# Patient Record
Sex: FEMALE | Race: WHITE | NOT HISPANIC OR LATINO | Employment: FULL TIME | URBAN - NONMETROPOLITAN AREA
[De-identification: names, ages, dates, MRNs, and addresses within clinical notes are randomized per-mention and may not be internally consistent; named-entity substitution may affect disease eponyms.]

---

## 2017-01-05 ENCOUNTER — OFFICE VISIT (OUTPATIENT)
Dept: GASTROENTEROLOGY | Facility: CLINIC | Age: 57
End: 2017-01-05

## 2017-01-05 VITALS
HEART RATE: 78 BPM | BODY MASS INDEX: 32.13 KG/M2 | OXYGEN SATURATION: 98 % | SYSTOLIC BLOOD PRESSURE: 128 MMHG | WEIGHT: 159.4 LBS | HEIGHT: 59 IN | DIASTOLIC BLOOD PRESSURE: 86 MMHG

## 2017-01-05 DIAGNOSIS — Z80.0 FAMILY HISTORY OF COLON CANCER: ICD-10-CM

## 2017-01-05 DIAGNOSIS — K63.5 COLON POLYPS: Primary | ICD-10-CM

## 2017-01-05 PROCEDURE — S0260 H&P FOR SURGERY: HCPCS | Performed by: NURSE PRACTITIONER

## 2017-01-05 RX ORDER — SODIUM, POTASSIUM,MAG SULFATES 17.5-3.13G
2 SOLUTION, RECONSTITUTED, ORAL ORAL ONCE
Qty: 2 BOTTLE | Refills: 0 | Status: SHIPPED | OUTPATIENT
Start: 2017-01-05 | End: 2017-01-05

## 2017-01-05 NOTE — MR AVS SNAPSHOT
Chasidy Tejada   1/5/2017 1:15 PM   Office Visit    Dept Phone:  [phone]   Encounter #:  [id number]    Provider:  SANDRA Collado   Department:  [facility] GASTROENTEROLOGY                Your Full Care Plan              Today's Medication Changes          These changes are accurate as of: 1/5/17  1:38 PM.  If you have any questions, ask your nurse or doctor.               New Medication(s)Ordered:     sodium-potassium-magnesium sulfates solution oral solution   Commonly known as:  SUPREP BOWEL PREP   Take 2 bottles by mouth 1 (One) Time for 1 dose. Split dose prep as directed by office instructions provided.  2 bottles = one kit.   Started by:  SANDRA Collado            Where to Get Your Medications      These medications were sent to [facility] [address] [place] [address] AT  60 - [phone]  - [phone] [address] [address]     Phone:  [phone]     sodium-potassium-magnesium sulfates solution oral solution                  Your Updated Medication List          This list is accurate as of: 1/5/17  1:38 PM.  Always use your most recent med list.                aspirin 81 MG EC tablet       calcium carbonate 600 MG tablet   Commonly known as:  OS-ORTIZ       estrogens (conjugated)-methyltestosterone 0.625-1.25 MG per tablet   Commonly known as:  ESTRATEST HS       MULTIVITAMIN ADULT PO       sodium-potassium-magnesium sulfates solution oral solution   Commonly known as:  SUPREP BOWEL PREP   Take 2 bottles by mouth 1 (One) Time for 1 dose. Split dose prep as directed by office instructions provided.  2 bottles = one kit.               We Performed the Following     Case request       You Were Diagnosed With        Codes Comments    Colon polyps    -  Primary ICD-10-CM: K63.5  ICD-9-CM: 211.3     Family history of colon cancer     ICD-10-CM: Z80.0  ICD-9-CM: V16.0 maternal grandmother      Instructions     None    Patient  "Instructions History      Upcoming Appointments     Visit Type Date Time Department    SCOPE SCREENING 1/5/2017  1:15 PM [facility]      MyChart Signup     Our records indicate that you have declined [facility] sign[facility]. If you would like to sign up for [employer], please email [email or web address] or call [phone] to obtain an activation code.             Other Info from Your Visit           Allergies     Demerol [Meperidine]      Morphine And Related        Reason for Visit     Colon Cancer Screening Patient states she currently is not having any problems.  Has [place] history of [place] [place]      Vital Signs     Blood Pressure Pulse Height Weight Oxygen Saturation Body Mass Index    128/86 78 59\" (149.9 cm) 159 lb 6.4 oz (72.3 kg) 98% 32.19 kg/m2    Smoking Status                   Never Smoker           Problems and Diagnoses Noted     Colon polyp    Family history of colon cancer        "

## 2017-01-05 NOTE — PROGRESS NOTES
Chief Complaint   Patient presents with   • Colon Cancer Screening     Patient states she currently is not having any problems.  Has family history of Colon Cancer -MGM     Subjective   HPI  Chasidy Tejada is a 56 y.o. female who presents as a referral for preventative maintenance. She has no complaints of nausea or vomiting. No change in bowels. No wt loss. No BRBPR. No melena. There is a family hx for colon cancer with maternal grandmother. No abdominal pain.Last colonoscopy 9/5/12 with one 13 mm polyp in rectum.  Past Medical History   Diagnosis Date   • Asthma      Past Surgical History   Procedure Laterality Date   • Tonsillectomy     • Appendectomy     • Cholecystectomy     • Colonoscopy  09/05/2012   • Hysterectomy         Current Outpatient Prescriptions:   •  aspirin 81 MG EC tablet, Take 81 mg by mouth Daily., Disp: , Rfl:   •  Multiple Vitamins-Minerals (MULTIVITAMIN ADULT PO), Take  by mouth Daily., Disp: , Rfl:   •  calcium carbonate (OS-ORTIZ) 600 MG tablet, Take 600 mg by mouth Daily., Disp: , Rfl:   •  estrogens, conjugated,-methyltestosterone (ESTRATEST HS) 0.625-1.25 MG per tablet, Take 1 tablet by mouth Daily., Disp: , Rfl:   •  sodium-potassium-magnesium sulfates (SUPREP BOWEL PREP) solution oral solution, Take 2 bottles by mouth 1 (One) Time for 1 dose. Split dose prep as directed by office instructions provided.  2 bottles = one kit., Disp: 2 bottle, Rfl: 0  Allergies   Allergen Reactions   • Demerol [Meperidine]    • Morphine And Related      Social History     Social History   • Marital status:      Spouse name: N/A   • Number of children: N/A   • Years of education: N/A     Occupational History   • Not on file.     Social History Main Topics   • Smoking status: Never Smoker   • Smokeless tobacco: Not on file   • Alcohol use No   • Drug use: Not on file   • Sexual activity: Not on file     Other Topics Concern   • Not on file     Social History Narrative     Family History   Problem  "Relation Age of Onset   • Colon cancer Maternal Grandmother      age not known   • Colon polyps Neg Hx    • Esophageal cancer Neg Hx    • Liver cancer Neg Hx    • Liver disease Neg Hx    • Rectal cancer Neg Hx    • Stomach cancer Neg Hx        REVIEW OF SYSTEMS  General: well appearing, no fever chills or sweats, no unexplained wt loss  HEENT: no acute visual or hearing disturbances  Cardiovascular: No chest pain or palpitations  Pulmonary: No shortness of breath, coughing, wheezing or hemoptysis  : No burning, urgency, hematuria, or dysuria  Musculoskeletal: No joint pain or stiffness  Peripheral: no edema  Skin: No lesions or rashes  Neuro: No dizziness, headaches, stroke, syncope  Endocrine: No hot or cold intolerances  Hematological: No blood dyscrasias    Objective   Vitals:    01/05/17 1310   BP: 128/86   Pulse: 78   SpO2: 98%   Weight: 159 lb 6.4 oz (72.3 kg)   Height: 59\" (149.9 cm)     Body mass index is 32.19 kg/(m^2).    PHYSICAL EXAM  General: age appropriate well nourished well appearing, no acute distress  Head: normocephalic and atraumatic  Global assessment-supple  Neck-No JVD noted, no lymphadenopathy  Pulmonary-clear to auscultation bilaterally, normal respiratory effort  Cardiovascular-normal rate and rhythm, normal heart sounds, S1 and S2 noted  Abdomen-soft, non tender, non distended, normal bowel sounds all 4 quadrants, no hepatosplenomegaly noted  Extremities-No clubbing cyanosis or edema  Neuro-Non focal, converses appropriately, awake, alert, oriented    Imaging Results (most recent)     None        Assessment/Plan   Chasidy was seen today for colon cancer screening.    Diagnoses and all orders for this visit:    Colon polyps  -     Case request colonoscopy     Family history of colon cancer  Comments:  maternal grandmother  Orders:  -     Case request    Other orders  -     sodium-potassium-magnesium sulfates (SUPREP BOWEL PREP) solution oral solution; Take 2 bottles by mouth 1 (One) Time " for 1 dose. Split dose prep as directed by office instructions provided.  2 bottles = one kit.    COLONOSCOPY WITH ANESTHESIA (N/A)  No Follow-up on file.  There are no Patient Instructions on file for this visit.    All risks, benefits, alternatives, and indications of colonoscopy procedure have been discussed with the patient. Risks to include perforation of the colon requiring possible surgery or colostomy, risk of bleeding from biopsies or removal of colon tissue, possibility of missing a colon polyp or cancer, or adverse drug reaction.  Benefits to include the diagnosis and management of disease of the colon and rectum. Alternatives to include barium enema, radiographic evaluation, lab testing or no intervention. Pt verbalizes understanding and agrees.

## 2017-02-08 ENCOUNTER — ANESTHESIA EVENT (OUTPATIENT)
Dept: GASTROENTEROLOGY | Facility: HOSPITAL | Age: 57
End: 2017-02-08

## 2017-02-10 ENCOUNTER — HOSPITAL ENCOUNTER (OUTPATIENT)
Facility: HOSPITAL | Age: 57
Setting detail: HOSPITAL OUTPATIENT SURGERY
Discharge: HOME | End: 2017-02-10
Attending: INTERNAL MEDICINE | Admitting: INTERNAL MEDICINE

## 2017-02-10 ENCOUNTER — ANESTHESIA (OUTPATIENT)
Dept: GASTROENTEROLOGY | Facility: HOSPITAL | Age: 57
End: 2017-02-10

## 2017-02-10 VITALS
OXYGEN SATURATION: 100 % | HEIGHT: 59 IN | BODY MASS INDEX: 31.85 KG/M2 | HEART RATE: 79 BPM | DIASTOLIC BLOOD PRESSURE: 71 MMHG | TEMPERATURE: 97.1 F | RESPIRATION RATE: 15 BRPM | SYSTOLIC BLOOD PRESSURE: 114 MMHG | WEIGHT: 158 LBS

## 2017-02-10 DIAGNOSIS — K63.5 COLON POLYPS: ICD-10-CM

## 2017-02-10 DIAGNOSIS — Z80.0 FAMILY HISTORY OF COLON CANCER: ICD-10-CM

## 2017-02-10 PROCEDURE — 25010000002 PROPOFOL 10 MG/ML EMULSION: Performed by: NURSE ANESTHETIST, CERTIFIED REGISTERED

## 2017-02-10 PROCEDURE — 88305 TISSUE EXAM BY PATHOLOGIST: CPT | Performed by: INTERNAL MEDICINE

## 2017-02-10 RX ORDER — SODIUM CHLORIDE 0.9 % (FLUSH) 0.9 %
1-10 SYRINGE (ML) INJECTION AS NEEDED
Status: CANCELLED | OUTPATIENT
Start: 2017-02-10

## 2017-02-10 RX ORDER — SODIUM CHLORIDE 0.9 % (FLUSH) 0.9 %
1-10 SYRINGE (ML) INJECTION AS NEEDED
Status: DISCONTINUED | OUTPATIENT
Start: 2017-02-10 | End: 2017-02-10 | Stop reason: HOSPADM

## 2017-02-10 RX ORDER — SODIUM CHLORIDE 9 MG/ML
100 INJECTION, SOLUTION INTRAVENOUS CONTINUOUS
Status: CANCELLED | OUTPATIENT
Start: 2017-02-10

## 2017-02-10 RX ORDER — PROPOFOL 10 MG/ML
VIAL (ML) INTRAVENOUS AS NEEDED
Status: DISCONTINUED | OUTPATIENT
Start: 2017-02-10 | End: 2017-02-10 | Stop reason: SURG

## 2017-02-10 RX ORDER — SODIUM CHLORIDE 9 MG/ML
100 INJECTION, SOLUTION INTRAVENOUS CONTINUOUS
Status: DISCONTINUED | OUTPATIENT
Start: 2017-02-10 | End: 2017-02-10 | Stop reason: HOSPADM

## 2017-02-10 RX ORDER — LIDOCAINE HYDROCHLORIDE 20 MG/ML
INJECTION, SOLUTION INFILTRATION; PERINEURAL AS NEEDED
Status: DISCONTINUED | OUTPATIENT
Start: 2017-02-10 | End: 2017-02-10 | Stop reason: SURG

## 2017-02-10 RX ADMIN — LIDOCAINE HYDROCHLORIDE 50 MG: 20 INJECTION, SOLUTION INFILTRATION; PERINEURAL at 08:28

## 2017-02-10 RX ADMIN — PROPOFOL 50 MG: 10 INJECTION, EMULSION INTRAVENOUS at 08:30

## 2017-02-10 RX ADMIN — PROPOFOL 50 MG: 10 INJECTION, EMULSION INTRAVENOUS at 08:40

## 2017-02-10 RX ADMIN — PROPOFOL 100 MG: 10 INJECTION, EMULSION INTRAVENOUS at 08:28

## 2017-02-10 RX ADMIN — PROPOFOL 50 MG: 10 INJECTION, EMULSION INTRAVENOUS at 08:33

## 2017-02-10 RX ADMIN — PROPOFOL 50 MG: 10 INJECTION, EMULSION INTRAVENOUS at 08:34

## 2017-02-10 RX ADMIN — SODIUM CHLORIDE 100 ML/HR: 9 INJECTION, SOLUTION INTRAVENOUS at 07:02

## 2017-02-10 NOTE — ANESTHESIA POSTPROCEDURE EVALUATION
Patient: Chasidy Tejada    Procedure Summary     Date Anesthesia Start Anesthesia Stop Room / Location    02/10/17 0823 0846  [facility] END[facility]COPY 5 / [facility]       Procedure Diagnosis Surgeon Provider    COLONOSCOPY WITH ANESTHESIA (N/A ) Family history of colon cancer; Colon polyps  (Family history of colon cancer [Z80.0]; Colon polyps [K63.5]) MD Rois Shepherd CRNA          Anesthesia Type: general  Last vitals  BP      Temp      Pulse     Resp      SpO2        Post Anesthesia Care and Evaluation    Patient location during evaluation: PHASE II  Patient participation: complete - patient participated  Level of consciousness: awake and alert  Pain score: 0  Pain management: adequate  Airway patency: patent  Anesthetic complications: No anesthetic complications    Cardiovascular status: acceptable, hemodynamically stable and stable  Respiratory status: acceptable and room air  Hydration status: acceptable

## 2017-02-10 NOTE — PLAN OF CARE
Problem: Patient Care Overview (Adult)  Goal: Plan of Care Review  Outcome: Outcome(s) achieved Date Met:  02/10/17    02/10/17 0851   Coping/Psychosocial Response Interventions   Plan Of Care Reviewed With patient;spouse   Outcome Evaluation   Outcome Summary/Follow up Plan d/c criteria met

## 2017-02-10 NOTE — H&P
"    Chief Complaint:   Colon polyp    Subjective     HPI:   She had a 1.3 cm colon polyp removed from her rectum in 2012.    Past Medical History:   Past Medical History   Diagnosis Date   • Asthma        Past Surgical History:  [unfilled]    Family History:  Family History   Problem Relation Age of Onset   • Colon cancer Maternal Grandmother      age not known   • Colon polyps Neg Hx    • Esophageal cancer Neg Hx    • Liver cancer Neg Hx    • Liver disease Neg Hx    • Rectal cancer Neg Hx    • Stomach cancer Neg Hx        Social History:   reports that she has never smoked. She has never used smokeless tobacco. She reports that she does not drink alcohol or use illicit drugs.    Medications:   Prescriptions Prior to Admission   Medication Sig Dispense Refill Last Dose   • calcium carbonate (OS-ORTIZ) 600 MG tablet Take 600 mg by mouth Daily.   2/3/2017 at Unknown time   • aspirin 81 MG EC tablet Take 81 mg by mouth Daily.   2/3/2017   • estrogens, conjugated,-methyltestosterone (ESTRATEST HS) 0.625-1.25 MG per tablet Take 1 tablet by mouth Daily.   Unknown at Unknown time   • Multiple Vitamins-Minerals (MULTIVITAMIN ADULT PO) Take  by mouth Daily.   2/3/2017       Allergies:  Demerol [meperidine] and Morphine and related    ROS:    General: Weight stable  Resp: No SOA  Cardiovascular: No CP      Objective     Visit Vitals   • /89 (BP Location: Right arm, Patient Position: Sitting)   • Pulse 90   • Temp 97.1 °F (36.2 °C) (Temporal Artery )   • Resp 20   • Ht 59\" (149.9 cm)   • Wt 158 lb (71.7 kg)   • SpO2 98%   • BMI 31.91 kg/m2       Physical Exam   Constitutional: Pt is oriented to person, place, and in no distress.  Eyes: No icterus  ENMT: Unremarkable   Cardiovascular: Normal rate, regular rhythm.    Pulmonary/Chest: No distress.  No audible wheezes  Abdominal: Soft.   Skin: Skin is warm and dry.   Psychiatric: Mood, memory, affect and judgment appear normal.     Assessment/Plan     Diagnosis:  Colon " polyp    Anticipated Surgical Procedure:  Colonoscopy    The risks, benefits, and alternatives of this procedure have been discussed with the patient or the responsible party- the patient understands and agrees to proceed.    The risks, benefits, and alternatives of colonoscopy were reviewed with the patient today.  Risks including perforation of the colon possibly requiring surgery or colostomy.  Additional risks include risk of bleeding from biopsies or removal of colon tissue.  There is also the risk of a drug reaction or problems with anesthesia.  This will be discussed with the further by the anesthesia team on the day of the procedure.  Lastly there is a possibility of missing a colon polyp or cancer.  The benefits include the diagnosis and management of disease of the colon and rectum.  Alternatives to colonoscopy include barium enema, laboratory testing, radiographic evaluation, or no intervention.  The patient verbalizes understanding and agrees.

## 2017-02-10 NOTE — PLAN OF CARE
Problem: Patient Care Overview (Adult)  Goal: Discharge Needs Assessment  Outcome: Outcome(s) achieved Date Met:  02/10/17    02/10/17 0851   Discharge Needs Assessment   Concerns To Be Addressed no discharge needs identified   Equipment Needed After Discharge none   Discharge Disposition home or self-care   Current Health   Anticipated Changes Related to Illness none   Self-Care   Equipment Currently Used at Home none   Living Environment   Transportation Available car

## 2017-02-10 NOTE — PLAN OF CARE
Problem: Patient Care Overview (Adult)  Goal: Adult Individualization and Mutuality  Outcome: Outcome(s) achieved Date Met:  02/10/17    02/10/17 0851   Individualization   Patient Specific Preferences none   Patient Specific Goals none   Patient Specific Interventions none   Mutuality/Individual Preferences   What Anxieties, Fears or Concerns Do You Have About Your Health or Care? none   What Questions Do You Have About Your Health or Care? none   What Information Would Help Us Give You More Personalized Care? none

## 2017-02-10 NOTE — ANESTHESIA PREPROCEDURE EVALUATION
Anesthesia Evaluation     Patient summary reviewed and Nursing notes reviewed   NPO Status: > 8 hours   Airway   Mallampati: II  TM distance: >3 FB  no difficulty expected  Dental - normal exam     Pulmonary - normal exam    breath sounds clear to auscultation  (+) asthma,   Cardiovascular - negative cardio ROS and normal exam  Exercise tolerance: good (4-7 METS)    Rhythm: regular  Rate: normal        Neuro/Psych- negative ROS  GI/Hepatic/Renal/Endo - negative ROS     Musculoskeletal (-) negative ROS    Abdominal     Abdomen: soft.  Bowel sounds: normal.   Substance History - negative use     OB/GYN negative ob/gyn ROS         Other - negative ROS                                   Anesthesia Plan    ASA 1     general     intravenous induction

## 2017-02-13 LAB
CYTO UR: NORMAL
LAB AP CASE REPORT: NORMAL
LAB AP CLINICAL INFORMATION: NORMAL
Lab: NORMAL
PATH REPORT.FINAL DX SPEC: NORMAL
PATH REPORT.GROSS SPEC: NORMAL

## 2018-04-16 NOTE — PROGRESS NOTES
YOB: 1960  Location: [facility]  Location Address: [address] [place] [address], [facility], [address] [place] [address]  Location Phone: [phone]    Chief Complaint   Patient presents with   • Difficulty Swallowing     dysphagia       History of Present Illness  Chasidy Tejada is a 57 y.o. female.  Chasidy Tejada is here for evaluation of ENT complaints. The patient has had problems with hoarseness, voice change, dysphagia and a globus sensation  The symptoms are not localized to a particular location. The patient has had moderate symptoms. The symptoms have been present for the last 6 months There have been no identified factors that aggravate the symptoms. There have been no factors that have improved the symptoms. Patient denies loss of smell, nasal congestion, nasal drainage, postnasal drainage, ear complaints, dry mouth and reflux.  Patient is not on any medications and has not had any testing.        Past Medical History:   Diagnosis Date   • Asthma        Past Surgical History:   Procedure Laterality Date   • APPENDECTOMY     • CHOLECYSTECTOMY     • COLONOSCOPY  2012   • COLONOSCOPY N/A 2/10/2017    Procedure: COLONOSCOPY WITH ANESTHESIA;  Surgeon: Elina Gonsalez MD;  Location: [facility];  Service:    • HYSTERECTOMY     • TONSILLECTOMY         Outpatient Prescriptions Marked as Taking for the 18 encounter (Office Visit) with Miah Lainez MD   Medication Sig Dispense Refill   • aspirin 81 MG EC tablet Take 81 mg by mouth Daily.     • Multiple Vitamins-Minerals (MULTIVITAMIN ADULT PO) Take  by mouth Daily.         Demerol [meperidine] and Morphine and related    Family History   Problem Relation Age of Onset   • Colon cancer Maternal Grandmother      age not known   • Colon polyps Neg Hx    • Esophageal cancer Neg Hx    • Liver cancer Neg Hx    • Liver disease Neg Hx    • Rectal cancer Neg Hx    • Stomach cancer Neg Hx        Social History     Social History   • Marital status:       Spouse name: N/A   • Number of children: N/A   • Years of education: N/A     Occupational History   • Not on file.     Social History Main Topics   • Smoking status: Never Smoker   • Smokeless tobacco: Never Used   • Alcohol use No   • Drug use: No   • Sexual activity: Not on file     Other Topics Concern   • Not on file     Social History Narrative   • No narrative on file       Review of Systems   Constitutional: Negative.    HENT: Positive for trouble swallowing and voice change.         Globus sensation   Eyes: Negative.    Respiratory: Positive for choking.    Cardiovascular: Negative.    Gastrointestinal: Negative.    Endocrine: Negative.    Genitourinary: Negative.    Musculoskeletal: Negative.    Skin: Negative.    Allergic/Immunologic: Negative.    Neurological: Negative.    Hematological: Negative.    Psychiatric/Behavioral: Negative.        Vitals:    04/17/18 1545   BP: 140/80   Pulse: 76   Temp: 97.3 °F (36.3 °C)       Body mass index is 30.9 kg/m².    Objective     Physical Exam  CONSTITUTIONAL: Mildly overweight, well nourished, alert, oriented, in no acute distress     COMMUNICATION AND VOICE: able to communicate normally, normal voice quality with minimal raspiness    HEAD: normocephalic, no lesions, atraumatic, no tenderness, no masses     FACE: appearance normal, no lesions, no tenderness, no deformities, facial motion symmetric    SALIVARY GLANDS: parotid glands with no tenderness, no swelling, no masses, submandibular glands with normal size, nontender    EYES: ocular motility normal, eyelids normal, orbits normal, no proptosis, conjunctiva normal , pupils equal, round     EARS:  Hearing: response to conversational voice normal bilaterally   External Ears: auricles without lesions  Otoscopic: tympanic membrane appearance normal, no lesions, no perforation, normal mobility, no fluid    NOSE:  External Nose: structure normal, no tenderness on palpation, no nasal discharge, no lesions,  no evidence of trauma, nostrils patent   Intranasal Exam: nasal mucosa normal, vestibule within normal limits, inferior turbinate normal, nasal septum midline   Nasopharynx:     ORAL:  Lips: upper and lower lips without lesion   Teeth: dentition within normal limits for age   Gums: gingivae healthy   Oral Mucosa: oral mucosa normal, no mucosal lesions   Floor of Mouth: Warthin’s duct patent, mucosa normal  Tongue: lingual mucosa normal without lesions, normal tongue mobility   Palate: soft and hard palates with normal mucosa and structure  Oropharynx: oropharyngeal mucosa normal, tonsils surgically absent    HYPOPHARYNX:   LARYNX: See endo    NECK: neck appearance normal, no mass, noted without erythema or tenderness    THYROID: no overt thyromegaly, no tenderness, nodules or mass present on palpation, position midline     LYMPH NODES: no lymphadenopathy    CHEST/RESPIRATORY: respiratory effort normal, normal breath sounds     CARDIOVASCULAR: rate and rhythm normal, extremities without cyanosis or edema    NEUROLOGIC/PSYCHIATRIC: oriented to time, place and person, mood normal, affect appropriate, CN II-XII intact grossly    Assessment/Plan   Chasidy was seen today for difficulty swallowing.    Diagnoses and all orders for this visit:    Laryngopharyngeal reflux  -     FL Esophagram Complete    Pharyngoesophageal dysphagia  -     FL Esophagram Complete    Other orders  -     omeprazole (priLOSEC) 40 MG capsule; Take 1 capsule by mouth 2 (Two) Times a Day for 30 days. Take 30 minutes to 1 hour before meals      * Surgery not found *  Orders Placed This Encounter   Procedures   • FL Esophagram Complete     Order Specific Question:   Patient Pregnant     Answer:   No     Order Specific Question:   Reason for Exam:     Answer:   Dysphagia associated with food sticking in the upper esophagus intermittently     Return in about 2 months (around 6/17/2018).       Patient Instructions   Call or return for problems  Follow-up  in 2 months  PPI twice a day for 2 months  Swallow study    Recommendation was made to consider a Paleo Diet.      It Starts with Food - Yury and Diana Perez was recommended.  The basics of a Paleo diet was covered including a diet composed of meat, fruits and non-leguminous vegetables.  It is important to avoid all processed foods.  This requires that you not eat ANYTHING with a chemical preservative.    The Paleo lifestyle is about nourishing our bodies with real food that is grown and raised as nature intended, not manufactured in a facility.  It is about unplugging from the modern day electronics from time to time and giving your body a chance to actually rest.    It is important to stick very close to this diet for 6 weeks without significant cheating.  It allows you to experience the benefit of eating this way, giving your body time to detoxify.    Acceptable foods  Fresh Fish/seafood  Fresh meats-preferably grass-fed and free range  Fresh fruits  Fresh vegetables  Healthy fats-Coconut oil, olive oil, avocados etc.  Nuts/seeds    Foods to avoid  Grains  Legumes  Processed foods  Soy  Refined sugar    Web sites include:  www.PrimLegend SiliconPrimal2Nite2Nite.net.Manga Corta  www.EverydayPaleo.com  www.[employer]  Www.Xuanyixia    Other Jrarod Resources:  NomNom Paleo  PrimalPaleo  Paleo [place]  Nourished      Other Recommended Books:  The Paleo Solution  Wheat Belly  Grain Brain  Primal Body/Primal Mind        Gastroesophageal Reflux Disease (Laryngopharyngeal Reflux), Adult  Gastroesophageal reflux disease (GERD) and/or Laryngopharyngeal Reflux, (LPR) happens when acid from your stomach flows up into the esophagus and/or throat and voicebox or larynx. When acid comes in contact with the these organs, the acid can cause soreness (inflammation). Over time, GERD may create small holes (ulcers) in the lining of the esophagus and may lead to the development of hoarseness, difficulty swallowing,   feeling of something  stuck in the throat, increased mucous or drainage and even predispose to the development of malignancies, (cancer).    CAUSES   · Increased body weight. This puts pressure on the stomach, making acid rise from the stomach into the esophagus.  · Smoking. This increases acid production in the stomach.  · Drinking alcohol. This causes decreased pressure in the lower esophageal sphincter (valve or ring of muscle between the esophagus and stomach), allowing acid from the stomach into the esophagus.  · Late evening meals and a full stomach. This increases pressure and acid production in the stomach.  · A malformed lower esophageal sphincter  · Diet which can include avoidance of gluten and dairy products  · Age  SYMPTOMS   · Burning pain in the lower part of the mid-chest behind the breastbone and in the mid-stomach area. This may occur twice a week or more often.  · Trouble swallowing.  · Sore throat.  · Dry cough.  · Asthma-like symptoms including chest tightness, shortness of breath, or wheezing.  · Globus sensation-something stuck in the throat/fullness  · Hoarseness  DIAGNOSIS   Your caregiver may be able to diagnose GERD based on your symptoms. In some cases, X-rays and other tests may be done to check for complications or to check the condition of your stomach and esophagus.  You may need to see another doctor.  TREATMENT   Over-the-counter or prescription medicines to help decrease acid production.   Dietary and behavioral modifications or changes may be also recommended.  HOME CARE INSTRUCTIONS   · Change the factors that you can control. Ask your caregiver for guidance concerning weight loss, quitting smoking, and alcohol consumption.  · Avoid foods and drinks that make your symptoms worse, and MAY include such as:  ¨ Caffeine or alcoholic drinks.  ¨ Chocolate.  ¨ Gluten containing foods  ¨ Dairy  ¨ Peppermint or mint flavorings.  ¨ Garlic and onions.  ¨ Spicy foods.  ¨ Citrus fruits, such as oranges, yadiel, or  limes.  ¨ Tomato-based foods such as sauce, chili, salsa, and pizza.  ¨ Fried and fatty foods.  · Avoid lying down for the 3 hours prior to your bedtime or prior to taking a nap.  · Eat small, frequent meals instead of large meals.  · Wear loose-fitting clothing. Do not wear anything tight around your waist that causes pressure on your stomach.  · Raise the head of your bed 6 to 8 inches with wood blocks to help you sleep. Extra pillows will not help.  · Only take over-the-counter or prescription medicines for pain, discomfort, or fever as directed by your caregiver.  · Do not take aspirin, ibuprofen, or other nonsteroidal anti-inflammatory drugs if possible (NSAIDs).  SEEK IMMEDIATE MEDICAL CARE IF:   · You have pain in your arms, neck, jaw, teeth, or back.  · Your pain increases or changes in intensity or duration.  · You develop nausea, vomiting, or sweating (diaphoresis).  · You develop shortness of breath, or you faint.  · Your vomit is green, yellow, black, or looks like coffee grounds or blood.  · Your stool is red, bloody, or black.  These symptoms could be signs of other problems, such as heart disease, gastric bleeding, or esophageal bleeding.  MAKE SURE YOU:   · Understand these instructions.  · Will watch your condition.  · Will get help right away if you are not doing well or get worse.     This information is not intended to replace advice given to you by your physician. Make sure you discuss any questions you have with your health care provider.     Modified by Miah Lainez MD, FACS 9/8/2016.  Document Released: 09/27/2006 Document Revised: 01/08/2016 Document Reviewed: 04/13/2016  [employer] Interactive Patient Education ©2016 Elsevier Inc.            MyPlate from [place]  The general, healthful diet is based on the 2010 Dietary Guidelines for Americans. The amount of food you need to eat from each food group depends on your age, sex, and level of physical activity and can be individualized by a  dietitian. Go to ChooseMyPlate.gov for more information.  What do I need to know about the MyPlate plan?  · Enjoy your food, but eat less.  · Avoid oversized portions.  ¨ ½ of your plate should include fruits and vegetables.  ¨ ¼ of your plate should be grains.  ¨ ¼ of your plate should be protein.  Grains   · Make at least half of your grains whole grains.  · For a 2,000 calorie daily food plan, eat 6 oz every day.  · 1 oz is about 1 slice bread, 1 cup cereal, or ½ cup cooked rice, cereal, or pasta.  Vegetables   · Make half your plate fruits and vegetables.  · For a 2,000 calorie daily food plan, eat 2½ cups every day.  · 1 cup is about 1 cup raw or cooked vegetables or vegetable juice or 2 cups raw leafy greens.  Fruits   · Make half your plate fruits and vegetables.  · For a 2,000 calorie daily food plan, eat 2 cups every day.  · 1 cup is about 1 cup fruit or 100% fruit juice or ½ cup dried fruit.  Protein   · For a 2,000 calorie daily food plan, eat 5½ oz every day.  · 1 oz is about 1 oz meat, poultry, or fish, ¼ cup cooked beans, 1 egg, 1 Tbsp peanut butter, or ½ oz nuts or seeds.  Dairy   · Switch to fat-free or low-fat (1%) milk.  · For a 2,000 calorie daily food plan, eat 3 cups every day.  · 1 cup is about 1 cup milk or yogurt or soy milk (soy beverage), 1½ oz natural cheese, or 2 oz processed cheese.  Fats, Oils, and Empty Calories   · Only small amounts of oils are recommended.  · Empty calories are calories from solid fats or added sugars.  · Compare sodium in foods like soup, bread, and frozen meals. Choose the foods with lower numbers.  · Drink water instead of sugary drinks.  What foods can I eat?  Grains   Whole grains such as whole wheat, quinoa, millet, and bulgur. Bread, rolls, and pasta made from whole grains. Brown or wild rice. Hot or cold cereals made from whole grains and without added sugar.  Vegetables   All fresh vegetables, especially fresh red, dark green, or orange vegetables. Peas and  beans. Low-sodium frozen or canned vegetables prepared without added salt. Low-sodium vegetable juices.  Fruits   All fresh, frozen, and dried fruits. Canned fruit packed in water or fruit juice without added sugar. Fruit juices without added sugar.  Meats and Other Protein Sources   Boiled, baked, or grilled lean meat trimmed of fat. Skinless poultry. Fresh seafood and shellfish. Canned seafood packed in water. Unsalted nuts and unsalted nut butters. Tofu. Dried beans and pea. Eggs.  Dairy   Low-fat or fat-free milk, yogurt, and cheeses.  Sweets and Desserts   Frozen desserts made from low-fat milk.  Fats and Oils   Olive, peanut, and canola oils and margarine. Salad dressing and mayonnaise made from these oils.  Other   Soups and casseroles made from allowed ingredients and without added fat or salt.  The items listed above may not be a complete list of recommended foods or beverages. Contact your dietitian for more options.   What foods are not recommended?  Grains   Sweetened, low-fiber cereals. Packaged baked goods. Snack crackers and chips. Cheese crackers, butter crackers, and biscuits. Frozen waffles, sweet breads, doughnuts, pastries, packaged baking mixes, pancakes, cakes, and cookies.  Vegetables   Regular canned or frozen vegetables or vegetables prepared with salt. Canned tomatoes. Canned tomato sauce. Fried vegetables. Vegetables in cream sauce or cheese sauce.  Fruits   Fruits packed in syrup or made with added sugar.  Meats and Other Protein Sources   Marbled or fatty meats such as ribs. Poultry with skin. Fried meats, poultry, eggs, or fish. Sausages, hot dogs, and deli meats such as pastrami, bologna, or salami.  Dairy   Whole milk, cream, cheeses made from whole milk, sour cream. Ice cream or yogurt made from whole milk or with added sugar.  Beverages   For adults, no more than one alcoholic drink per day. Regular soft drinks or other sugary beverages. Juice drinks.  Sweets and Desserts   Sugary  or fatty desserts, candy, and other sweets.  Fats and Oils   Solid shortening or partially hydrogenated oils. Solid margarine. Margarine that contains trans fats. Butter.  The items listed above may not be a complete list of foods and beverages to avoid. Contact your dietitian for more information.   This information is not intended to replace advice given to you by your health care provider. Make sure you discuss any questions you have with your health care provider.  Document Released: 01/06/2009 Document Revised: 05/25/2017 Document Reviewed: 11/26/2014  Believe.in Interactive Patient Education © 2017 Believe.in Inc.     Calorie Counting for Weight Loss  Calories are units of energy. Your body needs a certain amount of calories from food to keep you going throughout the day. When you eat more calories than your body needs, your body stores the extra calories as fat. When you eat fewer calories than your body needs, your body burns fat to get the energy it needs.  Calorie counting means keeping track of how many calories you eat and drink each day. Calorie counting can be helpful if you need to lose weight. If you make sure to eat fewer calories than your body needs, you should lose weight. Ask your health care provider what a healthy weight is for you.  For calorie counting to work, you will need to eat the right number of calories in a day in order to lose a healthy amount of weight per week. A dietitian can help you determine how many calories you need in a day and will give you suggestions on how to reach your calorie goal.  · A healthy amount of weight to lose per week is usually 1-2 lb (0.5-0.9 kg). This usually means that your daily calorie intake should be reduced by 500-750 calories.  · Eating 1,200 - 1,500 calories per day can help most women lose weight.  · Eating 1,500 - 1,800 calories per day can help most men lose weight.  What is my plan?  My goal is to have __________ calories per day.  If I have this  many calories per day, I should lose around __________ pounds per week.  What do I need to know about calorie counting?  In order to meet your daily calorie goal, you will need to:  · Find out how many calories are in each food you would like to eat. Try to do this before you eat.  · Decide how much of the food you plan to eat.  · Write down what you ate and how many calories it had. Doing this is called keeping a food log.  To successfully lose weight, it is important to balance calorie counting with a healthy lifestyle that includes regular activity. Aim for 150 minutes of moderate exercise (such as walking) or 75 minutes of vigorous exercise (such as running) each week.  Where do I find calorie information?     The number of calories in a food can be found on a Nutrition Facts label. If a food does not have a Nutrition Facts label, try to look up the calories online or ask your dietitian for help.  Remember that calories are listed per serving. If you choose to have more than one serving of a food, you will have to multiply the calories per serving by the amount of servings you plan to eat. For example, the label on a package of bread might say that a serving size is 1 slice and that there are 90 calories in a serving. If you eat 1 slice, you will have eaten 90 calories. If you eat 2 slices, you will have eaten 180 calories.  How do I keep a food log?  Immediately after each meal, record the following information in your food log:  · What you ate. Don't forget to include toppings, sauces, and other extras on the food.  · How much you ate. This can be measured in cups, ounces, or number of items.  · How many calories each food and drink had.  · The total number of calories in the meal.  Keep your food log near you, such as in a small notebook in your pocket, or use a mobile florentin or website. Some programs will calculate calories for you and show you how many calories you have left for the day to meet your  "goal.  What are some calorie counting tips?  · Use your calories on foods and drinks that will fill you up and not leave you hungry:  ¨ Some examples of foods that fill you up are nuts and nut butters, vegetables, lean proteins, and high-fiber foods like whole grains. High-fiber foods are foods with more than 5 g fiber per serving.  ¨ Drinks such as sodas, specialty coffee drinks, alcohol, and juices have a lot of calories, yet do not fill you up.  · Eat nutritious foods and avoid empty calories. Empty calories are calories you get from foods or beverages that do not have many vitamins or protein, such as candy, sweets, and soda. It is better to have a nutritious high-calorie food (such as an avocado) than a food with few nutrients (such as a bag of chips).  · Know how many calories are in the foods you eat most often. This will help you calculate calorie counts faster.  · Pay attention to calories in drinks. Low-calorie drinks include water and unsweetened drinks.  · Pay attention to nutrition labels for \"low fat\" or \"fat free\" foods. These foods sometimes have the same amount of calories or more calories than the full fat versions. They also often have added sugar, starch, or salt, to make up for flavor that was removed with the fat.  · Find a way of tracking calories that works for you. Get creative. Try different apps or programs if writing down calories does not work for you.  What are some portion control tips?  · Know how many calories are in a serving. This will help you know how many servings of a certain food you can have.  · Use a measuring cup to measure serving sizes. You could also try weighing out portions on a kitchen scale. With time, you will be able to estimate serving sizes for some foods.  · Take some time to put servings of different foods on your favorite plates, bowls, and cups so you know what a serving looks like.  · Try not to eat straight from a bag or box. Doing this can lead to " overeating. Put the amount you would like to eat in a cup or on a plate to make sure you are eating the right portion.  · Use smaller plates, glasses, and bowls to prevent overeating.  · Try not to multitask (for example, watch TV or use your computer) while eating. If it is time to eat, sit down at a table and enjoy your food. This will help you to know when you are full. It will also help you to be aware of what you are eating and how much you are eating.  What are tips for following this plan?  Reading food labels   · Check the calorie count compared to the serving size. The serving size may be smaller than what you are used to eating.  · Check the source of the calories. Make sure the food you are eating is high in vitamins and protein and low in saturated and trans fats.  Shopping   · Read nutrition labels while you shop. This will help you make healthy decisions before you decide to purchase your food.  · Make a grocery list and stick to it.  Cooking   · Try to cook your favorite foods in a healthier way. For example, try baking instead of frying.  · Use low-fat dairy products.  Meal planning   · Use more fruits and vegetables. Half of your plate should be fruits and vegetables.  · Include lean proteins like poultry and fish.  How do I count calories when eating out?  · Ask for smaller portion sizes.  · Consider sharing an entree and sides instead of getting your own entree.  · If you get your own entree, eat only half. Ask for a box at the beginning of your meal and put the rest of your entree in it so you are not tempted to eat it.  · If calories are listed on the menu, choose the lower calorie options.  · Choose dishes that include vegetables, fruits, whole grains, low-fat dairy products, and lean protein.  · Choose items that are boiled, broiled, grilled, or steamed. Stay away from items that are buttered, battered, fried, or served with cream sauce. Items labeled “crispy” are usually fried, unless stated  otherwise.  · Choose water, low-fat milk, unsweetened iced tea, or other drinks without added sugar. If you want an alcoholic beverage, choose a lower calorie option such as a glass of wine or light beer.  · Ask for dressings, sauces, and syrups on the side. These are usually high in calories, so you should limit the amount you eat.  · If you want a salad, choose a garden salad and ask for grilled meats. Avoid extra toppings like aguilar, cheese, or fried items. Ask for the dressing on the side, or ask for olive oil and vinegar or lemon to use as dressing.  · Estimate how many servings of a food you are given. For example, a serving of cooked rice is ½ cup or about the size of half a baseball. Knowing serving sizes will help you be aware of how much food you are eating at restaurants. The list below tells you how big or small some common portion sizes are based on everyday objects:  ¨ 1 oz--4 stacked dice.  ¨ 3 oz--1 deck of cards.  ¨ 1 tsp--1 die.  ¨ 1 Tbsp--½ a ping-pong ball.  ¨ 2 Tbsp--1 ping-pong ball.  ¨ ½ cup--½ baseball.  ¨ 1 cup--1 baseball.  Summary  · Calorie counting means keeping track of how many calories you eat and drink each day. If you eat fewer calories than your body needs, you should lose weight.  · A healthy amount of weight to lose per week is usually 1-2 lb (0.5-0.9 kg). This usually means reducing your daily calorie intake by 500-750 calories.  · The number of calories in a food can be found on a Nutrition Facts label. If a food does not have a Nutrition Facts label, try to look up the calories online or ask your dietitian for help.  · Use your calories on foods and drinks that will fill you up, and not on foods and drinks that will leave you hungry.  · Use smaller plates, glasses, and bowls to prevent overeating.  This information is not intended to replace advice given to you by your health care provider. Make sure you discuss any questions you have with your health care provider.  Document  Released: 12/18/2006 Document Revised: 11/17/2017 Document Reviewed: 11/17/2017  [employer] Interactive Patient Education © 2017 [employer]     Exercising to Lose Weight  Exercising can help you to lose weight. In order to lose weight through exercise, you need to do vigorous-intensity exercise. You can tell that you are exercising with vigorous intensity if you are breathing very hard and fast and cannot hold a conversation while exercising.  Moderate-intensity exercise helps to maintain your current weight. You can tell that you are exercising at a moderate level if you have a higher heart rate and faster breathing, but you are still able to hold a conversation.  How often should I exercise?  Choose an activity that you enjoy and set realistic goals. Your health care provider can help you to make an activity plan that works for you. Exercise regularly as directed by your health care provider. This may include:  · Doing resistance training twice each week, such as:  ¨ Push-ups.  ¨ Sit-ups.  ¨ Lifting weights.  ¨ Using resistance bands.  · Doing a given intensity of exercise for a given amount of time. Choose from these options:  ¨ 150 minutes of moderate-intensity exercise every week.  ¨ 75 minutes of vigorous-intensity exercise every week.  ¨ A mix of moderate-intensity and vigorous-intensity exercise every week.  Children, pregnant women, people who are out of shape, people who are overweight, and older adults may need to consult a health care provider for individual recommendations. If you have any sort of medical condition, be sure to consult your health care provider before starting a new exercise program.  What are some activities that can help me to lose weight?  · Walking at a rate of at least 4.5 miles an hour.  · Jogging or running at a rate of 5 miles per hour.  · Biking at a rate of at least 10 miles per hour.  · Lap swimming.  · Roller-skating or in-line skating.  · Cross-country skiing.  · Vigorous  competitive sports, such as football, basketball, and soccer.  · Jumping rope.  · Aerobic dancing.  How can I be more active in my day-to-day activities?  · Use the stairs instead of the elevator.  · Take a walk during your lunch break.  · If you drive, park your car farther away from work or school.  · If you take public transportation, get off one stop early and walk the rest of the way.  · Make all of your phone calls while standing up and walking around.  · Get up, stretch, and walk around every 30 minutes throughout the day.  What guidelines should I follow while exercising?  · Do not exercise so much that you hurt yourself, feel dizzy, or get very short of breath.  · Consult your health care provider prior to starting a new exercise program.  · Wear comfortable clothes and shoes with good support.  · Drink plenty of water while you exercise to prevent dehydration or heat stroke. Body water is lost during exercise and must be replaced.  · Work out until you breathe faster and your heart beats faster.  This information is not intended to replace advice given to you by your health care provider. Make sure you discuss any questions you have with your health care provider.  Document Released: 01/20/2012 Document Revised: 05/25/2017 Document Reviewed: 05/21/2015  [employer] Interactive Patient Education © 2017 [employer]

## 2018-04-17 ENCOUNTER — OFFICE VISIT (OUTPATIENT)
Dept: OTOLARYNGOLOGY | Facility: CLINIC | Age: 58
End: 2018-04-17

## 2018-04-17 VITALS
HEIGHT: 59 IN | SYSTOLIC BLOOD PRESSURE: 140 MMHG | TEMPERATURE: 97.3 F | BODY MASS INDEX: 30.84 KG/M2 | HEART RATE: 76 BPM | DIASTOLIC BLOOD PRESSURE: 80 MMHG | WEIGHT: 153 LBS

## 2018-04-17 DIAGNOSIS — K21.9 LARYNGOPHARYNGEAL REFLUX: Primary | ICD-10-CM

## 2018-04-17 DIAGNOSIS — R13.14 PHARYNGOESOPHAGEAL DYSPHAGIA: ICD-10-CM

## 2018-04-17 PROCEDURE — 99203 OFFICE O/P NEW LOW 30 MIN: CPT | Performed by: OTOLARYNGOLOGY

## 2018-04-17 PROCEDURE — 31575 DIAGNOSTIC LARYNGOSCOPY: CPT | Performed by: OTOLARYNGOLOGY

## 2018-04-17 RX ORDER — OMEPRAZOLE 40 MG/1
40 CAPSULE, DELAYED RELEASE ORAL 2 TIMES DAILY
Qty: 60 CAPSULE | Refills: 3 | Status: SHIPPED | OUTPATIENT
Start: 2018-04-17 | End: 2018-05-17

## 2018-04-17 NOTE — PROGRESS NOTES
OPERATIVE NOTE:  Chasidy Tejada    DATE OF PROCEDURE: 04/17/2018    PROCEDURE:   Flexible Fiberoptic Laryngoscopy    ANESTHESIA:  None    REASON FOR PROCEDURE:  Procedure was recommend for persistant hoarseness and globus sensation  Risks, benefits and alternatives were discussed.      DETAILS of OPERATION:  The patient was seated in the exam chair.  A flexible fiberoptic laryngoscopy was performed through the oral cavity.  The scope was introduced into the oral cavity and directed to the level of the glottis, examining the structures of the oropharynx, base of tongue, vallecula, supraglottic larynx, glottic larynx, and hypopharynx.      FINDINGS:  Mucosal surfaces:   The mucosal surfaces demonstrated normal mucosa surfaces with mild inflammation    Base of tongue:  The base of tongue was found to have no mass or lesion.    Epiglottis:  The epiglottis was found to have no mass or lesion.    Aryepligottic fold:  The AE folds were found to have no mass or lesion.    False Vocal Fold:  The false cords were found to have no mass or lesion.    True Vocal Cord:  The true vocal cords were found to have no mass or lesion.  True vocal cords adduct and abduct normally    Arytenoid:   The arytenoids were found to have mild to moderate inter-arytenoid edema.    Hypopharynx:  The hypopharynx was found to have no mass or lesion.    The patient tolerated procedure well.

## 2018-04-17 NOTE — PATIENT INSTRUCTIONS
Call or return for problems  Follow-up in 2 months  PPI twice a day for 2 months  Swallow study    Recommendation was made to consider a Paleo Diet.      It Starts with Food - Yury and Diana Perez was recommended.  The basics of a Paleo diet was covered including a diet composed of meat, fruits and non-leguminous vegetables.  It is important to avoid all processed foods.  This requires that you not eat ANYTHING with a chemical preservative.    The Paleo lifestyle is about nourishing our bodies with real food that is grown and raised as nature intended, not manufactured in a facility.  It is about unplugging from the modern day electronics from time to time and giving your body a chance to actually rest.    It is important to stick very close to this diet for 6 weeks without significant cheating.  It allows you to experience the benefit of eating this way, giving your body time to detoxify.    Acceptable foods  Fresh Fish/seafood  Fresh meats-preferably grass-fed and free range  Fresh fruits  Fresh vegetables  Healthy fats-Coconut oil, olive oil, avocados etc.  Nuts/seeds    Foods to avoid  Grains  Legumes  Processed foods  Soy  Refined sugar    Web sites include:  [email or web address] [email or web address] management  [email or web address] management  www.PureHistory  [email or web address] management    Other [employer] Resources:  Angie Mcallister  PrimalPalejuana  Paleo [place]  Nourished      Other Recommended Books:  The Paleo Solution  Wheat Belly  Grain Brain  Primal Body/Primal Mind        Gastroesophageal Reflux Disease (Laryngopharyngeal Reflux), Adult  Gastroesophageal reflux disease (GERD) and/or Laryngopharyngeal Reflux, (LPR) happens when acid from your stomach flows up into the esophagus and/or throat and voicebox or larynx. When acid comes in contact with the these organs, the acid can cause soreness (inflammation). Over time, GERD may create small holes (ulcers) in the lining of the esophagus and may lead to the development of hoarseness,  difficulty swallowing,   feeling of something stuck in the throat, increased mucous or drainage and even predispose to the development of malignancies, (cancer).    CAUSES   · Increased body weight. This puts pressure on the stomach, making acid rise from the stomach into the esophagus.  · Smoking. This increases acid production in the stomach.  · Drinking alcohol. This causes decreased pressure in the lower esophageal sphincter (valve or ring of muscle between the esophagus and stomach), allowing acid from the stomach into the esophagus.  · Late evening meals and a full stomach. This increases pressure and acid production in the stomach.  · A malformed lower esophageal sphincter  · Diet which can include avoidance of gluten and dairy products  · Age  SYMPTOMS   · Burning pain in the lower part of the mid-chest behind the breastbone and in the mid-stomach area. This may occur twice a week or more often.  · Trouble swallowing.  · Sore throat.  · Dry cough.  · Asthma-like symptoms including chest tightness, shortness of breath, or wheezing.  · Globus sensation-something stuck in the throat/fullness  · Hoarseness  DIAGNOSIS   Your caregiver may be able to diagnose GERD based on your symptoms. In some cases, X-rays and other tests may be done to check for complications or to check the condition of your stomach and esophagus.  You may need to see another doctor.  TREATMENT   Over-the-counter or prescription medicines to help decrease acid production.   Dietary and behavioral modifications or changes may be also recommended.  HOME CARE INSTRUCTIONS   · Change the factors that you can control. Ask your caregiver for guidance concerning weight loss, quitting smoking, and alcohol consumption.  · Avoid foods and drinks that make your symptoms worse, and MAY include such as:  ¨ Caffeine or alcoholic drinks.  ¨ Chocolate.  ¨ Gluten containing foods  ¨ Dairy  ¨ Peppermint or mint flavorings.  ¨ Garlic and onions.  ¨ Spicy  foods.  ¨ Citrus fruits, such as oranges, yadiel, or limes.  ¨ Tomato-based foods such as sauce, chili, salsa, and pizza.  ¨ Fried and fatty foods.  · Avoid lying down for the 3 hours prior to your bedtime or prior to taking a nap.  · Eat small, frequent meals instead of large meals.  · Wear loose-fitting clothing. Do not wear anything tight around your waist that causes pressure on your stomach.  · Raise the head of your bed 6 to 8 inches with wood blocks to help you sleep. Extra pillows will not help.  · Only take over-the-counter or prescription medicines for pain, discomfort, or fever as directed by your caregiver.  · Do not take aspirin, ibuprofen, or other nonsteroidal anti-inflammatory drugs if possible (NSAIDs).  SEEK IMMEDIATE MEDICAL CARE IF:   · You have pain in your arms, neck, jaw, teeth, or back.  · Your pain increases or changes in intensity or duration.  · You develop nausea, vomiting, or sweating (diaphoresis).  · You develop shortness of breath, or you faint.  · Your vomit is green, yellow, black, or looks like coffee grounds or blood.  · Your stool is red, bloody, or black.  These symptoms could be signs of other problems, such as heart disease, gastric bleeding, or esophageal bleeding.  MAKE SURE YOU:   · Understand these instructions.  · Will watch your condition.  · Will get help right away if you are not doing well or get worse.     This information is not intended to replace advice given to you by your physician. Make sure you discuss any questions you have with your health care provider.     Modified by Miah Lainez MD, FACS 9/8/2016.  Document Released: 09/27/2006 Document Revised: 01/08/2016 Document Reviewed: 04/13/2016  [employer] Interactive Patient Education ©2016 [employer] Inc.            MyPlate from [employer]  The general, healthful diet is based on the 2010 Dietary Guidelines for Americans. The amount of food you need to eat from each food group depends on your age, sex, and level of  physical activity and can be individualized by a dietitian. Go to ChooseMyPlate.gov for more information.  What do I need to know about the MyPlate plan?  · Enjoy your food, but eat less.  · Avoid oversized portions.  ¨ ½ of your plate should include fruits and vegetables.  ¨ ¼ of your plate should be grains.  ¨ ¼ of your plate should be protein.  Grains   · Make at least half of your grains whole grains.  · For a 2,000 calorie daily food plan, eat 6 oz every day.  · 1 oz is about 1 slice bread, 1 cup cereal, or ½ cup cooked rice, cereal, or pasta.  Vegetables   · Make half your plate fruits and vegetables.  · For a 2,000 calorie daily food plan, eat 2½ cups every day.  · 1 cup is about 1 cup raw or cooked vegetables or vegetable juice or 2 cups raw leafy greens.  Fruits   · Make half your plate fruits and vegetables.  · For a 2,000 calorie daily food plan, eat 2 cups every day.  · 1 cup is about 1 cup fruit or 100% fruit juice or ½ cup dried fruit.  Protein   · For a 2,000 calorie daily food plan, eat 5½ oz every day.  · 1 oz is about 1 oz meat, poultry, or fish, ¼ cup cooked beans, 1 egg, 1 Tbsp peanut butter, or ½ oz nuts or seeds.  Dairy   · Switch to fat-free or low-fat (1%) milk.  · For a 2,000 calorie daily food plan, eat 3 cups every day.  · 1 cup is about 1 cup milk or yogurt or soy milk (soy beverage), 1½ oz natural cheese, or 2 oz processed cheese.  Fats, Oils, and Empty Calories   · Only small amounts of oils are recommended.  · Empty calories are calories from solid fats or added sugars.  · Compare sodium in foods like soup, bread, and frozen meals. Choose the foods with lower numbers.  · Drink water instead of sugary drinks.  What foods can I eat?  Grains   Whole grains such as whole wheat, quinoa, millet, and bulgur. Bread, rolls, and pasta made from whole grains. Brown or wild rice. Hot or cold cereals made from whole grains and without added sugar.  Vegetables   All fresh vegetables, especially  fresh red, dark green, or orange vegetables. Peas and beans. Low-sodium frozen or canned vegetables prepared without added salt. Low-sodium vegetable juices.  Fruits   All fresh, frozen, and dried fruits. Canned fruit packed in water or fruit juice without added sugar. Fruit juices without added sugar.  Meats and Other Protein Sources   Boiled, baked, or grilled lean meat trimmed of fat. Skinless poultry. Fresh seafood and shellfish. Canned seafood packed in water. Unsalted nuts and unsalted nut butters. Tofu. Dried beans and pea. Eggs.  Dairy   Low-fat or fat-free milk, yogurt, and cheeses.  Sweets and Desserts   Frozen desserts made from low-fat milk.  Fats and Oils   Olive, peanut, and canola oils and margarine. Salad dressing and mayonnaise made from these oils.  Other   Soups and casseroles made from allowed ingredients and without added fat or salt.  The items listed above may not be a complete list of recommended foods or beverages. Contact your dietitian for more options.   What foods are not recommended?  Grains   Sweetened, low-fiber cereals. Packaged baked goods. Snack crackers and chips. Cheese crackers, butter crackers, and biscuits. Frozen waffles, sweet breads, doughnuts, pastries, packaged baking mixes, pancakes, cakes, and cookies.  Vegetables   Regular canned or frozen vegetables or vegetables prepared with salt. Canned tomatoes. Canned tomato sauce. Fried vegetables. Vegetables in cream sauce or cheese sauce.  Fruits   Fruits packed in syrup or made with added sugar.  Meats and Other Protein Sources   Marbled or fatty meats such as ribs. Poultry with skin. Fried meats, poultry, eggs, or fish. Sausages, hot dogs, and deli meats such as pastrami, bologna, or salami.  Dairy   Whole milk, cream, cheeses made from whole milk, sour cream. Ice cream or yogurt made from whole milk or with added sugar.  Beverages   For adults, no more than one alcoholic drink per day. Regular soft drinks or other sugary  beverages. Juice drinks.  Sweets and Desserts   Sugary or fatty desserts, candy, and other sweets.  Fats and Oils   Solid shortening or partially hydrogenated oils. Solid margarine. Margarine that contains trans fats. Butter.  The items listed above may not be a complete list of foods and beverages to avoid. Contact your dietitian for more information.   This information is not intended to replace advice given to you by your health care provider. Make sure you discuss any questions you have with your health care provider.  Document Released: 01/06/2009 Document Revised: 05/25/2017 Document Reviewed: 11/26/2014  [employer] Interactive Patient Education © 2017 [employer]     Calorie Counting for Weight Loss  Calories are units of energy. Your body needs a certain amount of calories from food to keep you going throughout the day. When you eat more calories than your body needs, your body stores the extra calories as fat. When you eat fewer calories than your body needs, your body burns fat to get the energy it needs.  Calorie counting means keeping track of how many calories you eat and drink each day. Calorie counting can be helpful if you need to lose weight. If you make sure to eat fewer calories than your body needs, you should lose weight. Ask your health care provider what a healthy weight is for you.  For calorie counting to work, you will need to eat the right number of calories in a day in order to lose a healthy amount of weight per week. A dietitian can help you determine how many calories you need in a day and will give you suggestions on how to reach your calorie goal.  · A healthy amount of weight to lose per week is usually 1-2 lb (0.5-0.9 kg). This usually means that your daily calorie intake should be reduced by 500-750 calories.  · Eating 1,200 - 1,500 calories per day can help most women lose weight.  · Eating 1,500 - 1,800 calories per day can help most men lose weight.  What is my plan?  My goal  is to have __________ calories per day.  If I have this many calories per day, I should lose around __________ pounds per week.  What do I need to know about calorie counting?  In order to meet your daily calorie goal, you will need to:  · Find out how many calories are in each food you would like to eat. Try to do this before you eat.  · Decide how much of the food you plan to eat.  · Write down what you ate and how many calories it had. Doing this is called keeping a food log.  To successfully lose weight, it is important to balance calorie counting with a healthy lifestyle that includes regular activity. Aim for 150 minutes of moderate exercise (such as walking) or 75 minutes of vigorous exercise (such as running) each week.  Where do I find calorie information?     The number of calories in a food can be found on a Nutrition Facts label. If a food does not have a Nutrition Facts label, try to look up the calories online or ask your dietitian for help.  Remember that calories are listed per serving. If you choose to have more than one serving of a food, you will have to multiply the calories per serving by the amount of servings you plan to eat. For example, the label on a package of bread might say that a serving size is 1 slice and that there are 90 calories in a serving. If you eat 1 slice, you will have eaten 90 calories. If you eat 2 slices, you will have eaten 180 calories.  How do I keep a food log?  Immediately after each meal, record the following information in your food log:  · What you ate. Don't forget to include toppings, sauces, and other extras on the food.  · How much you ate. This can be measured in cups, ounces, or number of items.  · How many calories each food and drink had.  · The total number of calories in the meal.  Keep your food log near you, such as in a small notebook in your pocket, or use a mobile florentin or website. Some programs will calculate calories for you and show you how many  "calories you have left for the day to meet your goal.  What are some calorie counting tips?  · Use your calories on foods and drinks that will fill you up and not leave you hungry:  ¨ Some examples of foods that fill you up are nuts and nut butters, vegetables, lean proteins, and high-fiber foods like whole grains. High-fiber foods are foods with more than 5 g fiber per serving.  ¨ Drinks such as sodas, specialty coffee drinks, alcohol, and juices have a lot of calories, yet do not fill you up.  · Eat nutritious foods and avoid empty calories. Empty calories are calories you get from foods or beverages that do not have many vitamins or protein, such as candy, sweets, and soda. It is better to have a nutritious high-calorie food (such as an avocado) than a food with few nutrients (such as a bag of chips).  · Know how many calories are in the foods you eat most often. This will help you calculate calorie counts faster.  · Pay attention to calories in drinks. Low-calorie drinks include water and unsweetened drinks.  · Pay attention to nutrition labels for \"low fat\" or \"fat free\" foods. These foods sometimes have the same amount of calories or more calories than the full fat versions. They also often have added sugar, starch, or salt, to make up for flavor that was removed with the fat.  · Find a way of tracking calories that works for you. Get creative. Try different apps or programs if writing down calories does not work for you.  What are some portion control tips?  · Know how many calories are in a serving. This will help you know how many servings of a certain food you can have.  · Use a measuring cup to measure serving sizes. You could also try weighing out portions on a kitchen scale. With time, you will be able to estimate serving sizes for some foods.  · Take some time to put servings of different foods on your favorite plates, bowls, and cups so you know what a serving looks like.  · Try not to eat straight " from a bag or box. Doing this can lead to overeating. Put the amount you would like to eat in a cup or on a plate to make sure you are eating the right portion.  · Use smaller plates, glasses, and bowls to prevent overeating.  · Try not to multitask (for example, watch TV or use your computer) while eating. If it is time to eat, sit down at a table and enjoy your food. This will help you to know when you are full. It will also help you to be aware of what you are eating and how much you are eating.  What are tips for following this plan?  Reading food labels   · Check the calorie count compared to the serving size. The serving size may be smaller than what you are used to eating.  · Check the source of the calories. Make sure the food you are eating is high in vitamins and protein and low in saturated and trans fats.  Shopping   · Read nutrition labels while you shop. This will help you make healthy decisions before you decide to purchase your food.  · Make a grocery list and stick to it.  Cooking   · Try to cook your favorite foods in a healthier way. For example, try baking instead of frying.  · Use low-fat dairy products.  Meal planning   · Use more fruits and vegetables. Half of your plate should be fruits and vegetables.  · Include lean proteins like poultry and fish.  How do I count calories when eating out?  · Ask for smaller portion sizes.  · Consider sharing an entree and sides instead of getting your own entree.  · If you get your own entree, eat only half. Ask for a box at the beginning of your meal and put the rest of your entree in it so you are not tempted to eat it.  · If calories are listed on the menu, choose the lower calorie options.  · Choose dishes that include vegetables, fruits, whole grains, low-fat dairy products, and lean protein.  · Choose items that are boiled, broiled, grilled, or steamed. Stay away from items that are buttered, battered, fried, or served with cream sauce. Items labeled  “crispy” are usually fried, unless stated otherwise.  · Choose water, low-fat milk, unsweetened iced tea, or other drinks without added sugar. If you want an alcoholic beverage, choose a lower calorie option such as a glass of wine or light beer.  · Ask for dressings, sauces, and syrups on the side. These are usually high in calories, so you should limit the amount you eat.  · If you want a salad, choose a garden salad and ask for grilled meats. Avoid extra toppings like aguilar, cheese, or fried items. Ask for the dressing on the side, or ask for olive oil and vinegar or lemon to use as dressing.  · Estimate how many servings of a food you are given. For example, a serving of cooked rice is ½ cup or about the size of half a baseball. Knowing serving sizes will help you be aware of how much food you are eating at restaurants. The list below tells you how big or small some common portion sizes are based on everyday objects:  ¨ 1 oz--4 stacked dice.  ¨ 3 oz--1 deck of cards.  ¨ 1 tsp--1 die.  ¨ 1 Tbsp--½ a ping-pong ball.  ¨ 2 Tbsp--1 ping-pong ball.  ¨ ½ cup--½ baseball.  ¨ 1 cup--1 baseball.  Summary  · Calorie counting means keeping track of how many calories you eat and drink each day. If you eat fewer calories than your body needs, you should lose weight.  · A healthy amount of weight to lose per week is usually 1-2 lb (0.5-0.9 kg). This usually means reducing your daily calorie intake by 500-750 calories.  · The number of calories in a food can be found on a Nutrition Facts label. If a food does not have a Nutrition Facts label, try to look up the calories online or ask your dietitian for help.  · Use your calories on foods and drinks that will fill you up, and not on foods and drinks that will leave you hungry.  · Use smaller plates, glasses, and bowls to prevent overeating.  This information is not intended to replace advice given to you by your health care provider. Make sure you discuss any questions you have  with your health care provider.  Document Released: 12/18/2006 Document Revised: 11/17/2017 Document Reviewed: 11/17/2017  [employer] Interactive Patient Education © 2017 [employer]     Exercising to Lose Weight  Exercising can help you to lose weight. In order to lose weight through exercise, you need to do vigorous-intensity exercise. You can tell that you are exercising with vigorous intensity if you are breathing very hard and fast and cannot hold a conversation while exercising.  Moderate-intensity exercise helps to maintain your current weight. You can tell that you are exercising at a moderate level if you have a higher heart rate and faster breathing, but you are still able to hold a conversation.  How often should I exercise?  Choose an activity that you enjoy and set realistic goals. Your health care provider can help you to make an activity plan that works for you. Exercise regularly as directed by your health care provider. This may include:  · Doing resistance training twice each week, such as:  ¨ Push-ups.  ¨ Sit-ups.  ¨ Lifting weights.  ¨ Using resistance bands.  · Doing a given intensity of exercise for a given amount of time. Choose from these options:  ¨ 150 minutes of moderate-intensity exercise every week.  ¨ 75 minutes of vigorous-intensity exercise every week.  ¨ A mix of moderate-intensity and vigorous-intensity exercise every week.  Children, pregnant women, people who are out of shape, people who are overweight, and older adults may need to consult a health care provider for individual recommendations. If you have any sort of medical condition, be sure to consult your health care provider before starting a new exercise program.  What are some activities that can help me to lose weight?  · Walking at a rate of at least 4.5 miles an hour.  · Jogging or running at a rate of 5 miles per hour.  · Biking at a rate of at least 10 miles per hour.  · Lap swimming.  · Roller-skating or in-line  skating.  · Cross-country skiing.  · Vigorous competitive sports, such as football, basketball, and soccer.  · Jumping rope.  · Aerobic dancing.  How can I be more active in my day-to-day activities?  · Use the stairs instead of the elevator.  · Take a walk during your lunch break.  · If you drive, park your car farther away from work or school.  · If you take public transportation, get off one stop early and walk the rest of the way.  · Make all of your phone calls while standing up and walking around.  · Get up, stretch, and walk around every 30 minutes throughout the day.  What guidelines should I follow while exercising?  · Do not exercise so much that you hurt yourself, feel dizzy, or get very short of breath.  · Consult your health care provider prior to starting a new exercise program.  · Wear comfortable clothes and shoes with good support.  · Drink plenty of water while you exercise to prevent dehydration or heat stroke. Body water is lost during exercise and must be replaced.  · Work out until you breathe faster and your heart beats faster.  This information is not intended to replace advice given to you by your health care provider. Make sure you discuss any questions you have with your health care provider.  Document Released: 01/20/2012 Document Revised: 05/25/2017 Document Reviewed: 05/21/2015  [employer] Interactive Patient Education © 2017 [employer]

## 2018-04-23 ENCOUNTER — HOSPITAL ENCOUNTER (OUTPATIENT)
Dept: GENERAL RADIOLOGY | Facility: HOSPITAL | Age: 58
Discharge: HOME | End: 2018-04-23
Attending: OTOLARYNGOLOGY | Admitting: OTOLARYNGOLOGY

## 2018-04-23 PROCEDURE — 74220 X-RAY XM ESOPHAGUS 1CNTRST: CPT

## 2018-04-23 RX ADMIN — ANTACID/ANTIFLATULENT 1 TABLET: 380; 550; 10; 10 GRANULE, EFFERVESCENT ORAL at 08:06

## 2018-04-23 RX ADMIN — BARIUM SULFATE 120 ML: 980 POWDER, FOR SUSPENSION ORAL at 08:06

## 2018-04-23 RX ADMIN — BARIUM SULFATE 120 ML: 960 POWDER, FOR SUSPENSION ORAL at 08:06

## 2018-04-23 RX ADMIN — BARIUM SULFATE 700 MG: 700 TABLET ORAL at 08:06

## 2018-05-29 ENCOUNTER — OFFICE VISIT (OUTPATIENT)
Dept: OBSTETRICS AND GYNECOLOGY | Facility: CLINIC | Age: 58
End: 2018-05-29

## 2018-05-29 VITALS
WEIGHT: 149 LBS | SYSTOLIC BLOOD PRESSURE: 110 MMHG | DIASTOLIC BLOOD PRESSURE: 72 MMHG | HEIGHT: 59 IN | BODY MASS INDEX: 30.04 KG/M2

## 2018-05-29 DIAGNOSIS — N81.6 RECTOCELE: ICD-10-CM

## 2018-05-29 DIAGNOSIS — Z01.411 ENCOUNTER FOR GYNECOLOGICAL EXAMINATION WITH ABNORMAL FINDING: Primary | ICD-10-CM

## 2018-05-29 DIAGNOSIS — Z78.0 MENOPAUSE: ICD-10-CM

## 2018-05-29 PROCEDURE — 99396 PREV VISIT EST AGE 40-64: CPT | Performed by: OBSTETRICS & GYNECOLOGY

## 2018-05-29 RX ORDER — ESTERIFIED ESTROGEN AND METHYLTESTOSTERONE .625; 1.25 MG/1; MG/1
1 TABLET ORAL DAILY
Qty: 30 TABLET | Refills: 3 | Status: SHIPPED | OUTPATIENT
Start: 2018-05-29 | End: 2018-08-29 | Stop reason: SDUPTHER

## 2018-05-29 NOTE — PATIENT INSTRUCTIONS
Breast Self-Awareness  Breast self-awareness means being familiar with how your breasts look and feel. It involves checking your breasts regularly and reporting any changes to your health care provider.  Practicing breast self-awareness is important. A change in your breasts can be a sign of a serious medical problem. Being familiar with how your breasts look and feel allows you to find any problems early, when treatment is more likely to be successful. All women should practice breast self-awareness, including women who have had breast implants.  How to do a breast self-exam  One way to learn what is normal for your breasts and whether your breasts are changing is to do a breast self-exam. To do a breast self-exam:  Look for Changes     1. Remove all the clothing above your waist.  2.  front of a mirror in a room with good lighting.  3. Put your hands on your hips.  4. Push your hands firmly downward.  5. Compare your breasts in the mirror. Look for differences between them (asymmetry), such as:  ¨ Differences in shape.  ¨ Differences in size.  ¨ Puckers, dips, and bumps in one breast and not the other.  6. Look at each breast for changes in your skin, such as:  ¨ Redness.  ¨ Scaly areas.  7. Look for changes in your nipples, such as:  ¨ Discharge.  ¨ Bleeding.  ¨ Dimpling.  ¨ Redness.  ¨ A change in position.  Feel for Changes     Carefully feel your breasts for lumps and changes. It is best to do this while lying on your back on the floor and again while sitting or standing in the shower or tub with soapy water on your skin. Feel each breast in the following way:  · Place the arm on the side of the breast you are examining above your head.  · Feel your breast with the other hand.  · Start in the nipple area and make ¾ inch (2 cm) overlapping circles to feel your breast. Use the pads of your three middle fingers to do this. Apply light pressure, then medium pressure, then firm pressure. The light pressure  will allow you to feel the tissue closest to the skin. The medium pressure will allow you to feel the tissue that is a little deeper. The firm pressure will allow you to feel the tissue close to the ribs.  · Continue the overlapping circles, moving downward over the breast until you feel your ribs below your breast.  · Move one finger-width toward the center of the body. Continue to use the ¾ inch (2 cm) overlapping circles to feel your breast as you move slowly up toward your collarbone.  · Continue the up and down exam using all three pressures until you reach your armpit.  Write Down What You Find     Write down what is normal for each breast and any changes that you find. Keep a written record with breast changes or normal findings for each breast. By writing this information down, you do not need to depend only on memory for size, tenderness, or location. Write down where you are in your menstrual cycle, if you are still menstruating.  If you are having trouble noticing differences in your breasts, do not get discouraged. With time you will become more familiar with the variations in your breasts and more comfortable with the exam.  How often should I examine my breasts?  Examine your breasts every month. If you are breastfeeding, the best time to examine your breasts is after a feeding or after using a breast pump. If you menstruate, the best time to examine your breasts is 5-7 days after your period is over. During your period, your breasts are lumpier, and it may be more difficult to notice changes.  When should I see my health care provider?  See your health care provider if you notice:  · A change in shape or size of your breasts or nipples.  · A change in the skin of your breast or nipples, such as a reddened or scaly area.  · Unusual discharge from your nipples.  · A lump or thick area that was not there before.  · Pain in your breasts.  · Anything that concerns you.  This information is not intended to  replace advice given to you by your health care provider. Make sure you discuss any questions you have with your health care provider.  Document Released: 12/18/2006 Document Revised: 05/25/2017 Document Reviewed: 11/06/2016  [employer] Interactive Patient Education © 2017 [employer]

## 2018-05-29 NOTE — PROGRESS NOTES
Subjective   Chief Complaint   Patient presents with   • Gynecologic Exam     Here for annual exam. Had hysterectomy  for painful periods and bleeding (Dr. Patel). Needs mammogram. Was a former patient of Dr. Huang.      Chasidy Tejada is a 57 y.o. year old  menopausal female presenting to be seen for her annual exam.  The patient is status-post hysterectomy. Hot flashes and night sweats ARE a significant problem.    SEXUAL Hx:  She is sexually active.  In the past year there has not been new sexual partners.  Patient denies any pain with sex.    HEALTH Hx:  She exercises regularly: yes.  Patient has lost 21 pounds recently  The patient reports regular self breast exams: no  She has noticed changes in height: no.    No Additional Complaints Reported    The following portions of the patient's history were reviewed and updated as appropriate:problem list, current medications, allergies, past family history, past medical history, past social history and past surgical history.    Smoking status: Never Smoker                                                              Smokeless tobacco: Never Used                        Review of Systems   Constitutional: Negative for activity change and unexpected weight change.   HENT: Negative for congestion.    Eyes: Positive for visual disturbance (wears glasses).   Respiratory: Negative for shortness of breath.    Cardiovascular: Negative for chest pain.   Gastrointestinal: Positive for constipation (needs to drink more water). Negative for abdominal pain, blood in stool and diarrhea.   Endocrine: Negative for cold intolerance and heat intolerance.   Genitourinary: Negative for difficulty urinating, dyspareunia, enuresis, pelvic pain, vaginal discharge and vaginal pain.   Musculoskeletal: Negative for arthralgias, back pain, neck pain and neck stiffness.   Skin: Negative for rash.   Neurological: Negative for dizziness and headaches.   Psychiatric/Behavioral: Negative  "for dysphoric mood and sleep disturbance. The patient is nervous/anxious (took medication in the past, but no panic attacks recently).          Objective   /72 (BP Location: Right arm, Patient Position: Sitting, Cuff Size: Adult)   Ht 149.9 cm (59\")   Wt 67.6 kg (149 lb)   Breastfeeding? No   BMI 30.09 kg/m²   Physical Exam   Constitutional: She is oriented to person, place, and time. She appears well-developed and well-nourished. No distress.   HENT:   Head: Normocephalic and atraumatic.   Eyes: EOM are normal.   Neck: Normal range of motion. Neck supple. No thyromegaly present.   Cardiovascular: Normal rate and regular rhythm.    No murmur heard.  Pulmonary/Chest: Effort normal and breath sounds normal. Right breast exhibits no inverted nipple and no mass. Left breast exhibits no inverted nipple and no mass.   Abdominal: Soft. She exhibits no distension. There is tenderness (mild LUQ tenderness).   Genitourinary: Vagina normal. No breast tenderness or discharge. Pelvic exam was performed with patient supine. There is no tenderness or lesion on the right labia. There is no tenderness or lesion on the left labia. Right adnexum displays no tenderness and no fullness. Left adnexum displays no tenderness and no fullness. No bleeding in the vagina. No vaginal discharge found.   Genitourinary Comments: Pt s/p hysterectomy: uterus and cervix surgically absent.  Vaginal cuff unremarkable.  Grade II rectocele.  Good anterior and vault support.   Musculoskeletal: Normal range of motion. She exhibits no edema.   Neurological: She is alert and oriented to person, place, and time.   Skin: Skin is warm and dry.   Psychiatric: She has a normal mood and affect. Her behavior is normal. Judgment normal.   Nursing note and vitals reviewed.           Assessment & Plan    Chasidy was seen today for gynecologic exam.    Diagnoses and all orders for this visit:    Encounter for gynecological examination with abnormal finding: " Grade II rectocele, currently asymptomatic.  Exam otherwise unremarkable.  SBE encouraged.  Patient will return for fasting labs.  -     estrogens, conjugated,-methyltestosterone (ESTRATEST HS) 0.625-1.25 MG per tablet; Take 1 tablet by mouth Daily.  -     DEXA Assess Fracture Vertebral; Future  -     Mammo Screening Digital Tomosynthesis Bilateral With CAD; Future  -     CBC & Differential  -     Comprehensive Metabolic Panel  -     Hemoglobin A1c  -     Lipid Panel With LDL / HDL Ratio  -     TSH  -     Vitamin D 25 Hydroxy    Rectocele    Menopause: Patient understands HRT to be a controlled substance.  She will have a [place]STON today and RTO in 90 days.      This note was electronically signed.    Elisha Martel MD  5/29/2018  2:38 PM

## 2018-05-29 NOTE — PROGRESS NOTES
Attempted to obtain health maintenance information, patient unable to provide answers for the following items Tdap, Zoster Vaccine and hepatitis C screen. .

## 2018-05-30 LAB
25(OH)D3+25(OH)D2 SERPL-MCNC: 50.5 NG/ML (ref 30–100)
ALBUMIN SERPL-MCNC: 4.7 G/DL (ref 3.5–5)
ALBUMIN/GLOB SERPL: 1.5 G/DL (ref 1.1–2.5)
ALP SERPL-CCNC: 94 U/L (ref 24–120)
ALT SERPL-CCNC: 23 U/L (ref 0–54)
AST SERPL-CCNC: 23 U/L (ref 7–45)
BASOPHILS # BLD AUTO: 0.05 10*3/MM3 (ref 0–0.2)
BASOPHILS NFR BLD AUTO: 0.7 % (ref 0–2)
BILIRUB SERPL-MCNC: 0.7 MG/DL (ref 0.1–1)
BUN SERPL-MCNC: 16 MG/DL (ref 5–21)
BUN/CREAT SERPL: 31.4 (ref 7–25)
CALCIUM SERPL-MCNC: 10 MG/DL (ref 8.4–10.4)
CHLORIDE SERPL-SCNC: 102 MMOL/L (ref 98–110)
CHOLEST SERPL-MCNC: 176 MG/DL (ref 130–200)
CO2 SERPL-SCNC: 29 MMOL/L (ref 24–31)
CREAT SERPL-MCNC: 0.51 MG/DL (ref 0.5–1.4)
EOSINOPHIL # BLD AUTO: 0.08 10*3/MM3 (ref 0–0.7)
EOSINOPHIL NFR BLD AUTO: 1.1 % (ref 0–4)
ERYTHROCYTE [DISTWIDTH] IN BLOOD BY AUTOMATED COUNT: 13.9 % (ref 12–15)
GFR SERPLBLD CREATININE-BSD FMLA CKD-EPI: 125 ML/MIN/1.73
GFR SERPLBLD CREATININE-BSD FMLA CKD-EPI: >150 ML/MIN/1.73
GLOBULIN SER CALC-MCNC: 3.1 GM/DL
GLUCOSE SERPL-MCNC: 93 MG/DL (ref 70–100)
HBA1C MFR BLD: 5.5 %
HCT VFR BLD AUTO: 44.5 % (ref 37–47)
HDLC SERPL-MCNC: 63 MG/DL
HGB BLD-MCNC: 14.3 G/DL (ref 12–16)
IMM GRANULOCYTES # BLD: 0.01 10*3/MM3 (ref 0–0.03)
IMM GRANULOCYTES NFR BLD: 0.1 % (ref 0–5)
LDLC SERPL CALC-MCNC: 102 MG/DL (ref 0–99)
LDLC/HDLC SERPL: 1.62 {RATIO}
LYMPHOCYTES # BLD AUTO: 2.91 10*3/MM3 (ref 0.72–4.86)
LYMPHOCYTES NFR BLD AUTO: 38.7 % (ref 15–45)
MCH RBC QN AUTO: 29.9 PG (ref 28–32)
MCHC RBC AUTO-ENTMCNC: 32.1 G/DL (ref 33–36)
MCV RBC AUTO: 92.9 FL (ref 82–98)
MONOCYTES # BLD AUTO: 0.35 10*3/MM3 (ref 0.19–1.3)
MONOCYTES NFR BLD AUTO: 4.7 % (ref 4–12)
NEUTROPHILS # BLD AUTO: 4.12 10*3/MM3 (ref 1.87–8.4)
NEUTROPHILS NFR BLD AUTO: 54.7 % (ref 39–78)
NRBC BLD AUTO-RTO: 0 /100 WBC (ref 0–0)
PLATELET # BLD AUTO: 296 10*3/MM3 (ref 130–400)
POTASSIUM SERPL-SCNC: 5.3 MMOL/L (ref 3.5–5.3)
PROT SERPL-MCNC: 7.8 G/DL (ref 6.3–8.7)
RBC # BLD AUTO: 4.79 10*6/MM3 (ref 4.2–5.4)
SODIUM SERPL-SCNC: 142 MMOL/L (ref 135–145)
TRIGL SERPL-MCNC: 56 MG/DL (ref 0–149)
TSH SERPL DL<=0.005 MIU/L-ACNC: 1.16 MIU/ML (ref 0.47–4.68)
VLDLC SERPL CALC-MCNC: 11.2 MG/DL
WBC # BLD AUTO: 7.52 10*3/MM3 (ref 4.8–10.8)

## 2018-06-01 ENCOUNTER — HOSPITAL ENCOUNTER (OUTPATIENT)
Dept: BONE DENSITY | Facility: HOSPITAL | Age: 58
Discharge: HOME | End: 2018-06-01
Attending: OBSTETRICS & GYNECOLOGY

## 2018-06-01 ENCOUNTER — HOSPITAL ENCOUNTER (OUTPATIENT)
Dept: MAMMOGRAPHY | Facility: HOSPITAL | Age: 58
Discharge: HOME | End: 2018-06-01
Attending: OBSTETRICS & GYNECOLOGY | Admitting: OBSTETRICS & GYNECOLOGY

## 2018-06-01 DIAGNOSIS — Z01.411 ENCOUNTER FOR GYNECOLOGICAL EXAMINATION WITH ABNORMAL FINDING: ICD-10-CM

## 2018-06-01 PROCEDURE — 77063 BREAST TOMOSYNTHESIS BI: CPT

## 2018-06-01 PROCEDURE — 77067 SCR MAMMO BI INCL CAD: CPT

## 2018-06-01 PROCEDURE — 77086 VRT FRACTURE ASSMT VIA DXA: CPT

## 2018-07-31 ENCOUNTER — TRANSCRIBE ORDERS (OUTPATIENT)
Dept: GENERAL RADIOLOGY | Facility: HOSPITAL | Age: 58
End: 2018-07-31

## 2018-07-31 ENCOUNTER — HOSPITAL ENCOUNTER (OUTPATIENT)
Dept: GENERAL RADIOLOGY | Facility: HOSPITAL | Age: 58
Discharge: HOME | End: 2018-07-31
Attending: INTERNAL MEDICINE | Admitting: INTERNAL MEDICINE

## 2018-07-31 DIAGNOSIS — M54.2 CERVICALGIA: ICD-10-CM

## 2018-07-31 DIAGNOSIS — M54.2 CERVICALGIA: Primary | ICD-10-CM

## 2018-07-31 PROCEDURE — 72050 X-RAY EXAM NECK SPINE 4/5VWS: CPT

## 2018-08-16 ENCOUNTER — OFFICE VISIT (OUTPATIENT)
Dept: NEUROSURGERY | Facility: CLINIC | Age: 58
End: 2018-08-16

## 2018-08-16 VITALS
BODY MASS INDEX: 32.25 KG/M2 | DIASTOLIC BLOOD PRESSURE: 78 MMHG | WEIGHT: 160 LBS | SYSTOLIC BLOOD PRESSURE: 122 MMHG | HEIGHT: 59 IN

## 2018-08-16 DIAGNOSIS — M48.02 SPINAL STENOSIS IN CERVICAL REGION: Primary | ICD-10-CM

## 2018-08-16 DIAGNOSIS — Z78.9 NON-SMOKER: ICD-10-CM

## 2018-08-16 DIAGNOSIS — M54.12 CERVICAL RADICULOPATHY: ICD-10-CM

## 2018-08-16 PROCEDURE — 99202 OFFICE O/P NEW SF 15 MIN: CPT | Performed by: NEUROLOGICAL SURGERY

## 2018-08-16 RX ORDER — MELOXICAM 7.5 MG/1
TABLET ORAL
COMMUNITY
Start: 2018-08-01 | End: 2018-08-27

## 2018-08-16 RX ORDER — CYCLOBENZAPRINE HCL 5 MG
TABLET ORAL
COMMUNITY
Start: 2018-07-23 | End: 2018-08-27

## 2018-08-16 NOTE — PATIENT INSTRUCTIONS
PATIENT DOESN'T HAVE A PRIMARY CARE PROVIDER - ONE CAN BE PROVIDED FOR HER THROUGH [facility] IS SHE WOULD LIKE FOR US TO MAKE A REFERRAL.      BMI for Adults  Body mass index (BMI) is a number that is calculated from a person's weight and height. In most adults, the number is used to find how much of an adult's weight is made up of fat. BMI is not as accurate as a direct measure of body fat.  How is BMI calculated?  BMI is calculated by dividing weight in kilograms by height in meters squared. It can also be calculated by dividing weight in pounds by height in inches squared, then multiplying the resulting number by 703. Charts are available to help you find your BMI quickly and easily without doing this calculation.  How is BMI interpreted?  Health care professionals use BMI charts to identify whether an adult is underweight, at a normal weight, or overweight based on the following guidelines:  · Underweight: BMI less than 18.5.  · Normal weight: BMI between 18.5 and 24.9.  · Overweight: BMI between 25 and 29.9.  · Obese: BMI of 30 and above.    BMI is usually interpreted the same for males and females.  Weight includes both fat and muscle, so someone with a muscular build, such as an athlete, may have a BMI that is higher than 24.9. In cases like these, BMI may not accurately depict body fat. To determine if excess body fat is the cause of a BMI of 25 or higher, further assessments may need to be done by a health care provider.  Why is BMI a useful tool?  BMI is used to identify a possible weight problem that may be related to a medical problem or may increase the risk for medical problems. BMI can also be used to promote changes to reach a healthy weight.  This information is not intended to replace advice given to you by your health care provider. Make sure you discuss any questions you have with your health care provider.  Document Released: 08/29/2005 Document Revised: 04/27/2017 Document Reviewed:  05/15/2015  Elsevier Interactive Patient Education © 2018 [employer]vier Inc.

## 2018-08-16 NOTE — PROGRESS NOTES
Patient: Chasidy Tejada  : 1960    Primary Care Provider: No primary care provider on file.    Requesting Provider: No ref. provider found        History    Chief Complaint: Neck pain  Chief Complaint   Patient presents with   • Neck Pain     patient had xrays @ [facility] and is here to discuss results/options       History of Present Illness: 58-year-old female with a history of right paraspinal neck pain and right upper extremity radicular type pain along with some numbness and tingling.  This is been going on for 6 months or so.  She has been treated by her primary care doctor with pain medication, anti-inflammatories, steroids with no significant improvement.  She does admit to some left-sided symptoms also.  She has done no physical therapy or chiropractic care.    Review of Systems   Musculoskeletal: Positive for neck pain and neck stiffness.   All other systems reviewed and are negative.      Past Medical History:   Past Medical History:   Diagnosis Date   • Acid reflux    • Asthma        Past Surgical History:   Past Surgical History:   Procedure Laterality Date   • APPENDECTOMY     • CHOLECYSTECTOMY     • COLONOSCOPY  2012   • COLONOSCOPY N/A 2/10/2017    Procedure: COLONOSCOPY WITH ANESTHESIA;  Surgeon: Elina Gonsalez MD;  Location: [facility] ENDOSCOPY;  Service:    • HYSTERECTOMY      Had hysterectomy for painful periods and bleeding. (Dr. Patel)   • TONSILLECTOMY         Family History: family history includes Colon cancer in her maternal grandmother; Hypertension in her sister; No Known Problems in her daughter and sister.    Social History:  reports that she has never smoked. She has never used smokeless tobacco. She reports that she drinks alcohol. She reports that she does not use drugs.    Medications:    (Not in a hospital admission)    Allergies:  Demerol [meperidine] and Morphine and related    Physical Exam:     Physical Exam   Constitutional: She is oriented to person, place, and time.  She appears well-developed and well-nourished.   Cardiovascular: Normal rate.    Pulmonary/Chest: Effort normal. No respiratory distress.   Neurological: She is oriented to person, place, and time. She has normal strength. She has a normal Finger-Nose-Finger Test and a normal Romberg Test. Gait normal.   Psychiatric: Her speech is normal.       Neurologic Exam     Mental Status   Oriented to person, place, and time.   Attention: normal.   Speech: speech is normal   Level of consciousness: alert  Knowledge: good.     Cranial Nerves   Cranial nerves II through XII intact.     Motor Exam   Muscle bulk: normal  Overall muscle tone: normal  Right arm pronator drift: absent  Left arm pronator drift: absent    Strength   Strength 5/5 throughout.     Sensory Exam   Light touch normal.   Pinprick normal.     Gait, Coordination, and Reflexes     Gait  Gait: normal    Coordination   Romberg: negative  Finger to nose coordination: normal    Tremor   Resting tremor: absent  Intention tremor: absent  Action tremor: absent    Reflexes   Reflexes 2+ except as noted.     No pain with active or passive range of motion of the right shoulder    Independent Review of Radiographic Studies:   Plain x-rays suggest a significant amount of foraminal stenosis at C 3-4 on the right    ASSESSMENT/PLAN: The patient is complaining of right paraspinal radiculopathy and neck pain likely due to a nerve root compression at C3 4.  I would recommend an MRI of her cervical spine as a next step to diagnose this problem.  In addition we are in a send her to a dedicated course of physical therapy with cervical traction to see if this starts to improve her symptoms.  1. Spinal stenosis in cervical region    2. Cervical radiculopathy    3. BMI 32.0-32.9,adult    4. Non-smoker          No Follow-up on file.      Chris Mcfadden MD

## 2018-08-23 ENCOUNTER — OFFICE VISIT (OUTPATIENT)
Dept: NEUROSURGERY | Facility: CLINIC | Age: 58
End: 2018-08-23

## 2018-08-23 VITALS
BODY MASS INDEX: 32.38 KG/M2 | DIASTOLIC BLOOD PRESSURE: 90 MMHG | WEIGHT: 160.6 LBS | SYSTOLIC BLOOD PRESSURE: 160 MMHG | HEIGHT: 59 IN

## 2018-08-23 DIAGNOSIS — M48.02 SPINAL STENOSIS IN CERVICAL REGION: ICD-10-CM

## 2018-08-23 DIAGNOSIS — M54.12 CERVICAL RADICULOPATHY: ICD-10-CM

## 2018-08-23 DIAGNOSIS — M50.20 HERNIATED CERVICAL DISC WITHOUT MYELOPATHY: Primary | ICD-10-CM

## 2018-08-23 DIAGNOSIS — Z78.9 NON-SMOKER: ICD-10-CM

## 2018-08-23 PROCEDURE — 99213 OFFICE O/P EST LOW 20 MIN: CPT | Performed by: NEUROLOGICAL SURGERY

## 2018-08-23 NOTE — PATIENT INSTRUCTIONS
PATIENT TO CONTINUE TO FOLLOW UP WITH HER PRIMARY CARE PROVIDER (SHE DOES NOT HAVE ONE AT THIS TIME AND I HAVE OFFERED AGAIN TODAY TO REFER HER TO DR PURI) FOR YEARLY PHYSICAL EXAMS TO ENSURE COMPLETE HEALTH MAINTENANCE.      BMI for Adults  Body mass index (BMI) is a number that is calculated from a person's weight and height. In most adults, the number is used to find how much of an adult's weight is made up of fat. BMI is not as accurate as a direct measure of body fat.  How is BMI calculated?  BMI is calculated by dividing weight in kilograms by height in meters squared. It can also be calculated by dividing weight in pounds by height in inches squared, then multiplying the resulting number by 703. Charts are available to help you find your BMI quickly and easily without doing this calculation.  How is BMI interpreted?  Health care professionals use BMI charts to identify whether an adult is underweight, at a normal weight, or overweight based on the following guidelines:  · Underweight: BMI less than 18.5.  · Normal weight: BMI between 18.5 and 24.9.  · Overweight: BMI between 25 and 29.9.  · Obese: BMI of 30 and above.    BMI is usually interpreted the same for males and females.  Weight includes both fat and muscle, so someone with a muscular build, such as an athlete, may have a BMI that is higher than 24.9. In cases like these, BMI may not accurately depict body fat. To determine if excess body fat is the cause of a BMI of 25 or higher, further assessments may need to be done by a health care provider.  Why is BMI a useful tool?  BMI is used to identify a possible weight problem that may be related to a medical problem or may increase the risk for medical problems. BMI can also be used to promote changes to reach a healthy weight.  This information is not intended to replace advice given to you by your health care provider. Make sure you discuss any questions you have with your health care  provider.  Document Released: 08/29/2005 Document Revised: 04/27/2017 Document Reviewed: 05/15/2015  [employer] Interactive Patient Education © 2018 [employer]vier Inc.

## 2018-08-23 NOTE — PROGRESS NOTES
SUBJECTIVE:  Patient ID: Chasidy Tejada is a 58 y.o. female is here today for follow-up.    Chief Complaint: Right upper extremity pain  Chief Complaint   Patient presents with   • neck & arm pain     patient had MRI @ [facility] on 8/21/18 and is here to discuss results; patient is to start therapy tomorrow.       HPI  58-year-old female that been complaining of right paraspinal neck pain and right upper extremity radicular pain numbness tingling and weakness.This is been going on for 6 months or so.  She has been treated by her primary care doctor with pain medication, anti-inflammatories, steroids with no significant improvement.  She just started a dedicated course of physical therapy with cervical traction.  We sent her for an MRI of her cervical spine and she is here to discuss the results.    The following portions of the patient's history were reviewed and updated as appropriate: allergies, current medications, past family history, past medical history, past social history, past surgical history and problem list.    OBJECTIVE:    Review of Systems   Musculoskeletal: Positive for neck pain.   Neurological: Positive for weakness and numbness.   All other systems reviewed and are negative.         Physical Exam   Constitutional: She is oriented to person, place, and time.   Neurological: She is oriented to person, place, and time. She has a normal Finger-Nose-Finger Test and a normal Tandem Gait Test. Gait normal.   Reflex Scores:       Tricep reflexes are 0 on the right side and 1+ on the left side.       Bicep reflexes are 2+ on the right side and 2+ on the left side.       Brachioradialis reflexes are 1+ on the right side and 1+ on the left side.  Psychiatric: Her speech is normal.       Neurologic Exam     Mental Status   Oriented to person, place, and time.   Attention: normal.   Speech: speech is normal   Level of consciousness: alert  Knowledge: good.     Cranial Nerves   Cranial nerves II through XII  intact.     Motor Exam   Muscle bulk: normal  Overall muscle tone: normal  Right arm pronator drift: absent  Left arm pronator drift: absent    Strength   Right deltoid: 5/5  Left deltoid: 5/5  Right biceps: 5/5  Left biceps: 5/5  Right triceps: 3/5  Left triceps: 5/5  Right interossei: 4/5  Left interossei: 5/5    Sensory Exam   Right arm light touch: decreased from elbow  Left arm light touch: normal  Right arm pinprick: decreased from elbow  Left arm pinprick: normal    Gait, Coordination, and Reflexes     Gait  Gait: normal    Coordination   Finger to nose coordination: normal  Tandem walking coordination: normal    Tremor   Resting tremor: absent  Intention tremor: absent  Action tremor: absent    Reflexes   Right brachioradialis: 1+  Left brachioradialis: 1+  Right biceps: 2+  Left biceps: 2+  Right triceps: 0  Left triceps: 1+  Right Figueroa: absent  Left Figueroa: absent  Right ankle clonus: absent  Left ankle clonus: absent      Independent Review of Radiographic Studies:   MRI the cervical spine shows a very large right paraspinal disc herniation at C6-7 with a tremendous amount of compression of the exiting nerve root and spinal cord.    ASSESSMENT/PLAN:  The patient has right upper extremity pain, sensory deficits, weakness, diminished reflexes.  This is been going on for greater than 6 months despite maximum medical treatment.  Her MRI shows a very large C6 7 disc herniation.  I think it is very unlikely that further conservative care is going to improve her situation.  Given the neurologic deficits and weakness that she has a recommend a single level anterior cervical discectomy fusion and partial corpectomy at C6-7 to decompress her spinal cord and the exiting nerve root at that level.  The risks and benefits of the procedure were discussed at length which included but were not limited to infection, bleeding, paralysis, spinal fluid leak, speech and swallow difficulty, stroke, coma, and death.  She  acknowledged understanding of this.  Her questions concerns were addressed.  We will get her preop and scheduled as soon as possible.      1. Herniated cervical disc without myelopathy    2. Spinal stenosis in cervical region    3. Cervical radiculopathy    4. Non-smoker    5. BMI 32.0-32.9,adult            Return for postoperatively.      Chris Mcfadden MD

## 2018-08-24 ENCOUNTER — HOSPITAL ENCOUNTER (OUTPATIENT)
Dept: PHYSICAL THERAPY | Facility: HOSPITAL | Age: 58
Setting detail: THERAPIES SERIES
Discharge: HOME | End: 2018-08-24
Attending: NEUROLOGICAL SURGERY

## 2018-08-24 DIAGNOSIS — M50.20 HERNIATED CERVICAL DISC WITHOUT MYELOPATHY: Primary | ICD-10-CM

## 2018-08-24 DIAGNOSIS — M54.12 CERVICAL RADICULOPATHY: ICD-10-CM

## 2018-08-24 DIAGNOSIS — M48.02 SPINAL STENOSIS IN CERVICAL REGION: ICD-10-CM

## 2018-08-24 PROCEDURE — 97161 PT EVAL LOW COMPLEX 20 MIN: CPT

## 2018-08-24 NOTE — THERAPY EVALUATION
Outpatient Physical Therapy Ortho Initial Evaluation  [facility]     Patient Name: Chasidy Tejada  : 1960  MRN: [id number]  Today's Date: 2018      Visit Date: 2018    Patient Active Problem List   Diagnosis   • Family history of colon cancer   • Colon polyps   • Laryngopharyngeal reflux   • Pharyngoesophageal dysphagia   • Non-smoker   • BMI 32.0-32.9,adult   • Spinal stenosis in cervical region   • Cervical radiculopathy   • Herniated cervical disc without myelopathy        Past Medical History:   Diagnosis Date   • Acid reflux    • Asthma         Past Surgical History:   Procedure Laterality Date   • APPENDECTOMY     • CHOLECYSTECTOMY     • COLONOSCOPY  2012   • COLONOSCOPY N/A 2/10/2017    Procedure: COLONOSCOPY WITH ANESTHESIA;  Surgeon: Elina Gonsalez MD;  Location: [facility] ENDOSCOPY;  Service:    • HYSTERECTOMY      Had hysterectomy for painful periods and bleeding. (Dr. Patel)   • TONSILLECTOMY         Visit Dx:     ICD-10-CM ICD-9-CM   1. Herniated cervical disc without myelopathy M50.20 722.0   2. Spinal stenosis in cervical region M48.02 723.0   3. Cervical radiculopathy M54.12 723.4             Patient History     Row Name 18 1100             History    Chief Complaint Pain  -RS      Type of Pain Upper Extremity / Arm;Neck pain  -RS      Date Current Problem(s) Began 18  -RS      Brief Description of Current Complaint Patient awoke on the date listed above with right arm and shoulder pain.  There was no ILIANA noted.  Patient reports worsening symptoms on the right LE and mid back.  No significant left UE symptoms noted.  No LE symptoms noted.  No falls or bowel/bladder changes reported.    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 Dr. Mcfadden performing surgery on 09/07/2018  -RS      What clinical tests have you had for this problem? MRI;X-ray  -RS      Results of Clinical Tests C6/7 extrusion with cord contact; DDD at C3/4 with spinal stenosis  -RS      Are you or can you be pregnant No  -RS         Pain     Pain Location Neck  -RS      Pain at Present 4  -RS      Pain at Best 4  -RS      Pain at Worst 10  -RS      Pain Frequency Constant/continuous  -RS      Pain Description Aching;Burning;Numbness;Radiating  -RS      Pain Comments (R) UE pain and paresthesias present  -RS      Is your sleep disturbed? Yes  -RS      Is medication used to assist with sleep? No  -RS      Total hours of sleep per night variable  -RS      Difficulties at work? patient cannot type due to the pain; cannot complete work duties  -RS         Fall Risk Assessment    Any falls in the past year: No  -RS         Services    Prior Rehab/Home Health Experiences No  -RS         Daily Activities    Primary Language English  -RS      Are you able to read Yes  -RS      Are you able to write Yes  -RS      How does patient learn best? Pictures/Video;Demonstration;Listening  -RS      Teaching needs identified Management of Condition  -RS      Does patient have problems with the following? None  -RS      Barriers to learning None  -RS      Pt Participated in POC and Goals Yes  -RS         Safety    Are you being hurt, hit, or frightened by anyone at home or in your life? No  -RS      Are you being neglected by a caregiver No  -RS        User Key  (r) = Recorded By, (t) = Taken By, (c) = Cosigned By    Initials Name Provider Type    RS Jeremy Tyson, PT, DPT, OCS Physical Therapist                PT Ortho     Row Name 08/24/18 1100       Quarter Clearing    Quarter Clearing Upper Quarter Clearing  -RS       DTR- Upper Quarter Clearing    Biceps (C5/6)  Bilateral:;2- Normal response  -RS    Brachioradialis (C6) Right:;1- Minimal response;Left:;2- Normal response  -RS    Triceps (C7) Right:;0- No response;Left:;2- Normal response  -RS       Neural Tension Signs- Upper Quarter Clearing    ULNTT 1 Right:;Postive  -RS    ULNTT 3 Right:;Postive  -RS       Sensory Screen for Light Touch- Upper Quarter Clearing    C4 (posterior shoulder) Bilateral:;Intact  -RS    C5 (lateral upper arm) Bilateral:;Intact  -RS    C6 (tip of thumb) Right:;Diminished;Left:;Intact  -RS    C7 (tip of 3rd finger) Right:;Diminished;Left:;Intact  -RS    C8 (tip of 5th finger) Right:;Intact  -RS    T1 (medial lower arm) Right:;Intact  -RS       Myotomal Screen- Upper Quarter Clearing    Shoulder flexion (C5) Left:;WNL;Right:;4- (Good -)  -RS    Elbow flexion/wrist extension (C6) Left:;WNL;Right:;4- (Good -)  -RS    Elbow extension/wrist flexion (C7) Left:;WNL;Right:;3- (Fair -)  -RS    Finger flexion/ (C8) Left:;WNL;Right:;4- (Good -)  -RS    Finger abduction (T1) Bilateral:;WNL  -RS       Cervical/Shoulder ROM Screen    Cervical flexion Impaired  -RS    Cervical extension Impaired  -RS    Cervical rotation Impaired  -RS       Special Tests/Palpation    Special Tests/Palpation Cervical/Thoracic  -RS       Cervical Palpation    Cervical Palpation- Location? Scalenes;Levator scapula;Upper traps;Cervical facets  -RS    Cervical Facets Right:;Tender;Guarded/taut   mid cervical  -RS    Scalenes Right:;Tender;Guarded/taut  -RS    Levator Scapula Right:;Tender;Guarded/taut  -RS    Upper Traps Right:;Tender;Guarded/taut  -RS       Cervical Accessory Motions    Cervical Accessory Motions Tested? Yes  -RS    PA glide- C3 Unable to assess  -RS    PA glide- C4 Unable to assess  -RS    PA glide- C5 Unable to assess  -RS    PA glide- C6 Unable to assess  -RS    PA glide- C7 Unable to assess  -RS       Cervical/Thoracic Special Tests    Cervical Distraction (Foraminal Compression vs. Facet Pain) Negative  -RS     Bakody/Shoulder Abduction Sign (Cervical Radiculopathy) Right:;Positive  -RS    Cervical/Thoracic Special Tests Comments distraction/traction peripheralized bilateral symptoms and mid thoracic symptoms  -RS       General ROM    Head/Neck/Trunk Neck Lt Rotation;Neck Rt Rotation  -RS       Head/Neck/Trunk    Neck Lt Rotation AROM 12  -RS    Neck Lt Rotation PROM 20  -RS    Neck Rt Rotation AROM 15  -RS    Neck Rt Rotation PROM 20  -RS    Head/Neck/Trunk Comments globally guarded  -RS       Pathomechanics    Pathomechanics Comments Not able to formally assess due to pain and guarding  -RS      User Key  (r) = Recorded By, (t) = Taken By, (c) = Cosigned By    Initials Name Provider Type    Jeremy Solorzano, PT, DPT, OCS Physical Therapist                      Therapy Education  Education Details: pillow propping for UE unweighing; gentle rotation avoiding sharp pain as able without increased radicular symptoms.  Given: HEP, Symptoms/condition management  Program: New  How Provided: Verbal  Provided to: Patient  Level of Understanding: Verbalized           PT OP Goals     Row Name 08/24/18 1100          PT Short Term Goals    STG Date to Achieve 08/26/18  -RS     STG 1 Patient will demonstrate understanding of proper positioning to alleviate shoulder guarding  -RS     STG 1 Progress New  -RS     STG 2 Patient will improve bilateral PROM cervical rotation to >40 degrees without pinching.  -RS     STG 2 Progress New  -RS        Time Calculation    PT Goal Re-Cert Due Date 09/23/18  -RS       User Key  (r) = Recorded By, (t) = Taken By, (c) = Cosigned By    Initials Name Provider Type    Jeremy Solorzano, PT, DPT, OCS Physical Therapist                PT Assessment/Plan     Row Name 08/24/18 1100          PT Assessment    Functional Limitations Performance in work activities;Performance in self-care ADL;Performance in leisure activities;Limitations in functional capacity and performance  -RS      Impairments Joint mobility;Muscle strength;Pain;Peripheral nerve integrity;Posture;Range of motion;Sensation  -RS     Assessment Comments Patient presents today with cervical radiculopathy with right UE symptoms.  The patient is having neck surgery on 09/07/2018.  The patient has MRI confirmed extrusion with cord contact at C6/7.  The patient's triceps, brachioradialis, and wrist extension is weak compared to the left.  Triceps reflex was essentially absent with low triceps tone.  Traction did peripheralize symptoms into the mid thoracic and left UE, which is not a positive finding.  At this point the patient is a surgical candidate with poor rehab prognosis prior to surgery.  We will see the patient for follow up next week to assess symptoms and work to decrease the guarding that is present.  We will work to improve baseline neck ROM as long as symptoms do not peripheralize.  UE AROM is limited secondary to radiculopathy and pain.  Thank you for allowing us to work with this patient.  -RS     Please refer to paper survey for additional self-reported information Yes  -RS     Rehab Potential Fair  -RS     Patient/caregiver participated in establishment of treatment plan and goals Yes  -RS     Patient would benefit from skilled therapy intervention Yes  -RS        PT Plan    PT Frequency 2x/week  -RS     Predicted Duration of Therapy Intervention (Therapy Eval) 2-4 weeks  -RS     Planned CPT's? PT EVAL LOW COMPLEXITY: 94346;PT THER PROC EA 15 MIN: 32095;PT MANUAL THERAPY EA 15 MIN: 66666;PT NEUROMUSC RE-EDUCATION EA 15 MIN: 41164;PT HOT OR COLD PACK TREAT MCARE;PT ELECTRICAL STIM UNATTEND: ;PT ELECTRICAL STIM ATTD EA 15 MIN: 62759;PT TRACTION CERVICAL: 32774  -RS     Physical Therapy Interventions (Optional Details) home exercise program;joint mobilization;manual therapy techniques;modalities;neuromuscular re-education;patient/family education;postural re-education;ROM (Range of  Motion);strengthening;stretching;swiss ball techniques;taping  -RS     PT Plan Comments We will work to reduce the localized guarding and centralize symptoms as able prior to surgery.  I anticipate that 2 visits will be sufficient.  Cervical traction was trialed without success and will be trialed again as able.  Modalities will be used for pain control.    -RS       User Key  (r) = Recorded By, (t) = Taken By, (c) = Cosigned By    Initials Name Provider Type    RS Jeremy Tyson PT, DPT, OCS Physical Therapist                                        Time Calculation:     Therapy Suggested Charges     Code   Minutes Charges    None             Start Time: 1045  Stop Time: 1145  Time Calculation (min): 60 min     Therapy Charges for Today     Code Description Service Date Service Provider Modifiers Qty    80341757369 HC PT EVAL LOW COMPLEXITY 4 8/24/2018 Jeremy Tyson, PT, DPT, OCS GP 1                    GAIL Tyson PT, DPT, OCS  8/24/2018

## 2018-08-27 ENCOUNTER — APPOINTMENT (OUTPATIENT)
Dept: PREADMISSION TESTING | Facility: HOSPITAL | Age: 58
End: 2018-08-27

## 2018-08-27 VITALS
HEART RATE: 86 BPM | DIASTOLIC BLOOD PRESSURE: 82 MMHG | OXYGEN SATURATION: 98 % | WEIGHT: 156.53 LBS | HEIGHT: 59 IN | SYSTOLIC BLOOD PRESSURE: 141 MMHG | RESPIRATION RATE: 16 BRPM | BODY MASS INDEX: 31.56 KG/M2

## 2018-08-27 DIAGNOSIS — M54.12 CERVICAL RADICULOPATHY: ICD-10-CM

## 2018-08-27 PROCEDURE — 80053 COMPREHEN METABOLIC PANEL: CPT | Performed by: NEUROLOGICAL SURGERY

## 2018-08-27 PROCEDURE — 85027 COMPLETE CBC AUTOMATED: CPT | Performed by: NEUROLOGICAL SURGERY

## 2018-08-27 PROCEDURE — 36415 COLL VENOUS BLD VENIPUNCTURE: CPT

## 2018-08-27 PROCEDURE — 81003 URINALYSIS AUTO W/O SCOPE: CPT | Performed by: NEUROLOGICAL SURGERY

## 2018-08-27 RX ORDER — CALCIUM POLYCARBOPHIL 625 MG 625 MG/1
625 TABLET ORAL DAILY
COMMUNITY
End: 2018-09-18

## 2018-08-29 ENCOUNTER — OFFICE VISIT (OUTPATIENT)
Dept: OBSTETRICS AND GYNECOLOGY | Facility: CLINIC | Age: 58
End: 2018-08-29

## 2018-08-29 ENCOUNTER — HOSPITAL ENCOUNTER (OUTPATIENT)
Dept: PHYSICAL THERAPY | Facility: HOSPITAL | Age: 58
Setting detail: THERAPIES SERIES
Discharge: HOME | End: 2018-08-29

## 2018-08-29 ENCOUNTER — OFFICE VISIT (OUTPATIENT)
Dept: INTERNAL MEDICINE | Facility: CLINIC | Age: 58
End: 2018-08-29

## 2018-08-29 ENCOUNTER — LAB (OUTPATIENT)
Dept: LAB | Facility: HOSPITAL | Age: 58
End: 2018-08-29

## 2018-08-29 VITALS
TEMPERATURE: 98 F | HEART RATE: 72 BPM | BODY MASS INDEX: 30.88 KG/M2 | OXYGEN SATURATION: 96 % | WEIGHT: 153.19 LBS | DIASTOLIC BLOOD PRESSURE: 78 MMHG | RESPIRATION RATE: 12 BRPM | HEIGHT: 59 IN | SYSTOLIC BLOOD PRESSURE: 140 MMHG

## 2018-08-29 VITALS
HEIGHT: 59 IN | DIASTOLIC BLOOD PRESSURE: 82 MMHG | WEIGHT: 151 LBS | BODY MASS INDEX: 30.44 KG/M2 | SYSTOLIC BLOOD PRESSURE: 126 MMHG

## 2018-08-29 DIAGNOSIS — Z01.818 PRE-OPERATIVE CLEARANCE: ICD-10-CM

## 2018-08-29 DIAGNOSIS — Z78.0 MENOPAUSE: Primary | ICD-10-CM

## 2018-08-29 DIAGNOSIS — Z01.411 ENCOUNTER FOR GYNECOLOGICAL EXAMINATION WITH ABNORMAL FINDING: ICD-10-CM

## 2018-08-29 DIAGNOSIS — Z11.59 ENCOUNTER FOR HEPATITIS C VIRUS SCREENING TEST FOR HIGH RISK PATIENT: ICD-10-CM

## 2018-08-29 DIAGNOSIS — Z23 NEED FOR TDAP VACCINATION: ICD-10-CM

## 2018-08-29 DIAGNOSIS — M54.12 CERVICAL RADICULOPATHY: ICD-10-CM

## 2018-08-29 DIAGNOSIS — M50.20 HERNIATED CERVICAL DISC WITHOUT MYELOPATHY: ICD-10-CM

## 2018-08-29 DIAGNOSIS — Z76.89 ESTABLISHING CARE WITH NEW DOCTOR, ENCOUNTER FOR: ICD-10-CM

## 2018-08-29 DIAGNOSIS — R03.0 BORDERLINE BLOOD PRESSURE: Primary | ICD-10-CM

## 2018-08-29 DIAGNOSIS — Z78.9 NON-SMOKER: ICD-10-CM

## 2018-08-29 DIAGNOSIS — Z91.89 ENCOUNTER FOR HEPATITIS C VIRUS SCREENING TEST FOR HIGH RISK PATIENT: ICD-10-CM

## 2018-08-29 DIAGNOSIS — Z82.49 FAMILY HISTORY OF HEART DISEASE: ICD-10-CM

## 2018-08-29 DIAGNOSIS — Z00.00 ADULT GENERAL MEDICAL EXAM: ICD-10-CM

## 2018-08-29 DIAGNOSIS — M48.02 SPINAL STENOSIS IN CERVICAL REGION: ICD-10-CM

## 2018-08-29 DIAGNOSIS — M50.20 HERNIATED CERVICAL DISC WITHOUT MYELOPATHY: Primary | ICD-10-CM

## 2018-08-29 PROBLEM — E66.9 OBESITY (BMI 30-39.9): Status: ACTIVE | Noted: 2018-08-16

## 2018-08-29 LAB
HCV AB SER DONR QL: NEGATIVE
HCV S/C RATIO: 0.03 (ref 0–0.99)

## 2018-08-29 PROCEDURE — 90715 TDAP VACCINE 7 YRS/> IM: CPT | Performed by: NURSE PRACTITIONER

## 2018-08-29 PROCEDURE — 86803 HEPATITIS C AB TEST: CPT | Performed by: NURSE PRACTITIONER

## 2018-08-29 PROCEDURE — 99214 OFFICE O/P EST MOD 30 MIN: CPT | Performed by: NURSE PRACTITIONER

## 2018-08-29 PROCEDURE — 97140 MANUAL THERAPY 1/> REGIONS: CPT

## 2018-08-29 PROCEDURE — 93000 ELECTROCARDIOGRAM COMPLETE: CPT | Performed by: NURSE PRACTITIONER

## 2018-08-29 PROCEDURE — 97032 APPL MODALITY 1+ESTIM EA 15: CPT

## 2018-08-29 PROCEDURE — 90471 IMMUNIZATION ADMIN: CPT | Performed by: NURSE PRACTITIONER

## 2018-08-29 PROCEDURE — 99213 OFFICE O/P EST LOW 20 MIN: CPT | Performed by: OBSTETRICS & GYNECOLOGY

## 2018-08-29 PROCEDURE — 36415 COLL VENOUS BLD VENIPUNCTURE: CPT

## 2018-08-29 RX ORDER — ESTERIFIED ESTROGEN AND METHYLTESTOSTERONE .625; 1.25 MG/1; MG/1
1 TABLET ORAL DAILY
Qty: 30 TABLET | Refills: 3 | Status: SHIPPED | OUTPATIENT
Start: 2018-08-29 | End: 2018-11-27 | Stop reason: SDUPTHER

## 2018-08-29 NOTE — THERAPY TREATMENT NOTE
Outpatient Physical Therapy Ortho Treatment Note  [facility]     Patient Name: Chasidy Tejada  : 1960  MRN: [id number]  Today's Date: 2018      Visit Date: 2018    Visit Dx:    ICD-10-CM ICD-9-CM   1. Herniated cervical disc without myelopathy M50.20 722.0   2. Spinal stenosis in cervical region M48.02 723.0   3. Cervical radiculopathy M54.12 723.4       Patient Active Problem List   Diagnosis   • Family history of colon cancer   • Colon polyps   • Laryngopharyngeal reflux   • Pharyngoesophageal dysphagia   • Non-smoker   • Obesity (BMI 30-39.9)   • Spinal stenosis in cervical region   • Cervical radiculopathy   • Herniated cervical disc without myelopathy   • Borderline blood pressure        Past Medical History:   Diagnosis Date   • Acid reflux    • Asthma     long time ago    • Panic attack    • Right arm weakness         Past Surgical History:   Procedure Laterality Date   • APPENDECTOMY     • CHOLECYSTECTOMY     • COLONOSCOPY  2012   • COLONOSCOPY N/A 2/10/2017    Procedure: COLONOSCOPY WITH ANESTHESIA;  Surgeon: Elina Gonsalez MD;  Location: [facility] ENDOSCOPY;  Service:    • HYSTERECTOMY      Had hysterectomy for painful periods and bleeding. (Dr. Patel)   • LAPAROSCOPIC TUBAL LIGATION     • TONSILLECTOMY                               PT Assessment/Plan     Row Name 18 1300          PT Assessment    Assessment Comments Patient did not demonstrate any worsening of radicular symptoms with treatment today.  The AROM has improved into rotation to each direction with no peripheralization of symptoms.  We will see the patient for one additional visit then discharge.   -RS        PT Plan    PT Plan Comments modalities and manual therapy as needed for pain control.  Discharge plan of care next session with surgery scheduled 2018  -RS       User Key  (r) = Recorded By, (t) = Taken By, (c) = Cosigned By    Initials Name Provider Type    RS Jeremy Tyson, PT, DPT, OCS  Physical Therapist                Modalities     Row Name 08/29/18 1300             ELECTRICAL STIMULATION    Attended/Unattended Unattended  -RS      Stimulation Type IFC  -RS      Max mAmp 11  -RS      Location/Electrode Placement/Other quadripolar CT junction   -RS      12992 - PT Electrical Stimulation (Manual) Minutes 20  -RS        User Key  (r) = Recorded By, (t) = Taken By, (c) = Cosigned By    Initials Name Provider Type    RS Jeremy Tyson, PT, DPT, OCS Physical Therapist                Exercises     Row Name 08/29/18 1300             Subjective Comments    Subjective Comments Patient has been working on her ROM and this has not worsened her symptoms  -RS         Subjective Pain    Able to rate subjective pain? yes  -RS      Pre-Treatment Pain Level 4  -RS      Post-Treatment Pain Level 1  -RS         Total Minutes    69277 - PT Manual Therapy Minutes 25  -RS        User Key  (r) = Recorded By, (t) = Taken By, (c) = Cosigned By    Initials Name Provider Type    RS Jeremy Tyson, PT, DPT, OCS Physical Therapist                        Manual Rx (last 36 hours)      Manual Treatments     Row Name 08/29/18 1300             Total Minutes    98215 - PT Manual Therapy Minutes 25  -RS         Manual Rx 1    Manual Rx 1 Location massage chair  -RS      Manual Rx 1 Type upper thoracic PA  -RS      Manual Rx 1 Grade 2 oscillatory  -RS      Manual Rx 1 Duration 10  -RS         Manual Rx 2    Manual Rx 2 Location massage chair  -RS      Manual Rx 2 Type CT junction  -RS      Manual Rx 2 Grade sustained grade 2  -RS      Manual Rx 2 Duration 3  -RS         Manual Rx 3    Manual Rx 3 Location massage chair  -RS      Manual Rx 3 Type CT junction  -RS      Manual Rx 3 Grade STM  -RS      Manual Rx 3 Duration 5  -RS         Manual Rx 4    Manual Rx 4 Location massage chair  -RS      Manual Rx 4 Type (R) upper trap/levator  -RS      Manual Rx 4 Grade min OP  -RS      Manual Rx 4 Duration 7   -RS        User  Key  (r) = Recorded By, (t) = Taken By, (c) = Cosigned By    Initials Name Provider Type    Jeremy Solorzano, PT, DPT, OCS Physical Therapist                PT OP Goals     Row Name 08/29/18 1300          PT Short Term Goals    STG Date to Achieve 08/26/18  -RS     STG 1 Patient will demonstrate understanding of proper positioning to alleviate shoulder guarding  -RS     STG 1 Progress Ongoing  -RS     STG 1 Progress Comments positioning with pillows as able; prefers to sitting with arms down by the side.  -RS     STG 2 Patient will improve bilateral PROM cervical rotation to >40 degrees without pinching.  -RS     STG 2 Progress Ongoing  -RS        Time Calculation    PT Goal Re-Cert Due Date 09/23/18  -RS       User Key  (r) = Recorded By, (t) = Taken By, (c) = Cosigned By    Initials Name Provider Type    Jeremy Solorzano, PT, DPT, OCS Physical Therapist          Therapy Education  Education Details: continue to stress neck AROM to mid range, not pushing through pain  Given: Symptoms/condition management  Program: Reinforced  How Provided: Verbal  Provided to: Patient  Level of Understanding: Verbalized              Time Calculation:   Start Time: 1340  Stop Time: 1430  Time Calculation (min): 50 min  Therapy Suggested Charges     Code   Minutes Charges    40064 (CPT®) Hc Pt Neuromusc Re Education Ea 15 Min      56140 (CPT®) Hc Pt Ther Proc Ea 15 Min      67651 (CPT®) Hc Gait Training Ea 15 Min      75859 (CPT®) Hc Pt Therapeutic Act Ea 15 Min      35012 (CPT®) Hc Pt Manual Therapy Ea 15 Min 25 2    01136 (CPT®) Hc Pt Ther Massage- Per 15 Min      83216 (CPT®) Hc Pt Iontophoresis Ea 15 Min      42594 (CPT®) Hc Pt Elec Stim Ea-Per 15 Min 20 1    22044 (CPT®) Hc Pt Ultrasound Ea 15 Min      08605 (CPT®) Hc Pt Self Care/Mgmt/Train Ea 15 Min      91481 (CPT®) Hc Pt Prosthetic (S) Train Initial Encounter, Each 15 Min      13015 (CPT®) Hc Orthotic(S) Mgmt/Train Initial Encounter, Each 15min      80087  (CPT®) Hc Pt Aquatic Therapy Ea 15 Min      46318 (CPT®) Hc Pt Orthotic(S)/Prosthetic(S) Encounter, Each 15 Min      Total  45 3        Therapy Charges for Today     Code Description Service Date Service Provider Modifiers Qty    34956568074 HC PT MANUAL THERAPY EA 15 MIN 8/29/2018 Jeremy Tyson, PT, DPT, OCS GP 2    69302407942 HC PT ELEC STIM EA-PER 15 MIN 8/29/2018 Jeremy Tyson, PT, DPT, OCS GP 1                    GAIL Tyson, PT, DPT, OCS  8/29/2018

## 2018-08-29 NOTE — PROGRESS NOTES
CC: surgery clearance    History:  Chasidy Tejada is a 58 y.o. female who presents today for evaluation of the above problems.      Patient presents today to establish care and for surgical clearance. She states she is having extensive neck surgery with Dr. Mcfadden next week for neck and right arm pain.  Previous PCP was Dr. Marin.   Can climb a flight of stairs without issues.   Has stopped ASA at this time for surgery. Was taking for family history of heart disease. Denies any personal history of MI or TIA. She denies any chest pain, extremity swelling or SOB.       ROS:  Review of Systems   Constitutional: Negative for fatigue and unexpected weight change.   HENT: Negative.    Eyes: Negative.    Respiratory: Negative.  Negative for cough, chest tightness and shortness of breath.    Cardiovascular: Negative.  Negative for chest pain, palpitations and leg swelling.   Gastrointestinal: Negative for abdominal pain, constipation and diarrhea.   Endocrine: Negative.    Genitourinary: Negative for difficulty urinating, dyspareunia, genital sores, menstrual problem, pelvic pain, vaginal bleeding, vaginal discharge and vaginal pain.   Musculoskeletal: Positive for arthralgias (right arm for a month) and neck pain (started July 19th).   Skin: Negative.    Neurological: Positive for dizziness.   Psychiatric/Behavioral: Negative.        Allergies   Allergen Reactions   • Demerol [Meperidine] Hives   • Morphine And Related Hives     Past Medical History:   Diagnosis Date   • Acid reflux    • Asthma     long time ago    • Panic attack    • Right arm weakness      Past Surgical History:   Procedure Laterality Date   • APPENDECTOMY     • CHOLECYSTECTOMY     • COLONOSCOPY  09/05/2012   • COLONOSCOPY N/A 2/10/2017    Procedure: COLONOSCOPY WITH ANESTHESIA;  Surgeon: Elina Gonsalez MD;  Location: [facility] ENDOSCOPY;  Service:    • HYSTERECTOMY  1998    Had hysterectomy for painful periods and bleeding. (Dr. Patel)   • LAPAROSCOPIC  "TUBAL LIGATION     • TONSILLECTOMY       Family History   Problem Relation Age of Onset   • Colon cancer Maternal Grandmother         age not known   • Cancer Father    • Heart disease Father    • Diabetes Mother    • Hypertension Mother    • Stroke Mother    • Hypertension Sister    • No Known Problems Daughter    • No Known Problems Sister    • Colon polyps Neg Hx    • Esophageal cancer Neg Hx    • Liver cancer Neg Hx    • Liver disease Neg Hx    • Rectal cancer Neg Hx    • Stomach cancer Neg Hx    • Breast cancer Neg Hx    • Ovarian cancer Neg Hx       reports that she has quit smoking. Her smoking use included Cigarettes. She has never used smokeless tobacco. She reports that she drinks alcohol. She reports that she does not use drugs.      Current Outpatient Prescriptions:   •  aspirin 81 MG EC tablet, Take 81 mg by mouth Daily., Disp: , Rfl:   •  calcium polycarbophil (FIBERCON) 625 MG tablet, Take 625 mg by mouth Daily., Disp: , Rfl:   •  estrogens, conjugated,-methyltestosterone (ESTRATEST HS) 0.625-1.25 MG per tablet, Take 1 tablet by mouth Daily., Disp: 30 tablet, Rfl: 3  •  Multiple Vitamins-Minerals (MULTIVITAMIN ADULT PO), Take  by mouth Daily., Disp: , Rfl:     OBJECTIVE:  /78 (BP Location: Left arm, Patient Position: Sitting, Cuff Size: Adult)   Pulse 72   Temp 98 °F (36.7 °C) (Oral)   Resp 12   Ht 149.9 cm (59\")   Wt 69.5 kg (153 lb 3 oz)   SpO2 96%   BMI 30.94 kg/m²    Physical Exam   Constitutional: She is oriented to person, place, and time. She appears well-developed and well-nourished.   HENT:   Head: Normocephalic and atraumatic.   Eyes: Pupils are equal, round, and reactive to light. EOM are normal.   Neck: Normal range of motion. Neck supple.   Cardiovascular: Normal rate, regular rhythm and normal heart sounds.    Pulmonary/Chest: Effort normal and breath sounds normal.   Abdominal: Soft. Bowel sounds are normal.   Musculoskeletal: Normal range of motion.        Cervical back: " She exhibits pain.   Neurological: She is alert and oriented to person, place, and time.   Skin: Skin is warm and dry.   Psychiatric: She has a normal mood and affect.   Vitals reviewed.      Assessment/Plan    Chasidy was seen today for establish care.    Diagnoses and all orders for this visit:    Adult general medical exam  Establishing care with new doctor, encounter for  Encounter for hepatitis C virus screening test for high risk patient  -     Hepatitis C antibody; Future  Need for Tdap vaccination  -     Tdap Vaccine Greater Than or Equal To 6yo IM  Family history of heart disease  Patient is holding ASA at this time for surgery.      Pre-operative clearance  -     ECG 12 Lead  ECG unremarkable  Reviewed preoperative labs.   Patient denies any symptoms that could indicate Acute coronary syndrome, arrhythmia, decompensated HF, or Valvular disease.   Has stopped ASA at this time for surgery. Was taking for family history of heart disease. Denies any personal history of MI or TIA. She denies any chest pain, extremity swelling or SOB.   RCRI Risk of 0.4% no additional testing needed for surgical clearance at this time. May proceed to surgery.   Moderate Functional Capacity.     Borderline blood pressure  BP goal for age is <140/90 per JNC 8 guidelines. It was normal this AM at her Ob/Gyn appt. Patient states she is nervous and in pain today.     Cervical radiculopathy  Herniated cervical disc without myelopathy  Plans to have single level anterior cervical discectomy fusion and partial corpectomy at C6-7 next week per Dr. Mcfadden Neurosurgery.       An After Visit Summary was printed and given to the patient at discharge.  Return in about 3 months (around 11/29/2018).         Destini Oates APRN 8/29/2018

## 2018-08-29 NOTE — PROGRESS NOTES
"Subjective   Chief Complaint   Patient presents with   • Med Refill     pt here today for refill on her estrogen. pt voices no other concerns.      Chasidy Tejada is a 58 y.o. year old .  No LMP recorded. Patient has had a hysterectomy.  She presents to be seen for follow-up of HRT.  Patient reports increased sex drive, resolution of vaginal dryness and improved energy.  She wants to continue.  The patient's biggest problem is neck pain, and she is having surgery with Dr. Mcfadden next Friday.     The following portions of the patient's history were reviewed and updated as appropriate:current medications and allergies    Smoking status: Never Smoker                                                              Smokeless tobacco: Never Used                        Review of Systems   Constitutional: Negative for activity change and unexpected weight change.   HENT: Negative for congestion.    Eyes: Positive for visual disturbance (wears glasses).   Respiratory: Negative for shortness of breath.    Cardiovascular: Negative for chest pain.   Gastrointestinal: Positive for constipation (needs to drink more water). Negative for abdominal pain, blood in stool and diarrhea.   Endocrine: Negative for cold intolerance and heat intolerance.   Genitourinary: Negative for difficulty urinating, dyspareunia, enuresis, pelvic pain, vaginal discharge and vaginal pain.        Vaginal dryness much improved since starting HRT   Musculoskeletal: Positive for neck pain and neck stiffness.   Skin: Negative for rash.   Neurological: Negative for dizziness and headaches.   Psychiatric/Behavioral: Negative for dysphoric mood and sleep disturbance. The patient is nervous/anxious (took medication in the past, but no panic attacks recently).          Objective   /82   Ht 149.9 cm (59\")   Wt 68.5 kg (151 lb)   BMI 30.50 kg/m²     Physical Exam   Constitutional: She is oriented to person, place, and time. She appears well-developed " and well-nourished. No distress.   HENT:   Head: Normocephalic and atraumatic.   Eyes: EOM are normal.   Neck: Normal range of motion. No thyromegaly present.   Cardiovascular: Normal rate and regular rhythm.    No murmur heard.  Pulmonary/Chest: Effort normal and breath sounds normal. She has no wheezes.   Abdominal: Soft. She exhibits no distension. There is no tenderness.   Musculoskeletal: Normal range of motion.   Neurological: She is alert and oriented to person, place, and time.   Skin: Skin is warm and dry.   Psychiatric: She has a normal mood and affect. Her behavior is normal. Judgment normal.   Nursing note and vitals reviewed.      Lab Review   No data reviewed    Imaging   No data reviewed     Assessment & Plan    Chasidy was seen today for med refill.    Diagnoses and all orders for this visit:    Menopause: Continuing HRT.  Patient reports she feels much better on it.  -     estrogens, conjugated,-methyltestosterone (ESTRATEST HS) 0.625-1.25 MG per tablet; Take 1 tablet by mouth Daily.    Non-smoker      This note was electronically signed.    Elisha Martel MD  August 29, 2018  8:12 AM    Total time spent today with Chasidy  was 20 minutes (level 3).  Greater than 50% of the time was spent coordinating care, answering her questions and counseling regarding pathophysiology of her presenting problem along with plans for any diagnositc work-up and treatment.

## 2018-08-31 ENCOUNTER — DOCUMENTATION (OUTPATIENT)
Dept: PHYSICAL THERAPY | Facility: HOSPITAL | Age: 58
End: 2018-08-31

## 2018-08-31 DIAGNOSIS — M48.02 SPINAL STENOSIS IN CERVICAL REGION: ICD-10-CM

## 2018-08-31 DIAGNOSIS — M54.12 CERVICAL RADICULOPATHY: ICD-10-CM

## 2018-08-31 DIAGNOSIS — M50.20 HERNIATED CERVICAL DISC WITHOUT MYELOPATHY: Primary | ICD-10-CM

## 2018-09-06 ENCOUNTER — ANESTHESIA EVENT (OUTPATIENT)
Dept: PERIOP | Facility: HOSPITAL | Age: 58
End: 2018-09-06

## 2018-09-07 ENCOUNTER — APPOINTMENT (OUTPATIENT)
Dept: GENERAL RADIOLOGY | Facility: HOSPITAL | Age: 58
End: 2018-09-07

## 2018-09-07 ENCOUNTER — ANESTHESIA (OUTPATIENT)
Dept: PERIOP | Facility: HOSPITAL | Age: 58
End: 2018-09-07

## 2018-09-07 ENCOUNTER — HOSPITAL ENCOUNTER (OUTPATIENT)
Facility: HOSPITAL | Age: 58
Discharge: HOME | End: 2018-09-08
Attending: NEUROLOGICAL SURGERY | Admitting: NEUROLOGICAL SURGERY

## 2018-09-07 DIAGNOSIS — Z78.9 IMPAIRED MOBILITY AND ADLS: ICD-10-CM

## 2018-09-07 DIAGNOSIS — M54.12 CERVICAL RADICULOPATHY: ICD-10-CM

## 2018-09-07 DIAGNOSIS — Z74.09 IMPAIRED MOBILITY AND ADLS: ICD-10-CM

## 2018-09-07 DIAGNOSIS — R26.9 GAIT ABNORMALITY: ICD-10-CM

## 2018-09-07 LAB
ABO GROUP BLD: NORMAL
BLD GP AB SCN SERPL QL: NEGATIVE
RH BLD: POSITIVE
T&S EXPIRATION DATE: NORMAL

## 2018-09-07 PROCEDURE — G8978 MOBILITY CURRENT STATUS: HCPCS

## 2018-09-07 PROCEDURE — G8987 SELF CARE CURRENT STATUS: HCPCS | Performed by: OCCUPATIONAL THERAPIST

## 2018-09-07 PROCEDURE — G8988 SELF CARE GOAL STATUS: HCPCS | Performed by: OCCUPATIONAL THERAPIST

## 2018-09-07 PROCEDURE — 25010000002 SUCCINYLCHOLINE PER 20 MG: Performed by: NURSE ANESTHETIST, CERTIFIED REGISTERED

## 2018-09-07 PROCEDURE — 25010000002 PROPOFOL 10 MG/ML EMULSION: Performed by: NURSE ANESTHETIST, CERTIFIED REGISTERED

## 2018-09-07 PROCEDURE — 25010000003 CEFAZOLIN PER 500 MG: Performed by: NEUROLOGICAL SURGERY

## 2018-09-07 PROCEDURE — 25010000002 DEXAMETHASONE PER 1 MG: Performed by: ANESTHESIOLOGY

## 2018-09-07 PROCEDURE — 97162 PT EVAL MOD COMPLEX 30 MIN: CPT

## 2018-09-07 PROCEDURE — 97166 OT EVAL MOD COMPLEX 45 MIN: CPT | Performed by: OCCUPATIONAL THERAPIST

## 2018-09-07 PROCEDURE — 25010000002 ONDANSETRON PER 1 MG: Performed by: NURSE ANESTHETIST, CERTIFIED REGISTERED

## 2018-09-07 PROCEDURE — 88311 DECALCIFY TISSUE: CPT | Performed by: NEUROLOGICAL SURGERY

## 2018-09-07 PROCEDURE — C1713 ANCHOR/SCREW BN/BN,TIS/BN: HCPCS | Performed by: NEUROLOGICAL SURGERY

## 2018-09-07 PROCEDURE — 94799 UNLISTED PULMONARY SVC/PX: CPT

## 2018-09-07 PROCEDURE — 86900 BLOOD TYPING SEROLOGIC ABO: CPT | Performed by: NEUROLOGICAL SURGERY

## 2018-09-07 PROCEDURE — 25010000002 PROPOFOL 1000 MG/ML EMULSION: Performed by: NURSE ANESTHETIST, CERTIFIED REGISTERED

## 2018-09-07 PROCEDURE — 72040 X-RAY EXAM NECK SPINE 2-3 VW: CPT

## 2018-09-07 PROCEDURE — 86850 RBC ANTIBODY SCREEN: CPT | Performed by: NEUROLOGICAL SURGERY

## 2018-09-07 PROCEDURE — 63081 REMOVE VERT BODY DCMPRN CRVL: CPT | Performed by: NEUROLOGICAL SURGERY

## 2018-09-07 PROCEDURE — G8979 MOBILITY GOAL STATUS: HCPCS

## 2018-09-07 PROCEDURE — 76000 FLUOROSCOPY <1 HR PHYS/QHP: CPT

## 2018-09-07 PROCEDURE — 25010000002 FENTANYL CITRATE (PF) 250 MCG/5ML SOLUTION: Performed by: NURSE ANESTHETIST, CERTIFIED REGISTERED

## 2018-09-07 PROCEDURE — 22554 ARTHRD ANT NTRBD MIN DSC CRV: CPT | Performed by: NEUROLOGICAL SURGERY

## 2018-09-07 PROCEDURE — 25010000002 MIDAZOLAM PER 1 MG: Performed by: ANESTHESIOLOGY

## 2018-09-07 PROCEDURE — 22854 INSJ BIOMECHANICAL DEVICE: CPT | Performed by: NEUROLOGICAL SURGERY

## 2018-09-07 PROCEDURE — 86901 BLOOD TYPING SEROLOGIC RH(D): CPT | Performed by: NEUROLOGICAL SURGERY

## 2018-09-07 PROCEDURE — 25010000002 FENTANYL CITRATE (PF) 100 MCG/2ML SOLUTION: Performed by: ANESTHESIOLOGY

## 2018-09-07 PROCEDURE — 88304 TISSUE EXAM BY PATHOLOGIST: CPT | Performed by: NEUROLOGICAL SURGERY

## 2018-09-07 DEVICE — IMPLANT G6626526 NP LORDO 6MMX15MMX12MM
Type: IMPLANTABLE DEVICE | Status: FUNCTIONAL
Brand: DIVERGENCE® ANTERIOR CERVICAL FUSION SYSTEM

## 2018-09-07 DEVICE — HEMO ABS GELFOAM PWDR PORCN 1GM: Type: IMPLANTABLE DEVICE | Status: FUNCTIONAL

## 2018-09-07 DEVICE — DBM T43103 2.5CC GRAFTON PUTTY
Type: IMPLANTABLE DEVICE | Status: FUNCTIONAL
Brand: GRAFTON®AND GRAFTON PLUS®DEMINERALIZED BONE MATRIX (DBM)

## 2018-09-07 DEVICE — SCREW PK2 G6623513 SA S-D 3.5MM X 13MM
Type: IMPLANTABLE DEVICE | Status: FUNCTIONAL
Brand: DIVERGENCE® ANTERIOR CERVICAL FUSION SYSTEM

## 2018-09-07 DEVICE — HEMO ABS GELFOAM SPNG PORCN SZ100: Type: IMPLANTABLE DEVICE | Status: FUNCTIONAL

## 2018-09-07 RX ORDER — ONDANSETRON 2 MG/ML
4 INJECTION INTRAMUSCULAR; INTRAVENOUS ONCE AS NEEDED
Status: DISCONTINUED | OUTPATIENT
Start: 2018-09-07 | End: 2018-09-07 | Stop reason: HOSPADM

## 2018-09-07 RX ORDER — ESTERIFIED ESTROGEN AND METHYLTESTOSTERONE .625; 1.25 MG/1; MG/1
1 TABLET ORAL DAILY
Status: DISCONTINUED | OUTPATIENT
Start: 2018-09-07 | End: 2018-09-08 | Stop reason: HOSPADM

## 2018-09-07 RX ORDER — MIDAZOLAM HYDROCHLORIDE 1 MG/ML
1 INJECTION INTRAMUSCULAR; INTRAVENOUS
Status: DISCONTINUED | OUTPATIENT
Start: 2018-09-07 | End: 2018-09-07 | Stop reason: HOSPADM

## 2018-09-07 RX ORDER — SODIUM CHLORIDE 0.9 % (FLUSH) 0.9 %
1-10 SYRINGE (ML) INJECTION AS NEEDED
Status: DISCONTINUED | OUTPATIENT
Start: 2018-09-07 | End: 2018-09-07 | Stop reason: HOSPADM

## 2018-09-07 RX ORDER — SODIUM CHLORIDE 0.9 % (FLUSH) 0.9 %
1-10 SYRINGE (ML) INJECTION AS NEEDED
Status: DISCONTINUED | OUTPATIENT
Start: 2018-09-07 | End: 2018-09-08 | Stop reason: HOSPADM

## 2018-09-07 RX ORDER — DEXAMETHASONE SODIUM PHOSPHATE 4 MG/ML
4 INJECTION, SOLUTION INTRA-ARTICULAR; INTRALESIONAL; INTRAMUSCULAR; INTRAVENOUS; SOFT TISSUE ONCE AS NEEDED
Status: COMPLETED | OUTPATIENT
Start: 2018-09-07 | End: 2018-09-07

## 2018-09-07 RX ORDER — FENTANYL CITRATE 50 UG/ML
25 INJECTION, SOLUTION INTRAMUSCULAR; INTRAVENOUS AS NEEDED
Status: COMPLETED | OUTPATIENT
Start: 2018-09-07 | End: 2018-09-07

## 2018-09-07 RX ORDER — ACETAMINOPHEN 500 MG
1000 TABLET ORAL ONCE
Status: COMPLETED | OUTPATIENT
Start: 2018-09-07 | End: 2018-09-07

## 2018-09-07 RX ORDER — LABETALOL HYDROCHLORIDE 5 MG/ML
5 INJECTION, SOLUTION INTRAVENOUS
Status: DISCONTINUED | OUTPATIENT
Start: 2018-09-07 | End: 2018-09-07 | Stop reason: HOSPADM

## 2018-09-07 RX ORDER — SODIUM CHLORIDE 9 MG/ML
INJECTION, SOLUTION INTRAVENOUS AS NEEDED
Status: DISCONTINUED | OUTPATIENT
Start: 2018-09-07 | End: 2018-09-07 | Stop reason: HOSPADM

## 2018-09-07 RX ORDER — ONDANSETRON 2 MG/ML
INJECTION INTRAMUSCULAR; INTRAVENOUS AS NEEDED
Status: DISCONTINUED | OUTPATIENT
Start: 2018-09-07 | End: 2018-09-07 | Stop reason: SURG

## 2018-09-07 RX ORDER — NALOXONE HCL 0.4 MG/ML
0.4 VIAL (ML) INJECTION
Status: DISCONTINUED | OUTPATIENT
Start: 2018-09-07 | End: 2018-09-08 | Stop reason: HOSPADM

## 2018-09-07 RX ORDER — METOCLOPRAMIDE HYDROCHLORIDE 5 MG/ML
5 INJECTION INTRAMUSCULAR; INTRAVENOUS
Status: DISCONTINUED | OUTPATIENT
Start: 2018-09-07 | End: 2018-09-07 | Stop reason: HOSPADM

## 2018-09-07 RX ORDER — HYDROMORPHONE HYDROCHLORIDE 1 MG/ML
0.5 INJECTION, SOLUTION INTRAMUSCULAR; INTRAVENOUS; SUBCUTANEOUS
Status: DISCONTINUED | OUTPATIENT
Start: 2018-09-07 | End: 2018-09-08 | Stop reason: HOSPADM

## 2018-09-07 RX ORDER — LIDOCAINE HYDROCHLORIDE 20 MG/ML
INJECTION, SOLUTION INFILTRATION; PERINEURAL AS NEEDED
Status: DISCONTINUED | OUTPATIENT
Start: 2018-09-07 | End: 2018-09-07 | Stop reason: SURG

## 2018-09-07 RX ORDER — ONDANSETRON 2 MG/ML
4 INJECTION INTRAMUSCULAR; INTRAVENOUS EVERY 6 HOURS PRN
Status: DISCONTINUED | OUTPATIENT
Start: 2018-09-07 | End: 2018-09-08 | Stop reason: HOSPADM

## 2018-09-07 RX ORDER — BISACODYL 10 MG
10 SUPPOSITORY, RECTAL RECTAL DAILY PRN
Status: DISCONTINUED | OUTPATIENT
Start: 2018-09-07 | End: 2018-09-08 | Stop reason: HOSPADM

## 2018-09-07 RX ORDER — FENTANYL CITRATE 50 UG/ML
INJECTION, SOLUTION INTRAMUSCULAR; INTRAVENOUS AS NEEDED
Status: DISCONTINUED | OUTPATIENT
Start: 2018-09-07 | End: 2018-09-07 | Stop reason: SURG

## 2018-09-07 RX ORDER — IPRATROPIUM BROMIDE AND ALBUTEROL SULFATE 2.5; .5 MG/3ML; MG/3ML
3 SOLUTION RESPIRATORY (INHALATION) ONCE AS NEEDED
Status: DISCONTINUED | OUTPATIENT
Start: 2018-09-07 | End: 2018-09-07 | Stop reason: HOSPADM

## 2018-09-07 RX ORDER — ROCURONIUM BROMIDE 10 MG/ML
INJECTION, SOLUTION INTRAVENOUS AS NEEDED
Status: DISCONTINUED | OUTPATIENT
Start: 2018-09-07 | End: 2018-09-07 | Stop reason: SURG

## 2018-09-07 RX ORDER — SENNA AND DOCUSATE SODIUM 50; 8.6 MG/1; MG/1
1 TABLET, FILM COATED ORAL NIGHTLY PRN
Status: DISCONTINUED | OUTPATIENT
Start: 2018-09-07 | End: 2018-09-08 | Stop reason: HOSPADM

## 2018-09-07 RX ORDER — DOCUSATE SODIUM 100 MG/1
100 CAPSULE, LIQUID FILLED ORAL 2 TIMES DAILY PRN
Status: DISCONTINUED | OUTPATIENT
Start: 2018-09-07 | End: 2018-09-08 | Stop reason: HOSPADM

## 2018-09-07 RX ORDER — PROPOFOL 10 MG/ML
VIAL (ML) INTRAVENOUS AS NEEDED
Status: DISCONTINUED | OUTPATIENT
Start: 2018-09-07 | End: 2018-09-07 | Stop reason: SURG

## 2018-09-07 RX ORDER — ACETAMINOPHEN 325 MG/1
650 TABLET ORAL EVERY 4 HOURS PRN
Status: DISCONTINUED | OUTPATIENT
Start: 2018-09-07 | End: 2018-09-08 | Stop reason: HOSPADM

## 2018-09-07 RX ORDER — MIDAZOLAM HYDROCHLORIDE 1 MG/ML
2 INJECTION INTRAMUSCULAR; INTRAVENOUS
Status: DISCONTINUED | OUTPATIENT
Start: 2018-09-07 | End: 2018-09-07 | Stop reason: HOSPADM

## 2018-09-07 RX ORDER — NALOXONE HCL 0.4 MG/ML
0.4 VIAL (ML) INJECTION AS NEEDED
Status: DISCONTINUED | OUTPATIENT
Start: 2018-09-07 | End: 2018-09-07 | Stop reason: HOSPADM

## 2018-09-07 RX ORDER — SODIUM CHLORIDE 9 MG/ML
75 INJECTION, SOLUTION INTRAVENOUS CONTINUOUS
Status: DISCONTINUED | OUTPATIENT
Start: 2018-09-07 | End: 2018-09-08 | Stop reason: HOSPADM

## 2018-09-07 RX ORDER — SUCCINYLCHOLINE CHLORIDE 20 MG/ML
INJECTION INTRAMUSCULAR; INTRAVENOUS AS NEEDED
Status: DISCONTINUED | OUTPATIENT
Start: 2018-09-07 | End: 2018-09-07 | Stop reason: SURG

## 2018-09-07 RX ORDER — SODIUM CHLORIDE, SODIUM LACTATE, POTASSIUM CHLORIDE, CALCIUM CHLORIDE 600; 310; 30; 20 MG/100ML; MG/100ML; MG/100ML; MG/100ML
100 INJECTION, SOLUTION INTRAVENOUS CONTINUOUS
Status: DISCONTINUED | OUTPATIENT
Start: 2018-09-07 | End: 2018-09-08 | Stop reason: HOSPADM

## 2018-09-07 RX ORDER — MAGNESIUM HYDROXIDE 1200 MG/15ML
LIQUID ORAL AS NEEDED
Status: DISCONTINUED | OUTPATIENT
Start: 2018-09-07 | End: 2018-09-07 | Stop reason: HOSPADM

## 2018-09-07 RX ORDER — OXYCODONE AND ACETAMINOPHEN 7.5; 325 MG/1; MG/1
2 TABLET ORAL ONCE AS NEEDED
Status: DISCONTINUED | OUTPATIENT
Start: 2018-09-07 | End: 2018-09-07 | Stop reason: HOSPADM

## 2018-09-07 RX ORDER — OXYCODONE AND ACETAMINOPHEN 10; 325 MG/1; MG/1
1 TABLET ORAL ONCE AS NEEDED
Status: COMPLETED | OUTPATIENT
Start: 2018-09-07 | End: 2018-09-07

## 2018-09-07 RX ORDER — SODIUM CHLORIDE, SODIUM LACTATE, POTASSIUM CHLORIDE, CALCIUM CHLORIDE 600; 310; 30; 20 MG/100ML; MG/100ML; MG/100ML; MG/100ML
30 INJECTION, SOLUTION INTRAVENOUS CONTINUOUS
Status: DISCONTINUED | OUTPATIENT
Start: 2018-09-07 | End: 2018-09-08 | Stop reason: HOSPADM

## 2018-09-07 RX ORDER — LIDOCAINE HYDROCHLORIDE 40 MG/ML
SOLUTION TOPICAL AS NEEDED
Status: DISCONTINUED | OUTPATIENT
Start: 2018-09-07 | End: 2018-09-07 | Stop reason: SURG

## 2018-09-07 RX ORDER — IBUPROFEN 600 MG/1
600 TABLET ORAL ONCE AS NEEDED
Status: DISCONTINUED | OUTPATIENT
Start: 2018-09-07 | End: 2018-09-07 | Stop reason: HOSPADM

## 2018-09-07 RX ORDER — TRAMADOL HYDROCHLORIDE 50 MG/1
50 TABLET ORAL EVERY 6 HOURS PRN
Status: DISCONTINUED | OUTPATIENT
Start: 2018-09-07 | End: 2018-09-08 | Stop reason: HOSPADM

## 2018-09-07 RX ADMIN — ACETAMINOPHEN 1000 MG: 500 TABLET, FILM COATED ORAL at 10:12

## 2018-09-07 RX ADMIN — PROPOFOL 200 MG: 10 INJECTION, EMULSION INTRAVENOUS at 11:40

## 2018-09-07 RX ADMIN — OXYCODONE HYDROCHLORIDE AND ACETAMINOPHEN 1 TABLET: 10; 325 TABLET ORAL at 14:03

## 2018-09-07 RX ADMIN — PROPOFOL 200 MCG/KG/MIN: 10 INJECTION, EMULSION INTRAVENOUS at 11:44

## 2018-09-07 RX ADMIN — SODIUM CHLORIDE 75 ML/HR: 9 INJECTION, SOLUTION INTRAVENOUS at 15:30

## 2018-09-07 RX ADMIN — SODIUM CHLORIDE, POTASSIUM CHLORIDE, SODIUM LACTATE AND CALCIUM CHLORIDE: 600; 310; 30; 20 INJECTION, SOLUTION INTRAVENOUS at 11:50

## 2018-09-07 RX ADMIN — CEFAZOLIN 2 G: 1 INJECTION, POWDER, FOR SOLUTION INTRAMUSCULAR; INTRAVENOUS at 20:02

## 2018-09-07 RX ADMIN — TRAMADOL HYDROCHLORIDE 50 MG: 50 TABLET, COATED ORAL at 18:20

## 2018-09-07 RX ADMIN — FENTANYL CITRATE 25 MCG: 50 INJECTION, SOLUTION INTRAMUSCULAR; INTRAVENOUS at 13:36

## 2018-09-07 RX ADMIN — FENTANYL CITRATE 25 MCG: 50 INJECTION, SOLUTION INTRAMUSCULAR; INTRAVENOUS at 13:50

## 2018-09-07 RX ADMIN — CEFAZOLIN 2 G: 330 INJECTION, POWDER, FOR SOLUTION INTRAMUSCULAR; INTRAVENOUS at 11:45

## 2018-09-07 RX ADMIN — FENTANYL CITRATE 50 MCG: 50 INJECTION INTRAMUSCULAR; INTRAVENOUS at 12:10

## 2018-09-07 RX ADMIN — SUCCINYLCHOLINE CHLORIDE 80 MG: 20 INJECTION, SOLUTION INTRAMUSCULAR; INTRAVENOUS at 11:40

## 2018-09-07 RX ADMIN — ONDANSETRON HYDROCHLORIDE 8 MG: 2 SOLUTION INTRAMUSCULAR; INTRAVENOUS at 12:55

## 2018-09-07 RX ADMIN — FENTANYL CITRATE 25 MCG: 50 INJECTION, SOLUTION INTRAMUSCULAR; INTRAVENOUS at 13:41

## 2018-09-07 RX ADMIN — ROCURONIUM BROMIDE 5 MG: 10 INJECTION INTRAVENOUS at 11:40

## 2018-09-07 RX ADMIN — SODIUM CHLORIDE, POTASSIUM CHLORIDE, SODIUM LACTATE AND CALCIUM CHLORIDE: 600; 310; 30; 20 INJECTION, SOLUTION INTRAVENOUS at 11:41

## 2018-09-07 RX ADMIN — PROPOFOL 200 MCG/KG/MIN: 10 INJECTION, EMULSION INTRAVENOUS at 13:15

## 2018-09-07 RX ADMIN — MIDAZOLAM HYDROCHLORIDE 2 MG: 1 INJECTION, SOLUTION INTRAMUSCULAR; INTRAVENOUS at 10:51

## 2018-09-07 RX ADMIN — MIDAZOLAM HYDROCHLORIDE 2 MG: 1 INJECTION, SOLUTION INTRAMUSCULAR; INTRAVENOUS at 10:37

## 2018-09-07 RX ADMIN — LIDOCAINE HYDROCHLORIDE 1 EACH: 40 SOLUTION TOPICAL at 11:41

## 2018-09-07 RX ADMIN — FENTANYL CITRATE 100 MCG: 50 INJECTION INTRAMUSCULAR; INTRAVENOUS at 11:41

## 2018-09-07 RX ADMIN — SODIUM CHLORIDE, POTASSIUM CHLORIDE, SODIUM LACTATE AND CALCIUM CHLORIDE 30 ML/HR: 600; 310; 30; 20 INJECTION, SOLUTION INTRAVENOUS at 09:57

## 2018-09-07 RX ADMIN — LIDOCAINE HYDROCHLORIDE 60 MG: 20 INJECTION, SOLUTION INFILTRATION; PERINEURAL at 11:41

## 2018-09-07 RX ADMIN — FENTANYL CITRATE 25 MCG: 50 INJECTION, SOLUTION INTRAMUSCULAR; INTRAVENOUS at 13:52

## 2018-09-07 RX ADMIN — DEXAMETHASONE SODIUM PHOSPHATE 4 MG: 4 INJECTION, SOLUTION INTRA-ARTICULAR; INTRALESIONAL; INTRAMUSCULAR; INTRAVENOUS; SOFT TISSUE at 10:13

## 2018-09-07 NOTE — PLAN OF CARE
Problem: Patient Care Overview  Goal: Plan of Care Review  Outcome: Ongoing (interventions implemented as appropriate)   09/07/18 7523   Coping/Psychosocial   Plan of Care Reviewed With patient;daughter   Plan of Care Review   Progress no change   OTHER   Outcome Summary OT eval completed. Pt reports mild middle back pain which is chronic in nature, 2/10 latesha reyna. Pt was mod-max A to adjust socks and cervical collar. Pt was CGA-min A for functional mobiltiy and t/f. Pt had decreased balance and dizziness with mobility. Skilled OT recommended to address adls, functional mobility and education. Recommended d/c home with assist and HH.

## 2018-09-07 NOTE — H&P (VIEW-ONLY)
SUBJECTIVE:  Patient ID: Chasidy Tejada is a 58 y.o. female is here today for follow-up.    Chief Complaint: Right upper extremity pain  Chief Complaint   Patient presents with   • neck & arm pain     patient had MRI @ [facility] [place] on [place]/21/18 and is here to discuss results; patient is to start therapy tomorrow.       HPI  58-year-old female that been complaining of right paraspinal neck pain and right upper extremity radicular pain numbness tingling and weakness.This is been going on for 6 months or so.  She has been treated by her primary care doctor with pain medication, anti-inflammatories, steroids with no significant improvement.  She just started a dedicated course of physical therapy with cervical traction.  We sent her for an MRI of her cervical spine and she is here to discuss the results.    The following portions of the patient's history were reviewed and updated as appropriate: allergies, current medications, past family history, past medical history, past social history, past surgical history and problem list.    OBJECTIVE:    Review of Systems   Musculoskeletal: Positive for neck pain.   Neurological: Positive for weakness and numbness.   All other systems reviewed and are negative.         Physical Exam   Constitutional: She is oriented to person, place, and time.   Neurological: She is oriented to person, place, and time. She has a normal Finger-Nose-Finger Test and a normal Tandem Gait Test. Gait normal.   Reflex Scores:       Tricep reflexes are 0 on the right side and 1+ on the left side.       Bicep reflexes are 2+ on the right side and 2+ on the left side.       Brachioradialis reflexes are 1+ on the right side and 1+ on the left side.  Psychiatric: Her speech is normal.       Neurologic Exam     Mental Status   Oriented to person, place, and time.   Attention: normal.   Speech: speech is normal   Level of consciousness: alert  Knowledge: good.     Cranial Nerves   Cranial nerves II through XII  intact.     Motor Exam   Muscle bulk: normal  Overall muscle tone: normal  Right arm pronator drift: absent  Left arm pronator drift: absent    Strength   Right deltoid: 5/5  Left deltoid: 5/5  Right biceps: 5/5  Left biceps: 5/5  Right triceps: 3/5  Left triceps: 5/5  Right interossei: 4/5  Left interossei: 5/5    Sensory Exam   Right arm light touch: decreased from elbow  Left arm light touch: normal  Right arm pinprick: decreased from elbow  Left arm pinprick: normal    Gait, Coordination, and Reflexes     Gait  Gait: normal    Coordination   Finger to nose coordination: normal  Tandem walking coordination: normal    Tremor   Resting tremor: absent  Intention tremor: absent  Action tremor: absent    Reflexes   Right brachioradialis: 1+  Left brachioradialis: 1+  Right biceps: 2+  Left biceps: 2+  Right triceps: 0  Left triceps: 1+  Right Figueroa: absent  Left Figueroa: absent  Right ankle clonus: absent  Left ankle clonus: absent      Independent Review of Radiographic Studies:   MRI the cervical spine shows a very large right paraspinal disc herniation at C6-7 with a tremendous amount of compression of the exiting nerve root and spinal cord.    ASSESSMENT/PLAN:  The patient has right upper extremity pain, sensory deficits, weakness, diminished reflexes.  This is been going on for greater than 6 months despite maximum medical treatment.  Her MRI shows a very large C6 7 disc herniation.  I think it is very unlikely that further conservative care is going to improve her situation.  Given the neurologic deficits and weakness that she has a recommend a single level anterior cervical discectomy fusion and partial corpectomy at C6-7 to decompress her spinal cord and the exiting nerve root at that level.  The risks and benefits of the procedure were discussed at length which included but were not limited to infection, bleeding, paralysis, spinal fluid leak, speech and swallow difficulty, stroke, coma, and death.  She  acknowledged understanding of this.  Her questions concerns were addressed.  We will get her preop and scheduled as soon as possible.      1. Herniated cervical disc without myelopathy    2. Spinal stenosis in cervical region    3. Cervical radiculopathy    4. Non-smoker    5. BMI 32.0-32.9,adult            Return for postoperatively.      Chris Mcfadden MD

## 2018-09-07 NOTE — PLAN OF CARE
Problem: Patient Care Overview  Goal: Plan of Care Review  Outcome: Ongoing (interventions implemented as appropriate)   09/07/18 9620   Coping/Psychosocial   Plan of Care Reviewed With patient   Plan of Care Review   Progress improving   OTHER   Outcome Summary PT eval completed. Pt s/p C6-7 anterior diskectomy with partial corpectomy, and interbody fusion with peek and allograft, and instrumentation. Pt is motivated, however continues with dizziness and wooziness s/p surgery this date. Requires assist of 1 to 2 for bed mobility and transfers out of bed. Min assist of 2 to ambulate ~80 ft this date. Required multiple standing rest due to decrease balance with wooziness. Returned to bed with min assist of 2. Pt will benefit from continued PT to increase I with bed mobility, transfers, and gait , to improve activity tolerance, balance, and safety awareness with increase knowledge of proper brace use to include jesse/doffing. Upon discharge feel pt will be safe to discharge home with assist from family.,

## 2018-09-07 NOTE — ANESTHESIA POSTPROCEDURE EVALUATION
"Patient: Chasidy Tejada    Procedure Summary     Date:  09/07/18 Room / Location:  [facility] OR  /  [facility] OR    Anesthesia Start:  1141 Anesthesia Stop:  1313    Procedures:       CERVICAL DISCECTOMY ANTERIOR WITH FUSION C6-7 (N/A Spine Cervical)      CERVICAL CORPECTOMY C6-7 (N/A Spine Cervical) Diagnosis:       Cervical radiculopathy      (Cervical radiculopathy [M54.12])    Surgeon:  Chris Mcfadden MD Provider:  Arjun Whitley CRNA    Anesthesia Type:  general ASA Status:  2          Anesthesia Type: general  Last vitals  BP   126/68 (09/07/18 1442)   Temp   97.7 °F (36.5 °C) (09/07/18 1442)   Pulse   93 (09/07/18 1442)   Resp   14 (09/07/18 1442)     SpO2   98 % (09/07/18 1442)     Post Anesthesia Care and Evaluation    Patient location during evaluation: PACU  Patient participation: complete - patient participated  Level of consciousness: awake and alert  Pain management: adequate  Airway patency: patent  Anesthetic complications: No anesthetic complications  PONV Status: none  Cardiovascular status: acceptable and hemodynamically stable  Respiratory status: acceptable  Hydration status: acceptable    Comments: Blood pressure 126/68, pulse 93, temperature 97.7 °F (36.5 °C), temperature source Oral, resp. rate 14, height 149.9 cm (59\"), weight 73 kg (160 lb 14.4 oz), SpO2 98 %, not currently breastfeeding.    Patient discharged from PACU based upon Jakub score. Please see RN notes for further details      "

## 2018-09-07 NOTE — ANESTHESIA PROCEDURE NOTES
Airway  Urgency: elective    Airway not difficult    General Information and Staff    Patient location during procedure: OR  CRNA: NELA OLIVEIRA    Indications and Patient Condition  Indications for airway management: airway protection    Preoxygenated: yes  Mask difficulty assessment: 1 - vent by mask    Final Airway Details  Final airway type: endotracheal airway      Successful airway: ETT and NIM tube    Successful intubation technique: direct laryngoscopy  Endotracheal tube insertion site: oral  Blade: Lukasz  Blade size: 3.5.  ETT size: 7.0 mm  Cormack-Lehane Classification: grade IIa - partial view of glottis  Placement verified by: chest auscultation and capnometry   Measured from: lips  ETT to lips (cm): 21  Number of attempts at approach: 1

## 2018-09-07 NOTE — ANESTHESIA PREPROCEDURE EVALUATION
Anesthesia Evaluation     Patient summary reviewed   no history of anesthetic complications:  NPO Solid Status: > 8 hours  NPO Liquid Status: > 8 hours           Airway   Mallampati: I  TM distance: >3 FB  Neck ROM: limited  Comment: Limited mobility 2/2 worsening symptoms with extension  Dental          Pulmonary    (+) a smoker (very occassional) Former, asthma (as child, no current medications),   Cardiovascular     (+) hypertension (borderline, no medications),       Neuro/Psych  (+) numbness (right hand), psychiatric history Anxiety,     GI/Hepatic/Renal/Endo      Musculoskeletal     (+) neck pain,       ROS comment: Cervical radiculopathy  Abdominal    Substance History      OB/GYN          Other                      Anesthesia Plan    ASA 2     general     intravenous induction   Anesthetic plan, all risks, benefits, and alternatives have been provided, discussed and informed consent has been obtained with: patient.

## 2018-09-07 NOTE — OP NOTE
Procedure Note  Preop Diagnosis: Cervical radiculopathy [M54.12]    Post-Op Diagnosis Codes:     * Cervical radiculopathy [M54.12]     Procedure Name:  C6-C7 anterior discectomy  C6-7 partial corpectomy less than 50% of vertebral body  C6-C7 anterior interbody fusion with peek and allograft  C6-7 anterior instrumentation  Use of the operative microscope    Indications:  A MRI of the cervical spine revealed findings of disc herniation. The patient now presents for discectomy and fusion after discussing therapeutic alternatives.          Surgeon: Chris Mcfadden MD     Assistants: None    Anesthesia: General endotracheal anesthesia    ASA Class: 3    Procedure Details   After obtaining informed consent, having the risks and benefits of the procedure explained including but not limited to infection, bleeding, paralysis, spinal fluid leak, bowel bladder incontinence, speech and swallow difficulty, stroke, coma, and death.  The patient was brought to the operating.  She was given general anesthesia and endotracheal tube.  She was laid supine on operating room table.  She was placed in occipital mandibular head harness and 5 pounds of axial traction.  A preplanned incision the right anterior portion the neck was marked with an indelible marker.  Fluoroscopy confirm the correct level CVI-7.  The patient was then prepped and draped in standard sterile fashion.  The preplanned incision was infiltrated Marcaine and epinephrine.  A 10 blade scalpel was used to make an incision to the dermis and epidermis.  Bovie cautery was used to extend the incision to the subjacent soft tissues to level of the platysma.  The platysma was then split longitudinally using Metzenbaum scissors.  The connective tissue plane lateral to the esophagus and trachea medial to the carotid artery was then developed using Metzenbaum scissors.  Once the anterior portions vertebral body were reached a bent spinal needle was placed into the disc space at  C6-7.  Fluoroscopy confirm this was a correct level.  This levels and marked with Bovie cautery and a purple marker.  The longus coli were then dissected off the lateral anterior portions of the vertebral bodies at C6 and C7 using Bovie cautery.  The shadow line retractor system was then placed into the wound.  At this point the operative microscope was brought in.  The discectomy is then conducted using a 15 blade scalpel to incise the annulus a series of up-biting curettes pituitary rongeurs and a 5 mm barrel bit.  Once the discectomy was completed the PLL was identified.  Several large free fragments of disc eccentric to the right were removed using a micropituitary.  The PLL was then split into the epidural space using a small nerve hook it was then incised using 1 mm Kerrison.  Bilateral foraminotomies were then conducted using a combination of 1 mm and 1.5 mm Kerrisons.  Posterior partial corpectomies were done at the inferior portion of C5 6 and the superior portion of C7 using 2 mm Kerrisons.  Once we are satisfied with the decompression a 6 mm peek interbody spacer was selected it was tapped into place and anchored to the vertebral bodies using 2x13 mm screws.  Fluoroscopy confirmed good positioning of all interbody devices and hardware.  The wound was then copiously irrigated with an antibiotic solution.  It was inspected for hemostasis.  The subcutaneous tissues were closed using a series of inverted interrupted 2-0 Vicryl sutures and the skin was closed using a running 4-0 Monocryl subcuticular.  All sponge needle and instrument counts were correct at the end of the procedure.  The patient was extubated in stable condition returned recovery with about 20 mL of blood loss.    Findings:  Cervical disc herniation]    Estimated Blood Loss:  20           Drains: None           Total IV Fluids: ml           Specimens:   ID Type Source Tests Collected by Time   A : C6-7 Disc Spine, Cervical TISSUE PATHOLOGY  EXAM Chris Mcfadden MD 9/7/2018 1219              Implants:   Implant Name Type Inv. Item Serial No.  Lot No. LRB No. Used   GELFOAM PWDR 1GM - [id number] Implant GELFOAM PWDR 1GM  [employer]  N/A 1   SPNG HEMO GELFOAM COLLGN  - [id number] Implant SPNG HEMO GELFOAM COLLGN   [employer]  N/A 1   CAGE DIVERGENCE STANDALONE LRD [id number] - [id number] Implant CAGE DIVERGENCE STANDALONE LRD [id number]  [employer] [id number] N/A 1   PUTTY DBM BLAISE 2.5CC - [id number] - [id number] Implant PUTTY DBM BLAISE 2.5CC [id number] [employer]  N/A 1   SCRW SD 3.5X13MM PK/2 - [id number] Implant SCRW SD 3.5X13MM PK/2   [employer] [id number] N/A 1              Complications:  None           Disposition: PACU - hemodynamically stable.           Condition: stable        Chris Mcfadden MD

## 2018-09-07 NOTE — THERAPY EVALUATION
Acute Care - Occupational Therapy Initial Evaluation  [facility]     Patient Name: Chasidy Tejada  : 1960  MRN: [id number]  Today's Date: 2018  Onset of Illness/Injury or Date of Surgery: 18  Date of Referral to OT: 18  Referring Physician: Dr. Mcfadden    Admit Date: 2018       ICD-10-CM ICD-9-CM   1. Cervical radiculopathy M54.12 723.4   2. Impaired mobility and ADLs Z74.09 799.89     Patient Active Problem List   Diagnosis   • Family history of colon cancer   • Colon polyps   • Laryngopharyngeal reflux   • Pharyngoesophageal dysphagia   • Non-smoker   • Obesity (BMI 30-39.9)   • Spinal stenosis in cervical region   • Cervical radiculopathy   • Herniated cervical disc without myelopathy   • Borderline blood pressure     Past Medical History:   Diagnosis Date   • Acid reflux    • Asthma     long time ago    • Panic attack    • Right arm weakness      Past Surgical History:   Procedure Laterality Date   • ANTERIOR CERVICAL DISCECTOMY W/ FUSION N/A 2018    Procedure: CERVICAL DISCECTOMY ANTERIOR WITH FUSION C6-7;  Surgeon: Chris Mcfadden MD;  Location: [facility] OR;  Service: Neurosurgery   • APPENDECTOMY     • CERVICAL CORPECTOMY N/A 2018    Procedure: CERVICAL CORPECTOMY C6-7;  Surgeon: Chris Mcfadden MD;  Location: [facility] OR;  Service: Neurosurgery   • CHOLECYSTECTOMY     • COLONOSCOPY  2012   • COLONOSCOPY N/A 2/10/2017    Procedure: COLONOSCOPY WITH ANESTHESIA;  Surgeon: Elina Gonsalez MD;  Location: [facility] ENDOSCOPY;  Service:    • HYSTERECTOMY  1998    Had hysterectomy for painful periods and bleeding. (Dr. Patel)   • LAPAROSCOPIC TUBAL LIGATION     • TONSILLECTOMY            OT ASSESSMENT FLOWSHEET (last 72 hours)      Occupational Therapy Evaluation     Row Name 18 1547                   OT Evaluation Time/Intention    Subjective Information complains of;pain   midback chronic  -MM        Document Type evaluation   see MAR  -MM        Mode of Treatment  occupational therapy  -MM        Patient Effort good  -MM        Symptoms Noted During/After Treatment dizziness  -MM           General Information    Patient Profile Reviewed? yes  -MM        Onset of Illness/Injury or Date of Surgery 09/07/18  -MM        Referring Physician Dr. Mcfadden  -MM        Patient Observations alert;cooperative;agree to therapy  -MM        Patient/Family Observations daughter present  -MM        General Observations of Patient fowleres, IV, SCD, cervical collar donned  -MM        Prior Level of Function independent:;all household mobility;community mobility;driving;work  -MM        Equipment Currently Used at Home none  -MM        Pertinent History of Current Functional Problem Pt admitted this date for elective surgery after failed OP PT.  Pt s/p C6-7 anterior diskectomy, C6-7 partial corpectomy less than 50% of vertebral body, C6-7 anterior interbody fusion with peek and allograft, C6-7 anterior instrumentation this date.   -MM        Existing Precautions/Restrictions fall;spinal  -MM        Risks Reviewed patient:;family:;dizziness;increased discomfort;lines disloged  -MM        Benefits Reviewed patient:;family:;improve function;increase independence;increase strength;increase balance;decrease pain;increase knowledge;other (comment);decrease risk of DVT  -MM        Barriers to Rehab none identified  -MM           Relationship/Environment    Primary Source of Support/Comfort spouse;child(ofelia)  -MM        Lives With spouse  -MM        Family Caregiver if Needed spouse;child(ofelia), adult  -MM           Resource/Environmental Concerns    Current Living Arrangements home/apartment/condo  -MM           Home Main Entrance    Number of Stairs, Main Entrance three  -MM        Stair Railings, Main Entrance railings on both sides of stairs  -MM           Cognitive Assessment/Interventions    Additional Documentation Cognitive Assessment/Intervention (Group)  -MM           Cognitive  Assessment/Intervention- PT/OT    Affect/Mental Status (Cognitive) WNL  -MM        Orientation Status (Cognition) oriented x 4  -MM        Follows Commands (Cognition) follows one step commands;75-90% accuracy;verbal cues/prompting required;physical/tactile prompts required;repetition of directions required  -MM        Safety Deficit (Cognitive) safety precautions awareness  -MM        Personal Safety Interventions fall prevention program maintained;gait belt;muscle strengthening facilitated;nonskid shoes/slippers when out of bed;supervised activity  -MM           Safety Issues, Functional Mobility    Safety Issues Affecting Function (Mobility) safety precaution awareness  -MM        Impairments Affecting Function (Mobility) balance;endurance/activity tolerance;pain  -MM           Bed Mobility Assessment/Treatment    Bed Mobility Assessment/Treatment rolling right;scooting/bridging;sidelying-sit;sit-sidelying  -MM        Rolling Right [place] (Bed Mobility) contact guard;verbal cues  -MM        Scooting/Bridging [place] (Bed Mobility) supervision;verbal cues  -MM        Sidelying-Sit [place] (Bed Mobility) minimum assist (75% patient effort);verbal cues  -MM        Sit-Sidelying [place] (Bed Mobility) minimum assist (75% patient effort);2 person assist;verbal cues  -MM        Assistive Device (Bed Mobility) bed rails  -MM           Functional Mobility    Functional Mobility- Ind. Level contact guard assist;minimum assist (75% patient effort);verbal cues required  -MM        Functional Mobility- Device other (see comments)   HHA x2  -MM        Functional Mobility- Comment down wheat. intermittent dizziness requiring min A  -MM           Transfer Assessment/Treatment    Transfer Assessment/Treatment sit-stand transfer;stand-sit transfer  -MM           Sit-Stand Transfer    Sit-Stand [place] (Transfers) minimum assist (75% patient effort);contact guard;verbal cues  -MM           Stand-Sit  Transfer    Stand-Sit [place] (Transfers) minimum assist (75% patient effort);contact guard  -MM           ADL Assessment/Intervention    BADL Assessment/Intervention lower body dressing  -MM           Lower Body Dressing Assessment/Training    Lower Body Dressing [place] Level moderate assist (50% patient effort);don;doff;socks  -MM        Lower Body Dressing Position edge of bed sitting  -MM        Comment (Lower Body Dressing) d/t dizziness  -MM           BADL Safety/Performance    Impairments, BADL Safety/Performance balance;endurance/activity tolerance;strength  -MM           General ROM    GENERAL ROM COMMENTS AROM WFL BUE  -MM           MMT (Manual Muscle Testing)    General MMT Comments functionally 4-/5 BUE  -MM           Motor Assessment/Interventions    Additional Documentation Fine Motor Testing & Training (Group);Gross Motor Coordination (Group)  -MM           Gross Motor Coordination    Gross Motor Impairments finger to nose  -MM        Gross Motor Skill, Impairments Detail mild uncoordination BUE likely d/t medication  -MM           Fine Motor Testing & Training    Comment, Fine Motor Coordination opposition WNL  -MM           Sensory Assessment/Intervention    Sensory General Assessment light touch sensation deficits identified;other (see comments)   mild decreased sensation RUE   -MM           Positioning and Restraints    Pre-Treatment Position in bed  -MM        Post Treatment Position bed  -MM        In Bed notified nsg;fowlers;call light within reach;encouraged to call for assist;with family/caregiver;side rails up x2;SCD pump applied;with brace  -MM           Pain Assessment    Additional Documentation Pain Scale: FACES Pre/Post-Treatment (Group)  -MM           Pain Scale: Numbers Pre/Post-Treatment    Pain Location - Orientation medial  -MM        Pain Location back  -MM           Pain Scale: FACES Pre/Post-Treatment    Pain: FACES Scale, Pretreatment 2-->hurts little bit  -MM         Pain: FACES Scale, Post-Treatment 2-->hurts little bit  -MM        Pre/Post Treatment Pain Comment tolerated tx well  -MM           Orthotics & Prosthetics Management    Orthosis Location spinal orthosis  -MM        Additional Documentation Orthosis Location (Row)  -MM           Spinal Orthosis Management    Type (Spinal Orthosis) cervical collar, hard  -MM        Fabrication Comment (Spinal Orthosis) pt prefitted for brace, already donned upon entry to the room. Chin piece extends out slightly too far on pt, overall fit is well  -MM        Functional Design (Spinal Orthosis) static orthosis  -MM        Therapeutic Indications (Spinal Orthosis) stabilization and support;post-op positioning/protection  -MM        Wearing Schedule (Spinal Orthosis) wear full time  -MM        Orthosis Training (Spinal Orthosis) patient;donning/doffing orthosis;purpose/goals of orthosis  -MM        Compliance/Wearing Issues (Spinal Orthosis) follow-up training required  -MM           Wound 09/07/18 1322 Other (See comments) neck incision    Wound - Properties Group Date first assessed: 09/07/18  -SAGAR Time first assessed: 1322  -SAGAR Side: Other (See comments)  -SAGAR Location: neck  -SAGAR Type: incision  -SAGAR       Coping    Observed Emotional State calm;cooperative  -MM        Verbalized Emotional State acceptance  -MM           Plan of Care Review    Plan of Care Reviewed With patient;daughter  -MM           Clinical Impression (OT)    Date of Referral to OT 09/07/18  -MM        OT Diagnosis impaired mobility and adls  -MM        Functional Level at Time of Evaluation (OT Eval) good  -MM        Patient/Family Goals Statement (OT Eval) return home  -MM        Criteria for Skilled Therapeutic Interventions Met (OT Eval) yes;treatment indicated  -MM        Rehab Potential (OT Eval) good, to achieve stated therapy goals  -MM        Therapy Frequency (OT Eval) 5 times/wk  -MM        Predicted Duration of Therapy Intervention (Therapy Eval) until  d/c  -MM        Care Plan Review (OT) care plan/treatment goals reviewed;evaluation/treatment results reviewed;risks/benefits reviewed;current/potential barriers reviewed;patient/other agree to care plan  -MM        Care Plan Review, Other Participant (OT Eval) daughter  -MM        Anticipated Discharge Disposition (OT) home with assist;home with home health  -MM           Planned OT Interventions    Planned Therapy Interventions (OT Eval) activity tolerance training;adaptive equipment training;BADL retraining;functional balance retraining;occupation/activity based interventions;patient/caregiver education/training;transfer/mobility retraining;orthotic fabrication/fitting/training  -MM           OT Goals    Dressing Goal Selection (OT) dressing, OT goal 1;dressing, OT goal 2  -MM        Toileting Goal Selection (OT) toileting, OT goal 1  -MM           Dressing Goal 1 (OT)    Activity/Assistive Device (Dressing Goal 1, OT) upper body dressing   including brace  -MM        [place]/Cues Needed (Dressing Goal 1, OT) conditional independence;set-up required  -MM        Time Frame (Dressing Goal 1, OT) 10 days  -MM        Progress/Outcome (Dressing Goal 1, OT) goal ongoing  -MM           Dressing Goal 2 (OT)    Activity/Assistive Device (Dressing Goal 2, OT) lower body dressing  -MM        [place]/Cues Needed (Dressing Goal 2, OT) conditional independence;set-up required  -MM        Time Frame (Dressing Goal 2, OT) 10 days  -MM        Progress/Outcome (Dressing Goal 2, OT) goal ongoing  -MM           Toileting Goal 1 (OT)    Activity/Device (Toileting Goal 1, OT) toileting skills, all  -MM        [place] Level/Cues Needed (Toileting Goal 1, OT) independent  -MM        Time Frame (Toileting Goal 1, OT) 10 days  -MM        Progress/Outcome (Toileting Goal 1, OT) goal ongoing  -MM           Living Environment    Home Accessibility stairs to enter home;tub/shower is not walk in  -MM          User Key  (r) =  Recorded By, (t) = Taken By, (c) = Cosigned By    Initials Name Effective Dates    SAGAR Stan Hewitt RN 08/02/16 -     MM Josef Bajwa, OTR/L 04/03/18 -                  OT Recommendation and Plan  Outcome Summary/Treatment Plan (OT)  Anticipated Discharge Disposition (OT): home with assist, home with home health  Planned Therapy Interventions (OT Eval): activity tolerance training, adaptive equipment training, BADL retraining, functional balance retraining, occupation/activity based interventions, patient/caregiver education/training, transfer/mobility retraining, orthotic fabrication/fitting/training  Therapy Frequency (OT Eval): 5 times/wk  Plan of Care Review  Plan of Care Reviewed With: patient, daughter  Plan of Care Reviewed With: patient, daughter  Outcome Summary: OT eval completed. Pt reports mild middle back pain which is chronic in nature, 2/10 latesha reyna. Pt was mod-max A to adjust socks and cervical collar. Pt was CGA-min A  for functional mobiltiy and t/f. Pt had decreased balance and dizziness with mobility. Skilled OT recommended to address adls, functional mobility and education. Recommended d/c home with assist and HH.          Outcome Measures     Row Name 09/07/18 1655 09/07/18 1600          How much help from another person do you currently need...    Turning from your back to your side while in flat bed without using bedrails?  -- 3  -JE     Moving from lying on back to sitting on the side of a flat bed without bedrails?  -- 3  -JE     Moving to and from a bed to a chair (including a wheelchair)?  -- 3  -JE     Standing up from a chair using your arms (e.g., wheelchair, bedside chair)?  -- 3  -JE     Climbing 3-5 steps with a railing?  -- 3  -JE     To walk in hospital room?  -- 3  -JE     AM-PAC 6 Clicks Score  -- 18  -JE        How much help from another is currently needed...    Putting on and taking off regular lower body clothing? 2  -MM  --     Bathing (including washing, rinsing,  and drying) 2  -MM  --     Toileting (which includes using toilet bed pan or urinal) 2  -MM  --     Putting on and taking off regular upper body clothing 2  -MM  --     Taking care of personal grooming (such as brushing teeth) 3  -MM  --     Eating meals 3  -MM  --     Score 14  -MM  --        Functional Assessment    Outcome Measure Options AM-PAC 6 Clicks Daily Activity (OT)  -MM AM-PAC 6 Clicks Basic Mobility (PT)  -       User Key  (r) = Recorded By, (t) = Taken By, (c) = Cosigned By    Initials Name Provider Type    MM Josef Bajwa, OTR/L Occupational Therapist    Wendy Benito, PT Physical Therapist          Time Calculation:         Time Calculation- OT     Row Name 09/07/18 1655             Time Calculation- OT    OT Start Time 1547  -MM      OT Stop Time 1626  -MM      OT Time Calculation (min) 39 min  -MM      OT Received On 09/07/18  -MM      OT Goal Re-Cert Due Date 09/17/18  -MM        User Key  (r) = Recorded By, (t) = Taken By, (c) = Cosigned By    Initials Name Provider Type    Josef Rodriguez, OTR/L Occupational Therapist        Therapy Suggested Charges     Code   Minutes Charges    None           Therapy Charges for Today     Code Description Service Date Service Provider Modifiers Qty    05011826470 HC OT SELFCARE CURRENT 9/7/2018 Josef Bajwa OTR/L GO, CK 1    [id number] HC OT SELFCARE PROJECTED 9/7/2018 Josef Bajwa OTR/L TATIANA, CI 1    [id number]  OT EVAL MOD COMPLEXITY 3 9/7/2018 Josef Bajwa OTR/L GO, KX 1          OT G-codes  OT Professional Judgement Used?: Yes  OT Functional Scales Options: AM-PAC 6 Clicks Daily Activity (OT)  Score: 14  Functional Limitation: Self care  Self Care Current Status (): At least 40 percent but less than 60 percent impaired, limited or restricted  Self Care Goal Status (): At least 1 percent but less than 20 percent impaired, limited or restricted    KIRSTEN Lopes/GERARDO  9/7/2018

## 2018-09-07 NOTE — THERAPY EVALUATION
Acute Care - Physical Therapy Initial Evaluation  [facility]     Patient Name: Chasidy Tejada  : 1960  MRN: [id number]  Today's Date: 2018   Onset of Illness/Injury or Date of Surgery: 18  Date of Referral to PT: 18  Referring Physician: Dr. Mcfadden      Admit Date: 2018    Visit Dx:     ICD-10-CM ICD-9-CM   1. Cervical radiculopathy M54.12 723.4   2. Impaired mobility and ADLs Z74.09 799.89   3. Gait abnormality R26.9 781.2     Patient Active Problem List   Diagnosis   • Family history of colon cancer   • Colon polyps   • Laryngopharyngeal reflux   • Pharyngoesophageal dysphagia   • Non-smoker   • Obesity (BMI 30-39.9)   • Spinal stenosis in cervical region   • Cervical radiculopathy   • Herniated cervical disc without myelopathy   • Borderline blood pressure     Past Medical History:   Diagnosis Date   • Acid reflux    • Asthma     long time ago    • Panic attack    • Right arm weakness      Past Surgical History:   Procedure Laterality Date   • ANTERIOR CERVICAL DISCECTOMY W/ FUSION N/A 2018    Procedure: CERVICAL DISCECTOMY ANTERIOR WITH FUSION C6-7;  Surgeon: Chris Mcfadden MD;  Location: [facility] OR;  Service: Neurosurgery   • APPENDECTOMY     • CERVICAL CORPECTOMY N/A 2018    Procedure: CERVICAL CORPECTOMY C6-7;  Surgeon: Chris Mcfadden MD;  Location: [facility] OR;  Service: Neurosurgery   • CHOLECYSTECTOMY     • COLONOSCOPY  2012   • COLONOSCOPY N/A 2/10/2017    Procedure: COLONOSCOPY WITH ANESTHESIA;  Surgeon: Elina Gonsalez MD;  Location: [facility] ENDOSCOPY;  Service:    • HYSTERECTOMY  1998    Had hysterectomy for painful periods and bleeding. (Dr. Patel)   • LAPAROSCOPIC TUBAL LIGATION     • TONSILLECTOMY          PT ASSESSMENT (last 12 hours)      Physical Therapy Evaluation     Row Name 18 1540          PT Evaluation Time/Intention    Subjective Information complains of;pain   pain midback  -JE     Document Type evaluation;other (see comments)   see MAR  -JE      Mode of Treatment physical therapy  -JE     Patient Effort good  -JE     Symptoms Noted During/After Treatment dizziness  -JE     Comment pt required hand held assist with gait due to wooziness   -CHETAN     Row Name 09/07/18 1540          General Information    Patient Profile Reviewed? yes  -JE     Onset of Illness/Injury or Date of Surgery 09/07/18  -JE     Referring Physician Dr. Mcfadden  -CHETAN     Patient Observations alert;cooperative;agree to therapy  -JE     Patient/Family Observations supportive daughter present  -JE     General Observations of Patient IV, SCDs, fowlers, SPO2  -JE     Prior Level of Function independent:;all household mobility;community mobility;driving;work  -JE     Equipment Currently Used at Home none  -JE     Pertinent History of Current Functional Problem Pt admitted this date for elective surgery after failed OP PT.  Pt s/p C6-7 anterior diskectomy, C6-7 partial corpectomy less than 50% of vertebral body, C6-7 anterior interbody fusion with peek and allograft, C6-7 anterior instrumentation this date.   -JE     Existing Precautions/Restrictions fall;spinal  -JE     Limitations/Impairments other (see comments)   requires assist with all mobility, increase pain  -JE     Risks Reviewed patient:;family:;dizziness;increased discomfort;lines disloged;other (comment)   fall risks, proper position of cervical collar  -JE     Benefits Reviewed patient:;family:;improve function;increase independence;increase strength;increase balance;decrease pain;increase knowledge;other (comment);decrease risk of DVT   safety/falls prevention  -JE     Barriers to Rehab none identified  -CHETAN     Row Name 09/07/18 1540          Relationship/Environment    Primary Source of Support/Comfort spouse;child(ofelia)  -CHETAN     Name of Support/Comfort Primary Source Mary Carmen Fontaine  -CHETAN     Lives With spouse  -CHETAN     Name(s) of Who Lives With Patient Zhen  -CHETAN     Family Caregiver if Needed spouse;child(ofelia), adult  -CHETAN      Family Caregiver Names spouse - Zhen, Dtr - Mary Carmen  -CHETAN     Row Name 09/07/18 1540          Resource/Environmental Concerns    Current Living Arrangements home/apartment/condo  -     Row Name 09/07/18 1540          Home Main Entrance    Number of Stairs, Main Entrance three  -JE     Stair Railings, Main Entrance railings on both sides of stairs  -     Row Name 09/07/18 1540          Cognitive Assessment/Intervention- PT/OT    Orientation Status (Cognition) oriented x 4  -     Follows Commands (Cognition) follows one step commands;75-90% accuracy;verbal cues/prompting required;physical/tactile prompts required;repetition of directions required  -     Safety Deficit (Cognitive) safety precautions awareness  -     Row Name 09/07/18 1540          Safety Issues, Functional Mobility    Safety Issues Affecting Function (Mobility) safety precaution awareness  -     Impairments Affecting Function (Mobility) balance;endurance/activity tolerance;pain  -     Row Name 09/07/18 1540          Bed Mobility Assessment/Treatment    Bed Mobility Assessment/Treatment rolling right;scooting/bridging;sidelying-sit;sit-sidelying  -     Rolling Right [place] (Bed Mobility) contact guard;verbal cues  -CHETAN     Scooting/Bridging [place] (Bed Mobility) supervision;verbal cues  -CHETAN     Sidelying-Sit [place] (Bed Mobility) minimum assist (75% patient effort);verbal cues  -CHETAN     Sit-Sidelying [place] (Bed Mobility) minimum assist (75% patient effort);2 person assist;verbal cues  -CHETAN     Row Name 09/07/18 1540          Transfer Assessment/Treatment    Transfer Assessment/Treatment sit-stand transfer;stand-sit transfer  -     Sit-Stand [place] (Transfers) minimum assist (75% patient effort);2 person assist;verbal cues  -CHETAN     Stand-Sit [place] (Transfers) minimum assist (75% patient effort);2 person assist;verbal cues  -     Row Name 09/07/18 1540          Gait/Stairs Assessment/Training     "Gait/Stairs Assessment/Training gait/ambulation independence;gait/ambulation assistive device;distance ambulated;gait pattern  -     [place] Level (Gait) minimum assist (75% patient effort);2 person assist;verbal cues  -     Distance in Feet (Gait) 80 ft  -     Pattern (Gait) step-through  -     Deviations/Abnormal Patterns (Gait) gait speed decreased;stride length decreased  -     Comment (Gait/Stairs) pt required frequent standing rest due to increase wooziness and decreased balance; required B UE support throughout gait; cervical collar in place throughout visit  -JE     Row Name 09/07/18 1540          General ROM    GENERAL ROM COMMENTS AROM all 4 extremities WFLs  -JE     Row Name 09/07/18 1540          MMT (Manual Muscle Testing)    General MMT Comments moves UEs against gravity without difficulty, no formal assessment; LE's grossly 4+/5 tested in seated position  -JE     Row Name 09/07/18 1540          Sensory Assessment/Intervention    Sensory General Assessment light touch sensation deficits identified;other (see comments)   mild deficit reported R upper arm compared to L  -JE     Row Name 09/07/18 1540          Pain Assessment    Additional Documentation Pain Scale: FACES Pre/Post-Treatment (Group)  -JE     Row Name 09/07/18 1540          Pain Scale: Numbers Pre/Post-Treatment    Pain Location - Orientation other (see comments)  -     Pain Location back   \"mid back\"  -     Pain Intervention(s) Medication (See MAR);Repositioned;Ambulation/increased activity  -JE     Row Name 09/07/18 1540          Pain Scale: FACES Pre/Post-Treatment    Pain: FACES Scale, Pretreatment 2-->hurts little bit  -     Pain: FACES Scale, Post-Treatment 2-->hurts little bit  -JE     Row Name             Wound 09/07/18 1322 Other (See comments) neck incision    Wound - Properties Group Date first assessed: 09/07/18  -SAGAR Time first assessed: 1322  -SAGAR Side: Other (See comments)  -SAGAR Location: neck  -SAGAR Type: " incision  -SAGAR    Row Name 09/07/18 1540          Coping    Observed Emotional State accepting;calm;cooperative  -     Verbalized Emotional State acceptance  -     Row Name 09/07/18 1540          Plan of Care Review    Plan of Care Reviewed With patient  -     Row Name 09/07/18 1540          Physical Therapy Clinical Impression    Date of Referral to PT 09/07/18  -     Patient/Family Goals Statement (PT Clinical Impression) improve mobility, successful surgery for R UE  -     Criteria for Skilled Interventions Met (PT Clinical Impression) yes;treatment indicated;other (see comments)   decrease balance and activity tolerance  -     Rehab Potential (PT Clinical Summary) good, to achieve stated therapy goals  -     Predicted Duration of Therapy (PT) until discharge or goals achieved  -     Care Plan Review (PT) evaluation/treatment results reviewed;care plan/treatment goals reviewed;risks/benefits reviewed;patient/other agree to care plan  -     Care Plan Review, Other Participant (PT Clinical Impression) daughter  -CHETAN     Row Name 09/07/18 1540          Physical Therapy Goals    Bed Mobility Goal Selection (PT) bed mobility, PT goal 1  -     Transfer Goal Selection (PT) transfer, PT goal 1  -     Gait Training Goal Selection (PT) gait training, PT goal 1  -     Stairs Goal Selection (PT) stairs, PT goal 1  -     Additional Documentation Stairs Goal Selection (PT) (Row)  -     Row Name 09/07/18 1540          Bed Mobility Goal 1 (PT)    Activity/Assistive Device (Bed Mobility Goal 1, PT) rolling to left;rolling to right;scooting;sidelying to sit/sit to sidelying  -     [place] Level/Cues Needed (Bed Mobility Goal 1, PT) independent  -     Time Frame (Bed Mobility Goal 1, PT) by discharge  -     Progress/Outcomes (Bed Mobility Goal 1, PT) goal ongoing  -     Row Name 09/07/18 1540          Transfer Goal 1 (PT)    Activity/Assistive Device (Transfer Goal 1, PT)  sit-to-stand/stand-to-sit;bed-to-chair/chair-to-bed  -JE     [place] Level/Cues Needed (Transfer Goal 1, PT) standby assist  -JE     Time Frame (Transfer Goal 1, PT) by discharge  -JE     Progress/Outcome (Transfer Goal 1, PT) goal ongoing  [facility]     Row Name 09/07/18 8650          Gait Training Goal 1 (PT)    Activity/Assistive Device (Gait Training Goal 1, PT) gait (walking locomotion);backward stepping;decrease fall risk;forward stepping;improve balance and speed;increase endurance/gait distance  -JE     [place] Level (Gait Training Goal 1, PT) standby assist  -JE     Distance (Gait Goal 1, PT) 300 ft  -JE     Time Frame (Gait Training Goal 1, PT) by discharge  -JE     Progress/Outcome (Gait Training Goal 1, PT) goal ongoing  -     Row Name 09/07/18 4140          Stairs Goal 1 (PT)    Activity/Assistive Device (Stairs Goal 1, PT) ascending stairs;descending stairs;decrease fall risk;using handrail, right;using handrail, left  -JE     [place] Level/Cues Needed (Stairs Goal 1, PT) standby assist;contact guard assist  -     Number of Stairs (Stairs Goal 1, PT) 3  -JE     Time Frame (Stairs Goal 1, PT) by discharge  -JE     Progress/Outcome (Stairs Goal 1, PT) goal ongoing  -     Row Name 09/07/18 4820          Patient Education Goal (PT)    Activity (Patient Education Goal, PT) safety/falls prevention;jesse/doffing brace  -     [place]/Cues/Accuracy (Memory Goal 2, PT) demonstrates adequately;verbalizes understanding  -JE     Time Frame (Patient Education Goal, PT) by discharge  -JE     Progress/Outcome (Patient Education Goal, PT) goal ongoing  -     Row Name 09/07/18 3900          Positioning and Restraints    Post Treatment Position bed  -JE     In Bed fowlers;call light within reach;encouraged to call for assist;with family/caregiver;side rails up x3;SCD pump applied  -     Row Name 09/07/18 1530          Living Environment    Home Accessibility stairs to enter home  -       User  Key  (r) = Recorded By, (t) = Taken By, (c) = Cosigned By    Initials Name Provider Type    Stan Reed, RN Registered Nurse    Wendy Benito, PT Physical Therapist          Physical Therapy Education     Title: PT OT SLP Therapies (Done)     Topic: Physical Therapy (Done)     Point: Mobility training (Done)    Learning Progress Summary     Learner Status Readiness Method Response Comment Documented by    Patient Done Acceptance ZANE DIETZ,ALICIA CARDENAS,AILIN,NR Education for logroll in/out of bed, proper technique with transfers and gait  09/07/18 1655          Point: Precautions (Done)    Learning Progress Summary     Learner Status Readiness Method Response Comment Documented by    Patient Done Acceptance MIRELA,ALICIA DEGROOT,AILIN,NR Education for safety/falls prevention to include transitioning slow during transfers to reduce risk for LOB and falls; proper use/fit of brace; to call for assist  and OOB only with assistance  09/07/18 1656                      User Key     Initials Effective Dates Name Provider Type Discipline     08/02/18 -  Wendy Cross, PT Physical Therapist PT                PT Recommendation and Plan  Anticipated Discharge Disposition (PT): home with assist  Planned Therapy Interventions (PT Eval): balance training, bed mobility training, gait training, patient/family education, stair training, transfer training, other (see comments) (safety/falls prevention; proper use of brace)  Therapy Frequency (PT Clinical Impression): 2 times/day  Outcome Summary/Treatment Plan (PT)  Anticipated Discharge Disposition (PT): home with assist  Plan of Care Reviewed With: patient  Progress: improving  Outcome Summary: PT eval completed. Pt s/p C6-7 anterior diskectomy with partial corpectomy, and interbody fusion with peek and allograft, and instrumentation.  Pt is motivated, however continues with dizziness and wooziness s/p surgery this date.  Requires assist of 1 to 2 for bed mobility and transfers out of bed.   Min assist of 2 to ambulate ~80 ft this date.  Required multiple standing rest due to decrease balance with wooziness.  Returned to bed with min assist of 2.  Pt will benefit from continued PT to increase I with bed mobility, transfers, and gait , to improve activity tolerance, balance, and safety awareness with increase knowledge of proper brace use to include jesse/doffing.  Upon discharge feel pt will be safe to discharge home with assist from family.,          Outcome Measures     Row Name 09/07/18 1655 09/07/18 1600          How much help from another person do you currently need...    Turning from your back to your side while in flat bed without using bedrails?  -- 3  -JE     Moving from lying on back to sitting on the side of a flat bed without bedrails?  -- 3  -JE     Moving to and from a bed to a chair (including a wheelchair)?  -- 3  -JE     Standing up from a chair using your arms (e.g., wheelchair, bedside chair)?  -- 3  -JE     Climbing 3-5 steps with a railing?  -- 3  -JE     To walk in hospital room?  -- 3  -JE     AM-PAC 6 Clicks Score  -- 18  -JE        How much help from another is currently needed...    Putting on and taking off regular lower body clothing? 2  -MM  --     Bathing (including washing, rinsing, and drying) 2  -MM  --     Toileting (which includes using toilet bed pan or urinal) 2  -MM  --     Putting on and taking off regular upper body clothing 2  -MM  --     Taking care of personal grooming (such as brushing teeth) 3  -MM  --     Eating meals 3  -MM  --     Score 14  -MM  --        Functional Assessment    Outcome Measure Options AM-PAC 6 Clicks Daily Activity (OT)  -MM AM-PAC 6 Clicks Basic Mobility (PT)  -JE       User Key  (r) = Recorded By, (t) = Taken By, (c) = Cosigned By    Initials Name Provider Type    Josef Rodriguez, OTR/L Occupational Therapist    Wendy Benito, PT Physical Therapist           Time Calculation:         PT Charges     Row Name 09/07/18 5921              Time Calculation    Start Time 1540  -      Stop Time 1625  -      Time Calculation (min) 45 min  -      PT Received On 09/07/18  -      PT Goal Re-Cert Due Date 09/17/18  -        User Key  (r) = Recorded By, (t) = Taken By, (c) = Cosigned By    Initials Name Provider Type    Wendy Benito, PT Physical Therapist        Therapy Suggested Charges     Code   Minutes Charges    None           Therapy Charges for Today     Code Description Service Date Service Provider Modifiers Qty    69388006306 HC PT MOBILITY CURRENT 9/7/2018 Wendy Cross, PT GP, CK 1    42057199924 HC PT MOBILITY PROJECTED 9/7/2018 Wendy Cross, PT GP, CI 1    [id number] HC PT EVAL MOD COMPLEXITY 3 9/7/2018 Wendy Cross, PT GP, KX 1          PT G-Codes  PT Professional Judgement Used?: Yes  Outcome Measure Options: AM-PAC 6 Clicks Daily Activity (OT)  AM-PAC 6 Clicks Score: 18  Score: 14  Functional Limitation: Mobility: Walking and moving around  Mobility: Walking and Moving Around Current Status (): At least 40 percent but less than 60 percent impaired, limited or restricted  Mobility: Walking and Moving Around Goal Status (): At least 1 percent but less than 20 percent impaired, limited or restricted      Wendy Cross PT  9/7/2018

## 2018-09-08 VITALS
HEART RATE: 67 BPM | OXYGEN SATURATION: 97 % | RESPIRATION RATE: 18 BRPM | DIASTOLIC BLOOD PRESSURE: 58 MMHG | TEMPERATURE: 97.7 F | SYSTOLIC BLOOD PRESSURE: 116 MMHG | WEIGHT: 160.9 LBS | HEIGHT: 59 IN | BODY MASS INDEX: 32.44 KG/M2

## 2018-09-08 PROCEDURE — 97116 GAIT TRAINING THERAPY: CPT

## 2018-09-08 PROCEDURE — 99024 POSTOP FOLLOW-UP VISIT: CPT | Performed by: NEUROLOGICAL SURGERY

## 2018-09-08 PROCEDURE — 25010000003 CEFAZOLIN PER 500 MG: Performed by: NEUROLOGICAL SURGERY

## 2018-09-08 RX ORDER — CARISOPRODOL 250 MG/1
250 TABLET ORAL 4 TIMES DAILY PRN
Qty: 40 TABLET | Refills: 0 | Status: SHIPPED | OUTPATIENT
Start: 2018-09-08 | End: 2018-11-27

## 2018-09-08 RX ORDER — TRAMADOL HYDROCHLORIDE 50 MG/1
50 TABLET ORAL EVERY 6 HOURS PRN
Qty: 60 TABLET | Refills: 0 | Status: SHIPPED | OUTPATIENT
Start: 2018-09-08 | End: 2018-09-18

## 2018-09-08 RX ADMIN — CEFAZOLIN 2 G: 1 INJECTION, POWDER, FOR SOLUTION INTRAMUSCULAR; INTRAVENOUS at 04:02

## 2018-09-08 RX ADMIN — TRAMADOL HYDROCHLORIDE 50 MG: 50 TABLET, COATED ORAL at 00:44

## 2018-09-08 NOTE — DISCHARGE SUMMARY
Date of Discharge:  9/8/2018    Discharge Diagnosis: Cervical disc herniation    Presenting Problem/History of Present Illness  Cervical radiculopathy [M54.12]  Cervical radiculopathy [M54.12]       Hospital Course  Patient is a 58 y.o. female presented with neck pain and right upper extremity pain in a C6-7 disc herniation.  Patient went to the operating room on 09/07/2018 for a single level anterior cervical fusion.  Patient did well.  Postoperatively her neck and arm pain were improved.  She is tolerating by mouth.  In doing without assistance and voiding spontaneously.  At this point is felt that she be suitable for discharge home.      Procedures Performed  Procedure(s):  CERVICAL DISCECTOMY ANTERIOR WITH FUSION C6-7  CERVICAL CORPECTOMY C6-7       Consults:   Consults     No orders found for last 30 day(s).          Pertinent Test Results: radiology: X-Ray: Cervical spine    Condition on Discharge:  Stable    Vital Signs  Temp:  [97.4 °F (36.3 °C)-98.6 °F (37 °C)] 97.7 °F (36.5 °C)  Heart Rate:  [] 67  Resp:  [12-20] 18  BP: (107-150)/() 116/58    Physical Exam:   Physical Exam   Constitutional: She is oriented to person, place, and time. She appears well-developed and well-nourished.   HENT:   Head: Normocephalic and atraumatic.   Right Ear: Hearing normal.   Left Ear: Hearing normal.   Eyes: Pupils are equal, round, and reactive to light. EOM are normal.   Neck: Normal range of motion.   Neurological: She is alert and oriented to person, place, and time. She has normal strength and normal reflexes. No cranial nerve deficit or sensory deficit. She displays a negative Romberg sign. GCS eye subscore is 4. GCS verbal subscore is 5. GCS motor subscore is 6. She displays no Babinski's sign on the right side. She displays no Babinski's sign on the left side.   Psychiatric: Her speech is normal. Judgment normal. Cognition and memory are normal.        Neurologic Exam     Mental Status   Oriented to  person, place, and time.   Speech: speech is normal     Cranial Nerves     CN III, IV, VI   Pupils are equal, round, and reactive to light.  Extraocular motions are normal.     Motor Exam     Strength   Strength 5/5 throughout.          Discharge Disposition  Home or Self Care    Discharge Medications     Discharge Medications      New Medications      Instructions Start Date   carisoprodol 250 MG tablet  Commonly known as:  SOMA   250 mg, Oral, 4 Times Daily PRN      traMADol 50 MG tablet  Commonly known as:  ULTRAM   50 mg, Oral, Every 6 Hours PRN         Continue These Medications      Instructions Start Date   aspirin 81 MG EC tablet   81 mg, Oral, Daily      calcium polycarbophil 625 MG tablet  Commonly known as:  FIBERCON   625 mg, Oral, Daily      estrogens (conjugated)-methyltestosterone 0.625-1.25 MG per tablet  Commonly known as:  ESTRATEST HS   1 tablet, Oral, Daily      MULTIVITAMIN ADULT PO   Oral, Daily             Discharge Diet:   Diet Instructions     Diet: Regular; Thin       Discharge Diet:  Regular    Fluid Consistency:  Thin          Activity at Discharge:   Activity Instructions     Discharge Activity Restrictions       1) No driving for till seen in office and no longer taking narcotics.   2) May shower / sponge bathe in 72 hours.  3) Do not lift / push / pull more then 5 lbs.  4) Wear collar for 14 days then ok to remove if doing well          Follow-up Appointments  Future Appointments  Date Time Provider Department Center   11/27/2018 8:00 AM Elisha Martel MD [facility] None   11/29/2018 10:30 AM Destini Oates APRN [facility] None     Additional Instructions for the Follow-ups that You Need to Schedule     Call MD With Problems / Concerns    As directed      Instructions: Worsening neck or arm pain, drainage from wound, fever, difficulty breathing or swallowing.    Order Comments:  Instructions: Worsening neck or arm pain, drainage from wound, fever, difficulty breathing or swallowing.           Discharge Follow-up with Specialty: jerry barcenas np; 3 Weeks    As directed      Specialty:  jerry barcenas np    Follow Up:  3 Weeks               Test Results Pending at Discharge   Order Current Status    Tissue Pathology Exam In process           Chris Mcfadden MD  09/08/18  9:06 AM    Time: Discharge 45 min

## 2018-09-08 NOTE — PLAN OF CARE
Problem: Patient Care Overview  Goal: Plan of Care Review  Outcome: Ongoing (interventions implemented as appropriate)   09/08/18 0970   Coping/Psychosocial   Plan of Care Reviewed With patient   Plan of Care Review   Progress improving   OTHER   Outcome Summary Patient ambulated 350 feet. Independent with gait and transfers, She is independent with 5 stairs step over step no rails. She is planning on going home this afternoon.

## 2018-09-08 NOTE — THERAPY TREATMENT NOTE
Acute Care - Physical Therapy Treatment Note  [facility]     Patient Name: Chasidy Tejada  : 1960  MRN: [id number]  Today's Date: 2018  Onset of Illness/Injury or Date of Surgery: 18  Date of Referral to PT: 18  Referring Physician: Dr. Mcfadden    Admit Date: 2018    Visit Dx:    ICD-10-CM ICD-9-CM   1. Cervical radiculopathy M54.12 723.4   2. Impaired mobility and ADLs Z74.09 799.89   3. Gait abnormality R26.9 781.2     Patient Active Problem List   Diagnosis   • Family history of colon cancer   • Colon polyps   • Laryngopharyngeal reflux   • Pharyngoesophageal dysphagia   • Non-smoker   • Obesity (BMI 30-39.9)   • Spinal stenosis in cervical region   • Cervical radiculopathy   • Herniated cervical disc without myelopathy   • Borderline blood pressure       Therapy Treatment          Rehabilitation Treatment Summary     Row Name 18 0947             Treatment Time/Intention    Discipline physical therapy assistant  -AL      Document Type therapy note (daily note)  -AL      Subjective Information complains of;pain  -AL2      Mode of Treatment physical therapy  -AL      Existing Precautions/Restrictions fall;spinal  -AL2      Recorded by [AL] Bessy Ried, PTA 18 0948  [AL2] Bessy Reid, PTA 18 0956      Row Name 18 09             Bed Mobility Assessment/Treatment    Rolling Right [place] (Bed Mobility) independent  -AL      Recorded by [AL] Bessy Reid, PTA 18 1000      Row Name 18 09             Functional Mobility    Functional Mobility- Ind. Level independent  -AL      Recorded by [AL] Bessy Reid, PTA 18 1000      Row Name 18             Sit-Stand Transfer    Sit-Stand [place] (Transfers) independent  -AL      Recorded by [AL] Bessy Reid, PTA 18 1000      Row Name 18             Stand-Sit Transfer    Stand-Sit [place] (Transfers) independent  -AL      Recorded by [AL] Bessy Reid,  PTA 09/08/18 1000      Row Name 09/08/18 0947             Gait/Stairs Assessment/Training    [place] Level (Gait) independent  -AL      Distance in Feet (Gait) 350  -AL      Pattern (Gait) step-through  -AL      [place] Level (Stairs) independent  -AL      Handrail Location (Stairs) none  -AL      Number of Steps (Stairs) 5  -AL      Ascending Technique (Stairs) step-over-step  -AL      Descending Technique (Stairs) step-over-step  -AL      Recorded by [AL] Bessy Reid, PTA 09/08/18 1000      Row Name 09/08/18 0947             Positioning and Restraints    Pre-Treatment Position in bed  -AL      Post Treatment Position bed  -AL      In Bed fowlers;call light within reach;with family/caregiver  -AL      Recorded by [AL] Bessy Reid, PTA 09/08/18 1000      Row Name 09/08/18 0947             Pain Scale: Numbers Pre/Post-Treatment    Pain Location - Orientation medial  -AL      Pain Location back  -AL      Pain Intervention(s) Medication (See MAR)  -AL      Recorded by [AL] Bessy Reid, PTA 09/08/18 1000      Row Name 09/08/18 0947             Pain Scale: FACES Pre/Post-Treatment    Pain: FACES Scale, Pretreatment 2-->hurts little bit  -AL      Pain: FACES Scale, Post-Treatment 2-->hurts little bit  -AL      Recorded by [AL] Bessy Reid, PTA 09/08/18 1000      Row Name 09/08/18 0947             Orthotic/Prosthetic Management    Orthosis Location spinal orthosis  -AL      Recorded by [AL] Bessy Reid PTA 09/08/18 1005      Row Name 09/08/18 0947             Spinal Orthosis Management    Type (Spinal Orthosis) cervical collar, hard  -AL      Wearing Schedule (Spinal Orthosis) wear full time  -AL      Recorded by [AL] Bessy Reid, PTA 09/08/18 1005      Row Name                Wound 09/07/18 1322 Other (See comments) neck incision    Wound - Properties Group Date first assessed: 09/07/18 [SAGAR] Time first assessed: 1322 [SAGAR] Side: Other (See comments) [SAGAR] Location: neck [SAGAR] Type: incision [SAGAR]  Recorded by:  [SAGAR] Stan Hewitt RN 09/07/18 1322      User Key  (r) = Recorded By, (t) = Taken By, (c) = Cosigned By    Initials Name Effective Dates Discipline    AL Bessy Reid PTA 08/02/16 -  PT    Stan Reed RN 08/02/16 -  Nurse          Wound 09/07/18 1322 Other (See comments) neck incision (Active)   Dressing Appearance dry;intact 9/8/2018  7:27 AM   Dressing Care, Wound gauze;other (see comments) 9/7/2018  7:40 PM             Physical Therapy Education     Title: PT OT SLP Therapies (Done)     Topic: Physical Therapy (Done)     Point: Mobility training (Done)    Learning Progress Summary     Learner Status Readiness Method Response Comment Documented by    Patient Done AILIN [place] 5 stairs no rail AL 09/08/18 1000     Done Acceptance ZANE DIETZ,ALICIA CARDENAS,AILIN,NR Education for logroll in/out of bed, proper technique with transfers and gait  09/07/18 1655          Point: Precautions (Done)    Learning Progress Summary     Learner Status Readiness Method Response Comment Documented by    Patient Done AILIN [place] 5 stairs no rail AL 09/08/18 1000     Done Acceptance ZANE DIETZ D VU,AILIN,NR Education for safety/falls prevention to include transitioning slow during transfers to reduce risk for LOB and falls; proper use/fit of brace; to call for assist  and OOB only with assistance  09/07/18 1656                      User Key     Initials Effective Dates Name Provider Type Discipline    AL 08/02/16 -  Bessy Reid PTA Physical Therapy Assistant PT     08/02/18 -  Wendy Cross PT Physical Therapist PT                    PT Recommendation and Plan     Plan of Care Reviewed With: patient  Progress: improving  Outcome Summary: Patient ambulated 350 feet.  Independent with gait and transfers,  She is independent with 5 stairs step over step no rails.  She is planning on going home this afternoon.          Outcome Measures     Row Name 09/08/18 0947 09/07/18 1655 09/07/18 1600       How much help from another  person do you currently need...    Turning from your back to your side while in flat bed without using bedrails? 4  -AL  -- 3  -JE    Moving from lying on back to sitting on the side of a flat bed without bedrails? 4  -AL  -- 3  -JE    Moving to and from a bed to a chair (including a wheelchair)? 4  -AL  -- 3  -JE    Standing up from a chair using your arms (e.g., wheelchair, bedside chair)? 4  -AL  -- 3  -JE    Climbing 3-5 steps with a railing? 4  -AL  -- 3  -JE    To walk in hospital room? 4  -AL  -- 3  -JE    AM-PAC 6 Clicks Score 24  -AL  -- 18  -JE       How much help from another is currently needed...    Putting on and taking off regular lower body clothing?  -- 2  -MM  --    Bathing (including washing, rinsing, and drying)  -- 2  -MM  --    Toileting (which includes using toilet bed pan or urinal)  -- 2  -MM  --    Putting on and taking off regular upper body clothing  -- 2  -MM  --    Taking care of personal grooming (such as brushing teeth)  -- 3  -MM  --    Eating meals  -- 3  -MM  --    Score  -- 14  -MM  --       Functional Assessment    Outcome Measure Options AM-PAC 6 Clicks Basic Mobility (PT)  -AL AM-PAC 6 Clicks Daily Activity (OT)  -MM AM-PAC 6 Clicks Basic Mobility (PT)  -      User Key  (r) = Recorded By, (t) = Taken By, (c) = Cosigned By    Initials Name Provider Type    Bessy Duff, PTA Physical Therapy Assistant    MM Josef Bajwa, OTR/L Occupational Therapist    Wendy Benito, PT Physical Therapist           Time Calculation:         PT Charges     Row Name 09/08/18 0947             Time Calculation    Start Time 0947  -AL      Stop Time 0957  -AL      Time Calculation (min) 10 min  -AL      PT Received On 09/08/18  -AL      PT Goal Re-Cert Due Date 09/17/18  -AL         Time Calculation- PT    Total Timed Code Minutes- PT 10 minute(s)  -AL         Timed Charges    99668 - Gait Training Minutes  10  -AL        User Key  (r) = Recorded By, (t) = Taken By, (c) = Cosigned By     Initials Name Provider Type    AL Bessy Reid PTA Physical Therapy Assistant        Therapy Suggested Charges     Code   Minutes Charges    44762 (CPT®) Hc Pt Neuromusc Re Education Ea 15 Min      77579 (CPT®) Hc Pt Ther Proc Ea 15 Min      32012 (CPT®) Hc Gait Training Ea 15 Min 10 1    30660 (CPT®) Hc Pt Therapeutic Act Ea 15 Min      45664 (CPT®) Hc Pt Manual Therapy Ea 15 Min      56002 (CPT®) Hc Pt Iontophoresis Ea 15 Min      15272 (CPT®) Hc Pt Elec Stim Ea-Per 15 Min      59121 (CPT®) Hc Pt Ultrasound Ea 15 Min      89551 (CPT®) Hc Pt Self Care/Mgmt/Train Ea 15 Min      96864 (CPT®) Hc Pt Prosthetic (S) Train Initial Encounter, Each 15 Min      97425 (CPT®) Hc Pt Orthotic(S)/Prosthetic(S) Encounter, Each 15 Min      66001 (CPT®) Hc Orthotic(S) Mgmt/Train Initial Encounter, Each 15min      Total  10 1        Therapy Charges for Today     Code Description Service Date Service Provider Modifiers Qty    94435366285 HC GAIT TRAINING EA 15 MIN 9/8/2018 Bessy Reid PTA GP, KX 1          PT G-Codes  PT Professional Judgement Used?: Yes  Outcome Measure Options: AM-PAC 6 Clicks Basic Mobility (PT)  AM-PAC 6 Clicks Score: 24  Score: 14  Functional Limitation: Mobility: Walking and moving around  Mobility: Walking and Moving Around Current Status (): At least 40 percent but less than 60 percent impaired, limited or restricted  Mobility: Walking and Moving Around Goal Status (): At least 1 percent but less than 20 percent impaired, limited or restricted    Bessy Reid PTA  9/8/2018

## 2018-09-08 NOTE — PLAN OF CARE
Problem: Patient Care Overview  Goal: Plan of Care Review  Outcome: Ongoing (interventions implemented as appropriate)   09/08/18 0317   Coping/Psychosocial   Plan of Care Reviewed With patient   Plan of Care Review   Progress improving   OTHER   Outcome Summary Medicated for pain as needed. Up to bathroom with assist x 1. Gait steady. C Collar inplace. Resting easy at present time.       Problem: Laminectomy/Foraminotomy/Discectomy (Adult)  Goal: Signs and Symptoms of Listed Potential Problems Will be Absent, Minimized or Managed (Laminectomy/Foraminotomy/Discectomy)  Outcome: Ongoing (interventions implemented as appropriate)    Goal: Anesthesia/Sedation Recovery  Outcome: Outcome(s) achieved Date Met: 09/08/18

## 2018-09-09 NOTE — THERAPY DISCHARGE NOTE
Acute Care - Physical Therapy Discharge Summary  [facility]       Patient Name: Chasidy Tejada  : 1960  MRN: [id number]    Today's Date: 2018  Onset of Illness/Injury or Date of Surgery: 18    Date of Referral to PT: 18  Referring Physician: Dr. Mcfadden      Admit Date: 2018      PT Recommendation and Plan    Visit Dx:    ICD-10-CM ICD-9-CM   1. Cervical radiculopathy M54.12 723.4   2. Impaired mobility and ADLs Z74.09 799.89   3. Gait abnormality R26.9 781.2             Outcome Measures     Row Name 18 0947 18 1655 18 1600       How much help from another person do you currently need...    Turning from your back to your side while in flat bed without using bedrails? 4  -AL  -- 3  -JE    Moving from lying on back to sitting on the side of a flat bed without bedrails? 4  -AL  -- 3  -JE    Moving to and from a bed to a chair (including a wheelchair)? 4  -AL  -- 3  -JE    Standing up from a chair using your arms (e.g., wheelchair, bedside chair)? 4  -AL  -- 3  -JE    Climbing 3-5 steps with a railing? 4  -AL  -- 3  -JE    To walk in hospital room? 4  -AL  -- 3  -JE    AM-PAC 6 Clicks Score 24  -AL  -- 18  -JE       How much help from another is currently needed...    Putting on and taking off regular lower body clothing?  -- 2  -MM  --    Bathing (including washing, rinsing, and drying)  -- 2  -MM  --    Toileting (which includes using toilet bed pan or urinal)  -- 2  -MM  --    Putting on and taking off regular upper body clothing  -- 2  -MM  --    Taking care of personal grooming (such as brushing teeth)  -- 3  -MM  --    Eating meals  -- 3  -MM  --    Score  -- 14  -MM  --       Functional Assessment    Outcome Measure Options AM-PAC 6 Clicks Basic Mobility (PT)  -AL AM-PAC 6 Clicks Daily Activity (OT)  -MM AM-PAC 6 Clicks Basic Mobility (PT)  -JE      User Key  (r) = Recorded By, (t) = Taken By, (c) = Cosigned By    Initials Name Provider Type    Bessy Duff, PTA  Physical Therapy Assistant    Josef Rodriguez, OTR/L Occupational Therapist    Wendy Benito, PT Physical Therapist            Therapy Suggested Charges     Code   Minutes Charges    28377 (CPT®) Hc Pt Neuromusc Re Education Ea 15 Min      99104 (CPT®) Hc Pt Ther Proc Ea 15 Min      93386 (CPT®) Hc Gait Training Ea 15 Min 10 1    66872 (CPT®) Hc Pt Therapeutic Act Ea 15 Min      18014 (CPT®) Hc Pt Manual Therapy Ea 15 Min      35692 (CPT®) Hc Pt Iontophoresis Ea 15 Min      67092 (CPT®) Hc Pt Elec Stim Ea-Per 15 Min      79704 (CPT®) Hc Pt Ultrasound Ea 15 Min      98709 (CPT®) Hc Pt Self Care/Mgmt/Train Ea 15 Min      78479 (CPT®) Hc Pt Prosthetic (S) Train Initial Encounter, Each 15 Min      48769 (CPT®) Hc Pt Orthotic(S)/Prosthetic(S) Encounter, Each 15 Min      18569 (CPT®) Hc Orthotic(S) Mgmt/Train Initial Encounter, Each 15min      Total  10 1                PT Rehab Goals     Row Name 09/09/18 0827             Bed Mobility Goal 1 (PT)    Activity/Assistive Device (Bed Mobility Goal 1, PT) rolling to left;rolling to right;scooting;sidelying to sit/sit to sidelying  -      [place] Level/Cues Needed (Bed Mobility Goal 1, PT) independent  -AH      Time Frame (Bed Mobility Goal 1, PT) by discharge  -      Progress/Outcomes (Bed Mobility Goal 1, PT) goal met  -         Transfer Goal 1 (PT)    Activity/Assistive Device (Transfer Goal 1, PT) sit-to-stand/stand-to-sit;bed-to-chair/chair-to-bed  -      [place] Level/Cues Needed (Transfer Goal 1, PT) standby assist  -      Time Frame (Transfer Goal 1, PT) by discharge  -      Progress/Outcome (Transfer Goal 1, PT) goal met  -         Gait Training Goal 1 (PT)    Activity/Assistive Device (Gait Training Goal 1, PT) gait (walking locomotion);backward stepping;decrease fall risk;forward stepping;improve balance and speed;increase endurance/gait distance  -      [place] Level (Gait Training Goal 1, PT) standby assist  -       Distance (Gait Goal 1, PT) 300 ft  -      Time Frame (Gait Training Goal 1, PT) by discharge  -      Progress/Outcome (Gait Training Goal 1, PT) goal met  -         Stairs Goal 1 (PT)    Activity/Assistive Device (Stairs Goal 1, PT) ascending stairs;descending stairs;decrease fall risk;using handrail, right;using handrail, left  -      [place] Level/Cues Needed (Stairs Goal 1, PT) standby assist;contact guard assist  -      Number of Stairs (Stairs Goal 1, PT) 3  -AH      Time Frame (Stairs Goal 1, PT) by discharge  -      Progress/Outcome (Stairs Goal 1, PT) goal met  -         Patient Education Goal (PT)    Activity (Patient Education Goal, PT) safety/falls prevention;jesse/doffing brace  -      [place]/Cues/Accuracy (Memory Goal 2, PT) demonstrates adequately;verbalizes understanding  -      Time Frame (Patient Education Goal, PT) by discharge  -      Progress/Outcome (Patient Education Goal, PT) goal met  -        User Key  (r) = Recorded By, (t) = Taken By, (c) = Cosigned By    Initials Name Provider Type Discipline    Bessy Eden PTA Physical Therapy Assistant PT              PT Discharge Summary  Reason for Discharge: Discharge from facility  Outcomes Achieved: Refer to plan of care for updates on goals achieved  Discharge Destination: Home      Bessy Adams PTA   9/9/2018

## 2018-09-10 NOTE — THERAPY DISCHARGE NOTE
Acute Care - Occupational Therapy Discharge Summary  [facility]     Patient Name: Chasidy Tejada  : 1960  MRN: [id number]    Today's Date: 9/10/2018  Onset of Illness/Injury or Date of Surgery: 18    Date of Referral to OT: 18  Referring Physician: Dr. Mcfadden      Admit Date: 2018        OT Recommendation and Plan    Visit Dx:    ICD-10-CM ICD-9-CM   1. Cervical radiculopathy M54.12 723.4   2. Impaired mobility and ADLs Z74.09 799.89   3. Gait abnormality R26.9 781.2                     OT Rehab Goals     Row Name 09/10/18 0750             Dressing Goal 1 (OT)    Activity/Assistive Device (Dressing Goal 1, OT) upper body dressing   including brace  -TS      [place]/Cues Needed (Dressing Goal 1, OT) conditional independence;set-up required  -TS      Time Frame (Dressing Goal 1, OT) 10 days  -TS      Progress/Outcome (Dressing Goal 1, OT) goal not met  -TS         Dressing Goal 2 (OT)    Activity/Assistive Device (Dressing Goal 2, OT) lower body dressing  -TS      [place]/Cues Needed (Dressing Goal 2, OT) conditional independence;set-up required  -TS      Time Frame (Dressing Goal 2, OT) 10 days  -TS      Progress/Outcome (Dressing Goal 2, OT) goal not met  -TS         Toileting Goal 1 (OT)    Activity/Device (Toileting Goal 1, OT) toileting skills, all  -TS      [place] Level/Cues Needed (Toileting Goal 1, OT) independent  -TS      Time Frame (Toileting Goal 1, OT) 10 days  -TS      Progress/Outcome (Toileting Goal 1, OT) goal not met  -TS        User Key  (r) = Recorded By, (t) = Taken By, (c) = Cosigned By    Initials Name Provider Type Discipline    TS Farheen Hodges, DEEPTI/L Occupational Therapy Assistant OT                Outcome Measures     Row Name 18 0947 18 1655 18 1600       How much help from another person do you currently need...    Turning from your back to your side while in flat bed without using bedrails? 4  -AL  -- 3  -JE    Moving from  lying on back to sitting on the side of a flat bed without bedrails? 4  -AL  -- 3  -JE    Moving to and from a bed to a chair (including a wheelchair)? 4  -AL  -- 3  -JE    Standing up from a chair using your arms (e.g., wheelchair, bedside chair)? 4  -AL  -- 3  -JE    Climbing 3-5 steps with a railing? 4  -AL  -- 3  -JE    To walk in hospital room? 4  -AL  -- 3  -JE    AM-PAC 6 Clicks Score 24  -AL  -- 18  -JE       How much help from another is currently needed...    Putting on and taking off regular lower body clothing?  -- 2  -MM  --    Bathing (including washing, rinsing, and drying)  -- 2  -MM  --    Toileting (which includes using toilet bed pan or urinal)  -- 2  -MM  --    Putting on and taking off regular upper body clothing  -- 2  -MM  --    Taking care of personal grooming (such as brushing teeth)  -- 3  -MM  --    Eating meals  -- 3  -MM  --    Score  -- 14  -MM  --       Functional Assessment    Outcome Measure Options AM-PAC 6 Clicks Basic Mobility (PT)  -AL AM-PAC 6 Clicks Daily Activity (OT)  -MM AM-PAC 6 Clicks Basic Mobility (PT)  -JE      User Key  (r) = Recorded By, (t) = Taken By, (c) = Cosigned By    Initials Name Provider Type    Bessy Duff, PTA Physical Therapy Assistant    MM Josef Bajwa, OTR/L Occupational Therapist    Wendy Benito, PT Physical Therapist          Therapy Suggested Charges     Code   Minutes Charges    None                 OT Discharge Summary  Reason for Discharge: Discharge from facility  Outcomes Achieved: Refer to plan of care for updates on goals achieved  Discharge Destination: Home with assist      PIO Soto  9/10/2018

## 2018-09-11 LAB
CYTO UR: NORMAL
LAB AP CASE REPORT: NORMAL
PATH REPORT.FINAL DX SPEC: NORMAL
PATH REPORT.GROSS SPEC: NORMAL

## 2018-09-18 ENCOUNTER — OFFICE VISIT (OUTPATIENT)
Dept: INTERNAL MEDICINE | Facility: CLINIC | Age: 58
End: 2018-09-18

## 2018-09-18 VITALS
WEIGHT: 150.4 LBS | RESPIRATION RATE: 16 BRPM | SYSTOLIC BLOOD PRESSURE: 148 MMHG | HEIGHT: 59 IN | BODY MASS INDEX: 30.32 KG/M2 | HEART RATE: 114 BPM | DIASTOLIC BLOOD PRESSURE: 93 MMHG

## 2018-09-18 DIAGNOSIS — E66.9 OBESITY (BMI 30-39.9): ICD-10-CM

## 2018-09-18 DIAGNOSIS — R03.0 BORDERLINE BLOOD PRESSURE: ICD-10-CM

## 2018-09-18 DIAGNOSIS — M50.20 HERNIATED CERVICAL DISC WITHOUT MYELOPATHY: ICD-10-CM

## 2018-09-18 DIAGNOSIS — Z00.01 ANNUAL VISIT FOR GENERAL ADULT MEDICAL EXAMINATION WITH ABNORMAL FINDINGS: Primary | ICD-10-CM

## 2018-09-18 PROBLEM — Z78.9 NON-SMOKER: Status: RESOLVED | Noted: 2018-08-16 | Resolved: 2018-09-18

## 2018-09-18 PROCEDURE — 99396 PREV VISIT EST AGE 40-64: CPT | Performed by: INTERNAL MEDICINE

## 2018-09-18 PROCEDURE — 96160 PT-FOCUSED HLTH RISK ASSMT: CPT | Performed by: INTERNAL MEDICINE

## 2018-09-18 RX ORDER — OMEPRAZOLE 40 MG/1
1 CAPSULE, DELAYED RELEASE ORAL AS NEEDED
COMMUNITY
Start: 2018-09-08 | End: 2020-02-03 | Stop reason: ALTCHOICE

## 2018-09-18 NOTE — PROGRESS NOTES
CC:    History:  Chasidy Tejada is a 58 y.o. female who presents today for evaluation of the above problems.      ROS:  Review of Systems    Allergies   Allergen Reactions   • Demerol [Meperidine] Hives   • Morphine And Related Hives     Past Medical History:   Diagnosis Date   • Acid reflux    • Asthma     long time ago    • Panic attack    • Right arm weakness      Past Surgical History:   Procedure Laterality Date   • ANTERIOR CERVICAL DISCECTOMY W/ FUSION N/A 9/7/2018    Procedure: CERVICAL DISCECTOMY ANTERIOR WITH FUSION C6-7;  Surgeon: Chris Mcfadden MD;  Location: [facility] OR;  Service: Neurosurgery   • APPENDECTOMY     • CERVICAL CORPECTOMY N/A 9/7/2018    Procedure: CERVICAL CORPECTOMY C6-7;  Surgeon: Chris Mcfadden MD;  Location: [facility] OR;  Service: Neurosurgery   • CHOLECYSTECTOMY     • COLONOSCOPY  09/05/2012   • COLONOSCOPY N/A 2/10/2017    Procedure: COLONOSCOPY WITH ANESTHESIA;  Surgeon: Elina Gonsalez MD;  Location: [facility] ENDOSCOPY;  Service:    • HYSTERECTOMY  1998    Had hysterectomy for painful periods and bleeding. (Dr. Patel)   • LAPAROSCOPIC TUBAL LIGATION     • TONSILLECTOMY       Family History   Problem Relation Age of Onset   • Colon cancer Maternal Grandmother         age not known   • Cancer Father    • Heart disease Father    • Diabetes Mother    • Hypertension Mother    • Stroke Mother    • Hypertension Sister    • No Known Problems Daughter    • No Known Problems Sister    • Colon polyps Neg Hx    • Esophageal cancer Neg Hx    • Liver cancer Neg Hx    • Liver disease Neg Hx    • Rectal cancer Neg Hx    • Stomach cancer Neg Hx    • Breast cancer Neg Hx    • Ovarian cancer Neg Hx       reports that she has quit smoking. Her smoking use included Cigarettes. She has never used smokeless tobacco. She reports that she drinks alcohol. She reports that she does not use drugs.      Current Outpatient Prescriptions:   •  carisoprodol (SOMA) 250 MG tablet, Take 1 tablet by mouth 4 (Four)  "Times a Day As Needed for Muscle Spasms. (Patient taking differently: Take 250 mg by mouth 4 (Four) Times a Day As Needed for Muscle Spasms. Dr Mcfadden), Disp: 40 tablet, Rfl: 0  •  estrogens, conjugated,-methyltestosterone (ESTRATEST HS) 0.625-1.25 MG per tablet, Take 1 tablet by mouth Daily., Disp: 30 tablet, Rfl: 3  •  Multiple Vitamins-Minerals (MULTIVITAMIN ADULT PO), Take  by mouth Daily., Disp: , Rfl:   •  aspirin 81 MG EC tablet, Take 81 mg by mouth Daily., Disp: , Rfl:   •  omeprazole (priLOSEC) 40 MG capsule, Take 1 capsule by mouth As Needed., Disp: , Rfl:     OBJECTIVE:  /93 (BP Location: Left arm, Patient Position: Sitting, Cuff Size: Adult)   Pulse 114   Resp 16   Ht 149.9 cm (59\")   Wt 68.2 kg (150 lb 6.4 oz)   Breastfeeding? No   BMI 30.38 kg/m²    Physical Exam    Assessment/Plan    Diagnoses and all orders for this visit:    Borderline blood pressure    Herniated cervical disc without myelopathy    Obesity (BMI 30-39.9)        An After Visit Summary was printed and given to the patient at discharge.  No Follow-up on file.         Jones Elias D.O. 9/18/2018   "

## 2018-09-24 ENCOUNTER — TELEPHONE (OUTPATIENT)
Dept: NEUROSURGERY | Facility: CLINIC | Age: 58
End: 2018-09-24

## 2018-10-01 ENCOUNTER — OFFICE VISIT (OUTPATIENT)
Dept: NEUROSURGERY | Facility: CLINIC | Age: 58
End: 2018-10-01

## 2018-10-01 ENCOUNTER — HOSPITAL ENCOUNTER (OUTPATIENT)
Dept: GENERAL RADIOLOGY | Facility: HOSPITAL | Age: 58
Discharge: HOME | End: 2018-10-01
Admitting: NURSE PRACTITIONER

## 2018-10-01 VITALS
WEIGHT: 158 LBS | SYSTOLIC BLOOD PRESSURE: 140 MMHG | HEIGHT: 59 IN | BODY MASS INDEX: 31.85 KG/M2 | DIASTOLIC BLOOD PRESSURE: 68 MMHG

## 2018-10-01 DIAGNOSIS — M50.20 HERNIATED CERVICAL DISC WITHOUT MYELOPATHY: Primary | ICD-10-CM

## 2018-10-01 DIAGNOSIS — M48.02 SPINAL STENOSIS IN CERVICAL REGION: ICD-10-CM

## 2018-10-01 DIAGNOSIS — Z78.9 NON-SMOKER: ICD-10-CM

## 2018-10-01 PROCEDURE — 72050 X-RAY EXAM NECK SPINE 4/5VWS: CPT

## 2018-10-01 PROCEDURE — 99024 POSTOP FOLLOW-UP VISIT: CPT | Performed by: NURSE PRACTITIONER

## 2018-10-01 NOTE — PATIENT INSTRUCTIONS

## 2018-10-01 NOTE — PROGRESS NOTES
"    Chief complaint:   Chief Complaint   Patient presents with   • Post-op     Chasidy returns today for her post op visit for an ACF, she states she is doing well and has no complaints        Subjective     HPI: This is a 58-year-old female patient went to the operating room on 09/07/2018 for a C 6-7 ACDF.  She is here in follow-up today.  She says that as far as the pain is concerned she had a complete resolution in the pain in her right arm.  She does still have numbness and tingling in her right arm and she does feel like it is still weak at this point.  The pain in her neck has improved as well as she is still complaining of some pain between her shoulder blades but overall everything is better than what it was prior to the surgery.  She is out of her brace at this time.  Her incision is healing nicely.    Review of Systems   Musculoskeletal: Positive for neck stiffness.   Neurological: Positive for weakness and numbness.         Objective      Vital Signs  /68 (BP Location: Right arm, Patient Position: Sitting)   Ht 149.9 cm (59\")   Wt 71.7 kg (158 lb)   BMI 31.91 kg/m²     Physical Exam   Constitutional: She is oriented to person, place, and time. She appears well-developed and well-nourished.   HENT:   Head: Normocephalic.   Eyes: Pupils are equal, round, and reactive to light. Conjunctivae, EOM and lids are normal.   Neck: Normal range of motion.   Cardiovascular: Normal rate, regular rhythm and normal heart sounds.    Pulmonary/Chest: Effort normal and breath sounds normal.   Abdominal: Normal appearance.   Musculoskeletal: Normal range of motion.   Neurological: She is alert and oriented to person, place, and time. She has normal strength and normal reflexes. She displays normal reflexes. No cranial nerve deficit or sensory deficit. Coordination and gait normal. GCS eye subscore is 4. GCS verbal subscore is 5. GCS motor subscore is 6.   , bicep, tricep on the right are 4 out of 5.  All other " muscle groups are 5 out of 5.   Skin: Skin is warm.   Psychiatric: She has a normal mood and affect. Her speech is normal and behavior is normal. Thought content normal. Cognition and memory are normal.       Results Review: No new imaging          Assessment/Plan: At this point I am going to have the patient come back and see Dr. Mcfadden in 6-8 weeks.  We will send her for set of x-rays of her cervical spine today.  She was told to call's if she had any further pounds.  She is planning on going back to work in about 3 weeks as long as everything is improved.          Cahsidy was seen today for post-op.    Diagnoses and all orders for this visit:    Herniated cervical disc without myelopathy  -     XR Spine Cervical Complete 4 or 5 View    Spinal stenosis in cervical region  -     XR Spine Cervical Complete 4 or 5 View    BMI 31.0-31.9,adult    Non-smoker        I discussed the patients findings and my recommendations with patient  Bill Douglas, APRN  10/01/18  9:33 AM

## 2018-11-27 ENCOUNTER — OFFICE VISIT (OUTPATIENT)
Dept: OBSTETRICS AND GYNECOLOGY | Facility: CLINIC | Age: 58
End: 2018-11-27

## 2018-11-27 VITALS
WEIGHT: 161 LBS | HEIGHT: 59 IN | SYSTOLIC BLOOD PRESSURE: 116 MMHG | DIASTOLIC BLOOD PRESSURE: 72 MMHG | BODY MASS INDEX: 32.46 KG/M2

## 2018-11-27 DIAGNOSIS — Z78.9 NON-SMOKER: ICD-10-CM

## 2018-11-27 DIAGNOSIS — Z01.411 ENCOUNTER FOR GYNECOLOGICAL EXAMINATION WITH ABNORMAL FINDING: ICD-10-CM

## 2018-11-27 DIAGNOSIS — Z78.0 MENOPAUSE: Primary | ICD-10-CM

## 2018-11-27 PROCEDURE — 99213 OFFICE O/P EST LOW 20 MIN: CPT | Performed by: OBSTETRICS & GYNECOLOGY

## 2018-11-27 RX ORDER — ESTERIFIED ESTROGEN AND METHYLTESTOSTERONE .625; 1.25 MG/1; MG/1
1 TABLET ORAL DAILY
Qty: 30 TABLET | Refills: 2 | Status: SHIPPED | OUTPATIENT
Start: 2018-11-27 | End: 2019-02-27 | Stop reason: SDUPTHER

## 2018-11-27 NOTE — PROGRESS NOTES
"Subjective   Chief Complaint   Patient presents with   • Med Refill     pt here today following up after starting estratest. pt says she is doing well. pt voices no concerns.      Chasidy Tejada is a 58 y.o. year old .  No LMP recorded. Patient has had a hysterectomy.  She presents to be seen for follow-up of HRT.  Patient started Estratest 3 months ago and reports that \"everything\" is better.  She further explains that energy and mood are both significantly improved.  In addition, she is no longer having hot flashes or night sweats.  Vaginal dryness also better.  Patient very pleased; her only complaint is back pain that is still present since her recent cervical fusion.     The following portions of the patient's history were reviewed and updated as appropriate:current medications and allergies    Social History    Tobacco Use      Smoking status: Former Smoker        Types: Cigarettes      Smokeless tobacco: Never Used    Review of Systems   Constitutional: Negative for activity change, fatigue and unexpected weight change.   Respiratory: Negative for shortness of breath.    Cardiovascular: Negative for chest pain.   Gastrointestinal: Negative for abdominal pain.   Endocrine: Negative for cold intolerance and heat intolerance.   Musculoskeletal: Positive for neck pain and neck stiffness.   Neurological: Negative for dizziness and headaches (gone since surgery).   Psychiatric/Behavioral: Positive for dysphoric mood. The patient is nervous/anxious.         Still some depression and anxiety over recent cervical fusion, but feels like HRT helps.         Objective   /72   Ht 149.9 cm (59\")   Wt 73 kg (161 lb)   BMI 32.52 kg/m²     Physical Exam   Constitutional: She is oriented to person, place, and time. She appears well-developed and well-nourished. No distress.   HENT:   Head: Normocephalic and atraumatic.   Eyes: EOM are normal.   Pulmonary/Chest: Effort normal.   Abdominal: Soft. She exhibits no " distension. There is no tenderness.   Musculoskeletal: Normal range of motion.   Neurological: She is alert and oriented to person, place, and time.   Skin: Skin is warm and dry.   Psychiatric: She has a normal mood and affect. Her behavior is normal. Judgment normal.   Nursing note and vitals reviewed.      Lab Review   No data reviewed    Imaging   No data reviewed     Assessment & Plan  Chasidy was seen today for med refill.    Diagnoses and all orders for this visit:    Menopause: Patient feels much better since starting HRT.  She wants to continue.  Lipids were normal in may 2017.  RTO in 90 days  -     estrogens, conjugated,-methyltestosterone (ESTRATEST HS) 0.625-1.25 MG per tablet; Take 1 tablet by mouth Daily.    Non-smoker      This note was electronically signed.    Elisha Martel MD  November 27, 2018  8:07 AM    Total time spent today with Chasidy  was 20 minutes (level 3).  Greater than 50% of the time was spent coordinating care, answering her questions and counseling regarding pathophysiology of her presenting problem along with plans for any diagnositc work-up and treatment.

## 2018-12-06 ENCOUNTER — OFFICE VISIT (OUTPATIENT)
Dept: NEUROSURGERY | Facility: CLINIC | Age: 58
End: 2018-12-06

## 2018-12-06 VITALS
BODY MASS INDEX: 33.02 KG/M2 | SYSTOLIC BLOOD PRESSURE: 140 MMHG | DIASTOLIC BLOOD PRESSURE: 68 MMHG | HEIGHT: 59 IN | WEIGHT: 163.8 LBS

## 2018-12-06 DIAGNOSIS — M48.02 SPINAL STENOSIS IN CERVICAL REGION: ICD-10-CM

## 2018-12-06 DIAGNOSIS — M50.20 HERNIATED CERVICAL DISC WITHOUT MYELOPATHY: Primary | ICD-10-CM

## 2018-12-06 DIAGNOSIS — M54.12 CERVICAL RADICULOPATHY: ICD-10-CM

## 2018-12-06 DIAGNOSIS — Z78.9 NON-SMOKER: ICD-10-CM

## 2018-12-06 PROCEDURE — 99024 POSTOP FOLLOW-UP VISIT: CPT | Performed by: NEUROLOGICAL SURGERY

## 2018-12-06 RX ORDER — CYCLOBENZAPRINE HCL 10 MG
10 TABLET ORAL 3 TIMES DAILY PRN
Qty: 90 TABLET | Refills: 0 | Status: SHIPPED | OUTPATIENT
Start: 2018-12-06 | End: 2020-02-03 | Stop reason: ALTCHOICE

## 2018-12-06 NOTE — PATIENT INSTRUCTIONS
PATIENT TO CONTINUE TO FOLLOW UP WITH HER PRIMARY CARE PROVIDER FOR YEARLY PHYSICAL EXAMS TO ENSURE COMPLETE HEALTH MAINTENANCE      BMI for Adults  Body mass index (BMI) is a number that is calculated from a person's weight and height. In most adults, the number is used to find how much of an adult's weight is made up of fat. BMI is not as accurate as a direct measure of body fat.  How is BMI calculated?  BMI is calculated by dividing weight in kilograms by height in meters squared. It can also be calculated by dividing weight in pounds by height in inches squared, then multiplying the resulting number by 703. Charts are available to help you find your BMI quickly and easily without doing this calculation.  How is BMI interpreted?  Health care professionals use BMI charts to identify whether an adult is underweight, at a normal weight, or overweight based on the following guidelines:  · Underweight: BMI less than 18.5.  · Normal weight: BMI between 18.5 and 24.9.  · Overweight: BMI between 25 and 29.9.  · Obese: BMI of 30 and above.    BMI is usually interpreted the same for males and females.  Weight includes both fat and muscle, so someone with a muscular build, such as an athlete, may have a BMI that is higher than 24.9. In cases like these, BMI may not accurately depict body fat. To determine if excess body fat is the cause of a BMI of 25 or higher, further assessments may need to be done by a health care provider.  Why is BMI a useful tool?  BMI is used to identify a possible weight problem that may be related to a medical problem or may increase the risk for medical problems. BMI can also be used to promote changes to reach a healthy weight.  This information is not intended to replace advice given to you by your health care provider. Make sure you discuss any questions you have with your health care provider.  Document Released: 08/29/2005 Document Revised: 04/27/2017 Document Reviewed: 05/15/2015  [employer]  Interactive Patient Education © 2018 [employer]

## 2018-12-06 NOTE — PROGRESS NOTES
SUBJECTIVE:  Patient ID: Chasidy Tejada is a 58 y.o. female is here today for follow-up.    Chief Complaint: Neck pain  Chief Complaint   Patient presents with   • Neck & arm pain     patient is here to have a wound check       HPI  58-year-old female went to the operating room for a single level anterior cervical fusion about 3 months ago.  She done very well.  Most of her neck pain is gone.  She has no upper extremity pain numbness or tingling.  In general she is very satisfied with the results for surgery.  She is complaining of some muscle tension type pain between her shoulder blades.  She takes a half a Soma which seems to help the discomfort however she has complained of a hangover and some fuzzy thinking after the Soma.    The following portions of the patient's history were reviewed and updated as appropriate: allergies, current medications, past family history, past medical history, past social history, past surgical history and problem list.    OBJECTIVE:    Review of Systems   Musculoskeletal: Positive for back pain.   All other systems reviewed and are negative.         Physical Exam   Constitutional: She is oriented to person, place, and time. She appears well-developed and well-nourished.   HENT:   Head: Normocephalic and atraumatic.   Right Ear: Hearing normal.   Left Ear: Hearing normal.   Eyes: EOM are normal. Pupils are equal, round, and reactive to light.   Neck: Normal range of motion.   Neurological: She is alert and oriented to person, place, and time. She has normal strength and normal reflexes. No cranial nerve deficit or sensory deficit. She displays a negative Romberg sign. GCS eye subscore is 4. GCS verbal subscore is 5. GCS motor subscore is 6. She displays no Babinski's sign on the right side. She displays no Babinski's sign on the left side.   Psychiatric: Her speech is normal. Judgment normal. Cognition and memory are normal.       Neurologic Exam     Mental Status   Oriented to  person, place, and time.   Speech: speech is normal     Cranial Nerves     CN III, IV, VI   Pupils are equal, round, and reactive to light.  Extraocular motions are normal.     Motor Exam     Strength   Strength 5/5 throughout.       Independent Review of Radiographic Studies:       ASSESSMENT/PLAN:  Chasidy is done very well after her surgery.  Her preoperative symptoms of essentially resolved.  I explained to her that her thoracic spine pain is on certainly a mild tension syndrome.  We are in a change her from Soma Flexeril to see if that helps the side effects of muscle relaxer.  We did offer her physical therapy program for her thoracic spine discomfort but right now she like to just manage on her own.  We will see her in follow-up in 3 months      1. Herniated cervical disc without myelopathy    2. Spinal stenosis in cervical region    3. Cervical radiculopathy    4. Non-smoker    5. BMI 33.0-33.9,adult            Return in about 3 months (around 3/6/2019) for follow up w/DR SIFUENTES.      Chris Sifuentes MD

## 2019-02-27 ENCOUNTER — OFFICE VISIT (OUTPATIENT)
Dept: OBSTETRICS AND GYNECOLOGY | Facility: CLINIC | Age: 59
End: 2019-02-27

## 2019-02-27 VITALS
SYSTOLIC BLOOD PRESSURE: 122 MMHG | WEIGHT: 165 LBS | HEIGHT: 59 IN | DIASTOLIC BLOOD PRESSURE: 84 MMHG | BODY MASS INDEX: 33.26 KG/M2

## 2019-02-27 DIAGNOSIS — Z78.9 NON-SMOKER: ICD-10-CM

## 2019-02-27 DIAGNOSIS — E66.9 OBESITY (BMI 30-39.9): ICD-10-CM

## 2019-02-27 DIAGNOSIS — Z78.0 MENOPAUSE: Primary | ICD-10-CM

## 2019-02-27 DIAGNOSIS — Z01.411 ENCOUNTER FOR GYNECOLOGICAL EXAMINATION WITH ABNORMAL FINDING: ICD-10-CM

## 2019-02-27 PROCEDURE — 99213 OFFICE O/P EST LOW 20 MIN: CPT | Performed by: OBSTETRICS & GYNECOLOGY

## 2019-02-27 RX ORDER — ESTERIFIED ESTROGEN AND METHYLTESTOSTERONE .625; 1.25 MG/1; MG/1
1 TABLET ORAL DAILY
Qty: 30 TABLET | Refills: 2 | Status: ON HOLD | OUTPATIENT
Start: 2019-02-27 | End: 2020-01-10

## 2019-02-27 NOTE — PROGRESS NOTES
"Subjective   Chief Complaint   Patient presents with   • Med Refill     pt here today for refill on estratest. pt says she is doing great on it. pt voices no concerns.      Chasidy Tejada is a 58 y.o. year old .  No LMP recorded. Patient has had a hysterectomy.  She presents to be seen for med check of her Estratest.  She denies any hot flashes or night sweats.  Patient still feels like her energy level is lagging, but has finally decided to attribute that to age and the dreary winter weather.  She does not get any exercise.     The following portions of the patient's history were reviewed and updated as appropriate:current medications and allergies    Social History    Tobacco Use      Smoking status: Former Smoker        Types: Cigarettes      Smokeless tobacco: Never Used    Review of Systems   Constitutional: Negative for activity change and unexpected weight change.   Respiratory: Negative for shortness of breath.    Cardiovascular: Negative for chest pain.   Gastrointestinal: Negative for abdominal pain.   Genitourinary: Negative for pelvic pain.   Musculoskeletal: Positive for neck pain (but much better than before her surgery) and neck stiffness. Negative for arthralgias and back pain.         Objective   /84   Ht 149.9 cm (59\")   Wt 74.8 kg (165 lb)   BMI 33.33 kg/m²     Physical Exam   Constitutional: She is oriented to person, place, and time. She appears well-developed and well-nourished. No distress.   HENT:   Head: Normocephalic and atraumatic.   Eyes: EOM are normal.   Neck: Normal range of motion.   Pulmonary/Chest: Effort normal.   Musculoskeletal: Normal range of motion.   Neurological: She is alert and oriented to person, place, and time.   Skin: Skin is warm and dry.   Psychiatric: She has a normal mood and affect. Her behavior is normal. Judgment normal.   Nursing note and vitals reviewed.      Lab Review   No data reviewed    Imaging   No data reviewed     Assessment & " Plan  Chasidy was seen today for med refill.    Diagnoses and all orders for this visit:    Menopause: Patient very pleased with HRT and wants to continue  -     estrogens, conjugated,-methyltestosterone (ESTRATEST HS) 0.625-1.25 MG per tablet; Take 1 tablet by mouth Daily.    Non-smoker    Obesity (BMI 30-39.9): Exercise encouraged.  10 minutes spent discussing benefits of regular exercise and how this would improve her energy level.      This note was electronically signed.    Elisha Martel MD  February 27, 2019  8:29 AM    Total time spent today with Chasidy  was 20 minutes (level 3).  Greater than 50% of the time was spent coordinating care, answering her questions and counseling regarding pathophysiology of her presenting problem along with plans for any diagnositc work-up and treatment.

## 2019-12-10 ENCOUNTER — OFFICE VISIT (OUTPATIENT)
Dept: OBSTETRICS AND GYNECOLOGY | Facility: CLINIC | Age: 59
End: 2019-12-10

## 2019-12-10 VITALS
BODY MASS INDEX: 32.05 KG/M2 | SYSTOLIC BLOOD PRESSURE: 122 MMHG | HEIGHT: 59 IN | DIASTOLIC BLOOD PRESSURE: 68 MMHG | WEIGHT: 159 LBS

## 2019-12-10 DIAGNOSIS — Z78.0 MENOPAUSE: ICD-10-CM

## 2019-12-10 DIAGNOSIS — F41.9 ANXIETY: Primary | ICD-10-CM

## 2019-12-10 DIAGNOSIS — Z01.419 ENCOUNTER FOR GYNECOLOGICAL EXAMINATION WITHOUT ABNORMAL FINDING: ICD-10-CM

## 2019-12-10 DIAGNOSIS — Z87.891 FORMER SMOKER: ICD-10-CM

## 2019-12-10 DIAGNOSIS — Z78.9 NON-SMOKER: ICD-10-CM

## 2019-12-10 DIAGNOSIS — Z01.411 ENCOUNTER FOR GYNECOLOGICAL EXAMINATION WITH ABNORMAL FINDING: ICD-10-CM

## 2019-12-10 PROCEDURE — 99396 PREV VISIT EST AGE 40-64: CPT | Performed by: OBSTETRICS & GYNECOLOGY

## 2019-12-10 RX ORDER — ALPRAZOLAM 0.5 MG/1
0.5 TABLET ORAL 2 TIMES DAILY PRN
Qty: 60 TABLET | Refills: 2 | Status: SHIPPED | OUTPATIENT
Start: 2019-12-10 | End: 2021-02-26

## 2019-12-10 RX ORDER — ESTERIFIED ESTROGEN AND METHYLTESTOSTERONE .625; 1.25 MG/1; MG/1
1 TABLET ORAL DAILY
Qty: 90 TABLET | Refills: 3 | Status: SHIPPED | OUTPATIENT
Start: 2019-12-10 | End: 2020-01-10 | Stop reason: HOSPADM

## 2019-12-10 NOTE — PROGRESS NOTES
Subjective   Chief Complaint   Patient presents with   • Gynecologic Exam     Patient here for yearly exam. Last exam was done 2018. Patient has had a TLH w/ BSO due to pelvic pain.  Last mammo and DEXA done 18 at [facility]. Patient needs order for mammogram.  Patient has stopped taking Estratest and would like to discuss this medicaiton.      Chasidy Tejada is a 59 y.o. year old  menopausal female presenting to be seen for her annual exam.  Patient really struggling with anxiety.  She feels like anxiety exacerbated by work mostly, but also by her .  She is worried about his health because he had a stroke and she is concerned about it happening again.  The patient is status-post hysterectomy. Hot flashes and night sweats ARE a problem since she has been out of her HRT, but she reports it is better when she stays off the carbs.    SEXUAL Hx:  She is sexually active.  In the past year there has not been new sexual partners.  HEALTH Hx:  She exercises regularly: no.  The patient reports regular self breast exams: no  She has noticed changes in height: no.    No Additional Complaints Reported    The following portions of the patient's history were reviewed and updated as appropriate:problem list, current medications, allergies, past family history, past medical history, past social history and past surgical history.    Social History    Tobacco Use      Smoking status: Former Smoker        Types: Cigarettes      Smokeless tobacco: Never Used    Review of Systems   Constitutional: Negative for activity change and unexpected weight change.   Eyes: Positive for visual disturbance (wears glasses).   Respiratory: Negative for shortness of breath.    Cardiovascular: Negative for chest pain.   Gastrointestinal: Negative for abdominal pain, blood in stool, constipation and diarrhea.        Colonoscopy up-to-date until    Endocrine: Positive for heat intolerance.   Genitourinary: Positive for enuresis (with  "cough or sneeze). Negative for difficulty urinating, dyspareunia, dysuria, pelvic pain, vaginal bleeding, vaginal discharge and vaginal pain.   Musculoskeletal: Negative for arthralgias and back pain.   Neurological: Positive for headaches (tension HA's and migraines). Negative for dizziness.   Psychiatric/Behavioral: Negative for dysphoric mood. The patient is nervous/anxious (took xanax years ago).          Objective   /68   Ht 149.9 cm (59\")   Wt 72.1 kg (159 lb)   Breastfeeding No   BMI 32.11 kg/m²   Physical Exam   Constitutional: She is oriented to person, place, and time. She appears well-developed and well-nourished. No distress.   HENT:   Head: Normocephalic and atraumatic.   Eyes: EOM are normal.   Neck: Normal range of motion. Neck supple. No thyromegaly present.   Cardiovascular: Normal rate and regular rhythm.   No murmur heard.  Pulmonary/Chest: Effort normal and breath sounds normal. Right breast exhibits no inverted nipple and no mass. Left breast exhibits no inverted nipple and no mass. No breast tenderness or discharge.   Abdominal: Soft. She exhibits no distension. There is no tenderness.   Genitourinary: Vagina normal. Rectal exam shows no external hemorrhoid and anal tone normal. No breast tenderness or discharge. Pelvic exam was performed with patient supine. There is no tenderness or lesion on the right labia. There is no tenderness or lesion on the left labia. Right adnexum displays no tenderness and no fullness. Left adnexum displays no tenderness and no fullness. No bleeding in the vagina. No vaginal discharge found.   Genitourinary Comments: Pt s/p hysterectomy: uterus and cervix surgically absent.  Vaginal cuff unremarkable.   Musculoskeletal: Normal range of motion. She exhibits no edema.   Neurological: She is alert and oriented to person, place, and time.   Skin: Skin is warm and dry.   Psychiatric: She has a normal mood and affect. Her behavior is normal. Judgment normal. "   Nursing note and vitals reviewed.           Assessment & Plan    Chasidy was seen today for gynecologic exam.    Diagnoses and all orders for this visit:    Anxiety: Will call in 30 days if this is not strong enough  -     sertraline (ZOLOFT) 50 MG tablet; Take 1 tablet by mouth Daily.  -     ALPRAZolam (XANAX) 0.5 MG tablet; Take 1 tablet by mouth 2 (Two) Times a Day As Needed for Anxiety.    Encounter for gynecological examination without abnormal finding: Patient will return when fasting for labs.  Mammogram ordered; SBE encouraged.  DEXA normal in 2018.    -     Mammo Screening Digital Tomosynthesis Bilateral With CAD  -     Mammo Screening Bilateral With CAD; Future  -     CBC & Differential  -     Comprehensive Metabolic Panel  -     Hemoglobin A1c  -     Lipid Panel  -     TSH    Menopause: Resuming HRT    BMI 32.0-32.9,adult    Non-smoker    Former smoker  Comments:  only smoked socially, but has been many years      This note was electronically signed.    Elisha Martel MD  12/10/2019  2:15 PM

## 2019-12-13 ENCOUNTER — HOSPITAL ENCOUNTER (OUTPATIENT)
Dept: MAMMOGRAPHY | Facility: HOSPITAL | Age: 59
Discharge: HOME | End: 2019-12-13
Admitting: OBSTETRICS & GYNECOLOGY

## 2019-12-13 LAB
ALBUMIN SERPL-MCNC: 4.8 G/DL (ref 3.5–5.2)
ALBUMIN/GLOB SERPL: 1.7 G/DL
ALP SERPL-CCNC: 78 U/L (ref 39–117)
ALT SERPL-CCNC: 20 U/L (ref 1–33)
AST SERPL-CCNC: 17 U/L (ref 1–32)
BASOPHILS # BLD AUTO: 0.05 10*3/MM3 (ref 0–0.2)
BASOPHILS NFR BLD AUTO: 0.8 % (ref 0–1.5)
BILIRUB SERPL-MCNC: 0.5 MG/DL (ref 0.2–1.2)
BUN SERPL-MCNC: 22 MG/DL (ref 6–20)
BUN/CREAT SERPL: 34.4 (ref 7–25)
CALCIUM SERPL-MCNC: 9.5 MG/DL (ref 8.6–10.5)
CHLORIDE SERPL-SCNC: 102 MMOL/L (ref 98–107)
CHOLEST SERPL-MCNC: 187 MG/DL (ref 0–200)
CO2 SERPL-SCNC: 25.9 MMOL/L (ref 22–29)
CREAT SERPL-MCNC: 0.64 MG/DL (ref 0.57–1)
EOSINOPHIL # BLD AUTO: 0.08 10*3/MM3 (ref 0–0.4)
EOSINOPHIL NFR BLD AUTO: 1.2 % (ref 0.3–6.2)
ERYTHROCYTE [DISTWIDTH] IN BLOOD BY AUTOMATED COUNT: 12.4 % (ref 12.3–15.4)
GLOBULIN SER CALC-MCNC: 2.8 GM/DL
GLUCOSE SERPL-MCNC: 91 MG/DL (ref 65–99)
HBA1C MFR BLD: 5.5 % (ref 4.8–5.6)
HCT VFR BLD AUTO: 41.3 % (ref 34–46.6)
HDLC SERPL-MCNC: 65 MG/DL (ref 40–60)
HGB BLD-MCNC: 14.1 G/DL (ref 12–15.9)
IMM GRANULOCYTES # BLD AUTO: 0.02 10*3/MM3 (ref 0–0.05)
IMM GRANULOCYTES NFR BLD AUTO: 0.3 % (ref 0–0.5)
LDLC SERPL CALC-MCNC: 113 MG/DL (ref 0–100)
LYMPHOCYTES # BLD AUTO: 2.81 10*3/MM3 (ref 0.7–3.1)
LYMPHOCYTES NFR BLD AUTO: 42.2 % (ref 19.6–45.3)
MCH RBC QN AUTO: 31.5 PG (ref 26.6–33)
MCHC RBC AUTO-ENTMCNC: 34.1 G/DL (ref 31.5–35.7)
MCV RBC AUTO: 92.4 FL (ref 79–97)
MONOCYTES # BLD AUTO: 0.33 10*3/MM3 (ref 0.1–0.9)
MONOCYTES NFR BLD AUTO: 5 % (ref 5–12)
NEUTROPHILS # BLD AUTO: 3.37 10*3/MM3 (ref 1.7–7)
NEUTROPHILS NFR BLD AUTO: 50.5 % (ref 42.7–76)
NRBC BLD AUTO-RTO: 0 /100 WBC (ref 0–0.2)
PLATELET # BLD AUTO: 311 10*3/MM3 (ref 140–450)
POTASSIUM SERPL-SCNC: 4.6 MMOL/L (ref 3.5–5.2)
PROT SERPL-MCNC: 7.6 G/DL (ref 6–8.5)
RBC # BLD AUTO: 4.47 10*6/MM3 (ref 3.77–5.28)
SODIUM SERPL-SCNC: 141 MMOL/L (ref 136–145)
TRIGL SERPL-MCNC: 46 MG/DL (ref 0–150)
TSH SERPL DL<=0.005 MIU/L-ACNC: 0.8 UIU/ML (ref 0.27–4.2)
VLDLC SERPL CALC-MCNC: 9.2 MG/DL
WBC # BLD AUTO: 6.66 10*3/MM3 (ref 3.4–10.8)

## 2019-12-13 PROCEDURE — 77063 BREAST TOMOSYNTHESIS BI: CPT

## 2019-12-13 PROCEDURE — 77067 SCR MAMMO BI INCL CAD: CPT

## 2019-12-14 DIAGNOSIS — R92.8 ABNORMAL MAMMOGRAM: Primary | ICD-10-CM

## 2019-12-27 ENCOUNTER — TELEPHONE (OUTPATIENT)
Dept: OBSTETRICS AND GYNECOLOGY | Facility: CLINIC | Age: 59
End: 2019-12-27

## 2019-12-27 ENCOUNTER — HOSPITAL ENCOUNTER (OUTPATIENT)
Dept: MAMMOGRAPHY | Facility: HOSPITAL | Age: 59
Discharge: HOME | End: 2019-12-27
Admitting: OBSTETRICS & GYNECOLOGY

## 2019-12-27 ENCOUNTER — HOSPITAL ENCOUNTER (OUTPATIENT)
Dept: ULTRASOUND IMAGING | Facility: HOSPITAL | Age: 59
Discharge: HOME | End: 2019-12-27

## 2019-12-27 DIAGNOSIS — R92.8 ABNORMAL MAMMOGRAM: ICD-10-CM

## 2019-12-27 PROCEDURE — 77065 DX MAMMO INCL CAD UNI: CPT

## 2019-12-27 PROCEDURE — 76642 ULTRASOUND BREAST LIMITED: CPT

## 2019-12-27 PROCEDURE — G0279 TOMOSYNTHESIS, MAMMO: HCPCS

## 2019-12-27 NOTE — TELEPHONE ENCOUNTER
Shari at [facility] calls re: Diagnostic mammogram and US l breast.    States shows 8mm solid mass Left breast. Results are being faxed.  Recommending biopsy

## 2019-12-30 ENCOUNTER — TRANSCRIBE ORDERS (OUTPATIENT)
Dept: ADMINISTRATIVE | Facility: HOSPITAL | Age: 59
End: 2019-12-30

## 2019-12-30 DIAGNOSIS — N63.0 BREAST MASS: Primary | ICD-10-CM

## 2020-01-03 ENCOUNTER — HOSPITAL ENCOUNTER (OUTPATIENT)
Dept: MAMMOGRAPHY | Facility: HOSPITAL | Age: 60
Discharge: HOME | End: 2020-01-03

## 2020-01-03 ENCOUNTER — HOSPITAL ENCOUNTER (OUTPATIENT)
Dept: ULTRASOUND IMAGING | Facility: HOSPITAL | Age: 60
Discharge: HOME | End: 2020-01-03
Admitting: SPECIALIST

## 2020-01-03 DIAGNOSIS — N63.0 BREAST MASS: ICD-10-CM

## 2020-01-03 PROCEDURE — A4648 IMPLANTABLE TISSUE MARKER: HCPCS

## 2020-01-03 PROCEDURE — 88361 TUMOR IMMUNOHISTOCHEM/COMPUT: CPT

## 2020-01-03 PROCEDURE — 88305 TISSUE EXAM BY PATHOLOGIST: CPT | Performed by: SPECIALIST

## 2020-01-03 PROCEDURE — 88377 M/PHMTRC ALYS ISHQUANT/SEMIQ: CPT

## 2020-01-03 RX ORDER — LIDOCAINE HYDROCHLORIDE AND EPINEPHRINE 10; 10 MG/ML; UG/ML
5 INJECTION, SOLUTION INFILTRATION; PERINEURAL ONCE
Status: DISCONTINUED | OUTPATIENT
Start: 2020-01-03 | End: 2020-01-09

## 2020-01-03 RX ORDER — LIDOCAINE HYDROCHLORIDE 10 MG/ML
5 INJECTION, SOLUTION INFILTRATION; PERINEURAL ONCE
Status: DISCONTINUED | OUTPATIENT
Start: 2020-01-03 | End: 2020-01-09

## 2020-01-09 ENCOUNTER — APPOINTMENT (OUTPATIENT)
Dept: PREADMISSION TESTING | Facility: HOSPITAL | Age: 60
End: 2020-01-09

## 2020-01-09 ENCOUNTER — TRANSCRIBE ORDERS (OUTPATIENT)
Dept: ADMINISTRATIVE | Facility: HOSPITAL | Age: 60
End: 2020-01-09

## 2020-01-09 VITALS
SYSTOLIC BLOOD PRESSURE: 148 MMHG | OXYGEN SATURATION: 96 % | DIASTOLIC BLOOD PRESSURE: 86 MMHG | HEART RATE: 86 BPM | RESPIRATION RATE: 16 BRPM | BODY MASS INDEX: 31.51 KG/M2 | HEIGHT: 60 IN | WEIGHT: 160.5 LBS

## 2020-01-09 DIAGNOSIS — D05.92 CARCINOMA IN SITU OF LEFT BREAST, UNSPECIFIED TYPE: Primary | ICD-10-CM

## 2020-01-09 PROBLEM — C50.912 INVASIVE DUCTAL CARCINOMA OF BREAST, FEMALE, LEFT (HCC): Status: ACTIVE | Noted: 2020-01-09

## 2020-01-09 LAB
ALBUMIN SERPL-MCNC: 4.9 G/DL (ref 3.5–5.2)
ALBUMIN/GLOB SERPL: 1.4 G/DL
ALP SERPL-CCNC: 74 U/L (ref 39–117)
ALT SERPL W P-5'-P-CCNC: 12 U/L (ref 1–33)
ANION GAP SERPL CALCULATED.3IONS-SCNC: 14 MMOL/L (ref 5–15)
AST SERPL-CCNC: 18 U/L (ref 1–32)
BASOPHILS # BLD AUTO: 0.06 10*3/MM3 (ref 0–0.2)
BASOPHILS NFR BLD AUTO: 0.8 % (ref 0–1.5)
BILIRUB SERPL-MCNC: 0.6 MG/DL (ref 0.2–1.2)
BUN BLD-MCNC: 13 MG/DL (ref 6–20)
BUN/CREAT SERPL: 28.3 (ref 7–25)
CALCIUM SPEC-SCNC: 9.5 MG/DL (ref 8.6–10.5)
CHLORIDE SERPL-SCNC: 99 MMOL/L (ref 98–107)
CO2 SERPL-SCNC: 26 MMOL/L (ref 22–29)
CREAT BLD-MCNC: 0.46 MG/DL (ref 0.57–1)
DEPRECATED RDW RBC AUTO: 43.9 FL (ref 37–54)
EOSINOPHIL # BLD AUTO: 0.03 10*3/MM3 (ref 0–0.4)
EOSINOPHIL NFR BLD AUTO: 0.4 % (ref 0.3–6.2)
ERYTHROCYTE [DISTWIDTH] IN BLOOD BY AUTOMATED COUNT: 13.3 % (ref 12.3–15.4)
GFR SERPL CREATININE-BSD FRML MDRD: 140 ML/MIN/1.73
GLOBULIN UR ELPH-MCNC: 3.4 GM/DL
GLUCOSE BLD-MCNC: 87 MG/DL (ref 65–99)
HCT VFR BLD AUTO: 43.1 % (ref 34–46.6)
HGB BLD-MCNC: 15 G/DL (ref 12–15.9)
IMM GRANULOCYTES # BLD AUTO: 0.02 10*3/MM3 (ref 0–0.05)
IMM GRANULOCYTES NFR BLD AUTO: 0.3 % (ref 0–0.5)
LYMPHOCYTES # BLD AUTO: 2.94 10*3/MM3 (ref 0.7–3.1)
LYMPHOCYTES NFR BLD AUTO: 37.3 % (ref 19.6–45.3)
MCH RBC QN AUTO: 31.1 PG (ref 26.6–33)
MCHC RBC AUTO-ENTMCNC: 34.8 G/DL (ref 31.5–35.7)
MCV RBC AUTO: 89.4 FL (ref 79–97)
MONOCYTES # BLD AUTO: 0.42 10*3/MM3 (ref 0.1–0.9)
MONOCYTES NFR BLD AUTO: 5.3 % (ref 5–12)
NEUTROPHILS # BLD AUTO: 4.42 10*3/MM3 (ref 1.7–7)
NEUTROPHILS NFR BLD AUTO: 55.9 % (ref 42.7–76)
NRBC BLD AUTO-RTO: 0 /100 WBC (ref 0–0.2)
PLATELET # BLD AUTO: 375 10*3/MM3 (ref 140–450)
PMV BLD AUTO: 9.5 FL (ref 6–12)
POTASSIUM BLD-SCNC: 4.1 MMOL/L (ref 3.5–5.2)
PROT SERPL-MCNC: 8.3 G/DL (ref 6–8.5)
RBC # BLD AUTO: 4.82 10*6/MM3 (ref 3.77–5.28)
SODIUM BLD-SCNC: 139 MMOL/L (ref 136–145)
WBC NRBC COR # BLD: 7.89 10*3/MM3 (ref 3.4–10.8)

## 2020-01-09 PROCEDURE — 85025 COMPLETE CBC W/AUTO DIFF WBC: CPT | Performed by: SPECIALIST

## 2020-01-09 PROCEDURE — 36415 COLL VENOUS BLD VENIPUNCTURE: CPT

## 2020-01-09 PROCEDURE — 80053 COMPREHEN METABOLIC PANEL: CPT | Performed by: SPECIALIST

## 2020-01-09 NOTE — DISCHARGE INSTRUCTIONS
DAY OF SURGERY INSTRUCTIONS        YOUR SURGEON: dr Flor hughes    PROCEDURE: ***left needle directed ultrasound guided partial mastectomy with sentinel lymph node biopsy    DATE OF SURGERY: ***1/10/2020    ARRIVAL TIME: AS DIRECTED BY OFFICE    YOU MAY TAKE THE FOLLOWING MEDICATION(S) THE MORNING OF SURGERY WITH A SIP OF WATER: ***xanax      ALL OTHER HOME MEDICATION CHECK WITH YOUR PHYSICIAN                MANAGING PAIN AFTER SURGERY    We know you are probably wondering what your pain will be like after surgery.  Following surgery it is unrealistic to expect you will not have pain.   Pain is how our bodies let us know that something is wrong or cautions us to be careful.  That said, our goal is to make your pain tolerable.    Methods we may use to treat your pain include (oral or IV medications, PCAs, epidurals, nerve blocks, etc.)   While some procedures require IV pain medications for a short time after surgery, transitioning to pain medications by mouth allows for better management of pain.   Your nurse will encourage you to take oral pain medications whenever possible.  IV medications work almost immediately, but only last a short while.  Taking medications by mouth allows for a more constant level of medication in your blood stream for a longer period of time.      Once your pain is out of control it is harder to get back under control.  It is important you are aware when your next dose of pain medication is due.  If you are admitted, your nurse may write the time of your next dose on the white board in your room to help you remember.      We are interested in your pain and encourage you to inform us about aggravating factors during your visit.   Many times a simple repositioning every few hours can make a big difference.    If your physician says it is okay, do not let your pain prevent you from getting out of bed. Be sure to call your nurse for assistance prior to getting up so you do not fall.       Before surgery, please decide your tolerable pain goal.  These faces help describe the pain ratings we use on a 0-10 scale.   Be prepared to tell us your goal and whether or not you take pain or anxiety medications at home.          BEFORE YOU COME TO THE HOSPITAL  (Pre-op instructions)  • Do not eat, drink, smoke or chew gum after midnight the night before surgery.  This also includes no mints.  • Morning of surgery take only the medicines you have been instructed with a sip of water unless otherwise instructed  by your physician.  • Do not shave, wear makeup or dark nail polish.  • Remove all jewelry including rings.  • Leave anything you consider valuable at home.  • Leave your suitcase in the car until after your surgery.  • Bring the following with you if applicable:  o Picture ID and insurance, Medicare or Medicaid cards  o Co-pay/deductible required by insurance (cash, check, credit card)  o Copy of advance directive, living will or power-of- documents if not brought to PAT  o CPAP or BIPAP mask and tubing  o Relaxation aids ( book, magazine), etc.  o Hearing aids                        ON THE DAY OF SURGERY  · On the day of surgery check in at registration located at the main entrance of the hospital.   ? You will be registered and given a beeper with instructions where to wait in the main lobby.  ? When your beeper lights up and vibrates a member of the Outpatient Surgery staff will meet you at the double doors under the stair steps and escort you to your preoperative room.   · You may have cloth compression devices placed on your legs. These help to prevent blood clots and reduce swelling in your legs.  · An IV may be inserted into one of your veins.  · In the operating room, you may be given one or more of the following:  ? A medicine to help you relax (sedative).  ? A medicine to numb the area (local anesthetic).  ? A medicine to make you fall asleep (general anesthetic).  ? A medicine that  "is injected into an area of your body to numb everything below the injection site (regional anesthetic).  · Your surgical site will be marked or identified.  · You may be given an antibiotic through your IV to help prevent infection.  Contact a health care provider if you:  · Develop a fever of more than 100.4°F (38°C) or other feelings of illness during the 48 hours before your surgery.  · Have symptoms that get worse.  Have questions or concerns about your surgery    General Anesthesia/Surgery, Adult  General anesthesia is the use of medicines to make a person \"go to sleep\" (unconscious) for a medical procedure. General anesthesia must be used for certain procedures, and is often recommended for procedures that:  · Last a long time.  · Require you to be still or in an unusual position.  · Are major and can cause blood loss.  The medicines used for general anesthesia are called general anesthetics. As well as making you unconscious for a certain amount of time, these medicines:  · Prevent pain.  · Control your blood pressure.  · Relax your muscles.  Tell a health care provider about:  · Any allergies you have.  · All medicines you are taking, including vitamins, herbs, eye drops, creams, and over-the-counter medicines.  · Any problems you or family members have had with anesthetic medicines.  · Types of anesthetics you have had in the past.  · Any blood disorders you have.  · Any surgeries you have had.  · Any medical conditions you have.  · Any recent upper respiratory, chest, or ear infections.  · Any history of:  ? Heart or lung conditions, such as heart failure, sleep apnea, asthma, or chronic obstructive pulmonary disease (COPD).  ?  service.  ? Depression or anxiety.  · Any tobacco or drug use, including marijuana or alcohol use.  · Whether you are pregnant or may be pregnant.  What are the risks?  Generally, this is a safe procedure. However, problems may occur, including:  · Allergic " reaction.  · Lung and heart problems.  · Inhaling food or liquid from the stomach into the lungs (aspiration).  · Nerve injury.  · Air in the bloodstream, which can lead to stroke.  · Extreme agitation or confusion (delirium) when you wake up from the anesthetic.  · Waking up during your procedure and being unable to move. This is rare.  These problems are more likely to develop if you are having a major surgery or if you have an advanced or serious medical condition. You can prevent some of these complications by answering all of your health care provider's questions thoroughly and by following all instructions before your procedure.  General anesthesia can cause side effects, including:  · Nausea or vomiting.  · A sore throat from the breathing tube.  · Hoarseness.  · Wheezing or coughing.  · Shaking chills.  · Tiredness.  · Body aches.  · Anxiety.  · Sleepiness or drowsiness.  · Confusion or agitation.  RISKS AND COMPLICATIONS OF SURGERY  Your health care provider will discuss possible risks and complications with you before surgery. Common risks and complications include:    · Problems due to the use of anesthetics.  · Blood loss and replacement (does not apply to minor surgical procedures).  · Temporary increase in pain due to surgery.  · Uncorrected pain or problems that the surgery was meant to correct.  · Infection.  · New damage.    What happens before the procedure?    Medicines  Ask your health care provider about:  · Changing or stopping your regular medicines. This is especially important if you are taking diabetes medicines or blood thinners.  · Taking medicines such as aspirin and ibuprofen. These medicines can thin your blood. Do not take these medicines unless your health care provider tells you to take them.  · Taking over-the-counter medicines, vitamins, herbs, and supplements. Do not take these during the week before your procedure unless your health care provider approves them.  General  instructions  · Starting 3-6 weeks before the procedure, do not use any products that contain nicotine or tobacco, such as cigarettes and e-cigarettes. If you need help quitting, ask your health care provider.  · If you brush your teeth on the morning of the procedure, make sure to spit out all of the toothpaste.  · Tell your health care provider if you become ill or develop a cold, cough, or fever.  · If instructed by your health care provider, bring your sleep apnea device with you on the day of your surgery (if applicable).  · Ask your health care provider if you will be going home the same day, the following day, or after a longer hospital stay.  ? Plan to have someone take you home from the hospital or clinic.  ? Plan to have a responsible adult care for you for at least 24 hours after you leave the hospital or clinic. This is important.  What happens during the procedure?  · You will be given anesthetics through both of the following:  ? A mask placed over your nose and mouth.  ? An IV in one of your veins.  · You may receive a medicine to help you relax (sedative).  · After you are unconscious, a breathing tube may be inserted down your throat to help you breathe. This will be removed before you wake up.  · An anesthesia specialist will stay with you throughout your procedure. He or she will:  ? Keep you comfortable and safe by continuing to give you medicines and adjusting the amount of medicine that you get.  ? Monitor your blood pressure, pulse, and oxygen levels to make sure that the anesthetics do not cause any problems.  The procedure may vary among health care providers and hospitals.  What happens after the procedure?  · Your blood pressure, temperature, heart rate, breathing rate, and blood oxygen level will be monitored until the medicines you were given have worn off.  · You will wake up in a recovery area. You may wake up slowly.  · If you feel anxious or agitated, you may be given medicine to  help you calm down.  · If you will be going home the same day, your health care provider may check to make sure you can walk, drink, and urinate.  · Your health care provider will treat any pain or side effects you have before you go home.  · Do not drive for 24 hours if you were given a sedative.  Summary  · General anesthesia is used to keep you still and prevent pain during a procedure.  · It is important to tell your healthcare provider about your medical history and any surgeries you have had, and previous experience with anesthesia.  · Follow your healthcare provider’s instructions about when to stop eating, drinking, or taking certain medicines before your procedure.  · Plan to have someone take you home from the hospital or clinic.  This information is not intended to replace advice given to you by your health care provider. Make sure you discuss any questions you have with your health care provider.  Document Released: 03/26/2009 Document Revised: 08/03/2018 Document Reviewed: 08/03/2018  [employer] Interactive Patient Education © 2019 [employer]       Fall Prevention in Hospitals, Adult  As a hospital patient, your condition and the treatments you receive can increase your risk for falls. Some additional risk factors for falls in a hospital include:  · Being in an unfamiliar environment.  · Being on bed rest.  · Your surgery.  · Taking certain medicines.  · Your tubing requirements, such as intravenous (IV) therapy or catheters.  It is important that you learn how to decrease fall risks while at the hospital. Below are important tips that can help prevent falls.  SAFETY TIPS FOR PREVENTING FALLS  Talk about your risk of falling.  · Ask your health care provider why you are at risk for falling. Is it your medicine, illness, tubing placement, or something else?  · Make a plan with your health care provider to keep you safe from falls.  · Ask your health care provider or pharmacist about side effects of your  medicines. Some medicines can make you dizzy or affect your coordination.  Ask for help.  · Ask for help before getting out of bed. You may need to press your call button.  · Ask for assistance in getting safely to the toilet.  · Ask for a walker or cane to be put at your bedside. Ask that most of the side rails on your bed be placed up before your health care provider leaves the room.  · Ask family or friends to sit with you.  · Ask for things that are out of your reach, such as your glasses, hearing aids, telephone, bedside table, or call button.  Follow these tips to avoid falling:  · Stay lying or seated, rather than standing, while waiting for help.  · Wear rubber-soled slippers or shoes whenever you walk in the hospital.  · Avoid quick, sudden movements.  ¨ Change positions slowly.  ¨ Sit on the side of your bed before standing.  ¨ Stand up slowly and wait before you start to walk.  · Let your health care provider know if there is a spill on the floor.  · Pay careful attention to the medical equipment, electrical cords, and tubes around you.  · When you need help, use your call button by your bed or in the bathroom. Wait for one of your health care providers to help you.  · If you feel dizzy or unsure of your footing, return to bed and wait for assistance.  · Avoid being distracted by the TV, telephone, or another person in your room.  · Do not lean or support yourself on rolling objects, such as IV poles or bedside tables.     This information is not intended to replace advice given to you by your health care provider. Make sure you discuss any questions you have with your health care provider.     Document Released: 12/15/2001 Document Revised: 01/08/2016 Document Reviewed: 08/25/2013  [employer] Interactive Patient Education ©2016 [employer]       Surgical Site Infections FAQs  What is a Surgical Site Infection (SSI)?  A surgical site infection is an infection that occurs after surgery in the part of the  body where the surgery took place. Most patients who have surgery do not develop an infection. However, infections develop in about 1 to 3 out of every 100 patients who have surgery.  Some of the common symptoms of a surgical site infection are:  · Redness and pain around the area where you had surgery  · Drainage of cloudy fluid from your surgical wound  · Fever  Can SSIs be treated?  Yes. Most surgical site infections can be treated with antibiotics. The antibiotic given to you depends on the bacteria (germs) causing the infection. Sometimes patients with SSIs also need another surgery to treat the infection.  What are some of the things that hospitals are doing to prevent SSIs?  To prevent SSIs, doctors, nurses, and other healthcare providers:  · Clean their hands and arms up to their elbows with an antiseptic agent just before the surgery.  · Clean their hands with soap and water or an alcohol-based hand rub before and after caring for each patient.  · May remove some of your hair immediately before your surgery using electric clippers if the hair is in the same area where the procedure will occur. They should not shave you with a razor.  · Wear special hair covers, masks, gowns, and gloves during surgery to keep the surgery area clean.  · Give you antibiotics before your surgery starts. In most cases, you should get antibiotics within 60 minutes before the surgery starts and the antibiotics should be stopped within 24 hours after surgery.  · Clean the skin at the site of your surgery with a special soap that kills germs.  What can I do to help prevent SSIs?  Before your surgery:  · Tell your doctor about other medical problems you may have. Health problems such as allergies, diabetes, and obesity could affect your surgery and your treatment.  · Quit smoking. Patients who smoke get more infections. Talk to your doctor about how you can quit before your surgery.  · Do not shave near where you will have surgery.  Shaving with a razor can irritate your skin and make it easier to develop an infection.  At the time of your surgery:  · Speak up if someone tries to shave you with a razor before surgery. Ask why you need to be shaved and talk with your surgeon if you have any concerns.  · Ask if you will get antibiotics before surgery.  After your surgery:  · Make sure that your healthcare providers clean their hands before examining you, either with soap and water or an alcohol-based hand rub.  · If you do not see your providers clean their hands, please ask them to do so.  · Family and friends who visit you should not touch the surgical wound or dressings.  · Family and friends should clean their hands with soap and water or an alcohol-based hand rub before and after visiting you. If you do not see them clean their hands, ask them to clean their hands.  What do I need to do when I go home from the hospital?  · Before you go home, your doctor or nurse should explain everything you need to know about taking care of your wound. Make sure you understand how to care for your wound before you leave the hospital.  · Always clean your hands before and after caring for your wound.  · Before you go home, make sure you know who to contact if you have questions or problems after you get home.  · If you have any symptoms of an infection, such as redness and pain at the surgery site, drainage, or fever, call your doctor immediately.  If you have additional questions, please ask your doctor or nurse.  Developed and co-sponsored by The Society for Healthcare Epidemiology of [place] (SHEA); Infectious Diseases Society of [place] (IDSA); American Hospital Association; Association for Professionals in Infection Control and Epidemiology (APIC); Centers for Disease Control and Prevention (CDC); and The Joint Commission.     This information is not intended to replace advice given to you by your health care provider. Make sure you discuss any  questions you have with your health care provider.     Document Released: 12/23/2014 Document Revised: 01/08/2016 Document Reviewed: 03/02/2016  [employer] Interactive Patient Education ©2016 [employer]           [facility]  CHG 4% Patient Instruction Sheet    Chlorhexidine Before Surgery  Chlorhexidine gluconate (CHG) is a germ-killing (antiseptic) solution that is used to clean the skin. It gets rid of the bacteria that normally live on the skin. Cleaning your skin with CHG before surgery helps lower the risk for infection after surgery.    How to use CHG solution  · You will take 2 showers, one shower the night before surgery, the second shower the morning of surgery before coming to the hospital.  · Use CHG only as told by your health care provider, and follow the instructions on the label.  · Use CHG solution while taking a shower. Follow these steps when using CHG solution (unless your health care provider gives you different instructions):  1. Start the shower.  2. Use your normal soap and shampoo to wash your face and hair.  3. Turn off the shower or move out of the shower stream.  4. Pour the CHG onto a clean washcloth. Do not use any type of brush or rough-edged sponge.  5. Starting at your neck, lather your body down to your toes. Make sure you:  6. Pay special attention to the part of your body where you will be having surgery. Scrub this area for at least 1 minute.  7. Use the full amount of CHG as directed. Usually, this is one half bottle for each shower.  8. Do not use CHG on your head or face. If the solution gets into your ears or eyes, rinse them well with water.  9. Avoid your genital area.  10. Avoid any areas of skin that have broken skin, cuts, or scrapes.  11. Scrub your back and under your arms. Make sure to wash skin folds.  12. Let the lather sit on your skin for 1-2 minutes or as long as told by your health care  provider.  13. Thoroughly rinse your entire body in the shower.  Make sure that all body creases and crevices are rinsed well.  14. Dry off with a clean towel. Do not put any substances on your body afterward, such as powder, lotion, or perfume.  15. Put on clean clothes or pajamas.  16. If it is the night before your surgery, sleep in clean sheets.    What are the risks?  Risks of using CHG include:  · A skin reaction.  · Hearing loss, if CHG gets in your ears.  · Eye injury, if CHG gets in your eyes and is not rinsed out.  · The CHG product catching fire.  Make sure that you avoid smoking and flames after applying CHG to your skin.  Do not use CHG:  · If you have a chlorhexidine allergy or have previously reacted to chlorhexidine.  · On babies younger than 2 months of age.      On the day of surgery, when you are taken to your room in Outpatient Surgery you will be given a CHG prepackaged cloth to wipe the site for your surgery.  How to use CHG prepackaged cloths  · Follow the instructions on the label.  · Use the CHG cloth on clean, dry skin. Follow these steps when using a CHG cloth (unless your health care provider gives you different instructions):  1. Using the CHG cloth, vigorously scrub the part of your body where you will be having surgery. Scrub using a back-and-forth motion for 3 minutes. The area on your body should be completely wet with CHG when you are finished scrubbing.  2. Do not rinse. Discard the cloth and let the area air-dry for 1 minute. Do not put any substances on your body afterward, such as powder, lotion, or perfume.  Contact a health care provider if:  · Your skin gets irritated after scrubbing.  · You have questions about using your solution or cloth.  Get help right away if:  · Your eyes become very red or swollen.  · Your eyes itch badly.  · Your skin itches badly and is red or swollen.  · Your hearing changes.  · You have trouble seeing.  · You have swelling or tingling in your mouth or throat.  · You have trouble breathing.  · You swallow any  chlorhexidine.  Summary  · Chlorhexidine gluconate (CHG) is a germ-killing (antiseptic) solution that is used to clean the skin. Cleaning your skin with CHG before surgery helps lower the risk for infection after surgery.  · You may be given CHG to use at home. It may be in a bottle or in a prepackaged cloth to use on your skin. Carefully follow your health care provider's instructions and the instructions on the product label.  · Do not use CHG if you have a chlorhexidine allergy.  · Contact your health care provider if your skin gets irritated after scrubbing.  This information is not intended to replace advice given to you by your health care provider. Make sure you discuss any questions you have with your health care provider.  Document Released: 09/11/2013 Document Revised: 11/15/2018 Document Reviewed: 11/15/2018  [employer] Interactive Patient Education © 2019 [employer]          PATIENT/FAMILY/RESPONSIBLE PARTY VERBALIZES UNDERSTANDING OF ABOVE EDUCATION.  COPY OF PAIN SCALE GIVEN AND REVIEWED WITH VERBALIZED UNDERSTANDING.

## 2020-01-10 ENCOUNTER — HOSPITAL ENCOUNTER (OUTPATIENT)
Dept: ULTRASOUND IMAGING | Facility: HOSPITAL | Age: 60
Discharge: HOME | End: 2020-01-10

## 2020-01-10 ENCOUNTER — HOSPITAL ENCOUNTER (OUTPATIENT)
Dept: MAMMOGRAPHY | Facility: HOSPITAL | Age: 60
Discharge: HOME | End: 2020-01-10

## 2020-01-10 ENCOUNTER — HOSPITAL ENCOUNTER (OUTPATIENT)
Dept: NUCLEAR MEDICINE | Facility: HOSPITAL | Age: 60
Discharge: HOME | End: 2020-01-10

## 2020-01-10 ENCOUNTER — HOSPITAL ENCOUNTER (OUTPATIENT)
Facility: HOSPITAL | Age: 60
Setting detail: HOSPITAL OUTPATIENT SURGERY
Discharge: HOME | End: 2020-01-10
Attending: SPECIALIST | Admitting: SPECIALIST

## 2020-01-10 ENCOUNTER — ANESTHESIA (OUTPATIENT)
Dept: PERIOP | Facility: HOSPITAL | Age: 60
End: 2020-01-10

## 2020-01-10 ENCOUNTER — ANESTHESIA EVENT (OUTPATIENT)
Dept: PERIOP | Facility: HOSPITAL | Age: 60
End: 2020-01-10

## 2020-01-10 VITALS
DIASTOLIC BLOOD PRESSURE: 62 MMHG | SYSTOLIC BLOOD PRESSURE: 106 MMHG | OXYGEN SATURATION: 95 % | RESPIRATION RATE: 16 BRPM | HEART RATE: 66 BPM | TEMPERATURE: 99 F

## 2020-01-10 DIAGNOSIS — D05.92 CARCINOMA IN SITU OF LEFT BREAST: ICD-10-CM

## 2020-01-10 DIAGNOSIS — D05.92 CARCINOMA IN SITU OF LEFT BREAST, UNSPECIFIED TYPE: ICD-10-CM

## 2020-01-10 DIAGNOSIS — Z78.0 MENOPAUSE: ICD-10-CM

## 2020-01-10 DIAGNOSIS — C50.412 MALIGNANT NEOPLASM OF UPPER-OUTER QUADRANT OF LEFT FEMALE BREAST, UNSPECIFIED ESTROGEN RECEPTOR STATUS (HCC): Primary | ICD-10-CM

## 2020-01-10 PROCEDURE — 88342 IMHCHEM/IMCYTCHM 1ST ANTB: CPT | Performed by: SPECIALIST

## 2020-01-10 PROCEDURE — 0 TECHNETIUM FILTERED SULFUR COLLOID: Performed by: SPECIALIST

## 2020-01-10 PROCEDURE — 25010000002 ONDANSETRON PER 1 MG: Performed by: ANESTHESIOLOGY

## 2020-01-10 PROCEDURE — 25010000002 HYDROMORPHONE PER 4 MG: Performed by: ANESTHESIOLOGY

## 2020-01-10 PROCEDURE — A9541 TC99M SULFUR COLLOID: HCPCS | Performed by: SPECIALIST

## 2020-01-10 PROCEDURE — 25010000002 DEXAMETHASONE PER 1 MG: Performed by: ANESTHESIOLOGY

## 2020-01-10 PROCEDURE — 25010000002 MIDAZOLAM PER 1 MG: Performed by: NURSE ANESTHETIST, CERTIFIED REGISTERED

## 2020-01-10 PROCEDURE — 25010000002 MIDAZOLAM PER 1 MG: Performed by: ANESTHESIOLOGY

## 2020-01-10 PROCEDURE — 25010000002 PROPOFOL 10 MG/ML EMULSION: Performed by: NURSE ANESTHETIST, CERTIFIED REGISTERED

## 2020-01-10 PROCEDURE — 88307 TISSUE EXAM BY PATHOLOGIST: CPT | Performed by: SPECIALIST

## 2020-01-10 PROCEDURE — 25010000002 ONDANSETRON PER 1 MG: Performed by: NURSE ANESTHETIST, CERTIFIED REGISTERED

## 2020-01-10 PROCEDURE — 25010000002 FENTANYL CITRATE (PF) 250 MCG/5ML SOLUTION: Performed by: NURSE ANESTHETIST, CERTIFIED REGISTERED

## 2020-01-10 PROCEDURE — 88341 IMHCHEM/IMCYTCHM EA ADD ANTB: CPT | Performed by: SPECIALIST

## 2020-01-10 PROCEDURE — 78195 LYMPH SYSTEM IMAGING: CPT

## 2020-01-10 PROCEDURE — 76098 X-RAY EXAM SURGICAL SPECIMEN: CPT

## 2020-01-10 PROCEDURE — 25010000002 DEXAMETHASONE PER 1 MG: Performed by: NURSE ANESTHETIST, CERTIFIED REGISTERED

## 2020-01-10 RX ORDER — LIDOCAINE AND PRILOCAINE 25; 25 MG/G; MG/G
CREAM TOPICAL ONCE
Status: DISCONTINUED | OUTPATIENT
Start: 2020-01-10 | End: 2020-01-10

## 2020-01-10 RX ORDER — IPRATROPIUM BROMIDE AND ALBUTEROL SULFATE 2.5; .5 MG/3ML; MG/3ML
3 SOLUTION RESPIRATORY (INHALATION) ONCE AS NEEDED
Status: DISCONTINUED | OUTPATIENT
Start: 2020-01-10 | End: 2020-01-10 | Stop reason: HOSPADM

## 2020-01-10 RX ORDER — HYDROMORPHONE HYDROCHLORIDE 1 MG/ML
0.25 INJECTION, SOLUTION INTRAMUSCULAR; INTRAVENOUS; SUBCUTANEOUS
Status: DISCONTINUED | OUTPATIENT
Start: 2020-01-10 | End: 2020-01-10 | Stop reason: HOSPADM

## 2020-01-10 RX ORDER — DEXAMETHASONE SODIUM PHOSPHATE 4 MG/ML
INJECTION, SOLUTION INTRA-ARTICULAR; INTRALESIONAL; INTRAMUSCULAR; INTRAVENOUS; SOFT TISSUE AS NEEDED
Status: DISCONTINUED | OUTPATIENT
Start: 2020-01-10 | End: 2020-01-10 | Stop reason: SURG

## 2020-01-10 RX ORDER — SODIUM CHLORIDE, SODIUM LACTATE, POTASSIUM CHLORIDE, CALCIUM CHLORIDE 600; 310; 30; 20 MG/100ML; MG/100ML; MG/100ML; MG/100ML
100 INJECTION, SOLUTION INTRAVENOUS CONTINUOUS
Status: DISCONTINUED | OUTPATIENT
Start: 2020-01-10 | End: 2020-01-10 | Stop reason: HOSPADM

## 2020-01-10 RX ORDER — SODIUM CHLORIDE 0.9 % (FLUSH) 0.9 %
3 SYRINGE (ML) INJECTION AS NEEDED
Status: DISCONTINUED | OUTPATIENT
Start: 2020-01-10 | End: 2020-01-10 | Stop reason: HOSPADM

## 2020-01-10 RX ORDER — MAGNESIUM HYDROXIDE 1200 MG/15ML
LIQUID ORAL AS NEEDED
Status: DISCONTINUED | OUTPATIENT
Start: 2020-01-10 | End: 2020-01-10 | Stop reason: HOSPADM

## 2020-01-10 RX ORDER — PROPOFOL 10 MG/ML
VIAL (ML) INTRAVENOUS AS NEEDED
Status: DISCONTINUED | OUTPATIENT
Start: 2020-01-10 | End: 2020-01-10 | Stop reason: SURG

## 2020-01-10 RX ORDER — LIDOCAINE AND PRILOCAINE 25; 25 MG/G; MG/G
CREAM TOPICAL ONCE
Status: COMPLETED | OUTPATIENT
Start: 2020-01-10 | End: 2020-01-10

## 2020-01-10 RX ORDER — DEXAMETHASONE SODIUM PHOSPHATE 4 MG/ML
4 INJECTION, SOLUTION INTRA-ARTICULAR; INTRALESIONAL; INTRAMUSCULAR; INTRAVENOUS; SOFT TISSUE ONCE AS NEEDED
Status: COMPLETED | OUTPATIENT
Start: 2020-01-10 | End: 2020-01-10

## 2020-01-10 RX ORDER — ONDANSETRON 2 MG/ML
INJECTION INTRAMUSCULAR; INTRAVENOUS AS NEEDED
Status: DISCONTINUED | OUTPATIENT
Start: 2020-01-10 | End: 2020-01-10 | Stop reason: SURG

## 2020-01-10 RX ORDER — OXYCODONE HYDROCHLORIDE AND ACETAMINOPHEN 5; 325 MG/1; MG/1
1 TABLET ORAL ONCE AS NEEDED
Status: DISCONTINUED | OUTPATIENT
Start: 2020-01-10 | End: 2020-01-10 | Stop reason: HOSPADM

## 2020-01-10 RX ORDER — OXYCODONE AND ACETAMINOPHEN 7.5; 325 MG/1; MG/1
2 TABLET ORAL EVERY 4 HOURS PRN
Status: DISCONTINUED | OUTPATIENT
Start: 2020-01-10 | End: 2020-01-10 | Stop reason: HOSPADM

## 2020-01-10 RX ORDER — LABETALOL HYDROCHLORIDE 5 MG/ML
5 INJECTION, SOLUTION INTRAVENOUS
Status: DISCONTINUED | OUTPATIENT
Start: 2020-01-10 | End: 2020-01-10 | Stop reason: HOSPADM

## 2020-01-10 RX ORDER — FENTANYL CITRATE 50 UG/ML
INJECTION, SOLUTION INTRAMUSCULAR; INTRAVENOUS AS NEEDED
Status: DISCONTINUED | OUTPATIENT
Start: 2020-01-10 | End: 2020-01-10 | Stop reason: SURG

## 2020-01-10 RX ORDER — SCOLOPAMINE TRANSDERMAL SYSTEM 1 MG/1
1 PATCH, EXTENDED RELEASE TRANSDERMAL CONTINUOUS
Status: DISCONTINUED | OUTPATIENT
Start: 2020-01-10 | End: 2020-01-10 | Stop reason: HOSPADM

## 2020-01-10 RX ORDER — ONDANSETRON 2 MG/ML
4 INJECTION INTRAMUSCULAR; INTRAVENOUS ONCE AS NEEDED
Status: COMPLETED | OUTPATIENT
Start: 2020-01-10 | End: 2020-01-10

## 2020-01-10 RX ORDER — OXYCODONE AND ACETAMINOPHEN 10; 325 MG/1; MG/1
1 TABLET ORAL ONCE AS NEEDED
Status: COMPLETED | OUTPATIENT
Start: 2020-01-10 | End: 2020-01-10

## 2020-01-10 RX ORDER — MIDAZOLAM HYDROCHLORIDE 1 MG/ML
2 INJECTION INTRAMUSCULAR; INTRAVENOUS
Status: DISCONTINUED | OUTPATIENT
Start: 2020-01-10 | End: 2020-01-10 | Stop reason: HOSPADM

## 2020-01-10 RX ORDER — METOCLOPRAMIDE HYDROCHLORIDE 5 MG/ML
5 INJECTION INTRAMUSCULAR; INTRAVENOUS
Status: DISCONTINUED | OUTPATIENT
Start: 2020-01-10 | End: 2020-01-10 | Stop reason: HOSPADM

## 2020-01-10 RX ORDER — OXYCODONE HYDROCHLORIDE AND ACETAMINOPHEN 5; 325 MG/1; MG/1
1-2 TABLET ORAL EVERY 4 HOURS PRN
Qty: 30 TABLET | Refills: 0 | Status: SHIPPED | OUTPATIENT
Start: 2020-01-10 | End: 2020-02-03 | Stop reason: ALTCHOICE

## 2020-01-10 RX ORDER — SODIUM CHLORIDE 0.9 % (FLUSH) 0.9 %
3-10 SYRINGE (ML) INJECTION AS NEEDED
Status: DISCONTINUED | OUTPATIENT
Start: 2020-01-10 | End: 2020-01-10 | Stop reason: HOSPADM

## 2020-01-10 RX ORDER — MIDAZOLAM HYDROCHLORIDE 1 MG/ML
INJECTION INTRAMUSCULAR; INTRAVENOUS AS NEEDED
Status: DISCONTINUED | OUTPATIENT
Start: 2020-01-10 | End: 2020-01-10 | Stop reason: SURG

## 2020-01-10 RX ORDER — ACETAMINOPHEN 500 MG
1000 TABLET ORAL ONCE
Status: COMPLETED | OUTPATIENT
Start: 2020-01-10 | End: 2020-01-10

## 2020-01-10 RX ORDER — SODIUM CHLORIDE 0.9 % (FLUSH) 0.9 %
3 SYRINGE (ML) INJECTION EVERY 12 HOURS SCHEDULED
Status: DISCONTINUED | OUTPATIENT
Start: 2020-01-10 | End: 2020-01-10 | Stop reason: HOSPADM

## 2020-01-10 RX ORDER — MIDAZOLAM HYDROCHLORIDE 1 MG/ML
1 INJECTION INTRAMUSCULAR; INTRAVENOUS
Status: DISCONTINUED | OUTPATIENT
Start: 2020-01-10 | End: 2020-01-10 | Stop reason: HOSPADM

## 2020-01-10 RX ORDER — NALOXONE HCL 0.4 MG/ML
0.4 VIAL (ML) INJECTION AS NEEDED
Status: DISCONTINUED | OUTPATIENT
Start: 2020-01-10 | End: 2020-01-10 | Stop reason: HOSPADM

## 2020-01-10 RX ORDER — IBUPROFEN 600 MG/1
600 TABLET ORAL ONCE AS NEEDED
Status: COMPLETED | OUTPATIENT
Start: 2020-01-10 | End: 2020-01-10

## 2020-01-10 RX ORDER — SODIUM CHLORIDE, SODIUM LACTATE, POTASSIUM CHLORIDE, CALCIUM CHLORIDE 600; 310; 30; 20 MG/100ML; MG/100ML; MG/100ML; MG/100ML
1000 INJECTION, SOLUTION INTRAVENOUS CONTINUOUS
Status: DISCONTINUED | OUTPATIENT
Start: 2020-01-10 | End: 2020-01-10 | Stop reason: HOSPADM

## 2020-01-10 RX ORDER — LIDOCAINE AND PRILOCAINE 25; 25 MG/G; MG/G
CREAM TOPICAL ONCE
Status: DISCONTINUED | OUTPATIENT
Start: 2020-01-10 | End: 2020-01-11 | Stop reason: HOSPADM

## 2020-01-10 RX ADMIN — MIDAZOLAM 2 MG: 1 INJECTION INTRAMUSCULAR; INTRAVENOUS at 10:55

## 2020-01-10 RX ADMIN — CEFAZOLIN 1 G: 1 INJECTION, POWDER, FOR SOLUTION INTRAMUSCULAR; INTRAVENOUS; PARENTERAL at 11:19

## 2020-01-10 RX ADMIN — MIDAZOLAM 2 MG: 1 INJECTION INTRAMUSCULAR; INTRAVENOUS at 10:31

## 2020-01-10 RX ADMIN — IBUPROFEN 600 MG: 600 TABLET ORAL at 14:39

## 2020-01-10 RX ADMIN — FENTANYL CITRATE 50 MCG: 50 INJECTION INTRAMUSCULAR; INTRAVENOUS at 11:36

## 2020-01-10 RX ADMIN — OXYCODONE HYDROCHLORIDE AND ACETAMINOPHEN 1 TABLET: 10; 325 TABLET ORAL at 13:25

## 2020-01-10 RX ADMIN — FENTANYL CITRATE 50 MCG: 50 INJECTION INTRAMUSCULAR; INTRAVENOUS at 11:17

## 2020-01-10 RX ADMIN — DEXAMETHASONE SODIUM PHOSPHATE 4 MG: 4 INJECTION, SOLUTION INTRAMUSCULAR; INTRAVENOUS at 12:01

## 2020-01-10 RX ADMIN — MIDAZOLAM HYDROCHLORIDE 5 MG: 1 INJECTION, SOLUTION INTRAMUSCULAR; INTRAVENOUS at 11:16

## 2020-01-10 RX ADMIN — ONDANSETRON HYDROCHLORIDE 4 MG: 2 SOLUTION INTRAMUSCULAR; INTRAVENOUS at 13:02

## 2020-01-10 RX ADMIN — FENTANYL CITRATE 50 MCG: 50 INJECTION INTRAMUSCULAR; INTRAVENOUS at 11:16

## 2020-01-10 RX ADMIN — TECHNETIUM TC 99M SULFUR COLLOID 1 DOSE: KIT at 09:15

## 2020-01-10 RX ADMIN — PROPOFOL 200 MG: 10 INJECTION, EMULSION INTRAVENOUS at 11:16

## 2020-01-10 RX ADMIN — HYDROMORPHONE HYDROCHLORIDE 0.5 MG: 1 INJECTION, SOLUTION INTRAMUSCULAR; INTRAVENOUS; SUBCUTANEOUS at 13:02

## 2020-01-10 RX ADMIN — ONDANSETRON HYDROCHLORIDE 8 MG: 2 SOLUTION INTRAMUSCULAR; INTRAVENOUS at 12:01

## 2020-01-10 RX ADMIN — SODIUM CHLORIDE, POTASSIUM CHLORIDE, SODIUM LACTATE AND CALCIUM CHLORIDE 1000 ML: 600; 310; 30; 20 INJECTION, SOLUTION INTRAVENOUS at 07:33

## 2020-01-10 RX ADMIN — VASOPRESSIN 1 ML: 20 INJECTION INTRAVENOUS at 11:24

## 2020-01-10 RX ADMIN — ACETAMINOPHEN 1000 MG: 500 TABLET, FILM COATED ORAL at 10:31

## 2020-01-10 RX ADMIN — SCOLOPAMINE TRANSDERMAL SYSTEM 1 PATCH: 1 PATCH, EXTENDED RELEASE TRANSDERMAL at 10:31

## 2020-01-10 RX ADMIN — FENTANYL CITRATE 50 MCG: 50 INJECTION INTRAMUSCULAR; INTRAVENOUS at 11:44

## 2020-01-10 RX ADMIN — DEXAMETHASONE SODIUM PHOSPHATE 4 MG: 4 INJECTION, SOLUTION INTRAMUSCULAR; INTRAVENOUS at 10:31

## 2020-01-10 RX ADMIN — FENTANYL CITRATE 50 MCG: 50 INJECTION INTRAMUSCULAR; INTRAVENOUS at 11:41

## 2020-01-10 RX ADMIN — LIDOCAINE HYDROCHLORIDE 100 MG: 20 INJECTION, SOLUTION INTRAVENOUS at 11:16

## 2020-01-10 RX ADMIN — VASOPRESSIN 1 ML: 20 INJECTION INTRAVENOUS at 11:23

## 2020-01-10 RX ADMIN — LIDOCAINE AND PRILOCAINE: 25; 25 CREAM TOPICAL at 07:33

## 2020-01-10 NOTE — ANESTHESIA POSTPROCEDURE EVALUATION
Patient: Chasidy Tejada    Procedure Summary     Date:  01/10/20 Room / Location:  [facility]  /  [facility] OR    Anesthesia Start:  1108 Anesthesia Stop:  1213    Procedure:  LEFT NEEDLE DIRECTED ULTRASOUND GUIDED PARTIAL MASTECTOMY WITH SENTINEL LYMPH NODE BIOPSY, INJECTION AND SCAN, RADIOLOGIST WILL INJECT (Left Breast) Diagnosis:  (BREAST CANCER)    Surgeon:  Flor Edmond MD Provider:  Lynda Morales CRNA    Anesthesia Type:  general ASA Status:  2          Anesthesia Type: general    Vitals  Vitals Value Taken Time   /61 1/10/2020  1:35 PM   Temp 99 °F (37.2 °C) 1/10/2020  1:35 PM   Pulse 71 1/10/2020  1:38 PM   Resp 16 1/10/2020  1:35 PM   SpO2 95 % 1/10/2020  1:38 PM   Vitals shown include unvalidated device data.        Post Anesthesia Care and Evaluation    Patient location during evaluation: PACU  Patient participation: complete - patient participated  Level of consciousness: awake  Pain management: adequate  Airway patency: patent  Anesthetic complications: No anesthetic complications  PONV Status: none  Cardiovascular status: acceptable  Respiratory status: acceptable  Hydration status: acceptable

## 2020-01-10 NOTE — ANESTHESIA PREPROCEDURE EVALUATION
Anesthesia Evaluation     Patient summary reviewed   no history of anesthetic complications:  NPO Solid Status: > 8 hours  NPO Liquid Status: > 8 hours           Airway   Mallampati: I  TM distance: >3 FB  Neck ROM: full  Dental          Pulmonary - normal exam    breath sounds clear to auscultation  (+) asthma,  (-) recent URI, sleep apnea, not a smoker  Cardiovascular - normal exam  Exercise tolerance: good (4-7 METS)    ECG reviewed  Rhythm: regular  Rate: normal    (-) pacemaker, hypertension, past MI, angina, cardiac stents, CABG      Neuro/Psych  (+) psychiatric history Anxiety,     (-) seizures, TIA, CVA  GI/Hepatic/Renal/Endo    (+) obesity,  GERD,    (-) liver disease, no renal disease, diabetes, no thyroid disorder    Musculoskeletal     Abdominal    Substance History      OB/GYN          Other                      Anesthesia Plan    ASA 2     general     intravenous induction     Anesthetic plan, all risks, benefits, and alternatives have been provided, discussed and informed consent has been obtained with: patient.

## 2020-01-10 NOTE — ANESTHESIA PROCEDURE NOTES
Airway  Urgency: elective    Date/Time: 1/10/2020 11:17 AM    General Information and Staff    Patient location during procedure: OR  CRNA: Lynda Morales CRNA    Indications and Patient Condition  Indications for airway management: airway protection    Preoxygenated: yes  MILS maintained throughout  Mask difficulty assessment: 1 - vent by mask    Final Airway Details  Final airway type: supraglottic airway      Successful airway: unique  Size 4    Number of attempts at approach: 1  Assessment: lips, teeth, and gum same as pre-op and atraumatic intubation

## 2020-01-10 NOTE — OP NOTE
Preoperative diagnosis:  Left invasive mammary carcinoma  Postoperative diagnosis:  Same  Procedure;  Left needle-directed partial mastectomy with sentinel lymph node biopsy  Surgeon:  Flor Edmond MD  Anesthesia;  Get loc  Ebl:  Minimal  Ivf:  See anesthesia  Indications: the patient is a 59-year-old female who presents for lumpectomy and lymph node sampling. The risks, benefits, complications, and possible alternatives were discussed with the patient who agreed to proceed.  Description of procedure; the patient was laid supine. The left chest was prepped and draped.  Under guidance of the Neoprobe, an incision was create at the left axilla.  bovie was used to enter the subcutaneous tissue.  neoprobe located the sentinel lymph node.  bovie was used to dissect and excise it.  No other elevated counts were identified.  The localization wire entered the upper outer quadrant.  An incision was created.  bovie was used to create skin flaps.  The breast tissue was very fatty.  A firm round <1cm nodule was identified at the tip of the wire.  Specimen mammogram was confirmed to include the wire, clip, and specimen.  The wounds were copiously irrigated.  The skin was closed with 3-0 and 4-0 vicryl. The sponge, needle, and instrument counts were correct.  Complications:None  Disposition good to pacu  Findings:  Specimen mammogram was confirmed by the radiologist

## 2020-01-10 NOTE — DISCHARGE INSTRUCTIONS

## 2020-01-10 NOTE — H&P
Flor Edmond MD [facility] History and Physical     Referring Provider: Flor Edmond MD    Patient Care Team:  Jones Elias DO as PCP - General (Internal Medicine)    Chief complaint left breast cancer    Subjective .     History of present illness:  The patient is a 59 y.o. female who has been found to have a 6 x 5 x 8 mm nodule in the left breast.  Ultrasound-guided biopsy demonstrated invasive mammary carcinoma, grade 2.  She presents for definitive surgical treatment.    Review of Systems    Review of Systems - General ROS: negative  ENT ROS: negative  Respiratory ROS: no cough, shortness of breath, or wheezing  Cardiovascular ROS: no chest pain or dyspnea on exertion  Gastrointestinal ROS: no abdominal pain, change in bowel habits, or black or bloody stools  Genito-Urinary ROS: no dysuria, trouble voiding, or hematuria  Dermatological ROS: negative   Breast ROS: negative for breast lumps  Hematological and Lymphatic ROS: negative  Musculoskeletal ROS: negative   Neurological ROS: no TIA or stroke symptoms    Psychological ROS: negative  Endocrine ROS: negative    History  Past Medical History:   Diagnosis Date   • Acid reflux    • Asthma     long time ago    • Panic attack    • Right arm weakness    ,   Past Surgical History:   Procedure Laterality Date   • ANTERIOR CERVICAL DISCECTOMY W/ FUSION N/A 9/7/2018    Procedure: CERVICAL DISCECTOMY ANTERIOR WITH FUSION C6-7;  Surgeon: Chris Mcfadden MD;  Location: [facility] OR;  Service: Neurosurgery   • APPENDECTOMY     • BILATERAL SALPINGO OOPHORECTOMY     • CERVICAL CORPECTOMY N/A 9/7/2018    Procedure: CERVICAL CORPECTOMY C6-7;  Surgeon: Chris Mcfadden MD;  Location: [facility] OR;  Service: Neurosurgery   • CHOLECYSTECTOMY     • COLONOSCOPY  09/05/2012   • COLONOSCOPY N/A 2/10/2017    Procedure: COLONOSCOPY WITH ANESTHESIA;  Surgeon: Elina Gonsalez MD;  Location: [facility] ENDOSCOPY;  Service:    • HYSTERECTOMY  1998    Had hysterectomy for painful  periods and bleeding. (Dr. Patel) TLH w/ BSO   • LAPAROSCOPIC TUBAL LIGATION     • TONSILLECTOMY     ,   Family History   Problem Relation Age of Onset   • Colon cancer Maternal Grandmother         age not known   • Cancer Father    • Heart disease Father    • Diabetes Mother    • Hypertension Mother    • Stroke Mother    • Hypertension Sister    • No Known Problems Daughter    • No Known Problems Sister    • Colon polyps Neg Hx    • Esophageal cancer Neg Hx    • Liver cancer Neg Hx    • Liver disease Neg Hx    • Rectal cancer Neg Hx    • Stomach cancer Neg Hx    • Breast cancer Neg Hx    • Ovarian cancer Neg Hx    ,   Social History     Tobacco Use   • Smoking status: Former Smoker     Types: Cigarettes   • Smokeless tobacco: Never Used   Substance Use Topics   • Alcohol use: Yes     Comment: occasional   • Drug use: No   ,   Facility-Administered Medications Prior to Admission   Medication Dose Route Frequency Provider Last Rate Last Dose   • [DISCONTINUED] lidocaine (XYLOCAINE) 1 % injection 5 mL  5 mL Subcutaneous Once Flor Edmond MD       • [DISCONTINUED] lidocaine-EPINEPHrine (XYLOCAINE W/EPI) 1 %-1:842820 injection 5 mL  5 mL Injection Once Flor Edmond MD         Medications Prior to Admission   Medication Sig Dispense Refill Last Dose   • ALPRAZolam (XANAX) 0.5 MG tablet Take 1 tablet by mouth 2 (Two) Times a Day As Needed for Anxiety. 60 tablet 2    • cyclobenzaprine (FLEXERIL) 10 MG tablet Take 1 tablet by mouth 3 (Three) Times a Day As Needed for Muscle Spasms. 90 tablet 0 Taking   • estrogens, conjugated,-methyltestosterone (ESTRATEST HS) 0.625-1.25 MG per tablet Take 1 tablet by mouth Daily. 30 tablet 2    • estrogens, conjugated,-methyltestosterone (ESTRATEST HS) 0.625-1.25 MG per tablet Take 1 tablet by mouth Daily. 90 tablet 3    • Multiple Vitamins-Minerals (MULTIVITAMIN ADULT PO) Take  by mouth Daily.   Taking   • omeprazole (priLOSEC) 40 MG capsule Take 1 capsule by mouth As Needed.    Taking   • sertraline (ZOLOFT) 50 MG tablet Take 1 tablet by mouth Daily. 30 tablet 11     and Allergies:  Demerol [meperidine] and Morphine and related  No current facility-administered medications for this encounter.     Objective     Vital Signs   Heart Rate:  [86] 86  Resp:  [16] 16  BP: (148)/(86) 148/86    Physical Exam:  General appearance - alert, well appearing, and in no distress  Mental status - alert, oriented to person, place, and time  Neck - supple, no significant adenopathy  Chest - clear to auscultation, no wheezes, rales or rhonchi, symmetric air entry  Heart - normal rate, regular rhythm, normal S1, S2, no murmurs, rubs, clicks or gallops  Abdomen - soft, nontender, nondistended, no masses or organomegaly  Neurological - alert, oriented, normal speech, no focal findings or movement disorder noted  Musculoskeletal - no joint tenderness, deformity or swelling  Extremities - peripheral pulses normal, no pedal edema, no clubbing or cyanosis    Results Review:     Lab Results (last 24 hours)     ** No results found for the last 24 hours. **        Imaging Results (Last 24 Hours)     ** No results found for the last 24 hours. **            Assessment/Plan       Left breast invasive mammary carcinoma.  She will undergo left needle-directed partial mastectomy with sentinel lymph node biopsy.  The risks, benefits, complications, and possible alternatives were discussed with the patient who agreed to proceed.      Flor Edmond MD  01/10/20  6:17 AM

## 2020-02-03 ENCOUNTER — CONSULT (OUTPATIENT)
Dept: ONCOLOGY | Facility: CLINIC | Age: 60
End: 2020-02-03

## 2020-02-03 ENCOUNTER — LAB (OUTPATIENT)
Dept: LAB | Facility: HOSPITAL | Age: 60
End: 2020-02-03

## 2020-02-03 VITALS
OXYGEN SATURATION: 99 % | HEIGHT: 61 IN | BODY MASS INDEX: 30.83 KG/M2 | HEART RATE: 90 BPM | SYSTOLIC BLOOD PRESSURE: 130 MMHG | DIASTOLIC BLOOD PRESSURE: 72 MMHG | TEMPERATURE: 97.8 F | RESPIRATION RATE: 16 BRPM | WEIGHT: 163.3 LBS

## 2020-02-03 DIAGNOSIS — C50.912 INVASIVE DUCTAL CARCINOMA OF BREAST, FEMALE, LEFT (HCC): ICD-10-CM

## 2020-02-03 DIAGNOSIS — C50.912 INVASIVE DUCTAL CARCINOMA OF BREAST, FEMALE, LEFT (HCC): Primary | ICD-10-CM

## 2020-02-03 LAB
ALBUMIN SERPL-MCNC: 5 G/DL (ref 3.5–5.2)
ALBUMIN/GLOB SERPL: 1.4 G/DL
ALP SERPL-CCNC: 82 U/L (ref 39–117)
ALT SERPL W P-5'-P-CCNC: 18 U/L (ref 1–33)
ANION GAP SERPL CALCULATED.3IONS-SCNC: 11 MMOL/L (ref 5–15)
AST SERPL-CCNC: 19 U/L (ref 1–32)
BASOPHILS # BLD AUTO: 0.04 10*3/MM3 (ref 0–0.2)
BASOPHILS NFR BLD AUTO: 0.5 % (ref 0–1.5)
BILIRUB SERPL-MCNC: 0.5 MG/DL (ref 0.2–1.2)
BUN BLD-MCNC: 15 MG/DL (ref 6–20)
BUN/CREAT SERPL: 28.3 (ref 7–25)
CALCIUM SPEC-SCNC: 9.8 MG/DL (ref 8.6–10.5)
CHLORIDE SERPL-SCNC: 103 MMOL/L (ref 98–107)
CO2 SERPL-SCNC: 27 MMOL/L (ref 22–29)
CREAT BLD-MCNC: 0.53 MG/DL (ref 0.57–1)
DEPRECATED RDW RBC AUTO: 43.6 FL (ref 37–54)
EOSINOPHIL # BLD AUTO: 0.02 10*3/MM3 (ref 0–0.4)
EOSINOPHIL NFR BLD AUTO: 0.3 % (ref 0.3–6.2)
ERYTHROCYTE [DISTWIDTH] IN BLOOD BY AUTOMATED COUNT: 13 % (ref 12.3–15.4)
GFR SERPL CREATININE-BSD FRML MDRD: 119 ML/MIN/1.73
GLOBULIN UR ELPH-MCNC: 3.5 GM/DL
GLUCOSE BLD-MCNC: 105 MG/DL (ref 65–99)
HCT VFR BLD AUTO: 43 % (ref 34–46.6)
HGB BLD-MCNC: 14.5 G/DL (ref 12–15.9)
IMM GRANULOCYTES # BLD AUTO: 0.01 10*3/MM3 (ref 0–0.05)
IMM GRANULOCYTES NFR BLD AUTO: 0.1 % (ref 0–0.5)
LYMPHOCYTES # BLD AUTO: 2.87 10*3/MM3 (ref 0.7–3.1)
LYMPHOCYTES NFR BLD AUTO: 37.4 % (ref 19.6–45.3)
MCH RBC QN AUTO: 30.7 PG (ref 26.6–33)
MCHC RBC AUTO-ENTMCNC: 33.7 G/DL (ref 31.5–35.7)
MCV RBC AUTO: 91.1 FL (ref 79–97)
MONOCYTES # BLD AUTO: 0.37 10*3/MM3 (ref 0.1–0.9)
MONOCYTES NFR BLD AUTO: 4.8 % (ref 5–12)
NEUTROPHILS # BLD AUTO: 4.36 10*3/MM3 (ref 1.7–7)
NEUTROPHILS NFR BLD AUTO: 56.9 % (ref 42.7–76)
NRBC BLD AUTO-RTO: 0 /100 WBC (ref 0–0.2)
PLATELET # BLD AUTO: 337 10*3/MM3 (ref 140–450)
PMV BLD AUTO: 9.8 FL (ref 6–12)
POTASSIUM BLD-SCNC: 4 MMOL/L (ref 3.5–5.2)
PROT SERPL-MCNC: 8.5 G/DL (ref 6–8.5)
RBC # BLD AUTO: 4.72 10*6/MM3 (ref 3.77–5.28)
SODIUM BLD-SCNC: 141 MMOL/L (ref 136–145)
WBC NRBC COR # BLD: 7.67 10*3/MM3 (ref 3.4–10.8)

## 2020-02-03 PROCEDURE — 36415 COLL VENOUS BLD VENIPUNCTURE: CPT

## 2020-02-03 PROCEDURE — 80053 COMPREHEN METABOLIC PANEL: CPT

## 2020-02-03 PROCEDURE — 99205 OFFICE O/P NEW HI 60 MIN: CPT | Performed by: INTERNAL MEDICINE

## 2020-02-03 PROCEDURE — 85025 COMPLETE CBC W/AUTO DIFF WBC: CPT

## 2020-02-03 RX ORDER — ONDANSETRON HYDROCHLORIDE 8 MG/1
8 TABLET, FILM COATED ORAL EVERY 8 HOURS PRN
Qty: 30 TABLET | Refills: 0 | Status: SHIPPED | OUTPATIENT
Start: 2020-02-03 | End: 2021-02-26

## 2020-02-03 RX ORDER — ANASTROZOLE 1 MG/1
1 TABLET ORAL DAILY
Qty: 30 TABLET | Refills: 2 | Status: ON HOLD | OUTPATIENT
Start: 2020-02-03 | End: 2020-03-05

## 2020-02-04 ENCOUNTER — TELEPHONE (OUTPATIENT)
Dept: ONCOLOGY | Facility: CLINIC | Age: 60
End: 2020-02-04

## 2020-02-04 PROBLEM — Z98.890 S/P LYMPH NODE BIOPSY: Status: ACTIVE | Noted: 2020-02-04

## 2020-02-04 PROBLEM — Z98.890 S/P LUMPECTOMY, LEFT BREAST: Status: ACTIVE | Noted: 2020-02-04

## 2020-02-04 PROBLEM — Z87.891 FORMER SMOKER: Status: ACTIVE | Noted: 2020-02-04

## 2020-02-04 PROBLEM — Z71.9 ENCOUNTER FOR CONSULTATION: Status: ACTIVE | Noted: 2020-02-04

## 2020-02-04 NOTE — PROGRESS NOTES
[facility]  MD Myriam Guzman BSN, PA-C  ___________________________________________________________  [facility]  Department of Radiation Oncology  [address] [place] [id number]  Office: [phone]  Fax: [phone]    DATE:  02/05/2020    PATIENT:  Chasidy Tejada 1960                                 MEDICAL RECORD # [id number]                                                  REASON FOR CONSULTATION: Chasidy Tejada is a very pleasant 59 y.o. female that has been referred to our clinic by Flor Edmond MD to discuss radiotherapy recommendations.     History of Present Illness:  Diagnosed in January 2020 with Stage IA (pT1a, pN0, G2) Invasive Ductal Carcinoma, 5 mm with associated low-grade DCIS. % positive, FL 25% positive, HER-2/lamberto-2+ (IHC)/FISH- positive (signal ratio: 2.9). Underwent lumpectomy and SNB, 01/10/2020. Pathology confirmed the single focus of invasive carcinoma.  No EIC nor LCIS.  Lymphovascular invasion/ Microcalcifications: Not identified.  Margins uninvolved by invasive carcinoma.  Lymph nodes: Number of lymph nodes examined: 1 (0/1) negative. Follows Dr. Partida who plans for referral to Dr. Johnson at [place] due to the HER-2/lamberto positive status, further recommendations pending that visit on 2/25/2020.    12/13/2019 - Bilateral screening mammogram:  • Stellate lesion outer left breast. Spot compression and ultrasound are suggested.  • Benign findings in the right breast   • Assessment: BI-RADS Category 0    12/27/2019 - Bilateral diagnostic mammogram:  • Irregular density persists in the upper outer left breast. This is approximately 11 cm from the nipple in the upper outer left breast.  • Ultrasound demonstrates this to be a hypoechoic demonstrates acoustic shadowing and biopsy is suggested.   IMPRESSION:  • Irregular density upper outer left breast. Biopsy is suggested  • ASSESSMENT: BI-RADS Category  4B    12/27/2019 - US left breast:  • IMPRESSION suspicious 6 x 5 x 8 mm density left breast approximately 8 cm from the nipple biopsy is suggested assessment:   • BI-RADS Category 4B    01/03/2020 - Left breast at 1 o'clock position, core biopsies:  • Invasive mammary carcinoma of no special type (ductal, not otherwise specified), grade 2, at least 4.9 mm in greatest extent.  • Associated low-grade ductal carcinoma in situ.  • % positive, NE 25% positive,   • HER-2/lamberto-2+ (IHC)/FISH- positive (signal ratio: 2.9)  Comment: The histologic grade of this tumor is based on a relatively limited sample and may change upon receipt of the final excision specimen.  Estrogen and progesterone receptor analyses as well as other prognostic indicator studies will be performed on tissue from a paraffin block.  Results of these studies will be reported as an addendum upon receipt from the reference laboratory.     01/10/2020 - Left breast lumpectomy and lymph node biopsy per :  Left breast mass, lumpectomy:  • Invasive and in situ ductal carcinoma, see synoptic and comment  Left axillary sentinel lymph node, excisional biopsy:  • Negative for tumor, see comment    02/03/2020 - Consult with ion:  ASSESSMENT:  • Invasive and in situ ductal carcinoma left breast  • Stage:  IA (pT1a, pN0, G2) ER  100% positive, NE  25%, HER-2/lamberto - 2+ (IHC)/FISH positive (signal ratio: 2.9).  • Tumor [place]:  5 mm.  Tumor focality: Single focus of invasive carcinoma.  No EIC nor LCIS.  Lymphovascular invasion: Not identified.  Dermal lymphovascular invasion: No skin present.  Microcalcifications: Not identified.  Margins uninvolved by invasive carcinoma.  Lymph nodes: Number of lymph nodes examined: 1 (0/1).  • Tumor Status:  Underwent lumpectomy and SNB, 01/10/2020.    RECOMMENDATIONS:   • Schedule baseline 2D echocardiogram, bone density and staging bone scan, CT scans of the head, chest abdomen and pelvis with IV contrast  at [facility]  • Draw baseline CBC with differential, CMP, CEA and CA-27-29   • Reviewed NCCN guidelines version 3.2019 invasive breast cancer.  For tumors (=/> 5 mm), and pN0, recommend adjuvant endocrine therapy +/- adjuvant chemotherapy with trastuzumab (category 2B).    •  Re: The potential toxicities of adjuvant chemotherapy + Herceptin (to include but not limited to: Asthenia, cardiotoxicity, myelosuppression) are discussed at length. The rationale for adjuvant hormonal manipulation with AIs (see above) is discussed (to include but not limited to: Hot flashes, asthenia, pain, arthralgia, arthritis, nausea, bone pains, edema, fatigue, headache, venous thrombosis, anemia, leukopenia, depression, rash, pharyngitis, weight gain, dyspnea, fractures, breast pains, infection, angina). Questions answered to the best of my ability, and to her apparent satisfaction. She is willing to proceed with a trial of therapy if deemed necessary.   • Teaching sheets for Arimidex, Taxotere, carboplatin, and Herceptin.   • Refer to Dr. Jaimes ASAP Re: Need an opinion on adjuvant therapy in this patient with stage IA left breast carcinoma with tumor of only 0.5 cm,, ER/AL positive, but HER-2 positive.  • Schedule treatment C1 D1 to be scheduled - HOLD,  then C2D1 on  HOLD at [facility] - To be administered every 3 weeks for 6 chemo cycles   o Taxotere 75 mg/m2 (BSA = ; TD = mg.) per administration guidelines   o Carboplatin AUC = 5 mg/m2 (TD= pending) per administration guidelines.   o Herceptin 4 mg/ kg (TD = mg) C1D1 loading dose on Week #1, then Herceptin 2 mg/ kg (TD = 152 mg) weekly x17 weeks, to begin on week #2, per administration guidelines  o Anticipate: Herceptin 6 mg/kg (TD= pending) every 3 weeks x12 to begin 1 week after the weekly Herceptin completed   • Discussed the rationale for adjuvant hormonal manipulation with AIs (see above) and potential toxicities of AI therapy are discussed (to include but not limited to: Hot  flashes, asthenia, pain, arthralgias, arthritis, nausea/vomiting, headache, pharyngitis, depression, rash, hypertension, lymphedema, insomnia, edema, weight gain, dyspnea, abdominal pain, constipation, hyperlipidemia, osteoporosis, fractures, cough, bone pain, diarrhea, breast pain, paresthesias, infections, cataracts, myalgias, thromboembolism, angina, Lyles-Yung syndrome, erythema multiforme, anemia, leukopenia, stroke, myocardial infarction, osteoporosis, fractures). Questions are answered. She agrees to a trial of therapy.   • Keep appointment with Dr. Brito on 02/05/2020 Re:  Adjuvant radiation  • Return to the [place] office with pre-office serum iron, iron saturation, ferritin B12, folate, CMP, CEA, CA 27-29 and CBC and differential  in 4 weeks (after she is seen by Dr. Jaimes in [place]) for further recommendations.    02/07/2020 - CT Head with contrast:    02/07/2020 - CT Chest with contrast:    02/07/2020 - CT Abdomen/Pelvis with contrast:    02/10/2020 - Bone Scan:    History obtained from  PATIENT, FAMILY and CHART    PAST MEDICAL HISTORY   Past Medical History:   Diagnosis Date   • Acid reflux    • Asthma     long time ago    • Cancer (CMS/HCC)    • Panic attack    • Right arm weakness       PAST SURGICAL HISTORY   Past Surgical History:   Procedure Laterality Date   • ANTERIOR CERVICAL DISCECTOMY W/ FUSION N/A 9/7/2018    Procedure: CERVICAL DISCECTOMY ANTERIOR WITH FUSION C6-7;  Surgeon: Chris Mcfadden MD;  Location: [facility] OR;  Service: Neurosurgery   • APPENDECTOMY     • BILATERAL SALPINGO OOPHORECTOMY     • CERVICAL CORPECTOMY N/A 9/7/2018    Procedure: CERVICAL CORPECTOMY C6-7;  Surgeon: Chris Mcfadden MD;  Location: [facility] OR;  Service: Neurosurgery   • CHOLECYSTECTOMY     • COLONOSCOPY  09/05/2012   • COLONOSCOPY N/A 2/10/2017    Procedure: COLONOSCOPY WITH ANESTHESIA;  Surgeon: Elina Gonsalez MD;  Location: [facility] ENDOSCOPY;  Service:    • HYSTERECTOMY  1998    Had hysterectomy  "for painful periods and bleeding. (Dr. Patel) TLH w/ BSO   • LAPAROSCOPIC TUBAL LIGATION     • MASTECTOMY W/ SENTINEL NODE BIOPSY Left 1/10/2020    Procedure: LEFT NEEDLE DIRECTED ULTRASOUND GUIDED PARTIAL MASTECTOMY WITH SENTINEL LYMPH NODE BIOPSY, INJECTION AND SCAN, RADIOLOGIST WILL INJECT;  Surgeon: Flor Edmond MD;  Location: [facility] OR;  Service: General   • TONSILLECTOMY        FAMILY HISTORY  family history includes Cancer in her father; Colon cancer in her maternal grandmother; Diabetes in her mother; Heart disease in her father; Hypertension in her mother and sister; No Known Problems in her daughter and sister; Stroke in her mother.     SOCIAL HISTORY   Social History     Socioeconomic History   • Marital status:      Spouse name: Not on file   • Number of children: 1   • Years of education: Not on file   • Highest education level: Not on file   Tobacco Use   • Smoking status: Former Smoker     Types: Cigarettes   • Smokeless tobacco: Never Used   • Tobacco comment: \"i never really smoked\"   Substance and Sexual Activity   • Alcohol use: Yes     Comment: occasional   • Drug use: No   • Sexual activity: Yes     Partners: Male     Birth control/protection: Surgical      ALLERGIES  Demerol [meperidine] and Morphine and related     MEDICATIONS   Current Outpatient Medications   Medication Sig Dispense Refill   • ALPRAZolam (XANAX) 0.5 MG tablet Take 1 tablet by mouth 2 (Two) Times a Day As Needed for Anxiety. 60 tablet 2   • anastrozole (ARIMIDEX) 1 MG tablet Take 1 tablet by mouth Daily. 30 tablet 2   • Inulin (FIBER CHOICE PO) Take  by mouth.     • Multiple Vitamins-Minerals (MULTIVITAMIN ADULT PO) Take  by mouth Daily.     • ondansetron (ZOFRAN) 8 MG tablet Take 1 tablet by mouth Every 8 (Eight) Hours As Needed for Nausea or Vomiting. 30 tablet 0   • sertraline (ZOLOFT) 50 MG tablet Take 1 tablet by mouth Daily. 30 tablet 11     No current facility-administered medications for this visit.     " "  The following portions of the patient's history were reviewed and updated as appropriate: allergies, current medications, past family history, past medical history, past social history, past surgical history and problem list.    Current outpatient and discharge medications have been reconciled for the patient.  Reviewed by: Kailash Brito III, MD    REVIEW OF SYSTEMS  Review of Systems   Constitutional: Negative.    HENT: Negative.    Eyes: Negative.    Respiratory: Negative.    Cardiovascular: Negative.    Gastrointestinal: Negative.    Endocrine: Negative.         Arimidex  Hot flashes   Genitourinary: Negative.    Musculoskeletal: Negative.    Skin: Negative.    Allergic/Immunologic: Negative.    Neurological: Negative.    Hematological: Negative.    Psychiatric/Behavioral: Negative.         Zoloft  \"Overwhelmed\"     PHYSICAL EXAM  VITAL SIGNS:   Vitals:    02/05/20 1415   BP: 150/70   Weight: 75.3 kg (166 lb)   Height: 154.9 cm (61\")   PainSc: 0-No pain      General Appearance:  awake, alert, oriented, in no acute distress, cooperative. Appears stated age.   Head: Normocephalic  Eyes: Conjunctiva pink, pupils equal and reactive.   Ears:  Normal externally.  Hearing- normal to conversation  Nose/Sinuses:  Mucosa normal. No drainage or sinus tenderness.  Mouth/Throat:  Mucosa moist, no lesions; pharynx without erythema, edema or exudate.  Neck: Supple, no mass, non-tender  Back:  Symmetric, no curvature, ROM normal, no CVA tenderness   Lungs:  Normal expansion.  Clear to auscultation.  No rales, rhonchi, or wheezing.  Heart:  Heart sounds are normal.  Regular rate and rhythm without murmur, gallop or rub.  Abdomen:  Soft, non-tender, normal bowel sounds; no bruits, organomegaly or masses.  Breasts: breasts appear normal, no suspicious masses, no skin or nipple changes or axillary nodes, left breast status post lumpectomy and SNB, without mass, skin or nipple changes or axillary nodes, surgical scars " "noted  Extremities: Warm to touch, pink, with no edema. Pulses 2+ bilaterally  Musculoskeletal: strength and sensation grossly normal  Neurologic:  Alert and oriented, gait normal, non-focal exam  Psych exam: normal situational behavior   Skin:  Warm and moist. No suspicious lesions or rashes of concern     Performance Status: ECOG (0) Fully active, able to carry on all predisease performance without restriction    Clinical Quality Measures  -Pain Documented by Standardized Tool, FPS Chasidy Tejada reports a pain score of 0/10 .  Given her pain assessment as noted, treatment options were discussed and the following options were decided upon as a follow-up plan to address the patient's pain: no pain, no plan given..    -Advanced Care Planning Advance Care Planning   ACP discussion was held with the patient during this visit. Patient does not have an advance directive, information provided.    -Body Mass Index Screening and Follow-Up Plan Patient's Body mass index is 31.37 kg/m². BMI is above normal parameters. Recommendations include: educational material.  -Tobacco Use: Screening and Cessation Intervention Social History    Tobacco Use      Smoking status: Former Smoker        Types: Cigarettes      Smokeless tobacco: Never Used      Tobacco comment: \"i never really smoked\"    ASSESSMENT AND PLAN  1. Invasive ductal carcinoma of breast, female, left (CMS/HCC)    2. S/P lumpectomy, left breast    3. S/P lymph node biopsy    4. Former smoker    5. Encounter for consultation      Orders Placed This Encounter   Procedures   • Ambulatory Referral to Physical Therapy Evaluate and treat, Lymphedema, Bioimpedance   • Ambulatory Referral to ONC Social Work     RECOMMENDATIONS: Chasidy Tejada is a very pleasant 59 y.o. female that has been referred to our clinic by Flor Edmond MD to discuss radiotherapy recommendations for breast carcinoma. Diagnosed in January 2020 with Stage IA (pT1a, pN0, G2) Invasive Ductal " Carcinoma, 5 mm with associated low-grade DCIS. % positive, [place] 25% positive, HER-2/lamberto-2+ (IHC)/FISH- positive (signal ratio: 2.9). Underwent lumpectomy and SNB, 01/10/2020. Pathology confirmed the single focus of invasive carcinoma.  No EIC nor LCIS.  Lymphovascular invasion/ Microcalcifications: Not identified.  Margins uninvolved by invasive carcinoma.  Lymph nodes: Number of lymph nodes examined: 1 (0/1) negative. Follows Dr. Partida who plans for referral to Dr. Johnson at [place] due to the HER-2/lamberto positive status, further recommendations pending that visit on 2/25/2020.    We have discussed the role of radiation therapy with this diagnosis as well as the iIndications and rationale of adjuvant radiation therapy according to the NCCN Guidelines. I have extensively reviewed the risks, benefits and alternatives of therapy for cancer of the breast.  Risks of radiation therapy includes but is not limited to radiation induced red, dry, tender, or itchy sunburn-type skin irritation of the targeted area, which may range from mild to intense, skin, breast heaviness,  redness, bruised appearance or discoloration of the skin, cumulative general fatigue and the risk of progression of disease in spite of therapy with either local or systemic failure.      I have seen, examined and reviewed her medication list, appropriate labs and imaging studies as well as other physician notes. We discussed the goals and plans of care with the patient and family and answered all questions. Since the patient is at risk for lymphedema post surgery and radiation, I will ask physical therapy for lymphedema baseline evaluation.     The patient has verbalized understanding of the risk of therapy for postoperative radiation therapy to the left breast.  We will await final recommendations from Dr. Jaimes, Mrs. Tejada has been referred to her by Dr. Wheat regarding a second opinion on adjuvant therapy in this patient with  Stage IA left breast carcinoma with tumor of only 0.5 cm,, ER/CT positive, but HER-2 positive.    Todays appointment time was spent in counseling and coordination of care as follows: diagnosis, intent of treatment discussing radiation therapy specifics: logistics, possible and probable side effects and after effects, staging of cancer, standard of care in for this stage of this cancer and treatment options   Kailash Brito III, MD  02/05/2020

## 2020-02-04 NOTE — TELEPHONE ENCOUNTER
Called patient and she was aware of the scan appointments. She reports that she has had a bone density less than 2 years ago. Insurance will not cover a bonedensity scan this early. I had Dr. LEÓN Review the bonedensity performed on 6/01/2018 and it was normal. We will repeat bone density in June 2020.

## 2020-02-05 ENCOUNTER — CONSULT (OUTPATIENT)
Dept: RADIATION ONCOLOGY | Facility: HOSPITAL | Age: 60
End: 2020-02-05

## 2020-02-05 ENCOUNTER — HOSPITAL ENCOUNTER (OUTPATIENT)
Dept: RADIATION ONCOLOGY | Facility: HOSPITAL | Age: 60
Setting detail: RADIATION/ONCOLOGY SERIES
End: 2020-02-05

## 2020-02-05 VITALS
HEIGHT: 61 IN | WEIGHT: 166 LBS | SYSTOLIC BLOOD PRESSURE: 150 MMHG | BODY MASS INDEX: 31.34 KG/M2 | DIASTOLIC BLOOD PRESSURE: 70 MMHG

## 2020-02-05 DIAGNOSIS — Z98.890 S/P LUMPECTOMY, LEFT BREAST: ICD-10-CM

## 2020-02-05 DIAGNOSIS — Z87.891 FORMER SMOKER: ICD-10-CM

## 2020-02-05 DIAGNOSIS — Z71.9 ENCOUNTER FOR CONSULTATION: ICD-10-CM

## 2020-02-05 DIAGNOSIS — Z98.890 S/P LYMPH NODE BIOPSY: ICD-10-CM

## 2020-02-05 DIAGNOSIS — C50.912 INVASIVE DUCTAL CARCINOMA OF BREAST, FEMALE, LEFT (HCC): Primary | ICD-10-CM

## 2020-02-05 PROCEDURE — G0463 HOSPITAL OUTPT CLINIC VISIT: HCPCS | Performed by: RADIOLOGY

## 2020-02-05 NOTE — PATIENT INSTRUCTIONS
Breast Cancer, Female    Breast cancer is a malignant growth of tissue (tumor) in the breast. Unlike noncancerous (benign) tumors, malignant tumors are cancerous and can spread to other parts of the body. The two most common types of breast cancer start in the milk ducts (ductal carcinoma) or in the lobules where milk is made in the breast (lobular carcinoma). Breast cancer is one of the most common types of cancer in women.  What are the causes?  The exact cause of female breast cancer is unknown.  What increases the risk?  The following factors may make you more likely to develop this condition:  · Being older than 55 years of age.  · Race and ethnicity.  women generally have an increased risk, but -American women are more likely to develop the disease before age 45.  · Having a family history of breast cancer.  · Having had breast cancer in the past.  · Having certain noncancerous conditions of the breast, such as dense breast tissue.  · Having the BRCA1 and BRCA2 genes.  · Having a history of radiation exposure.  · Obesity.  · Starting menopause after age 55.  · Starting your menstrual periods before age 12.  · Having never been pregnant or having your first child after age 30.  · Having never .  · Using hormone therapy after menopause.  · Using birth control pills.  · Drinking more than one alcoholic drink a day.  · Exposure to the drug JODI, which was given to pregnant women from the 1940s to the 1970s.  What are the signs or symptoms?  Symptoms of this condition include:  · A painless lump or thickening in your breast.  · Changes in the size or shape of your breast.  · Breast skin changes, such as puckering or dimpling.  · Nipple abnormalities, such as scaling, crustiness, redness, or pulling in (retraction).  · Nipple discharge that is bloody or clear.  How is this diagnosed?  This condition may be diagnosed by:  · Taking your medical history and doing a physical exam. During the  exam, your health care provider will feel the tissue around your breast and under your arms.  · Taking a sample of nipple discharge. The sample will be examined under a microscope.  · Performing imaging tests, such as breast X-rays (mammogram), breast ultrasound exams, or an MRI.  · Taking a tissue sample (biopsy) from the breast. The sample will be examined under a microscope to look for cancer cells.  · Taking a sample from the lymph nodes near the affected breast (sentinel node biopsy).  Your cancer will be staged to determine its severity and extent. Staging is a careful attempt to find out the size of the tumor, whether the cancer has spread, and if so, to what parts of the body. Staging also includes testing your tumor for certain receptors, such as estrogen, progesterone, and human epidermal growth factor receptor 2 (HER2). This will help your cancer care team decide on a treatment that will work best for you. You may need to have more tests to determine the stage of your cancer. Stages include the following:  · Stage 0--The tumor has not spread to other breast tissue.  · Stage I--The cancer is only found in the breast or may be in the lymph nodes. The tumor may be up to ¾ in (2 cm) wide.  · Stage II--The cancer has spread to nearby lymph nodes. The tumor may be up to 2 in (5 cm) wide.  · Stage III--The cancer has spread to more distant lymph nodes. The tumor may be larger than 2 in (5 cm) wide.  · Stage IV--The cancer has spread to other parts of the body, such as the bones, brain, liver, or lungs.  How is this treated?  Treatment for this condition depends on the type and stage of the breast cancer. It may be treated with:  · Surgery. This may involve breast-conserving surgery (lumpectomy or partial mastectomy) in which only the part of the breast containing the cancer is removed. Some normal tissue surrounding this area may also be removed. In some cases, surgery may be done to remove the entire breast  (mastectomy) and nipple. Lymph nodes may also be removed.  · Radiation therapy, which uses high-energy rays to kill cancer cells.  · Chemotherapy, which is the use of drugs to kill cancer cells.  · Hormone therapy, which involves taking medicine to adjust the hormone levels in your body. You may take medicine to decrease your estrogen levels. This can help stop cancer cells from growing.  · Targeted therapy, in which drugs are used to block the growth and spread of cancer cells. These drugs target a specific part of the cancer cell and usually cause fewer side effects than chemotherapy. Targeted therapy may be used alone or in combination with chemotherapy.  · A combination of surgery, radiation, chemotherapy, or hormone therapy may be needed to treat breast cancer.  Follow these instructions at home:  · Take over-the-counter and prescription medicines only as told by your health care provider.  · Eat a healthy diet. A healthy diet includes lots of fruits and vegetables, low-fat dairy products, lean meats, and fiber.  ? Make sure half your plate is filled with fruits or vegetables.  ? Choose high-fiber foods such as whole-grain breads and cereals.  · Consider joining a support group. This may help you learn to cope with the stress of having breast cancer.  · Talk to your health care team about exercise and physical activity. The right exercise program can:  ? Help prevent or reduce symptoms such as fatigue or depression.  ? Improve overall health and survival rates.  · Keep all follow-up visits as told by your health care provider. This is important.  Where to find more information  · American Cancer Society: www.cancer.org  · National Cancer [place]: www.cancer.gov  Contact a health care provider if:  · You have a sudden increase in pain.  · You have any symptoms or changes that concern you.  · You lose weight without trying.  · You notice a new lump in either breast or under your arm.  · You develop swelling in  either arm or hand.  · You have a fever.  · You notice new fatigue or weakness.  Get help right away if:  · You have chest pain or trouble breathing.  · You faint.  Summary  · Breast cancer is a malignant growth of tissue (tumor) in the breast.  · Your cancer will be staged to determine its severity and extent.  · Treatment for this condition depends on the type and stage of the breast cancer.  This information is not intended to replace advice given to you by your health care provider. Make sure you discuss any questions you have with your health care provider.  Document Released: 03/28/2007 Document Revised: 08/13/2018 Document Reviewed: 08/13/2018  [employer] Interactive Patient Education © 2019 [employer]    External Beam Radiation Therapy  External beam radiation therapy is a type of radiation treatment. This type of radiation therapy can deliver radiation to a fairly large area. This is the most common type of radiation therapy for cancer. This therapy may be done to:  · Treat cancer by:  ? Destroying cancer cells. Radiation delivered during the treatment damages cancer cells. It may also damage normal cells, but normal cells have the DNA to repair themselves while cancer cells do not.  ? Helping with symptoms of your cancer.  ? Stopping the growth of any remaining cancer cells after surgery.  ? Preventing cancer cells from growing in areas that do not have cancer (prophylactic radiation therapy).  · Treat or shrink a tumor.  · Reduce pain (palliative therapy).  The amount of radiation you will receive and the length of therapy depend on your medical condition. You should not feel the radiation being delivered or any pain during your therapy.  Let your health care provider know about:  · Any allergies you have.  · All medicines you are taking, including vitamins, herbs, eye drops, creams, and over-the-counter medicines.  · Any problems you or family members have had with anesthetic medicines.  · Any blood  disorders you have.  · Any surgeries you have had.  · Any medical conditions you have.  · Whether you are pregnant or may be pregnant.  What are the risks?  Generally, this is a safe procedure. However, radiation therapy can place a person at a higher risk for a second cancer later in life.  Most people experience side effects from the therapy. Side effects depend on the amount of radiation and the part of the body that was exposed to radiation. The most common side effects include:  · Skin changes.  · Hair loss.  · Fatigue.  · Nausea and vomiting.  What happens before the procedure?  · There will be a planning session (simulation). During the session:  ? Your health care provider will plan exactly where the radiation will be delivered (treatment field).  ? You will be positioned for your therapy. The goal is to have a position that can be reproduced for each therapy session.  ? Temporary marks may be drawn on your body. Permanent marks may also be drawn on your body in order for you to be positioned the same way for each therapy session.  ? A tool that holds a body part in place (immobilization device) may be used to keep the area of treatment in the correct position.  · Follow instructions from your health care provider about eating or drinking restrictions.  · Ask your health care provider about changing or stopping your regular medicines. This is especially important if you are taking diabetes medicines or blood thinners.  What happens during the procedure?    · You will either lie on a table or sit in a chair in the position determined for your therapy.  · You may have a heavy shield placed on you to protect tissues and organs that are not being treated.  · The radiation machine (linear accelerator) will move around you to deliver the radiation in exact doses from different angles. The machine will not touch you.  The procedure may vary among health care providers and hospitals.  What happens after the  procedure?  · You may return to your normal routine including diet, activities, and medicines as told by your health care provider.  · You may want to have someone take you home from the hospital or clinic.  Summary  · External beam radiation therapy is a type of radiation treatment for cancer.  · The amount of radiation you will receive and the length of therapy depend on your medical condition.  · Most people experience side effects from the therapy. Side effects depend on the amount of radiation and the part of the body that was exposed to radiation.  This information is not intended to replace advice given to you by your health care provider. Make sure you discuss any questions you have with your health care provider.  Document Released: 05/06/2010 Document Revised: 12/18/2017 Document Reviewed: 12/18/2017  "[employer] Interactive Patient Education © 2019 "[employer] Inc.

## 2020-02-07 ENCOUNTER — HOSPITAL ENCOUNTER (OUTPATIENT)
Dept: CT IMAGING | Facility: HOSPITAL | Age: 60
Discharge: HOME | End: 2020-02-07
Admitting: INTERNAL MEDICINE

## 2020-02-07 DIAGNOSIS — C50.912 INVASIVE DUCTAL CARCINOMA OF BREAST, FEMALE, LEFT (HCC): ICD-10-CM

## 2020-02-07 PROCEDURE — 25010000002 IOPAMIDOL 61 % SOLUTION: Performed by: INTERNAL MEDICINE

## 2020-02-07 PROCEDURE — 74177 CT ABD & PELVIS W/CONTRAST: CPT

## 2020-02-07 PROCEDURE — 82565 ASSAY OF CREATININE: CPT

## 2020-02-07 PROCEDURE — 71260 CT THORAX DX C+: CPT

## 2020-02-07 PROCEDURE — 70460 CT HEAD/BRAIN W/DYE: CPT

## 2020-02-07 RX ADMIN — IOPAMIDOL 50 ML: 612 INJECTION, SOLUTION INTRAVENOUS at 09:57

## 2020-02-07 RX ADMIN — IOPAMIDOL 100 ML: 612 INJECTION, SOLUTION INTRAVENOUS at 09:57

## 2020-02-10 ENCOUNTER — HOSPITAL ENCOUNTER (OUTPATIENT)
Dept: NUCLEAR MEDICINE | Facility: HOSPITAL | Age: 60
Discharge: HOME | End: 2020-02-10

## 2020-02-10 DIAGNOSIS — C50.912 INVASIVE DUCTAL CARCINOMA OF BREAST, FEMALE, LEFT (HCC): ICD-10-CM

## 2020-02-10 LAB — CREAT BLDA-MCNC: 0.6 MG/DL (ref 0.6–1.3)

## 2020-02-10 PROCEDURE — 78306 BONE IMAGING WHOLE BODY: CPT

## 2020-02-10 PROCEDURE — A9561 TC99M OXIDRONATE: HCPCS | Performed by: INTERNAL MEDICINE

## 2020-02-10 PROCEDURE — 0 TECHNETIUM OXIDRONATE KIT: Performed by: INTERNAL MEDICINE

## 2020-02-10 RX ADMIN — TECHNETIUM TC 99M OXIDRONATE 1 DOSE: 3.15 INJECTION, POWDER, LYOPHILIZED, FOR SOLUTION INTRAVENOUS at 08:24

## 2020-02-12 ENCOUNTER — TELEPHONE (OUTPATIENT)
Dept: SOCIAL WORK | Facility: HOSPITAL | Age: 60
End: 2020-02-12

## 2020-02-12 ENCOUNTER — HOSPITAL ENCOUNTER (OUTPATIENT)
Dept: CARDIOLOGY | Facility: HOSPITAL | Age: 60
Discharge: HOME | End: 2020-02-12
Admitting: INTERNAL MEDICINE

## 2020-02-12 VITALS
DIASTOLIC BLOOD PRESSURE: 52 MMHG | BODY MASS INDEX: 31.34 KG/M2 | HEIGHT: 61 IN | WEIGHT: 166.01 LBS | SYSTOLIC BLOOD PRESSURE: 134 MMHG

## 2020-02-12 DIAGNOSIS — C50.912 INVASIVE DUCTAL CARCINOMA OF BREAST, FEMALE, LEFT (HCC): ICD-10-CM

## 2020-02-12 PROCEDURE — 25010000002 PERFLUTREN 6.52 MG/ML SUSPENSION: Performed by: INTERNAL MEDICINE

## 2020-02-12 PROCEDURE — 93356 MYOCRD STRAIN IMG SPCKL TRCK: CPT | Performed by: INTERNAL MEDICINE

## 2020-02-12 PROCEDURE — 93306 TTE W/DOPPLER COMPLETE: CPT

## 2020-02-12 PROCEDURE — 93306 TTE W/DOPPLER COMPLETE: CPT | Performed by: INTERNAL MEDICINE

## 2020-02-12 PROCEDURE — 93356 MYOCRD STRAIN IMG SPCKL TRCK: CPT

## 2020-02-12 RX ADMIN — PERFLUTREN 8.48 MG: 6.52 INJECTION, SUSPENSION INTRAVENOUS at 09:41

## 2020-02-12 NOTE — NURSING NOTE
Unsuccessful IV stick x 2 to right hand.  Unable to position needle related to joint placement.  Second attempt infiltrated.

## 2020-02-12 NOTE — TELEPHONE ENCOUNTER
SW has been consulted to assess PT for potential needs. SW has called and spoke with PT who states that she is sure she has questions, but cannot think of any at this time. WILBUR has provided her with the direct number for SW and advised that she may call when or if she thinks of any questions or concerns that may arise. Will assist as needed. Sharlene Mane, HILTONW

## 2020-02-14 LAB
BH CV ECHO MEAS - AO MAX PG (FULL): 4.1 MMHG
BH CV ECHO MEAS - AO MAX PG: 8.3 MMHG
BH CV ECHO MEAS - AO MEAN PG (FULL): 3 MMHG
BH CV ECHO MEAS - AO MEAN PG: 5 MMHG
BH CV ECHO MEAS - AO ROOT AREA (BSA CORRECTED): 1.6
BH CV ECHO MEAS - AO ROOT AREA: 6.2 CM^2
BH CV ECHO MEAS - AO ROOT DIAM: 2.8 CM
BH CV ECHO MEAS - AO V2 MAX: 144 CM/SEC
BH CV ECHO MEAS - AO V2 MEAN: 103 CM/SEC
BH CV ECHO MEAS - AO V2 VTI: 33.3 CM
BH CV ECHO MEAS - AVA(I,A): 2.2 CM^2
BH CV ECHO MEAS - AVA(I,D): 2.2 CM^2
BH CV ECHO MEAS - AVA(V,A): 2 CM^2
BH CV ECHO MEAS - AVA(V,D): 2 CM^2
BH CV ECHO MEAS - BSA(HAYCOCK): 1.8 M^2
BH CV ECHO MEAS - BSA: 1.7 M^2
BH CV ECHO MEAS - BZI_BMI: 31.4 KILOGRAMS/M^2
BH CV ECHO MEAS - BZI_METRIC_HEIGHT: 154.9 CM
BH CV ECHO MEAS - BZI_METRIC_WEIGHT: 75.3 KG
BH CV ECHO MEAS - EDV(CUBED): 82.9 ML
BH CV ECHO MEAS - EDV(MOD-SP4): 83.4 ML
BH CV ECHO MEAS - EDV(TEICH): 85.8 ML
BH CV ECHO MEAS - EF(CUBED): 90.9 %
BH CV ECHO MEAS - EF(MOD-SP4): 80.1 %
BH CV ECHO MEAS - EF(TEICH): 85.9 %
BH CV ECHO MEAS - ESV(CUBED): 7.5 ML
BH CV ECHO MEAS - ESV(MOD-SP4): 16.6 ML
BH CV ECHO MEAS - ESV(TEICH): 12.1 ML
BH CV ECHO MEAS - FS: 55 %
BH CV ECHO MEAS - IVS/LVPW: 1.2
BH CV ECHO MEAS - IVSD: 0.85 CM
BH CV ECHO MEAS - LA DIMENSION: 2.3 CM
BH CV ECHO MEAS - LA/AO: 0.82
BH CV ECHO MEAS - LAT PEAK E' VEL: 8.8 CM/SEC
BH CV ECHO MEAS - LV DIASTOLIC VOL/BSA (35-75): 47.8 ML/M^2
BH CV ECHO MEAS - LV MASS(C)D: 102.3 GRAMS
BH CV ECHO MEAS - LV MASS(C)DI: 58.6 GRAMS/M^2
BH CV ECHO MEAS - LV MAX PG: 4.2 MMHG
BH CV ECHO MEAS - LV MEAN PG: 2 MMHG
BH CV ECHO MEAS - LV SYSTOLIC VOL/BSA (12-30): 9.5 ML/M^2
BH CV ECHO MEAS - LV V1 MAX: 102 CM/SEC
BH CV ECHO MEAS - LV V1 MEAN: 72.9 CM/SEC
BH CV ECHO MEAS - LV V1 VTI: 26 CM
BH CV ECHO MEAS - LVIDD: 4.4 CM
BH CV ECHO MEAS - LVIDS: 2 CM
BH CV ECHO MEAS - LVLD AP4: 7.6 CM
BH CV ECHO MEAS - LVLS AP4: 5.7 CM
BH CV ECHO MEAS - LVOT AREA (M): 2.8 CM^2
BH CV ECHO MEAS - LVOT AREA: 2.8 CM^2
BH CV ECHO MEAS - LVOT DIAM: 1.9 CM
BH CV ECHO MEAS - LVPWD: 0.69 CM
BH CV ECHO MEAS - MED PEAK E' VEL: 7.18 CM/SEC
BH CV ECHO MEAS - MV A MAX VEL: 71.8 CM/SEC
BH CV ECHO MEAS - MV DEC SLOPE: 472 CM/SEC^2
BH CV ECHO MEAS - MV DEC TIME: 0.17 SEC
BH CV ECHO MEAS - MV E MAX VEL: 78.1 CM/SEC
BH CV ECHO MEAS - MV E/A: 1.1
BH CV ECHO MEAS - SI(AO): 117.5 ML/M^2
BH CV ECHO MEAS - SI(CUBED): 43.2 ML/M^2
BH CV ECHO MEAS - SI(LVOT): 42.2 ML/M^2
BH CV ECHO MEAS - SI(MOD-SP4): 38.3 ML/M^2
BH CV ECHO MEAS - SI(TEICH): 42.2 ML/M^2
BH CV ECHO MEAS - SV(AO): 205 ML
BH CV ECHO MEAS - SV(CUBED): 75.4 ML
BH CV ECHO MEAS - SV(LVOT): 73.7 ML
BH CV ECHO MEAS - SV(MOD-SP4): 66.8 ML
BH CV ECHO MEAS - SV(TEICH): 73.7 ML
BH CV ECHO MEAS - TR MAX VEL: 125 CM/SEC
BH CV ECHO MEASUREMENTS AVERAGE E/E' RATIO: 9.77
LEFT ATRIUM VOLUME INDEX: 36 ML/M2
LEFT ATRIUM VOLUME: 81.8 CM3
LV EF 2D ECHO EST: 70 %
MAXIMAL PREDICTED HEART RATE: 163 BPM
STRESS TARGET HR: 139 BPM

## 2020-02-18 ENCOUNTER — TREATMENT (OUTPATIENT)
Dept: PHYSICAL THERAPY | Facility: CLINIC | Age: 60
End: 2020-02-18

## 2020-02-18 DIAGNOSIS — Z98.890 S/P LUMPECTOMY, LEFT BREAST: ICD-10-CM

## 2020-02-18 DIAGNOSIS — Z98.890 S/P LYMPH NODE BIOPSY: ICD-10-CM

## 2020-02-18 DIAGNOSIS — Z91.89 AT RISK FOR LYMPHEDEMA: Primary | ICD-10-CM

## 2020-02-18 PROCEDURE — 97161 PT EVAL LOW COMPLEX 20 MIN: CPT | Performed by: PHYSICAL THERAPIST

## 2020-02-18 PROCEDURE — 93702 BIS XTRACELL FLUID ANALYSIS: CPT | Performed by: PHYSICAL THERAPIST

## 2020-02-18 NOTE — PROGRESS NOTES
Physical Therapy Lymphedema Initial Evaluation       Patient Name: Chasidy Tejada  : 1960  MRN: [id number]  Today's Date: 2020      Visit Date: 2020    Visit Dx:    ICD-10-CM ICD-9-CM   1. At risk for lymphedema Z91.89 V49.89   2. S/P lumpectomy, left breast Z98.890 V45.89   3. S/P lymph node biopsy Z98.890 V45.89       Patient Active Problem List   Diagnosis   • Family history of colon cancer   • Colon polyps   • Laryngopharyngeal reflux   • Pharyngoesophageal dysphagia   • Non-smoker   • Obesity (BMI 30-39.9)   • Spinal stenosis in cervical region   • Cervical radiculopathy   • Herniated cervical disc without myelopathy   • Borderline blood pressure   • BMI 31.0-31.9,adult   • BMI 33.0-33.9,adult   • Invasive ductal carcinoma of breast, female, left (CMS/HCC)   • Former smoker   • S/P lumpectomy, left breast   • S/P lymph node biopsy        Past Medical History:   Diagnosis Date   • Acid reflux    • Asthma     long time ago    • Cancer (CMS/HCC)    • Panic attack    • Right arm weakness         Past Surgical History:   Procedure Laterality Date   • ANTERIOR CERVICAL DISCECTOMY W/ FUSION N/A 2018    Procedure: CERVICAL DISCECTOMY ANTERIOR WITH FUSION C6-7;  Surgeon: Chris Mcfadden MD;  Location: [facility] OR;  Service: Neurosurgery   • APPENDECTOMY     • BILATERAL SALPINGO OOPHORECTOMY     • CERVICAL CORPECTOMY N/A 2018    Procedure: CERVICAL CORPECTOMY C6-7;  Surgeon: Chris Mcfadden MD;  Location: [facility] OR;  Service: Neurosurgery   • CHOLECYSTECTOMY     • COLONOSCOPY  2012   • COLONOSCOPY N/A 2/10/2017    Procedure: COLONOSCOPY WITH ANESTHESIA;  Surgeon: Elina Gonsalez MD;  Location: [facility] ENDOSCOPY;  Service:    • HYSTERECTOMY  1998    Had hysterectomy for painful periods and bleeding. (Dr. Patel) [facility] w/ BSO   • LAPAROSCOPIC TUBAL LIGATION     • MASTECTOMY W/ SENTINEL NODE BIOPSY Left 1/10/2020    Procedure: LEFT NEEDLE DIRECTED ULTRASOUND GUIDED PARTIAL MASTECTOMY WITH  SENTINEL LYMPH NODE BIOPSY, INJECTION AND SCAN, RADIOLOGIST WILL INJECT;  Surgeon: Flor Edmond MD;  Location: [facility] OR;  Service: General   • TONSILLECTOMY         Visit Dx:    ICD-10-CM ICD-9-CM   1. At risk for lymphedema Z91.89 V49.89   2. S/P lumpectomy, left breast Z98.890 V45.89   3. S/P lymph node biopsy Z98.890 V45.89       Patient History     Row Name 02/18/20 1600             History    Chief Complaint  Other 1 (comment) At risk for lymphedema, infection  -HR      Date Current Problem(s) Began  01/10/20  -HR      Brief Description of Current Complaint  L lumpectomy and SNB. Will receive herceptin and radiation as well.   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  yes  -HR      Pre-Treatment Pain Level  0  -HR         Subjective Comments    Subjective Comments  She is going to [place] to see an oncologist because of the type of cancer she has.  -HR         Lymphedema Assessment    Lymphedema Classification  LUE:;Trunk:;at risk/stage 0  -HR      Lymphedema Cancer Related Sx  left;lumpectomy;sentinel node biopsy  -HR      Lymphedema Surgery Comments  Flor Edmond performed L lumpectomy and SNB on 1/10/2020.  -HR      Lymph Nodes Removed #  2  -HR      Positive Lymph Nodes #  0  -HR      Cancer Comments  ER+ and HER2+  -HR      Chemo Received  no  -HR      Radiation Therapy Received  no  -HR      Infections or Cellulitis?  no  -HR      Lymphedema Assessment Comments  will have Herceptin and radiation once second opinion is obtained  -HR         Lymphedema Measurements     Measurement Type(s)  Quick Girth  -HR      Quick Girth Areas  Upper extremities  -HR         LUE Quick Girth (cm)    Axilla  31.3 cm  -HR      Mid upper arm  31 cm  -HR      Elbow  25 cm  -HR      Mid forearm  23.2 cm  -HR      Wrist crease  14.7 cm  -HR      Web space  18.1 cm  -HR      LUE Quick Girth Total  143.3  -HR         RUE Quick Girth (cm)    Axilla  32.2 cm  -HR      Mid upper arm  31.5 cm  -HR      Elbow  25.3 cm  -HR      Mid forearm  22.5 cm  -HR      Wrist crease  15.1 cm  -HR      Web space  18.3 cm  -HR      RUE Quick Girth Total  144.9  -HR         L-Dex Bioimpedence Screening    L-Dex Measurement Extremity  LUE  -HR      L-Dex Patient Position  Standing  -HR      L-Dex UE Dominate Side  Right  -HR      L-Dex UE At Risk Side  Left  -HR      L-Dex UE Pre Surgical Value  No  -HR      L-Dex UE Score  9.3  -HR      L-Dex UE Baseline Score  9.3  -HR      L-Dex UE Value Change  0  -HR      L-Dex UE Comment  5 weeks post-op  -HR        User Key  (r) = Recorded By, (t) = Taken By, (c) = Cosigned By    Initials Name Provider Type    HR Ada Hooker, PT, DPT, CLT-JIN Physical Therapist                          Therapy Education  Education Details: Lymphedema education folder reviewed and given      OP Exercises     Row Name 02/18/20 1600             Subjective Comments    Subjective Comments  She is going to [place] to see an oncologist because of the type of cancer she has.  -HR         Subjective Pain    Able to rate subjective pain?  yes  -HR      Pre-Treatment Pain Level  0  -HR        User Key  (r) = Recorded By, (t) = Taken By, (c) = Cosigned By    Initials Name Provider Type    HR Ada Hooker, PT, DPT, CLT-JIN Physical Therapist                      PT OP Goals     Row Name 02/18/20 1600          PT Short Term Goals    STG 1  Patient will have a good basic understanding of the condition of lymphedema and its risk reduction practices.   -HR     STG 1 Progress  Met  -HR        User Key  (r) = Recorded By, (t) = Taken By, (c) = Cosigned By    Initials Name Provider Type    HR Ada Hooker, PT, DPT, CLT-JIN Physical Therapist          PT Assessment/Plan     Row Name 02/18/20 1600          PT Assessment    Impairments  Impaired lymphatic circulation  -HR     Assessment Comments  Mrs. Tejada is evaluated for a baseline assessment for lymphedema. She has only had surgery for L breast cancer at this time. Awaiting decisions about chemotherapy and radiation. She showed a good understanding of the risk reduction practices that are suggested. IHer measurements did not show a size difference between the surgical and non-surgical side. Her L-Dex score was a bit elevated, but she is only 5 weeks post-op so this is not surprising. We will know better once we re-test her down the road. Anticipate seeing her again once she completes her radiation treatments.   -HR     Rehab Potential  Excellent  -HR     Patient/caregiver participated in establishment of treatment plan and goals  Yes  -HR        PT Plan    PT Frequency  One time visit  -HR     Planned CPT's?  PT RE-EVAL: 80912;PT THER PROC EA 15 MIN: 67364;PT MANUAL THERAPY EA 15 MIN: 12435;PT SELF CARE/HOME MGMT/TRAIN EA 15: 97279;PT BIS XTRACELL FLUID ANALYSIS: 92600  -HR     PT Plan Comments  Plan to reassess after radiation or as needed if she develops symptoms earlier.   -HR       User Key  (r) = Recorded By, (t) = Taken By, (c) = Cosigned By    Initials Name Provider Type    HR Ada Hooker, PT, DPT, CLT-JIN Physical Therapist             Outcome Measure Options: Other Outcome Measure  Other Outcome Measure Tool Used  Other Outcome Measure Tool Comments: L-Dex score 9.3      Time Calculation:           PT G-Codes  Outcome Measure Options: Other Outcome Measure         Ada Hooker, PT, DPT, CLT-JIN  2/18/2020

## 2020-02-26 ENCOUNTER — TELEPHONE (OUTPATIENT)
Dept: ONCOLOGY | Facility: CLINIC | Age: 60
End: 2020-02-26

## 2020-02-26 NOTE — TELEPHONE ENCOUNTER
----- Message from Myriam Lei sent at 2/26/2020  2:09 PM CST -----  Contact: CUONG Chairez called stating she saw her dr at [facility] and she wants some type of test repeated. Please call pt at your earliest convenience. [phone].

## 2020-02-26 NOTE — TELEPHONE ENCOUNTER
Spoke with he patient. She asked if a FISH had been performed on her breast cancer specimen. I let her know that it had and it was HER positive. She asks if we have received the note from Dr. Jaimes. I let her know that we had. She reports that she is needing to start her chemo regimen.

## 2020-02-26 NOTE — TELEPHONE ENCOUNTER
Read dr fuller's recs. Repeat her2 on specimen from 01/03/2020 then when we see her next week we'll have results to decide on chemo + herceptin if confirmed (+) or just AI rx if (-). Thank u

## 2020-02-26 NOTE — TELEPHONE ENCOUNTER
Called and spoke with patient and explained that we would be resending the Fish her-2. She verbalized understanding.

## 2020-02-27 ENCOUNTER — LAB (OUTPATIENT)
Dept: LAB | Facility: HOSPITAL | Age: 60
End: 2020-02-27

## 2020-02-27 ENCOUNTER — TELEPHONE (OUTPATIENT)
Dept: ONCOLOGY | Facility: CLINIC | Age: 60
End: 2020-02-27

## 2020-02-27 DIAGNOSIS — C50.912 INVASIVE DUCTAL CARCINOMA OF BREAST, FEMALE, LEFT (HCC): ICD-10-CM

## 2020-02-27 LAB
ALBUMIN SERPL-MCNC: 4.9 G/DL (ref 3.5–5.2)
ALBUMIN/GLOB SERPL: 1.4 G/DL
ALP SERPL-CCNC: 83 U/L (ref 39–117)
ALT SERPL W P-5'-P-CCNC: 19 U/L (ref 1–33)
ANION GAP SERPL CALCULATED.3IONS-SCNC: 13 MMOL/L (ref 5–15)
AST SERPL-CCNC: 18 U/L (ref 1–32)
BASOPHILS # BLD AUTO: 0.07 10*3/MM3 (ref 0–0.2)
BASOPHILS NFR BLD AUTO: 0.8 % (ref 0–1.5)
BILIRUB SERPL-MCNC: 0.4 MG/DL (ref 0.2–1.2)
BUN BLD-MCNC: 17 MG/DL (ref 6–20)
BUN/CREAT SERPL: 28.8 (ref 7–25)
CALCIUM SPEC-SCNC: 10 MG/DL (ref 8.6–10.5)
CEA SERPL-MCNC: 1.76 NG/ML
CHLORIDE SERPL-SCNC: 101 MMOL/L (ref 98–107)
CO2 SERPL-SCNC: 27 MMOL/L (ref 22–29)
CREAT BLD-MCNC: 0.59 MG/DL (ref 0.57–1)
DEPRECATED RDW RBC AUTO: 43.5 FL (ref 37–54)
EOSINOPHIL # BLD AUTO: 0.06 10*3/MM3 (ref 0–0.4)
EOSINOPHIL NFR BLD AUTO: 0.7 % (ref 0.3–6.2)
ERYTHROCYTE [DISTWIDTH] IN BLOOD BY AUTOMATED COUNT: 13.1 % (ref 12.3–15.4)
FERRITIN SERPL-MCNC: 134.2 NG/ML (ref 13–150)
FOLATE SERPL-MCNC: >20 NG/ML (ref 4.78–24.2)
GFR SERPL CREATININE-BSD FRML MDRD: 105 ML/MIN/1.73
GLOBULIN UR ELPH-MCNC: 3.4 GM/DL
GLUCOSE BLD-MCNC: 108 MG/DL (ref 65–99)
HCT VFR BLD AUTO: 41.6 % (ref 34–46.6)
HGB BLD-MCNC: 14.1 G/DL (ref 12–15.9)
IMM GRANULOCYTES # BLD AUTO: 0.01 10*3/MM3 (ref 0–0.05)
IMM GRANULOCYTES NFR BLD AUTO: 0.1 % (ref 0–0.5)
IRON 24H UR-MRATE: 65 MCG/DL (ref 37–145)
IRON SATN MFR SERPL: 18 % (ref 20–50)
LYMPHOCYTES # BLD AUTO: 3.74 10*3/MM3 (ref 0.7–3.1)
LYMPHOCYTES NFR BLD AUTO: 44.6 % (ref 19.6–45.3)
MCH RBC QN AUTO: 30.7 PG (ref 26.6–33)
MCHC RBC AUTO-ENTMCNC: 33.9 G/DL (ref 31.5–35.7)
MCV RBC AUTO: 90.4 FL (ref 79–97)
MONOCYTES # BLD AUTO: 0.44 10*3/MM3 (ref 0.1–0.9)
MONOCYTES NFR BLD AUTO: 5.2 % (ref 5–12)
NEUTROPHILS # BLD AUTO: 4.07 10*3/MM3 (ref 1.7–7)
NEUTROPHILS NFR BLD AUTO: 48.6 % (ref 42.7–76)
NRBC BLD AUTO-RTO: 0 /100 WBC (ref 0–0.2)
PLATELET # BLD AUTO: 313 10*3/MM3 (ref 140–450)
PMV BLD AUTO: 9.6 FL (ref 6–12)
POTASSIUM BLD-SCNC: 3.9 MMOL/L (ref 3.5–5.2)
PROT SERPL-MCNC: 8.3 G/DL (ref 6–8.5)
RBC # BLD AUTO: 4.6 10*6/MM3 (ref 3.77–5.28)
SODIUM BLD-SCNC: 141 MMOL/L (ref 136–145)
TIBC SERPL-MCNC: 370 MCG/DL (ref 298–536)
TRANSFERRIN SERPL-MCNC: 248 MG/DL (ref 200–360)
VIT B12 BLD-MCNC: 745 PG/ML (ref 211–946)
WBC NRBC COR # BLD: 8.39 10*3/MM3 (ref 3.4–10.8)

## 2020-02-27 PROCEDURE — 82378 CARCINOEMBRYONIC ANTIGEN: CPT

## 2020-02-27 PROCEDURE — 82746 ASSAY OF FOLIC ACID SERUM: CPT

## 2020-02-27 PROCEDURE — 86300 IMMUNOASSAY TUMOR CA 15-3: CPT

## 2020-02-27 PROCEDURE — 84466 ASSAY OF TRANSFERRIN: CPT

## 2020-02-27 PROCEDURE — 36415 COLL VENOUS BLD VENIPUNCTURE: CPT

## 2020-02-27 PROCEDURE — 82728 ASSAY OF FERRITIN: CPT

## 2020-02-27 PROCEDURE — 85025 COMPLETE CBC W/AUTO DIFF WBC: CPT

## 2020-02-27 PROCEDURE — 83540 ASSAY OF IRON: CPT

## 2020-02-27 PROCEDURE — 82607 VITAMIN B-12: CPT

## 2020-02-27 PROCEDURE — 80053 COMPREHEN METABOLIC PANEL: CPT

## 2020-02-28 ENCOUNTER — TELEPHONE (OUTPATIENT)
Dept: ONCOLOGY | Facility: CLINIC | Age: 60
End: 2020-02-28

## 2020-02-28 LAB — CANCER AG27-29 SERPL-ACNC: 14.2 U/ML (ref 0–38.6)

## 2020-02-28 NOTE — TELEPHONE ENCOUNTER
Call made to the patient today at [phone] to discuss her visit with Dr. Johnson on 2/25/2020.     Excerpt from that encounter is discussed at length: Assessment-breast cancer: We discussed that since her cancer is strongly ER+ and low histologic grade/low proliferative rate, and the HER2 on the biopsy was borderline, I would recommend repeat HER2 testing on her surgical pathology. If HER2 is negative, this could possibly save her from chemotherapy and she would proceed with only endocrine therapy and radiation. If HER2 is confirmed to be positive, would recommend adjuvant weekly Taxol x 12 + Herceptin [id number] f/b Herceptin only to complete 1 year of HER2 directed therapy.    I explained to the patient that I have personally discussed sending off the original pathology from 1/10/2020 with Dr. Burgos of pathology.  Dr. Burgos reassured me that she would be sending off the specimen today for repeat FISH/HER-2 testing.    We will discuss the results and subsequent management planning at her office visit next week.  She voiced understanding and agreement.

## 2020-02-28 NOTE — TELEPHONE ENCOUNTER
Call made to Dr. Radha Burgos, pathology at [facility].  We requested that the patient's breast tumor specimen be resent for HER-2/lamberto retesting with fish (original reported 15% positive) as recommended by Dr. Johnson in order to determine whether the patient will need systemic therapy with Herceptin (if truly positive) versus aromatase inhibitor only.  I also asked Dr. Burgos to send the specimen for Oncotype DX if the HER-2/lamberto retesting is negative.  Dr. Burgos will look for an adequate tissue block and send out the specimen tomorrow as the specimens have been sent to [facility] and she is awaiting their return.

## 2020-03-01 NOTE — PROGRESS NOTES
[facility] ONC [facility] GROUP HEMATOLOGY AND ONCOLOGY  [address]  [place] [address] [phone]    Patient Name: Chasidy Tejada  Encounter Date: 03/05/2020  YOB: 1960  Patient Number: [id number]    REASON FOR VISIT: Chasidy Tejada is a 59-year-old female who returns in follow-up of newly diagnosed stage IA left breast carcinoma, ER/AL and HER-2/lamberto positive.  She underwent left breast lumpectomy with SNB, 01/10/2020 and was started on adjuvant Arimidex beginning 2/3/2020.  She is seen to discuss the diagnostic information gathered since her initial visit and for subsequent management planning.  She is here with her spouse.  In the interval she has been seen by Dr. Jaimes of [facility] for a second opinion.    DIAGNOSTIC ABNORMALITIES:          1.   12/13/2019- mammogram screening.  Impression: Stellate lesion outer left breast.  Spot compression and ultrasound suggested.          2.   12/27/2019- diagnostic mammogram.  Impression: Irregular density upper outer left breast.  Biopsy suggested.          3.   12/27/2019- left breast ultrasound.  Impression: Suspicious 6 x 5 x 8 mm density left breast approximately 8 cm from the nipple.  Biopsy suggested.  BI-RADS Category 4B.          4.   01/03/2020-ultrasound-guided breast biopsy.  Impression: Appropriate sampling of the 8 mm irregular hypoechoic nodule in the left breast at 1:00 in the posterior depth/axillary tail region.  Final diagnosis: Left breast at 1 o'clock position, core biopsies: A.invasive mammary carcinoma of no special type (ductal, not otherwise specified), grade 2, at least 4.9 mm in greatest extent.  B.associated low-grade ductal carcinoma in situ.  % positive, AL 25% positive, HER-2/lamberto-2+ (IHC)/FISH- a focal positive score is present in an area corresponding to approximately 15% of the tumor (signal ratio: 2.9).           5.   02/03/2020- CMP normal  with .  CBC normal.           6.   02/03/2020-bone scan ([facility]).  Impression: Degenerative joint changes.  No evidence of bony metastasis.           7.   02/03/2020- DEXA scan ([facility]).  Impression: Pending           8.   02/03/2020-CT scans, head chest, abdomen, pelvis-impression: No abnormal intracranial enhancement to suggest intracranial metastasis/abnormalities.  No evidence of intrathoracic metastasis.  Stable 5 to 6 mm right lower lobe nodule of the right hemidiaphragm unchanged from 2/14/2011.  No convincing evidence of intra-abdominal or pelvic metastasis.  Hepatic cysts.  Fatty attenuated benign focus within the body of the pancreas visualized, 12/14/2011.  Previous hysterectomy, cholecystectomy and appendectomy.           9.   02/14/2020-echocardiogram.  Impression: LV systolic function normal (EF equals 70%).  Normal LV and RV size thickness and function.  Normal LA and RA size.  Normal cardiac valves.  No pericardial effusion.          10.  02/27/2020- CMP normal with .  CBC normal.  Iron 65, iron saturation 18%, ferritin 134, B12 745, folate > 20, CEA 1.76, CA-27-29 14.2.          11.  03/04/2020- repeat FISH for HER-2/lamberto (from tumor block, 01/10/2020).  No tumor on specimen.    PREVIOUS INTERVENTIONS:           1.   01/10/2020- left breast lumpectomy with left axillary sentinel node biopsy.  Final diagnosis: 1.left breast mass, lumpectomy: Invasive and in situ ductal carcinoma.  Tumor site-not specified.  Histologic type: Invasive carcinoma of no special type (invasive ductal carcinoma, not otherwise specified).  Overall tumor grade: Grade 1.  Tumor size: Greatest dimension of largest invasive focus: 5 mm.  Tumor focality: Single focus of invasive carcinoma.  Ductal carcinoma in situ (DCIS): Present.  Negative for extensive intraductal component (EIC).  Size (extent) CIS: Cannot be determined: Microscopic.  Architectural pattern: Cribriform/solid.  Nuclear grade: 2 (intermediate).  Necrosis:  "Present, central (expansive \"comedo) necrosis).  Lobular carcinoma in situ (LCIS): No LCIS in specimen.  Lymphovascular invasion: Not identified.  Dermal lymphovascular invasion: No skin present.  Margins: Uninvolved by invasive carcinoma.  Lymph nodes: Uninvolved by tumor cells.  Regional lymph nodes: 1.  Number of lymph nodes examined: 1.  Number of sentinel nodes examined: 1.  Pathologic stage classification (pTNM, AJCC eighth edition): pT1a,(sn), pN0.  % positive, AZ 25% positive, HER-2 2+ (equivocal).           2.   Adjuvant Arimidex 1 mg p.o. daily beginning 02/03/2020    LABS    Lab Results - Last 18 Months   Lab Units 02/27/20  0901 02/03/20 1035 01/09/20  1032 12/12/19  0704   HEMOGLOBIN g/dL 14.1 14.5 15.0 14.1   HEMATOCRIT % 41.6 43.0 43.1 41.3   MCV fL 90.4 91.1 89.4 92.4   WBC 10*3/mm3 8.39 7.67 7.89 6.66   RDW % 13.1 13.0 13.3 12.4   MPV fL 9.6 9.8 9.5  --    PLATELETS 10*3/mm3 313 337 375 311   IMM GRAN % % 0.1 0.1 0.3  --    NEUTROS ABS 10*3/mm3 4.07 4.36 4.42 3.37   LYMPHS ABS 10*3/mm3 3.74* 2.87 2.94 2.81   MONOS ABS 10*3/mm3 0.44 0.37 0.42 0.33   EOS ABS 10*3/mm3 0.06 0.02 0.03 0.08   BASOS ABS 10*3/mm3 0.07 0.04 0.06 0.05   IMMATURE GRANS (ABS) 10*3/mm3 0.01 0.01 0.02  --    NRBC /100 WBC 0.0 0.0 0.0 0.0       Lab Results - Last 18 Months   Lab Units 02/27/20  0901 02/07/20  0951 02/03/20  1035 01/09/20  1032 12/12/19  0704   GLUCOSE mg/dL 108*  --  105* 87  --    SODIUM mmol/L 141  --  141 139 141   POTASSIUM mmol/L 3.9  --  4.0 4.1 4.6   TOTAL CO2 mmol/L  --   --   --   --  25.9   CO2 mmol/L 27.0  --  27.0 26.0  --    CHLORIDE mmol/L 101  --  103 99 102   ANION GAP mmol/L 13.0  --  11.0 14.0  --    CREATININE mg/dL 0.59 0.60 0.53* 0.46* 0.64   BUN mg/dL 17  --  15 13 22*   BUN / CREAT RATIO  28.8*  --  28.3* 28.3* 34.4*   CALCIUM mg/dL 10.0  --  9.8 9.5 9.5   EGFR IF NONAFRICN AM mL/min/1.73 105  --  119 140 96   ALK PHOS U/L 83  --  82 74 78   TOTAL PROTEIN g/dL 8.3  --  8.5 8.3  --  "   ALT (SGPT) U/L 19  --  18 12 20   AST (SGOT) U/L 18  --  19 18 17   BILIRUBIN mg/dL 0.4  --  0.5 0.6 0.5   ALBUMIN g/dL 4.90  --  5.00 4.90 4.80   GLOBULIN gm/dL 3.4  --  3.5 3.4  --        Lab Results - Last 18 Months   Lab Units 02/27/20  0901   CEA ng/mL 1.76       Lab Results - Last 18 Months   Lab Units 02/27/20  0901 12/12/19  0704   IRON mcg/dL 65  --    TIBC mcg/dL 370  --    IRON SATURATION % 18*  --    FERRITIN ng/mL 134.20  --    TSH uIU/mL  --  0.798   FOLATE ng/mL >20.00  --          PAST MEDICAL HISTORY:  ALLERGIES:  Allergies   Allergen Reactions   • Demerol [Meperidine] Hives   • Morphine And Related Hives     CURRENT MEDICATIONS:  Outpatient Encounter Medications as of 3/5/2020   Medication Sig Dispense Refill   • ALPRAZolam (XANAX) 0.5 MG tablet Take 1 tablet by mouth 2 (Two) Times a Day As Needed for Anxiety. 60 tablet 2   • anastrozole (ARIMIDEX) 1 MG tablet Take 1 tablet by mouth Daily. 30 tablet 2   • Inulin (FIBER CHOICE PO) Take  by mouth.     • Multiple Vitamins-Minerals (MULTIVITAMIN ADULT PO) Take  by mouth Daily.     • ondansetron (ZOFRAN) 8 MG tablet Take 1 tablet by mouth Every 8 (Eight) Hours As Needed for Nausea or Vomiting. 30 tablet 0   • sertraline (ZOLOFT) 50 MG tablet Take 1 tablet by mouth Daily. 30 tablet 11     No facility-administered encounter medications on file as of 3/5/2020.      Adult illnesses:  Colon polyps  Obesity  Spinal stenosis neck   Herniated cervical disc without myelopathy  Borderline blood pressure  History of dysphagia    Past surgeries:  Anterior cervical discectomy w fusion, 09/2018  Appendectomy  BSO/GANGA  Cholecystectomy  Colonoscopy, 02/10/2017  BTL  Tonsillectomy  Breast biopsy  Left partial mastectomy, 01/10/2020    ADULT ILLNESSES:  Patient Active Problem List   Diagnosis Code   • Family history of colon cancer Z80.0   • Colon polyps K63.5   • Laryngopharyngeal reflux K21.9   • Pharyngoesophageal dysphagia R13.14   • Non-smoker Z78.9   • Obesity  (BMI 30-39.9) E66.9   • Spinal stenosis in cervical region M48.02   • Cervical radiculopathy M54.12   • Herniated cervical disc without myelopathy M50.20   • Borderline blood pressure R03.0   • BMI 31.0-31.9,adult Z68.31   • BMI 33.0-33.9,adult Z68.33   • Invasive ductal carcinoma of breast, female, left (CMS/HCC) C50.912   • Former smoker Z87.891   • S/P lumpectomy, left breast Z98.890   • S/P lymph node biopsy Z98.890     SURGERIES:  Past Surgical History:   Procedure Laterality Date   • ANTERIOR CERVICAL DISCECTOMY W/ FUSION N/A 9/7/2018    Procedure: CERVICAL DISCECTOMY ANTERIOR WITH FUSION C6-7;  Surgeon: Chris Mcfadden MD;  Location: [facility] OR;  Service: Neurosurgery   • APPENDECTOMY     • BILATERAL SALPINGO OOPHORECTOMY     • CERVICAL CORPECTOMY N/A 9/7/2018    Procedure: CERVICAL CORPECTOMY C6-7;  Surgeon: Chris Mcfadden MD;  Location: [facility] OR;  Service: Neurosurgery   • CHOLECYSTECTOMY     • COLONOSCOPY  09/05/2012   • COLONOSCOPY N/A 2/10/2017    Procedure: COLONOSCOPY WITH ANESTHESIA;  Surgeon: Elina Gonsalez MD;  Location: [facility] ENDOSCOPY;  Service:    • HYSTERECTOMY  1998    Had hysterectomy for painful periods and bleeding. (Dr. Patel) [facility] w/ BSO   • LAPAROSCOPIC TUBAL LIGATION     • MASTECTOMY W/ SENTINEL NODE BIOPSY Left 1/10/2020    Procedure: LEFT NEEDLE DIRECTED ULTRASOUND GUIDED PARTIAL MASTECTOMY WITH SENTINEL LYMPH NODE BIOPSY, INJECTION AND SCAN, RADIOLOGIST WILL INJECT;  Surgeon: Flor Edmond MD;  Location: [facility] OR;  Service: General   • TONSILLECTOMY       HEALTH MAINTENANCE ITEMS:  Health Maintenance Due   Topic Date Due   • ZOSTER VACCINE (1 of 2) 06/06/2012       <no information>  Last Completed Colonoscopy       Status Date      COLONOSCOPY Done 2/10/2017 Surg:COLONOSCOPY     Patient has more history with this topic...        Immunization History   Administered Date(s) Administered   • Tdap 08/29/2018     Last Completed Mammogram       Status Date      MAMMOGRAM Done  "12/27/2019 MAMMO DIAGNOSTIC DIGITAL TOMOSYNTHESIS LEFT W CAD     Patient has more history with this topic...            FAMILY HISTORY:  Family History   Problem Relation Age of Onset   • Colon cancer Maternal Grandmother         age not known   • Cancer Father    • Heart disease Father    • Diabetes Mother    • Hypertension Mother    • Stroke Mother    • Hypertension Sister    • No Known Problems Daughter    • No Known Problems Sister    • Colon polyps Neg Hx    • Esophageal cancer Neg Hx    • Liver cancer Neg Hx    • Liver disease Neg Hx    • Rectal cancer Neg Hx    • Stomach cancer Neg Hx    • Breast cancer Neg Hx    • Ovarian cancer Neg Hx      SOCIAL HISTORY:  Social History     Socioeconomic History   • Marital status:      Spouse name: Not on file   • Number of children: 1   • Years of education: Not on file   • Highest education level: Not on file   Tobacco Use   • Smoking status: Former Smoker     Types: Cigarettes   • Smokeless tobacco: Never Used   • Tobacco comment: \"i never really smoked\"   Substance and Sexual Activity   • Alcohol use: Yes     Comment: occasional   • Drug use: No   • Sexual activity: Yes     Partners: Male     Birth control/protection: Surgical       REVIEW OF SYSTEMS:  Review of Systems   Constitutional: Negative.    HENT: Negative.    Eyes: Negative.    Respiratory: Negative.    Cardiovascular: Negative.    Gastrointestinal: Negative.    Endocrine: Positive for heat intolerance (mild residual vasomotor changes).   Genitourinary: Negative.    Musculoskeletal: Negative.    Allergic/Immunologic: Negative.    Neurological: Negative.    Hematological: Negative.    Psychiatric/Behavioral: The patient is nervous/anxious.        /82   Pulse 105   Temp 98.4 °F (36.9 °C)   Resp 16   Ht 154.9 cm (60.98\")   Wt 74.3 kg (163 lb 14.4 oz)   SpO2 98%   Breastfeeding No   BMI 30.99 kg/m²  Body surface area is 1.74 meters squared.  Pain Score    03/05/20 0902   PainSc: 0-No pain "       Physical Exam:  Physical Exam   Constitutional: She is oriented to person, place, and time. She appears well-developed and well-nourished. No distress.   Pleasant, heavy set, cooperative, modestly kept female.  Appears her age.  ECOG 0.  Here with her spouse   HENT:   Head: Normocephalic and atraumatic.   Mouth/Throat: No oropharyngeal exudate.   Eyes: Pupils are equal, round, and reactive to light. Conjunctivae and EOM are normal. No scleral icterus.   Neck: Normal range of motion. Neck supple. No JVD present. No tracheal deviation present. No thyromegaly present.   Cardiovascular: Normal rate, regular rhythm and normal heart sounds. Exam reveals no friction rub.   No murmur heard.  Pulmonary/Chest: Effort normal and breath sounds normal. No stridor. No respiratory distress. She has no wheezes. She has no rales.   Abdominal: Soft. Bowel sounds are normal. She exhibits no distension and no mass. There is no tenderness. There is no rebound and no guarding.   Musculoskeletal: Normal range of motion. She exhibits no edema or deformity.   Lymphadenopathy:     She has no cervical adenopathy.   Neurological: She is alert and oriented to person, place, and time. She displays normal reflexes. No cranial nerve deficit. She exhibits normal muscle tone. Coordination normal.   Skin: Skin is warm and dry. No rash noted. No erythema. No pallor.   Psychiatric: She has a normal mood and affect. Her behavior is normal. Judgment and thought content normal.   Vitals reviewed.  Breasts: Left breast lumpectomy and sentinel node biopsy site in the left axillary healed.  There is no more incisional tenderness in either site.  The rest of the breast is without obvious nodularity skin changes no nipple discharge.  Right breast without masses, skin changes nor nipple discharge.    ASSESSMENT:   1.  Invasive and in situ ductal carcinoma  left breast.                 Stage:  IA (pT1a, pN0, G2) ER  100% positive, LA  25%, HER-2/lamberto - 2+  (IHC)/FISH -a focal positive score is present in an area corresponding to approximately 15% of the tumor (signal ratio: 2.9).                 Tumor Haskell:  5 mm.  Tumor focality: Single focus of invasive carcinoma.  No EIC nor LCIS.  Lymphovascular invasion: Not identified.  Dermal lymphovascular invasion: No skin present.  Microcalcifications: Not identified.  Margins uninvolved by invasive carcinoma.  Lymph nodes: Number of lymph nodes examined: 1 (0/1).                 Tumor Status:  Underwent lumpectomy and SNB, 01/10/2020.   Adjuvant Arimidex since 02/03/2020          2.   Obesity        3.   Spinal stenosis neck         4.   Herniated cervical disc without myelopathy       RECOMMENDATIONS:         1.    Re: Apprised of the labs, 02/03/2020 and 02/27/2020 (above).  Essentially normal CBC, CMP, CEA and CA-27-29.        2.   Apprised of the normal 2D echocardiogram, pending bone density, negative bone scan,  negative CT scans of the head, chest abdomen and pelvis (no evidence for metastases).        3.   Unable to repeat FISH testing for HER-2 on the tumor specimen from 01/10/2020 -pathology block did not contain any more tumor - personally discussed/confirmed with Dr. Burgos of pathology.        4.   Care discussed with Dr. Jaimes on 02/28/2020 after she saw the patient on 02/25/2020 (encounter reviewed).  Pathology was reviewed at [place] and discussed with the patient.  Recommendations/plan: Discussed that since her cancer is strongly ER positive and low histologic grade/low proliferative rate, and HER-2 on the biopsy was borderline recommended repeat HER-2 testing on her surgical pathology.  Defer to his negative proceed with only endocrine therapy and radiation.  If HER-2 is confirmed to be positive, recommend adjuvant weekly Taxol x12+ Herceptin every 3 weeks to complete 1 year of HER-2 directed therapy.  These plans were personally discussed with the patient (via telephone) on 02/28/2020 I also  personally asked the pathologist (Dr. Radha Burgos) to send off the biopsy specimen for repeat FISH HER-2 testing.          5.   Reviewed NCCN guidelines version 3.2019 invasive breast cancer.  For tumors (=/> 5 mm), and pN0, recommend adjuvant endocrine therapy +/- adjuvant chemotherapy with trastuzumab (category 2B).          6.   Re: The potential toxicities of adjuvant chemotherapy + Herceptin (to include but not limited to: Asthenia, cardiotoxicity, myelosuppression) are discussed at length. The rationale for adjuvant hormonal manipulation with AIs (see above) is discussed (to include but not limited to: Hot flashes, asthenia, pain, arthralgia, arthritis, nausea, bone pains, edema, fatigue, headache, venous thrombosis, anemia, leukopenia, depression, rash, pharyngitis, weight gain, dyspnea, fractures, breast pains, infection, angina). Questions answered to the best of my ability, and to her apparent satisfaction. She is willing to proceed with a trial of therapy if deemed necessary.   7.  Schedule treatment C1 on 03/09/2020 at [facility] -   · Taxol 80 mg/m2 (BSA = ; TD = mg.) per administration guidelines weekly x 12 beginning 03/09/2020 -C2, 03/16/2020; C3, 03/23/2020; C4, 03/30/2020; C5, 04/06/2020; C6, 04/13/2020; C7, 04/20/2020; C8, 04/27/2020; C9, 05/04/2020; C10, 05/11/2020, C11, 05/18/2020; C12, 05/25/2020  · Herceptin 8 mg/ kg loading dose for C1, then 6 mg/kg beginning C2 (TD = mg) per administration guidelines every 3 weeks x 1 year (17 cycles)-C2, 03/30/2020; C3, 04/20/2020; C4, 05/11/2020 - then C5-C17                     -  Premed before Herceptin -   · Tylenol 500 mg orally.   · Benadryl 25 mg IVP over 20-30 min.               8.  Premedicate on day 1 of chemo with:   · Aloxi 0.25 mg IVP   · Decadron 10 mg IVPB over 20-30 min   · ON TAXOL DAYS (D1,8,15): ALOXI + DEX + APAP + BENADRYL (even on No Herceptin days to prevent hypersensitivity with Taxol) - NO EMEND                9.  Rx:   · Zofran 8  mg p.o. every 8 hours as needed #30 - eRx   · Arimidex 1 mg daily #30 x2 RF - eRx - STOP on day 1 of chemo. Will resume after chemo is completed.  · Oscal 600/400 po bid # 60 - OTC          10.  CMP and CBC with diff weekly with Procrit 40,000 units subcutaneous if Hgb less than 10 or Hct less than 30; Neupogen 480 mcg subcutaneously daily x4 if ANC less than 1.0- [facility]        11. Reappoint to Dr. Edmond Re:  Mediport placement.       12. Discussed the rationale for adjuvant hormonal manipulation with AIs (see above) and potential toxicities of AI therapy are discussed (to include but not limited to: Hot flashes, asthenia, pain, arthralgias, arthritis, nausea/vomiting, headache, pharyngitis, depression, rash, hypertension, lymphedema, insomnia, edema, weight gain, dyspnea, abdominal pain, constipation, hyperlipidemia, osteoporosis, fractures, cough, bone pain, diarrhea, breast pain, paresthesias, infections, cataracts, myalgias, thromboembolism, angina, Lyles-Yung syndrome, erythema multiforme, anemia, leukopenia, stroke, myocardial infarction, osteoporosis, fractures). Questions are answered. She agrees to a trial of therapy.        13.  Review encounter with Dr. Brito on 02/05/2020.  Anticipate adjuvant radiation pending repeat FISH testing and subsequent plans for systemic therapy       14.  Return to the [place] office with pre-office serum iron, iron saturation, ferritin B12, folate, CMP, CEA, CA 27-29 and CBC and differential in 6 weeks    .     MEDICAL DECISION MAKING: High complexity   AMOUNT OF DATA: Extensive     I spent 105 total minutes, face-to-face, caring for Chasidy nova.  Greater than 50% of this time involved counseling and/or coordination of care as documented within this note regarding the patient's illness(es), pros and cons of various treatment options, instructions and/or risk reduction.     Cc:  Abigail Jaimes MD- [place] [facility]          MD Kailash Sawant  MD Jones Brito MD

## 2020-03-02 ENCOUNTER — HOSPITAL ENCOUNTER (OUTPATIENT)
Dept: RADIATION ONCOLOGY | Facility: HOSPITAL | Age: 60
Setting detail: RADIATION/ONCOLOGY SERIES
End: 2020-03-02

## 2020-03-05 ENCOUNTER — HOSPITAL ENCOUNTER (OUTPATIENT)
Facility: HOSPITAL | Age: 60
Setting detail: HOSPITAL OUTPATIENT SURGERY
Discharge: HOME | End: 2020-03-05
Attending: SPECIALIST | Admitting: SPECIALIST

## 2020-03-05 ENCOUNTER — ANESTHESIA EVENT (OUTPATIENT)
Dept: PERIOP | Facility: HOSPITAL | Age: 60
End: 2020-03-05

## 2020-03-05 ENCOUNTER — APPOINTMENT (OUTPATIENT)
Dept: GENERAL RADIOLOGY | Facility: HOSPITAL | Age: 60
End: 2020-03-05

## 2020-03-05 ENCOUNTER — ANESTHESIA (OUTPATIENT)
Dept: PERIOP | Facility: HOSPITAL | Age: 60
End: 2020-03-05

## 2020-03-05 ENCOUNTER — OFFICE VISIT (OUTPATIENT)
Dept: ONCOLOGY | Facility: CLINIC | Age: 60
End: 2020-03-05

## 2020-03-05 VITALS
TEMPERATURE: 97.4 F | HEART RATE: 81 BPM | BODY MASS INDEX: 30.26 KG/M2 | WEIGHT: 160.27 LBS | OXYGEN SATURATION: 98 % | RESPIRATION RATE: 12 BRPM | SYSTOLIC BLOOD PRESSURE: 131 MMHG | DIASTOLIC BLOOD PRESSURE: 72 MMHG | HEIGHT: 61 IN

## 2020-03-05 VITALS
DIASTOLIC BLOOD PRESSURE: 82 MMHG | HEIGHT: 61 IN | TEMPERATURE: 98.4 F | HEART RATE: 105 BPM | RESPIRATION RATE: 16 BRPM | OXYGEN SATURATION: 98 % | WEIGHT: 163.9 LBS | SYSTOLIC BLOOD PRESSURE: 158 MMHG | BODY MASS INDEX: 30.94 KG/M2

## 2020-03-05 DIAGNOSIS — C50.912 INVASIVE DUCTAL CARCINOMA OF BREAST, FEMALE, LEFT (HCC): Primary | ICD-10-CM

## 2020-03-05 DIAGNOSIS — Z98.890 S/P LYMPH NODE BIOPSY: ICD-10-CM

## 2020-03-05 DIAGNOSIS — C50.919 MALIGNANT NEOPLASM OF FEMALE BREAST, UNSPECIFIED ESTROGEN RECEPTOR STATUS, UNSPECIFIED LATERALITY, UNSPECIFIED SITE OF BREAST (HCC): Primary | ICD-10-CM

## 2020-03-05 DIAGNOSIS — Z98.890 S/P LUMPECTOMY, LEFT BREAST: ICD-10-CM

## 2020-03-05 PROCEDURE — 25010000002 FENTANYL CITRATE (PF) 100 MCG/2ML SOLUTION: Performed by: NURSE ANESTHETIST, CERTIFIED REGISTERED

## 2020-03-05 PROCEDURE — 25010000002 ONDANSETRON PER 1 MG: Performed by: NURSE ANESTHETIST, CERTIFIED REGISTERED

## 2020-03-05 PROCEDURE — 25010000002 MIDAZOLAM PER 1 MG: Performed by: NURSE ANESTHETIST, CERTIFIED REGISTERED

## 2020-03-05 PROCEDURE — 76000 FLUOROSCOPY <1 HR PHYS/QHP: CPT

## 2020-03-05 PROCEDURE — 25010000002 PROPOFOL 10 MG/ML EMULSION: Performed by: NURSE ANESTHETIST, CERTIFIED REGISTERED

## 2020-03-05 PROCEDURE — 99214 OFFICE O/P EST MOD 30 MIN: CPT | Performed by: INTERNAL MEDICINE

## 2020-03-05 PROCEDURE — 25010000002 DEXAMETHASONE PER 1 MG: Performed by: NURSE ANESTHETIST, CERTIFIED REGISTERED

## 2020-03-05 PROCEDURE — 25010000002 MIDAZOLAM PER 1 MG: Performed by: ANESTHESIOLOGY

## 2020-03-05 PROCEDURE — C1788 PORT, INDWELLING, IMP: HCPCS | Performed by: SPECIALIST

## 2020-03-05 DEVICE — POWERPORT M.R.I. IMPLANTABLE PORT WITH ATTACHABLE 9.6F OPEN-ENDED SINGLE-LUMEN VENOUS CATHETER INTERMEDIATE KIT (WITH SUTURE PLUGS)
Type: IMPLANTABLE DEVICE | Status: FUNCTIONAL
Brand: POWERPORT M.R.I.

## 2020-03-05 RX ORDER — MIDAZOLAM HYDROCHLORIDE 1 MG/ML
INJECTION INTRAMUSCULAR; INTRAVENOUS AS NEEDED
Status: DISCONTINUED | OUTPATIENT
Start: 2020-03-05 | End: 2020-03-05 | Stop reason: SURG

## 2020-03-05 RX ORDER — SODIUM CHLORIDE, SODIUM LACTATE, POTASSIUM CHLORIDE, CALCIUM CHLORIDE 600; 310; 30; 20 MG/100ML; MG/100ML; MG/100ML; MG/100ML
100 INJECTION, SOLUTION INTRAVENOUS CONTINUOUS
Status: DISCONTINUED | OUTPATIENT
Start: 2020-03-05 | End: 2020-03-05 | Stop reason: HOSPADM

## 2020-03-05 RX ORDER — DEXAMETHASONE SODIUM PHOSPHATE 4 MG/ML
INJECTION, SOLUTION INTRA-ARTICULAR; INTRALESIONAL; INTRAMUSCULAR; INTRAVENOUS; SOFT TISSUE AS NEEDED
Status: DISCONTINUED | OUTPATIENT
Start: 2020-03-05 | End: 2020-03-05 | Stop reason: SURG

## 2020-03-05 RX ORDER — HEPARIN SODIUM,PORCINE 10 UNIT/ML
VIAL (ML) INTRAVENOUS AS NEEDED
Status: DISCONTINUED | OUTPATIENT
Start: 2020-03-05 | End: 2020-03-05 | Stop reason: HOSPADM

## 2020-03-05 RX ORDER — SODIUM CHLORIDE, SODIUM LACTATE, POTASSIUM CHLORIDE, CALCIUM CHLORIDE 600; 310; 30; 20 MG/100ML; MG/100ML; MG/100ML; MG/100ML
1000 INJECTION, SOLUTION INTRAVENOUS CONTINUOUS
Status: DISCONTINUED | OUTPATIENT
Start: 2020-03-05 | End: 2020-03-05 | Stop reason: HOSPADM

## 2020-03-05 RX ORDER — PROPOFOL 10 MG/ML
VIAL (ML) INTRAVENOUS AS NEEDED
Status: DISCONTINUED | OUTPATIENT
Start: 2020-03-05 | End: 2020-03-05 | Stop reason: SURG

## 2020-03-05 RX ORDER — LABETALOL HYDROCHLORIDE 5 MG/ML
5 INJECTION, SOLUTION INTRAVENOUS
Status: DISCONTINUED | OUTPATIENT
Start: 2020-03-05 | End: 2020-03-05 | Stop reason: HOSPADM

## 2020-03-05 RX ORDER — ONDANSETRON 2 MG/ML
INJECTION INTRAMUSCULAR; INTRAVENOUS AS NEEDED
Status: DISCONTINUED | OUTPATIENT
Start: 2020-03-05 | End: 2020-03-05 | Stop reason: SURG

## 2020-03-05 RX ORDER — MIDAZOLAM HYDROCHLORIDE 1 MG/ML
1 INJECTION INTRAMUSCULAR; INTRAVENOUS
Status: DISCONTINUED | OUTPATIENT
Start: 2020-03-05 | End: 2020-03-05 | Stop reason: HOSPADM

## 2020-03-05 RX ORDER — SODIUM CHLORIDE 0.9 % (FLUSH) 0.9 %
3-10 SYRINGE (ML) INJECTION AS NEEDED
Status: DISCONTINUED | OUTPATIENT
Start: 2020-03-05 | End: 2020-03-05 | Stop reason: HOSPADM

## 2020-03-05 RX ORDER — LIDOCAINE HYDROCHLORIDE AND EPINEPHRINE 10; 10 MG/ML; UG/ML
INJECTION, SOLUTION INFILTRATION; PERINEURAL AS NEEDED
Status: DISCONTINUED | OUTPATIENT
Start: 2020-03-05 | End: 2020-03-05 | Stop reason: HOSPADM

## 2020-03-05 RX ORDER — FLUMAZENIL 0.1 MG/ML
0.2 INJECTION INTRAVENOUS AS NEEDED
Status: DISCONTINUED | OUTPATIENT
Start: 2020-03-05 | End: 2020-03-05 | Stop reason: HOSPADM

## 2020-03-05 RX ORDER — OXYCODONE HYDROCHLORIDE AND ACETAMINOPHEN 5; 325 MG/1; MG/1
1-2 TABLET ORAL EVERY 4 HOURS PRN
Qty: 20 TABLET | Refills: 0 | Status: SHIPPED | OUTPATIENT
Start: 2020-03-05 | End: 2020-09-30

## 2020-03-05 RX ORDER — SODIUM CHLORIDE 0.9 % (FLUSH) 0.9 %
3 SYRINGE (ML) INJECTION EVERY 12 HOURS SCHEDULED
Status: DISCONTINUED | OUTPATIENT
Start: 2020-03-05 | End: 2020-03-05 | Stop reason: HOSPADM

## 2020-03-05 RX ORDER — FENTANYL CITRATE 50 UG/ML
INJECTION, SOLUTION INTRAMUSCULAR; INTRAVENOUS AS NEEDED
Status: DISCONTINUED | OUTPATIENT
Start: 2020-03-05 | End: 2020-03-05 | Stop reason: SURG

## 2020-03-05 RX ORDER — OXYCODONE HYDROCHLORIDE AND ACETAMINOPHEN 5; 325 MG/1; MG/1
1 TABLET ORAL ONCE AS NEEDED
Status: DISCONTINUED | OUTPATIENT
Start: 2020-03-05 | End: 2020-03-05 | Stop reason: HOSPADM

## 2020-03-05 RX ORDER — FENTANYL CITRATE 50 UG/ML
25 INJECTION, SOLUTION INTRAMUSCULAR; INTRAVENOUS
Status: DISCONTINUED | OUTPATIENT
Start: 2020-03-05 | End: 2020-03-05 | Stop reason: HOSPADM

## 2020-03-05 RX ORDER — IPRATROPIUM BROMIDE AND ALBUTEROL SULFATE 2.5; .5 MG/3ML; MG/3ML
3 SOLUTION RESPIRATORY (INHALATION) ONCE AS NEEDED
Status: DISCONTINUED | OUTPATIENT
Start: 2020-03-05 | End: 2020-03-05 | Stop reason: HOSPADM

## 2020-03-05 RX ORDER — OXYCODONE AND ACETAMINOPHEN 7.5; 325 MG/1; MG/1
2 TABLET ORAL EVERY 4 HOURS PRN
Status: DISCONTINUED | OUTPATIENT
Start: 2020-03-05 | End: 2020-03-05 | Stop reason: HOSPADM

## 2020-03-05 RX ORDER — IBUPROFEN 600 MG/1
600 TABLET ORAL ONCE AS NEEDED
Status: DISCONTINUED | OUTPATIENT
Start: 2020-03-05 | End: 2020-03-05 | Stop reason: HOSPADM

## 2020-03-05 RX ORDER — SODIUM CHLORIDE 0.9 % (FLUSH) 0.9 %
3 SYRINGE (ML) INJECTION AS NEEDED
Status: DISCONTINUED | OUTPATIENT
Start: 2020-03-05 | End: 2020-03-05 | Stop reason: HOSPADM

## 2020-03-05 RX ORDER — ONDANSETRON 2 MG/ML
4 INJECTION INTRAMUSCULAR; INTRAVENOUS ONCE AS NEEDED
Status: DISCONTINUED | OUTPATIENT
Start: 2020-03-05 | End: 2020-03-05 | Stop reason: HOSPADM

## 2020-03-05 RX ORDER — LIDOCAINE HYDROCHLORIDE 10 MG/ML
0.5 INJECTION, SOLUTION EPIDURAL; INFILTRATION; INTRACAUDAL; PERINEURAL ONCE AS NEEDED
Status: DISCONTINUED | OUTPATIENT
Start: 2020-03-05 | End: 2020-03-05 | Stop reason: HOSPADM

## 2020-03-05 RX ORDER — OXYCODONE AND ACETAMINOPHEN 10; 325 MG/1; MG/1
1 TABLET ORAL ONCE AS NEEDED
Status: DISCONTINUED | OUTPATIENT
Start: 2020-03-05 | End: 2020-03-05 | Stop reason: HOSPADM

## 2020-03-05 RX ORDER — NALOXONE HCL 0.4 MG/ML
0.4 VIAL (ML) INJECTION AS NEEDED
Status: DISCONTINUED | OUTPATIENT
Start: 2020-03-05 | End: 2020-03-05 | Stop reason: HOSPADM

## 2020-03-05 RX ORDER — MIDAZOLAM HYDROCHLORIDE 1 MG/ML
2 INJECTION INTRAMUSCULAR; INTRAVENOUS
Status: DISCONTINUED | OUTPATIENT
Start: 2020-03-05 | End: 2020-03-05 | Stop reason: HOSPADM

## 2020-03-05 RX ORDER — ACETAMINOPHEN 500 MG
1000 TABLET ORAL ONCE
Status: COMPLETED | OUTPATIENT
Start: 2020-03-05 | End: 2020-03-05

## 2020-03-05 RX ADMIN — LIDOCAINE HYDROCHLORIDE 40 MG: 20 INJECTION, SOLUTION INTRAVENOUS at 18:25

## 2020-03-05 RX ADMIN — ACETAMINOPHEN 1000 MG: 500 TABLET, FILM COATED ORAL at 18:08

## 2020-03-05 RX ADMIN — PROPOFOL 50 MG: 10 INJECTION, EMULSION INTRAVENOUS at 18:28

## 2020-03-05 RX ADMIN — ONDANSETRON HYDROCHLORIDE 4 MG: 2 SOLUTION INTRAMUSCULAR; INTRAVENOUS at 18:39

## 2020-03-05 RX ADMIN — SODIUM CHLORIDE, POTASSIUM CHLORIDE, SODIUM LACTATE AND CALCIUM CHLORIDE 1000 ML: 600; 310; 30; 20 INJECTION, SOLUTION INTRAVENOUS at 18:00

## 2020-03-05 RX ADMIN — DEXAMETHASONE SODIUM PHOSPHATE 4 MG: 4 INJECTION, SOLUTION INTRAMUSCULAR; INTRAVENOUS at 18:39

## 2020-03-05 RX ADMIN — FENTANYL CITRATE 50 MCG: 50 INJECTION, SOLUTION INTRAMUSCULAR; INTRAVENOUS at 18:22

## 2020-03-05 RX ADMIN — CEFAZOLIN 1 G: 1 INJECTION, POWDER, FOR SOLUTION INTRAMUSCULAR; INTRAVENOUS; PARENTERAL at 18:23

## 2020-03-05 RX ADMIN — MIDAZOLAM HYDROCHLORIDE 3 MG: 1 INJECTION, SOLUTION INTRAMUSCULAR; INTRAVENOUS at 18:19

## 2020-03-05 RX ADMIN — MIDAZOLAM 2 MG: 1 INJECTION INTRAMUSCULAR; INTRAVENOUS at 18:10

## 2020-03-05 RX ADMIN — PROPOFOL 50 MG: 10 INJECTION, EMULSION INTRAVENOUS at 18:25

## 2020-03-05 RX ADMIN — MIDAZOLAM HYDROCHLORIDE 2 MG: 1 INJECTION, SOLUTION INTRAMUSCULAR; INTRAVENOUS at 18:20

## 2020-03-05 RX ADMIN — FENTANYL CITRATE 50 MCG: 50 INJECTION, SOLUTION INTRAMUSCULAR; INTRAVENOUS at 18:21

## 2020-03-05 RX ADMIN — PROPOFOL 30 MG: 10 INJECTION, EMULSION INTRAVENOUS at 18:30

## 2020-03-05 NOTE — H&P
Flor Edmond MD [facility] History and Physical     Referring Provider: Flor Edmond MD    Patient Care Team:  Jones Elias DO as PCP - General (Internal Medicine)    Chief complaint breast cancer    Subjective .     History of present illness:  The patient is a 59 y.o. female who presents for port placement.    Review of Systems    Review of Systems - General ROS: negative  ENT ROS: negative  Respiratory ROS: no cough, shortness of breath, or wheezing  Cardiovascular ROS: no chest pain or dyspnea on exertion  Gastrointestinal ROS: no abdominal pain, change in bowel habits, or black or bloody stools  Genito-Urinary ROS: no dysuria, trouble voiding, or hematuria  Dermatological ROS: negative   Breast ROS: negative for breast lumps  Hematological and Lymphatic ROS: negative  Musculoskeletal ROS: negative   Neurological ROS: no TIA or stroke symptoms    Psychological ROS: negative  Endocrine ROS: negative    History  Past Medical History:   Diagnosis Date   • Acid reflux    • Asthma     long time ago    • Cancer (CMS/HCC)    • Panic attack    • Right arm weakness    ,   Past Surgical History:   Procedure Laterality Date   • ANTERIOR CERVICAL DISCECTOMY W/ FUSION N/A 9/7/2018    Procedure: CERVICAL DISCECTOMY ANTERIOR WITH FUSION C6-7;  Surgeon: Chris Mcfadden MD;  Location: [facility] OR;  Service: Neurosurgery   • APPENDECTOMY     • BILATERAL SALPINGO OOPHORECTOMY     • CERVICAL CORPECTOMY N/A 9/7/2018    Procedure: CERVICAL CORPECTOMY C6-7;  Surgeon: Chris Mcfadden MD;  Location: [facility] OR;  Service: Neurosurgery   • CHOLECYSTECTOMY     • COLONOSCOPY  09/05/2012   • COLONOSCOPY N/A 2/10/2017    Procedure: COLONOSCOPY WITH ANESTHESIA;  Surgeon: Elina Gonsalez MD;  Location: [facility] ENDOSCOPY;  Service:    • HYSTERECTOMY  1998    Had hysterectomy for painful periods and bleeding. (Dr. Patel) [facility] w/ BSO   • LAPAROSCOPIC TUBAL LIGATION     • MASTECTOMY W/ SENTINEL NODE BIOPSY Left 1/10/2020    Procedure:  "LEFT NEEDLE DIRECTED ULTRASOUND GUIDED PARTIAL MASTECTOMY WITH SENTINEL LYMPH NODE BIOPSY, INJECTION AND SCAN, RADIOLOGIST WILL INJECT;  Surgeon: Flor Edmond MD;  Location: [facility];  Service: General   • TONSILLECTOMY     ,   Family History   Problem Relation Age of Onset   • Colon cancer Maternal Grandmother         age not known   • Cancer Father    • Heart disease Father    • Diabetes Mother    • Hypertension Mother    • Stroke Mother    • Hypertension Sister    • No Known Problems Daughter    • No Known Problems Sister    • Colon polyps Neg Hx    • Esophageal cancer Neg Hx    • Liver cancer Neg Hx    • Liver disease Neg Hx    • Rectal cancer Neg Hx    • Stomach cancer Neg Hx    • Breast cancer Neg Hx    • Ovarian cancer Neg Hx    ,   Social History     Tobacco Use   • Smoking status: Former Smoker     Types: Cigarettes   • Smokeless tobacco: Never Used   • Tobacco comment: \"i never really smoked\"   Substance Use Topics   • Alcohol use: Yes     Comment: occasional   • Drug use: No   ,   Medications Prior to Admission   Medication Sig Dispense Refill Last Dose   • ALPRAZolam (XANAX) 0.5 MG tablet Take 1 tablet by mouth 2 (Two) Times a Day As Needed for Anxiety. 60 tablet 2 Taking   • anastrozole (ARIMIDEX) 1 MG tablet Take 1 tablet by mouth Daily. 30 tablet 2 Taking   • Inulin (FIBER CHOICE PO) Take  by mouth.   Taking   • Multiple Vitamins-Minerals (MULTIVITAMIN ADULT PO) Take  by mouth Daily.   Taking   • ondansetron (ZOFRAN) 8 MG tablet Take 1 tablet by mouth Every 8 (Eight) Hours As Needed for Nausea or Vomiting. 30 tablet 0 Taking   • sertraline (ZOLOFT) 50 MG tablet Take 1 tablet by mouth Daily. 30 tablet 11 Taking    and Allergies:  Demerol [meperidine] and Morphine and related  No current facility-administered medications for this encounter.     Objective     Vital Signs   Temp:  [98.2 °F (36.8 °C)-98.4 °F (36.9 °C)] 98.2 °F (36.8 °C)  Heart Rate:  [] 95  Resp:  [16-20] 20  BP: " (145-158)/(80-82) 145/80    Physical Exam:  General appearance - alert, well appearing, and in no distress  Mental status - alert, oriented to person, place, and time  Neck - supple, no significant adenopathy  Chest - clear to auscultation, no wheezes, rales or rhonchi, symmetric air entry  Heart - normal rate, regular rhythm, normal S1, S2, no murmurs, rubs, clicks or gallops  Abdomen - soft, nontender, nondistended, no masses or organomegaly  Neurological - alert, oriented, normal speech, no focal findings or movement disorder noted  Musculoskeletal - no joint tenderness, deformity or swelling  Extremities - peripheral pulses normal, no pedal edema, no clubbing or cyanosis    Results Review:     Lab Results (last 24 hours)     ** No results found for the last 24 hours. **        Imaging Results (Last 24 Hours)     ** No results found for the last 24 hours. **            Assessment/Plan       Breast cancer.  She will undergo port placement.      Flor Edmond MD  03/05/20  4:43 PM

## 2020-03-05 NOTE — ANESTHESIA PREPROCEDURE EVALUATION
Anesthesia Evaluation     Patient summary reviewed   no history of anesthetic complications:  NPO Solid Status: > 8 hours  NPO Liquid Status: > 8 hours           Airway   Mallampati: I  TM distance: >3 FB  Neck ROM: full  Dental          Pulmonary - normal exam    breath sounds clear to auscultation  (+) asthma,  (-) recent URI, sleep apnea, not a smoker  Cardiovascular - normal exam  Exercise tolerance: good (4-7 METS)    ECG reviewed  Rhythm: regular  Rate: normal    (-) pacemaker, hypertension, past MI, angina, cardiac stents, CABG      Neuro/Psych  (+) psychiatric history Anxiety,     (-) seizures, TIA, CVA  GI/Hepatic/Renal/Endo    (+) obesity,  GERD,    (-) liver disease, no renal disease, diabetes, no thyroid disorder    Musculoskeletal     Abdominal    Substance History      OB/GYN          Other      history of cancer                      Anesthesia Plan    ASA 2     MAC     intravenous induction     Anesthetic plan, all risks, benefits, and alternatives have been provided, discussed and informed consent has been obtained with: patient.

## 2020-03-06 DIAGNOSIS — C50.912 INVASIVE DUCTAL CARCINOMA OF BREAST, FEMALE, LEFT (HCC): ICD-10-CM

## 2020-03-06 NOTE — DISCHARGE INSTRUCTIONS
YOUR NEXT PAIN MEDICATION IS DUE AT______________        Moderate Conscious Sedation, Adult, Care After  Refer to this sheet in the next few weeks. These instructions provide you with information on caring for yourself after your procedure. Your health care provider may also give you more specific instructions. Your treatment has been planned according to current medical practices, but problems sometimes occur. Call your health care provider if you have any problems or questions after your procedure.  WHAT TO EXPECT AFTER THE PROCEDURE    After your procedure:  · You may feel sleepy, clumsy, and have poor balance for several hours.  · Vomiting may occur if you eat too soon after the procedure.  HOME CARE INSTRUCTIONS  · Do not participate in any activities where you could become injured for at least 24 hours. Do not:  ¨ Drive.  ¨ Swim.  ¨ Ride a bicycle.  ¨ Operate heavy machinery.  ¨ Cook.  ¨ Use power tools.  ¨ Climb ladders.  ¨ Work from a high place.  · Do not make important decisions or sign legal documents until you are improved.  · If you vomit, drink water, juice, or soup when you can drink without vomiting. Make sure you have little or no nausea before eating solid foods.  · Only take over-the-counter or prescription medicines for pain, discomfort, or fever as directed by your health care provider.  · Make sure you and your family fully understand everything about the medicines given to you, including what side effects may occur.  · You should not drink alcohol, take sleeping pills, or take medicines that cause drowsiness for at least 24 hours.  · If you smoke, do not smoke without supervision.  · If you are feeling better, you may resume normal activities 24 hours after you were sedated.  · Keep all appointments with your health care provider.  SEEK MEDICAL CARE IF:  · Your skin is pale or bluish in color.  · You continue to feel nauseous or vomit.  · Your pain is getting worse and is not helped by  medicine.  · You have bleeding or swelling.  · You are still sleepy or feeling clumsy after 24 hours.  SEEK IMMEDIATE MEDICAL CARE IF:  · You develop a rash.  · You have difficulty breathing.  · You develop any type of allergic problem.  · You have a fever.  MAKE SURE YOU:  · Understand these instructions.  · Will watch your condition.  · Will get help right away if you are not doing well or get worse.     This information is not intended to replace advice given to you by your health care provider. Make sure you discuss any questions you have with your health care provider.     Document Released: 10/08/2014 Document Revised: 01/08/2016 Document Reviewed: 10/08/2014  [employer] Interactive Patient Education ©2016 [employer]         CALL YOUR PHYSICIAN IF YOU EXPERIENCE  INCREASED PAIN NOT HELPED BY YOUR PAIN MEDICATION.        Fall Prevention in the Home      Falls can cause injuries. They can happen to people of all ages. There are many things you can do to make your home safe and to help prevent falls.    WHAT CAN I DO ON THE OUTSIDE OF MY HOME?  · Regularly fix the edges of walkways and driveways and fix any cracks.  · Remove anything that might make you trip as you walk through a door, such as a raised step or threshold.  · Trim any bushes or trees on the path to your home.  · Use bright outdoor lighting.  · Clear any walking paths of anything that might make someone trip, such as rocks or tools.  · Regularly check to see if handrails are loose or broken. Make sure that both sides of any steps have handrails.  · Any raised decks and porches should have guardrails on the edges.  · Have any leaves, snow, or ice cleared regularly.  · Use sand or salt on walking paths during winter.  · Clean up any spills in your garage right away. This includes oil or grease spills.  WHAT CAN I DO IN THE BATHROOM?    · Use night lights.  · Install grab bars by the toilet and in the tub and shower. Do not use towel bars as grab  bars.  · Use non-skid mats or decals in the tub or shower.  · If you need to sit down in the shower, use a plastic, non-slip stool.  · Keep the floor dry. Clean up any water that spills on the floor as soon as it happens.  · Remove soap buildup in the tub or shower regularly.  · Attach bath mats securely with double-sided non-slip rug tape.  · Do not have throw rugs and other things on the floor that can make you trip.  WHAT CAN I DO IN THE BEDROOM?  · Use night lights.  · Make sure that you have a light by your bed that is easy to reach.  · Do not use any sheets or blankets that are too big for your bed. They should not hang down onto the floor.  · Have a firm chair that has side arms. You can use this for support while you get dressed.  · Do not have throw rugs and other things on the floor that can make you trip.  WHAT CAN I DO IN THE KITCHEN?  · Clean up any spills right away.  · Avoid walking on wet floors.  · Keep items that you use a lot in easy-to-reach places.  · If you need to reach something above you, use a strong step stool that has a grab bar.  · Keep electrical cords out of the way.  · Do not use floor polish or wax that makes floors slippery. If you must use wax, use non-skid floor wax.  · Do not have throw rugs and other things on the floor that can make you trip.  WHAT CAN I DO WITH MY STAIRS?  · Do not leave any items on the stairs.  · Make sure that there are handrails on both sides of the stairs and use them. Fix handrails that are broken or loose. Make sure that handrails are as long as the stairways.  · Check any carpeting to make sure that it is firmly attached to the stairs. Fix any carpet that is loose or worn.  · Avoid having throw rugs at the top or bottom of the stairs. If you do have throw rugs, attach them to the floor with carpet tape.  · Make sure that you have a light switch at the top of the stairs and the bottom of the stairs. If you do not have them, ask someone to add them for  you.  WHAT ELSE CAN I DO TO HELP PREVENT FALLS?  · Wear shoes that:  ¨ Do not have high heels.  ¨ Have rubber bottoms.  ¨ Are comfortable and fit you well.  ¨ Are closed at the toe. Do not wear sandals.  · If you use a stepladder:  ¨ Make sure that it is fully opened. Do not climb a closed stepladder.  ¨ Make sure that both sides of the stepladder are locked into place.  ¨ Ask someone to hold it for you, if possible.  · Clearly naeem and make sure that you can see:  ¨ Any grab bars or handrails.  ¨ First and last steps.  ¨ Where the edge of each step is.  · Use tools that help you move around (mobility aids) if they are needed. These include:  ¨ Canes.  ¨ Walkers.  ¨ Scooters.  ¨ Crutches.  · Turn on the lights when you go into a dark area. Replace any light bulbs as soon as they burn out.  · Set up your furniture so you have a clear path. Avoid moving your furniture around.  · If any of your floors are uneven, fix them.  · If there are any pets around you, be aware of where they are.  · Review your medicines with your doctor. Some medicines can make you feel dizzy. This can increase your chance of falling.  Ask your doctor what other things that you can do to help prevent falls.     This information is not intended to replace advice given to you by your health care provider. Make sure you discuss any questions you have with your health care provider.     Document Released: 10/14/2010 Document Revised: 05/03/2016 Document Reviewed: 01/22/2016  [employer]vier Interactive Patient Education ©2016 [employer]     PATIENT/FAMILY/RESPONSIBLE PARTY VERBALIZES UNDERSTANDING OF ABOVE EDUCATION.  COPY OF PAIN SCALE GIVEN AND REVIEWED WITH VERBALIZED UNDERSTANDING.

## 2020-03-06 NOTE — ANESTHESIA POSTPROCEDURE EVALUATION
"Patient: Chasidy Tejada    Procedure Summary     Date:  03/05/20 Room / [facility]:   [facility]  [facility]    Anesthesia Start:  1815 Anesthesia Stop:  1905    Procedure:  INSERTION VENOUS ACCESS DEVICE EXCISION SKIN LESION X 2 CHEST AND ABDOMEN (N/A Chin to Nipples) Diagnosis:      Surgeon:  Flor Edmond MD Provider:  Lynda Morales CRNA    Anesthesia Type:  MAC ASA Status:  2          Anesthesia Type: MAC    Vitals  Vitals Value Taken Time   /86 3/5/2020  7:15 PM   Temp 97.4 °F (36.3 °C) 3/5/2020  7:03 PM   Pulse 88 3/5/2020  7:19 PM   Resp 12 3/5/2020  7:10 PM   SpO2 100 % 3/5/2020  7:19 PM   Vitals shown include unvalidated device data.        Post Anesthesia Care and Evaluation    Patient location during evaluation: PHASE II  Patient participation: complete - patient participated  Level of consciousness: awake and alert  Pain score: 0  Pain management: adequate  Airway patency: patent  Anesthetic complications: No anesthetic complications  PONV Status: none  Cardiovascular status: acceptable  Respiratory status: acceptable  Hydration status: acceptable    Comments: Blood pressure 132/77, pulse 100, temperature 97.4 °F (36.3 °C), temperature source Tympanic, resp. rate 12, height 154 cm (60.63\"), weight 72.7 kg (160 lb 4.4 oz), SpO2 97 %, not currently breastfeeding.    Pt discharged from PACU based on darlin score >8      "

## 2020-03-06 NOTE — OP NOTE
Preoperative diagnosis:  Breast cancer  Postoperative diagnosis:  Same  Procedure:  Placement single lumen port via left subclavian vein, excision skin lesion x 2 each 1cm  Surgeon: Flor Edmond MD  Anesthesia;  Mac loc  Ebl:  Minimal  Ivf:  See anesthesia  Indications: the patient is a 59-year-old female who presents for port placement.  The risks, benefits, complications, and possible alternatives were discussed with the patient who agreed to proceed.  Description of procedure: the patient was laid supine. The chest and abdomen were prepped and draped.  Venous blood was aspirated from the left subclavian vein.  A 0.035J wire was placed into the vein thru the needle.  The needle was removed.  Under fluoro, the wire was seen in the superior vena cava.  A pocket was created for placement of the port.  The skin at the wire exit site was slightly enlarged.  The tubing was tunneled from the pocket to the wire exit site.  The tubing was sized and anchored to the port with the locking device.  The port was anchored to the subcutaneous tissues with 3-0 prolene x 2.  A dilator thru a breakaway sheath was placed into the vein over the wire.  The wire and dilator were removed.  The tubing was placed into the vein thru the sheath as it was broken away.  Under fluoro, the tip was seen in the superior vena cava.  Venous blood was aspirated from the port and the port was then flushed with heparinized saline.  The skin was closed with 3-0 and 4-0 vicyrl. There was a 1cm flat skin lesion at the right chest and right side of abdomen. An elliptical incision was created around each.  bovie was used to excise the specimens. The skin was closed with 3-0 and 4-0 vicyrl. The sponge, needle, and instrument counts were correct.  Complications: none  Disposition:  Good to pacu  Finding: tip svc

## 2020-03-09 ENCOUNTER — OFFICE VISIT (OUTPATIENT)
Dept: ONCOLOGY | Facility: CLINIC | Age: 60
End: 2020-03-09

## 2020-03-09 VITALS
TEMPERATURE: 97.9 F | HEART RATE: 100 BPM | RESPIRATION RATE: 16 BRPM | WEIGHT: 165 LBS | OXYGEN SATURATION: 98 % | BODY MASS INDEX: 31.15 KG/M2 | DIASTOLIC BLOOD PRESSURE: 84 MMHG | SYSTOLIC BLOOD PRESSURE: 148 MMHG | HEIGHT: 61 IN

## 2020-03-09 DIAGNOSIS — C50.912 INVASIVE DUCTAL CARCINOMA OF BREAST, FEMALE, LEFT (HCC): Primary | ICD-10-CM

## 2020-03-09 PROCEDURE — 99213 OFFICE O/P EST LOW 20 MIN: CPT | Performed by: NURSE PRACTITIONER

## 2020-03-09 NOTE — PROGRESS NOTES
[facility]  HEMATOLOGY & ONCOLOGY    W [facility] HEMATOLOGY AND ONCOLOGY  [address]  [place] [address] [phone]    Patient Name: Chasidy Tejada  Encounter Date: 03/09/2020  YOB: 1960  Patient Number: [id number]    Chief Complaint   Patient presents with   • Breast Cancer     Here for chemo education       REASON FOR VISIT: Chasidy Tejada is a 59-year-old female who returns in follow-up of newly diagnosed stage IA left breast carcinoma, ER/RI and HER-2/lamberto positive.  She underwent left breast lumpectomy with SNB, 01/10/2020 and was started on adjuvant Arimidex beginning 2/3/2020.  She is seen today for chemotherapy education.  She is here with her spouse.  In the interval she has been seen by Dr. Jaimes of [facility] for a second opinion.    Mrs. Tejada is here today for chemotherapy education. She is scheduled to begin weekly Taxol + Herceptin on Wednesday.  She has her port in place and ready to begin.  She is feeling well.      DIAGNOSTIC ABNORMALITIES:          1.   12/13/2019- mammogram screening.  Impression: Stellate lesion outer left breast.  Spot compression and ultrasound suggested.          2.   12/27/2019- diagnostic mammogram.  Impression: Irregular density upper outer left breast.  Biopsy suggested.          3.   12/27/2019- left breast ultrasound.  Impression: Suspicious 6 x 5 x 8 mm density left breast approximately 8 cm from the nipple.  Biopsy suggested.  BI-RADS Category 4B.          4.   01/03/2020-ultrasound-guided breast biopsy.  Impression: Appropriate sampling of the 8 mm irregular hypoechoic nodule in the left breast at 1:00 in the posterior depth/axillary tail region.  Final diagnosis: Left breast at 1 o'clock position, core biopsies: A.invasive mammary carcinoma of no special type (ductal, not otherwise specified), grade 2, at least 4.9 mm in greatest extent.   B.associated low-grade ductal carcinoma in situ.  % positive, TN 25% positive, HER-2/lamberto-2+ (IHC)/FISH- a focal positive score is present in an area corresponding to approximately 15% of the tumor (signal ratio: 2.9).           5.   02/03/2020- CMP normal with .  CBC normal.           6.   02/03/2020-bone scan ([facility]).  Impression: Degenerative joint changes.  No evidence of bony metastasis.           7.   02/03/2020- DEXA scan ([facility]).  Impression: Pending           8.   02/03/2020-CT scans, head chest, abdomen, pelvis-impression: No abnormal intracranial enhancement to suggest intracranial metastasis/abnormalities.  No evidence of intrathoracic metastasis.  Stable 5 to 6 mm right lower lobe nodule of the right hemidiaphragm unchanged from 2/14/2011.  No convincing evidence of intra-abdominal or pelvic metastasis.  Hepatic cysts.  Fatty attenuated benign focus within the body of the pancreas visualized, 12/14/2011.  Previous hysterectomy, cholecystectomy and appendectomy.           9.   02/14/2020-echocardiogram.  Impression: LV systolic function normal (EF equals 70%).  Normal LV and RV size thickness and function.  Normal LA and RA size.  Normal cardiac valves.  No pericardial effusion.          10.  02/27/2020- CMP normal with .  CBC normal.  Iron 65, iron saturation 18%, ferritin 134, B12 745, folate > 20, CEA 1.76, CA-27-29 14.2.          11.  03/04/2020- repeat FISH for HER-2/lamberto (from tumor block, 01/10/2020).  No tumor on specimen.     PREVIOUS INTERVENTIONS:           1.   01/10/2020- left breast lumpectomy with left axillary sentinel node biopsy.  Final diagnosis: 1.left breast mass, lumpectomy: Invasive and in situ ductal carcinoma.  Tumor site-not specified.  Histologic type: Invasive carcinoma of no special type (invasive ductal carcinoma, not otherwise specified).  Overall tumor grade: Grade 1.  Tumor size: Greatest dimension of largest invasive focus: 5 mm.  Tumor focality: Single  "focus of invasive carcinoma.  Ductal carcinoma in situ (DCIS): Present.  Negative for extensive intraductal component (EIC).  Size (extent) CIS: Cannot be determined: Microscopic.  Architectural pattern: Cribriform/solid.  Nuclear grade: 2 (intermediate).  Necrosis: Present, central (expansive \"comedo) necrosis).  Lobular carcinoma in situ (LCIS): No LCIS in specimen.  Lymphovascular invasion: Not identified.  Dermal lymphovascular invasion: No skin present.  Margins: Uninvolved by invasive carcinoma.  Lymph nodes: Uninvolved by tumor cells.  Regional lymph nodes: 1.  Number of lymph nodes examined: 1.  Number of sentinel nodes examined: 1.  Pathologic stage classification (pTNM, AJCC eighth edition): pT1a,(sn), pN0.  % positive, NC 25% positive, HER-2 2+ (equivocal).           2.   Adjuvant Arimidex 1 mg p.o. daily beginning 02/03/2020       PAST MEDICAL HISTORY:  ALLERGIES:  Allergies   Allergen Reactions   • Demerol [Meperidine] Hives   • Morphine And Related Hives       CURRENT MEDICATIONS:  Outpatient Encounter Medications as of 3/9/2020   Medication Sig Dispense Refill   • ALPRAZolam (XANAX) 0.5 MG tablet Take 1 tablet by mouth 2 (Two) Times a Day As Needed for Anxiety. 60 tablet 2   • Calcium Carb-Cholecalciferol (OS-ORTIZ PO) Take 1 tablet by mouth Daily.     • Docusate Sodium (STOOL SOFTENER LAXATIVE PO) Take  by mouth. Prn     • Inulin (FIBER CHOICE PO) Take 1 tablet by mouth Daily.     • Multiple Vitamins-Minerals (MULTIVITAMIN ADULT PO) Take 1 tablet by mouth Daily.     • ondansetron (ZOFRAN) 8 MG tablet Take 1 tablet by mouth Every 8 (Eight) Hours As Needed for Nausea or Vomiting. 30 tablet 0   • oxyCODONE-acetaminophen (PERCOCET) 5-325 MG per tablet Take 1-2 tablets by mouth Every 4 (Four) Hours As Needed (Pain). 20 tablet 0   • sertraline (ZOLOFT) 50 MG tablet Take 1 tablet by mouth Daily. 30 tablet 11     No facility-administered encounter medications on file as of 3/9/2020.      ADULT " ILLNESSES:  Patient Active Problem List   Diagnosis Code   • Family history of colon cancer Z80.0   • Colon polyps K63.5   • Laryngopharyngeal reflux K21.9   • Pharyngoesophageal dysphagia R13.14   • Non-smoker Z78.9   • Obesity (BMI 30-39.9) E66.9   • Spinal stenosis in cervical region M48.02   • Cervical radiculopathy M54.12   • Herniated cervical disc without myelopathy M50.20   • Borderline blood pressure R03.0   • BMI 31.0-31.9,adult Z68.31   • BMI 33.0-33.9,adult Z68.33   • Invasive ductal carcinoma of breast, female, left (CMS/HCC) C50.912   • Former smoker Z87.891   • S/P lumpectomy, left breast Z98.890   • S/P lymph node biopsy Z98.890     SURGERIES:  Past Surgical History:   Procedure Laterality Date   • ANTERIOR CERVICAL DISCECTOMY W/ FUSION N/A 9/7/2018    Procedure: CERVICAL DISCECTOMY ANTERIOR WITH FUSION C6-7;  Surgeon: Chris Mcfadden MD;  [facility];  Service: Neurosurgery   • APPENDECTOMY     • BILATERAL SALPINGO OOPHORECTOMY     • CERVICAL CORPECTOMY N/A 9/7/2018    Procedure: CERVICAL CORPECTOMY C6-7;  Surgeon: Chris Mcfadden MD;  Location: [facility] OR;  Service: Neurosurgery   • CHOLECYSTECTOMY     • COLONOSCOPY  09/05/2012   • COLONOSCOPY N/A 2/10/2017    Procedure: COLONOSCOPY WITH ANESTHESIA;  Surgeon: Elina Gonsalez MD;  Location: [facility] ENDOSCOPY;  Service:    • HYSTERECTOMY  1998    Had hysterectomy for painful periods and bleeding. (Dr. Patel) [facility] w/ BSO   • LAPAROSCOPIC TUBAL LIGATION     • MASTECTOMY W/ SENTINEL NODE BIOPSY Left 1/10/2020    Procedure: LEFT NEEDLE DIRECTED ULTRASOUND GUIDED PARTIAL MASTECTOMY WITH SENTINEL LYMPH NODE BIOPSY, INJECTION AND SCAN, RADIOLOGIST WILL INJECT;  Surgeon: Flor Edmond MD;  Location: [facility] OR;  Service: General   • TONSILLECTOMY     • VENOUS ACCESS DEVICE (PORT) INSERTION N/A 3/5/2020    Procedure: INSERTION VENOUS ACCESS DEVICE EXCISION SKIN LESION X 2 CHEST AND ABDOMEN;  Surgeon: Flor Edmond MD;  Location: [facility] OR;   "Service: General;  Laterality: N/A;     HEALTH MAINTENANCE ITEMS:  Health Maintenance Due   Topic Date Due   • ZOSTER VACCINE (1 of 2) 06/06/2012       <no information>  Last Completed Colonoscopy       Status Date      COLONOSCOPY Done 2/10/2017 Surg:COLONOSCOPY     1 polyp removed        Immunization History   Administered Date(s) Administered   • Tdap 08/29/2018     Last Completed Mammogram       Status Date      MAMMOGRAM Done 12/27/2019 MAMMO DIAGNOSTIC DIGITAL TOMOSYNTHESIS LEFT W CAD             FAMILY HISTORY:  Family History   Problem Relation Age of Onset   • Colon cancer Maternal Grandmother         age not known   • Cancer Father    • Heart disease Father    • Diabetes Mother    • Hypertension Mother    • Stroke Mother    • Hypertension Sister    • No Known Problems Daughter    • No Known Problems Sister    • Colon polyps Neg Hx    • Esophageal cancer Neg Hx    • Liver cancer Neg Hx    • Liver disease Neg Hx    • Rectal cancer Neg Hx    • Stomach cancer Neg Hx    • Breast cancer Neg Hx    • Ovarian cancer Neg Hx      SOCIAL HISTORY:  Social History     Socioeconomic History   • Marital status:      Spouse name: Not on file   • Number of children: 1   • Years of education: Not on file   • Highest education level: Not on file   Tobacco Use   • Smoking status: Former Smoker     Types: Cigarettes   • Smokeless tobacco: Never Used   • Tobacco comment: \"i never really smoked\"   Substance and Sexual Activity   • Alcohol use: Not Currently     Frequency: Never   • Drug use: No   • Sexual activity: Yes     Partners: Male     Birth control/protection: Surgical       REVIEW OF SYSTEMS:  Review of Systems   Constitutional: Negative for activity change, appetite change, chills, diaphoresis, fatigue, fever and unexpected weight loss.   HENT: Negative for ear pain, nosebleeds, sinus pressure, sore throat and voice change.    Eyes: Negative for blurred vision, double vision, pain and visual disturbance. " "  Respiratory: Negative for cough and shortness of breath.    Cardiovascular: Negative for chest pain, palpitations and leg swelling.   Gastrointestinal: Negative for abdominal pain, anal bleeding, blood in stool, constipation, diarrhea, nausea and vomiting.   Endocrine: Negative for heat intolerance, polydipsia and polyuria.   Genitourinary: Negative for dysuria, frequency, hematuria, urgency and urinary incontinence.   Musculoskeletal: Negative for arthralgias and myalgias.   Skin: Negative for rash and skin lesions.   Neurological: Negative for dizziness, tremors, seizures, syncope, speech difficulty, weakness and headache.   Hematological: Negative for adenopathy. Does not bruise/bleed easily.   Psychiatric/Behavioral: Negative for dysphoric mood, sleep disturbance, suicidal ideas and depressed mood.       /84   Pulse 100   Temp 97.9 °F (36.6 °C) (Temporal)   Resp 16   Ht 154.9 cm (61\")   Wt 74.8 kg (165 lb)   SpO2 98%   Breastfeeding No   BMI 31.18 kg/m²  Body surface area is 1.74 meters squared.  Pain Score    03/09/20 1314   PainSc: 0-No pain       Physical Exam:  Physical Exam   Constitutional: She is oriented to person, place, and time. She appears well-developed and well-nourished.   HENT:   Head: Atraumatic.   Mouth/Throat: Oropharynx is clear and moist.   Eyes: Pupils are equal, round, and reactive to light. EOM are normal. No scleral icterus.   Neck: Trachea normal. Neck supple. No JVD present.   Cardiovascular: Normal rate, regular rhythm and normal pulses. Exam reveals no gallop and no friction rub.   No murmur heard.  Pulmonary/Chest: Effort normal and breath sounds normal. She has no wheezes. She has no rhonchi. She has no rales. Chest wall is not dull to percussion.   Abdominal: Soft. Normal appearance. There is no tenderness. There is no rebound and no guarding.   Lymphadenopathy:     She has no cervical adenopathy.     She has no axillary adenopathy.        Right: No supraclavicular " adenopathy present.        Left: No supraclavicular adenopathy present.   Neurological: She is alert and oriented to person, place, and time. She has normal strength. No sensory deficit.   Psychiatric: She has a normal mood and affect. Judgment normal.       Chasidy WILSON Terrell reports a pain score of 0.   Patient's Body mass index is 31.18 kg/m². BMI is above normal parameters. Recommendations include: nutrition counseling.      LABS    Lab Results - Last 18 Months   Lab Units 02/27/20  0901 02/03/20 1035 01/09/20 1032 12/12/19  0704   HEMOGLOBIN g/dL 14.1 14.5 15.0 14.1   HEMATOCRIT % 41.6 43.0 43.1 41.3   MCV fL 90.4 91.1 89.4 92.4   WBC 10*3/mm3 8.39 7.67 7.89 6.66   RDW % 13.1 13.0 13.3 12.4   MPV fL 9.6 9.8 9.5  --    PLATELETS 10*3/mm3 313 337 375 311   IMM GRAN % % 0.1 0.1 0.3  --    NEUTROS ABS 10*3/mm3 4.07 4.36 4.42 3.37   LYMPHS ABS 10*3/mm3 3.74* 2.87 2.94 2.81   MONOS ABS 10*3/mm3 0.44 0.37 0.42 0.33   EOS ABS 10*3/mm3 0.06 0.02 0.03 0.08   BASOS ABS 10*3/mm3 0.07 0.04 0.06 0.05   IMMATURE GRANS (ABS) 10*3/mm3 0.01 0.01 0.02  --    NRBC /100 WBC 0.0 0.0 0.0 0.0       Lab Results - Last 18 Months   Lab Units 02/27/20  0901 02/07/20  0951 02/03/20  1035 01/09/20  1032 12/12/19  0704   GLUCOSE mg/dL 108*  --  105* 87  --    SODIUM mmol/L 141  --  141 139 141   POTASSIUM mmol/L 3.9  --  4.0 4.1 4.6   TOTAL CO2 mmol/L  --   --   --   --  25.9   CO2 mmol/L 27.0  --  27.0 26.0  --    CHLORIDE mmol/L 101  --  103 99 102   ANION GAP mmol/L 13.0  --  11.0 14.0  --    CREATININE mg/dL 0.59 0.60 0.53* 0.46* 0.64   BUN mg/dL 17  --  15 13 22*   BUN / CREAT RATIO  28.8*  --  28.3* 28.3* 34.4*   CALCIUM mg/dL 10.0  --  9.8 9.5 9.5   EGFR IF NONAFRICN AM mL/min/1.73 105  --  119 140 96   ALK PHOS U/L 83  --  82 74 78   TOTAL PROTEIN g/dL 8.3  --  8.5 8.3  --    ALT (SGPT) U/L 19  --  18 12 20   AST (SGOT) U/L 18  --  19 18 17   BILIRUBIN mg/dL 0.4  --  0.5 0.6 0.5   ALBUMIN g/dL 4.90  --  5.00 4.90 4.80   GLOBULIN  gm/dL 3.4  --  3.5 3.4  --        Lab Results - Last 18 Months   Lab Units 02/27/20  0901   CEA ng/mL 1.76       Lab Results - Last 18 Months   Lab Units 02/27/20  0901 12/12/19  0704   IRON mcg/dL 65  --    TIBC mcg/dL 370  --    IRON SATURATION % 18*  --    FERRITIN ng/mL 134.20  --    TSH uIU/mL  --  0.798   FOLATE ng/mL >20.00  --      ASSESSMENT:   1.  Invasive and in situ ductal carcinoma  left breast.                 Stage:  IA (pT1a, pN0, G2) ER  100% positive, AZ  25%, HER-2/lamberto - 2+ (IHC)/FISH -a focal positive score is present in an area corresponding to approximately 15% of the tumor (signal ratio: 2.9).                 Tumor Pierson:  5 mm.  Tumor focality: Single focus of invasive carcinoma.  No EIC nor LCIS.  Lymphovascular invasion: Not identified.  Dermal lymphovascular invasion: No skin present.  Microcalcifications: Not identified.  Margins uninvolved by invasive carcinoma.  Lymph nodes: Number of lymph nodes examined: 1 (0/1).                 Tumor Status:  Underwent lumpectomy and SNB, 01/10/2020.   Adjuvant Arimidex since 02/03/2020           2.   Obesity        3.   Spinal stenosis neck         4.   Herniated cervical disc without myelopathy        RECOMMENDATIONS:         1.    Re: Apprised of the labs, 02/03/2020 and 02/27/2020 (above).  Essentially normal CBC, CMP, CEA and CA-27-29.        2.   Apprised of the normal 2D echocardiogram, pending bone density, negative bone scan,  negative CT scans of the head, chest abdomen and pelvis (no evidence for metastases).        3.   Unable repeat FISH testing for HER-2 on the tumor specimen from 01/10/2020 -pathology block did not contain any more tumor.        4.   Dr Wheat previously discussed care with Dr. Jaimes on 02/28/2020 after she saw the patient on 02/25/2020 (encounter reviewed).  Pathology was reviewed at [place] and discussed with the patient.  Recommendations/plan: Discussed that since her cancer is strongly ER positive  and low histologic grade/low proliferative rate, and HER-2 on the biopsy was borderline recommended repeat HER-2 testing on her surgical pathology.  Defer to his negative proceed with only endocrine therapy and radiation.  If HER-2 is confirmed to be positive, recommend adjuvant weekly Taxol x12+ Herceptin every 3 weeks to complete 1 year of HER-2 directed therapy.  These plans were personally discussed with the patient (via telephone) on 02/28/2020 I also personally asked the pathologist (Dr. Radha Burgos) to send off the biopsy specimen for repeat FISH HER-2 testing.         5. Dr. Wheat previously reviewed NCCN guidelines version 3.2019 invasive breast cancer.  For tumors (=/> 5 mm), and pN0, recommend adjuvant endocrine therapy +/- adjuvant chemotherapy with trastuzumab (category 2B).          6.  Dr Wheat previoiusly  Re: The potential toxicities of adjuvant chemotherapy + Herceptin (to include but not limited to: Asthenia, cardiotoxicity, myelosuppression) are discussed at length. The rationale for adjuvant hormonal manipulation with AIs (see above) is discussed (to include but not limited to: Hot flashes, asthenia, pain, arthralgia, arthritis, nausea, bone pains, edema, fatigue, headache, venous thrombosis, anemia, leukopenia, depression, rash, pharyngitis, weight gain, dyspnea, fractures, breast pains, infection, angina). Questions answered to the best of my ability, and to her apparent satisfaction. She is willing to proceed with a trial of therapy if deemed necessary.   7.  Schedule treatment C1 on 03/11/2020 at [facility] -   · Taxol 80 mg/m2 (BSA = ; TD = mg.) per administration guidelines weekly x 12 beginning 03/11/2020 -C2, 03/18/2020; C3, 03/25/2020; C4, 04/01/2020; C5, 04/08/2020; C6, 04/15/2020; C7, 04/22/2020; C8, 04/29/2020; C9, 05/06/2020; C10, 05/13/2020, C11, 05/20/2020; C12, 05/27/2020  · Herceptin 8 mg/ kg loading dose for C1, then 6 mg/kg beginning C2 (TD = mg) per administration  guidelines every 3 weeks x 1 year (17 cycles)-C2, 04/01/2020; C3, 04/22/2020; C4, 05/13/2020 - then C5-C17                     -  Premed before Herceptin -   · Tylenol 500 mg orally.   · Benadryl 25 mg IVP over 20-30 min.               8.  Premedicate on day 1 of chemo with:   · Aloxi 0.25 mg IVP   · Decadron 10 mg IVPB over 20-30 min   · ON TAXOL DAYS (D1,8,15): ALOXI + DEX + APAP + BENADRYL (even on No Herceptin days to prevent hypersensitivity with Taxol) - NO EMEND        9. Explained  Rx:   · Zofran 8 mg p.o. every 8 hours as needed #30 - eRx   · Arimidex 1 mg daily #30 x2 RF - eRx - STOP on day 1 of chemo. Will resume after chemo is completed.  · Oscal 600/400 po bid # 60 - OTC          10. Dr Wheat has previously ordered: CMP and CBC with diff weekly with Procrit 40,000 units subcutaneous if Hgb less than 10 or Hct less than 30; Neupogen 480 mcg subcutaneously daily x4 if ANC less than 1.0- [facility]        11. Dr Wheat previously discussed the rationale for adjuvant hormonal manipulation with AIs (see above) and potential toxicities of AI therapy are discussed (to include but not limited to: Hot flashes, asthenia, pain, arthralgias, arthritis, nausea/vomiting, headache, pharyngitis, depression, rash, hypertension, lymphedema, insomnia, edema, weight gain, dyspnea, abdominal pain, constipation, hyperlipidemia, osteoporosis, fractures, cough, bone pain, diarrhea, breast pain, paresthesias, infections, cataracts, myalgias, thromboembolism, angina, Lyles-Yung syndrome, erythema multiforme, anemia, leukopenia, stroke, myocardial infarction, osteoporosis, fractures). Questions are answered. She agrees to a trial of therapy.        12.  Review encounter with Dr. Brito on 02/05/2020.  Anticipate adjuvant radiation post systemic therapy       13.  Return to the [facility] office with pre-office serum iron, iron saturation, ferritin B12, folate, CMP, CEA, CA 27-29 and CBC and differential in 6 weeks           14.  Chemotherapy education was provided today.  Handouts from [employer] was discussed and provided.  The information that was discussed but not limited to is listed below.  She verbalized understanding and signed the consent to proceed with therapy.     Important things to remember about Taxol side effects include:  Most people do not experience all of the Taxol side effects listed.   Taxol side effects are often predictable in terms of their onset and duration.   Taxol side effects are almost always reversible and will go away after treatment is complete.   There are many options to help minimize or prevent Taxol side effects.   There is no relationship between the presence or severity of Taxol side effects and the effectiveness of the medication.   The Taxol side effects and their severity vary depending on how much of the drug is given, and/or the schedule in which it is given.   The following Taxol side effects are common (occurring in greater than 30%) for patients taking Taxol:  Low blood counts. Your white and red blood cells and platelets may temporarily decrease. This can put you at increased risk for infection, anemia and/or bleeding.   Hair loss   Arthralgias and myalgias, pain in the joints and muscles. Usually temporary occurring 2 to 3 days after Taxol, and resolve within a few days.   Peripheral neuropathy (numbness and tingling of the hands and feet)   Nausea and vomiting (usually mild)   Diarrhea   Mouth sores   Hypersensitivity reaction - fever, facial flushing, chills, shortness of breath, or hives after Taxol is given. The majority of these reactions occur within the first 10 minutes of an infusion. Notify your healthcare provider immediately (premedication regimen has significantly decreased the incidence of this reaction).   Nail changes (discoloration of nail beds - rare) (see skin reactions).  The following side effects are common (occurring in greater than 30%) for patients taking  trastuzumab. The frequency of side effects reported is based on single agent trastuzumab:  During the first infusion of this trastuzumab, you may develop chills or a fever. Your health care provider might prescribe medicine to prevent or treat these symptoms.   Body pain   Weakness   Nausea  Swelling (usually of the feet, ankles or hands)  Infrequently, serious hypersensitivity reactions (anaphylaxis) (see allergic reactions), have been associated with trastuzumab. Most of these events occur within 24 hours of infusion. However, delayed reactions have occurred. Trastuzumab should be used with caution in people with lung problems. If a person experiences severe hypersensitivity reaction, trastuzumab may be discontinued.   A serious but uncommon side effect of trastuzumab can be interference with the pumping action of the heart. The incidence of heart problems (heart failure) increase in people with heart disease or other risk factors such as radiation to the chest, advancing age, and use of other heart-toxic drugs (such as doxorubicin and cyclophosphamide). Your doctor may check your heart function before you may take any trastuzumab and will monitor your heart closely during your treatment. Trastuzumab may be discontinued if symptoms of heart failure appear.    When to contact your doctor or health care provider:  Contact your health care provider immediately, day or night, if you should experience any of the following symptoms:  Fever of 100.4° F (38° C), chills (possible signs of infection)   Shortness of breath, wheezing, difficulty breathing, closing up of the throat, swelling of facial features, hives (possible allergic reaction).  The following symptoms require medical attention, but are not an emergency. Contact your health care provider within 24 hours of noticing any of the following:  If you notice any redness or pain at the site of injection   Nausea (interferes with ability to eat and unrelieved with  prescribed medication)   Vomiting (vomiting more than 4-5 times in a 24 hour period)   Diarrhea (4-6 episodes in a 24-hour period)   Unusual bleeding or bruising   Black or tarry stools, or blood in your stools or urine   Extreme fatigue (unable to carry on self-care activities)   Mouth sores (painful redness, swelling or ulcers)   Yellowing of the skin or eyes   Swelling of the feet or ankles. Sudden weight gain   Signs of infection such as redness or swelling, pain on swallowing, coughing up mucous, or painful urination.  Always inform your health care provider if you experience any unusual symptoms.     MEDICAL DECISION MAKING: High complexity   AMOUNT OF DATA: Extensive     I spent 38 total minutes, face-to-face, caring for Chasidy nova.  Greater than 50% of this time involved counseling and/or coordination of care as documented within this note regarding the patient's illness(es) as well as instructions and/or risk reduction.    Erma Varela, APRN   03/09/2020  4:45 PM    Cc:  Abigail Jaimes MD- [facility]          MD Kailash Sawant MD Robert Learch, MD

## 2020-03-10 ENCOUNTER — TELEPHONE (OUTPATIENT)
Dept: ONCOLOGY | Facility: CLINIC | Age: 60
End: 2020-03-10

## 2020-03-10 DIAGNOSIS — C50.912 INVASIVE DUCTAL CARCINOMA OF BREAST, FEMALE, LEFT (HCC): ICD-10-CM

## 2020-03-10 RX ORDER — ACETAMINOPHEN 325 MG/1
500 TABLET ORAL ONCE
Status: CANCELLED
Start: 2020-03-11

## 2020-03-10 RX ORDER — SODIUM CHLORIDE 9 MG/ML
250 INJECTION, SOLUTION INTRAVENOUS ONCE
Status: CANCELLED | OUTPATIENT
Start: 2020-03-25

## 2020-03-10 RX ORDER — DIPHENHYDRAMINE HYDROCHLORIDE 50 MG/ML
50 INJECTION INTRAMUSCULAR; INTRAVENOUS AS NEEDED
Status: CANCELLED | OUTPATIENT
Start: 2020-03-25

## 2020-03-10 RX ORDER — DIPHENHYDRAMINE HYDROCHLORIDE 50 MG/ML
50 INJECTION INTRAMUSCULAR; INTRAVENOUS AS NEEDED
Status: CANCELLED | OUTPATIENT
Start: 2020-03-11

## 2020-03-10 RX ORDER — FAMOTIDINE 10 MG/ML
20 INJECTION, SOLUTION INTRAVENOUS AS NEEDED
Status: CANCELLED | OUTPATIENT
Start: 2020-03-25

## 2020-03-10 RX ORDER — PALONOSETRON 0.05 MG/ML
0.25 INJECTION, SOLUTION INTRAVENOUS ONCE
Status: CANCELLED
Start: 2020-03-18

## 2020-03-10 RX ORDER — PALONOSETRON 0.05 MG/ML
0.25 INJECTION, SOLUTION INTRAVENOUS ONCE
Status: CANCELLED
Start: 2020-03-25

## 2020-03-10 RX ORDER — FAMOTIDINE 10 MG/ML
20 INJECTION, SOLUTION INTRAVENOUS AS NEEDED
Status: CANCELLED | OUTPATIENT
Start: 2020-03-18

## 2020-03-10 RX ORDER — DIPHENHYDRAMINE HYDROCHLORIDE 50 MG/ML
50 INJECTION INTRAMUSCULAR; INTRAVENOUS AS NEEDED
Status: CANCELLED | OUTPATIENT
Start: 2020-03-18

## 2020-03-10 RX ORDER — ACETAMINOPHEN 325 MG/1
500 TABLET ORAL ONCE
Status: CANCELLED
Start: 2020-03-25

## 2020-03-10 RX ORDER — PALONOSETRON 0.05 MG/ML
0.25 INJECTION, SOLUTION INTRAVENOUS ONCE
Status: CANCELLED
Start: 2020-03-11

## 2020-03-10 RX ORDER — FAMOTIDINE 10 MG/ML
20 INJECTION, SOLUTION INTRAVENOUS AS NEEDED
Status: CANCELLED | OUTPATIENT
Start: 2020-03-11

## 2020-03-10 RX ORDER — ACETAMINOPHEN 325 MG/1
500 TABLET ORAL ONCE
Status: CANCELLED
Start: 2020-03-18

## 2020-03-10 RX ORDER — SODIUM CHLORIDE 9 MG/ML
250 INJECTION, SOLUTION INTRAVENOUS ONCE
Status: CANCELLED | OUTPATIENT
Start: 2020-03-11

## 2020-03-10 RX ORDER — SODIUM CHLORIDE 9 MG/ML
250 INJECTION, SOLUTION INTRAVENOUS ONCE
Status: CANCELLED | OUTPATIENT
Start: 2020-03-18

## 2020-03-10 NOTE — TELEPHONE ENCOUNTER
Spoke with patient and discussed [facility] papers. I let her know they would be completed today.

## 2020-03-11 ENCOUNTER — LAB (OUTPATIENT)
Dept: LAB | Facility: HOSPITAL | Age: 60
End: 2020-03-11

## 2020-03-11 ENCOUNTER — INFUSION (OUTPATIENT)
Dept: ONCOLOGY | Facility: HOSPITAL | Age: 60
End: 2020-03-11

## 2020-03-11 VITALS
DIASTOLIC BLOOD PRESSURE: 67 MMHG | OXYGEN SATURATION: 95 % | TEMPERATURE: 98.7 F | WEIGHT: 163 LBS | HEIGHT: 60 IN | SYSTOLIC BLOOD PRESSURE: 131 MMHG | HEART RATE: 93 BPM | RESPIRATION RATE: 16 BRPM | BODY MASS INDEX: 32 KG/M2

## 2020-03-11 DIAGNOSIS — C50.912 INVASIVE DUCTAL CARCINOMA OF BREAST, FEMALE, LEFT (HCC): Primary | ICD-10-CM

## 2020-03-11 DIAGNOSIS — C50.912 INVASIVE DUCTAL CARCINOMA OF BREAST, FEMALE, LEFT (HCC): ICD-10-CM

## 2020-03-11 LAB
ALBUMIN SERPL-MCNC: 4.6 G/DL (ref 3.5–5.2)
ALBUMIN/GLOB SERPL: 1.4 G/DL
ALP SERPL-CCNC: 83 U/L (ref 39–117)
ALT SERPL W P-5'-P-CCNC: 39 U/L (ref 1–33)
ANION GAP SERPL CALCULATED.3IONS-SCNC: 12 MMOL/L (ref 5–15)
AST SERPL-CCNC: 31 U/L (ref 1–32)
BASOPHILS # BLD AUTO: 0.05 10*3/MM3 (ref 0–0.2)
BASOPHILS NFR BLD AUTO: 0.7 % (ref 0–1.5)
BILIRUB SERPL-MCNC: 0.7 MG/DL (ref 0.2–1.2)
BUN BLD-MCNC: 15 MG/DL (ref 6–20)
BUN/CREAT SERPL: 30.6 (ref 7–25)
CALCIUM SPEC-SCNC: 9.9 MG/DL (ref 8.6–10.5)
CHLORIDE SERPL-SCNC: 103 MMOL/L (ref 98–107)
CO2 SERPL-SCNC: 26 MMOL/L (ref 22–29)
CREAT BLD-MCNC: 0.49 MG/DL (ref 0.57–1)
DEPRECATED RDW RBC AUTO: 43.3 FL (ref 37–54)
EOSINOPHIL # BLD AUTO: 0.06 10*3/MM3 (ref 0–0.4)
EOSINOPHIL NFR BLD AUTO: 0.8 % (ref 0.3–6.2)
ERYTHROCYTE [DISTWIDTH] IN BLOOD BY AUTOMATED COUNT: 13.2 % (ref 12.3–15.4)
GFR SERPL CREATININE-BSD FRML MDRD: 130 ML/MIN/1.73
GLOBULIN UR ELPH-MCNC: 3.3 GM/DL
GLUCOSE BLD-MCNC: 107 MG/DL (ref 65–99)
HCT VFR BLD AUTO: 42.3 % (ref 34–46.6)
HGB BLD-MCNC: 14.3 G/DL (ref 12–15.9)
IMM GRANULOCYTES # BLD AUTO: 0.02 10*3/MM3 (ref 0–0.05)
IMM GRANULOCYTES NFR BLD AUTO: 0.3 % (ref 0–0.5)
LYMPHOCYTES # BLD AUTO: 3.04 10*3/MM3 (ref 0.7–3.1)
LYMPHOCYTES NFR BLD AUTO: 41.7 % (ref 19.6–45.3)
MCH RBC QN AUTO: 30.5 PG (ref 26.6–33)
MCHC RBC AUTO-ENTMCNC: 33.8 G/DL (ref 31.5–35.7)
MCV RBC AUTO: 90.2 FL (ref 79–97)
MONOCYTES # BLD AUTO: 0.37 10*3/MM3 (ref 0.1–0.9)
MONOCYTES NFR BLD AUTO: 5.1 % (ref 5–12)
NEUTROPHILS # BLD AUTO: 3.75 10*3/MM3 (ref 1.7–7)
NEUTROPHILS NFR BLD AUTO: 51.4 % (ref 42.7–76)
NRBC BLD AUTO-RTO: 0 /100 WBC (ref 0–0.2)
PLATELET # BLD AUTO: 278 10*3/MM3 (ref 140–450)
PMV BLD AUTO: 9.7 FL (ref 6–12)
POTASSIUM BLD-SCNC: 4 MMOL/L (ref 3.5–5.2)
PROT SERPL-MCNC: 7.9 G/DL (ref 6–8.5)
RBC # BLD AUTO: 4.69 10*6/MM3 (ref 3.77–5.28)
SODIUM BLD-SCNC: 141 MMOL/L (ref 136–145)
WBC NRBC COR # BLD: 7.29 10*3/MM3 (ref 3.4–10.8)

## 2020-03-11 PROCEDURE — 25010000002 PALONOSETRON PER 25 MCG: Performed by: NURSE PRACTITIONER

## 2020-03-11 PROCEDURE — 96375 TX/PRO/DX INJ NEW DRUG ADDON: CPT

## 2020-03-11 PROCEDURE — 80053 COMPREHEN METABOLIC PANEL: CPT

## 2020-03-11 PROCEDURE — 25010000003 HEPARIN LOCK FLUCH PER 10 UNITS: Performed by: INTERNAL MEDICINE

## 2020-03-11 PROCEDURE — 25010000002 PACLITAXEL PER 30 MG: Performed by: NURSE PRACTITIONER

## 2020-03-11 PROCEDURE — 96415 CHEMO IV INFUSION ADDL HR: CPT

## 2020-03-11 PROCEDURE — 25010000002 DEXAMETHASONE SODIUM PHOSPHATE 100 MG/10ML SOLUTION: Performed by: NURSE PRACTITIONER

## 2020-03-11 PROCEDURE — 96367 TX/PROPH/DG ADDL SEQ IV INF: CPT

## 2020-03-11 PROCEDURE — 96413 CHEMO IV INFUSION 1 HR: CPT

## 2020-03-11 PROCEDURE — 25010000002 DIPHENHYDRAMINE PER 50 MG: Performed by: NURSE PRACTITIONER

## 2020-03-11 PROCEDURE — 36415 COLL VENOUS BLD VENIPUNCTURE: CPT

## 2020-03-11 PROCEDURE — 25010000002 TRASTUZUMAB-ANNS 150 MG RECONSTITUTED SOLUTION 1 EACH VIAL: Performed by: NURSE PRACTITIONER

## 2020-03-11 PROCEDURE — 85025 COMPLETE CBC W/AUTO DIFF WBC: CPT

## 2020-03-11 PROCEDURE — 96417 CHEMO IV INFUS EACH ADDL SEQ: CPT

## 2020-03-11 RX ORDER — SODIUM CHLORIDE 9 MG/ML
250 INJECTION, SOLUTION INTRAVENOUS ONCE
Status: COMPLETED | OUTPATIENT
Start: 2020-03-11 | End: 2020-03-11

## 2020-03-11 RX ORDER — SODIUM CHLORIDE 0.9 % (FLUSH) 0.9 %
10 SYRINGE (ML) INJECTION AS NEEDED
Status: CANCELLED | OUTPATIENT
Start: 2020-03-11

## 2020-03-11 RX ORDER — SODIUM CHLORIDE 0.9 % (FLUSH) 0.9 %
10 SYRINGE (ML) INJECTION AS NEEDED
Status: DISCONTINUED | OUTPATIENT
Start: 2020-03-11 | End: 2020-03-11 | Stop reason: HOSPADM

## 2020-03-11 RX ORDER — DIPHENHYDRAMINE HYDROCHLORIDE 50 MG/ML
50 INJECTION INTRAMUSCULAR; INTRAVENOUS AS NEEDED
Status: DISCONTINUED | OUTPATIENT
Start: 2020-03-11 | End: 2020-03-11 | Stop reason: HOSPADM

## 2020-03-11 RX ORDER — HEPARIN SODIUM (PORCINE) LOCK FLUSH IV SOLN 100 UNIT/ML 100 UNIT/ML
500 SOLUTION INTRAVENOUS AS NEEDED
Status: CANCELLED | OUTPATIENT
Start: 2020-03-11

## 2020-03-11 RX ORDER — FAMOTIDINE 10 MG/ML
20 INJECTION, SOLUTION INTRAVENOUS AS NEEDED
Status: DISCONTINUED | OUTPATIENT
Start: 2020-03-11 | End: 2020-03-11 | Stop reason: HOSPADM

## 2020-03-11 RX ORDER — HEPARIN SODIUM (PORCINE) LOCK FLUSH IV SOLN 100 UNIT/ML 100 UNIT/ML
500 SOLUTION INTRAVENOUS AS NEEDED
Status: DISCONTINUED | OUTPATIENT
Start: 2020-03-11 | End: 2020-03-11 | Stop reason: HOSPADM

## 2020-03-11 RX ORDER — PALONOSETRON 0.05 MG/ML
0.25 INJECTION, SOLUTION INTRAVENOUS ONCE
Status: COMPLETED | OUTPATIENT
Start: 2020-03-11 | End: 2020-03-11

## 2020-03-11 RX ORDER — ACETAMINOPHEN 500 MG
500 TABLET ORAL ONCE
Status: COMPLETED | OUTPATIENT
Start: 2020-03-11 | End: 2020-03-11

## 2020-03-11 RX ADMIN — PACLITAXEL 140 MG: 6 INJECTION, SOLUTION INTRAVENOUS at 12:22

## 2020-03-11 RX ADMIN — SODIUM CHLORIDE 250 ML: 9 INJECTION, SOLUTION INTRAVENOUS at 09:56

## 2020-03-11 RX ADMIN — PALONOSETRON HYDROCHLORIDE 0.25 MG: 0.25 INJECTION, SOLUTION INTRAVENOUS at 09:59

## 2020-03-11 RX ADMIN — ACETAMINOPHEN 500 MG: 500 TABLET, FILM COATED ORAL at 09:57

## 2020-03-11 RX ADMIN — DIPHENHYDRAMINE HYDROCHLORIDE 25 MG: 50 INJECTION, SOLUTION INTRAMUSCULAR; INTRAVENOUS at 10:22

## 2020-03-11 RX ADMIN — SODIUM CHLORIDE, PRESERVATIVE FREE 10 ML: 5 INJECTION INTRAVENOUS at 13:57

## 2020-03-11 RX ADMIN — DEXAMETHASONE SODIUM PHOSPHATE 12 MG: 10 INJECTION, SOLUTION INTRAMUSCULAR; INTRAVENOUS at 10:00

## 2020-03-11 RX ADMIN — TRASTUZUMAB-ANNS 590 MG: 150 INJECTION, POWDER, LYOPHILIZED, FOR SOLUTION INTRAVENOUS at 10:43

## 2020-03-11 RX ADMIN — Medication 500 UNITS: at 13:58

## 2020-03-18 ENCOUNTER — INFUSION (OUTPATIENT)
Dept: ONCOLOGY | Facility: HOSPITAL | Age: 60
End: 2020-03-18

## 2020-03-18 ENCOUNTER — LAB (OUTPATIENT)
Dept: LAB | Facility: HOSPITAL | Age: 60
End: 2020-03-18

## 2020-03-18 VITALS
TEMPERATURE: 98.1 F | WEIGHT: 163 LBS | RESPIRATION RATE: 18 BRPM | HEART RATE: 76 BPM | DIASTOLIC BLOOD PRESSURE: 65 MMHG | OXYGEN SATURATION: 97 % | HEIGHT: 60 IN | BODY MASS INDEX: 32 KG/M2 | SYSTOLIC BLOOD PRESSURE: 114 MMHG

## 2020-03-18 DIAGNOSIS — Z98.890 S/P LUMPECTOMY, LEFT BREAST: ICD-10-CM

## 2020-03-18 DIAGNOSIS — Z98.890 S/P LYMPH NODE BIOPSY: ICD-10-CM

## 2020-03-18 DIAGNOSIS — C50.912 INVASIVE DUCTAL CARCINOMA OF BREAST, FEMALE, LEFT (HCC): ICD-10-CM

## 2020-03-18 DIAGNOSIS — C50.912 INVASIVE DUCTAL CARCINOMA OF BREAST, FEMALE, LEFT (HCC): Primary | ICD-10-CM

## 2020-03-18 LAB
ALBUMIN SERPL-MCNC: 4.4 G/DL (ref 3.5–5.2)
ALBUMIN/GLOB SERPL: 1.4 G/DL
ALP SERPL-CCNC: 75 U/L (ref 39–117)
ALT SERPL W P-5'-P-CCNC: 58 U/L (ref 1–33)
ANION GAP SERPL CALCULATED.3IONS-SCNC: 11 MMOL/L (ref 5–15)
AST SERPL-CCNC: 26 U/L (ref 1–32)
BASOPHILS # BLD AUTO: 0.05 10*3/MM3 (ref 0–0.2)
BASOPHILS NFR BLD AUTO: 0.9 % (ref 0–1.5)
BILIRUB SERPL-MCNC: 0.5 MG/DL (ref 0.2–1.2)
BUN BLD-MCNC: 16 MG/DL (ref 6–20)
BUN/CREAT SERPL: 28.6 (ref 7–25)
CALCIUM SPEC-SCNC: 9.6 MG/DL (ref 8.6–10.5)
CHLORIDE SERPL-SCNC: 102 MMOL/L (ref 98–107)
CO2 SERPL-SCNC: 25 MMOL/L (ref 22–29)
CREAT BLD-MCNC: 0.56 MG/DL (ref 0.57–1)
DEPRECATED RDW RBC AUTO: 42.4 FL (ref 37–54)
EOSINOPHIL # BLD AUTO: 0.13 10*3/MM3 (ref 0–0.4)
EOSINOPHIL NFR BLD AUTO: 2.3 % (ref 0.3–6.2)
ERYTHROCYTE [DISTWIDTH] IN BLOOD BY AUTOMATED COUNT: 12.8 % (ref 12.3–15.4)
GFR SERPL CREATININE-BSD FRML MDRD: 112 ML/MIN/1.73
GLOBULIN UR ELPH-MCNC: 3.2 GM/DL
GLUCOSE BLD-MCNC: 95 MG/DL (ref 65–99)
HCT VFR BLD AUTO: 39.4 % (ref 34–46.6)
HGB BLD-MCNC: 13.2 G/DL (ref 12–15.9)
IMM GRANULOCYTES # BLD AUTO: 0.02 10*3/MM3 (ref 0–0.05)
IMM GRANULOCYTES NFR BLD AUTO: 0.4 % (ref 0–0.5)
LYMPHOCYTES # BLD AUTO: 2.18 10*3/MM3 (ref 0.7–3.1)
LYMPHOCYTES NFR BLD AUTO: 39 % (ref 19.6–45.3)
MCH RBC QN AUTO: 30.1 PG (ref 26.6–33)
MCHC RBC AUTO-ENTMCNC: 33.5 G/DL (ref 31.5–35.7)
MCV RBC AUTO: 89.7 FL (ref 79–97)
MONOCYTES # BLD AUTO: 0.21 10*3/MM3 (ref 0.1–0.9)
MONOCYTES NFR BLD AUTO: 3.8 % (ref 5–12)
NEUTROPHILS # BLD AUTO: 3 10*3/MM3 (ref 1.7–7)
NEUTROPHILS NFR BLD AUTO: 53.6 % (ref 42.7–76)
NRBC BLD AUTO-RTO: 0 /100 WBC (ref 0–0.2)
PLATELET # BLD AUTO: 344 10*3/MM3 (ref 140–450)
PMV BLD AUTO: 9.5 FL (ref 6–12)
POTASSIUM BLD-SCNC: 4.5 MMOL/L (ref 3.5–5.2)
PROT SERPL-MCNC: 7.6 G/DL (ref 6–8.5)
RBC # BLD AUTO: 4.39 10*6/MM3 (ref 3.77–5.28)
SODIUM BLD-SCNC: 138 MMOL/L (ref 136–145)
WBC NRBC COR # BLD: 5.59 10*3/MM3 (ref 3.4–10.8)

## 2020-03-18 PROCEDURE — 96375 TX/PRO/DX INJ NEW DRUG ADDON: CPT

## 2020-03-18 PROCEDURE — 25010000003 HEPARIN LOCK FLUCH PER 10 UNITS: Performed by: INTERNAL MEDICINE

## 2020-03-18 PROCEDURE — 96366 THER/PROPH/DIAG IV INF ADDON: CPT

## 2020-03-18 PROCEDURE — 96367 TX/PROPH/DG ADDL SEQ IV INF: CPT

## 2020-03-18 PROCEDURE — 25010000002 DIPHENHYDRAMINE PER 50 MG: Performed by: NURSE PRACTITIONER

## 2020-03-18 PROCEDURE — 25010000002 DEXAMETHASONE SODIUM PHOSPHATE 100 MG/10ML SOLUTION: Performed by: NURSE PRACTITIONER

## 2020-03-18 PROCEDURE — 25010000002 PALONOSETRON PER 25 MCG: Performed by: NURSE PRACTITIONER

## 2020-03-18 PROCEDURE — 96413 CHEMO IV INFUSION 1 HR: CPT

## 2020-03-18 PROCEDURE — 85025 COMPLETE CBC W/AUTO DIFF WBC: CPT

## 2020-03-18 PROCEDURE — 25010000002 PACLITAXEL PER 30 MG: Performed by: NURSE PRACTITIONER

## 2020-03-18 PROCEDURE — 36415 COLL VENOUS BLD VENIPUNCTURE: CPT

## 2020-03-18 PROCEDURE — 80053 COMPREHEN METABOLIC PANEL: CPT

## 2020-03-18 RX ORDER — HEPARIN SODIUM (PORCINE) LOCK FLUSH IV SOLN 100 UNIT/ML 100 UNIT/ML
500 SOLUTION INTRAVENOUS AS NEEDED
Status: DISCONTINUED | OUTPATIENT
Start: 2020-03-18 | End: 2020-03-18 | Stop reason: HOSPADM

## 2020-03-18 RX ORDER — PALONOSETRON 0.05 MG/ML
0.25 INJECTION, SOLUTION INTRAVENOUS ONCE
Status: COMPLETED | OUTPATIENT
Start: 2020-03-18 | End: 2020-03-18

## 2020-03-18 RX ORDER — SODIUM CHLORIDE 0.9 % (FLUSH) 0.9 %
10 SYRINGE (ML) INJECTION AS NEEDED
Status: DISCONTINUED | OUTPATIENT
Start: 2020-03-18 | End: 2020-03-18 | Stop reason: HOSPADM

## 2020-03-18 RX ORDER — ACETAMINOPHEN 500 MG
500 TABLET ORAL ONCE
Status: COMPLETED | OUTPATIENT
Start: 2020-03-18 | End: 2020-03-18

## 2020-03-18 RX ORDER — SODIUM CHLORIDE 9 MG/ML
250 INJECTION, SOLUTION INTRAVENOUS ONCE
Status: COMPLETED | OUTPATIENT
Start: 2020-03-18 | End: 2020-03-18

## 2020-03-18 RX ORDER — FAMOTIDINE 10 MG/ML
20 INJECTION, SOLUTION INTRAVENOUS AS NEEDED
Status: DISCONTINUED | OUTPATIENT
Start: 2020-03-18 | End: 2020-03-18 | Stop reason: HOSPADM

## 2020-03-18 RX ORDER — HEPARIN SODIUM (PORCINE) LOCK FLUSH IV SOLN 100 UNIT/ML 100 UNIT/ML
500 SOLUTION INTRAVENOUS AS NEEDED
Status: CANCELLED | OUTPATIENT
Start: 2020-03-18

## 2020-03-18 RX ORDER — DIPHENHYDRAMINE HYDROCHLORIDE 50 MG/ML
50 INJECTION INTRAMUSCULAR; INTRAVENOUS AS NEEDED
Status: DISCONTINUED | OUTPATIENT
Start: 2020-03-18 | End: 2020-03-18 | Stop reason: HOSPADM

## 2020-03-18 RX ORDER — SODIUM CHLORIDE 0.9 % (FLUSH) 0.9 %
10 SYRINGE (ML) INJECTION AS NEEDED
Status: CANCELLED | OUTPATIENT
Start: 2020-03-18

## 2020-03-18 RX ADMIN — ACETAMINOPHEN 500 MG: 500 TABLET, FILM COATED ORAL at 09:36

## 2020-03-18 RX ADMIN — Medication 500 UNITS: at 11:36

## 2020-03-18 RX ADMIN — DEXAMETHASONE SODIUM PHOSPHATE 12 MG: 10 INJECTION, SOLUTION INTRAMUSCULAR; INTRAVENOUS at 09:33

## 2020-03-18 RX ADMIN — PACLITAXEL 140 MG: 6 INJECTION, SOLUTION INTRAVENOUS at 10:14

## 2020-03-18 RX ADMIN — SODIUM CHLORIDE, PRESERVATIVE FREE 10 ML: 5 INJECTION INTRAVENOUS at 11:35

## 2020-03-18 RX ADMIN — DIPHENHYDRAMINE HYDROCHLORIDE 25 MG: 50 INJECTION, SOLUTION INTRAMUSCULAR; INTRAVENOUS at 09:52

## 2020-03-18 RX ADMIN — PALONOSETRON HYDROCHLORIDE 0.25 MG: 0.25 INJECTION, SOLUTION INTRAVENOUS at 09:31

## 2020-03-18 RX ADMIN — SODIUM CHLORIDE 250 ML: 9 INJECTION, SOLUTION INTRAVENOUS at 09:05

## 2020-03-19 LAB
CYTO UR: NORMAL
LAB AP CASE REPORT: NORMAL
LAB AP CASE REPORT: NORMAL
LAB AP CLINICAL INFORMATION: NORMAL
LAB AP CLINICAL INFORMATION: NORMAL
LAB AP DIAGNOSIS COMMENT: NORMAL
LAB AP INTRADEPARTMENTAL CONSULT: NORMAL
LAB AP SYNOPTIC CHECKLIST: NORMAL
PATH REPORT.ADDENDUM SPEC: NORMAL
PATH REPORT.FINAL DX SPEC: NORMAL
PATH REPORT.FINAL DX SPEC: NORMAL
PATH REPORT.GROSS SPEC: NORMAL
PATH REPORT.GROSS SPEC: NORMAL

## 2020-03-23 ENCOUNTER — TELEPHONE (OUTPATIENT)
Dept: ONCOLOGY | Facility: CLINIC | Age: 60
End: 2020-03-23

## 2020-03-23 NOTE — TELEPHONE ENCOUNTER
Received call from patient she reports that she had developed a rash located on her face, neck, and behind her ears. She has received 2 treatments of chemotherapy of Trastuzumab, Paclitaxel. Questions what can use?    Also patient is requesting a letter, which I can type up, regarding her need for distancing due to her immunocompromised state is this ok for me to type up?   verbal instruction/pictorial/skill demonstration

## 2020-03-24 RX ORDER — METHYLPREDNISOLONE 4 MG/1
TABLET ORAL
Qty: 21 TABLET | Refills: 0 | Status: SHIPPED | OUTPATIENT
Start: 2020-03-24 | End: 2020-05-26 | Stop reason: ALTCHOICE

## 2020-03-25 ENCOUNTER — INFUSION (OUTPATIENT)
Dept: ONCOLOGY | Facility: HOSPITAL | Age: 60
End: 2020-03-25

## 2020-03-25 ENCOUNTER — LAB (OUTPATIENT)
Dept: LAB | Facility: HOSPITAL | Age: 60
End: 2020-03-25

## 2020-03-25 VITALS
HEART RATE: 79 BPM | RESPIRATION RATE: 16 BRPM | BODY MASS INDEX: 32.2 KG/M2 | WEIGHT: 164 LBS | DIASTOLIC BLOOD PRESSURE: 67 MMHG | SYSTOLIC BLOOD PRESSURE: 107 MMHG | HEIGHT: 60 IN | OXYGEN SATURATION: 96 % | TEMPERATURE: 98.2 F

## 2020-03-25 DIAGNOSIS — C50.912 INVASIVE DUCTAL CARCINOMA OF BREAST, FEMALE, LEFT (HCC): Primary | ICD-10-CM

## 2020-03-25 DIAGNOSIS — Z98.890 S/P LUMPECTOMY, LEFT BREAST: ICD-10-CM

## 2020-03-25 DIAGNOSIS — C50.912 INVASIVE DUCTAL CARCINOMA OF BREAST, FEMALE, LEFT (HCC): ICD-10-CM

## 2020-03-25 DIAGNOSIS — Z98.890 S/P LYMPH NODE BIOPSY: ICD-10-CM

## 2020-03-25 LAB
ALBUMIN SERPL-MCNC: 4.4 G/DL (ref 3.5–5.2)
ALBUMIN/GLOB SERPL: 1.5 G/DL
ALP SERPL-CCNC: 75 U/L (ref 39–117)
ALT SERPL W P-5'-P-CCNC: 43 U/L (ref 1–33)
ANION GAP SERPL CALCULATED.3IONS-SCNC: 10 MMOL/L (ref 5–15)
AST SERPL-CCNC: 21 U/L (ref 1–32)
BASOPHILS # BLD AUTO: 0.05 10*3/MM3 (ref 0–0.2)
BASOPHILS NFR BLD AUTO: 1.2 % (ref 0–1.5)
BILIRUB SERPL-MCNC: 0.5 MG/DL (ref 0.2–1.2)
BUN BLD-MCNC: 18 MG/DL (ref 6–20)
BUN/CREAT SERPL: 34 (ref 7–25)
CALCIUM SPEC-SCNC: 9.4 MG/DL (ref 8.6–10.5)
CHLORIDE SERPL-SCNC: 102 MMOL/L (ref 98–107)
CO2 SERPL-SCNC: 28 MMOL/L (ref 22–29)
CREAT BLD-MCNC: 0.53 MG/DL (ref 0.57–1)
DEPRECATED RDW RBC AUTO: 42.5 FL (ref 37–54)
EOSINOPHIL # BLD AUTO: 0.1 10*3/MM3 (ref 0–0.4)
EOSINOPHIL NFR BLD AUTO: 2.5 % (ref 0.3–6.2)
ERYTHROCYTE [DISTWIDTH] IN BLOOD BY AUTOMATED COUNT: 13 % (ref 12.3–15.4)
GFR SERPL CREATININE-BSD FRML MDRD: 119 ML/MIN/1.73
GLOBULIN UR ELPH-MCNC: 2.9 GM/DL
GLUCOSE BLD-MCNC: 100 MG/DL (ref 65–99)
HCT VFR BLD AUTO: 38.3 % (ref 34–46.6)
HGB BLD-MCNC: 12.8 G/DL (ref 12–15.9)
IMM GRANULOCYTES # BLD AUTO: 0.01 10*3/MM3 (ref 0–0.05)
IMM GRANULOCYTES NFR BLD AUTO: 0.2 % (ref 0–0.5)
LYMPHOCYTES # BLD AUTO: 2.21 10*3/MM3 (ref 0.7–3.1)
LYMPHOCYTES NFR BLD AUTO: 54.3 % (ref 19.6–45.3)
MCH RBC QN AUTO: 30.1 PG (ref 26.6–33)
MCHC RBC AUTO-ENTMCNC: 33.4 G/DL (ref 31.5–35.7)
MCV RBC AUTO: 90.1 FL (ref 79–97)
MONOCYTES # BLD AUTO: 0.16 10*3/MM3 (ref 0.1–0.9)
MONOCYTES NFR BLD AUTO: 3.9 % (ref 5–12)
NEUTROPHILS # BLD AUTO: 1.54 10*3/MM3 (ref 1.7–7)
NEUTROPHILS NFR BLD AUTO: 37.9 % (ref 42.7–76)
NRBC BLD AUTO-RTO: 0 /100 WBC (ref 0–0.2)
PLATELET # BLD AUTO: 371 10*3/MM3 (ref 140–450)
PMV BLD AUTO: 9.9 FL (ref 6–12)
POTASSIUM BLD-SCNC: 4.3 MMOL/L (ref 3.5–5.2)
PROT SERPL-MCNC: 7.3 G/DL (ref 6–8.5)
RBC # BLD AUTO: 4.25 10*6/MM3 (ref 3.77–5.28)
SODIUM BLD-SCNC: 140 MMOL/L (ref 136–145)
WBC NRBC COR # BLD: 4.07 10*3/MM3 (ref 3.4–10.8)

## 2020-03-25 PROCEDURE — 96367 TX/PROPH/DG ADDL SEQ IV INF: CPT

## 2020-03-25 PROCEDURE — 96413 CHEMO IV INFUSION 1 HR: CPT

## 2020-03-25 PROCEDURE — 36415 COLL VENOUS BLD VENIPUNCTURE: CPT

## 2020-03-25 PROCEDURE — 80053 COMPREHEN METABOLIC PANEL: CPT

## 2020-03-25 PROCEDURE — 25010000003 HEPARIN LOCK FLUCH PER 10 UNITS: Performed by: INTERNAL MEDICINE

## 2020-03-25 PROCEDURE — 85025 COMPLETE CBC W/AUTO DIFF WBC: CPT

## 2020-03-25 PROCEDURE — 25010000002 DIPHENHYDRAMINE PER 50 MG: Performed by: NURSE PRACTITIONER

## 2020-03-25 PROCEDURE — 25010000002 PALONOSETRON PER 25 MCG: Performed by: NURSE PRACTITIONER

## 2020-03-25 PROCEDURE — 25010000002 HYDROCORTISONE SODIUM SUCCINATE 100 MG RECONSTITUTED SOLUTION: Performed by: NURSE PRACTITIONER

## 2020-03-25 PROCEDURE — 25010000002 DEXAMETHASONE SODIUM PHOSPHATE 100 MG/10ML SOLUTION: Performed by: NURSE PRACTITIONER

## 2020-03-25 PROCEDURE — 96375 TX/PRO/DX INJ NEW DRUG ADDON: CPT

## 2020-03-25 PROCEDURE — 25010000002 PACLITAXEL PER 30 MG: Performed by: NURSE PRACTITIONER

## 2020-03-25 RX ORDER — PALONOSETRON 0.05 MG/ML
0.25 INJECTION, SOLUTION INTRAVENOUS ONCE
Status: COMPLETED | OUTPATIENT
Start: 2020-03-25 | End: 2020-03-25

## 2020-03-25 RX ORDER — SODIUM CHLORIDE 9 MG/ML
250 INJECTION, SOLUTION INTRAVENOUS ONCE
Status: COMPLETED | OUTPATIENT
Start: 2020-03-25 | End: 2020-03-25

## 2020-03-25 RX ORDER — DIPHENHYDRAMINE HYDROCHLORIDE 50 MG/ML
50 INJECTION INTRAMUSCULAR; INTRAVENOUS AS NEEDED
Status: COMPLETED | OUTPATIENT
Start: 2020-03-25 | End: 2020-03-25

## 2020-03-25 RX ORDER — SODIUM CHLORIDE 0.9 % (FLUSH) 0.9 %
10 SYRINGE (ML) INJECTION AS NEEDED
Status: DISCONTINUED | OUTPATIENT
Start: 2020-03-25 | End: 2020-03-25 | Stop reason: HOSPADM

## 2020-03-25 RX ORDER — FAMOTIDINE 10 MG/ML
20 INJECTION, SOLUTION INTRAVENOUS AS NEEDED
Status: DISCONTINUED | OUTPATIENT
Start: 2020-03-25 | End: 2020-03-25 | Stop reason: HOSPADM

## 2020-03-25 RX ORDER — ACETAMINOPHEN 500 MG
500 TABLET ORAL ONCE
Status: COMPLETED | OUTPATIENT
Start: 2020-03-25 | End: 2020-03-25

## 2020-03-25 RX ORDER — SODIUM CHLORIDE 0.9 % (FLUSH) 0.9 %
10 SYRINGE (ML) INJECTION AS NEEDED
Status: CANCELLED | OUTPATIENT
Start: 2020-03-25

## 2020-03-25 RX ORDER — HEPARIN SODIUM (PORCINE) LOCK FLUSH IV SOLN 100 UNIT/ML 100 UNIT/ML
500 SOLUTION INTRAVENOUS AS NEEDED
Status: CANCELLED | OUTPATIENT
Start: 2020-03-25

## 2020-03-25 RX ORDER — HEPARIN SODIUM (PORCINE) LOCK FLUSH IV SOLN 100 UNIT/ML 100 UNIT/ML
500 SOLUTION INTRAVENOUS AS NEEDED
Status: DISCONTINUED | OUTPATIENT
Start: 2020-03-25 | End: 2020-03-25 | Stop reason: HOSPADM

## 2020-03-25 RX ADMIN — ACETAMINOPHEN 500 MG: 500 TABLET, FILM COATED ORAL at 09:48

## 2020-03-25 RX ADMIN — PACLITAXEL 140 MG: 6 INJECTION, SOLUTION INTRAVENOUS at 10:32

## 2020-03-25 RX ADMIN — DIPHENHYDRAMINE HYDROCHLORIDE 25 MG: 50 INJECTION, SOLUTION INTRAMUSCULAR; INTRAVENOUS at 10:48

## 2020-03-25 RX ADMIN — PALONOSETRON HYDROCHLORIDE 0.25 MG: 0.25 INJECTION, SOLUTION INTRAVENOUS at 09:51

## 2020-03-25 RX ADMIN — Medication 500 UNITS: at 11:52

## 2020-03-25 RX ADMIN — SODIUM CHLORIDE, PRESERVATIVE FREE 10 ML: 5 INJECTION INTRAVENOUS at 11:51

## 2020-03-25 RX ADMIN — SODIUM CHLORIDE 250 ML: 9 INJECTION, SOLUTION INTRAVENOUS at 09:44

## 2020-03-25 RX ADMIN — DEXAMETHASONE SODIUM PHOSPHATE 12 MG: 10 INJECTION, SOLUTION INTRAMUSCULAR; INTRAVENOUS at 09:44

## 2020-03-25 RX ADMIN — DIPHENHYDRAMINE HYDROCHLORIDE 25 MG: 50 INJECTION, SOLUTION INTRAMUSCULAR; INTRAVENOUS at 10:15

## 2020-03-25 NOTE — PROGRESS NOTES
0907 Labs called to Jorge Belle RN to discuss with Dr. Armstrong and call back with orders.Javier PALENCIA    [id number] Jorge Belle RN called back with orders from Dr. Patti RAMIREZ to give treatment as ordered.Javier PALENCIA

## 2020-03-31 DIAGNOSIS — C50.912 INVASIVE DUCTAL CARCINOMA OF BREAST, FEMALE, LEFT (HCC): ICD-10-CM

## 2020-03-31 RX ORDER — SODIUM CHLORIDE 9 MG/ML
250 INJECTION, SOLUTION INTRAVENOUS ONCE
Status: CANCELLED | OUTPATIENT
Start: 2020-04-15

## 2020-03-31 RX ORDER — FAMOTIDINE 10 MG/ML
20 INJECTION, SOLUTION INTRAVENOUS AS NEEDED
Status: CANCELLED | OUTPATIENT
Start: 2020-04-08

## 2020-03-31 RX ORDER — ACETAMINOPHEN 325 MG/1
500 TABLET ORAL ONCE
Status: CANCELLED
Start: 2020-04-01

## 2020-03-31 RX ORDER — PALONOSETRON 0.05 MG/ML
0.25 INJECTION, SOLUTION INTRAVENOUS ONCE
Status: CANCELLED
Start: 2020-04-15

## 2020-03-31 RX ORDER — DIPHENHYDRAMINE HYDROCHLORIDE 50 MG/ML
50 INJECTION INTRAMUSCULAR; INTRAVENOUS AS NEEDED
Status: CANCELLED | OUTPATIENT
Start: 2020-04-01

## 2020-03-31 RX ORDER — FAMOTIDINE 10 MG/ML
20 INJECTION, SOLUTION INTRAVENOUS AS NEEDED
Status: CANCELLED | OUTPATIENT
Start: 2020-04-15

## 2020-03-31 RX ORDER — FAMOTIDINE 10 MG/ML
20 INJECTION, SOLUTION INTRAVENOUS AS NEEDED
Status: CANCELLED | OUTPATIENT
Start: 2020-04-01

## 2020-03-31 RX ORDER — DIPHENHYDRAMINE HYDROCHLORIDE 50 MG/ML
50 INJECTION INTRAMUSCULAR; INTRAVENOUS AS NEEDED
Status: CANCELLED | OUTPATIENT
Start: 2020-04-08

## 2020-03-31 RX ORDER — SODIUM CHLORIDE 9 MG/ML
250 INJECTION, SOLUTION INTRAVENOUS ONCE
Status: CANCELLED | OUTPATIENT
Start: 2020-04-08

## 2020-03-31 RX ORDER — SODIUM CHLORIDE 9 MG/ML
250 INJECTION, SOLUTION INTRAVENOUS ONCE
Status: CANCELLED | OUTPATIENT
Start: 2020-04-01

## 2020-03-31 RX ORDER — DIPHENHYDRAMINE HYDROCHLORIDE 50 MG/ML
50 INJECTION INTRAMUSCULAR; INTRAVENOUS AS NEEDED
Status: CANCELLED | OUTPATIENT
Start: 2020-04-15

## 2020-03-31 RX ORDER — ACETAMINOPHEN 325 MG/1
500 TABLET ORAL ONCE
Status: CANCELLED
Start: 2020-04-15

## 2020-03-31 RX ORDER — PALONOSETRON 0.05 MG/ML
0.25 INJECTION, SOLUTION INTRAVENOUS ONCE
Status: CANCELLED
Start: 2020-04-01

## 2020-03-31 RX ORDER — PALONOSETRON 0.05 MG/ML
0.25 INJECTION, SOLUTION INTRAVENOUS ONCE
Status: CANCELLED
Start: 2020-04-08

## 2020-03-31 RX ORDER — ACETAMINOPHEN 325 MG/1
500 TABLET ORAL ONCE
Status: CANCELLED
Start: 2020-04-08

## 2020-04-01 ENCOUNTER — INFUSION (OUTPATIENT)
Dept: ONCOLOGY | Facility: HOSPITAL | Age: 60
End: 2020-04-01

## 2020-04-01 ENCOUNTER — LAB (OUTPATIENT)
Dept: LAB | Facility: HOSPITAL | Age: 60
End: 2020-04-01

## 2020-04-01 VITALS
OXYGEN SATURATION: 97 % | DIASTOLIC BLOOD PRESSURE: 58 MMHG | HEIGHT: 60 IN | SYSTOLIC BLOOD PRESSURE: 115 MMHG | TEMPERATURE: 98 F | BODY MASS INDEX: 32 KG/M2 | WEIGHT: 163 LBS | HEART RATE: 74 BPM | RESPIRATION RATE: 18 BRPM

## 2020-04-01 DIAGNOSIS — C50.912 INVASIVE DUCTAL CARCINOMA OF BREAST, FEMALE, LEFT (HCC): ICD-10-CM

## 2020-04-01 DIAGNOSIS — C50.912 INVASIVE DUCTAL CARCINOMA OF BREAST, FEMALE, LEFT (HCC): Primary | ICD-10-CM

## 2020-04-01 LAB
ALBUMIN SERPL-MCNC: 4.6 G/DL (ref 3.5–5.2)
ALBUMIN/GLOB SERPL: 1.5 G/DL
ALP SERPL-CCNC: 71 U/L (ref 39–117)
ALT SERPL W P-5'-P-CCNC: 32 U/L (ref 1–33)
ANION GAP SERPL CALCULATED.3IONS-SCNC: 13 MMOL/L (ref 5–15)
AST SERPL-CCNC: 17 U/L (ref 1–32)
BASOPHILS # BLD AUTO: 0.1 10*3/MM3 (ref 0–0.2)
BASOPHILS NFR BLD AUTO: 1.3 % (ref 0–1.5)
BILIRUB SERPL-MCNC: 0.5 MG/DL (ref 0.2–1.2)
BUN BLD-MCNC: 21 MG/DL (ref 6–20)
BUN/CREAT SERPL: 34.4 (ref 7–25)
CALCIUM SPEC-SCNC: 9.7 MG/DL (ref 8.6–10.5)
CHLORIDE SERPL-SCNC: 101 MMOL/L (ref 98–107)
CO2 SERPL-SCNC: 27 MMOL/L (ref 22–29)
CREAT BLD-MCNC: 0.61 MG/DL (ref 0.57–1)
DEPRECATED RDW RBC AUTO: 44.9 FL (ref 37–54)
EOSINOPHIL # BLD AUTO: 0.14 10*3/MM3 (ref 0–0.4)
EOSINOPHIL NFR BLD AUTO: 1.8 % (ref 0.3–6.2)
ERYTHROCYTE [DISTWIDTH] IN BLOOD BY AUTOMATED COUNT: 13.3 % (ref 12.3–15.4)
GFR SERPL CREATININE-BSD FRML MDRD: 101 ML/MIN/1.73
GLOBULIN UR ELPH-MCNC: 3 GM/DL
GLUCOSE BLD-MCNC: 97 MG/DL (ref 65–99)
HCT VFR BLD AUTO: 40.6 % (ref 34–46.6)
HGB BLD-MCNC: 13.2 G/DL (ref 12–15.9)
LYMPHOCYTES # BLD AUTO: 4.35 10*3/MM3 (ref 0.7–3.1)
LYMPHOCYTES NFR BLD AUTO: 55.6 % (ref 19.6–45.3)
MCH RBC QN AUTO: 30.1 PG (ref 26.6–33)
MCHC RBC AUTO-ENTMCNC: 32.5 G/DL (ref 31.5–35.7)
MCV RBC AUTO: 92.7 FL (ref 79–97)
MONOCYTES # BLD AUTO: 0.39 10*3/MM3 (ref 0.1–0.9)
MONOCYTES NFR BLD AUTO: 5 % (ref 5–12)
NEUTROPHILS # BLD AUTO: 2.73 10*3/MM3 (ref 1.7–7)
NEUTROPHILS NFR BLD AUTO: 34.9 % (ref 42.7–76)
PLATELET # BLD AUTO: 424 10*3/MM3 (ref 140–450)
PMV BLD AUTO: 10.1 FL (ref 6–12)
POTASSIUM BLD-SCNC: 4.2 MMOL/L (ref 3.5–5.2)
PROT SERPL-MCNC: 7.6 G/DL (ref 6–8.5)
RBC # BLD AUTO: 4.38 10*6/MM3 (ref 3.77–5.28)
SODIUM BLD-SCNC: 141 MMOL/L (ref 136–145)
WBC NRBC COR # BLD: 7.82 10*3/MM3 (ref 3.4–10.8)

## 2020-04-01 PROCEDURE — 25010000002 PACLITAXEL PER 30 MG: Performed by: NURSE PRACTITIONER

## 2020-04-01 PROCEDURE — 36415 COLL VENOUS BLD VENIPUNCTURE: CPT

## 2020-04-01 PROCEDURE — 25010000002 DIPHENHYDRAMINE PER 50 MG: Performed by: NURSE PRACTITIONER

## 2020-04-01 PROCEDURE — 25010000003 HEPARIN LOCK FLUCH PER 10 UNITS: Performed by: INTERNAL MEDICINE

## 2020-04-01 PROCEDURE — 96375 TX/PRO/DX INJ NEW DRUG ADDON: CPT

## 2020-04-01 PROCEDURE — 80053 COMPREHEN METABOLIC PANEL: CPT

## 2020-04-01 PROCEDURE — 25010000002 DEXAMETHASONE SODIUM PHOSPHATE 100 MG/10ML SOLUTION: Performed by: NURSE PRACTITIONER

## 2020-04-01 PROCEDURE — 96367 TX/PROPH/DG ADDL SEQ IV INF: CPT

## 2020-04-01 PROCEDURE — 25010000002 TRASTUZUMAB-ANNS 150 MG RECONSTITUTED SOLUTION 1 EACH VIAL: Performed by: NURSE PRACTITIONER

## 2020-04-01 PROCEDURE — 96366 THER/PROPH/DIAG IV INF ADDON: CPT

## 2020-04-01 PROCEDURE — 85025 COMPLETE CBC W/AUTO DIFF WBC: CPT

## 2020-04-01 PROCEDURE — 25010000002 PALONOSETRON PER 25 MCG: Performed by: NURSE PRACTITIONER

## 2020-04-01 PROCEDURE — 96417 CHEMO IV INFUS EACH ADDL SEQ: CPT

## 2020-04-01 PROCEDURE — 96413 CHEMO IV INFUSION 1 HR: CPT

## 2020-04-01 RX ORDER — FAMOTIDINE 10 MG/ML
20 INJECTION, SOLUTION INTRAVENOUS AS NEEDED
Status: DISCONTINUED | OUTPATIENT
Start: 2020-04-01 | End: 2020-04-01 | Stop reason: HOSPADM

## 2020-04-01 RX ORDER — HEPARIN SODIUM (PORCINE) LOCK FLUSH IV SOLN 100 UNIT/ML 100 UNIT/ML
500 SOLUTION INTRAVENOUS AS NEEDED
Status: DISCONTINUED | OUTPATIENT
Start: 2020-04-01 | End: 2020-04-01 | Stop reason: HOSPADM

## 2020-04-01 RX ORDER — HEPARIN SODIUM (PORCINE) LOCK FLUSH IV SOLN 100 UNIT/ML 100 UNIT/ML
500 SOLUTION INTRAVENOUS AS NEEDED
Status: CANCELLED | OUTPATIENT
Start: 2020-04-01

## 2020-04-01 RX ORDER — SODIUM CHLORIDE 0.9 % (FLUSH) 0.9 %
10 SYRINGE (ML) INJECTION AS NEEDED
Status: DISCONTINUED | OUTPATIENT
Start: 2020-04-01 | End: 2020-04-01 | Stop reason: HOSPADM

## 2020-04-01 RX ORDER — SODIUM CHLORIDE 9 MG/ML
250 INJECTION, SOLUTION INTRAVENOUS ONCE
Status: COMPLETED | OUTPATIENT
Start: 2020-04-01 | End: 2020-04-01

## 2020-04-01 RX ORDER — ACETAMINOPHEN 500 MG
500 TABLET ORAL ONCE
Status: COMPLETED | OUTPATIENT
Start: 2020-04-01 | End: 2020-04-01

## 2020-04-01 RX ORDER — PALONOSETRON 0.05 MG/ML
0.25 INJECTION, SOLUTION INTRAVENOUS ONCE
Status: COMPLETED | OUTPATIENT
Start: 2020-04-01 | End: 2020-04-01

## 2020-04-01 RX ORDER — SODIUM CHLORIDE 0.9 % (FLUSH) 0.9 %
10 SYRINGE (ML) INJECTION AS NEEDED
Status: CANCELLED | OUTPATIENT
Start: 2020-04-01

## 2020-04-01 RX ORDER — DIPHENHYDRAMINE HYDROCHLORIDE 50 MG/ML
50 INJECTION INTRAMUSCULAR; INTRAVENOUS AS NEEDED
Status: DISCONTINUED | OUTPATIENT
Start: 2020-04-01 | End: 2020-04-01 | Stop reason: HOSPADM

## 2020-04-01 RX ADMIN — SODIUM CHLORIDE 250 ML: 9 INJECTION, SOLUTION INTRAVENOUS at 10:18

## 2020-04-01 RX ADMIN — SODIUM CHLORIDE, PRESERVATIVE FREE 10 ML: 5 INJECTION INTRAVENOUS at 13:29

## 2020-04-01 RX ADMIN — PACLITAXEL 140 MG: 6 INJECTION, SOLUTION INTRAVENOUS at 12:10

## 2020-04-01 RX ADMIN — Medication 500 UNITS: at 13:29

## 2020-04-01 RX ADMIN — PALONOSETRON HYDROCHLORIDE 0.25 MG: 0.25 INJECTION, SOLUTION INTRAVENOUS at 10:19

## 2020-04-01 RX ADMIN — DEXAMETHASONE SODIUM PHOSPHATE 12 MG: 10 INJECTION, SOLUTION INTRAMUSCULAR; INTRAVENOUS at 10:22

## 2020-04-01 RX ADMIN — ACETAMINOPHEN 500 MG: 500 TABLET, FILM COATED ORAL at 10:19

## 2020-04-01 RX ADMIN — TRASTUZUMAB-ANNS 450 MG: 150 INJECTION, POWDER, LYOPHILIZED, FOR SOLUTION INTRAVENOUS at 11:08

## 2020-04-01 RX ADMIN — DIPHENHYDRAMINE HYDROCHLORIDE 25 MG: 50 INJECTION, SOLUTION INTRAMUSCULAR; INTRAVENOUS at 10:45

## 2020-04-06 ENCOUNTER — TELEPHONE (OUTPATIENT)
Dept: ONCOLOGY | Facility: CLINIC | Age: 60
End: 2020-04-06

## 2020-04-06 NOTE — TELEPHONE ENCOUNTER
CALLED MONICA BACK TO LET HIM KNOW THAT I WENT THROUGH PATIENTS CHART AND GAVE HIM THE DATES FROM DR CANO ORIGINAL FIRST OFFICE NOTE. HAD TO LEAVE A MESSAGE AS HE DID NOT ANSWER.

## 2020-04-06 NOTE — TELEPHONE ENCOUNTER
from [facility]    New policy beginning of year.  She has not given a date of diagnosis.    She has only given a pathology report dated 1/10.    Please call him to verify the date of initial cancer diagnosis.    Can leave voicemail with the date if he is unable to answer.    [phone]

## 2020-04-08 ENCOUNTER — LAB (OUTPATIENT)
Dept: LAB | Facility: HOSPITAL | Age: 60
End: 2020-04-08

## 2020-04-08 ENCOUNTER — INFUSION (OUTPATIENT)
Dept: ONCOLOGY | Facility: HOSPITAL | Age: 60
End: 2020-04-08

## 2020-04-08 VITALS
TEMPERATURE: 96.9 F | OXYGEN SATURATION: 97 % | WEIGHT: 161 LBS | RESPIRATION RATE: 18 BRPM | DIASTOLIC BLOOD PRESSURE: 67 MMHG | HEIGHT: 60 IN | HEART RATE: 81 BPM | BODY MASS INDEX: 31.61 KG/M2 | SYSTOLIC BLOOD PRESSURE: 124 MMHG

## 2020-04-08 DIAGNOSIS — C50.912 INVASIVE DUCTAL CARCINOMA OF BREAST, FEMALE, LEFT (HCC): ICD-10-CM

## 2020-04-08 DIAGNOSIS — Z98.890 S/P LUMPECTOMY, LEFT BREAST: ICD-10-CM

## 2020-04-08 DIAGNOSIS — C50.912 INVASIVE DUCTAL CARCINOMA OF BREAST, FEMALE, LEFT (HCC): Primary | ICD-10-CM

## 2020-04-08 DIAGNOSIS — Z98.890 S/P LYMPH NODE BIOPSY: ICD-10-CM

## 2020-04-08 LAB
ALBUMIN SERPL-MCNC: 4.3 G/DL (ref 3.5–5.2)
ALBUMIN/GLOB SERPL: 1.6 G/DL
ALP SERPL-CCNC: 67 U/L (ref 39–117)
ALT SERPL W P-5'-P-CCNC: 46 U/L (ref 1–33)
ANION GAP SERPL CALCULATED.3IONS-SCNC: 11 MMOL/L (ref 5–15)
AST SERPL-CCNC: 27 U/L (ref 1–32)
BASOPHILS # BLD AUTO: 0.05 10*3/MM3 (ref 0–0.2)
BASOPHILS NFR BLD AUTO: 1.1 % (ref 0–1.5)
BILIRUB SERPL-MCNC: 0.4 MG/DL (ref 0.2–1.2)
BUN BLD-MCNC: 16 MG/DL (ref 6–20)
BUN/CREAT SERPL: 25.8 (ref 7–25)
CALCIUM SPEC-SCNC: 9.3 MG/DL (ref 8.6–10.5)
CHLORIDE SERPL-SCNC: 103 MMOL/L (ref 98–107)
CO2 SERPL-SCNC: 27 MMOL/L (ref 22–29)
CREAT BLD-MCNC: 0.62 MG/DL (ref 0.57–1)
DEPRECATED RDW RBC AUTO: 45.2 FL (ref 37–54)
EOSINOPHIL # BLD AUTO: 0.08 10*3/MM3 (ref 0–0.4)
EOSINOPHIL NFR BLD AUTO: 1.7 % (ref 0.3–6.2)
ERYTHROCYTE [DISTWIDTH] IN BLOOD BY AUTOMATED COUNT: 13.6 % (ref 12.3–15.4)
GFR SERPL CREATININE-BSD FRML MDRD: 99 ML/MIN/1.73
GLOBULIN UR ELPH-MCNC: 2.7 GM/DL
GLUCOSE BLD-MCNC: 114 MG/DL (ref 65–99)
HCT VFR BLD AUTO: 38 % (ref 34–46.6)
HGB BLD-MCNC: 12.6 G/DL (ref 12–15.9)
IMM GRANULOCYTES # BLD AUTO: 0.03 10*3/MM3 (ref 0–0.05)
IMM GRANULOCYTES NFR BLD AUTO: 0.6 % (ref 0–0.5)
LYMPHOCYTES # BLD AUTO: 2.09 10*3/MM3 (ref 0.7–3.1)
LYMPHOCYTES NFR BLD AUTO: 45.1 % (ref 19.6–45.3)
MCH RBC QN AUTO: 30.5 PG (ref 26.6–33)
MCHC RBC AUTO-ENTMCNC: 33.2 G/DL (ref 31.5–35.7)
MCV RBC AUTO: 92 FL (ref 79–97)
MONOCYTES # BLD AUTO: 0.28 10*3/MM3 (ref 0.1–0.9)
MONOCYTES NFR BLD AUTO: 6 % (ref 5–12)
NEUTROPHILS # BLD AUTO: 2.1 10*3/MM3 (ref 1.7–7)
NEUTROPHILS NFR BLD AUTO: 45.5 % (ref 42.7–76)
NRBC BLD AUTO-RTO: 0 /100 WBC (ref 0–0.2)
PLATELET # BLD AUTO: 336 10*3/MM3 (ref 140–450)
PMV BLD AUTO: 9.8 FL (ref 6–12)
POTASSIUM BLD-SCNC: 3.7 MMOL/L (ref 3.5–5.2)
PROT SERPL-MCNC: 7 G/DL (ref 6–8.5)
RBC # BLD AUTO: 4.13 10*6/MM3 (ref 3.77–5.28)
SODIUM BLD-SCNC: 141 MMOL/L (ref 136–145)
WBC NRBC COR # BLD: 4.63 10*3/MM3 (ref 3.4–10.8)

## 2020-04-08 PROCEDURE — 25010000002 PALONOSETRON PER 25 MCG: Performed by: NURSE PRACTITIONER

## 2020-04-08 PROCEDURE — 96367 TX/PROPH/DG ADDL SEQ IV INF: CPT

## 2020-04-08 PROCEDURE — 85025 COMPLETE CBC W/AUTO DIFF WBC: CPT

## 2020-04-08 PROCEDURE — 96375 TX/PRO/DX INJ NEW DRUG ADDON: CPT

## 2020-04-08 PROCEDURE — 96413 CHEMO IV INFUSION 1 HR: CPT

## 2020-04-08 PROCEDURE — 25010000003 HEPARIN LOCK FLUCH PER 10 UNITS: Performed by: INTERNAL MEDICINE

## 2020-04-08 PROCEDURE — 36415 COLL VENOUS BLD VENIPUNCTURE: CPT

## 2020-04-08 PROCEDURE — 80053 COMPREHEN METABOLIC PANEL: CPT

## 2020-04-08 PROCEDURE — 25010000002 DEXAMETHASONE SODIUM PHOSPHATE 100 MG/10ML SOLUTION: Performed by: NURSE PRACTITIONER

## 2020-04-08 PROCEDURE — 25010000002 PACLITAXEL PER 30 MG: Performed by: NURSE PRACTITIONER

## 2020-04-08 PROCEDURE — 25010000002 DIPHENHYDRAMINE PER 50 MG: Performed by: NURSE PRACTITIONER

## 2020-04-08 RX ORDER — DIPHENHYDRAMINE HYDROCHLORIDE 50 MG/ML
50 INJECTION INTRAMUSCULAR; INTRAVENOUS AS NEEDED
Status: DISCONTINUED | OUTPATIENT
Start: 2020-04-08 | End: 2020-04-08 | Stop reason: HOSPADM

## 2020-04-08 RX ORDER — HEPARIN SODIUM (PORCINE) LOCK FLUSH IV SOLN 100 UNIT/ML 100 UNIT/ML
500 SOLUTION INTRAVENOUS AS NEEDED
Status: CANCELLED | OUTPATIENT
Start: 2020-04-08

## 2020-04-08 RX ORDER — PALONOSETRON 0.05 MG/ML
0.25 INJECTION, SOLUTION INTRAVENOUS ONCE
Status: COMPLETED | OUTPATIENT
Start: 2020-04-08 | End: 2020-04-08

## 2020-04-08 RX ORDER — FAMOTIDINE 10 MG/ML
20 INJECTION, SOLUTION INTRAVENOUS AS NEEDED
Status: DISCONTINUED | OUTPATIENT
Start: 2020-04-08 | End: 2020-04-08 | Stop reason: HOSPADM

## 2020-04-08 RX ORDER — SODIUM CHLORIDE 9 MG/ML
250 INJECTION, SOLUTION INTRAVENOUS ONCE
Status: COMPLETED | OUTPATIENT
Start: 2020-04-08 | End: 2020-04-08

## 2020-04-08 RX ORDER — SODIUM CHLORIDE 0.9 % (FLUSH) 0.9 %
10 SYRINGE (ML) INJECTION AS NEEDED
Status: CANCELLED | OUTPATIENT
Start: 2020-04-08

## 2020-04-08 RX ORDER — HEPARIN SODIUM (PORCINE) LOCK FLUSH IV SOLN 100 UNIT/ML 100 UNIT/ML
500 SOLUTION INTRAVENOUS AS NEEDED
Status: DISCONTINUED | OUTPATIENT
Start: 2020-04-08 | End: 2020-04-08 | Stop reason: HOSPADM

## 2020-04-08 RX ORDER — SODIUM CHLORIDE 0.9 % (FLUSH) 0.9 %
10 SYRINGE (ML) INJECTION AS NEEDED
Status: DISCONTINUED | OUTPATIENT
Start: 2020-04-08 | End: 2020-04-08 | Stop reason: HOSPADM

## 2020-04-08 RX ORDER — ACETAMINOPHEN 500 MG
500 TABLET ORAL ONCE
Status: COMPLETED | OUTPATIENT
Start: 2020-04-08 | End: 2020-04-08

## 2020-04-08 RX ADMIN — DEXAMETHASONE SODIUM PHOSPHATE 12 MG: 10 INJECTION, SOLUTION INTRAMUSCULAR; INTRAVENOUS at 10:21

## 2020-04-08 RX ADMIN — ACETAMINOPHEN 500 MG: 500 TABLET, FILM COATED ORAL at 10:19

## 2020-04-08 RX ADMIN — PACLITAXEL 140 MG: 6 INJECTION, SOLUTION INTRAVENOUS at 10:59

## 2020-04-08 RX ADMIN — PALONOSETRON HYDROCHLORIDE 0.25 MG: 0.25 INJECTION, SOLUTION INTRAVENOUS at 10:19

## 2020-04-08 RX ADMIN — Medication 500 UNITS: at 12:05

## 2020-04-08 RX ADMIN — SODIUM CHLORIDE, PRESERVATIVE FREE 10 ML: 5 INJECTION INTRAVENOUS at 12:05

## 2020-04-08 RX ADMIN — DIPHENHYDRAMINE HYDROCHLORIDE 25 MG: 50 INJECTION, SOLUTION INTRAMUSCULAR; INTRAVENOUS at 10:40

## 2020-04-08 RX ADMIN — SODIUM CHLORIDE 250 ML: 9 INJECTION, SOLUTION INTRAVENOUS at 10:19

## 2020-04-09 ENCOUNTER — TELEPHONE (OUTPATIENT)
Dept: ONCOLOGY | Facility: CLINIC | Age: 60
End: 2020-04-09

## 2020-04-10 NOTE — TELEPHONE ENCOUNTER
Patient called back and Cancer policy form is completed. Will fax once the form is signed by Dr. Wheat.

## 2020-04-15 ENCOUNTER — LAB (OUTPATIENT)
Dept: LAB | Facility: HOSPITAL | Age: 60
End: 2020-04-15

## 2020-04-15 ENCOUNTER — INFUSION (OUTPATIENT)
Dept: ONCOLOGY | Facility: HOSPITAL | Age: 60
End: 2020-04-15

## 2020-04-15 VITALS
WEIGHT: 165.2 LBS | HEART RATE: 89 BPM | RESPIRATION RATE: 16 BRPM | BODY MASS INDEX: 32.43 KG/M2 | OXYGEN SATURATION: 97 % | SYSTOLIC BLOOD PRESSURE: 116 MMHG | DIASTOLIC BLOOD PRESSURE: 71 MMHG | TEMPERATURE: 97.6 F | HEIGHT: 60 IN

## 2020-04-15 DIAGNOSIS — Z98.890 S/P LUMPECTOMY, LEFT BREAST: ICD-10-CM

## 2020-04-15 DIAGNOSIS — C50.912 INVASIVE DUCTAL CARCINOMA OF BREAST, FEMALE, LEFT (HCC): Primary | ICD-10-CM

## 2020-04-15 DIAGNOSIS — Z98.890 S/P LYMPH NODE BIOPSY: ICD-10-CM

## 2020-04-15 DIAGNOSIS — C50.912 INVASIVE DUCTAL CARCINOMA OF BREAST, FEMALE, LEFT (HCC): ICD-10-CM

## 2020-04-15 LAB
ALBUMIN SERPL-MCNC: 4.4 G/DL (ref 3.5–5.2)
ALBUMIN/GLOB SERPL: 1.7 G/DL
ALP SERPL-CCNC: 67 U/L (ref 39–117)
ALT SERPL W P-5'-P-CCNC: 59 U/L (ref 1–33)
ANION GAP SERPL CALCULATED.3IONS-SCNC: 12 MMOL/L (ref 5–15)
AST SERPL-CCNC: 28 U/L (ref 1–32)
BASOPHILS # BLD AUTO: 0.05 10*3/MM3 (ref 0–0.2)
BASOPHILS NFR BLD AUTO: 1.4 % (ref 0–1.5)
BILIRUB SERPL-MCNC: 0.4 MG/DL (ref 0.2–1.2)
BUN BLD-MCNC: 14 MG/DL (ref 6–20)
BUN/CREAT SERPL: 22.6 (ref 7–25)
CALCIUM SPEC-SCNC: 9.3 MG/DL (ref 8.6–10.5)
CHLORIDE SERPL-SCNC: 105 MMOL/L (ref 98–107)
CO2 SERPL-SCNC: 24 MMOL/L (ref 22–29)
CREAT BLD-MCNC: 0.62 MG/DL (ref 0.57–1)
DEPRECATED RDW RBC AUTO: 44.3 FL (ref 37–54)
EOSINOPHIL # BLD AUTO: 0.12 10*3/MM3 (ref 0–0.4)
EOSINOPHIL NFR BLD AUTO: 3.3 % (ref 0.3–6.2)
ERYTHROCYTE [DISTWIDTH] IN BLOOD BY AUTOMATED COUNT: 13.8 % (ref 12.3–15.4)
GFR SERPL CREATININE-BSD FRML MDRD: 99 ML/MIN/1.73
GLOBULIN UR ELPH-MCNC: 2.6 GM/DL
GLUCOSE BLD-MCNC: 129 MG/DL (ref 65–99)
HCT VFR BLD AUTO: 36.4 % (ref 34–46.6)
HGB BLD-MCNC: 12.4 G/DL (ref 12–15.9)
IMM GRANULOCYTES # BLD AUTO: 0.02 10*3/MM3 (ref 0–0.05)
IMM GRANULOCYTES NFR BLD AUTO: 0.5 % (ref 0–0.5)
LYMPHOCYTES # BLD AUTO: 1.76 10*3/MM3 (ref 0.7–3.1)
LYMPHOCYTES NFR BLD AUTO: 48 % (ref 19.6–45.3)
MCH RBC QN AUTO: 30.5 PG (ref 26.6–33)
MCHC RBC AUTO-ENTMCNC: 34.1 G/DL (ref 31.5–35.7)
MCV RBC AUTO: 89.7 FL (ref 79–97)
MONOCYTES # BLD AUTO: 0.2 10*3/MM3 (ref 0.1–0.9)
MONOCYTES NFR BLD AUTO: 5.4 % (ref 5–12)
NEUTROPHILS # BLD AUTO: 1.52 10*3/MM3 (ref 1.7–7)
NEUTROPHILS NFR BLD AUTO: 41.4 % (ref 42.7–76)
NRBC BLD AUTO-RTO: 0 /100 WBC (ref 0–0.2)
PLATELET # BLD AUTO: 329 10*3/MM3 (ref 140–450)
PMV BLD AUTO: 9.6 FL (ref 6–12)
POTASSIUM BLD-SCNC: 3.7 MMOL/L (ref 3.5–5.2)
PROT SERPL-MCNC: 7 G/DL (ref 6–8.5)
RBC # BLD AUTO: 4.06 10*6/MM3 (ref 3.77–5.28)
SODIUM BLD-SCNC: 141 MMOL/L (ref 136–145)
WBC NRBC COR # BLD: 3.67 10*3/MM3 (ref 3.4–10.8)

## 2020-04-15 PROCEDURE — 25010000002 PACLITAXEL PER 30 MG: Performed by: NURSE PRACTITIONER

## 2020-04-15 PROCEDURE — 80053 COMPREHEN METABOLIC PANEL: CPT

## 2020-04-15 PROCEDURE — 96413 CHEMO IV INFUSION 1 HR: CPT

## 2020-04-15 PROCEDURE — 25010000002 DIPHENHYDRAMINE PER 50 MG: Performed by: NURSE PRACTITIONER

## 2020-04-15 PROCEDURE — 25010000002 DEXAMETHASONE SODIUM PHOSPHATE 100 MG/10ML SOLUTION: Performed by: NURSE PRACTITIONER

## 2020-04-15 PROCEDURE — 36415 COLL VENOUS BLD VENIPUNCTURE: CPT

## 2020-04-15 PROCEDURE — 96367 TX/PROPH/DG ADDL SEQ IV INF: CPT

## 2020-04-15 PROCEDURE — 25010000002 PALONOSETRON PER 25 MCG: Performed by: NURSE PRACTITIONER

## 2020-04-15 PROCEDURE — 25010000003 HEPARIN LOCK FLUCH PER 10 UNITS: Performed by: INTERNAL MEDICINE

## 2020-04-15 PROCEDURE — 96375 TX/PRO/DX INJ NEW DRUG ADDON: CPT

## 2020-04-15 PROCEDURE — 85025 COMPLETE CBC W/AUTO DIFF WBC: CPT

## 2020-04-15 RX ORDER — SODIUM CHLORIDE 9 MG/ML
250 INJECTION, SOLUTION INTRAVENOUS ONCE
Status: COMPLETED | OUTPATIENT
Start: 2020-04-15 | End: 2020-04-15

## 2020-04-15 RX ORDER — PALONOSETRON 0.05 MG/ML
0.25 INJECTION, SOLUTION INTRAVENOUS ONCE
Status: COMPLETED | OUTPATIENT
Start: 2020-04-15 | End: 2020-04-15

## 2020-04-15 RX ORDER — DIPHENHYDRAMINE HYDROCHLORIDE 50 MG/ML
50 INJECTION INTRAMUSCULAR; INTRAVENOUS AS NEEDED
Status: DISCONTINUED | OUTPATIENT
Start: 2020-04-15 | End: 2020-04-15 | Stop reason: HOSPADM

## 2020-04-15 RX ORDER — HEPARIN SODIUM (PORCINE) LOCK FLUSH IV SOLN 100 UNIT/ML 100 UNIT/ML
500 SOLUTION INTRAVENOUS AS NEEDED
Status: CANCELLED | OUTPATIENT
Start: 2020-04-15

## 2020-04-15 RX ORDER — HEPARIN SODIUM (PORCINE) LOCK FLUSH IV SOLN 100 UNIT/ML 100 UNIT/ML
500 SOLUTION INTRAVENOUS AS NEEDED
Status: DISCONTINUED | OUTPATIENT
Start: 2020-04-15 | End: 2020-04-15 | Stop reason: HOSPADM

## 2020-04-15 RX ORDER — SODIUM CHLORIDE 0.9 % (FLUSH) 0.9 %
10 SYRINGE (ML) INJECTION AS NEEDED
Status: CANCELLED | OUTPATIENT
Start: 2020-04-15

## 2020-04-15 RX ORDER — FAMOTIDINE 10 MG/ML
20 INJECTION, SOLUTION INTRAVENOUS AS NEEDED
Status: DISCONTINUED | OUTPATIENT
Start: 2020-04-15 | End: 2020-04-15 | Stop reason: HOSPADM

## 2020-04-15 RX ORDER — ACETAMINOPHEN 500 MG
500 TABLET ORAL ONCE
Status: COMPLETED | OUTPATIENT
Start: 2020-04-15 | End: 2020-04-15

## 2020-04-15 RX ORDER — SODIUM CHLORIDE 0.9 % (FLUSH) 0.9 %
10 SYRINGE (ML) INJECTION AS NEEDED
Status: DISCONTINUED | OUTPATIENT
Start: 2020-04-15 | End: 2020-04-15 | Stop reason: HOSPADM

## 2020-04-15 RX ADMIN — SODIUM CHLORIDE, PRESERVATIVE FREE 10 ML: 5 INJECTION INTRAVENOUS at 12:19

## 2020-04-15 RX ADMIN — SODIUM CHLORIDE 250 ML: 9 INJECTION, SOLUTION INTRAVENOUS at 09:28

## 2020-04-15 RX ADMIN — PACLITAXEL 140 MG: 6 INJECTION, SOLUTION INTRAVENOUS at 10:44

## 2020-04-15 RX ADMIN — ACETAMINOPHEN 500 MG: 500 TABLET, FILM COATED ORAL at 09:34

## 2020-04-15 RX ADMIN — PALONOSETRON HYDROCHLORIDE 0.25 MG: 0.25 INJECTION, SOLUTION INTRAVENOUS at 09:34

## 2020-04-15 RX ADMIN — Medication 500 UNITS: at 12:19

## 2020-04-15 RX ADMIN — DEXAMETHASONE SODIUM PHOSPHATE 12 MG: 10 INJECTION, SOLUTION INTRAMUSCULAR; INTRAVENOUS at 09:36

## 2020-04-15 RX ADMIN — DIPHENHYDRAMINE HYDROCHLORIDE 25 MG: 50 INJECTION, SOLUTION INTRAMUSCULAR; INTRAVENOUS at 10:23

## 2020-04-21 DIAGNOSIS — C50.912 INVASIVE DUCTAL CARCINOMA OF BREAST, FEMALE, LEFT (HCC): ICD-10-CM

## 2020-04-21 RX ORDER — FAMOTIDINE 10 MG/ML
20 INJECTION, SOLUTION INTRAVENOUS AS NEEDED
Status: CANCELLED | OUTPATIENT
Start: 2020-04-29

## 2020-04-21 RX ORDER — SODIUM CHLORIDE 9 MG/ML
250 INJECTION, SOLUTION INTRAVENOUS ONCE
Status: CANCELLED | OUTPATIENT
Start: 2020-04-29

## 2020-04-21 RX ORDER — FAMOTIDINE 10 MG/ML
20 INJECTION, SOLUTION INTRAVENOUS AS NEEDED
Status: CANCELLED | OUTPATIENT
Start: 2020-04-22

## 2020-04-21 RX ORDER — PALONOSETRON 0.05 MG/ML
0.25 INJECTION, SOLUTION INTRAVENOUS ONCE
Status: CANCELLED
Start: 2020-05-06

## 2020-04-21 RX ORDER — DIPHENHYDRAMINE HYDROCHLORIDE 50 MG/ML
50 INJECTION INTRAMUSCULAR; INTRAVENOUS AS NEEDED
Status: CANCELLED | OUTPATIENT
Start: 2020-04-29

## 2020-04-21 RX ORDER — FAMOTIDINE 10 MG/ML
20 INJECTION, SOLUTION INTRAVENOUS AS NEEDED
Status: CANCELLED | OUTPATIENT
Start: 2020-05-06

## 2020-04-21 RX ORDER — ACETAMINOPHEN 325 MG/1
500 TABLET ORAL ONCE
Status: CANCELLED
Start: 2020-05-06

## 2020-04-21 RX ORDER — PALONOSETRON 0.05 MG/ML
0.25 INJECTION, SOLUTION INTRAVENOUS ONCE
Status: CANCELLED
Start: 2020-04-29

## 2020-04-21 RX ORDER — DIPHENHYDRAMINE HYDROCHLORIDE 50 MG/ML
50 INJECTION INTRAMUSCULAR; INTRAVENOUS AS NEEDED
Status: CANCELLED | OUTPATIENT
Start: 2020-05-06

## 2020-04-21 RX ORDER — SODIUM CHLORIDE 9 MG/ML
250 INJECTION, SOLUTION INTRAVENOUS ONCE
Status: CANCELLED | OUTPATIENT
Start: 2020-05-06

## 2020-04-21 RX ORDER — ACETAMINOPHEN 325 MG/1
500 TABLET ORAL ONCE
Status: CANCELLED
Start: 2020-04-22

## 2020-04-21 RX ORDER — ACETAMINOPHEN 325 MG/1
500 TABLET ORAL ONCE
Status: CANCELLED
Start: 2020-04-29

## 2020-04-21 RX ORDER — SODIUM CHLORIDE 9 MG/ML
250 INJECTION, SOLUTION INTRAVENOUS ONCE
Status: CANCELLED | OUTPATIENT
Start: 2020-04-22

## 2020-04-21 RX ORDER — DIPHENHYDRAMINE HYDROCHLORIDE 50 MG/ML
50 INJECTION INTRAMUSCULAR; INTRAVENOUS AS NEEDED
Status: CANCELLED | OUTPATIENT
Start: 2020-04-22

## 2020-04-21 RX ORDER — PALONOSETRON 0.05 MG/ML
0.25 INJECTION, SOLUTION INTRAVENOUS ONCE
Status: CANCELLED
Start: 2020-04-22

## 2020-04-22 ENCOUNTER — TELEPHONE (OUTPATIENT)
Dept: ONCOLOGY | Facility: CLINIC | Age: 60
End: 2020-04-22

## 2020-04-22 ENCOUNTER — INFUSION (OUTPATIENT)
Dept: ONCOLOGY | Facility: HOSPITAL | Age: 60
End: 2020-04-22

## 2020-04-22 ENCOUNTER — LAB (OUTPATIENT)
Dept: LAB | Facility: HOSPITAL | Age: 60
End: 2020-04-22

## 2020-04-22 VITALS
HEIGHT: 60 IN | RESPIRATION RATE: 16 BRPM | HEART RATE: 91 BPM | SYSTOLIC BLOOD PRESSURE: 117 MMHG | BODY MASS INDEX: 32.79 KG/M2 | WEIGHT: 167 LBS | TEMPERATURE: 97 F | OXYGEN SATURATION: 97 % | DIASTOLIC BLOOD PRESSURE: 60 MMHG

## 2020-04-22 DIAGNOSIS — C50.912 INVASIVE DUCTAL CARCINOMA OF BREAST, FEMALE, LEFT (HCC): Primary | ICD-10-CM

## 2020-04-22 DIAGNOSIS — Z98.890 S/P LUMPECTOMY, LEFT BREAST: ICD-10-CM

## 2020-04-22 DIAGNOSIS — C50.912 INVASIVE DUCTAL CARCINOMA OF BREAST, FEMALE, LEFT (HCC): ICD-10-CM

## 2020-04-22 DIAGNOSIS — Z98.890 S/P LYMPH NODE BIOPSY: ICD-10-CM

## 2020-04-22 LAB
ALBUMIN SERPL-MCNC: 4.6 G/DL (ref 3.5–5.2)
ALBUMIN/GLOB SERPL: 1.8 G/DL
ALP SERPL-CCNC: 71 U/L (ref 39–117)
ALT SERPL W P-5'-P-CCNC: 46 U/L (ref 1–33)
ANION GAP SERPL CALCULATED.3IONS-SCNC: 11 MMOL/L (ref 5–15)
AST SERPL-CCNC: 24 U/L (ref 1–32)
BASOPHILS # BLD AUTO: 0.06 10*3/MM3 (ref 0–0.2)
BASOPHILS NFR BLD AUTO: 1.3 % (ref 0–1.5)
BILIRUB SERPL-MCNC: 0.3 MG/DL (ref 0.2–1.2)
BUN BLD-MCNC: 19 MG/DL (ref 6–20)
BUN/CREAT SERPL: 37.3 (ref 7–25)
CALCIUM SPEC-SCNC: 9.8 MG/DL (ref 8.6–10.5)
CEA SERPL-MCNC: 2.26 NG/ML
CHLORIDE SERPL-SCNC: 103 MMOL/L (ref 98–107)
CO2 SERPL-SCNC: 26 MMOL/L (ref 22–29)
CREAT BLD-MCNC: 0.51 MG/DL (ref 0.57–1)
DEPRECATED RDW RBC AUTO: 46.8 FL (ref 37–54)
EOSINOPHIL # BLD AUTO: 0.13 10*3/MM3 (ref 0–0.4)
EOSINOPHIL NFR BLD AUTO: 2.9 % (ref 0.3–6.2)
ERYTHROCYTE [DISTWIDTH] IN BLOOD BY AUTOMATED COUNT: 14.3 % (ref 12.3–15.4)
FERRITIN SERPL-MCNC: 199 NG/ML (ref 13–150)
FOLATE SERPL-MCNC: >20 NG/ML (ref 4.78–24.2)
GFR SERPL CREATININE-BSD FRML MDRD: 124 ML/MIN/1.73
GLOBULIN UR ELPH-MCNC: 2.6 GM/DL
GLUCOSE BLD-MCNC: 89 MG/DL (ref 65–99)
HCT VFR BLD AUTO: 35.8 % (ref 34–46.6)
HGB BLD-MCNC: 11.9 G/DL (ref 12–15.9)
IMM GRANULOCYTES # BLD AUTO: 0.09 10*3/MM3 (ref 0–0.05)
IMM GRANULOCYTES NFR BLD AUTO: 2 % (ref 0–0.5)
IRON 24H UR-MRATE: 55 MCG/DL (ref 37–145)
IRON SATN MFR SERPL: 16 % (ref 20–50)
LYMPHOCYTES # BLD AUTO: 2.05 10*3/MM3 (ref 0.7–3.1)
LYMPHOCYTES NFR BLD AUTO: 45.9 % (ref 19.6–45.3)
MCH RBC QN AUTO: 30.4 PG (ref 26.6–33)
MCHC RBC AUTO-ENTMCNC: 33.2 G/DL (ref 31.5–35.7)
MCV RBC AUTO: 91.6 FL (ref 79–97)
MONOCYTES # BLD AUTO: 0.25 10*3/MM3 (ref 0.1–0.9)
MONOCYTES NFR BLD AUTO: 5.6 % (ref 5–12)
NEUTROPHILS # BLD AUTO: 1.89 10*3/MM3 (ref 1.7–7)
NEUTROPHILS NFR BLD AUTO: 42.3 % (ref 42.7–76)
NRBC BLD AUTO-RTO: 0 /100 WBC (ref 0–0.2)
PLATELET # BLD AUTO: 372 10*3/MM3 (ref 140–450)
PMV BLD AUTO: 9.6 FL (ref 6–12)
POTASSIUM BLD-SCNC: 4 MMOL/L (ref 3.5–5.2)
PROT SERPL-MCNC: 7.2 G/DL (ref 6–8.5)
RBC # BLD AUTO: 3.91 10*6/MM3 (ref 3.77–5.28)
SODIUM BLD-SCNC: 140 MMOL/L (ref 136–145)
TIBC SERPL-MCNC: 353 MCG/DL (ref 298–536)
TRANSFERRIN SERPL-MCNC: 237 MG/DL (ref 200–360)
VIT B12 BLD-MCNC: 681 PG/ML (ref 211–946)
WBC NRBC COR # BLD: 4.47 10*3/MM3 (ref 3.4–10.8)

## 2020-04-22 PROCEDURE — 25010000002 TRASTUZUMAB PER 10 MG: Performed by: NURSE PRACTITIONER

## 2020-04-22 PROCEDURE — 96367 TX/PROPH/DG ADDL SEQ IV INF: CPT

## 2020-04-22 PROCEDURE — 25010000003 HEPARIN LOCK FLUCH PER 10 UNITS: Performed by: INTERNAL MEDICINE

## 2020-04-22 PROCEDURE — 96417 CHEMO IV INFUS EACH ADDL SEQ: CPT

## 2020-04-22 PROCEDURE — 25010000002 DIPHENHYDRAMINE PER 50 MG: Performed by: NURSE PRACTITIONER

## 2020-04-22 PROCEDURE — 25010000002 PALONOSETRON PER 25 MCG: Performed by: NURSE PRACTITIONER

## 2020-04-22 PROCEDURE — 86300 IMMUNOASSAY TUMOR CA 15-3: CPT

## 2020-04-22 PROCEDURE — 85025 COMPLETE CBC W/AUTO DIFF WBC: CPT

## 2020-04-22 PROCEDURE — 25010000002 DEXAMETHASONE SODIUM PHOSPHATE 100 MG/10ML SOLUTION: Performed by: NURSE PRACTITIONER

## 2020-04-22 PROCEDURE — 82378 CARCINOEMBRYONIC ANTIGEN: CPT

## 2020-04-22 PROCEDURE — 82746 ASSAY OF FOLIC ACID SERUM: CPT

## 2020-04-22 PROCEDURE — 80053 COMPREHEN METABOLIC PANEL: CPT

## 2020-04-22 PROCEDURE — 82728 ASSAY OF FERRITIN: CPT

## 2020-04-22 PROCEDURE — 96375 TX/PRO/DX INJ NEW DRUG ADDON: CPT

## 2020-04-22 PROCEDURE — 96413 CHEMO IV INFUSION 1 HR: CPT

## 2020-04-22 PROCEDURE — 84466 ASSAY OF TRANSFERRIN: CPT

## 2020-04-22 PROCEDURE — 82607 VITAMIN B-12: CPT

## 2020-04-22 PROCEDURE — 36415 COLL VENOUS BLD VENIPUNCTURE: CPT

## 2020-04-22 PROCEDURE — 25010000002 PACLITAXEL PER 30 MG: Performed by: NURSE PRACTITIONER

## 2020-04-22 PROCEDURE — 83540 ASSAY OF IRON: CPT

## 2020-04-22 RX ORDER — SODIUM CHLORIDE 0.9 % (FLUSH) 0.9 %
10 SYRINGE (ML) INJECTION AS NEEDED
Status: DISCONTINUED | OUTPATIENT
Start: 2020-04-22 | End: 2020-04-22 | Stop reason: HOSPADM

## 2020-04-22 RX ORDER — ACETAMINOPHEN 500 MG
500 TABLET ORAL ONCE
Status: COMPLETED | OUTPATIENT
Start: 2020-04-22 | End: 2020-04-22

## 2020-04-22 RX ORDER — SODIUM CHLORIDE 0.9 % (FLUSH) 0.9 %
10 SYRINGE (ML) INJECTION AS NEEDED
Status: CANCELLED | OUTPATIENT
Start: 2020-04-22

## 2020-04-22 RX ORDER — SODIUM CHLORIDE 9 MG/ML
250 INJECTION, SOLUTION INTRAVENOUS ONCE
Status: COMPLETED | OUTPATIENT
Start: 2020-04-22 | End: 2020-04-22

## 2020-04-22 RX ORDER — FAMOTIDINE 10 MG/ML
20 INJECTION, SOLUTION INTRAVENOUS AS NEEDED
Status: DISCONTINUED | OUTPATIENT
Start: 2020-04-22 | End: 2020-04-22 | Stop reason: HOSPADM

## 2020-04-22 RX ORDER — DIPHENHYDRAMINE HYDROCHLORIDE 50 MG/ML
50 INJECTION INTRAMUSCULAR; INTRAVENOUS AS NEEDED
Status: DISCONTINUED | OUTPATIENT
Start: 2020-04-22 | End: 2020-04-22 | Stop reason: HOSPADM

## 2020-04-22 RX ORDER — POLYETHYLENE GLYCOL 3350 17 G/17G
17 POWDER, FOR SOLUTION ORAL DAILY
COMMUNITY
End: 2022-11-21 | Stop reason: ALTCHOICE

## 2020-04-22 RX ORDER — PALONOSETRON 0.05 MG/ML
0.25 INJECTION, SOLUTION INTRAVENOUS ONCE
Status: COMPLETED | OUTPATIENT
Start: 2020-04-22 | End: 2020-04-22

## 2020-04-22 RX ORDER — HEPARIN SODIUM (PORCINE) LOCK FLUSH IV SOLN 100 UNIT/ML 100 UNIT/ML
500 SOLUTION INTRAVENOUS AS NEEDED
Status: DISCONTINUED | OUTPATIENT
Start: 2020-04-22 | End: 2020-04-22 | Stop reason: HOSPADM

## 2020-04-22 RX ORDER — HEPARIN SODIUM (PORCINE) LOCK FLUSH IV SOLN 100 UNIT/ML 100 UNIT/ML
500 SOLUTION INTRAVENOUS AS NEEDED
Status: CANCELLED | OUTPATIENT
Start: 2020-04-22

## 2020-04-22 RX ADMIN — TRASTUZUMAB 450 MG: 150 INJECTION, POWDER, LYOPHILIZED, FOR SOLUTION INTRAVENOUS at 10:20

## 2020-04-22 RX ADMIN — DEXAMETHASONE SODIUM PHOSPHATE 12 MG: 10 INJECTION, SOLUTION INTRAMUSCULAR; INTRAVENOUS at 09:39

## 2020-04-22 RX ADMIN — PACLITAXEL 140 MG: 6 INJECTION, SOLUTION INTRAVENOUS at 11:12

## 2020-04-22 RX ADMIN — ACETAMINOPHEN 500 MG: 500 TABLET, FILM COATED ORAL at 09:37

## 2020-04-22 RX ADMIN — SODIUM CHLORIDE 250 ML: 9 INJECTION, SOLUTION INTRAVENOUS at 09:30

## 2020-04-22 RX ADMIN — SODIUM CHLORIDE, PRESERVATIVE FREE 10 ML: 5 INJECTION INTRAVENOUS at 12:29

## 2020-04-22 RX ADMIN — Medication 500 UNITS: at 12:29

## 2020-04-22 RX ADMIN — PALONOSETRON HYDROCHLORIDE 0.25 MG: 0.25 INJECTION, SOLUTION INTRAVENOUS at 09:37

## 2020-04-22 RX ADMIN — DIPHENHYDRAMINE HYDROCHLORIDE 25 MG: 50 INJECTION, SOLUTION INTRAMUSCULAR; INTRAVENOUS at 10:00

## 2020-04-22 NOTE — TELEPHONE ENCOUNTER
Received call from [facility] she reports patients medication was overlooked this week, (Kanjinti) which has alternative Herceptin, the only difference is in manufacturing, she questions if patient can receive the Herceptin today for txt one time dose to keep her txt plan on schedule.    Per Dr Wheat instruction, Yes; patient can receive the Herceptin today for her txt. [facility] v/u and will order the Drug Kanjinti for nxt amy txt.

## 2020-04-23 LAB — CANCER AG27-29 SERPL-ACNC: 21.7 U/ML (ref 0–38.6)

## 2020-04-29 ENCOUNTER — LAB (OUTPATIENT)
Dept: LAB | Facility: HOSPITAL | Age: 60
End: 2020-04-29

## 2020-04-29 ENCOUNTER — INFUSION (OUTPATIENT)
Dept: ONCOLOGY | Facility: HOSPITAL | Age: 60
End: 2020-04-29

## 2020-04-29 VITALS
HEART RATE: 85 BPM | DIASTOLIC BLOOD PRESSURE: 66 MMHG | BODY MASS INDEX: 33.18 KG/M2 | HEIGHT: 60 IN | WEIGHT: 169 LBS | RESPIRATION RATE: 18 BRPM | SYSTOLIC BLOOD PRESSURE: 113 MMHG | TEMPERATURE: 98.5 F | OXYGEN SATURATION: 96 %

## 2020-04-29 DIAGNOSIS — C50.912 INVASIVE DUCTAL CARCINOMA OF BREAST, FEMALE, LEFT (HCC): Primary | ICD-10-CM

## 2020-04-29 DIAGNOSIS — Z98.890 S/P LUMPECTOMY, LEFT BREAST: ICD-10-CM

## 2020-04-29 DIAGNOSIS — Z98.890 S/P LYMPH NODE BIOPSY: ICD-10-CM

## 2020-04-29 DIAGNOSIS — C50.912 INVASIVE DUCTAL CARCINOMA OF BREAST, FEMALE, LEFT (HCC): ICD-10-CM

## 2020-04-29 LAB
ALBUMIN SERPL-MCNC: 4.5 G/DL (ref 3.5–5.2)
ALBUMIN/GLOB SERPL: 1.8 G/DL
ALP SERPL-CCNC: 68 U/L (ref 39–117)
ALT SERPL W P-5'-P-CCNC: 55 U/L (ref 1–33)
ANION GAP SERPL CALCULATED.3IONS-SCNC: 11 MMOL/L (ref 5–15)
AST SERPL-CCNC: 29 U/L (ref 1–32)
BASOPHILS # BLD AUTO: 0.08 10*3/MM3 (ref 0–0.2)
BASOPHILS NFR BLD AUTO: 1.7 % (ref 0–1.5)
BILIRUB SERPL-MCNC: 0.4 MG/DL (ref 0.2–1.2)
BUN BLD-MCNC: 15 MG/DL (ref 6–20)
BUN/CREAT SERPL: 23.8 (ref 7–25)
CALCIUM SPEC-SCNC: 9.6 MG/DL (ref 8.6–10.5)
CHLORIDE SERPL-SCNC: 102 MMOL/L (ref 98–107)
CO2 SERPL-SCNC: 27 MMOL/L (ref 22–29)
CREAT BLD-MCNC: 0.63 MG/DL (ref 0.57–1)
DEPRECATED RDW RBC AUTO: 48.6 FL (ref 37–54)
EOSINOPHIL # BLD AUTO: 0.07 10*3/MM3 (ref 0–0.4)
EOSINOPHIL NFR BLD AUTO: 1.5 % (ref 0.3–6.2)
ERYTHROCYTE [DISTWIDTH] IN BLOOD BY AUTOMATED COUNT: 14.7 % (ref 12.3–15.4)
GFR SERPL CREATININE-BSD FRML MDRD: 97 ML/MIN/1.73
GLOBULIN UR ELPH-MCNC: 2.5 GM/DL
GLUCOSE BLD-MCNC: 119 MG/DL (ref 65–99)
HCT VFR BLD AUTO: 35.7 % (ref 34–46.6)
HGB BLD-MCNC: 11.9 G/DL (ref 12–15.9)
IMM GRANULOCYTES # BLD AUTO: 0.13 10*3/MM3 (ref 0–0.05)
IMM GRANULOCYTES NFR BLD AUTO: 2.8 % (ref 0–0.5)
LYMPHOCYTES # BLD AUTO: 2 10*3/MM3 (ref 0.7–3.1)
LYMPHOCYTES NFR BLD AUTO: 43.4 % (ref 19.6–45.3)
MCH RBC QN AUTO: 30.8 PG (ref 26.6–33)
MCHC RBC AUTO-ENTMCNC: 33.3 G/DL (ref 31.5–35.7)
MCV RBC AUTO: 92.5 FL (ref 79–97)
MONOCYTES # BLD AUTO: 0.3 10*3/MM3 (ref 0.1–0.9)
MONOCYTES NFR BLD AUTO: 6.5 % (ref 5–12)
NEUTROPHILS # BLD AUTO: 2.03 10*3/MM3 (ref 1.7–7)
NEUTROPHILS NFR BLD AUTO: 44.1 % (ref 42.7–76)
NRBC BLD AUTO-RTO: 0 /100 WBC (ref 0–0.2)
PLATELET # BLD AUTO: 404 10*3/MM3 (ref 140–450)
PMV BLD AUTO: 9.7 FL (ref 6–12)
POTASSIUM BLD-SCNC: 4.1 MMOL/L (ref 3.5–5.2)
PROT SERPL-MCNC: 7 G/DL (ref 6–8.5)
RBC # BLD AUTO: 3.86 10*6/MM3 (ref 3.77–5.28)
SODIUM BLD-SCNC: 140 MMOL/L (ref 136–145)
WBC NRBC COR # BLD: 4.61 10*3/MM3 (ref 3.4–10.8)

## 2020-04-29 PROCEDURE — 25010000002 PACLITAXEL PER 30 MG: Performed by: NURSE PRACTITIONER

## 2020-04-29 PROCEDURE — 25010000002 DIPHENHYDRAMINE PER 50 MG: Performed by: NURSE PRACTITIONER

## 2020-04-29 PROCEDURE — 25010000002 DEXAMETHASONE SODIUM PHOSPHATE 100 MG/10ML SOLUTION: Performed by: NURSE PRACTITIONER

## 2020-04-29 PROCEDURE — 96375 TX/PRO/DX INJ NEW DRUG ADDON: CPT

## 2020-04-29 PROCEDURE — 96367 TX/PROPH/DG ADDL SEQ IV INF: CPT

## 2020-04-29 PROCEDURE — 96413 CHEMO IV INFUSION 1 HR: CPT

## 2020-04-29 PROCEDURE — 25010000002 PALONOSETRON PER 25 MCG: Performed by: NURSE PRACTITIONER

## 2020-04-29 PROCEDURE — 85025 COMPLETE CBC W/AUTO DIFF WBC: CPT

## 2020-04-29 PROCEDURE — 25010000003 HEPARIN LOCK FLUSH PER 10 UNITS: Performed by: INTERNAL MEDICINE

## 2020-04-29 PROCEDURE — 80053 COMPREHEN METABOLIC PANEL: CPT

## 2020-04-29 PROCEDURE — 36415 COLL VENOUS BLD VENIPUNCTURE: CPT

## 2020-04-29 RX ORDER — PALONOSETRON 0.05 MG/ML
0.25 INJECTION, SOLUTION INTRAVENOUS ONCE
Status: COMPLETED | OUTPATIENT
Start: 2020-04-29 | End: 2020-04-29

## 2020-04-29 RX ORDER — DIPHENHYDRAMINE HYDROCHLORIDE 50 MG/ML
50 INJECTION INTRAMUSCULAR; INTRAVENOUS AS NEEDED
Status: DISCONTINUED | OUTPATIENT
Start: 2020-04-29 | End: 2020-04-29 | Stop reason: HOSPADM

## 2020-04-29 RX ORDER — HEPARIN SODIUM (PORCINE) LOCK FLUSH IV SOLN 100 UNIT/ML 100 UNIT/ML
500 SOLUTION INTRAVENOUS AS NEEDED
Status: CANCELLED | OUTPATIENT
Start: 2020-04-29

## 2020-04-29 RX ORDER — SODIUM CHLORIDE 0.9 % (FLUSH) 0.9 %
10 SYRINGE (ML) INJECTION AS NEEDED
Status: DISCONTINUED | OUTPATIENT
Start: 2020-04-29 | End: 2020-04-29 | Stop reason: HOSPADM

## 2020-04-29 RX ORDER — SODIUM CHLORIDE 0.9 % (FLUSH) 0.9 %
10 SYRINGE (ML) INJECTION AS NEEDED
Status: CANCELLED | OUTPATIENT
Start: 2020-04-29

## 2020-04-29 RX ORDER — HEPARIN SODIUM (PORCINE) LOCK FLUSH IV SOLN 100 UNIT/ML 100 UNIT/ML
500 SOLUTION INTRAVENOUS AS NEEDED
Status: DISCONTINUED | OUTPATIENT
Start: 2020-04-29 | End: 2020-04-29 | Stop reason: HOSPADM

## 2020-04-29 RX ORDER — ACETAMINOPHEN 500 MG
500 TABLET ORAL ONCE
Status: COMPLETED | OUTPATIENT
Start: 2020-04-29 | End: 2020-04-29

## 2020-04-29 RX ORDER — SODIUM CHLORIDE 9 MG/ML
250 INJECTION, SOLUTION INTRAVENOUS ONCE
Status: COMPLETED | OUTPATIENT
Start: 2020-04-29 | End: 2020-04-29

## 2020-04-29 RX ORDER — FAMOTIDINE 10 MG/ML
20 INJECTION, SOLUTION INTRAVENOUS AS NEEDED
Status: DISCONTINUED | OUTPATIENT
Start: 2020-04-29 | End: 2020-04-29 | Stop reason: HOSPADM

## 2020-04-29 RX ADMIN — Medication 500 UNITS: at 11:18

## 2020-04-29 RX ADMIN — SODIUM CHLORIDE, PRESERVATIVE FREE 10 ML: 5 INJECTION INTRAVENOUS at 11:18

## 2020-04-29 RX ADMIN — PACLITAXEL 140 MG: 6 INJECTION, SOLUTION INTRAVENOUS at 10:07

## 2020-04-29 RX ADMIN — DIPHENHYDRAMINE HYDROCHLORIDE 25 MG: 50 INJECTION, SOLUTION INTRAMUSCULAR; INTRAVENOUS at 09:30

## 2020-04-29 RX ADMIN — PALONOSETRON HYDROCHLORIDE 0.25 MG: 0.25 INJECTION, SOLUTION INTRAVENOUS at 09:28

## 2020-04-29 RX ADMIN — DEXAMETHASONE SODIUM PHOSPHATE 12 MG: 10 INJECTION, SOLUTION INTRAMUSCULAR; INTRAVENOUS at 09:51

## 2020-04-29 RX ADMIN — ACETAMINOPHEN 500 MG: 500 TABLET, FILM COATED ORAL at 09:28

## 2020-04-29 RX ADMIN — SODIUM CHLORIDE 250 ML: 9 INJECTION, SOLUTION INTRAVENOUS at 09:24

## 2020-05-06 ENCOUNTER — INFUSION (OUTPATIENT)
Dept: ONCOLOGY | Facility: HOSPITAL | Age: 60
End: 2020-05-06

## 2020-05-06 ENCOUNTER — LAB (OUTPATIENT)
Dept: LAB | Facility: HOSPITAL | Age: 60
End: 2020-05-06

## 2020-05-06 VITALS
SYSTOLIC BLOOD PRESSURE: 118 MMHG | BODY MASS INDEX: 33.57 KG/M2 | RESPIRATION RATE: 17 BRPM | TEMPERATURE: 97.9 F | DIASTOLIC BLOOD PRESSURE: 67 MMHG | HEIGHT: 60 IN | OXYGEN SATURATION: 97 % | WEIGHT: 171 LBS | HEART RATE: 87 BPM

## 2020-05-06 DIAGNOSIS — Z98.890 S/P LYMPH NODE BIOPSY: ICD-10-CM

## 2020-05-06 DIAGNOSIS — C50.912 INVASIVE DUCTAL CARCINOMA OF BREAST, FEMALE, LEFT (HCC): Primary | ICD-10-CM

## 2020-05-06 DIAGNOSIS — Z98.890 S/P LUMPECTOMY, LEFT BREAST: ICD-10-CM

## 2020-05-06 DIAGNOSIS — C50.912 INVASIVE DUCTAL CARCINOMA OF BREAST, FEMALE, LEFT (HCC): ICD-10-CM

## 2020-05-06 LAB
ALBUMIN SERPL-MCNC: 4.4 G/DL (ref 3.5–5.2)
ALBUMIN/GLOB SERPL: 1.7 G/DL
ALP SERPL-CCNC: 66 U/L (ref 39–117)
ALT SERPL W P-5'-P-CCNC: 49 U/L (ref 1–33)
ANION GAP SERPL CALCULATED.3IONS-SCNC: 12 MMOL/L (ref 5–15)
AST SERPL-CCNC: 27 U/L (ref 1–32)
BASOPHILS # BLD AUTO: 0.06 10*3/MM3 (ref 0–0.2)
BASOPHILS NFR BLD AUTO: 1.2 % (ref 0–1.5)
BILIRUB SERPL-MCNC: 0.5 MG/DL (ref 0.2–1.2)
BUN BLD-MCNC: 16 MG/DL (ref 6–20)
BUN/CREAT SERPL: 25.4 (ref 7–25)
CALCIUM SPEC-SCNC: 9.5 MG/DL (ref 8.6–10.5)
CHLORIDE SERPL-SCNC: 103 MMOL/L (ref 98–107)
CO2 SERPL-SCNC: 25 MMOL/L (ref 22–29)
CREAT BLD-MCNC: 0.63 MG/DL (ref 0.57–1)
DEPRECATED RDW RBC AUTO: 49 FL (ref 37–54)
EOSINOPHIL # BLD AUTO: 0.05 10*3/MM3 (ref 0–0.4)
EOSINOPHIL NFR BLD AUTO: 1 % (ref 0.3–6.2)
ERYTHROCYTE [DISTWIDTH] IN BLOOD BY AUTOMATED COUNT: 15.1 % (ref 12.3–15.4)
GFR SERPL CREATININE-BSD FRML MDRD: 97 ML/MIN/1.73
GLOBULIN UR ELPH-MCNC: 2.6 GM/DL
GLUCOSE BLD-MCNC: 109 MG/DL (ref 65–99)
HCT VFR BLD AUTO: 34.3 % (ref 34–46.6)
HGB BLD-MCNC: 11.5 G/DL (ref 12–15.9)
IMM GRANULOCYTES # BLD AUTO: 0.19 10*3/MM3 (ref 0–0.05)
IMM GRANULOCYTES NFR BLD AUTO: 3.7 % (ref 0–0.5)
LYMPHOCYTES # BLD AUTO: 2.25 10*3/MM3 (ref 0.7–3.1)
LYMPHOCYTES NFR BLD AUTO: 43.9 % (ref 19.6–45.3)
MCH RBC QN AUTO: 30.7 PG (ref 26.6–33)
MCHC RBC AUTO-ENTMCNC: 33.5 G/DL (ref 31.5–35.7)
MCV RBC AUTO: 91.7 FL (ref 79–97)
MONOCYTES # BLD AUTO: 0.37 10*3/MM3 (ref 0.1–0.9)
MONOCYTES NFR BLD AUTO: 7.2 % (ref 5–12)
NEUTROPHILS # BLD AUTO: 2.2 10*3/MM3 (ref 1.7–7)
NEUTROPHILS NFR BLD AUTO: 43 % (ref 42.7–76)
NRBC BLD AUTO-RTO: 0 /100 WBC (ref 0–0.2)
PLATELET # BLD AUTO: 372 10*3/MM3 (ref 140–450)
PMV BLD AUTO: 9.7 FL (ref 6–12)
POTASSIUM BLD-SCNC: 3.9 MMOL/L (ref 3.5–5.2)
PROT SERPL-MCNC: 7 G/DL (ref 6–8.5)
RBC # BLD AUTO: 3.74 10*6/MM3 (ref 3.77–5.28)
SODIUM BLD-SCNC: 140 MMOL/L (ref 136–145)
WBC NRBC COR # BLD: 5.12 10*3/MM3 (ref 3.4–10.8)

## 2020-05-06 PROCEDURE — 25010000002 PACLITAXEL PER 30 MG: Performed by: NURSE PRACTITIONER

## 2020-05-06 PROCEDURE — 25010000002 DIPHENHYDRAMINE PER 50 MG: Performed by: NURSE PRACTITIONER

## 2020-05-06 PROCEDURE — 25010000002 PALONOSETRON PER 25 MCG: Performed by: NURSE PRACTITIONER

## 2020-05-06 PROCEDURE — 25010000002 DEXAMETHASONE SODIUM PHOSPHATE 100 MG/10ML SOLUTION: Performed by: NURSE PRACTITIONER

## 2020-05-06 PROCEDURE — 96413 CHEMO IV INFUSION 1 HR: CPT

## 2020-05-06 PROCEDURE — 80053 COMPREHEN METABOLIC PANEL: CPT

## 2020-05-06 PROCEDURE — 96375 TX/PRO/DX INJ NEW DRUG ADDON: CPT

## 2020-05-06 PROCEDURE — 25010000003 HEPARIN LOCK FLUSH PER 10 UNITS: Performed by: INTERNAL MEDICINE

## 2020-05-06 PROCEDURE — 36415 COLL VENOUS BLD VENIPUNCTURE: CPT

## 2020-05-06 PROCEDURE — 85025 COMPLETE CBC W/AUTO DIFF WBC: CPT

## 2020-05-06 PROCEDURE — 96367 TX/PROPH/DG ADDL SEQ IV INF: CPT

## 2020-05-06 RX ORDER — PALONOSETRON 0.05 MG/ML
0.25 INJECTION, SOLUTION INTRAVENOUS ONCE
Status: COMPLETED | OUTPATIENT
Start: 2020-05-06 | End: 2020-05-06

## 2020-05-06 RX ORDER — HEPARIN SODIUM (PORCINE) LOCK FLUSH IV SOLN 100 UNIT/ML 100 UNIT/ML
500 SOLUTION INTRAVENOUS AS NEEDED
Status: CANCELLED | OUTPATIENT
Start: 2020-05-06

## 2020-05-06 RX ORDER — SODIUM CHLORIDE 0.9 % (FLUSH) 0.9 %
10 SYRINGE (ML) INJECTION AS NEEDED
Status: DISCONTINUED | OUTPATIENT
Start: 2020-05-06 | End: 2020-05-06 | Stop reason: HOSPADM

## 2020-05-06 RX ORDER — SODIUM CHLORIDE 9 MG/ML
250 INJECTION, SOLUTION INTRAVENOUS ONCE
Status: COMPLETED | OUTPATIENT
Start: 2020-05-06 | End: 2020-05-06

## 2020-05-06 RX ORDER — SODIUM CHLORIDE 0.9 % (FLUSH) 0.9 %
10 SYRINGE (ML) INJECTION AS NEEDED
Status: CANCELLED | OUTPATIENT
Start: 2020-05-06

## 2020-05-06 RX ORDER — HEPARIN SODIUM (PORCINE) LOCK FLUSH IV SOLN 100 UNIT/ML 100 UNIT/ML
500 SOLUTION INTRAVENOUS AS NEEDED
Status: DISCONTINUED | OUTPATIENT
Start: 2020-05-06 | End: 2020-05-06 | Stop reason: HOSPADM

## 2020-05-06 RX ORDER — ACETAMINOPHEN 500 MG
500 TABLET ORAL ONCE
Status: COMPLETED | OUTPATIENT
Start: 2020-05-06 | End: 2020-05-06

## 2020-05-06 RX ADMIN — DIPHENHYDRAMINE HYDROCHLORIDE 25 MG: 50 INJECTION, SOLUTION INTRAMUSCULAR; INTRAVENOUS at 10:00

## 2020-05-06 RX ADMIN — Medication 500 UNITS: at 11:48

## 2020-05-06 RX ADMIN — PACLITAXEL 140 MG: 6 INJECTION, SOLUTION INTRAVENOUS at 10:18

## 2020-05-06 RX ADMIN — SODIUM CHLORIDE 250 ML: 9 INJECTION, SOLUTION INTRAVENOUS at 09:35

## 2020-05-06 RX ADMIN — PALONOSETRON HYDROCHLORIDE 0.25 MG: 0.25 INJECTION, SOLUTION INTRAVENOUS at 09:45

## 2020-05-06 RX ADMIN — SODIUM CHLORIDE, PRESERVATIVE FREE 10 ML: 5 INJECTION INTRAVENOUS at 11:48

## 2020-05-06 RX ADMIN — DEXAMETHASONE SODIUM PHOSPHATE 12 MG: 10 INJECTION, SOLUTION INTRAMUSCULAR; INTRAVENOUS at 09:42

## 2020-05-06 RX ADMIN — ACETAMINOPHEN 500 MG: 500 TABLET, FILM COATED ORAL at 09:41

## 2020-05-12 DIAGNOSIS — C50.912 INVASIVE DUCTAL CARCINOMA OF BREAST, FEMALE, LEFT (HCC): ICD-10-CM

## 2020-05-12 RX ORDER — FAMOTIDINE 10 MG/ML
20 INJECTION, SOLUTION INTRAVENOUS AS NEEDED
Status: CANCELLED | OUTPATIENT
Start: 2020-05-20

## 2020-05-12 RX ORDER — ACETAMINOPHEN 325 MG/1
500 TABLET ORAL ONCE
Status: CANCELLED
Start: 2020-05-27

## 2020-05-12 RX ORDER — SODIUM CHLORIDE 9 MG/ML
250 INJECTION, SOLUTION INTRAVENOUS ONCE
Status: CANCELLED | OUTPATIENT
Start: 2020-05-13

## 2020-05-12 RX ORDER — PALONOSETRON 0.05 MG/ML
0.25 INJECTION, SOLUTION INTRAVENOUS ONCE
Status: CANCELLED
Start: 2020-05-13

## 2020-05-12 RX ORDER — PALONOSETRON 0.05 MG/ML
0.25 INJECTION, SOLUTION INTRAVENOUS ONCE
Status: CANCELLED
Start: 2020-05-20

## 2020-05-12 RX ORDER — ACETAMINOPHEN 325 MG/1
500 TABLET ORAL ONCE
Status: CANCELLED
Start: 2020-05-13

## 2020-05-12 RX ORDER — SODIUM CHLORIDE 9 MG/ML
250 INJECTION, SOLUTION INTRAVENOUS ONCE
Status: CANCELLED | OUTPATIENT
Start: 2020-05-20

## 2020-05-12 RX ORDER — PALONOSETRON 0.05 MG/ML
0.25 INJECTION, SOLUTION INTRAVENOUS ONCE
Status: CANCELLED
Start: 2020-05-27

## 2020-05-12 RX ORDER — DIPHENHYDRAMINE HYDROCHLORIDE 50 MG/ML
50 INJECTION INTRAMUSCULAR; INTRAVENOUS AS NEEDED
Status: CANCELLED | OUTPATIENT
Start: 2020-05-13

## 2020-05-12 RX ORDER — FAMOTIDINE 10 MG/ML
20 INJECTION, SOLUTION INTRAVENOUS AS NEEDED
Status: CANCELLED | OUTPATIENT
Start: 2020-05-27

## 2020-05-12 RX ORDER — FAMOTIDINE 10 MG/ML
20 INJECTION, SOLUTION INTRAVENOUS AS NEEDED
Status: CANCELLED | OUTPATIENT
Start: 2020-05-13

## 2020-05-12 RX ORDER — ACETAMINOPHEN 325 MG/1
500 TABLET ORAL ONCE
Status: CANCELLED
Start: 2020-05-20

## 2020-05-12 RX ORDER — SODIUM CHLORIDE 9 MG/ML
250 INJECTION, SOLUTION INTRAVENOUS ONCE
Status: CANCELLED | OUTPATIENT
Start: 2020-05-27

## 2020-05-12 RX ORDER — DIPHENHYDRAMINE HYDROCHLORIDE 50 MG/ML
50 INJECTION INTRAMUSCULAR; INTRAVENOUS AS NEEDED
Status: CANCELLED | OUTPATIENT
Start: 2020-05-27

## 2020-05-12 RX ORDER — DIPHENHYDRAMINE HYDROCHLORIDE 50 MG/ML
50 INJECTION INTRAMUSCULAR; INTRAVENOUS AS NEEDED
Status: CANCELLED | OUTPATIENT
Start: 2020-05-20

## 2020-05-13 ENCOUNTER — INFUSION (OUTPATIENT)
Dept: ONCOLOGY | Facility: HOSPITAL | Age: 60
End: 2020-05-13

## 2020-05-13 ENCOUNTER — TELEPHONE (OUTPATIENT)
Dept: ONCOLOGY | Facility: CLINIC | Age: 60
End: 2020-05-13

## 2020-05-13 ENCOUNTER — LAB (OUTPATIENT)
Dept: LAB | Facility: HOSPITAL | Age: 60
End: 2020-05-13

## 2020-05-13 VITALS
SYSTOLIC BLOOD PRESSURE: 118 MMHG | WEIGHT: 172 LBS | BODY MASS INDEX: 33.77 KG/M2 | HEIGHT: 60 IN | HEART RATE: 88 BPM | DIASTOLIC BLOOD PRESSURE: 67 MMHG | RESPIRATION RATE: 18 BRPM | OXYGEN SATURATION: 97 % | TEMPERATURE: 97.4 F

## 2020-05-13 DIAGNOSIS — C50.912 INVASIVE DUCTAL CARCINOMA OF BREAST, FEMALE, LEFT (HCC): ICD-10-CM

## 2020-05-13 DIAGNOSIS — C50.912 INVASIVE DUCTAL CARCINOMA OF BREAST, FEMALE, LEFT (HCC): Primary | ICD-10-CM

## 2020-05-13 LAB
ALBUMIN SERPL-MCNC: 4.3 G/DL (ref 3.5–5.2)
ALBUMIN/GLOB SERPL: 1.5 G/DL
ALP SERPL-CCNC: 69 U/L (ref 39–117)
ALT SERPL W P-5'-P-CCNC: 47 U/L (ref 1–33)
ANION GAP SERPL CALCULATED.3IONS-SCNC: 11 MMOL/L (ref 5–15)
AST SERPL-CCNC: 26 U/L (ref 1–32)
BASOPHILS # BLD AUTO: 0.07 10*3/MM3 (ref 0–0.2)
BASOPHILS NFR BLD AUTO: 1.4 % (ref 0–1.5)
BILIRUB SERPL-MCNC: 0.4 MG/DL (ref 0.2–1.2)
BUN BLD-MCNC: 16 MG/DL (ref 6–20)
BUN/CREAT SERPL: 34 (ref 7–25)
CALCIUM SPEC-SCNC: 9.4 MG/DL (ref 8.6–10.5)
CHLORIDE SERPL-SCNC: 105 MMOL/L (ref 98–107)
CO2 SERPL-SCNC: 24 MMOL/L (ref 22–29)
CREAT BLD-MCNC: 0.47 MG/DL (ref 0.57–1)
DEPRECATED RDW RBC AUTO: 50.5 FL (ref 37–54)
EOSINOPHIL # BLD AUTO: 0.09 10*3/MM3 (ref 0–0.4)
EOSINOPHIL NFR BLD AUTO: 1.8 % (ref 0.3–6.2)
ERYTHROCYTE [DISTWIDTH] IN BLOOD BY AUTOMATED COUNT: 15.2 % (ref 12.3–15.4)
GFR SERPL CREATININE-BSD FRML MDRD: 137 ML/MIN/1.73
GLOBULIN UR ELPH-MCNC: 2.8 GM/DL
GLUCOSE BLD-MCNC: 102 MG/DL (ref 65–99)
HCT VFR BLD AUTO: 34.7 % (ref 34–46.6)
HGB BLD-MCNC: 11.5 G/DL (ref 12–15.9)
IMM GRANULOCYTES # BLD AUTO: 0.22 10*3/MM3 (ref 0–0.05)
IMM GRANULOCYTES NFR BLD AUTO: 4.5 % (ref 0–0.5)
LYMPHOCYTES # BLD AUTO: 2.05 10*3/MM3 (ref 0.7–3.1)
LYMPHOCYTES NFR BLD AUTO: 42.1 % (ref 19.6–45.3)
MCH RBC QN AUTO: 31 PG (ref 26.6–33)
MCHC RBC AUTO-ENTMCNC: 33.1 G/DL (ref 31.5–35.7)
MCV RBC AUTO: 93.5 FL (ref 79–97)
MONOCYTES # BLD AUTO: 0.31 10*3/MM3 (ref 0.1–0.9)
MONOCYTES NFR BLD AUTO: 6.4 % (ref 5–12)
NEUTROPHILS # BLD AUTO: 2.13 10*3/MM3 (ref 1.7–7)
NEUTROPHILS NFR BLD AUTO: 43.8 % (ref 42.7–76)
NRBC BLD AUTO-RTO: 0 /100 WBC (ref 0–0.2)
PLATELET # BLD AUTO: 344 10*3/MM3 (ref 140–450)
PMV BLD AUTO: 9.7 FL (ref 6–12)
POTASSIUM BLD-SCNC: 4.2 MMOL/L (ref 3.5–5.2)
PROT SERPL-MCNC: 7.1 G/DL (ref 6–8.5)
RBC # BLD AUTO: 3.71 10*6/MM3 (ref 3.77–5.28)
SODIUM BLD-SCNC: 140 MMOL/L (ref 136–145)
WBC NRBC COR # BLD: 4.87 10*3/MM3 (ref 3.4–10.8)

## 2020-05-13 PROCEDURE — 25010000002 PACLITAXEL PER 30 MG: Performed by: NURSE PRACTITIONER

## 2020-05-13 PROCEDURE — 96367 TX/PROPH/DG ADDL SEQ IV INF: CPT

## 2020-05-13 PROCEDURE — 96413 CHEMO IV INFUSION 1 HR: CPT

## 2020-05-13 PROCEDURE — 96375 TX/PRO/DX INJ NEW DRUG ADDON: CPT

## 2020-05-13 PROCEDURE — 25010000002 DEXAMETHASONE SODIUM PHOSPHATE 100 MG/10ML SOLUTION: Performed by: NURSE PRACTITIONER

## 2020-05-13 PROCEDURE — 96417 CHEMO IV INFUS EACH ADDL SEQ: CPT

## 2020-05-13 PROCEDURE — 80053 COMPREHEN METABOLIC PANEL: CPT

## 2020-05-13 PROCEDURE — 25010000002 DIPHENHYDRAMINE PER 50 MG: Performed by: NURSE PRACTITIONER

## 2020-05-13 PROCEDURE — 25010000002 TRASTUZUMAB-ANNS 150 MG RECONSTITUTED SOLUTION 1 EACH VIAL: Performed by: NURSE PRACTITIONER

## 2020-05-13 PROCEDURE — 36415 COLL VENOUS BLD VENIPUNCTURE: CPT

## 2020-05-13 PROCEDURE — 25010000003 HEPARIN LOCK FLUSH PER 10 UNITS: Performed by: INTERNAL MEDICINE

## 2020-05-13 PROCEDURE — 25010000002 PALONOSETRON PER 25 MCG: Performed by: NURSE PRACTITIONER

## 2020-05-13 PROCEDURE — 85025 COMPLETE CBC W/AUTO DIFF WBC: CPT

## 2020-05-13 RX ORDER — PALONOSETRON 0.05 MG/ML
0.25 INJECTION, SOLUTION INTRAVENOUS ONCE
Status: COMPLETED | OUTPATIENT
Start: 2020-05-13 | End: 2020-05-13

## 2020-05-13 RX ORDER — FAMOTIDINE 10 MG/ML
20 INJECTION, SOLUTION INTRAVENOUS AS NEEDED
Status: DISCONTINUED | OUTPATIENT
Start: 2020-05-13 | End: 2020-05-13 | Stop reason: HOSPADM

## 2020-05-13 RX ORDER — HEPARIN SODIUM (PORCINE) LOCK FLUSH IV SOLN 100 UNIT/ML 100 UNIT/ML
500 SOLUTION INTRAVENOUS AS NEEDED
Status: CANCELLED | OUTPATIENT
Start: 2020-05-13

## 2020-05-13 RX ORDER — SODIUM CHLORIDE 0.9 % (FLUSH) 0.9 %
10 SYRINGE (ML) INJECTION AS NEEDED
Status: CANCELLED | OUTPATIENT
Start: 2020-05-13

## 2020-05-13 RX ORDER — METHYLPREDNISOLONE 4 MG/1
TABLET ORAL
Qty: 21 TABLET | Refills: 0 | Status: SHIPPED | OUTPATIENT
Start: 2020-05-13 | End: 2020-05-26 | Stop reason: ALTCHOICE

## 2020-05-13 RX ORDER — ACETAMINOPHEN 500 MG
500 TABLET ORAL ONCE
Status: COMPLETED | OUTPATIENT
Start: 2020-05-13 | End: 2020-05-13

## 2020-05-13 RX ORDER — SODIUM CHLORIDE 9 MG/ML
250 INJECTION, SOLUTION INTRAVENOUS ONCE
Status: COMPLETED | OUTPATIENT
Start: 2020-05-13 | End: 2020-05-13

## 2020-05-13 RX ORDER — HEPARIN SODIUM (PORCINE) LOCK FLUSH IV SOLN 100 UNIT/ML 100 UNIT/ML
500 SOLUTION INTRAVENOUS AS NEEDED
Status: DISCONTINUED | OUTPATIENT
Start: 2020-05-13 | End: 2020-05-13 | Stop reason: HOSPADM

## 2020-05-13 RX ORDER — DIPHENHYDRAMINE HYDROCHLORIDE 50 MG/ML
50 INJECTION INTRAMUSCULAR; INTRAVENOUS AS NEEDED
Status: DISCONTINUED | OUTPATIENT
Start: 2020-05-13 | End: 2020-05-13 | Stop reason: HOSPADM

## 2020-05-13 RX ORDER — SODIUM CHLORIDE 0.9 % (FLUSH) 0.9 %
10 SYRINGE (ML) INJECTION AS NEEDED
Status: DISCONTINUED | OUTPATIENT
Start: 2020-05-13 | End: 2020-05-13 | Stop reason: HOSPADM

## 2020-05-13 RX ADMIN — SODIUM CHLORIDE 250 ML: 9 INJECTION, SOLUTION INTRAVENOUS at 09:55

## 2020-05-13 RX ADMIN — ACETAMINOPHEN 500 MG: 500 TABLET, FILM COATED ORAL at 09:55

## 2020-05-13 RX ADMIN — DEXAMETHASONE SODIUM PHOSPHATE 12 MG: 10 INJECTION, SOLUTION INTRAMUSCULAR; INTRAVENOUS at 09:55

## 2020-05-13 RX ADMIN — DIPHENHYDRAMINE HYDROCHLORIDE 25 MG: 50 INJECTION, SOLUTION INTRAMUSCULAR; INTRAVENOUS at 10:20

## 2020-05-13 RX ADMIN — TRASTUZUMAB-ANNS 450 MG: 150 INJECTION, POWDER, LYOPHILIZED, FOR SOLUTION INTRAVENOUS at 11:04

## 2020-05-13 RX ADMIN — SODIUM CHLORIDE, PRESERVATIVE FREE 10 ML: 5 INJECTION INTRAVENOUS at 12:55

## 2020-05-13 RX ADMIN — PACLITAXEL 140 MG: 6 INJECTION, SOLUTION INTRAVENOUS at 11:44

## 2020-05-13 RX ADMIN — PALONOSETRON HYDROCHLORIDE 0.25 MG: 0.25 INJECTION, SOLUTION INTRAVENOUS at 09:55

## 2020-05-13 RX ADMIN — Medication 500 UNITS: at 12:55

## 2020-05-20 ENCOUNTER — INFUSION (OUTPATIENT)
Dept: ONCOLOGY | Facility: HOSPITAL | Age: 60
End: 2020-05-20

## 2020-05-20 ENCOUNTER — LAB (OUTPATIENT)
Dept: LAB | Facility: HOSPITAL | Age: 60
End: 2020-05-20

## 2020-05-20 ENCOUNTER — TELEPHONE (OUTPATIENT)
Dept: ONCOLOGY | Facility: CLINIC | Age: 60
End: 2020-05-20

## 2020-05-20 VITALS
WEIGHT: 172 LBS | RESPIRATION RATE: 18 BRPM | HEIGHT: 60 IN | OXYGEN SATURATION: 99 % | TEMPERATURE: 98.3 F | BODY MASS INDEX: 33.77 KG/M2 | DIASTOLIC BLOOD PRESSURE: 75 MMHG | HEART RATE: 98 BPM | SYSTOLIC BLOOD PRESSURE: 136 MMHG

## 2020-05-20 DIAGNOSIS — C50.912 INVASIVE DUCTAL CARCINOMA OF BREAST, FEMALE, LEFT (HCC): Primary | ICD-10-CM

## 2020-05-20 DIAGNOSIS — Z98.890 S/P LUMPECTOMY, LEFT BREAST: ICD-10-CM

## 2020-05-20 DIAGNOSIS — C50.912 INVASIVE DUCTAL CARCINOMA OF BREAST, FEMALE, LEFT (HCC): ICD-10-CM

## 2020-05-20 DIAGNOSIS — Z98.890 S/P LYMPH NODE BIOPSY: ICD-10-CM

## 2020-05-20 LAB
ALBUMIN SERPL-MCNC: 4.2 G/DL (ref 3.5–5.2)
ALBUMIN/GLOB SERPL: 1.6 G/DL
ALP SERPL-CCNC: 63 U/L (ref 39–117)
ALT SERPL W P-5'-P-CCNC: 33 U/L (ref 1–33)
ANION GAP SERPL CALCULATED.3IONS-SCNC: 11 MMOL/L (ref 5–15)
ANISOCYTOSIS BLD QL: ABNORMAL
AST SERPL-CCNC: 17 U/L (ref 1–32)
BASOPHILS # BLD MANUAL: 0.09 10*3/MM3 (ref 0–0.2)
BASOPHILS NFR BLD AUTO: 1 % (ref 0–1.5)
BILIRUB SERPL-MCNC: 0.3 MG/DL (ref 0.2–1.2)
BUN BLD-MCNC: 24 MG/DL (ref 6–20)
BUN/CREAT SERPL: 47.1 (ref 7–25)
CALCIUM SPEC-SCNC: 9.5 MG/DL (ref 8.6–10.5)
CHLORIDE SERPL-SCNC: 103 MMOL/L (ref 98–107)
CO2 SERPL-SCNC: 25 MMOL/L (ref 22–29)
CREAT BLD-MCNC: 0.51 MG/DL (ref 0.57–1)
DEPRECATED RDW RBC AUTO: 51.1 FL (ref 37–54)
EOSINOPHIL # BLD MANUAL: 0.18 10*3/MM3 (ref 0–0.4)
EOSINOPHIL NFR BLD MANUAL: 2 % (ref 0.3–6.2)
ERYTHROCYTE [DISTWIDTH] IN BLOOD BY AUTOMATED COUNT: 15.6 % (ref 12.3–15.4)
GFR SERPL CREATININE-BSD FRML MDRD: 124 ML/MIN/1.73
GIANT PLATELETS: ABNORMAL
GLOBULIN UR ELPH-MCNC: 2.6 GM/DL
GLUCOSE BLD-MCNC: 110 MG/DL (ref 65–99)
HCT VFR BLD AUTO: 34.5 % (ref 34–46.6)
HGB BLD-MCNC: 11.5 G/DL (ref 12–15.9)
LYMPHOCYTES # BLD MANUAL: 2.68 10*3/MM3 (ref 0.7–3.1)
LYMPHOCYTES NFR BLD MANUAL: 30.3 % (ref 19.6–45.3)
LYMPHOCYTES NFR BLD MANUAL: 4 % (ref 5–12)
MCH RBC QN AUTO: 31 PG (ref 26.6–33)
MCHC RBC AUTO-ENTMCNC: 33.3 G/DL (ref 31.5–35.7)
MCV RBC AUTO: 93 FL (ref 79–97)
METAMYELOCYTES NFR BLD MANUAL: 1 % (ref 0–0)
MONOCYTES # BLD AUTO: 0.35 10*3/MM3 (ref 0.1–0.9)
MYELOCYTES NFR BLD MANUAL: 3 % (ref 0–0)
NEUTROPHILS # BLD AUTO: 5.18 10*3/MM3 (ref 1.7–7)
NEUTROPHILS NFR BLD MANUAL: 55.6 % (ref 42.7–76)
NEUTS BAND NFR BLD MANUAL: 3 % (ref 0–5)
NRBC SPEC MANUAL: 1 /100 WBC (ref 0–0.2)
PLATELET # BLD AUTO: 402 10*3/MM3 (ref 140–450)
PMV BLD AUTO: 9.5 FL (ref 6–12)
POLYCHROMASIA BLD QL SMEAR: ABNORMAL
POTASSIUM BLD-SCNC: 3.7 MMOL/L (ref 3.5–5.2)
PROT SERPL-MCNC: 6.8 G/DL (ref 6–8.5)
RBC # BLD AUTO: 3.71 10*6/MM3 (ref 3.77–5.28)
SMALL PLATELETS BLD QL SMEAR: ABNORMAL
SODIUM BLD-SCNC: 139 MMOL/L (ref 136–145)
WBC MORPH BLD: NORMAL
WBC NRBC COR # BLD: 8.84 10*3/MM3 (ref 3.4–10.8)

## 2020-05-20 PROCEDURE — 96375 TX/PRO/DX INJ NEW DRUG ADDON: CPT

## 2020-05-20 PROCEDURE — 85007 BL SMEAR W/DIFF WBC COUNT: CPT

## 2020-05-20 PROCEDURE — 25010000002 DIPHENHYDRAMINE PER 50 MG: Performed by: NURSE PRACTITIONER

## 2020-05-20 PROCEDURE — 85025 COMPLETE CBC W/AUTO DIFF WBC: CPT

## 2020-05-20 PROCEDURE — 25010000002 PACLITAXEL PER 30 MG: Performed by: NURSE PRACTITIONER

## 2020-05-20 PROCEDURE — 96413 CHEMO IV INFUSION 1 HR: CPT

## 2020-05-20 PROCEDURE — 25010000002 PALONOSETRON PER 25 MCG: Performed by: NURSE PRACTITIONER

## 2020-05-20 PROCEDURE — 96367 TX/PROPH/DG ADDL SEQ IV INF: CPT

## 2020-05-20 PROCEDURE — 36415 COLL VENOUS BLD VENIPUNCTURE: CPT

## 2020-05-20 PROCEDURE — 25010000002 DEXAMETHASONE SODIUM PHOSPHATE 100 MG/10ML SOLUTION: Performed by: NURSE PRACTITIONER

## 2020-05-20 PROCEDURE — 80053 COMPREHEN METABOLIC PANEL: CPT

## 2020-05-20 PROCEDURE — 25010000003 HEPARIN LOCK FLUSH PER 10 UNITS: Performed by: INTERNAL MEDICINE

## 2020-05-20 RX ORDER — ACETAMINOPHEN 500 MG
500 TABLET ORAL ONCE
Status: COMPLETED | OUTPATIENT
Start: 2020-05-20 | End: 2020-05-20

## 2020-05-20 RX ORDER — DIPHENHYDRAMINE HYDROCHLORIDE 50 MG/ML
50 INJECTION INTRAMUSCULAR; INTRAVENOUS AS NEEDED
Status: DISCONTINUED | OUTPATIENT
Start: 2020-05-20 | End: 2020-05-20 | Stop reason: HOSPADM

## 2020-05-20 RX ORDER — SODIUM CHLORIDE 9 MG/ML
250 INJECTION, SOLUTION INTRAVENOUS ONCE
Status: COMPLETED | OUTPATIENT
Start: 2020-05-20 | End: 2020-05-20

## 2020-05-20 RX ORDER — SODIUM CHLORIDE 0.9 % (FLUSH) 0.9 %
10 SYRINGE (ML) INJECTION AS NEEDED
Status: CANCELLED | OUTPATIENT
Start: 2020-05-20

## 2020-05-20 RX ORDER — FAMOTIDINE 10 MG/ML
20 INJECTION, SOLUTION INTRAVENOUS AS NEEDED
Status: DISCONTINUED | OUTPATIENT
Start: 2020-05-20 | End: 2020-05-20 | Stop reason: HOSPADM

## 2020-05-20 RX ORDER — HEPARIN SODIUM (PORCINE) LOCK FLUSH IV SOLN 100 UNIT/ML 100 UNIT/ML
500 SOLUTION INTRAVENOUS AS NEEDED
Status: CANCELLED | OUTPATIENT
Start: 2020-05-20

## 2020-05-20 RX ORDER — PALONOSETRON 0.05 MG/ML
0.25 INJECTION, SOLUTION INTRAVENOUS ONCE
Status: COMPLETED | OUTPATIENT
Start: 2020-05-20 | End: 2020-05-20

## 2020-05-20 RX ORDER — SODIUM CHLORIDE 0.9 % (FLUSH) 0.9 %
10 SYRINGE (ML) INJECTION AS NEEDED
Status: DISCONTINUED | OUTPATIENT
Start: 2020-05-20 | End: 2020-05-20 | Stop reason: HOSPADM

## 2020-05-20 RX ORDER — HEPARIN SODIUM (PORCINE) LOCK FLUSH IV SOLN 100 UNIT/ML 100 UNIT/ML
500 SOLUTION INTRAVENOUS AS NEEDED
Status: DISCONTINUED | OUTPATIENT
Start: 2020-05-20 | End: 2020-05-20 | Stop reason: HOSPADM

## 2020-05-20 RX ADMIN — DEXAMETHASONE SODIUM PHOSPHATE 12 MG: 10 INJECTION, SOLUTION INTRAMUSCULAR; INTRAVENOUS at 09:59

## 2020-05-20 RX ADMIN — Medication 500 UNITS: at 12:09

## 2020-05-20 RX ADMIN — SODIUM CHLORIDE, PRESERVATIVE FREE 10 ML: 5 INJECTION INTRAVENOUS at 12:09

## 2020-05-20 RX ADMIN — DIPHENHYDRAMINE HYDROCHLORIDE 25 MG: 50 INJECTION, SOLUTION INTRAMUSCULAR; INTRAVENOUS at 10:21

## 2020-05-20 RX ADMIN — PACLITAXEL 140 MG: 6 INJECTION, SOLUTION INTRAVENOUS at 10:45

## 2020-05-20 RX ADMIN — ACETAMINOPHEN 500 MG: 500 TABLET, FILM COATED ORAL at 09:57

## 2020-05-20 RX ADMIN — PALONOSETRON HYDROCHLORIDE 0.25 MG: 0.25 INJECTION, SOLUTION INTRAVENOUS at 09:57

## 2020-05-20 RX ADMIN — SODIUM CHLORIDE 250 ML: 9 INJECTION, SOLUTION INTRAVENOUS at 09:51

## 2020-05-20 NOTE — TELEPHONE ENCOUNTER
Received call from [facility], patient presents to Center for consideration of txt Taxol, she questions todays labs (renal function) and patient receiving her chemo txt today?    Per Dr Wheat instruction, proceed with txt, she v/u of instruction.

## 2020-05-22 NOTE — PROGRESS NOTES
[facility] GROUP HEMATOLOGY AND ONCOLOGY  [address]  [place] [address] [phone]    Patient Name: Chasidy Tejada  Encounter Date: 05/27/2020  YOB: 1960  Patient Number: [id number]    REASON FOR VISIT: Chasidy Tejada is a 59-year-old female who returns in follow-up of newly diagnosed stage IA left breast carcinoma, ER/OK and HER-2/lamberto positive.  She underwent left breast lumpectomy with SNB, 01/10/2020 and was started on adjuvant Arimidex beginning 2/3/2020 through 03/11/2020.  She is currently on adjuvant paclitaxel (week 11/12, 05/20/2020), and adjuvant trastuzumab (Herceptin), having completed cycle 4, 05/13/2020.  She is here alone (previously with her spouse).    DIAGNOSTIC ABNORMALITIES:          1.   12/13/2019- mammogram screening.  Impression: Stellate lesion outer left breast.  Spot compression and ultrasound suggested.          2.   12/27/2019- diagnostic mammogram.  Impression: Irregular density upper outer left breast.  Biopsy suggested.          3.   12/27/2019- left breast ultrasound.  Impression: Suspicious 6 x 5 x 8 mm density left breast approximately 8 cm from the nipple.  Biopsy suggested.  BI-RADS Category 4B.          4.   01/03/2020-ultrasound-guided breast biopsy.  Impression: Appropriate sampling of the 8 mm irregular hypoechoic nodule in the left breast at 1:00 in the posterior depth/axillary tail region.  Final diagnosis: Left breast at 1 o'clock position, core biopsies: A.invasive mammary carcinoma of no special type (ductal, not otherwise specified), grade 2, at least 4.9 mm in greatest extent.  B.associated low-grade ductal carcinoma in situ.  % positive, OK 25% positive, HER-2/lamberto-2+ (IHC)/FISH- a focal positive score is present in an area corresponding to approximately 15% of the tumor (signal ratio: 2.9).           5.   02/03/2020- CMP normal with .  CBC normal.           6.    "02/03/2020-bone scan ([facility]).  Impression: Degenerative joint changes.  No evidence of bony metastasis.           7.   02/03/2020- DEXA scan ([facility]).  Impression: Pending           8.   02/03/2020-CT scans, head chest, abdomen, pelvis-impression: No abnormal intracranial enhancement to suggest intracranial metastasis/abnormalities.  No evidence of intrathoracic metastasis.  Stable 5 to 6 mm right lower lobe nodule of the right hemidiaphragm unchanged from 2/14/2011.  No convincing evidence of intra-abdominal or pelvic metastasis.  Hepatic cysts.  Fatty attenuated benign focus within the body of the pancreas visualized, 12/14/2011.  Previous hysterectomy, cholecystectomy and appendectomy.           9.   02/14/2020-echocardiogram.  Impression: LV systolic function normal (EF equals 70%).  Normal LV and RV size thickness and function.  Normal LA and RA size.  Normal cardiac valves.  No pericardial effusion.          10.  02/27/2020- CMP normal with .  CBC normal.  Iron 65, iron saturation 18%, ferritin 134, B12 745, folate > 20, CEA 1.76, CA-27-29 14.2.          11.  03/04/2020- repeat FISH for HER-2/lamberto (from tumor block, 01/10/2020).  No tumor on specimen.    PREVIOUS INTERVENTIONS:           1.   01/10/2020- left breast lumpectomy with left axillary sentinel node biopsy.  Final diagnosis: 1.left breast mass, lumpectomy: Invasive and in situ ductal carcinoma.  Tumor site-not specified.  Histologic type: Invasive carcinoma of no special type (invasive ductal carcinoma, not otherwise specified).  Overall tumor grade: Grade 1.  Tumor size: Greatest dimension of largest invasive focus: 5 mm.  Tumor focality: Single focus of invasive carcinoma.  Ductal carcinoma in situ (DCIS): Present.  Negative for extensive intraductal component (EIC).  Size (extent) CIS: Cannot be determined: Microscopic.  Architectural pattern: Cribriform/solid.  Nuclear grade: 2 (intermediate).  Necrosis: Present, central (expansive \"comedo) " necrosis).  Lobular carcinoma in situ (LCIS): No LCIS in specimen.  Lymphovascular invasion: Not identified.  Dermal lymphovascular invasion: No skin present.  Margins: Uninvolved by invasive carcinoma.  Lymph nodes: Uninvolved by tumor cells.  Regional lymph nodes: 1.  Number of lymph nodes examined: 1.  Number of sentinel nodes examined: 1.  Pathologic stage classification (pTNM, AJCC eighth edition): pT1a,(sn), pN0.  % positive, TX 25% positive, HER-2 2+ (equivocal).           2.   Adjuvant Arimidex 1 mg p.o. daily beginning 02/03/2020 through 03/11/2020           3.   Adjuvant paclitaxel beginning 03/11/2020 through 05/20/2020 (10/12 weekly doses)           4.   Adjuvant trastuzamab every 3 weeks - beginning 03/11/2020 through 05/13/2020 (4 cycles)    LABS    Lab Results - Last 18 Months   Lab Units 05/20/20  0846 05/13/20  0904 05/06/20  0856 04/29/20  0845 04/22/20  0851 04/15/20  0849 04/08/20  0853   HEMOGLOBIN g/dL 11.5* 11.5* 11.5* 11.9* 11.9* 12.4 12.6   HEMATOCRIT % 34.5 34.7 34.3 35.7 35.8 36.4 38.0   MCV fL 93.0 93.5 91.7 92.5 91.6 89.7 92.0   WBC 10*3/mm3 8.84 4.87 5.12 4.61 4.47 3.67 4.63   RDW % 15.6* 15.2 15.1 14.7 14.3 13.8 13.6   MPV fL 9.5 9.7 9.7 9.7 9.6 9.6 9.8   PLATELETS 10*3/mm3 402 344 372 404 372 329 336   IMM GRAN % %  --  4.5* 3.7* 2.8* 2.0* 0.5 0.6*   NEUTROS ABS 10*3/mm3 5.18 2.13 2.20 2.03 1.89 1.52* 2.10   LYMPHS ABS 10*3/mm3  --  2.05 2.25 2.00 2.05 1.76 2.09   MONOS ABS 10*3/mm3  --  0.31 0.37 0.30 0.25 0.20 0.28   EOS ABS 10*3/mm3 0.18 0.09 0.05 0.07 0.13 0.12 0.08   BASOS ABS 10*3/mm3 0.09 0.07 0.06 0.08 0.06 0.05 0.05   IMMATURE GRANS (ABS) 10*3/mm3  --  0.22* 0.19* 0.13* 0.09* 0.02 0.03   NRBC /100 WBC 1.0* 0.0 0.0 0.0 0.0 0.0 0.0   NEUTROPHIL % % 55.6  --   --   --   --   --   --    MONOCYTES % % 4.0*  --   --   --   --   --   --    BASOPHIL % % 1.0  --   --   --   --   --   --    ANISOCYTOSIS  Slight/1+  --   --   --   --   --   --    GIANT PLT  Slight/1+  --   --   --    --   --   --        Lab Results - Last 18 Months   Lab Units 05/20/20  0846 05/13/20  0904 05/06/20  0856 04/29/20  0845 04/22/20  0851 04/15/20  0849   GLUCOSE mg/dL 110* 102* 109* 119* 89 129*   SODIUM mmol/L 139 140 140 140 140 141   POTASSIUM mmol/L 3.7 4.2 3.9 4.1 4.0 3.7   CO2 mmol/L 25.0 24.0 25.0 27.0 26.0 24.0   CHLORIDE mmol/L 103 105 103 102 103 105   ANION GAP mmol/L 11.0 11.0 12.0 11.0 11.0 12.0   CREATININE mg/dL 0.51* 0.47* 0.63 0.63 0.51* 0.62   BUN mg/dL 24* 16 16 15 19 14   BUN / CREAT RATIO  47.1* 34.0* 25.4* 23.8 37.3* 22.6   CALCIUM mg/dL 9.5 9.4 9.5 9.6 9.8 9.3   EGFR IF NONAFRICN AM mL/min/1.73 124 137 97 97 124 99   ALK PHOS U/L 63 69 66 68 71 67   TOTAL PROTEIN g/dL 6.8 7.1 7.0 7.0 7.2 7.0   ALT (SGPT) U/L 33 47* 49* 55* 46* 59*   AST (SGOT) U/L 17 26 27 29 24 28   BILIRUBIN mg/dL 0.3 0.4 0.5 0.4 0.3 0.4   ALBUMIN g/dL 4.20 4.30 4.40 4.50 4.60 4.40   GLOBULIN gm/dL 2.6 2.8 2.6 2.5 2.6 2.6       Lab Results - Last 18 Months   Lab Units 04/22/20  0851 02/27/20  0901   CEA ng/mL 2.26 1.76       Lab Results - Last 18 Months   Lab Units 04/22/20  0851 02/27/20  0901 12/12/19  0704   IRON mcg/dL 55 65  --    TIBC mcg/dL 353 370  --    IRON SATURATION % 16* 18*  --    FERRITIN ng/mL 199.00* 134.20  --    TSH uIU/mL  --   --  0.798   FOLATE ng/mL >20.00 >20.00  --          PAST MEDICAL HISTORY:  ALLERGIES:  Allergies   Allergen Reactions   • Demerol [Meperidine] Hives   • Morphine And Related Hives     CURRENT MEDICATIONS:  Outpatient Encounter Medications as of 5/26/2020   Medication Sig Dispense Refill   • ALPRAZolam (XANAX) 0.5 MG tablet Take 1 tablet by mouth 2 (Two) Times a Day As Needed for Anxiety. 60 tablet 2   • Calcium Carb-Cholecalciferol (OS-ORTIZ PO) Take 1 tablet by mouth Daily.     • Inulin (FIBER CHOICE PO) Take 1 tablet by mouth Daily.     • Multiple Vitamins-Minerals (MULTIVITAMIN ADULT PO) Take 1 tablet by mouth Daily.     • ondansetron (ZOFRAN) 8 MG tablet Take 1 tablet by mouth  Every 8 (Eight) Hours As Needed for Nausea or Vomiting. 30 tablet 0   • oxyCODONE-acetaminophen (PERCOCET) 5-325 MG per tablet Take 1-2 tablets by mouth Every 4 (Four) Hours As Needed (Pain). 20 tablet 0   • polyethylene glycol (MIRALAX) packet Take 17 g by mouth Daily.     • sertraline (ZOLOFT) 50 MG tablet Take 1 tablet by mouth Daily. 30 tablet 11   • [DISCONTINUED] Docusate Sodium (STOOL SOFTENER LAXATIVE PO) Take  by mouth. Prn     • [DISCONTINUED] methylPREDNISolone (MEDROL, CHUCK,) 4 MG tablet Take as directed on package instructions. 21 tablet 0   • [DISCONTINUED] methylPREDNISolone (MEDROL, CHUCK,) 4 MG tablet Take as directed on package instructions. 21 tablet 0     No facility-administered encounter medications on file as of 5/26/2020.      Adult illnesses:  Colon polyps  Obesity  Spinal stenosis neck   Herniated cervical disc without myelopathy  Borderline blood pressure  History of dysphagia    Past surgeries:  Anterior cervical discectomy w fusion, 09/2018  Appendectomy  BSO/GANGA  Cholecystectomy  Colonoscopy, 02/10/2017  BTL  Tonsillectomy  Breast biopsy  Left partial mastectomy, 01/10/2020  03/05/2020 - Mediport placement per Dr. Edmond    ADULT ILLNESSES:  Patient Active Problem List   Diagnosis Code   • Family history of colon cancer Z80.0   • Colon polyps K63.5   • Laryngopharyngeal reflux K21.9   • Pharyngoesophageal dysphagia R13.14   • Non-smoker Z78.9   • Obesity (BMI 30-39.9) E66.9   • Spinal stenosis in cervical region M48.02   • Cervical radiculopathy M54.12   • Herniated cervical disc without myelopathy M50.20   • Borderline blood pressure R03.0   • BMI 31.0-31.9,adult Z68.31   • BMI 33.0-33.9,adult Z68.33   • Invasive ductal carcinoma of breast, female, left (CMS/HCC) C50.912   • Former smoker Z87.891   • S/P lumpectomy, left breast Z98.890   • S/P lymph node biopsy Z98.890     SURGERIES:  Past Surgical History:   Procedure Laterality Date   • ANTERIOR CERVICAL DISCECTOMY W/ FUSION N/A  9/7/2018    Procedure: CERVICAL DISCECTOMY ANTERIOR WITH FUSION C6-7;  Surgeon: Chris Mcfadden MD;  Location:  [facility] OR;  Service: Neurosurgery   • APPENDECTOMY     • BILATERAL SALPINGO OOPHORECTOMY     • CERVICAL CORPECTOMY N/A 9/7/2018    Procedure: CERVICAL CORPECTOMY C6-7;  Surgeon: Chris Mcfadden MD;  Location:  [facility] OR;  Service: Neurosurgery   • CHOLECYSTECTOMY     • COLONOSCOPY  09/05/2012   • COLONOSCOPY N/A 2/10/2017    Procedure: COLONOSCOPY WITH ANESTHESIA;  Surgeon: Elina Gonsalez MD;  Location:  [facility] ENDOSCOPY;  Service:    • HYSTERECTOMY  1998    Had hysterectomy for painful periods and bleeding. (Dr. Patel) TLH w/ BSO   • LAPAROSCOPIC TUBAL LIGATION     • MASTECTOMY W/ SENTINEL NODE BIOPSY Left 1/10/2020    Procedure: LEFT NEEDLE DIRECTED ULTRASOUND GUIDED PARTIAL MASTECTOMY WITH SENTINEL LYMPH NODE BIOPSY, INJECTION AND SCAN, RADIOLOGIST WILL INJECT;  Surgeon: Flor Edmond MD;  Location:  [facility] OR;  Service: General   • TONSILLECTOMY     • VENOUS ACCESS DEVICE (PORT) INSERTION N/A 3/5/2020    Procedure: INSERTION VENOUS ACCESS DEVICE EXCISION SKIN LESION X 2 CHEST AND ABDOMEN;  Surgeon: Flor Edmond MD;  Location:  [facility] OR;  Service: General;  Laterality: N/A;     HEALTH MAINTENANCE ITEMS:  Health Maintenance Due   Topic Date Due   • ZOSTER VACCINE (1 of 2) 06/06/2012       <no information>  Last Completed Colonoscopy       Status Date      COLONOSCOPY Done 2/10/2017 Surg:COLONOSCOPY     Patient has more history with this topic...        Immunization History   Administered Date(s) Administered   • Tdap 08/29/2018     Last Completed Mammogram       Status Date      MAMMOGRAM Done 12/27/2019 MAMMO DIAGNOSTIC DIGITAL TOMOSYNTHESIS LEFT W CAD     Patient has more history with this topic...            FAMILY HISTORY:  Family History   Problem Relation Age of Onset   • Colon cancer Maternal Grandmother         age not known   • Cancer Father    • Heart disease Father    • Diabetes  "Mother    • Hypertension Mother    • Stroke Mother    • Hypertension Sister    • No Known Problems Daughter    • No Known Problems Sister    • Colon polyps Neg Hx    • Esophageal cancer Neg Hx    • Liver cancer Neg Hx    • Liver disease Neg Hx    • Rectal cancer Neg Hx    • Stomach cancer Neg Hx    • Breast cancer Neg Hx    • Ovarian cancer Neg Hx      SOCIAL HISTORY:  Social History     Socioeconomic History   • Marital status:      Spouse name: Not on file   • Number of children: 1   • Years of education: Not on file   • Highest education level: Not on file   Tobacco Use   • Smoking status: Former Smoker     Types: Cigarettes   • Smokeless tobacco: Never Used   • Tobacco comment: \"i never really smoked\"   Substance and Sexual Activity   • Alcohol use: Not Currently     Frequency: Never   • Drug use: No   • Sexual activity: Yes     Partners: Male     Birth control/protection: Surgical       REVIEW OF SYSTEMS:  Review of Systems   Constitutional: Negative.         She manages her ADLs' including chores, errands, driving    Chemo tolerance:  Fatigue.  \"Comes and goes.\" Mild mouth sores, \"not bad.\"  Mild nausea, has needed Zofran only very infrequently, \"not bad at all.\"  No vomiting, some dry skin otherwise no skin changes.  Mild paresthasias of the fingers and toes.  No loss of fine motor function.  Loose stools requiring 2 Imodiums yesterday.  No overt diarrhea.  Hair loss.   HENT: Negative.    Eyes: Negative.    Respiratory: Negative.    Cardiovascular: Negative.    Gastrointestinal: Positive for diarrhea (Loose stools - 1-3/day.  Had needed Imodium yesterday.), nausea and GERD (\"before chemo\".  Modulated on omeprazole).   Endocrine: Positive for heat intolerance (mild residual vasomotor changes).   Genitourinary: Negative.    Musculoskeletal: Positive for arthralgias (knees, and hips \"sometimes, not bad\").   Skin: Positive for dry skin.   Allergic/Immunologic: Negative.    Neurological: Positive for " "numbness (paresthesias of the fingers and toes).   Hematological: Negative.    Psychiatric/Behavioral: The patient is nervous/anxious (\"situational\").          /82   Pulse 108   Temp 97.1 °F (36.2 °C)   Resp 16   Ht 152.4 cm (60\")   Wt 76.2 kg (168 lb 1.6 oz)   SpO2 97%   Breastfeeding No   BMI 32.83 kg/m²  Body surface area is 1.73 meters squared.  Pain Score    05/26/20 0943   PainSc: 0-No pain       Physical Exam:  Physical Exam   Constitutional: She is oriented to person, place, and time. She appears well-developed and well-nourished. No distress.   Pleasant, heavy set, cooperative, modestly kept female.  Appears her age.  ECOG 0.      She has gained 5 pounds in the interval.   HENT:   Head: Normocephalic and atraumatic.   Mouth/Throat: No oropharyngeal exudate.   Grade 2 alopecia   Eyes: Pupils are equal, round, and reactive to light. Conjunctivae and EOM are normal. No scleral icterus.   Neck: Normal range of motion. Neck supple. No JVD present. No tracheal deviation present. No thyromegaly present.   Cardiovascular: Normal rate, regular rhythm and normal heart sounds. Exam reveals no friction rub.   No murmur heard.  Pulmonary/Chest: Effort normal and breath sounds normal. No stridor. No respiratory distress. She has no wheezes. She has no rales.   Abdominal: Soft. Bowel sounds are normal. She exhibits no distension and no mass. There is no tenderness. There is no rebound and no guarding.   Musculoskeletal: Normal range of motion. She exhibits no edema or deformity.   Lymphadenopathy:     She has no cervical adenopathy.   Neurological: She is alert and oriented to person, place, and time. She displays normal reflexes. No cranial nerve deficit. She exhibits normal muscle tone. Coordination normal.   Skin: Skin is warm and dry. No rash noted. No erythema. No pallor.   Psychiatric: She has a normal mood and affect. Her behavior is normal. Judgment and thought content normal.   Vitals " reviewed.  Breasts: Left breast lumpectomy and sentinel node biopsy site in the left axillary healed.  There is no incisional tenderness in either site.  The rest of the breast is without obvious nodularity skin changes no nipple discharge.  Right breast without masses, skin changes nor nipple discharge.    ASSESSMENT:   1.  Invasive and in situ ductal carcinoma  left breast.                 Stage:  IA (pT1a, pN0, G2) ER  100% positive, ID  25%, HER-2/lamberto - 2+ (IHC)/FISH -a focal positive score is present in an area corresponding to approximately 15% of the tumor (signal ratio: 2.9).                 Tumor [place]:  5 mm.  Tumor focality: Single focus of invasive carcinoma.  No EIC nor LCIS.  Lymphovascular invasion: Not identified.  Dermal lymphovascular invasion: No skin present.  Microcalcifications: Not identified.  Margins uninvolved by invasive carcinoma.  Lymph nodes: Number of lymph nodes examined: 1 (0/1).                 Tumor Status:  Underwent lumpectomy and SNB, 01/10/2020.   Adjuvant Arimidex since 02/03/2020          2.   Obesity        3.   Spinal stenosis neck, quiescent         4.   Herniated cervical disc without myelopathy        5.   Anemia, likely chemo associated.  Hgb 11.5, 05/20/2020 (prior: Hgb 11.5-12.6)        6.   GERD, quiescent       RECOMMENDATIONS:         1.    Re: Apprised of the labs, 05/13/2020 and 05/20/2020 (above).  Mild anemia otherwise essentially normal CBC, CMP, CEA and CA-27-29.  Chemo tolerance discussed.  Grade 1 fatigue, nausea, dry skin, and loose bowels (no overt diarrhea).  Grade 2 alopecia.  Is willing to press on.        2.   Schedule bone density (was not done) and 2D echocardiogram this [facility] at [facility].  Compare with 02/14/2020        3.   Unable to repeat FISH testing for HER-2 on the tumor specimen from 01/10/2020 -pathology block did not contain any more tumor - personally discussed/confirmed with Dr. Burgos of pathology.        4.   Care previously discussed  with Dr. Jaimes on 02/28/2020 after she saw the patient on 02/25/2020 (encounter reviewed).  Pathology was reviewed at [place] and discussed with the patient.  Recommendations/plan: Discussed that since her cancer is strongly ER positive and low histologic grade/low proliferative rate, and HER-2 on the biopsy was borderline recommended repeat HER-2 testing on her surgical pathology.  Defer to his negative proceed with only endocrine therapy and radiation.  If HER-2 is confirmed to be positive, recommend adjuvant weekly Taxol x12+ Herceptin every 3 weeks to complete 1 year of HER-2 directed therapy.  These plans were personally discussed with the patient (via telephone) on 02/28/2020 I also personally asked the pathologist (Dr. Radha Burgos) to send off the biopsy specimen for repeat FISH HER-2 testing.          5.   Reviewed NCCN guidelines version 3.2019 invasive breast cancer.  For tumors (=/> 5 mm), and pN0, recommend adjuvant endocrine therapy +/- adjuvant chemotherapy with trastuzumab (category 2B).          6.   Re: The potential toxicities of adjuvant chemotherapy + Herceptin (to include but not limited to: Asthenia, cardiotoxicity, myelosuppression) are discussed at length. The rationale for adjuvant hormonal manipulation with AIs (see above) is discussed (to include but not limited to: Hot flashes, asthenia, pain, arthralgia, arthritis, nausea, bone pains, edema, fatigue, headache, venous thrombosis, anemia, leukopenia, depression, rash, pharyngitis, weight gain, dyspnea, fractures, breast pains, infection, angina). Questions answered to the best of my ability, and to her apparent satisfaction. She is willing to press on with therapy.   7.  Schedule treatment at [facility] -   · Taxol 80 mg/m2 (BSA = 1.73; TD = 140 mg.) per administration guidelines weekly x 12 - C12, 05/27/2020  · Herceptin 6 mg/kg beginning C2 (TD = 450 mg) per administration guidelines every 3 weeks x 1 year (17 cycles)-C5,  06/03/2020; C6, 06/24/2020; C7, 07/15/2020 - then C8-C17                     -  Premed before Herceptin -   · Tylenol 500 mg orally.   · Benadryl 25 mg IVP over 20-30 min.               8.  Premedicate on day 1 of chemo with:   · Aloxi 0.25 mg IVP   · Decadron 10 mg IVPB over 20-30 min   · ON TAXOL DAYS (D1,8,15): ALOXI + DEX + APAP + BENADRYL (even on No Herceptin days to prevent hypersensitivity with Taxol) - NO EMEND           9.  Rx:   · Zofran 8 mg p.o. every 8 hours as needed #30 - eRx   · Arimidex 1 mg daily #30 x2 RF - eRx - STOP on day 1 of chemo. Will resume after chemo is completed.  · Oscal 600/400 po bid # 60 - OTC          10.  CMP and CBC with diff weekly with Procrit 40,000 units subcutaneous if Hgb less than 10 or Hct less than 30; Neupogen 480 mcg subcutaneously daily x4 if ANC less than 1.0- [facility]        11. Discussed the rationale for adjuvant hormonal manipulation with AIs (see above) and potential toxicities of AI therapy are discussed (to include but not limited to: Hot flashes, asthenia, pain, arthralgias, arthritis, nausea/vomiting, headache, pharyngitis, depression, rash, hypertension, lymphedema, insomnia, edema, weight gain, dyspnea, abdominal pain, constipation, hyperlipidemia, osteoporosis, fractures, cough, bone pain, diarrhea, breast pain, paresthesias, infections, cataracts, myalgias, thromboembolism, angina, Lyles-Yung syndrome, erythema multiforme, anemia, leukopenia, stroke, myocardial infarction, osteoporosis, fractures). Questions are answered. She agrees to a trial of therapy.        12.  Reappoint to Dr. Brito Re:  Reassess for adjuvant radiation       13.  Return to the [facility] office with pre-office serum iron, iron saturation, ferritin B12, folate, CMP, CEA, CA 27-29 and CBC and differential in 5 weeks    .     MEDICAL DECISION MAKING: High complexity   AMOUNT OF DATA: Extensive     I spent at least 45 total minutes, face-to-face, caring for Chasidy today.  Greater than  50% of this time involved counseling and/or coordination of care as documented within this note regarding the patient's illness(es), pros and cons of various treatment options, instructions and/or risk reduction.     Cc:  Abigail Jaimes MD- [facility]          MD Kailash Sawant MD Robert Learch, MD

## 2020-05-26 ENCOUNTER — OFFICE VISIT (OUTPATIENT)
Dept: ONCOLOGY | Facility: CLINIC | Age: 60
End: 2020-05-26

## 2020-05-26 VITALS
SYSTOLIC BLOOD PRESSURE: 130 MMHG | HEART RATE: 108 BPM | WEIGHT: 168.1 LBS | RESPIRATION RATE: 16 BRPM | DIASTOLIC BLOOD PRESSURE: 82 MMHG | OXYGEN SATURATION: 97 % | HEIGHT: 60 IN | BODY MASS INDEX: 33 KG/M2 | TEMPERATURE: 97.1 F

## 2020-05-26 DIAGNOSIS — C50.912 INVASIVE DUCTAL CARCINOMA OF BREAST, FEMALE, LEFT (HCC): Primary | ICD-10-CM

## 2020-05-26 PROCEDURE — 99214 OFFICE O/P EST MOD 30 MIN: CPT | Performed by: INTERNAL MEDICINE

## 2020-05-27 ENCOUNTER — TELEPHONE (OUTPATIENT)
Dept: ONCOLOGY | Facility: CLINIC | Age: 60
End: 2020-05-27

## 2020-05-27 ENCOUNTER — LAB (OUTPATIENT)
Dept: LAB | Facility: HOSPITAL | Age: 60
End: 2020-05-27

## 2020-05-27 ENCOUNTER — INFUSION (OUTPATIENT)
Dept: ONCOLOGY | Facility: HOSPITAL | Age: 60
End: 2020-05-27

## 2020-05-27 VITALS
BODY MASS INDEX: 33.57 KG/M2 | DIASTOLIC BLOOD PRESSURE: 64 MMHG | WEIGHT: 171 LBS | TEMPERATURE: 98.9 F | HEART RATE: 83 BPM | OXYGEN SATURATION: 96 % | SYSTOLIC BLOOD PRESSURE: 111 MMHG | RESPIRATION RATE: 16 BRPM | HEIGHT: 60 IN

## 2020-05-27 DIAGNOSIS — C50.912 INVASIVE DUCTAL CARCINOMA OF BREAST, FEMALE, LEFT (HCC): ICD-10-CM

## 2020-05-27 DIAGNOSIS — C50.912 INVASIVE DUCTAL CARCINOMA OF BREAST, FEMALE, LEFT (HCC): Primary | ICD-10-CM

## 2020-05-27 LAB
ALBUMIN SERPL-MCNC: 4 G/DL (ref 3.5–5.2)
ALBUMIN/GLOB SERPL: 1.6 G/DL
ALP SERPL-CCNC: 60 U/L (ref 39–117)
ALT SERPL W P-5'-P-CCNC: 43 U/L (ref 1–33)
ANION GAP SERPL CALCULATED.3IONS-SCNC: 12 MMOL/L (ref 5–15)
AST SERPL-CCNC: 22 U/L (ref 1–32)
BASOPHILS # BLD AUTO: 0.07 10*3/MM3 (ref 0–0.2)
BASOPHILS NFR BLD AUTO: 1.3 % (ref 0–1.5)
BILIRUB SERPL-MCNC: 0.4 MG/DL (ref 0.2–1.2)
BUN BLD-MCNC: 23 MG/DL (ref 6–20)
BUN/CREAT SERPL: 44.2 (ref 7–25)
CALCIUM SPEC-SCNC: 9.2 MG/DL (ref 8.6–10.5)
CHLORIDE SERPL-SCNC: 102 MMOL/L (ref 98–107)
CO2 SERPL-SCNC: 25 MMOL/L (ref 22–29)
CREAT BLD-MCNC: 0.52 MG/DL (ref 0.57–1)
DEPRECATED RDW RBC AUTO: 52.8 FL (ref 37–54)
EOSINOPHIL # BLD AUTO: 0.06 10*3/MM3 (ref 0–0.4)
EOSINOPHIL NFR BLD AUTO: 1.1 % (ref 0.3–6.2)
ERYTHROCYTE [DISTWIDTH] IN BLOOD BY AUTOMATED COUNT: 15.7 % (ref 12.3–15.4)
GFR SERPL CREATININE-BSD FRML MDRD: 122 ML/MIN/1.73
GLOBULIN UR ELPH-MCNC: 2.5 GM/DL
GLUCOSE BLD-MCNC: 123 MG/DL (ref 65–99)
HCT VFR BLD AUTO: 33.1 % (ref 34–46.6)
HGB BLD-MCNC: 11.1 G/DL (ref 12–15.9)
IMM GRANULOCYTES # BLD AUTO: 0.11 10*3/MM3 (ref 0–0.05)
IMM GRANULOCYTES NFR BLD AUTO: 2 % (ref 0–0.5)
IRON 24H UR-MRATE: 37 MCG/DL (ref 37–145)
IRON SATN MFR SERPL: 11 % (ref 20–50)
LYMPHOCYTES # BLD AUTO: 1.87 10*3/MM3 (ref 0.7–3.1)
LYMPHOCYTES NFR BLD AUTO: 34.8 % (ref 19.6–45.3)
MCH RBC QN AUTO: 31.5 PG (ref 26.6–33)
MCHC RBC AUTO-ENTMCNC: 33.5 G/DL (ref 31.5–35.7)
MCV RBC AUTO: 94 FL (ref 79–97)
MONOCYTES # BLD AUTO: 0.49 10*3/MM3 (ref 0.1–0.9)
MONOCYTES NFR BLD AUTO: 9.1 % (ref 5–12)
NEUTROPHILS # BLD AUTO: 2.78 10*3/MM3 (ref 1.7–7)
NEUTROPHILS NFR BLD AUTO: 51.7 % (ref 42.7–76)
NRBC BLD AUTO-RTO: 0 /100 WBC (ref 0–0.2)
PLATELET # BLD AUTO: 331 10*3/MM3 (ref 140–450)
PMV BLD AUTO: 9.8 FL (ref 6–12)
POTASSIUM BLD-SCNC: 4 MMOL/L (ref 3.5–5.2)
PROT SERPL-MCNC: 6.5 G/DL (ref 6–8.5)
RBC # BLD AUTO: 3.52 10*6/MM3 (ref 3.77–5.28)
SODIUM BLD-SCNC: 139 MMOL/L (ref 136–145)
TIBC SERPL-MCNC: 326 MCG/DL (ref 298–536)
TRANSFERRIN SERPL-MCNC: 219 MG/DL (ref 200–360)
WBC NRBC COR # BLD: 5.38 10*3/MM3 (ref 3.4–10.8)

## 2020-05-27 PROCEDURE — 96367 TX/PROPH/DG ADDL SEQ IV INF: CPT

## 2020-05-27 PROCEDURE — 84466 ASSAY OF TRANSFERRIN: CPT

## 2020-05-27 PROCEDURE — 25010000002 PACLITAXEL PER 30 MG: Performed by: NURSE PRACTITIONER

## 2020-05-27 PROCEDURE — 25010000003 HEPARIN LOCK FLUSH PER 10 UNITS: Performed by: INTERNAL MEDICINE

## 2020-05-27 PROCEDURE — 36415 COLL VENOUS BLD VENIPUNCTURE: CPT

## 2020-05-27 PROCEDURE — 25010000002 DEXAMETHASONE SODIUM PHOSPHATE 100 MG/10ML SOLUTION: Performed by: NURSE PRACTITIONER

## 2020-05-27 PROCEDURE — 96413 CHEMO IV INFUSION 1 HR: CPT

## 2020-05-27 PROCEDURE — 85025 COMPLETE CBC W/AUTO DIFF WBC: CPT

## 2020-05-27 PROCEDURE — 25010000002 PALONOSETRON PER 25 MCG: Performed by: NURSE PRACTITIONER

## 2020-05-27 PROCEDURE — 96375 TX/PRO/DX INJ NEW DRUG ADDON: CPT

## 2020-05-27 PROCEDURE — 83540 ASSAY OF IRON: CPT

## 2020-05-27 PROCEDURE — 25010000002 DIPHENHYDRAMINE PER 50 MG: Performed by: NURSE PRACTITIONER

## 2020-05-27 PROCEDURE — 80053 COMPREHEN METABOLIC PANEL: CPT

## 2020-05-27 RX ORDER — PALONOSETRON 0.05 MG/ML
0.25 INJECTION, SOLUTION INTRAVENOUS ONCE
Status: COMPLETED | OUTPATIENT
Start: 2020-05-27 | End: 2020-05-27

## 2020-05-27 RX ORDER — FERROUS SULFATE 325(65) MG
325 TABLET ORAL 2 TIMES DAILY
Qty: 60 TABLET | Refills: 0 | Status: SHIPPED | OUTPATIENT
Start: 2020-05-27 | End: 2020-09-30

## 2020-05-27 RX ORDER — LIDOCAINE AND PRILOCAINE 25; 25 MG/G; MG/G
CREAM TOPICAL
Qty: 30 G | Refills: 2 | Status: SHIPPED | OUTPATIENT
Start: 2020-05-27 | End: 2021-02-26

## 2020-05-27 RX ORDER — ACETAMINOPHEN 500 MG
500 TABLET ORAL ONCE
Status: COMPLETED | OUTPATIENT
Start: 2020-05-27 | End: 2020-05-27

## 2020-05-27 RX ORDER — SODIUM CHLORIDE 0.9 % (FLUSH) 0.9 %
10 SYRINGE (ML) INJECTION AS NEEDED
Status: DISCONTINUED | OUTPATIENT
Start: 2020-05-27 | End: 2020-05-27 | Stop reason: HOSPADM

## 2020-05-27 RX ORDER — FAMOTIDINE 10 MG/ML
20 INJECTION, SOLUTION INTRAVENOUS AS NEEDED
Status: DISCONTINUED | OUTPATIENT
Start: 2020-05-27 | End: 2020-05-27 | Stop reason: HOSPADM

## 2020-05-27 RX ORDER — SODIUM CHLORIDE 9 MG/ML
250 INJECTION, SOLUTION INTRAVENOUS ONCE
Status: COMPLETED | OUTPATIENT
Start: 2020-05-27 | End: 2020-05-27

## 2020-05-27 RX ORDER — DIPHENHYDRAMINE HYDROCHLORIDE 50 MG/ML
50 INJECTION INTRAMUSCULAR; INTRAVENOUS AS NEEDED
Status: DISCONTINUED | OUTPATIENT
Start: 2020-05-27 | End: 2020-05-27 | Stop reason: HOSPADM

## 2020-05-27 RX ORDER — SODIUM CHLORIDE 0.9 % (FLUSH) 0.9 %
10 SYRINGE (ML) INJECTION AS NEEDED
Status: CANCELLED | OUTPATIENT
Start: 2020-05-27

## 2020-05-27 RX ORDER — HEPARIN SODIUM (PORCINE) LOCK FLUSH IV SOLN 100 UNIT/ML 100 UNIT/ML
500 SOLUTION INTRAVENOUS AS NEEDED
Status: CANCELLED | OUTPATIENT
Start: 2020-05-27

## 2020-05-27 RX ORDER — HEPARIN SODIUM (PORCINE) LOCK FLUSH IV SOLN 100 UNIT/ML 100 UNIT/ML
500 SOLUTION INTRAVENOUS AS NEEDED
Status: DISCONTINUED | OUTPATIENT
Start: 2020-05-27 | End: 2020-05-27 | Stop reason: HOSPADM

## 2020-05-27 RX ADMIN — PACLITAXEL 140 MG: 6 INJECTION, SOLUTION INTRAVENOUS at 11:05

## 2020-05-27 RX ADMIN — SODIUM CHLORIDE 250 ML: 9 INJECTION, SOLUTION INTRAVENOUS at 10:16

## 2020-05-27 RX ADMIN — DEXAMETHASONE SODIUM PHOSPHATE 12 MG: 10 INJECTION, SOLUTION INTRAMUSCULAR; INTRAVENOUS at 10:17

## 2020-05-27 RX ADMIN — SODIUM CHLORIDE, PRESERVATIVE FREE 10 ML: 5 INJECTION INTRAVENOUS at 12:15

## 2020-05-27 RX ADMIN — Medication 500 UNITS: at 12:15

## 2020-05-27 RX ADMIN — ACETAMINOPHEN 500 MG: 500 TABLET, FILM COATED ORAL at 10:17

## 2020-05-27 RX ADMIN — PALONOSETRON HYDROCHLORIDE 0.25 MG: 0.25 INJECTION, SOLUTION INTRAVENOUS at 10:17

## 2020-05-27 RX ADMIN — DIPHENHYDRAMINE HYDROCHLORIDE 25 MG: 50 INJECTION, SOLUTION INTRAMUSCULAR; INTRAVENOUS at 10:43

## 2020-05-27 NOTE — TELEPHONE ENCOUNTER
NOTIFIED PT  SENT SCRIPT TO [facility]    ----- Message from Tommy Wheat MD sent at 5/27/2020  2:28 PM CDT -----  Labs 5/27/2020-serum iron 37, iron saturation 11% (previously 55 and 16 respectively).  Ferritin pending.  Please call in ferrous sulfate 325 p.o. twice daily dispense 60.  Please asked patient to notify us if she is intolerant (GI symptoms) so we can consider IV iron replacement instead.  Thank you

## 2020-06-01 DIAGNOSIS — C50.912 INVASIVE DUCTAL CARCINOMA OF BREAST, FEMALE, LEFT (HCC): ICD-10-CM

## 2020-06-01 RX ORDER — DIPHENHYDRAMINE HYDROCHLORIDE 50 MG/ML
50 INJECTION INTRAMUSCULAR; INTRAVENOUS AS NEEDED
Status: CANCELLED | OUTPATIENT
Start: 2020-06-03

## 2020-06-01 RX ORDER — FAMOTIDINE 10 MG/ML
20 INJECTION, SOLUTION INTRAVENOUS AS NEEDED
Status: CANCELLED | OUTPATIENT
Start: 2020-06-03

## 2020-06-01 RX ORDER — ACETAMINOPHEN 325 MG/1
500 TABLET ORAL ONCE
Status: CANCELLED
Start: 2020-06-03

## 2020-06-01 RX ORDER — SODIUM CHLORIDE 9 MG/ML
250 INJECTION, SOLUTION INTRAVENOUS ONCE
Status: CANCELLED | OUTPATIENT
Start: 2020-06-03

## 2020-06-02 NOTE — PROGRESS NOTES
[facility], P.SSudhaCMD Myriam Lopez BSN, PA-C  _______________________________________________  [facility]  Department of Radiation Oncology  [address] [place] [id number]  Office: [phone]  Fax: [phone]    DATE:  06/04/2020  PATIENT: Chasidy Tejada 1960                         MEDICAL RECORD #:  [id number]                                                       Reason for Visit: Chasidy Tejada is a very pleasant 60 y.o. patient that returns to the clinic today for further radiation therapy recommendations regarding Stage IA (pT1a, pN0, G2) Invasive Ductal Carcinoma, 5 mm with associated low-grade DCIS. % positive, UT 25% positive, HER-2/lamberto-2+ (IHC)/FISH- positive (signal ratio: 2.9). Patient was seen in clinic for consultation on 02/05/2020 where she was asked to follow up for final recommendations after appointment with Dr. Jaimes. Patient was see per  on 02/25/2020 who recommended adjuvant weekly Taxol x 12 + Herceptin [id number] f/b Herceptin only to complete 1 year of HER2 directed therapy. Patient completed 12 cycles of Taxol on 05/27/2020. She continues Herceptin under the care of . She has returned to clinic for further recommendations. Upon exam patient reports fatigue and numbness. Denies appetite change, unexpected weight change, nausea/vomiting, diarrhea, light-headedness, weakness, and headaches. She continues to follow .     HISTORY OF PRESENT ILLNESS:  Diagnosed in January 2020 with Stage IA (pT1a, pN0, G2) Invasive Ductal Carcinoma, 5 mm with associated low-grade DCIS. % positive, UT 25% positive, HER-2/lamberto-2+ (IHC)/FISH- positive (signal ratio: 2.9). Underwent lumpectomy and SNB, 01/10/2020. Pathology confirmed the single focus of invasive carcinoma.  No EIC nor LCIS.  Lymphovascular invasion/ Microcalcifications: Not identified.  Margins uninvolved by invasive carcinoma.   Lymph nodes: Number of lymph nodes examined: 1 (0/1) negative. Follows Dr. Partida who plans for referral to Dr. Johnson at [place] due to the HER-2/lamberto positive status, further recommendations pending that visit on 2/25/2020.    12/13/2019 - Bilateral screening mammogram:  • Stellate lesion outer left breast. Spot compression and ultrasound are suggested.  • Benign findings in the right breast   • Assessment: BI-RADS Category 0    12/27/2019 - Bilateral diagnostic mammogram:  • Irregular density persists in the upper outer left breast. This is approximately 11 cm from the nipple in the upper outer left breast.  • Ultrasound demonstrates this to be a hypoechoic demonstrates acoustic shadowing and biopsy is suggested.   IMPRESSION:  • Irregular density upper outer left breast. Biopsy is suggested  • ASSESSMENT: BI-RADS Category 4B    12/27/2019 - US left breast:  • IMPRESSION suspicious 6 x 5 x 8 mm density left breast approximately 8 cm from the nipple biopsy is suggested assessment:   • BI-RADS Category 4B    01/03/2020 - Left breast at 1 o'clock position, core biopsies:  • Invasive mammary carcinoma of no special type (ductal, not otherwise specified), grade 2, at least 4.9 mm in greatest extent.  • Associated low-grade ductal carcinoma in situ.  • % positive, MD 25% positive,   • HER-2/lamberto-2+ (IHC)/FISH- positive (signal ratio: 2.9)  Comment: The histologic grade of this tumor is based on a relatively limited sample and may change upon receipt of the final excision specimen.  Estrogen and progesterone receptor analyses as well as other prognostic indicator studies will be performed on tissue from a paraffin block.  Results of these studies will be reported as an addendum upon receipt from the reference laboratory.     01/10/2020 - Left breast lumpectomy and lymph node biopsy per :  Left breast mass, lumpectomy:  • Invasive and in situ ductal carcinoma, see synoptic and comment  Left  axillary sentinel lymph node, excisional biopsy:  • Negative for tumor, see comment    02/03/2020 - Consult with ion:  ASSESSMENT:  • Invasive and in situ ductal carcinoma left breast  • Stage:  IA (pT1a, pN0, G2) ER  100% positive, [place]  25%, HER-2/lamberto - 2+ (IHC)/FISH positive (signal ratio: 2.9).  • Tumor [place]:  5 mm.  Tumor focality: Single focus of invasive carcinoma.  No EIC nor LCIS.  Lymphovascular invasion: Not identified.  Dermal lymphovascular invasion: No skin present.  Microcalcifications: Not identified.  Margins uninvolved by invasive carcinoma.  Lymph nodes: Number of lymph nodes examined: 1 (0/1).  • Tumor Status:  Underwent lumpectomy and SNB, 01/10/2020.    RECOMMENDATIONS:   • Schedule baseline 2D echocardiogram, bone density and staging bone scan, CT scans of the head, chest abdomen and pelvis with IV contrast at [facility]  • Draw baseline CBC with differential, CMP, CEA and CA-27-29   • Reviewed NCCN guidelines version 3.2019 invasive breast cancer.  For tumors (=/> 5 mm), and pN0, recommend adjuvant endocrine therapy +/- adjuvant chemotherapy with trastuzumab (category 2B).    •  Re: The potential toxicities of adjuvant chemotherapy + Herceptin (to include but not limited to: Asthenia, cardiotoxicity, myelosuppression) are discussed at length. The rationale for adjuvant hormonal manipulation with AIs (see above) is discussed (to include but not limited to: Hot flashes, asthenia, pain, arthralgia, arthritis, nausea, bone pains, edema, fatigue, headache, venous thrombosis, anemia, leukopenia, depression, rash, pharyngitis, weight gain, dyspnea, fractures, breast pains, infection, angina). Questions answered to the best of my ability, and to her apparent satisfaction. She is willing to proceed with a trial of therapy if deemed necessary.   • Teaching sheets for Arimidex, Taxotere, carboplatin, and Herceptin.   • Refer to Dr. Jaimes ASAP Re: Need an opinion on adjuvant therapy in this  patient with stage IA left breast carcinoma with tumor of only 0.5 cm,, ER/MA positive, but HER-2 positive.  • Schedule treatment C1 D1 to be scheduled - HOLD,  then C2D1 on  HOLD at [facility] - To be administered every 3 weeks for 6 chemo cycles   o Taxotere 75 mg/m2 (BSA = ; TD = mg.) per administration guidelines   o Carboplatin AUC = 5 mg/m2 (TD= pending) per administration guidelines.   o Herceptin 4 mg/ kg (TD = mg) C1D1 loading dose on Week #1, then Herceptin 2 mg/ kg (TD = 152 mg) weekly x17 weeks, to begin on week #2, per administration guidelines  o Anticipate: Herceptin 6 mg/kg (TD= pending) every 3 weeks x12 to begin 1 week after the weekly Herceptin completed   • Discussed the rationale for adjuvant hormonal manipulation with AIs (see above) and potential toxicities of AI therapy are discussed (to include but not limited to: Hot flashes, asthenia, pain, arthralgias, arthritis, nausea/vomiting, headache, pharyngitis, depression, rash, hypertension, lymphedema, insomnia, edema, weight gain, dyspnea, abdominal pain, constipation, hyperlipidemia, osteoporosis, fractures, cough, bone pain, diarrhea, breast pain, paresthesias, infections, cataracts, myalgias, thromboembolism, angina, Lyles-Yung syndrome, erythema multiforme, anemia, leukopenia, stroke, myocardial infarction, osteoporosis, fractures). Questions are answered. She agrees to a trial of therapy.   • Keep appointment with Dr. Brito on 02/05/2020 Re:  Adjuvant radiation  • Return to the [place] office with pre-office serum iron, iron saturation, ferritin B12, folate, CMP, CEA, CA 27-29 and CBC and differential  in 4 weeks (after she is seen by Dr. Jaimes in [place]) for further recommendations.    02/05/2020 - Consult with :  • I have seen, examined and reviewed her medication list, appropriate labs and imaging studies as well as other physician notes. We discussed the goals and plans of care with the patient and family and answered  all questions. Since the patient is at risk for lymphedema post surgery and radiation, I will ask physical therapy for lymphedema baseline evaluation.   • The patient has verbalized understanding of the risk of therapy for postoperative radiation therapy to the left breast.  We will await final recommendations from Dr. Jaimes, Mrs. Tejada has been referred to her by Dr. Wheat regarding a second opinion on adjuvant therapy in this patient with Stage IA left breast carcinoma with tumor of only 0.5 cm,, ER/MI positive, but HER-2 positive.    02/07/2020 - CT Head:  • No abnormal intracranial enhancement to suggest intracranial metastasis on CT exam. No acute intracranial abnormality identified.     02/07/2020 - CT Chest:  • Postoperative changes of the left upper outer quadrant breast and the left axilla from recent lumpectomy and sentinel lymph node versus partial axillary dissection. No evidence of intrathoracic metastasis.  • Stable 5 to 6 mm right lower lobe nodule of the right hemidiaphragm, unchanged from 02/14/2011. Dependent basilar atelectasis.    02/07/2020 - CT Abdomen/Pelvis:  • No convincing evidence of intra-abdominal or pelvic metastasis.  • Hepatic cysts.  • Fatty attenuated benign focus within the body of the pancreas, partially visualized on the CTA chest of 12/14/2011.  • Previous hysterectomy, cholecystectomy, and appendectomy.    02/10/2020 - Bone Scan:  • Degenerative joint changes.  • No evidence of bony metastases.    02/25/2020 - Appointment with :  • Reviewed her pathology report, prognosis, and treatment recommendations in detail. Our plan:  • Breast cancer: We discussed that since her cancer is strongly ER+ and low histologic grade/low proliferative rate, and the HER2 on the biopsy was borderline, I would recommend repeat HER2 testing on her surgical pathology. If HER2 is negative, this could possibly save her from chemotherapy and she would proceed with only endocrine  therapy and radiation. If HER2 is confirmed to be positive, would recommend adjuvant weekly Taxol x 12 + Herceptin [id number] f/b Herceptin only to complete 1 year of HER2 directed therapy.  • She will continue to follow up with Dr. Wheat and return to see me if needed     03/05/2020 - Port placement per .     03/11/2020 - 05/27/2020 - Chemotherapy course:  • PACLitaxel Weekly (12 cycles)  • Trastuzumab-anns Q21D - ( x one year) - continues     05/26/2020 - Appointment with :  ASSESSMENT:   • Invasive and in situ ductal carcinoma  left breast.  o Stage:  IA (pT1a, pN0, G2) ER  100% positive, [place]  25%, HER-2/lamberto - 2+ (IHC)/FISH -a focal positive score is present in an area corresponding to approximately 15% of the tumor (signal ratio: 2.9).  o Tumor [place]:  5 mm.  Tumor focality: Single focus of invasive carcinoma.  No EIC nor LCIS.  Lymphovascular invasion: Not identified.  Dermal lymphovascular invasion: No skin present.  Microcalcifications: Not identified.  Margins uninvolved by invasive carcinoma.  Lymph nodes: Number of lymph nodes examined: 1 (0/1).  o Tumor Status:  Underwent lumpectomy and SNB, 01/10/2020.   Adjuvant Arimidex since 02/03/2020  • Obesity  • Spinal stenosis neck, quiescent   • Herniated cervical disc without myelopathy  • Anemia, likely chemo associated.  Hgb 11.5, 05/20/2020 (prior: Hgb 11.5-12.6)  • GERD, quiescent   RECOMMENDATIONS:   •  Re: Apprised of the labs, 05/13/2020 and 05/20/2020 (above).  Mild anemia otherwise essentially normal CBC, CMP, CEA and CA-27-29.  Chemo tolerance discussed.  Grade 1 fatigue, nausea, dry skin, and loose bowels (no overt diarrhea).  Grade 2 alopecia.  Is willing to press on.  • Schedule bone density (was not done) and 2D echocardiogram this week at [facility].  Compare with 02/14/2020  • Unable to repeat FISH testing for HER-2 on the tumor specimen from 01/10/2020 -pathology block did not contain any more tumor - personally  discussed/confirmed with Dr. Burgos of pathology.  • Care previously discussed with Dr. Jaimes on 02/28/2020 after she saw the patient on 02/25/2020 (encounter reviewed).  Pathology was reviewed at [place] and discussed with the patient.  Recommendations/plan: Discussed that since her cancer is strongly ER positive and low histologic grade/low proliferative rate, and HER-2 on the biopsy was borderline recommended repeat HER-2 testing on her surgical pathology.  Defer to his negative proceed with only endocrine therapy and radiation.  If HER-2 is confirmed to be positive, recommend adjuvant weekly Taxol x12+ Herceptin every 3 weeks to complete 1 year of HER-2 directed therapy.  These plans were personally discussed with the patient (via telephone) on 02/28/2020 I also personally asked the pathologist (Dr. Radha Burgos) to send off the biopsy specimen for repeat FISH HER-2 testing.  • Reviewed NCCN guidelines version 3.2019 invasive breast cancer.  For tumors (=/> 5 mm), and pN0, recommend adjuvant endocrine therapy +/- adjuvant chemotherapy with trastuzumab (category 2B).    •  Re: The potential toxicities of adjuvant chemotherapy + Herceptin (to include but not limited to: Asthenia, cardiotoxicity, myelosuppression) are discussed at length. The rationale for adjuvant hormonal manipulation with AIs (see above) is discussed (to include but not limited to: Hot flashes, asthenia, pain, arthralgia, arthritis, nausea, bone pains, edema, fatigue, headache, venous thrombosis, anemia, leukopenia, depression, rash, pharyngitis, weight gain, dyspnea, fractures, breast pains, infection, angina). Questions answered to the best of my ability, and to her apparent satisfaction. She is willing to press on with therapy.   • Schedule treatment at [facility] -   • Taxol 80 mg/m2 (BSA = 1.73; TD = 140 mg.) per administration guidelines weekly x 12 - C12, 05/27/2020  • Herceptin 6 mg/kg beginning C2 (TD = 450 mg) per administration  guidelines every 3 weeks x 1 year (17 cycles)-C5, 06/03/2020; C6, 06/24/2020; C7, 07/15/2020 - then C8-C17  - Premed before Herceptin -   - Tylenol 500 mg orally.   - Benadryl 25 mg IVP over 20-30 min.   - Premedicate on day 1 of chemo with:   - Aloxi 0.25 mg IVP   - Decadron 10 mg IVPB over 20-30 min   • ON TAXOL DAYS (D1,8,15): ALOXI + DEX + APAP + BENADRYL (even on No Herceptin days to prevent hypersensitivity with Taxol) - NO EMEND  • Rx:   - Zofran 8 mg p.o. every 8 hours as needed #30 - eRx   - Arimidex 1 mg daily #30 x2 RF - eRx - STOP on day 1 of chemo. Will resume after chemo is completed.  - Oscal 600/400 po bid # 60 - OTC  • CMP and CBC with diff weekly with Procrit 40,000 units subcutaneous if Hgb less than 10 or Hct less than 30; Neupogen 480 mcg subcutaneously daily x4 if ANC less than 1.0- BHP   • Discussed the rationale for adjuvant hormonal manipulation with AIs (see above) and potential toxicities of AI therapy are discussed (to include but not limited to: Hot flashes, asthenia, pain, arthralgias, arthritis, nausea/vomiting, headache, pharyngitis, depression, rash, hypertension, lymphedema, insomnia, edema, weight gain, dyspnea, abdominal pain, constipation, hyperlipidemia, osteoporosis, fractures, cough, bone pain, diarrhea, breast pain, paresthesias, infections, cataracts, myalgias, thromboembolism, angina, Lyles-Yung syndrome, erythema multiforme, anemia, leukopenia, stroke, myocardial infarction, osteoporosis, fractures). Questions are answered. She agrees to a trial of therapy.   • Reappoint to Dr. Brito Re:  Reassess for adjuvant radiation  • Return to the [place] office with pre-office serum iron, iron saturation, ferritin B12, folate, CMP, CEA, CA 27-29 and CBC and differential in 5 weeks    History obtained from  PATIENT and CHART    PAST MEDICAL HISTORY  Past Medical History:   Diagnosis Date   • Acid reflux    • Asthma     long time ago    • Breast cancer (CMS/HCC)    • Cancer  "(CMS/[facility])    • Panic attack    • Right arm weakness       PAST SURGICAL HISTORY  Past Surgical History:   Procedure Laterality Date   • ANTERIOR CERVICAL DISCECTOMY W/ FUSION N/A 9/7/2018    Procedure: CERVICAL DISCECTOMY ANTERIOR WITH FUSION C6-7;  Surgeon: Chris Mcfadden MD;  Location: [facility] OR;  Service: Neurosurgery   • APPENDECTOMY     • BILATERAL SALPINGO OOPHORECTOMY     • CERVICAL CORPECTOMY N/A 9/7/2018    Procedure: CERVICAL CORPECTOMY C6-7;  Surgeon: Chris Mcfadden MD;  Location:  [facility] OR;  Service: Neurosurgery   • CHOLECYSTECTOMY     • COLONOSCOPY  09/05/2012   • COLONOSCOPY N/A 2/10/2017    Procedure: COLONOSCOPY WITH ANESTHESIA;  Surgeon: Elina Gonsalez MD;  Location: [facility] ENDOSCOPY;  Service:    • HYSTERECTOMY  1998    Had hysterectomy for painful periods and bleeding. (Dr. Patel) [facility] w/ BSO   • LAPAROSCOPIC TUBAL LIGATION     • MASTECTOMY W/ SENTINEL NODE BIOPSY Left 1/10/2020    Procedure: LEFT NEEDLE DIRECTED ULTRASOUND GUIDED PARTIAL MASTECTOMY WITH SENTINEL LYMPH NODE BIOPSY, INJECTION AND SCAN, RADIOLOGIST WILL INJECT;  Surgeon: Flor Edmond MD;  Location:  [facility] OR;  Service: General   • TONSILLECTOMY     • VENOUS ACCESS DEVICE (PORT) INSERTION N/A 3/5/2020    Procedure: INSERTION VENOUS ACCESS DEVICE EXCISION SKIN LESION X 2 CHEST AND ABDOMEN;  Surgeon: Flor Edmond MD;  Location: [facility] OR;  Service: General;  Laterality: N/A;      FAMILY HISTORY  family history includes Cancer in her father; Colon cancer in her maternal grandmother; Diabetes in her mother; Heart disease in her father; Hypertension in her mother and sister; No Known Problems in her daughter and sister; Stroke in her mother.     SOCIAL HISTORY  Social History     Tobacco Use   • Smoking status: Former Smoker     Types: Cigarettes   • Smokeless tobacco: Never Used   • Tobacco comment: \"i never really smoked\"   Substance Use Topics   • Alcohol use: Not Currently     Frequency: Never   • Drug use: No    "   ALLERGIES  Demerol [meperidine] and Morphine and related     MEDICATIONS  Current Outpatient Medications   Medication Sig Dispense Refill   • ALPRAZolam (XANAX) 0.5 MG tablet Take 1 tablet by mouth 2 (Two) Times a Day As Needed for Anxiety. 60 tablet 2   • Calcium Carb-Cholecalciferol (OS-ORTIZ PO) Take 1 tablet by mouth Daily.     • ferrous sulfate 325 (65 FE) MG tablet Take 1 tablet by mouth 2 (Two) Times a Day. 60 tablet 0   • Inulin (FIBER CHOICE PO) Take 1 tablet by mouth Daily.     • Multiple Vitamins-Minerals (MULTIVITAMIN ADULT PO) Take 1 tablet by mouth Daily.     • polyethylene glycol (MIRALAX) packet Take 17 g by mouth Daily.     • sertraline (ZOLOFT) 50 MG tablet Take 1 tablet by mouth Daily. 30 tablet 11   • lidocaine-prilocaine (EMLA) 2.5-2.5 % cream Apply to port site 30 minutes before it is accessed.  Cover with occlusive dressing. 30 g 2   • ondansetron (ZOFRAN) 8 MG tablet Take 1 tablet by mouth Every 8 (Eight) Hours As Needed for Nausea or Vomiting. 30 tablet 0   • oxyCODONE-acetaminophen (PERCOCET) 5-325 MG per tablet Take 1-2 tablets by mouth Every 4 (Four) Hours As Needed (Pain). 20 tablet 0     No current facility-administered medications for this visit.       The following portions of the patient's history were reviewed and updated as appropriate: allergies, current medications, past family history, past medical history, past social history, past surgical history and problem list.    REVIEW OF SYSTEMS  Review of Systems   Constitutional: Positive for fatigue. Negative for appetite change and unexpected weight change.   HENT: Negative.    Eyes:        Glasses   Respiratory: Negative.    Cardiovascular: Negative.    Gastrointestinal: Negative.    Endocrine: Negative.    Genitourinary: Negative.    Musculoskeletal: Negative.    Skin: Negative.    Allergic/Immunologic: Negative.    Neurological: Positive for numbness (fingers/toes r/t neuropathy from chemo).   Hematological: Negative.   "  Psychiatric/Behavioral: Negative.       PHYSICAL EXAM  VITAL SIGNS:   Vitals:    06/04/20 1003   Weight: 78.9 kg (174 lb)   Height: 152.4 cm (60\")   PainSc: 0-No pain      General:  Alert and oriented, no acute distress, well developed, Vitals reviewed.  Head:  Normocephalic, without obvious abnormality    Nose/Sinuses:  Nares normal externally, no sinus tenderness.  Mouth/Throat:  Mucosa moist, pharynx without erythema  Neck:  supple, No evidence of adenopathy in the cervical or supraclavicular areas.  Eyes: No gross abnormalities   Ears: Ears intact with no external abnormalities noted  Chest:  Respiratory efforts are normal and unlabored, chest is clear to auscultation.  Cardiovascular: Regular rate and rhythm without murmurs, rubs, or gallops.   Abdomen:  Soft, non-tender, normal bowel sounds; no CVA tenderness   Extremities:  GERARD well, warm to touch, no evidence of cyanosis or edema.  Skin: No suspicious lesions or rashes of concern  Neurologic:  Alert and oriented, non focal exam, strength and sensation grossly normal  Psych: Mood and affect are appropriate    Performance Status: ECOG (0) Fully active, able to carry on all predisease performance without restriction    Clinical Quality Measures  -Pain Documented by Standardized Tool, FPS Chasidy Tejada reports a pain score of 0.  Given her pain assessment as noted, treatment options were discussed and the following options were decided upon as a follow-up plan to address the patient's pain: No pain, no plan given.    Pain Medications             sertraline (ZOLOFT) 50 MG tablet Take 1 tablet by mouth Daily.    oxyCODONE-acetaminophen (PERCOCET) 5-325 MG per tablet Take 1-2 tablets by mouth Every 4 (Four) Hours As Needed (Pain).        -Advanced Care Planning Advance Care Planning    ACP discussion was held with the patient during this visit. Patient does not have an advance directive, information provided.    -Body Mass Index Screening and Follow-Up Plan  " "Patient's Body mass index is 33.98 kg/m². BMI is above normal parameters. Recommendations include: educational material.  -Tobacco Use: Screening and Cessation Intervention Social History    Tobacco Use      Smoking status: Former Smoker        Types: Cigarettes      Smokeless tobacco: Never Used      Tobacco comment: \"i never really smoked\"     ASSESSMENT AND PLAN  1. Invasive ductal carcinoma of breast, female, left (CMS/HCC)    2. Estrogen receptor positive status (ER+)    3. On antineoplastic chemotherapy    4. S/P lumpectomy, left breast    5. S/P lymph node biopsy    6. Former smoker      RECOMMENDATIONS:  Chasidy Tejada returns to the clinic today for further radiation therapy recommendations regarding Stage IA (pT1a, pN0, G2) Invasive Ductal Carcinoma, 5 mm with associated low-grade DCIS. % positive, [place] 25% positive, HER-2/lamberto-2+ (IHC)/FISH- positive (signal ratio: 2.9). Patient was seen in clinic for consultation on 02/05/2020 where she was asked to follow up for final recommendations after appointment with Dr. Jaimes. Patient was see per  on 02/25/2020 who recommended adjuvant weekly Taxol x 12 + Herceptin [id number] f/b Herceptin only to complete 1 year of HER2 directed therapy. Patient completed 12 cycles of Taxol on 05/27/2020. She continues Herceptin under the care of . She has returned to clinic for further recommendations regarding adjuvant radiation post chemotherapy.     We have reviewed the risks, benefits of radiation therapy includes but is not limited to radiation induced red, dry, tender, or itchy sunburn-type skin irritation of the targeted area, which may range from mild to intense, skin, breast heaviness,  redness, bruised appearance or discoloration of the skin, cumulative general fatigue and the risk of progression of disease in spite of therapy with either local or systemic failure.  I have reviewed this patient's medication list, appropriate labs and " imaging studies as well as other physician notes.  The patient has verbalized understanding of the risk of therapy and agrees to the treatment recommendations for postoperative radiation therapy to the left breast, we will simulate treatment fields with dose and final number of treatment fractions to be determined during the treatment planning process,  I anticipate a dose of 5040 cGy with 1000 cGy boost to the tumor bed for a total of 6040 cGy/33 fractions with plans to start around July 1st to allow for post chemotherapy healing time, she will continue Herceptin.     Todays appointment time was spent in counseling, coordination of care and surveillance related to patients diagnosis as well as radiation therapy possible and probable after effects.   Kailash Brito III, MD  06/04/2020

## 2020-06-03 ENCOUNTER — LAB (OUTPATIENT)
Dept: LAB | Facility: HOSPITAL | Age: 60
End: 2020-06-03

## 2020-06-03 ENCOUNTER — INFUSION (OUTPATIENT)
Dept: ONCOLOGY | Facility: HOSPITAL | Age: 60
End: 2020-06-03

## 2020-06-03 VITALS
WEIGHT: 171.2 LBS | BODY MASS INDEX: 33.61 KG/M2 | RESPIRATION RATE: 16 BRPM | OXYGEN SATURATION: 98 % | DIASTOLIC BLOOD PRESSURE: 66 MMHG | SYSTOLIC BLOOD PRESSURE: 120 MMHG | HEIGHT: 60 IN | HEART RATE: 79 BPM | TEMPERATURE: 98.1 F

## 2020-06-03 DIAGNOSIS — C50.912 INVASIVE DUCTAL CARCINOMA OF BREAST, FEMALE, LEFT (HCC): ICD-10-CM

## 2020-06-03 DIAGNOSIS — C50.912 INVASIVE DUCTAL CARCINOMA OF BREAST, FEMALE, LEFT (HCC): Primary | ICD-10-CM

## 2020-06-03 LAB
ALBUMIN SERPL-MCNC: 4.1 G/DL (ref 3.5–5.2)
ALBUMIN/GLOB SERPL: 1.6 G/DL
ALP SERPL-CCNC: 71 U/L (ref 39–117)
ALT SERPL W P-5'-P-CCNC: 39 U/L (ref 1–33)
ANION GAP SERPL CALCULATED.3IONS-SCNC: 12 MMOL/L (ref 5–15)
AST SERPL-CCNC: 23 U/L (ref 1–32)
BASOPHILS # BLD AUTO: 0.09 10*3/MM3 (ref 0–0.2)
BASOPHILS NFR BLD AUTO: 1.6 % (ref 0–1.5)
BILIRUB SERPL-MCNC: 0.4 MG/DL (ref 0.2–1.2)
BUN BLD-MCNC: 17 MG/DL (ref 6–20)
BUN/CREAT SERPL: 24.6 (ref 7–25)
CALCIUM SPEC-SCNC: 9.4 MG/DL (ref 8.6–10.5)
CHLORIDE SERPL-SCNC: 106 MMOL/L (ref 98–107)
CO2 SERPL-SCNC: 23 MMOL/L (ref 22–29)
CREAT BLD-MCNC: 0.69 MG/DL (ref 0.57–1)
DEPRECATED RDW RBC AUTO: 53.5 FL (ref 37–54)
EOSINOPHIL # BLD AUTO: 0.08 10*3/MM3 (ref 0–0.4)
EOSINOPHIL NFR BLD AUTO: 1.4 % (ref 0.3–6.2)
ERYTHROCYTE [DISTWIDTH] IN BLOOD BY AUTOMATED COUNT: 15.8 % (ref 12.3–15.4)
GFR SERPL CREATININE-BSD FRML MDRD: 88 ML/MIN/1.73
GLOBULIN UR ELPH-MCNC: 2.6 GM/DL
GLUCOSE BLD-MCNC: 102 MG/DL (ref 65–99)
HCT VFR BLD AUTO: 33.8 % (ref 34–46.6)
HGB BLD-MCNC: 11.2 G/DL (ref 12–15.9)
IMM GRANULOCYTES # BLD AUTO: 0.26 10*3/MM3 (ref 0–0.05)
IMM GRANULOCYTES NFR BLD AUTO: 4.5 % (ref 0–0.5)
LYMPHOCYTES # BLD AUTO: 2.23 10*3/MM3 (ref 0.7–3.1)
LYMPHOCYTES NFR BLD AUTO: 38.6 % (ref 19.6–45.3)
MCH RBC QN AUTO: 31 PG (ref 26.6–33)
MCHC RBC AUTO-ENTMCNC: 33.1 G/DL (ref 31.5–35.7)
MCV RBC AUTO: 93.6 FL (ref 79–97)
MONOCYTES # BLD AUTO: 0.4 10*3/MM3 (ref 0.1–0.9)
MONOCYTES NFR BLD AUTO: 6.9 % (ref 5–12)
NEUTROPHILS # BLD AUTO: 2.71 10*3/MM3 (ref 1.7–7)
NEUTROPHILS NFR BLD AUTO: 47 % (ref 42.7–76)
NRBC BLD AUTO-RTO: 0 /100 WBC (ref 0–0.2)
PLATELET # BLD AUTO: 383 10*3/MM3 (ref 140–450)
PMV BLD AUTO: 9.5 FL (ref 6–12)
POTASSIUM BLD-SCNC: 4.2 MMOL/L (ref 3.5–5.2)
PROT SERPL-MCNC: 6.7 G/DL (ref 6–8.5)
RBC # BLD AUTO: 3.61 10*6/MM3 (ref 3.77–5.28)
SODIUM BLD-SCNC: 141 MMOL/L (ref 136–145)
WBC NRBC COR # BLD: 5.77 10*3/MM3 (ref 3.4–10.8)

## 2020-06-03 PROCEDURE — 80053 COMPREHEN METABOLIC PANEL: CPT

## 2020-06-03 PROCEDURE — 25010000002 DIPHENHYDRAMINE PER 50 MG: Performed by: NURSE PRACTITIONER

## 2020-06-03 PROCEDURE — 96413 CHEMO IV INFUSION 1 HR: CPT

## 2020-06-03 PROCEDURE — 85025 COMPLETE CBC W/AUTO DIFF WBC: CPT

## 2020-06-03 PROCEDURE — 96367 TX/PROPH/DG ADDL SEQ IV INF: CPT

## 2020-06-03 PROCEDURE — 25010000002 TRASTUZUMAB-ANNS 150 MG RECONSTITUTED SOLUTION 1 EACH VIAL: Performed by: NURSE PRACTITIONER

## 2020-06-03 PROCEDURE — 25010000003 HEPARIN LOCK FLUSH PER 10 UNITS: Performed by: INTERNAL MEDICINE

## 2020-06-03 PROCEDURE — 36415 COLL VENOUS BLD VENIPUNCTURE: CPT

## 2020-06-03 RX ORDER — ACETAMINOPHEN 500 MG
500 TABLET ORAL ONCE
Status: COMPLETED | OUTPATIENT
Start: 2020-06-03 | End: 2020-06-03

## 2020-06-03 RX ORDER — HEPARIN SODIUM (PORCINE) LOCK FLUSH IV SOLN 100 UNIT/ML 100 UNIT/ML
500 SOLUTION INTRAVENOUS AS NEEDED
Status: CANCELLED | OUTPATIENT
Start: 2020-06-03

## 2020-06-03 RX ORDER — SODIUM CHLORIDE 0.9 % (FLUSH) 0.9 %
10 SYRINGE (ML) INJECTION AS NEEDED
Status: DISCONTINUED | OUTPATIENT
Start: 2020-06-03 | End: 2020-06-03 | Stop reason: HOSPADM

## 2020-06-03 RX ORDER — DIPHENHYDRAMINE HYDROCHLORIDE 50 MG/ML
50 INJECTION INTRAMUSCULAR; INTRAVENOUS AS NEEDED
Status: DISCONTINUED | OUTPATIENT
Start: 2020-06-03 | End: 2020-06-03 | Stop reason: HOSPADM

## 2020-06-03 RX ORDER — SODIUM CHLORIDE 0.9 % (FLUSH) 0.9 %
10 SYRINGE (ML) INJECTION AS NEEDED
Status: CANCELLED | OUTPATIENT
Start: 2020-06-03

## 2020-06-03 RX ORDER — FAMOTIDINE 10 MG/ML
20 INJECTION, SOLUTION INTRAVENOUS AS NEEDED
Status: DISCONTINUED | OUTPATIENT
Start: 2020-06-03 | End: 2020-06-03 | Stop reason: HOSPADM

## 2020-06-03 RX ORDER — SODIUM CHLORIDE 9 MG/ML
250 INJECTION, SOLUTION INTRAVENOUS ONCE
Status: COMPLETED | OUTPATIENT
Start: 2020-06-03 | End: 2020-06-03

## 2020-06-03 RX ORDER — HEPARIN SODIUM (PORCINE) LOCK FLUSH IV SOLN 100 UNIT/ML 100 UNIT/ML
500 SOLUTION INTRAVENOUS AS NEEDED
Status: DISCONTINUED | OUTPATIENT
Start: 2020-06-03 | End: 2020-06-03 | Stop reason: HOSPADM

## 2020-06-03 RX ADMIN — Medication 500 UNITS: at 11:36

## 2020-06-03 RX ADMIN — DIPHENHYDRAMINE HYDROCHLORIDE 25 MG: 50 INJECTION, SOLUTION INTRAMUSCULAR; INTRAVENOUS at 10:34

## 2020-06-03 RX ADMIN — SODIUM CHLORIDE, PRESERVATIVE FREE 10 ML: 5 INJECTION INTRAVENOUS at 11:36

## 2020-06-03 RX ADMIN — TRASTUZUMAB-ANNS 450 MG: 150 INJECTION, POWDER, LYOPHILIZED, FOR SOLUTION INTRAVENOUS at 10:55

## 2020-06-03 RX ADMIN — SODIUM CHLORIDE 250 ML: 9 INJECTION, SOLUTION INTRAVENOUS at 10:34

## 2020-06-03 RX ADMIN — ACETAMINOPHEN 500 MG: 500 TABLET, FILM COATED ORAL at 10:33

## 2020-06-04 ENCOUNTER — HOSPITAL ENCOUNTER (OUTPATIENT)
Dept: RADIATION ONCOLOGY | Facility: HOSPITAL | Age: 60
Setting detail: RADIATION/ONCOLOGY SERIES
Discharge: HOME | End: 2020-06-04

## 2020-06-04 ENCOUNTER — OFFICE VISIT (OUTPATIENT)
Dept: RADIATION ONCOLOGY | Facility: HOSPITAL | Age: 60
End: 2020-06-04

## 2020-06-04 ENCOUNTER — HOSPITAL ENCOUNTER (OUTPATIENT)
Dept: RADIATION ONCOLOGY | Facility: HOSPITAL | Age: 60
Setting detail: RADIATION/ONCOLOGY SERIES
End: 2020-06-04

## 2020-06-04 VITALS — BODY MASS INDEX: 34.16 KG/M2 | HEIGHT: 60 IN | WEIGHT: 174 LBS

## 2020-06-04 DIAGNOSIS — Z79.899 ON ANTINEOPLASTIC CHEMOTHERAPY: ICD-10-CM

## 2020-06-04 DIAGNOSIS — Z98.890 S/P LYMPH NODE BIOPSY: ICD-10-CM

## 2020-06-04 DIAGNOSIS — C50.912 INVASIVE DUCTAL CARCINOMA OF BREAST, FEMALE, LEFT (HCC): Primary | ICD-10-CM

## 2020-06-04 DIAGNOSIS — Z17.0 ESTROGEN RECEPTOR POSITIVE STATUS (ER+): ICD-10-CM

## 2020-06-04 DIAGNOSIS — Z98.890 S/P LUMPECTOMY, LEFT BREAST: ICD-10-CM

## 2020-06-04 DIAGNOSIS — Z87.891 FORMER SMOKER: ICD-10-CM

## 2020-06-04 PROBLEM — Z79.69 ON ANTINEOPLASTIC CHEMOTHERAPY: Status: ACTIVE | Noted: 2020-06-04

## 2020-06-04 PROCEDURE — 77290 THER RAD SIMULAJ FIELD CPLX: CPT | Performed by: RADIOLOGY

## 2020-06-04 PROCEDURE — G0463 HOSPITAL OUTPT CLINIC VISIT: HCPCS | Performed by: RADIOLOGY

## 2020-06-04 PROCEDURE — 77334 RADIATION TREATMENT AID(S): CPT | Performed by: RADIOLOGY

## 2020-06-12 PROCEDURE — 77295 3-D RADIOTHERAPY PLAN: CPT | Performed by: RADIOLOGY

## 2020-06-12 PROCEDURE — 77300 RADIATION THERAPY DOSE PLAN: CPT | Performed by: RADIOLOGY

## 2020-06-12 PROCEDURE — 77334 RADIATION TREATMENT AID(S): CPT | Performed by: RADIOLOGY

## 2020-06-22 ENCOUNTER — HOSPITAL ENCOUNTER (OUTPATIENT)
Dept: RADIATION ONCOLOGY | Facility: HOSPITAL | Age: 60
Setting detail: RADIATION/ONCOLOGY SERIES
Discharge: HOME | End: 2020-06-22

## 2020-06-22 PROCEDURE — 77412 RADIATION TX DELIVERY LVL 3: CPT | Performed by: RADIOLOGY

## 2020-06-22 PROCEDURE — 77402 RADIATION TX DELIVERY LVL 1: CPT | Performed by: RADIOLOGY

## 2020-06-22 PROCEDURE — 77280 THER RAD SIMULAJ FIELD SMPL: CPT | Performed by: RADIOLOGY

## 2020-06-23 ENCOUNTER — TELEPHONE (OUTPATIENT)
Dept: ONCOLOGY | Facility: CLINIC | Age: 60
End: 2020-06-23

## 2020-06-23 ENCOUNTER — HOSPITAL ENCOUNTER (OUTPATIENT)
Dept: RADIATION ONCOLOGY | Facility: HOSPITAL | Age: 60
Setting detail: RADIATION/ONCOLOGY SERIES
Discharge: HOME | End: 2020-06-23

## 2020-06-23 ENCOUNTER — HOSPITAL ENCOUNTER (OUTPATIENT)
Dept: BONE DENSITY | Facility: HOSPITAL | Age: 60
Discharge: HOME | End: 2020-06-23
Admitting: INTERNAL MEDICINE

## 2020-06-23 DIAGNOSIS — C50.912 INVASIVE DUCTAL CARCINOMA OF BREAST, FEMALE, LEFT (HCC): ICD-10-CM

## 2020-06-23 DIAGNOSIS — C50.912 INVASIVE DUCTAL CARCINOMA OF BREAST, FEMALE, LEFT (HCC): Primary | ICD-10-CM

## 2020-06-23 PROCEDURE — 77412 RADIATION TX DELIVERY LVL 3: CPT | Performed by: RADIOLOGY

## 2020-06-23 PROCEDURE — 77080 DXA BONE DENSITY AXIAL: CPT

## 2020-06-23 PROCEDURE — 77417 THER RADIOLOGY PORT IMAGE(S): CPT | Performed by: RADIOLOGY

## 2020-06-23 RX ORDER — FAMOTIDINE 10 MG/ML
20 INJECTION, SOLUTION INTRAVENOUS AS NEEDED
Status: CANCELLED | OUTPATIENT
Start: 2020-06-24

## 2020-06-23 RX ORDER — DIPHENHYDRAMINE HYDROCHLORIDE 50 MG/ML
50 INJECTION INTRAMUSCULAR; INTRAVENOUS AS NEEDED
Status: CANCELLED | OUTPATIENT
Start: 2020-06-24

## 2020-06-23 RX ORDER — SODIUM CHLORIDE 9 MG/ML
250 INJECTION, SOLUTION INTRAVENOUS ONCE
Status: CANCELLED | OUTPATIENT
Start: 2020-06-24

## 2020-06-23 RX ORDER — ACETAMINOPHEN 325 MG/1
500 TABLET ORAL ONCE
Status: CANCELLED
Start: 2020-06-24

## 2020-06-23 NOTE — TELEPHONE ENCOUNTER
----- Message from Tommy Wheat MD sent at 6/23/2020  1:46 PM CDT -----  Regarding: Schedule 2D echocardiogram this week at [facility].  Compare with 02/14/2020  Schedule 2D echocardiogram this week at [facility].  Compare with 02/14/2020. tx u

## 2020-06-23 NOTE — PROGRESS NOTES
[facility] HEMATOLOGY AND ONCOLOGY  [address]  [place] [address] [phone]    Patient Name: Chasidy Tejada  Encounter Date: 06/30/2020  YOB: 1960  Patient Number: [id number]    REASON FOR VISIT: Chasidy Tejada is a 60-year-old female who returns in follow-up of stage IA left breast carcinoma, ER/UT and HER-2/lamberto positive.  She underwent left breast lumpectomy with SNB, 01/10/2020 and was started on adjuvant Arimidex beginning 2/3/2020 through 03/11/2020.  She is completed adjuvant paclitaxel (week 12/12, 05/27/2020), and is currently receiving adjuvant trastuzumab (Herceptin), having completed cycle 6, 06/24/2020.  Adjuvant radiation started on 06/22/2020 (7/33 fx).  She is here alone (previously with her spouse).    DIAGNOSTIC ABNORMALITIES:          1.   12/13/2019- mammogram screening.  Impression: Stellate lesion outer left breast.  Spot compression and ultrasound suggested.          2.   12/27/2019- diagnostic mammogram.  Impression: Irregular density upper outer left breast.  Biopsy suggested.          3.   12/27/2019- left breast ultrasound.  Impression: Suspicious 6 x 5 x 8 mm density left breast approximately 8 cm from the nipple.  Biopsy suggested.  BI-RADS Category 4B.          4.   01/03/2020-ultrasound-guided breast biopsy.  Impression: Appropriate sampling of the 8 mm irregular hypoechoic nodule in the left breast at 1:00 in the posterior depth/axillary tail region.  Final diagnosis: Left breast at 1 o'clock position, core biopsies: A.invasive mammary carcinoma of no special type (ductal, not otherwise specified), grade 2, at least 4.9 mm in greatest extent.  B.associated low-grade ductal carcinoma in situ.  % positive, UT 25% positive, HER-2/lamberto-2+ (IHC)/FISH- a focal positive score is present in an area corresponding to approximately 15% of the tumor (signal ratio: 2.9).           5.   02/03/2020-  CMP normal with .  CBC normal.           6.   02/03/2020-bone scan ([facility]).  Impression: Degenerative joint changes.  No evidence of bony metastasis.           7.   02/03/2020-CT scans, head chest, abdomen, pelvis-impression: No abnormal intracranial enhancement to suggest intracranial metastasis/abnormalities.  No evidence of intrathoracic metastasis.  Stable 5 to 6 mm right lower lobe nodule of the right hemidiaphragm unchanged from 2/14/2011.  No convincing evidence of intra-abdominal or pelvic metastasis.  Hepatic cysts.  Fatty attenuated benign focus within the body of the pancreas visualized, 12/14/2011.  Previous hysterectomy, cholecystectomy and appendectomy.           8.   02/14/2020-echocardiogram.  Impression: LV systolic function normal (EF equals 70%).  Normal LV and RV size thickness and function.  Normal LA and RA size.  Normal cardiac valves.  No pericardial effusion.           9.  02/27/2020- CMP normal with .  CBC normal.  Iron 65, iron saturation 18%, ferritin 134, B12 745, folate > 20, CEA 1.76, CA-27-29 14.2.          10.  03/04/2020- repeat FISH for HER-2/lamberto (from tumor block, 01/10/2020).  No tumor on specimen.          11.  06/23/2020- DEXA scan.  Impression: Normal bone density.  No lower than 1 standard deviation below the mean for young adult woman.    PREVIOUS INTERVENTIONS:           1.   01/10/2020- left breast lumpectomy with left axillary sentinel node biopsy.  Final diagnosis: 1.left breast mass, lumpectomy: Invasive and in situ ductal carcinoma.  Tumor site-not specified.  Histologic type: Invasive carcinoma of no special type (invasive ductal carcinoma, not otherwise specified).  Overall tumor grade: Grade 1.  Tumor size: Greatest dimension of largest invasive focus: 5 mm.  Tumor focality: Single focus of invasive carcinoma.  Ductal carcinoma in situ (DCIS): Present.  Negative for extensive intraductal component (EIC).  Size (extent) CIS: Cannot be determined:  "Microscopic.  Architectural pattern: Cribriform/solid.  Nuclear grade: 2 (intermediate).  Necrosis: Present, central (expansive \"comedo) necrosis).  Lobular carcinoma in situ (LCIS): No LCIS in specimen.  Lymphovascular invasion: Not identified.  Dermal lymphovascular invasion: No skin present.  Margins: Uninvolved by invasive carcinoma.  Lymph nodes: Uninvolved by tumor cells.  Regional lymph nodes: 1.  Number of lymph nodes examined: 1.  Number of sentinel nodes examined: 1.  Pathologic stage classification (pTNM, AJCC eighth edition): pT1a,(sn), pN0.  % positive, [place] 25% positive, HER-2 2+ (equivocal).           2.   Adjuvant Arimidex 1 mg p.o. daily beginning 02/03/2020 through 03/11/2020.           3.   Adjuvant paclitaxel beginning 03/11/2020 through 05/27/2020 (12/12 weekly doses)           4.   Adjuvant trastuzamab every 3 weeks - beginning 03/11/2020 through 06/24/2020 (6 cycles)           5.   Adjuvant radiation to the left breast (5040 cGy with 1000 cGy boost to the tumor bed for a total of 6040 cGy/33 fractions) beginning 06/22/2020 through present .    LABS    Lab Results - Last 18 Months   Lab Units 06/24/20  1009 06/03/20  0942 05/27/20  0916 05/20/20  0846 05/13/20  0904 05/06/20  0856 04/29/20  0845   HEMOGLOBIN g/dL 12.8 11.2* 11.1* 11.5* 11.5* 11.5* 11.9*   HEMATOCRIT % 38.5 33.8* 33.1* 34.5 34.7 34.3 35.7   MCV fL 93.0 93.6 94.0 93.0 93.5 91.7 92.5   WBC 10*3/mm3 9.53 5.77 5.38 8.84 4.87 5.12 4.61   RDW % 14.2 15.8* 15.7* 15.6* 15.2 15.1 14.7   MPV fL 10.1 9.5 9.8 9.5 9.7 9.7 9.7   PLATELETS 10*3/mm3 267 383 331 402 344 372 404   IMM GRAN % % 0.3 4.5* 2.0*  --  4.5* 3.7* 2.8*   NEUTROS ABS 10*3/mm3 4.14 2.71 2.78 5.18 2.13 2.20 2.03   LYMPHS ABS 10*3/mm3 4.47* 2.23 1.87  --  2.05 2.25 2.00   MONOS ABS 10*3/mm3 0.60 0.40 0.49  --  0.31 0.37 0.30   EOS ABS 10*3/mm3 0.19 0.08 0.06 0.18 0.09 0.05 0.07   BASOS ABS 10*3/mm3 0.10 0.09 0.07 0.09 0.07 0.06 0.08   IMMATURE GRANS (ABS) 10*3/mm3 0.03 " 0.26* 0.11*  --  0.22* 0.19* 0.13*   NRBC /100 WBC 0.0 0.0 0.0 1.0* 0.0 0.0 0.0   NEUTROPHIL % %  --   --   --  55.6  --   --   --    MONOCYTES % %  --   --   --  4.0*  --   --   --    BASOPHIL % %  --   --   --  1.0  --   --   --    ANISOCYTOSIS   --   --   --  Slight/1+  --   --   --    GIANT PLT   --   --   --  Slight/1+  --   --   --        Lab Results - Last 18 Months   Lab Units 06/24/20  1009 06/03/20  0942 05/27/20  0916 05/20/20  0846 05/13/20  0904 05/06/20  0856   GLUCOSE mg/dL 96 102* 123* 110* 102* 109*   SODIUM mmol/L 139 141 139 139 140 140   POTASSIUM mmol/L 4.1 4.2 4.0 3.7 4.2 3.9   CO2 mmol/L 24.0 23.0 25.0 25.0 24.0 25.0   CHLORIDE mmol/L 103 106 102 103 105 103   ANION GAP mmol/L 12.0 12.0 12.0 11.0 11.0 12.0   CREATININE mg/dL 0.53* 0.69 0.52* 0.51* 0.47* 0.63   BUN mg/dL 16 17 23* 24* 16 16   BUN / CREAT RATIO  30.2* 24.6 44.2* 47.1* 34.0* 25.4*   CALCIUM mg/dL 9.7 9.4 9.2 9.5 9.4 9.5   EGFR IF NONAFRICN AM mL/min/1.73 118 88 122 124 137 97   ALK PHOS U/L 73 71 60 63 69 66   TOTAL PROTEIN g/dL 7.1 6.7 6.5 6.8 7.1 7.0   ALT (SGPT) U/L 39* 39* 43* 33 47* 49*   AST (SGOT) U/L 32 23 22 17 26 27   BILIRUBIN mg/dL 0.6 0.4 0.4 0.3 0.4 0.5   ALBUMIN g/dL 4.40 4.10 4.00 4.20 4.30 4.40   GLOBULIN gm/dL 2.7 2.6 2.5 2.6 2.8 2.6       Lab Results - Last 18 Months   Lab Units 06/24/20  1009 04/22/20  0851 02/27/20  0901   CEA ng/mL 1.97 2.26 1.76       Lab Results - Last 18 Months   Lab Units 06/24/20  1009 05/27/20  0916 04/22/20  0851 02/27/20  0901 12/12/19  0704   IRON mcg/dL  --  37 55 65  --    TIBC mcg/dL  --  326 353 370  --    IRON SATURATION %  --  11* 16* 18*  --    FERRITIN ng/mL 197.90*  --  199.00* 134.20  --    TSH uIU/mL  --   --   --   --  0.798   FOLATE ng/mL  --   --  >20.00 >20.00  --          PAST MEDICAL HISTORY:  ALLERGIES:  Allergies   Allergen Reactions   • Demerol [Meperidine] Hives   • Morphine And Related Hives     CURRENT MEDICATIONS:  Outpatient Encounter Medications as of  6/30/2020   Medication Sig Dispense Refill   • ALPRAZolam (XANAX) 0.5 MG tablet Take 1 tablet by mouth 2 (Two) Times a Day As Needed for Anxiety. 60 tablet 2   • Calcium Carb-Cholecalciferol (OS-ORTIZ PO) Take 1 tablet by mouth Daily.     • Inulin (FIBER CHOICE PO) Take 1 tablet by mouth Daily.     • lidocaine-prilocaine (EMLA) 2.5-2.5 % cream Apply to port site 30 minutes before it is accessed.  Cover with occlusive dressing. 30 g 2   • Multiple Vitamins-Minerals (MULTIVITAMIN ADULT PO) Take 1 tablet by mouth Daily.     • ondansetron (ZOFRAN) 8 MG tablet Take 1 tablet by mouth Every 8 (Eight) Hours As Needed for Nausea or Vomiting. 30 tablet 0   • oxyCODONE-acetaminophen (PERCOCET) 5-325 MG per tablet Take 1-2 tablets by mouth Every 4 (Four) Hours As Needed (Pain). 20 tablet 0   • polyethylene glycol (MIRALAX) packet Take 17 g by mouth Daily.     • sertraline (ZOLOFT) 50 MG tablet Take 1 tablet by mouth Daily. 30 tablet 11   • ferrous sulfate 325 (65 FE) MG tablet Take 1 tablet by mouth 2 (Two) Times a Day. 60 tablet 0     No facility-administered encounter medications on file as of 6/30/2020.      Adult illnesses:  Colon polyps  Obesity  Spinal stenosis neck   Herniated cervical disc without myelopathy  Borderline blood pressure  History of dysphagia    Past surgeries:  Anterior cervical discectomy w fusion, 09/2018  Appendectomy  BSO/GANGA  Cholecystectomy  Colonoscopy, 02/10/2017  BTL  Tonsillectomy  Breast biopsy  Left partial mastectomy, 01/10/2020  03/05/2020 - Mediport placement per Dr. Edmond    ADULT ILLNESSES:  Patient Active Problem List   Diagnosis Code   • Family history of colon cancer Z80.0   • Colon polyps K63.5   • Laryngopharyngeal reflux K21.9   • Pharyngoesophageal dysphagia R13.14   • Non-smoker Z78.9   • Obesity (BMI 30-39.9) E66.9   • Spinal stenosis in cervical region M48.02   • Cervical radiculopathy M54.12   • Herniated cervical disc without myelopathy M50.20   • Borderline blood pressure  R03.0   • BMI 31.0-31.9,adult Z68.31   • BMI 33.0-33.9,adult Z68.33   • Invasive ductal carcinoma of breast, female, left (CMS/HCC) C50.912   • Former smoker Z87.891   • S/P lumpectomy, left breast Z98.890   • S/P lymph node biopsy Z98.890   • On antineoplastic chemotherapy Z79.899   • Estrogen receptor positive status (ER+) Z17.0     SURGERIES:  Past Surgical History:   Procedure Laterality Date   • ANTERIOR CERVICAL DISCECTOMY W/ FUSION N/A 9/7/2018    Procedure: CERVICAL DISCECTOMY ANTERIOR WITH FUSION C6-7;  Surgeon: Chris Mcfadden MD;  Location: [facility] OR;  Service: Neurosurgery   • APPENDECTOMY     • BILATERAL SALPINGO OOPHORECTOMY     • CERVICAL CORPECTOMY N/A 9/7/2018    Procedure: CERVICAL CORPECTOMY C6-7;  Surgeon: Chris Mcfadden MD;  Location:  [facility] OR;  Service: Neurosurgery   • CHOLECYSTECTOMY     • COLONOSCOPY  09/05/2012   • COLONOSCOPY N/A 2/10/2017    Procedure: COLONOSCOPY WITH ANESTHESIA;  Surgeon: Elina Gonsalez MD;  Location: [facility] ENDOSCOPY;  Service:    • HYSTERECTOMY  1998    Had hysterectomy for painful periods and bleeding. (Dr. Patel) TLH w/ BSO   • LAPAROSCOPIC TUBAL LIGATION     • MASTECTOMY W/ SENTINEL NODE BIOPSY Left 1/10/2020    Procedure: LEFT NEEDLE DIRECTED ULTRASOUND GUIDED PARTIAL MASTECTOMY WITH SENTINEL LYMPH NODE BIOPSY, INJECTION AND SCAN, RADIOLOGIST WILL INJECT;  Surgeon: Flor Edmond MD;  Location:  [facility] OR;  Service: General   • TONSILLECTOMY     • VENOUS ACCESS DEVICE (PORT) INSERTION N/A 3/5/2020    Procedure: INSERTION VENOUS ACCESS DEVICE EXCISION SKIN LESION X 2 CHEST AND ABDOMEN;  Surgeon: Flor Edmond MD;  Location:  [facility] OR;  Service: General;  Laterality: N/A;     HEALTH MAINTENANCE ITEMS:  Health Maintenance Due   Topic Date Due   • ZOSTER VACCINE (1 of 2) 06/06/2012       <no information>  Last Completed Colonoscopy       Status Date      COLONOSCOPY Done 2/10/2017 Surg:COLONOSCOPY     Patient has more history with this topic...     "    Immunization History   Administered Date(s) Administered   • Tdap 08/29/2018     Last Completed Mammogram       Status Date      MAMMOGRAM Done 12/27/2019 MAMMO DIAGNOSTIC DIGITAL TOMOSYNTHESIS LEFT W CAD     Patient has more history with this topic...            FAMILY HISTORY:  Family History   Problem Relation Age of Onset   • Colon cancer Maternal Grandmother         age not known   • Cancer Father    • Heart disease Father    • Diabetes Mother    • Hypertension Mother    • Stroke Mother    • Hypertension Sister    • No Known Problems Daughter    • No Known Problems Sister    • Colon polyps Neg Hx    • Esophageal cancer Neg Hx    • Liver cancer Neg Hx    • Liver disease Neg Hx    • Rectal cancer Neg Hx    • Stomach cancer Neg Hx    • Breast cancer Neg Hx    • Ovarian cancer Neg Hx      SOCIAL HISTORY:  Social History     Socioeconomic History   • Marital status:      Spouse name: Not on file   • Number of children: 1   • Years of education: Not on file   • Highest education level: Not on file   Tobacco Use   • Smoking status: Former Smoker     Types: Cigarettes   • Smokeless tobacco: Never Used   • Tobacco comment: \"i never really smoked\"   Substance and Sexual Activity   • Alcohol use: Not Currently     Frequency: Never   • Drug use: No   • Sexual activity: Yes     Partners: Male     Birth control/protection: Surgical       REVIEW OF SYSTEMS:  Review of Systems   Constitutional: Negative.         She manages her ADLs' including chores, errands, driving    Herceptin tolerance:  Fatigue.  \"Not bad.\" No mouth sores, \"gone.\"  No nausea, has not needed Zofran.  No vomiting, some dry skin otherwise no skin changes.  Improved paresthesias of the fingers and toes.  No loss of fine motor function.  No loose stools.  Has not required Imodiums.  No overt diarrhea.  Hair \"growing back.\"   HENT: Negative.    Eyes: Negative.    Respiratory: Negative.    Cardiovascular: Negative.    Gastrointestinal: Positive for " "GERD (\"Much better off chemo\".  Has been using omeprazole as needed). Negative for diarrhea (Loose stools - 1-3/day.  Had needed Imodium yesterday.) and nausea.        Tolerating oral iron bid - dark stools   Endocrine: Positive for heat intolerance (mild residual vasomotor changes).   Genitourinary: Negative.    Musculoskeletal: Positive for arthralgias (knees, and hips \"sometimes.\" Does not impede her activities.).   Allergic/Immunologic: Negative.    Neurological: Positive for numbness (paresthesias of the fingers and toes.  Improving).   Hematological: Negative.    Psychiatric/Behavioral: The patient is nervous/anxious (\"it's better now that I know what to expect.\").        /72   Pulse 86   Temp 96.1 °F (35.6 °C)   Resp 16   Ht 152.4 cm (60\")   Wt 77.8 kg (171 lb 9.6 oz)   SpO2 98%   Breastfeeding No   BMI 33.51 kg/m²  Body surface area is 1.75 meters squared.  Pain Score    06/30/20 1028   PainSc: 0-No pain       Physical Exam:  Physical Exam   Constitutional: She is oriented to person, place, and time. She appears well-developed and well-nourished. No distress.   Pleasant, heavy set, cooperative, modestly kept female.  Appears her age.  ECOG 0.      She has gained 3 pounds (in addition to 5 pounds at her prior visit) in the interval.   HENT:   Head: Normocephalic and atraumatic.   Mouth/Throat: No oropharyngeal exudate.   Hair growth noted   Eyes: Pupils are equal, round, and reactive to light. Conjunctivae and EOM are normal. No scleral icterus.   Neck: Normal range of motion. Neck supple. No JVD present. No tracheal deviation present. No thyromegaly present.   Cardiovascular: Normal rate, regular rhythm and normal heart sounds. Exam reveals no friction rub.   No murmur heard.  Pulmonary/Chest: Effort normal and breath sounds normal. No stridor. No respiratory distress. She has no wheezes. She has no rales.   Abdominal: Soft. Bowel sounds are normal. She exhibits no distension and no mass. There " is no tenderness. There is no rebound and no guarding.   Musculoskeletal: Normal range of motion. She exhibits no edema or deformity.   Lymphadenopathy:     She has no cervical adenopathy.   Neurological: She is alert and oriented to person, place, and time. She displays normal reflexes. No cranial nerve deficit. She exhibits normal muscle tone. Coordination normal.   Skin: Skin is warm and dry. No rash noted. No erythema. No pallor.   Psychiatric: She has a normal mood and affect. Her behavior is normal. Judgment and thought content normal.   Vitals reviewed.  Breasts: Left breast lumpectomy and sentinel node biopsy site in the left axillary healed.  There is no incisional tenderness in either site.  There is subtle radiation associated blush but no skin disruption.  The rest of the breast is without obvious nodularity skin changes no nipple discharge.  Right breast without masses, skin changes nor nipple discharge.    ASSESSMENT:   1.  Invasive and in situ ductal carcinoma  left breast.                 Stage:  IA (pT1a, pN0, G2) ER  100% positive, LA  25%, HER-2/lamberto - 2+ (IHC)/FISH -a focal positive score is present in an area corresponding to approximately 15% of the tumor (signal ratio: 2.9).                 Tumor [place]:  5 mm.  Tumor focality: Single focus of invasive carcinoma.  No EIC nor LCIS.  Lymphovascular invasion: Not identified.  Dermal lymphovascular invasion: No skin present.  Microcalcifications: Not identified.  Margins uninvolved by invasive carcinoma.  Lymph nodes: Number of lymph nodes examined: 1 (0/1).                 Tumor Status:  Underwent lumpectomy and SNB, 01/10/2020.   Adjuvant Arimidex since 02/03/2020          2.   Obesity.  Moderate (BMI 33.5)        3.   Spinal stenosis neck, quiescent         4.   Herniated cervical disc without myelopathy        5.   Anemia, likely chemo associated.  Hgb 11.2, 06/03/2020 (prior: Hgb 11.5-12.6)        6.   GERD, quiescent       RECOMMENDATIONS:          1.    Re: Apprised of the labs, 05/27/2020 and 06/03/2020 (above).  Mild anemia otherwise essentially normal CBC, and CMP.  Depressed serum iron, iron saturation 11% (previously 55 and 16 respectively).  Ferrous sulfate 325 p.o. twice daily dispense 60 called in on 05/27/2020. Chemo tolerance discussed.  Grade 1 fatigue, nausea, dry skin, and loose bowels (no overt diarrhea).  Grade 2 alopecia.  Is willing to press on.        2.    Apprised of bone density, 06/23/2020 (above).  Normal.         3.    Keep appointment for 2D echocardiogram on 07/08/2020        4.    Previously noted that we are unable to repeat FISH testing for HER-2 on the tumor specimen from 01/10/2020 -pathology block did not contain any more tumor - personally discussed/confirmed with Dr. Burgos of pathology.        5.   Care previously discussed with Dr. Jaimes on 02/28/2020 after she saw the patient on 02/25/2020 (encounter reviewed).  Pathology was reviewed at [place] and discussed with the patient.  Recommendations/plan: Discussed that since her cancer is strongly ER positive and low histologic grade/low proliferative rate, and HER-2 on the biopsy was borderline recommended repeat HER-2 testing on her surgical pathology.  Defer to his negative proceed with only endocrine therapy and radiation.  If HER-2 is confirmed to be positive, recommend adjuvant weekly Taxol x12+ Herceptin every 3 weeks to complete 1 year of HER-2 directed therapy.  These plans were personally discussed with the patient (via telephone) on 02/28/2020 I also personally asked the pathologist (Dr. Radha Burgos) to send off the biopsy specimen for repeat FISH HER-2 testing.          6.   Reviewed NCCN guidelines version 3.2019 invasive breast cancer.  For tumors (=/> 5 mm), and pN0, recommend adjuvant endocrine therapy +/- adjuvant chemotherapy with trastuzumab (category 2B).          7.   Re: The potential toxicities of adjuvant chemotherapy + Herceptin  (to include but not limited to: Asthenia, cardiotoxicity, myelosuppression) are discussed at length. The rationale for adjuvant hormonal manipulation with AIs (see above) is discussed (to include but not limited to: Hot flashes, asthenia, pain, arthralgia, arthritis, nausea, bone pains, edema, fatigue, headache, venous thrombosis, anemia, leukopenia, depression, rash, pharyngitis, weight gain, dyspnea, fractures, breast pains, infection, angina). Questions answered to the best of my ability, and to her apparent satisfaction. She is willing to press on with therapy.   8.  Schedule treatment at [facility] -     · Herceptin 6 mg/kg beginning C2 (TD = 450 mg) per administration guidelines every 3 weeks x 1 year (17 cycles)-C7, 07/15/2020; C8, 08/06/2020 - then C9                     -  Premed before Herceptin -   · Tylenol 500 mg orally.   · Benadryl 25 mg IVP over 20-30 min.                 9.  Rx:   · Oscal 600/400 po bid # 60 - OTC  · Ferrous sulfate 325 po bid # 60 x 2 RF          10.  CMP and CBC with diff weekly with Procrit 40,000 units subcutaneous if Hgb less than 10 or Hct less than 30; Neupogen 480 mcg subcutaneously daily x4 if ANC less than 1.0- [facility]        11. Previously discussed the rationale for adjuvant hormonal manipulation with AIs (see above) and potential toxicities of AI therapy are discussed (to include but not limited to: Hot flashes, asthenia, pain, arthralgias, arthritis, nausea/vomiting, headache, pharyngitis, depression, rash, hypertension, lymphedema, insomnia, edema, weight gain, dyspnea, abdominal pain, constipation, hyperlipidemia, osteoporosis, fractures, cough, bone pain, diarrhea, breast pain, paresthesias, infections, cataracts, myalgias, thromboembolism, angina, Lyles-Yung syndrome, erythema multiforme, anemia, leukopenia, stroke, myocardial infarction, osteoporosis, fractures). Questions are answered. She agrees to resume therapy at the terminus of radiation.       12.  Review office  encounter, 06/07/2020 from Dr. Brito.  Recommends adjuvant radiation to the left breast.  Anticipate 5040 cGy with 1000 cGy boost to the tumor bed for a total of 6040 cGy/33 fractions to start around July 1 to allow for postchemotherapy healing time.       13.  Return to the [place] office with pre-office serum iron, iron saturation, ferritin B12, folate, CMP, CEA, CA 27-29 and CBC and differential in 6 weeks.      .     MEDICAL DECISION MAKING: High complexity   AMOUNT OF DATA: Extensive     I spent at least 57 total minutes, face-to-face, caring for Chasidy nova.  Greater than 50% of this time involved counseling and/or coordination of care as documented within this note regarding the patient's illness(es), pros and cons of various treatment options, instructions and/or risk reduction.     Cc:  Abigail Jaimes MD- [facility]          MD Kailash Sawant MD Robert Learch, MD

## 2020-06-24 ENCOUNTER — LAB (OUTPATIENT)
Dept: LAB | Facility: HOSPITAL | Age: 60
End: 2020-06-24

## 2020-06-24 ENCOUNTER — INFUSION (OUTPATIENT)
Dept: ONCOLOGY | Facility: HOSPITAL | Age: 60
End: 2020-06-24

## 2020-06-24 ENCOUNTER — HOSPITAL ENCOUNTER (OUTPATIENT)
Dept: RADIATION ONCOLOGY | Facility: HOSPITAL | Age: 60
Setting detail: RADIATION/ONCOLOGY SERIES
Discharge: HOME | End: 2020-06-24

## 2020-06-24 VITALS
SYSTOLIC BLOOD PRESSURE: 119 MMHG | HEART RATE: 74 BPM | DIASTOLIC BLOOD PRESSURE: 68 MMHG | BODY MASS INDEX: 33.51 KG/M2 | OXYGEN SATURATION: 99 % | TEMPERATURE: 98.2 F | WEIGHT: 171.6 LBS | RESPIRATION RATE: 18 BRPM

## 2020-06-24 DIAGNOSIS — C50.912 INVASIVE DUCTAL CARCINOMA OF BREAST, FEMALE, LEFT (HCC): Primary | ICD-10-CM

## 2020-06-24 DIAGNOSIS — C50.912 INVASIVE DUCTAL CARCINOMA OF BREAST, FEMALE, LEFT (HCC): ICD-10-CM

## 2020-06-24 LAB
ALBUMIN SERPL-MCNC: 4.4 G/DL (ref 3.5–5.2)
ALBUMIN/GLOB SERPL: 1.6 G/DL
ALP SERPL-CCNC: 73 U/L (ref 39–117)
ALT SERPL W P-5'-P-CCNC: 39 U/L (ref 1–33)
ANION GAP SERPL CALCULATED.3IONS-SCNC: 12 MMOL/L (ref 5–15)
AST SERPL-CCNC: 32 U/L (ref 1–32)
BASOPHILS # BLD AUTO: 0.1 10*3/MM3 (ref 0–0.2)
BASOPHILS NFR BLD AUTO: 1 % (ref 0–1.5)
BILIRUB SERPL-MCNC: 0.6 MG/DL (ref 0.2–1.2)
BUN BLD-MCNC: 16 MG/DL (ref 6–20)
BUN/CREAT SERPL: 30.2 (ref 7–25)
CALCIUM SPEC-SCNC: 9.7 MG/DL (ref 8.6–10.5)
CEA SERPL-MCNC: 1.97 NG/ML
CHLORIDE SERPL-SCNC: 103 MMOL/L (ref 98–107)
CO2 SERPL-SCNC: 24 MMOL/L (ref 22–29)
CREAT BLD-MCNC: 0.53 MG/DL (ref 0.57–1)
DEPRECATED RDW RBC AUTO: 48.2 FL (ref 37–54)
EOSINOPHIL # BLD AUTO: 0.19 10*3/MM3 (ref 0–0.4)
EOSINOPHIL NFR BLD AUTO: 2 % (ref 0.3–6.2)
ERYTHROCYTE [DISTWIDTH] IN BLOOD BY AUTOMATED COUNT: 14.2 % (ref 12.3–15.4)
FERRITIN SERPL-MCNC: 197.9 NG/ML (ref 13–150)
GFR SERPL CREATININE-BSD FRML MDRD: 118 ML/MIN/1.73
GLOBULIN UR ELPH-MCNC: 2.7 GM/DL
GLUCOSE BLD-MCNC: 96 MG/DL (ref 65–99)
HCT VFR BLD AUTO: 38.5 % (ref 34–46.6)
HGB BLD-MCNC: 12.8 G/DL (ref 12–15.9)
HOLD SPECIMEN: NORMAL
HOLD SPECIMEN: NORMAL
IMM GRANULOCYTES # BLD AUTO: 0.03 10*3/MM3 (ref 0–0.05)
IMM GRANULOCYTES NFR BLD AUTO: 0.3 % (ref 0–0.5)
LYMPHOCYTES # BLD AUTO: 4.47 10*3/MM3 (ref 0.7–3.1)
LYMPHOCYTES NFR BLD AUTO: 46.9 % (ref 19.6–45.3)
MCH RBC QN AUTO: 30.9 PG (ref 26.6–33)
MCHC RBC AUTO-ENTMCNC: 33.2 G/DL (ref 31.5–35.7)
MCV RBC AUTO: 93 FL (ref 79–97)
MONOCYTES # BLD AUTO: 0.6 10*3/MM3 (ref 0.1–0.9)
MONOCYTES NFR BLD AUTO: 6.3 % (ref 5–12)
NEUTROPHILS # BLD AUTO: 4.14 10*3/MM3 (ref 1.7–7)
NEUTROPHILS NFR BLD AUTO: 43.5 % (ref 42.7–76)
NRBC BLD AUTO-RTO: 0 /100 WBC (ref 0–0.2)
PLATELET # BLD AUTO: 267 10*3/MM3 (ref 140–450)
PMV BLD AUTO: 10.1 FL (ref 6–12)
POTASSIUM BLD-SCNC: 4.1 MMOL/L (ref 3.5–5.2)
PROT SERPL-MCNC: 7.1 G/DL (ref 6–8.5)
RBC # BLD AUTO: 4.14 10*6/MM3 (ref 3.77–5.28)
SODIUM BLD-SCNC: 139 MMOL/L (ref 136–145)
WBC NRBC COR # BLD: 9.53 10*3/MM3 (ref 3.4–10.8)

## 2020-06-24 PROCEDURE — 77336 RADIATION PHYSICS CONSULT: CPT | Performed by: RADIOLOGY

## 2020-06-24 PROCEDURE — 96367 TX/PROPH/DG ADDL SEQ IV INF: CPT

## 2020-06-24 PROCEDURE — 25010000002 DIPHENHYDRAMINE PER 50 MG: Performed by: NURSE PRACTITIONER

## 2020-06-24 PROCEDURE — 82728 ASSAY OF FERRITIN: CPT

## 2020-06-24 PROCEDURE — 25010000003 HEPARIN LOCK FLUSH PER 10 UNITS: Performed by: INTERNAL MEDICINE

## 2020-06-24 PROCEDURE — 96366 THER/PROPH/DIAG IV INF ADDON: CPT

## 2020-06-24 PROCEDURE — 80053 COMPREHEN METABOLIC PANEL: CPT

## 2020-06-24 PROCEDURE — 96413 CHEMO IV INFUSION 1 HR: CPT

## 2020-06-24 PROCEDURE — 36415 COLL VENOUS BLD VENIPUNCTURE: CPT

## 2020-06-24 PROCEDURE — 25010000002 TRASTUZUMAB-ANNS 150 MG RECONSTITUTED SOLUTION 1 EACH VIAL: Performed by: NURSE PRACTITIONER

## 2020-06-24 PROCEDURE — 86300 IMMUNOASSAY TUMOR CA 15-3: CPT

## 2020-06-24 PROCEDURE — 85025 COMPLETE CBC W/AUTO DIFF WBC: CPT

## 2020-06-24 PROCEDURE — 77412 RADIATION TX DELIVERY LVL 3: CPT | Performed by: RADIOLOGY

## 2020-06-24 PROCEDURE — 82378 CARCINOEMBRYONIC ANTIGEN: CPT

## 2020-06-24 RX ORDER — HEPARIN SODIUM (PORCINE) LOCK FLUSH IV SOLN 100 UNIT/ML 100 UNIT/ML
500 SOLUTION INTRAVENOUS AS NEEDED
Status: DISCONTINUED | OUTPATIENT
Start: 2020-06-24 | End: 2020-06-24 | Stop reason: HOSPADM

## 2020-06-24 RX ORDER — SODIUM CHLORIDE 9 MG/ML
250 INJECTION, SOLUTION INTRAVENOUS ONCE
Status: COMPLETED | OUTPATIENT
Start: 2020-06-24 | End: 2020-06-24

## 2020-06-24 RX ORDER — SODIUM CHLORIDE 0.9 % (FLUSH) 0.9 %
10 SYRINGE (ML) INJECTION AS NEEDED
Status: CANCELLED | OUTPATIENT
Start: 2020-06-24

## 2020-06-24 RX ORDER — DIPHENHYDRAMINE HYDROCHLORIDE 50 MG/ML
50 INJECTION INTRAMUSCULAR; INTRAVENOUS AS NEEDED
Status: DISCONTINUED | OUTPATIENT
Start: 2020-06-24 | End: 2020-06-24 | Stop reason: HOSPADM

## 2020-06-24 RX ORDER — ACETAMINOPHEN 500 MG
500 TABLET ORAL ONCE
Status: COMPLETED | OUTPATIENT
Start: 2020-06-24 | End: 2020-06-24

## 2020-06-24 RX ORDER — HEPARIN SODIUM (PORCINE) LOCK FLUSH IV SOLN 100 UNIT/ML 100 UNIT/ML
500 SOLUTION INTRAVENOUS AS NEEDED
Status: CANCELLED | OUTPATIENT
Start: 2020-06-24

## 2020-06-24 RX ORDER — SODIUM CHLORIDE 0.9 % (FLUSH) 0.9 %
10 SYRINGE (ML) INJECTION AS NEEDED
Status: DISCONTINUED | OUTPATIENT
Start: 2020-06-24 | End: 2020-06-24 | Stop reason: HOSPADM

## 2020-06-24 RX ORDER — FAMOTIDINE 10 MG/ML
20 INJECTION, SOLUTION INTRAVENOUS AS NEEDED
Status: DISCONTINUED | OUTPATIENT
Start: 2020-06-24 | End: 2020-06-24 | Stop reason: HOSPADM

## 2020-06-24 RX ADMIN — SODIUM CHLORIDE 250 ML: 9 INJECTION, SOLUTION INTRAVENOUS at 10:51

## 2020-06-24 RX ADMIN — SODIUM CHLORIDE, PRESERVATIVE FREE 10 ML: 5 INJECTION INTRAVENOUS at 12:25

## 2020-06-24 RX ADMIN — Medication 500 UNITS: at 12:25

## 2020-06-24 RX ADMIN — TRASTUZUMAB-ANNS 450 MG: 150 INJECTION, POWDER, LYOPHILIZED, FOR SOLUTION INTRAVENOUS at 11:27

## 2020-06-24 RX ADMIN — ACETAMINOPHEN 500 MG: 500 TABLET, FILM COATED ORAL at 10:56

## 2020-06-24 RX ADMIN — DIPHENHYDRAMINE HYDROCHLORIDE 25 MG: 50 INJECTION, SOLUTION INTRAMUSCULAR; INTRAVENOUS at 10:56

## 2020-06-25 ENCOUNTER — HOSPITAL ENCOUNTER (OUTPATIENT)
Dept: RADIATION ONCOLOGY | Facility: HOSPITAL | Age: 60
Setting detail: RADIATION/ONCOLOGY SERIES
Discharge: HOME | End: 2020-06-25

## 2020-06-25 LAB — CANCER AG27-29 SERPL-ACNC: 22.2 U/ML (ref 0–38.6)

## 2020-06-25 PROCEDURE — 77412 RADIATION TX DELIVERY LVL 3: CPT | Performed by: RADIOLOGY

## 2020-06-26 ENCOUNTER — HOSPITAL ENCOUNTER (OUTPATIENT)
Dept: RADIATION ONCOLOGY | Facility: HOSPITAL | Age: 60
Setting detail: RADIATION/ONCOLOGY SERIES
Discharge: HOME | End: 2020-06-26

## 2020-06-26 PROCEDURE — 77412 RADIATION TX DELIVERY LVL 3: CPT | Performed by: RADIOLOGY

## 2020-06-29 ENCOUNTER — HOSPITAL ENCOUNTER (OUTPATIENT)
Dept: RADIATION ONCOLOGY | Facility: HOSPITAL | Age: 60
Setting detail: RADIATION/ONCOLOGY SERIES
Discharge: HOME | End: 2020-06-29

## 2020-06-29 PROCEDURE — 77412 RADIATION TX DELIVERY LVL 3: CPT | Performed by: RADIOLOGY

## 2020-06-30 ENCOUNTER — HOSPITAL ENCOUNTER (OUTPATIENT)
Dept: RADIATION ONCOLOGY | Facility: HOSPITAL | Age: 60
Setting detail: RADIATION/ONCOLOGY SERIES
Discharge: HOME | End: 2020-06-30

## 2020-06-30 ENCOUNTER — OFFICE VISIT (OUTPATIENT)
Dept: ONCOLOGY | Facility: CLINIC | Age: 60
End: 2020-06-30

## 2020-06-30 VITALS
RESPIRATION RATE: 16 BRPM | SYSTOLIC BLOOD PRESSURE: 118 MMHG | DIASTOLIC BLOOD PRESSURE: 72 MMHG | BODY MASS INDEX: 33.69 KG/M2 | HEART RATE: 86 BPM | HEIGHT: 60 IN | TEMPERATURE: 96.1 F | OXYGEN SATURATION: 98 % | WEIGHT: 171.6 LBS

## 2020-06-30 DIAGNOSIS — C50.912 INVASIVE DUCTAL CARCINOMA OF BREAST, FEMALE, LEFT (HCC): Primary | ICD-10-CM

## 2020-06-30 PROCEDURE — 77412 RADIATION TX DELIVERY LVL 3: CPT | Performed by: RADIOLOGY

## 2020-06-30 PROCEDURE — 99215 OFFICE O/P EST HI 40 MIN: CPT | Performed by: INTERNAL MEDICINE

## 2020-06-30 PROCEDURE — 77417 THER RADIOLOGY PORT IMAGE(S): CPT | Performed by: RADIOLOGY

## 2020-06-30 RX ORDER — ONDANSETRON HYDROCHLORIDE 8 MG/1
8 TABLET, FILM COATED ORAL EVERY 8 HOURS PRN
Qty: 30 TABLET | Refills: 0 | Status: CANCELLED | OUTPATIENT
Start: 2020-06-30

## 2020-06-30 RX ORDER — ANASTROZOLE 1 MG/1
1 TABLET ORAL DAILY
Qty: 30 TABLET | Refills: 2 | Status: CANCELLED | OUTPATIENT
Start: 2020-06-30

## 2020-07-01 ENCOUNTER — HOSPITAL ENCOUNTER (OUTPATIENT)
Dept: RADIATION ONCOLOGY | Facility: HOSPITAL | Age: 60
Setting detail: RADIATION/ONCOLOGY SERIES
Discharge: HOME | End: 2020-07-01

## 2020-07-01 ENCOUNTER — HOSPITAL ENCOUNTER (OUTPATIENT)
Dept: RADIATION ONCOLOGY | Facility: HOSPITAL | Age: 60
Setting detail: RADIATION/ONCOLOGY SERIES
End: 2020-07-01

## 2020-07-01 PROCEDURE — 77336 RADIATION PHYSICS CONSULT: CPT | Performed by: RADIOLOGY

## 2020-07-01 PROCEDURE — 77412 RADIATION TX DELIVERY LVL 3: CPT | Performed by: RADIOLOGY

## 2020-07-02 ENCOUNTER — HOSPITAL ENCOUNTER (OUTPATIENT)
Dept: RADIATION ONCOLOGY | Facility: HOSPITAL | Age: 60
Setting detail: RADIATION/ONCOLOGY SERIES
Discharge: HOME | End: 2020-07-02

## 2020-07-02 PROCEDURE — 77412 RADIATION TX DELIVERY LVL 3: CPT | Performed by: RADIOLOGY

## 2020-07-06 ENCOUNTER — HOSPITAL ENCOUNTER (OUTPATIENT)
Dept: RADIATION ONCOLOGY | Facility: HOSPITAL | Age: 60
Setting detail: RADIATION/ONCOLOGY SERIES
Discharge: HOME | End: 2020-07-06

## 2020-07-06 PROCEDURE — 77412 RADIATION TX DELIVERY LVL 3: CPT | Performed by: RADIOLOGY

## 2020-07-07 ENCOUNTER — HOSPITAL ENCOUNTER (OUTPATIENT)
Dept: RADIATION ONCOLOGY | Facility: HOSPITAL | Age: 60
Setting detail: RADIATION/ONCOLOGY SERIES
Discharge: HOME | End: 2020-07-07

## 2020-07-07 PROCEDURE — 77412 RADIATION TX DELIVERY LVL 3: CPT | Performed by: RADIOLOGY

## 2020-07-08 ENCOUNTER — HOSPITAL ENCOUNTER (OUTPATIENT)
Dept: RADIATION ONCOLOGY | Facility: HOSPITAL | Age: 60
Setting detail: RADIATION/ONCOLOGY SERIES
Discharge: HOME | End: 2020-07-08

## 2020-07-08 ENCOUNTER — HOSPITAL ENCOUNTER (OUTPATIENT)
Dept: CARDIOLOGY | Facility: HOSPITAL | Age: 60
Discharge: HOME | End: 2020-07-08
Admitting: INTERNAL MEDICINE

## 2020-07-08 VITALS
SYSTOLIC BLOOD PRESSURE: 119 MMHG | BODY MASS INDEX: 33.67 KG/M2 | WEIGHT: 171.52 LBS | HEIGHT: 60 IN | DIASTOLIC BLOOD PRESSURE: 51 MMHG

## 2020-07-08 DIAGNOSIS — C50.912 INVASIVE DUCTAL CARCINOMA OF BREAST, FEMALE, LEFT (HCC): ICD-10-CM

## 2020-07-08 LAB
BH CV ECHO MEAS - AO MAX PG (FULL): 2.9 MMHG
BH CV ECHO MEAS - AO MAX PG: 9.7 MMHG
BH CV ECHO MEAS - AO MEAN PG (FULL): 3 MMHG
BH CV ECHO MEAS - AO MEAN PG: 6 MMHG
BH CV ECHO MEAS - AO ROOT AREA (BSA CORRECTED): 1.7
BH CV ECHO MEAS - AO ROOT AREA: 6.6 CM^2
BH CV ECHO MEAS - AO ROOT DIAM: 2.9 CM
BH CV ECHO MEAS - AO V2 MAX: 156 CM/SEC
BH CV ECHO MEAS - AO V2 MEAN: 111 CM/SEC
BH CV ECHO MEAS - AO V2 VTI: 38.4 CM
BH CV ECHO MEAS - AVA(I,A): 2.6 CM^2
BH CV ECHO MEAS - AVA(I,D): 2.6 CM^2
BH CV ECHO MEAS - AVA(V,A): 2.6 CM^2
BH CV ECHO MEAS - AVA(V,D): 2.6 CM^2
BH CV ECHO MEAS - BSA(HAYCOCK): 1.8 M^2
BH CV ECHO MEAS - BSA: 1.7 M^2
BH CV ECHO MEAS - BZI_BMI: 33.4 KILOGRAMS/M^2
BH CV ECHO MEAS - BZI_METRIC_HEIGHT: 152.4 CM
BH CV ECHO MEAS - BZI_METRIC_WEIGHT: 77.6 KG
BH CV ECHO MEAS - EDV(CUBED): 47 ML
BH CV ECHO MEAS - EDV(MOD-SP4): 80.8 ML
BH CV ECHO MEAS - EDV(TEICH): 54.8 ML
BH CV ECHO MEAS - EF(CUBED): 75.1 %
BH CV ECHO MEAS - EF(MOD-BP): 67 %
BH CV ECHO MEAS - EF(MOD-SP4): 74.3 %
BH CV ECHO MEAS - EF(TEICH): 68 %
BH CV ECHO MEAS - ESV(CUBED): 11.7 ML
BH CV ECHO MEAS - ESV(MOD-SP4): 20.8 ML
BH CV ECHO MEAS - ESV(TEICH): 17.5 ML
BH CV ECHO MEAS - FS: 37.1 %
BH CV ECHO MEAS - IVS/LVPW: 0.91
BH CV ECHO MEAS - IVSD: 1.1 CM
BH CV ECHO MEAS - LA DIMENSION: 3.4 CM
BH CV ECHO MEAS - LA/AO: 1.2
BH CV ECHO MEAS - LAT PEAK E' VEL: 11.4 CM/SEC
BH CV ECHO MEAS - LV DIASTOLIC VOL/BSA (35-75): 46.3 ML/M^2
BH CV ECHO MEAS - LV MASS(C)D: 128.9 GRAMS
BH CV ECHO MEAS - LV MASS(C)DI: 73.8 GRAMS/M^2
BH CV ECHO MEAS - LV MAX PG: 6.9 MMHG
BH CV ECHO MEAS - LV MEAN PG: 3 MMHG
BH CV ECHO MEAS - LV SYSTOLIC VOL/BSA (12-30): 11.9 ML/M^2
BH CV ECHO MEAS - LV V1 MAX: 131 CM/SEC
BH CV ECHO MEAS - LV V1 MEAN: 86.2 CM/SEC
BH CV ECHO MEAS - LV V1 VTI: 32.1 CM
BH CV ECHO MEAS - LVIDD: 3.6 CM
BH CV ECHO MEAS - LVIDS: 2.3 CM
BH CV ECHO MEAS - LVLD AP4: 7.4 CM
BH CV ECHO MEAS - LVLS AP4: 5.9 CM
BH CV ECHO MEAS - LVOT AREA (M): 3.1 CM^2
BH CV ECHO MEAS - LVOT AREA: 3.1 CM^2
BH CV ECHO MEAS - LVOT DIAM: 2 CM
BH CV ECHO MEAS - LVPWD: 1.2 CM
BH CV ECHO MEAS - MED PEAK E' VEL: 10.3 CM/SEC
BH CV ECHO MEAS - MV A MAX VEL: 67.1 CM/SEC
BH CV ECHO MEAS - MV DEC SLOPE: 265 CM/SEC^2
BH CV ECHO MEAS - MV DEC TIME: 0.27 SEC
BH CV ECHO MEAS - MV E MAX VEL: 71.9 CM/SEC
BH CV ECHO MEAS - MV E/A: 1.1
BH CV ECHO MEAS - RAP SYSTOLE: 10 MMHG
BH CV ECHO MEAS - RVSP: 32.8 MMHG
BH CV ECHO MEAS - SI(AO): 145.2 ML/M^2
BH CV ECHO MEAS - SI(CUBED): 20.2 ML/M^2
BH CV ECHO MEAS - SI(LVOT): 57.7 ML/M^2
BH CV ECHO MEAS - SI(MOD-SP4): 34.4 ML/M^2
BH CV ECHO MEAS - SI(TEICH): 21.3 ML/M^2
BH CV ECHO MEAS - SV(AO): 253.6 ML
BH CV ECHO MEAS - SV(CUBED): 35.3 ML
BH CV ECHO MEAS - SV(LVOT): 100.8 ML
BH CV ECHO MEAS - SV(MOD-SP4): 60 ML
BH CV ECHO MEAS - SV(TEICH): 37.3 ML
BH CV ECHO MEAS - TR MAX VEL: 239 CM/SEC
BH CV ECHO MEASUREMENTS AVERAGE E/E' RATIO: 6.63
LEFT ATRIUM VOLUME INDEX: 19.1 ML/M2
LEFT ATRIUM VOLUME: 33.5 CM3
MAXIMAL PREDICTED HEART RATE: 162 BPM
STRESS TARGET HR: 138 BPM

## 2020-07-08 PROCEDURE — 93356 MYOCRD STRAIN IMG SPCKL TRCK: CPT

## 2020-07-08 PROCEDURE — 93306 TTE W/DOPPLER COMPLETE: CPT

## 2020-07-08 PROCEDURE — 25010000002 PERFLUTREN 6.52 MG/ML SUSPENSION: Performed by: INTERNAL MEDICINE

## 2020-07-08 PROCEDURE — 93306 TTE W/DOPPLER COMPLETE: CPT | Performed by: INTERNAL MEDICINE

## 2020-07-08 PROCEDURE — 77412 RADIATION TX DELIVERY LVL 3: CPT | Performed by: RADIOLOGY

## 2020-07-08 PROCEDURE — 93356 MYOCRD STRAIN IMG SPCKL TRCK: CPT | Performed by: INTERNAL MEDICINE

## 2020-07-08 RX ADMIN — PERFLUTREN 8.48 MG: 6.52 INJECTION, SUSPENSION INTRAVENOUS at 09:00

## 2020-07-09 ENCOUNTER — HOSPITAL ENCOUNTER (OUTPATIENT)
Dept: RADIATION ONCOLOGY | Facility: HOSPITAL | Age: 60
Setting detail: RADIATION/ONCOLOGY SERIES
Discharge: HOME | End: 2020-07-09

## 2020-07-09 PROCEDURE — 77417 THER RADIOLOGY PORT IMAGE(S): CPT | Performed by: RADIOLOGY

## 2020-07-09 PROCEDURE — 77412 RADIATION TX DELIVERY LVL 3: CPT | Performed by: RADIOLOGY

## 2020-07-10 ENCOUNTER — HOSPITAL ENCOUNTER (OUTPATIENT)
Dept: RADIATION ONCOLOGY | Facility: HOSPITAL | Age: 60
Setting detail: RADIATION/ONCOLOGY SERIES
Discharge: HOME | End: 2020-07-10

## 2020-07-10 PROCEDURE — 77412 RADIATION TX DELIVERY LVL 3: CPT | Performed by: RADIOLOGY

## 2020-07-13 ENCOUNTER — HOSPITAL ENCOUNTER (OUTPATIENT)
Dept: RADIATION ONCOLOGY | Facility: HOSPITAL | Age: 60
Setting detail: RADIATION/ONCOLOGY SERIES
Discharge: HOME | End: 2020-07-13

## 2020-07-13 PROCEDURE — 77412 RADIATION TX DELIVERY LVL 3: CPT | Performed by: RADIOLOGY

## 2020-07-14 ENCOUNTER — HOSPITAL ENCOUNTER (OUTPATIENT)
Dept: RADIATION ONCOLOGY | Facility: HOSPITAL | Age: 60
Discharge: HOME | End: 2020-07-14

## 2020-07-14 ENCOUNTER — TELEPHONE (OUTPATIENT)
Dept: ONCOLOGY | Facility: CLINIC | Age: 60
End: 2020-07-14

## 2020-07-14 DIAGNOSIS — C50.912 INVASIVE DUCTAL CARCINOMA OF BREAST, FEMALE, LEFT (HCC): ICD-10-CM

## 2020-07-14 PROCEDURE — 77412 RADIATION TX DELIVERY LVL 3: CPT | Performed by: RADIOLOGY

## 2020-07-14 RX ORDER — ACETAMINOPHEN 325 MG/1
500 TABLET ORAL ONCE
Status: CANCELLED
Start: 2020-07-15

## 2020-07-14 RX ORDER — DIPHENHYDRAMINE HYDROCHLORIDE 50 MG/ML
50 INJECTION INTRAMUSCULAR; INTRAVENOUS AS NEEDED
Status: CANCELLED | OUTPATIENT
Start: 2020-07-15

## 2020-07-14 RX ORDER — FAMOTIDINE 10 MG/ML
20 INJECTION, SOLUTION INTRAVENOUS AS NEEDED
Status: CANCELLED | OUTPATIENT
Start: 2020-07-15

## 2020-07-14 RX ORDER — SODIUM CHLORIDE 9 MG/ML
250 INJECTION, SOLUTION INTRAVENOUS ONCE
Status: CANCELLED | OUTPATIENT
Start: 2020-07-15

## 2020-07-14 NOTE — TELEPHONE ENCOUNTER
The patient has invasive and in situ ductal carcinoma  left breast - Stage:  IA (pT1a, pN0, G2) ER  100% positive, DE  25%, HER-2/lamberto - 2+ (IHC)/FISH -a focal positive score is present in an area corresponding to approximately 15% of the tumor (signal ratio: 2.9).

## 2020-07-14 NOTE — TELEPHONE ENCOUNTER
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

## 2020-07-15 ENCOUNTER — LAB (OUTPATIENT)
Dept: LAB | Facility: HOSPITAL | Age: 60
End: 2020-07-15

## 2020-07-15 ENCOUNTER — INFUSION (OUTPATIENT)
Dept: ONCOLOGY | Facility: HOSPITAL | Age: 60
End: 2020-07-15

## 2020-07-15 ENCOUNTER — HOSPITAL ENCOUNTER (OUTPATIENT)
Dept: RADIATION ONCOLOGY | Facility: HOSPITAL | Age: 60
Discharge: HOME | End: 2020-07-15

## 2020-07-15 VITALS
DIASTOLIC BLOOD PRESSURE: 60 MMHG | WEIGHT: 171.4 LBS | SYSTOLIC BLOOD PRESSURE: 121 MMHG | TEMPERATURE: 97.9 F | BODY MASS INDEX: 33.65 KG/M2 | OXYGEN SATURATION: 100 % | HEIGHT: 60 IN | RESPIRATION RATE: 18 BRPM | HEART RATE: 68 BPM

## 2020-07-15 DIAGNOSIS — C50.912 INVASIVE DUCTAL CARCINOMA OF BREAST, FEMALE, LEFT (HCC): ICD-10-CM

## 2020-07-15 DIAGNOSIS — C50.912 INVASIVE DUCTAL CARCINOMA OF BREAST, FEMALE, LEFT (HCC): Primary | ICD-10-CM

## 2020-07-15 LAB
ALBUMIN SERPL-MCNC: 4.4 G/DL (ref 3.5–5.2)
ALBUMIN/GLOB SERPL: 1.7 G/DL
ALP SERPL-CCNC: 73 U/L (ref 39–117)
ALT SERPL W P-5'-P-CCNC: 35 U/L (ref 1–33)
ANION GAP SERPL CALCULATED.3IONS-SCNC: 10 MMOL/L (ref 5–15)
AST SERPL-CCNC: 25 U/L (ref 1–32)
BASOPHILS # BLD AUTO: 0.07 10*3/MM3 (ref 0–0.2)
BASOPHILS NFR BLD AUTO: 1.1 % (ref 0–1.5)
BILIRUB SERPL-MCNC: 0.4 MG/DL (ref 0–1.2)
BUN SERPL-MCNC: 19 MG/DL (ref 6–20)
BUN/CREAT SERPL: 41.3 (ref 7–25)
CALCIUM SPEC-SCNC: 9.8 MG/DL (ref 8.6–10.5)
CHLORIDE SERPL-SCNC: 105 MMOL/L (ref 98–107)
CO2 SERPL-SCNC: 25 MMOL/L (ref 22–29)
CREAT SERPL-MCNC: 0.46 MG/DL (ref 0.57–1)
DEPRECATED RDW RBC AUTO: 44.5 FL (ref 37–54)
EOSINOPHIL # BLD AUTO: 0.49 10*3/MM3 (ref 0–0.4)
EOSINOPHIL NFR BLD AUTO: 8 % (ref 0.3–6.2)
ERYTHROCYTE [DISTWIDTH] IN BLOOD BY AUTOMATED COUNT: 13.1 % (ref 12.3–15.4)
GFR SERPL CREATININE-BSD FRML MDRD: 140 ML/MIN/1.73
GLOBULIN UR ELPH-MCNC: 2.6 GM/DL
GLUCOSE SERPL-MCNC: 90 MG/DL (ref 65–99)
HCT VFR BLD AUTO: 37.9 % (ref 34–46.6)
HGB BLD-MCNC: 12.4 G/DL (ref 12–15.9)
IMM GRANULOCYTES # BLD AUTO: 0.02 10*3/MM3 (ref 0–0.05)
IMM GRANULOCYTES NFR BLD AUTO: 0.3 % (ref 0–0.5)
LYMPHOCYTES # BLD AUTO: 2.21 10*3/MM3 (ref 0.7–3.1)
LYMPHOCYTES NFR BLD AUTO: 36 % (ref 19.6–45.3)
MCH RBC QN AUTO: 30.3 PG (ref 26.6–33)
MCHC RBC AUTO-ENTMCNC: 32.7 G/DL (ref 31.5–35.7)
MCV RBC AUTO: 92.7 FL (ref 79–97)
MONOCYTES # BLD AUTO: 0.46 10*3/MM3 (ref 0.1–0.9)
MONOCYTES NFR BLD AUTO: 7.5 % (ref 5–12)
NEUTROPHILS NFR BLD AUTO: 2.89 10*3/MM3 (ref 1.7–7)
NEUTROPHILS NFR BLD AUTO: 47.1 % (ref 42.7–76)
NRBC BLD AUTO-RTO: 0 /100 WBC (ref 0–0.2)
PLATELET # BLD AUTO: 283 10*3/MM3 (ref 140–450)
PMV BLD AUTO: 10 FL (ref 6–12)
POTASSIUM SERPL-SCNC: 3.9 MMOL/L (ref 3.5–5.2)
PROT SERPL-MCNC: 7 G/DL (ref 6–8.5)
RBC # BLD AUTO: 4.09 10*6/MM3 (ref 3.77–5.28)
SODIUM SERPL-SCNC: 140 MMOL/L (ref 136–145)
WBC # BLD AUTO: 6.14 10*3/MM3 (ref 3.4–10.8)

## 2020-07-15 PROCEDURE — 80053 COMPREHEN METABOLIC PANEL: CPT

## 2020-07-15 PROCEDURE — 25010000003 HEPARIN LOCK FLUSH PER 10 UNITS: Performed by: INTERNAL MEDICINE

## 2020-07-15 PROCEDURE — 25010000002 DIPHENHYDRAMINE PER 50 MG: Performed by: NURSE PRACTITIONER

## 2020-07-15 PROCEDURE — 96367 TX/PROPH/DG ADDL SEQ IV INF: CPT

## 2020-07-15 PROCEDURE — 96413 CHEMO IV INFUSION 1 HR: CPT

## 2020-07-15 PROCEDURE — 25010000002 TRASTUZUMAB-ANNS 150 MG RECONSTITUTED SOLUTION 1 EACH VIAL: Performed by: NURSE PRACTITIONER

## 2020-07-15 PROCEDURE — 85025 COMPLETE CBC W/AUTO DIFF WBC: CPT

## 2020-07-15 PROCEDURE — 77336 RADIATION PHYSICS CONSULT: CPT | Performed by: RADIOLOGY

## 2020-07-15 PROCEDURE — 77412 RADIATION TX DELIVERY LVL 3: CPT | Performed by: RADIOLOGY

## 2020-07-15 RX ORDER — SODIUM CHLORIDE 0.9 % (FLUSH) 0.9 %
10 SYRINGE (ML) INJECTION AS NEEDED
Status: CANCELLED | OUTPATIENT
Start: 2020-07-15

## 2020-07-15 RX ORDER — ACETAMINOPHEN 500 MG
500 TABLET ORAL ONCE
Status: COMPLETED | OUTPATIENT
Start: 2020-07-15 | End: 2020-07-15

## 2020-07-15 RX ORDER — SODIUM CHLORIDE 9 MG/ML
250 INJECTION, SOLUTION INTRAVENOUS ONCE
Status: COMPLETED | OUTPATIENT
Start: 2020-07-15 | End: 2020-07-15

## 2020-07-15 RX ORDER — HEPARIN SODIUM (PORCINE) LOCK FLUSH IV SOLN 100 UNIT/ML 100 UNIT/ML
500 SOLUTION INTRAVENOUS AS NEEDED
Status: DISCONTINUED | OUTPATIENT
Start: 2020-07-15 | End: 2020-07-15 | Stop reason: HOSPADM

## 2020-07-15 RX ORDER — HEPARIN SODIUM (PORCINE) LOCK FLUSH IV SOLN 100 UNIT/ML 100 UNIT/ML
500 SOLUTION INTRAVENOUS AS NEEDED
Status: CANCELLED | OUTPATIENT
Start: 2020-07-15

## 2020-07-15 RX ORDER — SODIUM CHLORIDE 0.9 % (FLUSH) 0.9 %
10 SYRINGE (ML) INJECTION AS NEEDED
Status: DISCONTINUED | OUTPATIENT
Start: 2020-07-15 | End: 2020-07-15 | Stop reason: HOSPADM

## 2020-07-15 RX ADMIN — SODIUM CHLORIDE 250 ML: 9 INJECTION, SOLUTION INTRAVENOUS at 11:06

## 2020-07-15 RX ADMIN — TRASTUZUMAB-ANNS 450 MG: 150 INJECTION, POWDER, LYOPHILIZED, FOR SOLUTION INTRAVENOUS at 11:39

## 2020-07-15 RX ADMIN — Medication 500 UNITS: at 12:27

## 2020-07-15 RX ADMIN — ACETAMINOPHEN 500 MG: 500 TABLET, FILM COATED ORAL at 11:08

## 2020-07-15 RX ADMIN — DIPHENHYDRAMINE HYDROCHLORIDE 25 MG: 50 INJECTION, SOLUTION INTRAMUSCULAR; INTRAVENOUS at 11:09

## 2020-07-15 RX ADMIN — SODIUM CHLORIDE, PRESERVATIVE FREE 10 ML: 5 INJECTION INTRAVENOUS at 12:26

## 2020-07-16 ENCOUNTER — HOSPITAL ENCOUNTER (OUTPATIENT)
Dept: RADIATION ONCOLOGY | Facility: HOSPITAL | Age: 60
Discharge: HOME | End: 2020-07-16

## 2020-07-16 PROCEDURE — 77417 THER RADIOLOGY PORT IMAGE(S): CPT | Performed by: RADIOLOGY

## 2020-07-16 PROCEDURE — 77412 RADIATION TX DELIVERY LVL 3: CPT | Performed by: RADIOLOGY

## 2020-07-17 ENCOUNTER — TELEPHONE (OUTPATIENT)
Dept: ONCOLOGY | Facility: CLINIC | Age: 60
End: 2020-07-17

## 2020-07-17 ENCOUNTER — HOSPITAL ENCOUNTER (OUTPATIENT)
Dept: RADIATION ONCOLOGY | Facility: HOSPITAL | Age: 60
Setting detail: RADIATION/ONCOLOGY SERIES
Discharge: HOME | End: 2020-07-17

## 2020-07-17 PROCEDURE — 77412 RADIATION TX DELIVERY LVL 3: CPT | Performed by: RADIOLOGY

## 2020-07-20 ENCOUNTER — HOSPITAL ENCOUNTER (OUTPATIENT)
Dept: RADIATION ONCOLOGY | Facility: HOSPITAL | Age: 60
Setting detail: RADIATION/ONCOLOGY SERIES
Discharge: HOME | End: 2020-07-20

## 2020-07-20 PROCEDURE — 77412 RADIATION TX DELIVERY LVL 3: CPT | Performed by: RADIOLOGY

## 2020-07-21 ENCOUNTER — HOSPITAL ENCOUNTER (OUTPATIENT)
Dept: RADIATION ONCOLOGY | Facility: HOSPITAL | Age: 60
Setting detail: RADIATION/ONCOLOGY SERIES
Discharge: HOME | End: 2020-07-21

## 2020-07-21 PROCEDURE — 77300 RADIATION THERAPY DOSE PLAN: CPT | Performed by: RADIOLOGY

## 2020-07-21 PROCEDURE — 77334 RADIATION TREATMENT AID(S): CPT | Performed by: RADIOLOGY

## 2020-07-21 PROCEDURE — 77412 RADIATION TX DELIVERY LVL 3: CPT | Performed by: RADIOLOGY

## 2020-07-22 ENCOUNTER — HOSPITAL ENCOUNTER (OUTPATIENT)
Dept: RADIATION ONCOLOGY | Facility: HOSPITAL | Age: 60
Setting detail: RADIATION/ONCOLOGY SERIES
Discharge: HOME | End: 2020-07-22

## 2020-07-22 PROCEDURE — 77412 RADIATION TX DELIVERY LVL 3: CPT | Performed by: RADIOLOGY

## 2020-07-23 ENCOUNTER — HOSPITAL ENCOUNTER (OUTPATIENT)
Dept: RADIATION ONCOLOGY | Facility: HOSPITAL | Age: 60
Setting detail: RADIATION/ONCOLOGY SERIES
Discharge: HOME | End: 2020-07-23

## 2020-07-23 PROCEDURE — 77412 RADIATION TX DELIVERY LVL 3: CPT | Performed by: RADIOLOGY

## 2020-07-23 PROCEDURE — 77336 RADIATION PHYSICS CONSULT: CPT | Performed by: RADIOLOGY

## 2020-07-24 ENCOUNTER — HOSPITAL ENCOUNTER (OUTPATIENT)
Dept: RADIATION ONCOLOGY | Facility: HOSPITAL | Age: 60
Setting detail: RADIATION/ONCOLOGY SERIES
Discharge: HOME | End: 2020-07-24

## 2020-07-24 PROCEDURE — 77412 RADIATION TX DELIVERY LVL 3: CPT | Performed by: RADIOLOGY

## 2020-07-27 ENCOUNTER — HOSPITAL ENCOUNTER (OUTPATIENT)
Dept: RADIATION ONCOLOGY | Facility: HOSPITAL | Age: 60
Setting detail: RADIATION/ONCOLOGY SERIES
Discharge: HOME | End: 2020-07-27

## 2020-07-27 PROCEDURE — 77412 RADIATION TX DELIVERY LVL 3: CPT | Performed by: RADIOLOGY

## 2020-07-28 ENCOUNTER — HOSPITAL ENCOUNTER (OUTPATIENT)
Dept: RADIATION ONCOLOGY | Facility: HOSPITAL | Age: 60
Setting detail: RADIATION/ONCOLOGY SERIES
Discharge: HOME | End: 2020-07-28

## 2020-07-28 PROCEDURE — 77412 RADIATION TX DELIVERY LVL 3: CPT | Performed by: RADIOLOGY

## 2020-07-29 ENCOUNTER — HOSPITAL ENCOUNTER (OUTPATIENT)
Dept: RADIATION ONCOLOGY | Facility: HOSPITAL | Age: 60
Setting detail: RADIATION/ONCOLOGY SERIES
Discharge: HOME | End: 2020-07-29

## 2020-07-29 PROCEDURE — 77336 RADIATION PHYSICS CONSULT: CPT | Performed by: RADIOLOGY

## 2020-07-29 PROCEDURE — 77412 RADIATION TX DELIVERY LVL 3: CPT | Performed by: RADIOLOGY

## 2020-07-30 ENCOUNTER — HOSPITAL ENCOUNTER (OUTPATIENT)
Dept: RADIATION ONCOLOGY | Facility: HOSPITAL | Age: 60
Setting detail: RADIATION/ONCOLOGY SERIES
Discharge: HOME | End: 2020-07-30

## 2020-07-30 PROCEDURE — 77412 RADIATION TX DELIVERY LVL 3: CPT | Performed by: RADIOLOGY

## 2020-07-31 ENCOUNTER — HOSPITAL ENCOUNTER (OUTPATIENT)
Dept: RADIATION ONCOLOGY | Facility: HOSPITAL | Age: 60
Setting detail: RADIATION/ONCOLOGY SERIES
Discharge: HOME | End: 2020-07-31

## 2020-07-31 PROCEDURE — 77280 THER RAD SIMULAJ FIELD SMPL: CPT | Performed by: RADIOLOGY

## 2020-07-31 PROCEDURE — 77412 RADIATION TX DELIVERY LVL 3: CPT | Performed by: RADIOLOGY

## 2020-08-03 ENCOUNTER — HOSPITAL ENCOUNTER (OUTPATIENT)
Dept: RADIATION ONCOLOGY | Facility: HOSPITAL | Age: 60
Setting detail: RADIATION/ONCOLOGY SERIES
End: 2020-08-03

## 2020-08-03 ENCOUNTER — HOSPITAL ENCOUNTER (OUTPATIENT)
Dept: RADIATION ONCOLOGY | Facility: HOSPITAL | Age: 60
Setting detail: RADIATION/ONCOLOGY SERIES
Discharge: HOME | End: 2020-08-03

## 2020-08-03 PROCEDURE — 77412 RADIATION TX DELIVERY LVL 3: CPT | Performed by: RADIOLOGY

## 2020-08-03 PROCEDURE — 77417 THER RADIOLOGY PORT IMAGE(S): CPT | Performed by: RADIOLOGY

## 2020-08-04 ENCOUNTER — HOSPITAL ENCOUNTER (OUTPATIENT)
Dept: RADIATION ONCOLOGY | Facility: HOSPITAL | Age: 60
Setting detail: RADIATION/ONCOLOGY SERIES
Discharge: HOME | End: 2020-08-04

## 2020-08-04 DIAGNOSIS — C50.912 INVASIVE DUCTAL CARCINOMA OF BREAST, FEMALE, LEFT (HCC): ICD-10-CM

## 2020-08-04 PROCEDURE — 77412 RADIATION TX DELIVERY LVL 3: CPT | Performed by: RADIOLOGY

## 2020-08-04 RX ORDER — DIPHENHYDRAMINE HYDROCHLORIDE 50 MG/ML
50 INJECTION INTRAMUSCULAR; INTRAVENOUS AS NEEDED
Status: CANCELLED | OUTPATIENT
Start: 2020-08-05

## 2020-08-04 RX ORDER — FAMOTIDINE 10 MG/ML
20 INJECTION, SOLUTION INTRAVENOUS AS NEEDED
Status: CANCELLED | OUTPATIENT
Start: 2020-08-05

## 2020-08-04 RX ORDER — ACETAMINOPHEN 325 MG/1
500 TABLET ORAL ONCE
Status: CANCELLED
Start: 2020-08-05

## 2020-08-04 RX ORDER — SODIUM CHLORIDE 9 MG/ML
250 INJECTION, SOLUTION INTRAVENOUS ONCE
Status: CANCELLED | OUTPATIENT
Start: 2020-08-05

## 2020-08-05 ENCOUNTER — HOSPITAL ENCOUNTER (OUTPATIENT)
Dept: RADIATION ONCOLOGY | Facility: HOSPITAL | Age: 60
Setting detail: RADIATION/ONCOLOGY SERIES
Discharge: HOME | End: 2020-08-05

## 2020-08-05 ENCOUNTER — LAB (OUTPATIENT)
Dept: LAB | Facility: HOSPITAL | Age: 60
End: 2020-08-05

## 2020-08-05 ENCOUNTER — INFUSION (OUTPATIENT)
Dept: ONCOLOGY | Facility: HOSPITAL | Age: 60
End: 2020-08-05

## 2020-08-05 VITALS
SYSTOLIC BLOOD PRESSURE: 126 MMHG | RESPIRATION RATE: 18 BRPM | BODY MASS INDEX: 33.53 KG/M2 | OXYGEN SATURATION: 100 % | TEMPERATURE: 96.9 F | HEART RATE: 70 BPM | WEIGHT: 170.8 LBS | DIASTOLIC BLOOD PRESSURE: 64 MMHG | HEIGHT: 60 IN

## 2020-08-05 DIAGNOSIS — C50.912 INVASIVE DUCTAL CARCINOMA OF BREAST, FEMALE, LEFT (HCC): ICD-10-CM

## 2020-08-05 DIAGNOSIS — C50.912 INVASIVE DUCTAL CARCINOMA OF BREAST, FEMALE, LEFT (HCC): Primary | ICD-10-CM

## 2020-08-05 LAB
ALBUMIN SERPL-MCNC: 4.4 G/DL (ref 3.5–5.2)
ALBUMIN/GLOB SERPL: 1.6 G/DL
ALP SERPL-CCNC: 72 U/L (ref 39–117)
ALT SERPL W P-5'-P-CCNC: 31 U/L (ref 1–33)
ANION GAP SERPL CALCULATED.3IONS-SCNC: 10 MMOL/L (ref 5–15)
AST SERPL-CCNC: 24 U/L (ref 1–32)
BASOPHILS # BLD AUTO: 0.05 10*3/MM3 (ref 0–0.2)
BASOPHILS NFR BLD AUTO: 0.9 % (ref 0–1.5)
BILIRUB SERPL-MCNC: 0.5 MG/DL (ref 0–1.2)
BUN SERPL-MCNC: 19 MG/DL (ref 6–20)
BUN/CREAT SERPL: 40.4 (ref 7–25)
CALCIUM SPEC-SCNC: 10 MG/DL (ref 8.6–10.5)
CEA SERPL-MCNC: 1.26 NG/ML
CHLORIDE SERPL-SCNC: 106 MMOL/L (ref 98–107)
CO2 SERPL-SCNC: 27 MMOL/L (ref 22–29)
CREAT SERPL-MCNC: 0.47 MG/DL (ref 0.57–1)
DEPRECATED RDW RBC AUTO: 41.9 FL (ref 37–54)
EOSINOPHIL # BLD AUTO: 0.42 10*3/MM3 (ref 0–0.4)
EOSINOPHIL NFR BLD AUTO: 7.4 % (ref 0.3–6.2)
ERYTHROCYTE [DISTWIDTH] IN BLOOD BY AUTOMATED COUNT: 12.7 % (ref 12.3–15.4)
FERRITIN SERPL-MCNC: 216.7 NG/ML (ref 13–150)
FOLATE SERPL-MCNC: >20 NG/ML (ref 4.78–24.2)
GFR SERPL CREATININE-BSD FRML MDRD: 136 ML/MIN/1.73
GLOBULIN UR ELPH-MCNC: 2.8 GM/DL
GLUCOSE SERPL-MCNC: 120 MG/DL (ref 65–99)
HCT VFR BLD AUTO: 37.7 % (ref 34–46.6)
HGB BLD-MCNC: 12.7 G/DL (ref 12–15.9)
IMM GRANULOCYTES # BLD AUTO: 0.02 10*3/MM3 (ref 0–0.05)
IMM GRANULOCYTES NFR BLD AUTO: 0.4 % (ref 0–0.5)
IRON 24H UR-MRATE: 98 MCG/DL (ref 37–145)
IRON SATN MFR SERPL: 29 % (ref 20–50)
LYMPHOCYTES # BLD AUTO: 1.68 10*3/MM3 (ref 0.7–3.1)
LYMPHOCYTES NFR BLD AUTO: 29.7 % (ref 19.6–45.3)
MCH RBC QN AUTO: 30.5 PG (ref 26.6–33)
MCHC RBC AUTO-ENTMCNC: 33.7 G/DL (ref 31.5–35.7)
MCV RBC AUTO: 90.6 FL (ref 79–97)
MONOCYTES # BLD AUTO: 0.41 10*3/MM3 (ref 0.1–0.9)
MONOCYTES NFR BLD AUTO: 7.2 % (ref 5–12)
NEUTROPHILS NFR BLD AUTO: 3.08 10*3/MM3 (ref 1.7–7)
NEUTROPHILS NFR BLD AUTO: 54.4 % (ref 42.7–76)
NRBC BLD AUTO-RTO: 0 /100 WBC (ref 0–0.2)
PLATELET # BLD AUTO: 280 10*3/MM3 (ref 140–450)
PMV BLD AUTO: 9.6 FL (ref 6–12)
POTASSIUM SERPL-SCNC: 3.9 MMOL/L (ref 3.5–5.2)
PROT SERPL-MCNC: 7.2 G/DL (ref 6–8.5)
RBC # BLD AUTO: 4.16 10*6/MM3 (ref 3.77–5.28)
SODIUM SERPL-SCNC: 143 MMOL/L (ref 136–145)
TIBC SERPL-MCNC: 340 MCG/DL (ref 298–536)
TRANSFERRIN SERPL-MCNC: 228 MG/DL (ref 200–360)
VIT B12 BLD-MCNC: 588 PG/ML (ref 211–946)
WBC # BLD AUTO: 5.66 10*3/MM3 (ref 3.4–10.8)

## 2020-08-05 PROCEDURE — 84466 ASSAY OF TRANSFERRIN: CPT

## 2020-08-05 PROCEDURE — 77412 RADIATION TX DELIVERY LVL 3: CPT | Performed by: RADIOLOGY

## 2020-08-05 PROCEDURE — 96413 CHEMO IV INFUSION 1 HR: CPT

## 2020-08-05 PROCEDURE — 25010000003 HEPARIN LOCK FLUSH PER 10 UNITS: Performed by: INTERNAL MEDICINE

## 2020-08-05 PROCEDURE — 25010000002 TRASTUZUMAB-ANNS 150 MG RECONSTITUTED SOLUTION 1 EACH VIAL: Performed by: NURSE PRACTITIONER

## 2020-08-05 PROCEDURE — 25010000002 DIPHENHYDRAMINE PER 50 MG: Performed by: NURSE PRACTITIONER

## 2020-08-05 PROCEDURE — 80053 COMPREHEN METABOLIC PANEL: CPT

## 2020-08-05 PROCEDURE — 96367 TX/PROPH/DG ADDL SEQ IV INF: CPT

## 2020-08-05 PROCEDURE — 82607 VITAMIN B-12: CPT

## 2020-08-05 PROCEDURE — 86300 IMMUNOASSAY TUMOR CA 15-3: CPT

## 2020-08-05 PROCEDURE — 83540 ASSAY OF IRON: CPT

## 2020-08-05 PROCEDURE — 85025 COMPLETE CBC W/AUTO DIFF WBC: CPT

## 2020-08-05 PROCEDURE — 82728 ASSAY OF FERRITIN: CPT

## 2020-08-05 PROCEDURE — 82378 CARCINOEMBRYONIC ANTIGEN: CPT

## 2020-08-05 PROCEDURE — 82746 ASSAY OF FOLIC ACID SERUM: CPT

## 2020-08-05 RX ORDER — ACETAMINOPHEN 500 MG
500 TABLET ORAL ONCE
Status: COMPLETED | OUTPATIENT
Start: 2020-08-05 | End: 2020-08-05

## 2020-08-05 RX ORDER — HEPARIN SODIUM (PORCINE) LOCK FLUSH IV SOLN 100 UNIT/ML 100 UNIT/ML
500 SOLUTION INTRAVENOUS AS NEEDED
Status: CANCELLED | OUTPATIENT
Start: 2020-08-05

## 2020-08-05 RX ORDER — SODIUM CHLORIDE 0.9 % (FLUSH) 0.9 %
10 SYRINGE (ML) INJECTION AS NEEDED
Status: CANCELLED | OUTPATIENT
Start: 2020-08-05

## 2020-08-05 RX ORDER — SODIUM CHLORIDE 0.9 % (FLUSH) 0.9 %
10 SYRINGE (ML) INJECTION AS NEEDED
Status: DISCONTINUED | OUTPATIENT
Start: 2020-08-05 | End: 2020-08-05 | Stop reason: HOSPADM

## 2020-08-05 RX ORDER — SODIUM CHLORIDE 9 MG/ML
250 INJECTION, SOLUTION INTRAVENOUS ONCE
Status: COMPLETED | OUTPATIENT
Start: 2020-08-05 | End: 2020-08-05

## 2020-08-05 RX ORDER — DIPHENHYDRAMINE HYDROCHLORIDE 50 MG/ML
50 INJECTION INTRAMUSCULAR; INTRAVENOUS AS NEEDED
Status: DISCONTINUED | OUTPATIENT
Start: 2020-08-05 | End: 2020-08-05 | Stop reason: HOSPADM

## 2020-08-05 RX ORDER — HEPARIN SODIUM (PORCINE) LOCK FLUSH IV SOLN 100 UNIT/ML 100 UNIT/ML
500 SOLUTION INTRAVENOUS AS NEEDED
Status: DISCONTINUED | OUTPATIENT
Start: 2020-08-05 | End: 2020-08-05 | Stop reason: HOSPADM

## 2020-08-05 RX ORDER — FAMOTIDINE 10 MG/ML
20 INJECTION, SOLUTION INTRAVENOUS AS NEEDED
Status: DISCONTINUED | OUTPATIENT
Start: 2020-08-05 | End: 2020-08-05 | Stop reason: HOSPADM

## 2020-08-05 RX ADMIN — Medication 500 UNITS: at 11:28

## 2020-08-05 RX ADMIN — SODIUM CHLORIDE 250 ML: 9 INJECTION, SOLUTION INTRAVENOUS at 10:16

## 2020-08-05 RX ADMIN — TRASTUZUMAB-ANNS 450 MG: 150 INJECTION, POWDER, LYOPHILIZED, FOR SOLUTION INTRAVENOUS at 10:40

## 2020-08-05 RX ADMIN — ACETAMINOPHEN 500 MG: 500 TABLET, FILM COATED ORAL at 10:19

## 2020-08-05 RX ADMIN — DIPHENHYDRAMINE HYDROCHLORIDE 25 MG: 50 INJECTION, SOLUTION INTRAMUSCULAR; INTRAVENOUS at 10:20

## 2020-08-05 RX ADMIN — SODIUM CHLORIDE, PRESERVATIVE FREE 10 ML: 5 INJECTION INTRAVENOUS at 11:27

## 2020-08-06 ENCOUNTER — HOSPITAL ENCOUNTER (OUTPATIENT)
Dept: RADIATION ONCOLOGY | Facility: HOSPITAL | Age: 60
Setting detail: RADIATION/ONCOLOGY SERIES
Discharge: HOME | End: 2020-08-06

## 2020-08-06 LAB — CANCER AG27-29 SERPL-ACNC: 21.1 U/ML (ref 0–38.6)

## 2020-08-06 PROCEDURE — 77412 RADIATION TX DELIVERY LVL 3: CPT | Performed by: RADIOLOGY

## 2020-08-06 PROCEDURE — 77336 RADIATION PHYSICS CONSULT: CPT | Performed by: RADIOLOGY

## 2020-08-15 NOTE — PROGRESS NOTES
[facility] ON[facility] GROUP HEMATOLOGY AND ONCOLOGY  [address]  [facility] [address] [phone]    Patient Name: Chasidy Tejada  Encounter Date: 06/30/2020  YOB: 1960  Patient Number: [id number]       REASON FOR VISIT: Chasidy Tejada is a 60-year-old female who returns in follow-up of stage IA left breast carcinoma, ER/SD and HER-2/lamberto positive.  She underwent left breast lumpectomy with SNB, 01/10/2020 and was started on adjuvant Arimidex beginning 2/3/2020 through 03/11/2020.  She is completed adjuvant paclitaxel (week 12/12, 05/27/2020), and is currently receiving adjuvant trastuzumab (Herceptin), having completed cycle 8, 08/05/2020.  Adjuvant radiation started on 06/22/2020 through 08/06/2020 (33/33 fx).  She is here alone (previously with her spouse).    DIAGNOSTIC ABNORMALITIES:          1.   12/13/2019- mammogram screening.  Impression: Stellate lesion outer left breast.  Spot compression and ultrasound suggested.          2.   12/27/2019- diagnostic mammogram.  Impression: Irregular density upper outer left breast.  Biopsy suggested.          3.   12/27/2019- left breast ultrasound.  Impression: Suspicious 6 x 5 x 8 mm density left breast approximately 8 cm from the nipple.  Biopsy suggested.  BI-RADS Category 4B.          4.   01/03/2020-ultrasound-guided breast biopsy.  Impression: Appropriate sampling of the 8 mm irregular hypoechoic nodule in the left breast at 1:00 in the posterior depth/axillary tail region.  Final diagnosis: Left breast at 1 o'clock position, core biopsies: A.invasive mammary carcinoma of no special type (ductal, not otherwise specified), grade 2, at least 4.9 mm in greatest extent.  B.associated low-grade ductal carcinoma in situ.  % positive, SD 25% positive, HER-2/lamberto-2+ (IHC)/FISH- a focal positive score is present in an area corresponding to approximately 15% of the tumor (signal ratio: 2.9).            5.   02/03/2020- CMP normal with .  CBC normal.           6.   02/03/2020-bone scan ([facility]).  Impression: Degenerative joint changes.  No evidence of bony metastasis.           7.   02/03/2020-CT scans, head chest, abdomen, pelvis-impression: No abnormal intracranial enhancement to suggest intracranial metastasis/abnormalities.  No evidence of intrathoracic metastasis.  Stable 5 to 6 mm right lower lobe nodule of the right hemidiaphragm unchanged from 2/14/2011.  No convincing evidence of intra-abdominal or pelvic metastasis.  Hepatic cysts.  Fatty attenuated benign focus within the body of the pancreas visualized, 12/14/2011.  Previous hysterectomy, cholecystectomy and appendectomy.           8.   02/14/2020-echocardiogram.  Impression: LV systolic function normal (EF equals 70%).  Normal LV and RV size thickness and function.  Normal LA and RA size.  Normal cardiac valves.  No pericardial effusion.           9.  02/27/2020- CMP normal with .  CBC normal.  Iron 65, iron saturation 18%, ferritin 134, B12 745, folate > 20, CEA 1.76, CA-27-29 14.2.          10.  03/04/2020- repeat FISH for HER-2/lamberto (from tumor block, 01/10/2020).  No tumor on specimen.          11.  06/23/2020- DEXA scan.  Impression: Normal bone density.  No lower than 1 standard deviation below the mean for young adult woman.    PREVIOUS INTERVENTIONS:           1.   01/10/2020- left breast lumpectomy with left axillary sentinel node biopsy.  Final diagnosis: 1.left breast mass, lumpectomy: Invasive and in situ ductal carcinoma.  Tumor site-not specified.  Histologic type: Invasive carcinoma of no special type (invasive ductal carcinoma, not otherwise specified).  Overall tumor grade: Grade 1.  Tumor size: Greatest dimension of largest invasive focus: 5 mm.  Tumor focality: Single focus of invasive carcinoma.  Ductal carcinoma in situ (DCIS): Present.  Negative for extensive intraductal component (EIC).  Size (extent) CIS: Cannot be  "determined: Microscopic.  Architectural pattern: Cribriform/solid.  Nuclear grade: 2 (intermediate).  Necrosis: Present, central (expansive \"comedo) necrosis).  Lobular carcinoma in situ (LCIS): No LCIS in specimen.  Lymphovascular invasion: Not identified.  Dermal lymphovascular invasion: No skin present.  Margins: Uninvolved by invasive carcinoma.  Lymph nodes: Uninvolved by tumor cells.  Regional lymph nodes: 1.  Number of lymph nodes examined: 1.  Number of sentinel nodes examined: 1.  Pathologic stage classification (pTNM, AJCC eighth edition): pT1a,(sn), pN0.  % positive, OK 25% positive, HER-2 2+ (equivocal).           2.   Adjuvant Arimidex 1 mg p.o. daily beginning 02/03/2020 through 03/11/2020: resumed 08/19/2020.           3.   Adjuvant paclitaxel beginning 03/11/2020 through 05/27/2020 (12/12 weekly doses)           4.   Adjuvant trastuzumab every 3 weeks - beginning 03/11/2020 through 08/05/2020 (8 cycles)           5.   Adjuvant radiation to the left breast (5040 cGy with 1000 cGy boost to the tumor bed for a total of 6040 cGy/33 fractions) beginning 06/22/2020 through 08/06/2020.    LABS    Lab Results - Last 18 Months   Lab Units 08/05/20  0939 07/15/20  0954 06/24/20  1009 06/03/20  0942 05/27/20  0916 05/20/20  0846 05/13/20  0904   HEMOGLOBIN g/dL 12.7 12.4 12.8 11.2* 11.1* 11.5* 11.5*   HEMATOCRIT % 37.7 37.9 38.5 33.8* 33.1* 34.5 34.7   MCV fL 90.6 92.7 93.0 93.6 94.0 93.0 93.5   WBC 10*3/mm3 5.66 6.14 9.53 5.77 5.38 8.84 4.87   RDW % 12.7 13.1 14.2 15.8* 15.7* 15.6* 15.2   MPV fL 9.6 10.0 10.1 9.5 9.8 9.5 9.7   PLATELETS 10*3/mm3 280 283 267 383 331 402 344   IMM GRAN % % 0.4 0.3 0.3 4.5* 2.0*  --  4.5*   NEUTROS ABS 10*3/mm3 3.08 2.89 4.14 2.71 2.78 5.18 2.13   LYMPHS ABS 10*3/mm3 1.68 2.21 4.47* 2.23 1.87  --  2.05   MONOS ABS 10*3/mm3 0.41 0.46 0.60 0.40 0.49  --  0.31   EOS ABS 10*3/mm3 0.42* 0.49* 0.19 0.08 0.06 0.18 0.09   BASOS ABS 10*3/mm3 0.05 0.07 0.10 0.09 0.07 0.09 0.07 "   IMMATURE GRANS (ABS) 10*3/mm3 0.02 0.02 0.03 0.26* 0.11*  --  0.22*   NRBC /100 WBC 0.0 0.0 0.0 0.0 0.0 1.0* 0.0   NEUTROPHIL % %  --   --   --   --   --  55.6  --    MONOCYTES % %  --   --   --   --   --  4.0*  --    BASOPHIL % %  --   --   --   --   --  1.0  --    ANISOCYTOSIS   --   --   --   --   --  Slight/1+  --    GIANT PLT   --   --   --   --   --  Slight/1+  --        Lab Results - Last 18 Months   Lab Units 08/05/20  0939 07/15/20  0954 06/24/20  1009 06/03/20  0942 05/27/20  0916 05/20/20  0846   GLUCOSE mg/dL 120* 90 96 102* 123* 110*   SODIUM mmol/L 143 140 139 141 139 139   POTASSIUM mmol/L 3.9 3.9 4.1 4.2 4.0 3.7   CO2 mmol/L 27.0 25.0 24.0 23.0 25.0 25.0   CHLORIDE mmol/L 106 105 103 106 102 103   ANION GAP mmol/L 10.0 10.0 12.0 12.0 12.0 11.0   CREATININE mg/dL 0.47* 0.46* 0.53* 0.69 0.52* 0.51*   BUN mg/dL 19 19 16 17 23* 24*   BUN / CREAT RATIO  40.4* 41.3* 30.2* 24.6 44.2* 47.1*   CALCIUM mg/dL 10.0 9.8 9.7 9.4 9.2 9.5   EGFR IF NONAFRICN AM mL/min/1.73 136 140 118 88 122 124   ALK PHOS U/L 72 73 73 71 60 63   TOTAL PROTEIN g/dL 7.2 7.0 7.1 6.7 6.5 6.8   ALT (SGPT) U/L 31 35* 39* 39* 43* 33   AST (SGOT) U/L 24 25 32 23 22 17   BILIRUBIN mg/dL 0.5 0.4 0.6 0.4 0.4 0.3   ALBUMIN g/dL 4.40 4.40 4.40 4.10 4.00 4.20   GLOBULIN gm/dL 2.8 2.6 2.7 2.6 2.5 2.6       Lab Results - Last 18 Months   Lab Units 08/05/20  0938 06/24/20  1009 04/22/20  0851 02/27/20  0901   CEA ng/mL 1.26 1.97 2.26 1.76       Lab Results - Last 18 Months   Lab Units 08/05/20  0939 08/05/20  0938 06/24/20  1009 05/27/20  0916 04/22/20  0851 02/27/20  0901 12/12/19  0704   IRON mcg/dL 98  --   --  37 55 65  --    TIBC mcg/dL 340  --   --  326 353 370  --    IRON SATURATION % 29  --   --  11* 16* 18*  --    FERRITIN ng/mL 216.70*  --  197.90*  --  199.00* 134.20  --    TSH uIU/mL  --   --   --   --   --   --  0.798   FOLATE ng/mL  --  >20.00  --   --  >20.00 >20.00  --          PAST MEDICAL HISTORY:  ALLERGIES:  Allergies    Allergen Reactions   • Demerol [Meperidine] Hives   • Morphine And Related Hives     CURRENT MEDICATIONS:  Outpatient Encounter Medications as of 8/19/2020   Medication Sig Dispense Refill   • ALPRAZolam (XANAX) 0.5 MG tablet Take 1 tablet by mouth 2 (Two) Times a Day As Needed for Anxiety. 60 tablet 2   • calcium carbonate-vitamin d (Calcium 600+D) 600-400 MG-UNIT per tablet Take 1 tablet by mouth 2 (Two) Times a Day. 60 tablet 3   • ferrous sulfate 325 (65 FE) MG tablet Take 1 tablet by mouth 2 (Two) Times a Day. 60 tablet 0   • Inulin (FIBER CHOICE PO) Take 1 tablet by mouth Daily.     • lidocaine-prilocaine (EMLA) 2.5-2.5 % cream Apply to port site 30 minutes before it is accessed.  Cover with occlusive dressing. 30 g 2   • Multiple Vitamins-Minerals (MULTIVITAMIN ADULT PO) Take 1 tablet by mouth Daily.     • ondansetron (ZOFRAN) 8 MG tablet Take 1 tablet by mouth Every 8 (Eight) Hours As Needed for Nausea or Vomiting. 30 tablet 0   • oxyCODONE-acetaminophen (PERCOCET) 5-325 MG per tablet Take 1-2 tablets by mouth Every 4 (Four) Hours As Needed (Pain). 20 tablet 0   • polyethylene glycol (MIRALAX) packet Take 17 g by mouth Daily.     • sertraline (ZOLOFT) 50 MG tablet Take 1 tablet by mouth Daily. 30 tablet 11     No facility-administered encounter medications on file as of 8/19/2020.      Adult illnesses:  Colon polyps  Obesity  Spinal stenosis neck   Herniated cervical disc without myelopathy  Borderline blood pressure  History of dysphagia    Past surgeries:  Anterior cervical discectomy w fusion, 09/2018  Appendectomy  BSO/GANGA  Cholecystectomy  Colonoscopy, 02/10/2017  BTL  Tonsillectomy  Breast biopsy  Left partial mastectomy, 01/10/2020  03/05/2020 - Mediport placement per Dr. Edmond    ADULT ILLNESSES:  Patient Active Problem List   Diagnosis Code   • Family history of colon cancer Z80.0   • Colon polyps K63.5   • Laryngopharyngeal reflux K21.9   • Pharyngoesophageal dysphagia R13.14   • Non-smoker  Z78.9   • Obesity (BMI 30-39.9) E66.9   • Spinal stenosis in cervical region M48.02   • Cervical radiculopathy M54.12   • Herniated cervical disc without myelopathy M50.20   • Borderline blood pressure R03.0   • BMI 31.0-31.9,adult Z68.31   • BMI 33.0-33.9,adult Z68.33   • Invasive ductal carcinoma of breast, female, left (CMS/HCC) C50.912   • Former smoker Z87.891   • S/P lumpectomy, left breast Z98.890   • S/P lymph node biopsy Z98.890   • On antineoplastic chemotherapy Z79.899   • Estrogen receptor positive status (ER+) Z17.0     SURGERIES:  Past Surgical History:   Procedure Laterality Date   • ANTERIOR CERVICAL DISCECTOMY W/ FUSION N/A 9/7/2018    Procedure: CERVICAL DISCECTOMY ANTERIOR WITH FUSION C6-7;  Surgeon: Chris Mcfadden MD;  Location: [facility] OR;  Service: Neurosurgery   • APPENDECTOMY     • BILATERAL SALPINGO OOPHORECTOMY     • CERVICAL CORPECTOMY N/A 9/7/2018    Procedure: CERVICAL CORPECTOMY C6-7;  Surgeon: Chris Mcfadden MD;  Location: [facility] OR;  Service: Neurosurgery   • CHOLECYSTECTOMY     • COLONOSCOPY  09/05/2012   • COLONOSCOPY N/A 2/10/2017    Procedure: COLONOSCOPY WITH ANESTHESIA;  Surgeon: Elina Gonsalez MD;  Location: [facility] ENDOSCOPY;  Service:    • HYSTERECTOMY  1998    Had hysterectomy for painful periods and bleeding. (Dr. Patel) [facility] w/ BSO   • LAPAROSCOPIC TUBAL LIGATION     • MASTECTOMY W/ SENTINEL NODE BIOPSY Left 1/10/2020    Procedure: LEFT NEEDLE DIRECTED ULTRASOUND GUIDED PARTIAL MASTECTOMY WITH SENTINEL LYMPH NODE BIOPSY, INJECTION AND SCAN, RADIOLOGIST WILL INJECT;  Surgeon: Flor Edmond MD;  Location: [facility] OR;  Service: General   • TONSILLECTOMY     • VENOUS ACCESS DEVICE (PORT) INSERTION N/A 3/5/2020    Procedure: INSERTION VENOUS ACCESS DEVICE EXCISION SKIN LESION X 2 CHEST AND ABDOMEN;  Surgeon: Flor Edmond MD;  Location:  [facility] OR;  Service: General;  Laterality: N/A;     HEALTH MAINTENANCE ITEMS:  Health Maintenance Due   Topic Date Due   • ZOSTER  "VACCINE (1 of 2) 06/06/2012   • INFLUENZA VACCINE  08/01/2020       <no information>  Last Completed Colonoscopy       Status Date      COLONOSCOPY Done 2/10/2017 Surg:COLONOSCOPY     Patient has more history with this topic...        Immunization History   Administered Date(s) Administered   • Tdap 08/29/2018     Last Completed Mammogram       Status Date      MAMMOGRAM Done 12/27/2019 MAMMO DIAGNOSTIC DIGITAL TOMOSYNTHESIS LEFT W CAD     Patient has more history with this topic...            FAMILY HISTORY:  Family History   Problem Relation Age of Onset   • Colon cancer Maternal Grandmother         age not known   • Cancer Father    • Heart disease Father    • Diabetes Mother    • Hypertension Mother    • Stroke Mother    • Hypertension Sister    • No Known Problems Daughter    • No Known Problems Sister    • Colon polyps Neg Hx    • Esophageal cancer Neg Hx    • Liver cancer Neg Hx    • Liver disease Neg Hx    • Rectal cancer Neg Hx    • Stomach cancer Neg Hx    • Breast cancer Neg Hx    • Ovarian cancer Neg Hx      SOCIAL HISTORY:  Social History     Socioeconomic History   • Marital status:      Spouse name: Not on file   • Number of children: 1   • Years of education: Not on file   • Highest education level: Not on file   Tobacco Use   • Smoking status: Former Smoker     Types: Cigarettes   • Smokeless tobacco: Never Used   • Tobacco comment: \"i never really smoked\"   Substance and Sexual Activity   • Alcohol use: Not Currently     Frequency: Never   • Drug use: No   • Sexual activity: Yes     Partners: Male     Birth control/protection: Surgical       REVIEW OF SYSTEMS:  Review of Systems   Constitutional: Negative.         She manages her ADLs' including chores, errands, driving    Herceptin tolerance:  \"No side effects.\"  No worsening fatigue.  No mouth sores.  No nausea, has not needed Zofran.  No vomiting, some dry skin otherwise no skin changes.  Improved paresthesias of the fingers and toes.  No " "loss of fine motor function.  No loose stools.  Has not required Imodiums.  No overt diarrhea.  Hair \"growing back.\"   HENT: Negative.    Eyes: Negative.    Respiratory: Negative.    Cardiovascular: Negative.    Gastrointestinal: Negative for diarrhea, nausea and GERD (\"Much better off chemo\".  Has been using omeprazole as needed).        Tolerating oral iron bid - dark stools   Endocrine: Positive for heat intolerance (mild residual vasomotor changes).   Genitourinary: Negative.    Musculoskeletal: Positive for arthralgias (knees, and hips \"sometimes.\" Does not impede her activities.).   Allergic/Immunologic: Negative.    Neurological: Positive for numbness (paresthesias of the toes> fingers.  Improving each time).   Hematological: Negative.    Psychiatric/Behavioral: The patient is not nervous/anxious (\"it's better now that I know what to expect.\").        /80   Pulse 96   Temp 97.8 °F (36.6 °C)   Resp 16   Ht 152.4 cm (60\")   Wt 77.3 kg (170 lb 6.4 oz)   SpO2 98%   Breastfeeding No   BMI 33.28 kg/m²  Body surface area is 1.74 meters squared.  Pain Score    08/19/20 0945   PainSc: 0-No pain       Physical Exam:  Physical Exam   Constitutional: She is oriented to person, place, and time. She appears well-developed and well-nourished. No distress.   Pleasant, heavy set, cooperative, modestly kept female.  Appears her age.  ECOG 0.      She has lost 1 pound (had gained 8 pounds at her 2 prior visits) in the interval.   HENT:   Head: Normocephalic and atraumatic.   Mouth/Throat: No oropharyngeal exudate.   Hair growth noted   Eyes: Pupils are equal, round, and reactive to light. Conjunctivae and EOM are normal. No scleral icterus.   Neck: Normal range of motion. Neck supple. No JVD present. No tracheal deviation present. No thyromegaly present.   Cardiovascular: Normal rate, regular rhythm and normal heart sounds. Exam reveals no friction rub.   No murmur heard.  Pulmonary/Chest: Effort normal and breath " sounds normal. No stridor. No respiratory distress. She has no wheezes. She has no rales.   Abdominal: Soft. Bowel sounds are normal. She exhibits no distension and no mass. There is no tenderness. There is no rebound and no guarding.   Musculoskeletal: Normal range of motion. She exhibits no edema or deformity.   Lymphadenopathy:     She has no cervical adenopathy.   Neurological: She is alert and oriented to person, place, and time. She displays normal reflexes. No cranial nerve deficit. She exhibits normal muscle tone. Coordination normal.   Skin: Skin is warm and dry. No rash noted. No erythema. No pallor.   Psychiatric: She has a normal mood and affect. Her behavior is normal. Judgment and thought content normal.   Vitals reviewed.  Breasts: Left breast lumpectomy and sentinel node biopsy site in the left axillary healed.  There is no incisional tenderness in either site.  There is radiation associated skin hyperemia with some skin disruption.  The rest of the breast is without obvious nodularity skin changes no nipple discharge.  Right breast without masses, skin changes nor nipple discharge.    ASSESSMENT:   1.  Invasive and in situ ductal carcinoma  left breast.                 Stage:  IA (pT1a, pN0, G2) ER  100% positive, KY  25%, HER-2/lamberto - 2+ (IHC)/FISH -a focal positive score is present in an area corresponding to approximately 15% of the tumor (signal ratio: 2.9).                 Tumor [place]:  5 mm.  Tumor focality: Single focus of invasive carcinoma.  No EIC nor LCIS.  Lymphovascular invasion: Not identified.  Dermal lymphovascular invasion: No skin present.  Microcalcifications: Not identified.  Margins uninvolved by invasive carcinoma.  Lymph nodes: Number of lymph nodes examined: 1 (0/1).                 Tumor Status:  Underwent lumpectomy and SNB, 01/10/2020.   Adjuvant Arimidex since 02/03/2020          2.   Obesity.  Moderate (BMI 33.5)        3.   Spinal stenosis neck, quiescent         4.    Herniated cervical disc without myelopathy        5.   Anemia, likely chemo associated.  Hgb 12.7, 08/05/2020 (prior: Hgb 11.5-12.6)        6.   paulie OCAMPO       RECOMMENDATIONS:         1.    Re: Apprised of the labs, 08/05/2020 (above).  Normal CBC, CMP, normal CEA, normal CA-27-29, repleted B12/folate, repleted serum iron, iron saturation 29% (from 11%) repleted ferritin (216)..  Ferrous sulfate 325 p.o. twice daily dispense 60 called in on 05/27/2020. Herceptin tolerance discussed.  No problems.  Is willing to press on.        2.    Stop ferrous sulfate.          3.    Previously apprised of bone density, 06/23/2020 (above).  Normal.         4.    Review 2D echocardiogram on 07/08/2020.  LV systolic function normal.        5.    Schedule 2D echo for 10/08/2020        6.    Previously noted that we are unable to repeat FISH testing for HER-2 on the tumor specimen from 01/10/2020 -pathology block did not contain any more tumor - personally discussed/confirmed with Dr. Burgos of pathology.        7.   Care previously discussed with Dr. Jaimes on 02/28/2020 after she saw the patient on 02/25/2020 (encounter reviewed).  Pathology was reviewed at [facility] and discussed with the patient.  Recommendations/plan: Discussed that since her cancer is strongly ER positive and low histologic grade/low proliferative rate, and HER-2 on the biopsy was borderline recommended repeat HER-2 testing on her surgical pathology.  Defer to his negative proceed with only endocrine therapy and radiation.  If HER-2 is confirmed to be positive, recommend adjuvant weekly Taxol x12+ Herceptin every 3 weeks to complete 1 year of HER-2 directed therapy.  These plans were personally discussed with the patient (via telephone) on 02/28/2020 I also personally asked the pathologist (Dr. Radha Burgos) to send off the biopsy specimen for repeat FISH HER-2 testing.          8.   Reviewed NCCN guidelines version 3.2019 invasive breast  cancer.  For tumors (=/> 5 mm), and pN0, recommend adjuvant endocrine therapy +/- adjuvant chemotherapy with trastuzumab (category 2B).          9.  Previously discussed the potential toxicities of adjuvant chemotherapy + Herceptin (to include but not limited to: Asthenia, cardiotoxicity, myelosuppression) are discussed at length. The rationale for adjuvant hormonal manipulation with AIs (see above) is discussed (to include but not limited to: Hot flashes, asthenia, pain, arthralgia, arthritis, nausea, bone pains, edema, fatigue, headache, venous thrombosis, anemia, leukopenia, depression, rash, pharyngitis, weight gain, dyspnea, fractures, breast pains, infection, angina). Questions answered to the best of my ability, and to her apparent satisfaction. She is willing to press on with therapy.   10.  Schedule treatment at [facility] -     · Herceptin 6 mg/kg beginning C2 (TD = 450 mg) per administration guidelines every 3 weeks x 1 year (17 cycles)-C9, 08/26/2020; C10, 09/16/2020; C11, 10/07/2020.                     -  Premed before Herceptin -   · Tylenol 500 mg orally.   · Benadryl 25 mg IVP over 20-30 min.                11.  Rx:   · Oscal 600/400 po bid # 60 - OTC  · Arimidex 1 mg daily # 30 x 3 RF-eRx          12.  CMP and CBC with diff weekly with Procrit 40,000 units subcutaneous if Hgb less than 10 or Hct less than 30; Neupogen 480 mcg subcutaneously daily x4 if ANC less than 1.0- [facility]        13. Previously discussed the rationale for adjuvant hormonal manipulation with AIs (see above) and potential toxicities of AI therapy are discussed (to include but not limited to: Hot flashes, asthenia, pain, arthralgias, arthritis, nausea/vomiting, headache, pharyngitis, depression, rash, hypertension, lymphedema, insomnia, edema, weight gain, dyspnea, abdominal pain, constipation, hyperlipidemia, osteoporosis, fractures, cough, bone pain, diarrhea, breast pain, paresthesias, infections, cataracts, myalgias, thromboembolism,  angina, Lyles-Yung syndrome, erythema multiforme, anemia, leukopenia, stroke, myocardial infarction, osteoporosis, fractures). Questions are answered. She agrees to resume therapy at the terminus of radiation.       14.  Previously reviewed office encounter, 06/07/2020 from Dr. Brito.  Recommends adjuvant radiation to the left breast.  Anticipate 5040 cGy with 1000 cGy boost to the tumor bed for a total of 6040 cGy/33 fractions to start around July 1 to allow for postchemotherapy healing time.       15.  Return to the [facility] office with pre-office serum iron, iron saturation, ferritin B12, folate, CMP, CEA, CA 27-29 and CBC and differential on 10/21/2020.      .     MEDICAL DECISION MAKING: High complexity   AMOUNT OF DATA: Extensive     I spent at least 40 total minutes, face-to-face, caring for Chasidy nova.  Greater than 50% of this time involved counseling and/or coordination of care as documented within this note regarding the patient's illness(es), pros and cons of various treatment options, instructions and/or risk reduction.     Cc:  Abigail Jaimes MD- [place] [facility]          MD Kailash Sawant MD Robert Learch, MD

## 2020-08-19 ENCOUNTER — OFFICE VISIT (OUTPATIENT)
Dept: ONCOLOGY | Facility: CLINIC | Age: 60
End: 2020-08-19

## 2020-08-19 VITALS
BODY MASS INDEX: 33.45 KG/M2 | RESPIRATION RATE: 16 BRPM | HEIGHT: 60 IN | OXYGEN SATURATION: 98 % | SYSTOLIC BLOOD PRESSURE: 130 MMHG | TEMPERATURE: 97.8 F | HEART RATE: 96 BPM | DIASTOLIC BLOOD PRESSURE: 80 MMHG | WEIGHT: 170.4 LBS

## 2020-08-19 DIAGNOSIS — C50.912 INVASIVE DUCTAL CARCINOMA OF BREAST, FEMALE, LEFT (HCC): Primary | ICD-10-CM

## 2020-08-19 PROCEDURE — 99215 OFFICE O/P EST HI 40 MIN: CPT | Performed by: INTERNAL MEDICINE

## 2020-08-25 DIAGNOSIS — C50.912 INVASIVE DUCTAL CARCINOMA OF BREAST, FEMALE, LEFT (HCC): ICD-10-CM

## 2020-08-25 RX ORDER — SODIUM CHLORIDE 9 MG/ML
250 INJECTION, SOLUTION INTRAVENOUS ONCE
Status: CANCELLED | OUTPATIENT
Start: 2020-08-26

## 2020-08-25 RX ORDER — ACETAMINOPHEN 325 MG/1
500 TABLET ORAL ONCE
Status: CANCELLED
Start: 2020-08-26

## 2020-08-25 RX ORDER — FAMOTIDINE 10 MG/ML
20 INJECTION, SOLUTION INTRAVENOUS AS NEEDED
Status: CANCELLED | OUTPATIENT
Start: 2020-08-26

## 2020-08-25 RX ORDER — DIPHENHYDRAMINE HYDROCHLORIDE 50 MG/ML
50 INJECTION INTRAMUSCULAR; INTRAVENOUS AS NEEDED
Status: CANCELLED | OUTPATIENT
Start: 2020-08-26

## 2020-08-26 ENCOUNTER — LAB (OUTPATIENT)
Dept: LAB | Facility: HOSPITAL | Age: 60
End: 2020-08-26

## 2020-08-26 ENCOUNTER — INFUSION (OUTPATIENT)
Dept: ONCOLOGY | Facility: HOSPITAL | Age: 60
End: 2020-08-26

## 2020-08-26 VITALS
TEMPERATURE: 96.7 F | DIASTOLIC BLOOD PRESSURE: 71 MMHG | OXYGEN SATURATION: 100 % | BODY MASS INDEX: 33.41 KG/M2 | SYSTOLIC BLOOD PRESSURE: 116 MMHG | RESPIRATION RATE: 18 BRPM | WEIGHT: 170.2 LBS | HEIGHT: 60 IN | HEART RATE: 70 BPM

## 2020-08-26 DIAGNOSIS — C50.912 INVASIVE DUCTAL CARCINOMA OF BREAST, FEMALE, LEFT (HCC): Primary | ICD-10-CM

## 2020-08-26 DIAGNOSIS — C50.912 INVASIVE DUCTAL CARCINOMA OF BREAST, FEMALE, LEFT (HCC): ICD-10-CM

## 2020-08-26 LAB
ALBUMIN SERPL-MCNC: 4.6 G/DL (ref 3.5–5.2)
ALBUMIN/GLOB SERPL: 1.5 G/DL
ALP SERPL-CCNC: 73 U/L (ref 39–117)
ALT SERPL W P-5'-P-CCNC: 24 U/L (ref 1–33)
ANION GAP SERPL CALCULATED.3IONS-SCNC: 11 MMOL/L (ref 5–15)
AST SERPL-CCNC: 26 U/L (ref 1–32)
BASOPHILS # BLD AUTO: 0.05 10*3/MM3 (ref 0–0.2)
BASOPHILS NFR BLD AUTO: 0.8 % (ref 0–1.5)
BILIRUB SERPL-MCNC: 0.4 MG/DL (ref 0–1.2)
BUN SERPL-MCNC: 25 MG/DL (ref 6–20)
BUN/CREAT SERPL: 41 (ref 7–25)
CALCIUM SPEC-SCNC: 9.7 MG/DL (ref 8.6–10.5)
CHLORIDE SERPL-SCNC: 102 MMOL/L (ref 98–107)
CO2 SERPL-SCNC: 27 MMOL/L (ref 22–29)
CREAT SERPL-MCNC: 0.61 MG/DL (ref 0.57–1)
DEPRECATED RDW RBC AUTO: 40.9 FL (ref 37–54)
EOSINOPHIL # BLD AUTO: 0.22 10*3/MM3 (ref 0–0.4)
EOSINOPHIL NFR BLD AUTO: 3.6 % (ref 0.3–6.2)
ERYTHROCYTE [DISTWIDTH] IN BLOOD BY AUTOMATED COUNT: 12.6 % (ref 12.3–15.4)
GFR SERPL CREATININE-BSD FRML MDRD: 101 ML/MIN/1.73
GLOBULIN UR ELPH-MCNC: 3.1 GM/DL
GLUCOSE SERPL-MCNC: 100 MG/DL (ref 65–99)
HCT VFR BLD AUTO: 40 % (ref 34–46.6)
HGB BLD-MCNC: 13.6 G/DL (ref 12–15.9)
IMM GRANULOCYTES # BLD AUTO: 0.02 10*3/MM3 (ref 0–0.05)
IMM GRANULOCYTES NFR BLD AUTO: 0.3 % (ref 0–0.5)
LYMPHOCYTES # BLD AUTO: 2 10*3/MM3 (ref 0.7–3.1)
LYMPHOCYTES NFR BLD AUTO: 32.8 % (ref 19.6–45.3)
MCH RBC QN AUTO: 30.2 PG (ref 26.6–33)
MCHC RBC AUTO-ENTMCNC: 34 G/DL (ref 31.5–35.7)
MCV RBC AUTO: 88.9 FL (ref 79–97)
MONOCYTES # BLD AUTO: 0.43 10*3/MM3 (ref 0.1–0.9)
MONOCYTES NFR BLD AUTO: 7.1 % (ref 5–12)
NEUTROPHILS NFR BLD AUTO: 3.37 10*3/MM3 (ref 1.7–7)
NEUTROPHILS NFR BLD AUTO: 55.4 % (ref 42.7–76)
NRBC BLD AUTO-RTO: 0 /100 WBC (ref 0–0.2)
PLATELET # BLD AUTO: 286 10*3/MM3 (ref 140–450)
PMV BLD AUTO: 9.8 FL (ref 6–12)
POTASSIUM SERPL-SCNC: 4.3 MMOL/L (ref 3.5–5.2)
PROT SERPL-MCNC: 7.7 G/DL (ref 6–8.5)
RBC # BLD AUTO: 4.5 10*6/MM3 (ref 3.77–5.28)
SODIUM SERPL-SCNC: 140 MMOL/L (ref 136–145)
WBC # BLD AUTO: 6.09 10*3/MM3 (ref 3.4–10.8)

## 2020-08-26 PROCEDURE — 25010000002 TRASTUZUMAB-ANNS 150 MG RECONSTITUTED SOLUTION 1 EACH VIAL: Performed by: NURSE PRACTITIONER

## 2020-08-26 PROCEDURE — 80053 COMPREHEN METABOLIC PANEL: CPT

## 2020-08-26 PROCEDURE — 96413 CHEMO IV INFUSION 1 HR: CPT

## 2020-08-26 PROCEDURE — 96367 TX/PROPH/DG ADDL SEQ IV INF: CPT

## 2020-08-26 PROCEDURE — 85025 COMPLETE CBC W/AUTO DIFF WBC: CPT

## 2020-08-26 PROCEDURE — 25010000002 DIPHENHYDRAMINE PER 50 MG: Performed by: NURSE PRACTITIONER

## 2020-08-26 PROCEDURE — 25010000003 HEPARIN LOCK FLUSH PER 10 UNITS: Performed by: INTERNAL MEDICINE

## 2020-08-26 RX ORDER — HEPARIN SODIUM (PORCINE) LOCK FLUSH IV SOLN 100 UNIT/ML 100 UNIT/ML
500 SOLUTION INTRAVENOUS AS NEEDED
Status: CANCELLED | OUTPATIENT
Start: 2020-08-26

## 2020-08-26 RX ORDER — SODIUM CHLORIDE 0.9 % (FLUSH) 0.9 %
10 SYRINGE (ML) INJECTION AS NEEDED
Status: DISCONTINUED | OUTPATIENT
Start: 2020-08-26 | End: 2020-08-26 | Stop reason: HOSPADM

## 2020-08-26 RX ORDER — HEPARIN SODIUM (PORCINE) LOCK FLUSH IV SOLN 100 UNIT/ML 100 UNIT/ML
500 SOLUTION INTRAVENOUS AS NEEDED
Status: DISCONTINUED | OUTPATIENT
Start: 2020-08-26 | End: 2020-08-26 | Stop reason: HOSPADM

## 2020-08-26 RX ORDER — SODIUM CHLORIDE 0.9 % (FLUSH) 0.9 %
10 SYRINGE (ML) INJECTION AS NEEDED
Status: CANCELLED | OUTPATIENT
Start: 2020-08-26

## 2020-08-26 RX ORDER — ACETAMINOPHEN 500 MG
500 TABLET ORAL ONCE
Status: COMPLETED | OUTPATIENT
Start: 2020-08-26 | End: 2020-08-26

## 2020-08-26 RX ORDER — DIPHENHYDRAMINE HYDROCHLORIDE 50 MG/ML
50 INJECTION INTRAMUSCULAR; INTRAVENOUS AS NEEDED
Status: DISCONTINUED | OUTPATIENT
Start: 2020-08-26 | End: 2020-08-26 | Stop reason: HOSPADM

## 2020-08-26 RX ORDER — SODIUM CHLORIDE 9 MG/ML
250 INJECTION, SOLUTION INTRAVENOUS ONCE
Status: COMPLETED | OUTPATIENT
Start: 2020-08-26 | End: 2020-08-26

## 2020-08-26 RX ORDER — FAMOTIDINE 10 MG/ML
20 INJECTION, SOLUTION INTRAVENOUS AS NEEDED
Status: DISCONTINUED | OUTPATIENT
Start: 2020-08-26 | End: 2020-08-26 | Stop reason: HOSPADM

## 2020-08-26 RX ADMIN — Medication 500 UNITS: at 11:55

## 2020-08-26 RX ADMIN — SODIUM CHLORIDE 250 ML: 9 INJECTION, SOLUTION INTRAVENOUS at 10:30

## 2020-08-26 RX ADMIN — TRASTUZUMAB-ANNS 450 MG: 150 INJECTION, POWDER, LYOPHILIZED, FOR SOLUTION INTRAVENOUS at 11:00

## 2020-08-26 RX ADMIN — SODIUM CHLORIDE, PRESERVATIVE FREE 10 ML: 5 INJECTION INTRAVENOUS at 11:55

## 2020-08-26 RX ADMIN — ACETAMINOPHEN 500 MG: 500 TABLET, FILM COATED ORAL at 10:33

## 2020-08-26 RX ADMIN — DIPHENHYDRAMINE HYDROCHLORIDE 25 MG: 50 INJECTION, SOLUTION INTRAMUSCULAR; INTRAVENOUS at 10:33

## 2020-09-01 ENCOUNTER — HOSPITAL ENCOUNTER (OUTPATIENT)
Dept: RADIATION ONCOLOGY | Facility: HOSPITAL | Age: 60
Setting detail: RADIATION/ONCOLOGY SERIES
End: 2020-09-01

## 2020-09-15 DIAGNOSIS — C50.912 INVASIVE DUCTAL CARCINOMA OF BREAST, FEMALE, LEFT (HCC): Primary | ICD-10-CM

## 2020-09-15 RX ORDER — FAMOTIDINE 10 MG/ML
20 INJECTION, SOLUTION INTRAVENOUS AS NEEDED
Status: CANCELLED | OUTPATIENT
Start: 2020-09-16

## 2020-09-15 RX ORDER — SODIUM CHLORIDE 9 MG/ML
250 INJECTION, SOLUTION INTRAVENOUS ONCE
Status: CANCELLED | OUTPATIENT
Start: 2020-09-16

## 2020-09-15 RX ORDER — ACETAMINOPHEN 325 MG/1
500 TABLET ORAL ONCE
Status: CANCELLED
Start: 2020-09-16

## 2020-09-15 RX ORDER — DIPHENHYDRAMINE HYDROCHLORIDE 50 MG/ML
50 INJECTION INTRAMUSCULAR; INTRAVENOUS AS NEEDED
Status: CANCELLED | OUTPATIENT
Start: 2020-09-16

## 2020-09-16 ENCOUNTER — INFUSION (OUTPATIENT)
Dept: ONCOLOGY | Facility: HOSPITAL | Age: 60
End: 2020-09-16

## 2020-09-16 ENCOUNTER — LAB (OUTPATIENT)
Dept: LAB | Facility: HOSPITAL | Age: 60
End: 2020-09-16

## 2020-09-16 VITALS
HEIGHT: 60 IN | TEMPERATURE: 97.1 F | WEIGHT: 172 LBS | SYSTOLIC BLOOD PRESSURE: 117 MMHG | OXYGEN SATURATION: 100 % | DIASTOLIC BLOOD PRESSURE: 67 MMHG | RESPIRATION RATE: 17 BRPM | BODY MASS INDEX: 33.77 KG/M2 | HEART RATE: 69 BPM

## 2020-09-16 DIAGNOSIS — C50.912 INVASIVE DUCTAL CARCINOMA OF BREAST, FEMALE, LEFT (HCC): ICD-10-CM

## 2020-09-16 DIAGNOSIS — C50.912 INVASIVE DUCTAL CARCINOMA OF BREAST, FEMALE, LEFT (HCC): Primary | ICD-10-CM

## 2020-09-16 LAB
ALBUMIN SERPL-MCNC: 4.3 G/DL (ref 3.5–5.2)
ALBUMIN/GLOB SERPL: 1.6 G/DL
ALP SERPL-CCNC: 74 U/L (ref 39–117)
ALT SERPL W P-5'-P-CCNC: 24 U/L (ref 1–33)
ANION GAP SERPL CALCULATED.3IONS-SCNC: 11 MMOL/L (ref 5–15)
AST SERPL-CCNC: 19 U/L (ref 1–32)
BASOPHILS # BLD AUTO: 0.06 10*3/MM3 (ref 0–0.2)
BASOPHILS NFR BLD AUTO: 1.2 % (ref 0–1.5)
BILIRUB SERPL-MCNC: 0.3 MG/DL (ref 0–1.2)
BUN SERPL-MCNC: 19 MG/DL (ref 6–20)
BUN/CREAT SERPL: 30.2 (ref 7–25)
CALCIUM SPEC-SCNC: 9.2 MG/DL (ref 8.6–10.5)
CHLORIDE SERPL-SCNC: 106 MMOL/L (ref 98–107)
CO2 SERPL-SCNC: 26 MMOL/L (ref 22–29)
CREAT SERPL-MCNC: 0.63 MG/DL (ref 0.57–1)
DEPRECATED RDW RBC AUTO: 41 FL (ref 37–54)
EOSINOPHIL # BLD AUTO: 0.18 10*3/MM3 (ref 0–0.4)
EOSINOPHIL NFR BLD AUTO: 3.5 % (ref 0.3–6.2)
ERYTHROCYTE [DISTWIDTH] IN BLOOD BY AUTOMATED COUNT: 12.7 % (ref 12.3–15.4)
GFR SERPL CREATININE-BSD FRML MDRD: 97 ML/MIN/1.73
GLOBULIN UR ELPH-MCNC: 2.7 GM/DL
GLUCOSE SERPL-MCNC: 105 MG/DL (ref 65–99)
HCT VFR BLD AUTO: 37.1 % (ref 34–46.6)
HGB BLD-MCNC: 12.5 G/DL (ref 12–15.9)
IMM GRANULOCYTES # BLD AUTO: 0.01 10*3/MM3 (ref 0–0.05)
IMM GRANULOCYTES NFR BLD AUTO: 0.2 % (ref 0–0.5)
LYMPHOCYTES # BLD AUTO: 2.03 10*3/MM3 (ref 0.7–3.1)
LYMPHOCYTES NFR BLD AUTO: 39.1 % (ref 19.6–45.3)
MCH RBC QN AUTO: 29.8 PG (ref 26.6–33)
MCHC RBC AUTO-ENTMCNC: 33.7 G/DL (ref 31.5–35.7)
MCV RBC AUTO: 88.3 FL (ref 79–97)
MONOCYTES # BLD AUTO: 0.33 10*3/MM3 (ref 0.1–0.9)
MONOCYTES NFR BLD AUTO: 6.4 % (ref 5–12)
NEUTROPHILS NFR BLD AUTO: 2.58 10*3/MM3 (ref 1.7–7)
NEUTROPHILS NFR BLD AUTO: 49.6 % (ref 42.7–76)
NRBC BLD AUTO-RTO: 0 /100 WBC (ref 0–0.2)
PLATELET # BLD AUTO: 300 10*3/MM3 (ref 140–450)
PMV BLD AUTO: 9.5 FL (ref 6–12)
POTASSIUM SERPL-SCNC: 4 MMOL/L (ref 3.5–5.2)
PROT SERPL-MCNC: 7 G/DL (ref 6–8.5)
RBC # BLD AUTO: 4.2 10*6/MM3 (ref 3.77–5.28)
SODIUM SERPL-SCNC: 143 MMOL/L (ref 136–145)
WBC # BLD AUTO: 5.19 10*3/MM3 (ref 3.4–10.8)

## 2020-09-16 PROCEDURE — 80053 COMPREHEN METABOLIC PANEL: CPT

## 2020-09-16 PROCEDURE — 96413 CHEMO IV INFUSION 1 HR: CPT

## 2020-09-16 PROCEDURE — 96367 TX/PROPH/DG ADDL SEQ IV INF: CPT

## 2020-09-16 PROCEDURE — 25010000003 HEPARIN LOCK FLUSH PER 10 UNITS: Performed by: INTERNAL MEDICINE

## 2020-09-16 PROCEDURE — 25010000002 TRASTUZUMAB-ANNS 150 MG RECONSTITUTED SOLUTION 1 EACH VIAL: Performed by: NURSE PRACTITIONER

## 2020-09-16 PROCEDURE — 85025 COMPLETE CBC W/AUTO DIFF WBC: CPT

## 2020-09-16 PROCEDURE — 25010000002 DIPHENHYDRAMINE PER 50 MG: Performed by: NURSE PRACTITIONER

## 2020-09-16 RX ORDER — SODIUM CHLORIDE 0.9 % (FLUSH) 0.9 %
10 SYRINGE (ML) INJECTION AS NEEDED
Status: DISCONTINUED | OUTPATIENT
Start: 2020-09-16 | End: 2020-09-16 | Stop reason: HOSPADM

## 2020-09-16 RX ORDER — ACETAMINOPHEN 500 MG
500 TABLET ORAL ONCE
Status: COMPLETED | OUTPATIENT
Start: 2020-09-16 | End: 2020-09-16

## 2020-09-16 RX ORDER — FAMOTIDINE 10 MG/ML
20 INJECTION, SOLUTION INTRAVENOUS AS NEEDED
Status: DISCONTINUED | OUTPATIENT
Start: 2020-09-16 | End: 2020-09-16 | Stop reason: HOSPADM

## 2020-09-16 RX ORDER — DIPHENHYDRAMINE HYDROCHLORIDE 50 MG/ML
50 INJECTION INTRAMUSCULAR; INTRAVENOUS AS NEEDED
Status: DISCONTINUED | OUTPATIENT
Start: 2020-09-16 | End: 2020-09-16 | Stop reason: HOSPADM

## 2020-09-16 RX ORDER — SODIUM CHLORIDE 9 MG/ML
250 INJECTION, SOLUTION INTRAVENOUS ONCE
Status: COMPLETED | OUTPATIENT
Start: 2020-09-16 | End: 2020-09-16

## 2020-09-16 RX ORDER — SODIUM CHLORIDE 0.9 % (FLUSH) 0.9 %
10 SYRINGE (ML) INJECTION AS NEEDED
Status: CANCELLED | OUTPATIENT
Start: 2020-09-16

## 2020-09-16 RX ORDER — HEPARIN SODIUM (PORCINE) LOCK FLUSH IV SOLN 100 UNIT/ML 100 UNIT/ML
500 SOLUTION INTRAVENOUS AS NEEDED
Status: CANCELLED | OUTPATIENT
Start: 2020-09-16

## 2020-09-16 RX ORDER — HEPARIN SODIUM (PORCINE) LOCK FLUSH IV SOLN 100 UNIT/ML 100 UNIT/ML
500 SOLUTION INTRAVENOUS AS NEEDED
Status: DISCONTINUED | OUTPATIENT
Start: 2020-09-16 | End: 2020-09-16 | Stop reason: HOSPADM

## 2020-09-16 RX ADMIN — DIPHENHYDRAMINE HYDROCHLORIDE 25 MG: 50 INJECTION, SOLUTION INTRAMUSCULAR; INTRAVENOUS at 10:57

## 2020-09-16 RX ADMIN — Medication 500 UNITS: at 11:58

## 2020-09-16 RX ADMIN — TRASTUZUMAB-ANNS 450 MG: 150 INJECTION, POWDER, LYOPHILIZED, FOR SOLUTION INTRAVENOUS at 11:18

## 2020-09-16 RX ADMIN — ACETAMINOPHEN 500 MG: 500 TABLET, FILM COATED ORAL at 10:57

## 2020-09-16 RX ADMIN — SODIUM CHLORIDE 250 ML: 9 INJECTION, SOLUTION INTRAVENOUS at 10:57

## 2020-09-16 RX ADMIN — SODIUM CHLORIDE, PRESERVATIVE FREE 10 ML: 5 INJECTION INTRAVENOUS at 11:58

## 2020-09-29 PROBLEM — Z92.3 HISTORY OF RADIATION THERAPY: Status: ACTIVE | Noted: 2020-09-29

## 2020-09-30 ENCOUNTER — OFFICE VISIT (OUTPATIENT)
Dept: RADIATION ONCOLOGY | Facility: HOSPITAL | Age: 60
End: 2020-09-30

## 2020-09-30 VITALS
HEIGHT: 60 IN | SYSTOLIC BLOOD PRESSURE: 151 MMHG | BODY MASS INDEX: 34.16 KG/M2 | WEIGHT: 174 LBS | DIASTOLIC BLOOD PRESSURE: 71 MMHG | HEART RATE: 81 BPM

## 2020-09-30 DIAGNOSIS — Z87.891 FORMER SMOKER: ICD-10-CM

## 2020-09-30 DIAGNOSIS — Z98.890 S/P LUMPECTOMY, LEFT BREAST: ICD-10-CM

## 2020-09-30 DIAGNOSIS — Z98.890 S/P LYMPH NODE BIOPSY: ICD-10-CM

## 2020-09-30 DIAGNOSIS — C50.912 INVASIVE DUCTAL CARCINOMA OF BREAST, FEMALE, LEFT (HCC): Primary | ICD-10-CM

## 2020-09-30 DIAGNOSIS — Z92.3 HISTORY OF RADIATION THERAPY: ICD-10-CM

## 2020-09-30 DIAGNOSIS — Z17.0 ESTROGEN RECEPTOR POSITIVE STATUS (ER+): ICD-10-CM

## 2020-09-30 PROCEDURE — G0463 HOSPITAL OUTPT CLINIC VISIT: HCPCS | Performed by: RADIOLOGY

## 2020-09-30 RX ORDER — ANASTROZOLE 1 MG/1
1 TABLET ORAL DAILY
COMMUNITY
Start: 2020-08-31 | End: 2020-10-23 | Stop reason: SDUPTHER

## 2020-10-06 DIAGNOSIS — C50.912 INVASIVE DUCTAL CARCINOMA OF BREAST, FEMALE, LEFT (HCC): Primary | ICD-10-CM

## 2020-10-06 RX ORDER — DIPHENHYDRAMINE HYDROCHLORIDE 50 MG/ML
50 INJECTION INTRAMUSCULAR; INTRAVENOUS AS NEEDED
Status: CANCELLED | OUTPATIENT
Start: 2020-10-07

## 2020-10-06 RX ORDER — SODIUM CHLORIDE 9 MG/ML
250 INJECTION, SOLUTION INTRAVENOUS ONCE
Status: CANCELLED | OUTPATIENT
Start: 2020-10-07

## 2020-10-06 RX ORDER — FAMOTIDINE 10 MG/ML
20 INJECTION, SOLUTION INTRAVENOUS AS NEEDED
Status: CANCELLED | OUTPATIENT
Start: 2020-10-07

## 2020-10-06 RX ORDER — ACETAMINOPHEN 325 MG/1
500 TABLET ORAL ONCE
Status: CANCELLED
Start: 2020-10-07

## 2020-10-07 ENCOUNTER — LAB (OUTPATIENT)
Dept: LAB | Facility: HOSPITAL | Age: 60
End: 2020-10-07

## 2020-10-07 ENCOUNTER — INFUSION (OUTPATIENT)
Dept: ONCOLOGY | Facility: HOSPITAL | Age: 60
End: 2020-10-07

## 2020-10-07 VITALS
DIASTOLIC BLOOD PRESSURE: 61 MMHG | OXYGEN SATURATION: 100 % | HEART RATE: 68 BPM | WEIGHT: 174 LBS | RESPIRATION RATE: 18 BRPM | TEMPERATURE: 97 F | SYSTOLIC BLOOD PRESSURE: 119 MMHG | HEIGHT: 60 IN | BODY MASS INDEX: 34.16 KG/M2

## 2020-10-07 DIAGNOSIS — C50.912 INVASIVE DUCTAL CARCINOMA OF BREAST, FEMALE, LEFT (HCC): Primary | ICD-10-CM

## 2020-10-07 DIAGNOSIS — C50.912 INVASIVE DUCTAL CARCINOMA OF BREAST, FEMALE, LEFT (HCC): ICD-10-CM

## 2020-10-07 LAB
ALBUMIN SERPL-MCNC: 4.6 G/DL (ref 3.5–5.2)
ALBUMIN/GLOB SERPL: 1.6 G/DL
ALP SERPL-CCNC: 83 U/L (ref 39–117)
ALT SERPL W P-5'-P-CCNC: 34 U/L (ref 1–33)
ANION GAP SERPL CALCULATED.3IONS-SCNC: 8 MMOL/L (ref 5–15)
AST SERPL-CCNC: 28 U/L (ref 1–32)
BASOPHILS # BLD AUTO: 0.04 10*3/MM3 (ref 0–0.2)
BASOPHILS NFR BLD AUTO: 0.7 % (ref 0–1.5)
BILIRUB SERPL-MCNC: 0.5 MG/DL (ref 0–1.2)
BUN SERPL-MCNC: 23 MG/DL (ref 6–20)
BUN/CREAT SERPL: 40.4 (ref 7–25)
CALCIUM SPEC-SCNC: 10.3 MG/DL (ref 8.6–10.5)
CHLORIDE SERPL-SCNC: 104 MMOL/L (ref 98–107)
CO2 SERPL-SCNC: 26 MMOL/L (ref 22–29)
CREAT SERPL-MCNC: 0.57 MG/DL (ref 0.57–1)
DEPRECATED RDW RBC AUTO: 42.8 FL (ref 37–54)
EOSINOPHIL # BLD AUTO: 0.13 10*3/MM3 (ref 0–0.4)
EOSINOPHIL NFR BLD AUTO: 2.2 % (ref 0.3–6.2)
ERYTHROCYTE [DISTWIDTH] IN BLOOD BY AUTOMATED COUNT: 13.2 % (ref 12.3–15.4)
GFR SERPL CREATININE-BSD FRML MDRD: 109 ML/MIN/1.73
GLOBULIN UR ELPH-MCNC: 2.9 GM/DL
GLUCOSE SERPL-MCNC: 101 MG/DL (ref 65–99)
HCT VFR BLD AUTO: 42.8 % (ref 34–46.6)
HGB BLD-MCNC: 14 G/DL (ref 12–15.9)
IMM GRANULOCYTES # BLD AUTO: 0.01 10*3/MM3 (ref 0–0.05)
IMM GRANULOCYTES NFR BLD AUTO: 0.2 % (ref 0–0.5)
LYMPHOCYTES # BLD AUTO: 2.08 10*3/MM3 (ref 0.7–3.1)
LYMPHOCYTES NFR BLD AUTO: 36 % (ref 19.6–45.3)
MCH RBC QN AUTO: 29 PG (ref 26.6–33)
MCHC RBC AUTO-ENTMCNC: 32.7 G/DL (ref 31.5–35.7)
MCV RBC AUTO: 88.8 FL (ref 79–97)
MONOCYTES # BLD AUTO: 0.34 10*3/MM3 (ref 0.1–0.9)
MONOCYTES NFR BLD AUTO: 5.9 % (ref 5–12)
NEUTROPHILS NFR BLD AUTO: 3.18 10*3/MM3 (ref 1.7–7)
NEUTROPHILS NFR BLD AUTO: 55 % (ref 42.7–76)
NRBC BLD AUTO-RTO: 0 /100 WBC (ref 0–0.2)
PLATELET # BLD AUTO: 299 10*3/MM3 (ref 140–450)
PMV BLD AUTO: 9.5 FL (ref 6–12)
POTASSIUM SERPL-SCNC: 4.1 MMOL/L (ref 3.5–5.2)
PROT SERPL-MCNC: 7.5 G/DL (ref 6–8.5)
RBC # BLD AUTO: 4.82 10*6/MM3 (ref 3.77–5.28)
SODIUM SERPL-SCNC: 138 MMOL/L (ref 136–145)
WBC # BLD AUTO: 5.78 10*3/MM3 (ref 3.4–10.8)

## 2020-10-07 PROCEDURE — 85025 COMPLETE CBC W/AUTO DIFF WBC: CPT

## 2020-10-07 PROCEDURE — 80053 COMPREHEN METABOLIC PANEL: CPT

## 2020-10-07 PROCEDURE — 36415 COLL VENOUS BLD VENIPUNCTURE: CPT

## 2020-10-07 PROCEDURE — 96413 CHEMO IV INFUSION 1 HR: CPT

## 2020-10-07 PROCEDURE — 25010000002 DIPHENHYDRAMINE PER 50 MG: Performed by: NURSE PRACTITIONER

## 2020-10-07 PROCEDURE — 25010000002 TRASTUZUMAB-ANNS 150 MG RECONSTITUTED SOLUTION 1 EACH VIAL: Performed by: NURSE PRACTITIONER

## 2020-10-07 PROCEDURE — 25010000003 HEPARIN LOCK FLUSH PER 10 UNITS: Performed by: INTERNAL MEDICINE

## 2020-10-07 PROCEDURE — 96367 TX/PROPH/DG ADDL SEQ IV INF: CPT

## 2020-10-07 RX ORDER — SODIUM CHLORIDE 0.9 % (FLUSH) 0.9 %
10 SYRINGE (ML) INJECTION AS NEEDED
Status: CANCELLED | OUTPATIENT
Start: 2020-10-07

## 2020-10-07 RX ORDER — FAMOTIDINE 10 MG/ML
20 INJECTION, SOLUTION INTRAVENOUS AS NEEDED
Status: DISCONTINUED | OUTPATIENT
Start: 2020-10-07 | End: 2020-10-07 | Stop reason: HOSPADM

## 2020-10-07 RX ORDER — DIPHENHYDRAMINE HYDROCHLORIDE 50 MG/ML
50 INJECTION INTRAMUSCULAR; INTRAVENOUS AS NEEDED
Status: DISCONTINUED | OUTPATIENT
Start: 2020-10-07 | End: 2020-10-07 | Stop reason: HOSPADM

## 2020-10-07 RX ORDER — HEPARIN SODIUM (PORCINE) LOCK FLUSH IV SOLN 100 UNIT/ML 100 UNIT/ML
500 SOLUTION INTRAVENOUS AS NEEDED
Status: DISCONTINUED | OUTPATIENT
Start: 2020-10-07 | End: 2020-10-07 | Stop reason: HOSPADM

## 2020-10-07 RX ORDER — ACETAMINOPHEN 500 MG
500 TABLET ORAL ONCE
Status: COMPLETED | OUTPATIENT
Start: 2020-10-07 | End: 2020-10-07

## 2020-10-07 RX ORDER — HEPARIN SODIUM (PORCINE) LOCK FLUSH IV SOLN 100 UNIT/ML 100 UNIT/ML
500 SOLUTION INTRAVENOUS AS NEEDED
Status: CANCELLED | OUTPATIENT
Start: 2020-10-07

## 2020-10-07 RX ORDER — SODIUM CHLORIDE 9 MG/ML
250 INJECTION, SOLUTION INTRAVENOUS ONCE
Status: COMPLETED | OUTPATIENT
Start: 2020-10-07 | End: 2020-10-07

## 2020-10-07 RX ORDER — SODIUM CHLORIDE 0.9 % (FLUSH) 0.9 %
10 SYRINGE (ML) INJECTION AS NEEDED
Status: DISCONTINUED | OUTPATIENT
Start: 2020-10-07 | End: 2020-10-07 | Stop reason: HOSPADM

## 2020-10-07 RX ADMIN — ACETAMINOPHEN 500 MG: 500 TABLET, FILM COATED ORAL at 11:08

## 2020-10-07 RX ADMIN — Medication 500 UNITS: at 12:23

## 2020-10-07 RX ADMIN — TRASTUZUMAB-ANNS 450 MG: 150 INJECTION, POWDER, LYOPHILIZED, FOR SOLUTION INTRAVENOUS at 11:43

## 2020-10-07 RX ADMIN — DIPHENHYDRAMINE HYDROCHLORIDE 25 MG: 50 INJECTION, SOLUTION INTRAMUSCULAR; INTRAVENOUS at 11:08

## 2020-10-07 RX ADMIN — SODIUM CHLORIDE, PRESERVATIVE FREE 10 ML: 5 INJECTION INTRAVENOUS at 12:23

## 2020-10-07 RX ADMIN — SODIUM CHLORIDE 250 ML: 9 INJECTION, SOLUTION INTRAVENOUS at 11:08

## 2020-10-19 ENCOUNTER — LAB (OUTPATIENT)
Dept: LAB | Facility: HOSPITAL | Age: 60
End: 2020-10-19

## 2020-10-19 ENCOUNTER — HOSPITAL ENCOUNTER (OUTPATIENT)
Dept: CARDIOLOGY | Facility: HOSPITAL | Age: 60
Discharge: HOME | End: 2020-10-19
Admitting: INTERNAL MEDICINE

## 2020-10-19 VITALS
DIASTOLIC BLOOD PRESSURE: 56 MMHG | BODY MASS INDEX: 34.15 KG/M2 | WEIGHT: 173.94 LBS | SYSTOLIC BLOOD PRESSURE: 133 MMHG | HEIGHT: 60 IN

## 2020-10-19 DIAGNOSIS — C50.912 INVASIVE DUCTAL CARCINOMA OF BREAST, FEMALE, LEFT (HCC): ICD-10-CM

## 2020-10-19 LAB
ALBUMIN SERPL-MCNC: 4.7 G/DL (ref 3.5–5.2)
ALBUMIN/GLOB SERPL: 1.6 G/DL
ALP SERPL-CCNC: 81 U/L (ref 39–117)
ALT SERPL W P-5'-P-CCNC: 27 U/L (ref 1–33)
ANION GAP SERPL CALCULATED.3IONS-SCNC: 9 MMOL/L (ref 5–15)
AST SERPL-CCNC: 24 U/L (ref 1–32)
BASOPHILS # BLD AUTO: 0.04 10*3/MM3 (ref 0–0.2)
BASOPHILS NFR BLD AUTO: 0.6 % (ref 0–1.5)
BILIRUB SERPL-MCNC: 0.4 MG/DL (ref 0–1.2)
BUN SERPL-MCNC: 16 MG/DL (ref 6–20)
BUN/CREAT SERPL: 25.8 (ref 7–25)
CALCIUM SPEC-SCNC: 9.9 MG/DL (ref 8.6–10.5)
CEA SERPL-MCNC: 1.87 NG/ML
CHLORIDE SERPL-SCNC: 104 MMOL/L (ref 98–107)
CO2 SERPL-SCNC: 25 MMOL/L (ref 22–29)
CREAT SERPL-MCNC: 0.62 MG/DL (ref 0.57–1)
DEPRECATED RDW RBC AUTO: 42.5 FL (ref 37–54)
EOSINOPHIL # BLD AUTO: 0.06 10*3/MM3 (ref 0–0.4)
EOSINOPHIL NFR BLD AUTO: 0.9 % (ref 0.3–6.2)
ERYTHROCYTE [DISTWIDTH] IN BLOOD BY AUTOMATED COUNT: 13.3 % (ref 12.3–15.4)
FERRITIN SERPL-MCNC: 141 NG/ML (ref 13–150)
FOLATE SERPL-MCNC: >20 NG/ML (ref 4.78–24.2)
GFR SERPL CREATININE-BSD FRML MDRD: 99 ML/MIN/1.73
GLOBULIN UR ELPH-MCNC: 2.9 GM/DL
GLUCOSE SERPL-MCNC: 101 MG/DL (ref 65–99)
HCT VFR BLD AUTO: 40.5 % (ref 34–46.6)
HGB BLD-MCNC: 13.8 G/DL (ref 12–15.9)
IMM GRANULOCYTES # BLD AUTO: 0.07 10*3/MM3 (ref 0–0.05)
IMM GRANULOCYTES NFR BLD AUTO: 1 % (ref 0–0.5)
IRON 24H UR-MRATE: 79 MCG/DL (ref 37–145)
IRON SATN MFR SERPL: 21 % (ref 20–50)
LYMPHOCYTES # BLD AUTO: 2.26 10*3/MM3 (ref 0.7–3.1)
LYMPHOCYTES NFR BLD AUTO: 33.3 % (ref 19.6–45.3)
MCH RBC QN AUTO: 29.9 PG (ref 26.6–33)
MCHC RBC AUTO-ENTMCNC: 34.1 G/DL (ref 31.5–35.7)
MCV RBC AUTO: 87.7 FL (ref 79–97)
MONOCYTES # BLD AUTO: 0.38 10*3/MM3 (ref 0.1–0.9)
MONOCYTES NFR BLD AUTO: 5.6 % (ref 5–12)
NEUTROPHILS NFR BLD AUTO: 3.97 10*3/MM3 (ref 1.7–7)
NEUTROPHILS NFR BLD AUTO: 58.6 % (ref 42.7–76)
NRBC BLD AUTO-RTO: 0 /100 WBC (ref 0–0.2)
PLATELET # BLD AUTO: 295 10*3/MM3 (ref 140–450)
PMV BLD AUTO: 9.8 FL (ref 6–12)
POTASSIUM SERPL-SCNC: 3.9 MMOL/L (ref 3.5–5.2)
PROT SERPL-MCNC: 7.6 G/DL (ref 6–8.5)
RBC # BLD AUTO: 4.62 10*6/MM3 (ref 3.77–5.28)
SODIUM SERPL-SCNC: 138 MMOL/L (ref 136–145)
TIBC SERPL-MCNC: 380 MCG/DL (ref 298–536)
TRANSFERRIN SERPL-MCNC: 255 MG/DL (ref 200–360)
VIT B12 BLD-MCNC: 865 PG/ML (ref 211–946)
WBC # BLD AUTO: 6.78 10*3/MM3 (ref 3.4–10.8)

## 2020-10-19 PROCEDURE — 82728 ASSAY OF FERRITIN: CPT

## 2020-10-19 PROCEDURE — 86300 IMMUNOASSAY TUMOR CA 15-3: CPT

## 2020-10-19 PROCEDURE — 82378 CARCINOEMBRYONIC ANTIGEN: CPT

## 2020-10-19 PROCEDURE — 83540 ASSAY OF IRON: CPT

## 2020-10-19 PROCEDURE — 93306 TTE W/DOPPLER COMPLETE: CPT

## 2020-10-19 PROCEDURE — 36415 COLL VENOUS BLD VENIPUNCTURE: CPT

## 2020-10-19 PROCEDURE — 93356 MYOCRD STRAIN IMG SPCKL TRCK: CPT

## 2020-10-19 PROCEDURE — 84466 ASSAY OF TRANSFERRIN: CPT

## 2020-10-19 PROCEDURE — 93306 TTE W/DOPPLER COMPLETE: CPT | Performed by: INTERNAL MEDICINE

## 2020-10-19 PROCEDURE — 80053 COMPREHEN METABOLIC PANEL: CPT

## 2020-10-19 PROCEDURE — 93356 MYOCRD STRAIN IMG SPCKL TRCK: CPT | Performed by: INTERNAL MEDICINE

## 2020-10-19 PROCEDURE — 82746 ASSAY OF FOLIC ACID SERUM: CPT

## 2020-10-19 PROCEDURE — 82607 VITAMIN B-12: CPT

## 2020-10-19 PROCEDURE — 85025 COMPLETE CBC W/AUTO DIFF WBC: CPT

## 2020-10-20 LAB
BH CV ECHO MEAS - AO MAX PG (FULL): -0.58 MMHG
BH CV ECHO MEAS - AO MAX PG: 8.1 MMHG
BH CV ECHO MEAS - AO MEAN PG (FULL): 0 MMHG
BH CV ECHO MEAS - AO MEAN PG: 4 MMHG
BH CV ECHO MEAS - AO ROOT AREA (BSA CORRECTED): 1.5
BH CV ECHO MEAS - AO ROOT AREA: 5.3 CM^2
BH CV ECHO MEAS - AO ROOT DIAM: 2.6 CM
BH CV ECHO MEAS - AO V2 MAX: 142 CM/SEC
BH CV ECHO MEAS - AO V2 MEAN: 87 CM/SEC
BH CV ECHO MEAS - AO V2 VTI: 30.1 CM
BH CV ECHO MEAS - AVA(I,A): 2.9 CM^2
BH CV ECHO MEAS - AVA(I,D): 2.9 CM^2
BH CV ECHO MEAS - AVA(V,A): 2.8 CM^2
BH CV ECHO MEAS - AVA(V,D): 2.8 CM^2
BH CV ECHO MEAS - BSA(HAYCOCK): 1.9 M^2
BH CV ECHO MEAS - BSA: 1.8 M^2
BH CV ECHO MEAS - BZI_BMI: 33.8 KILOGRAMS/M^2
BH CV ECHO MEAS - BZI_METRIC_HEIGHT: 152.4 CM
BH CV ECHO MEAS - BZI_METRIC_WEIGHT: 78.5 KG
BH CV ECHO MEAS - EDV(CUBED): 65.9 ML
BH CV ECHO MEAS - EDV(MOD-SP4): 47 ML
BH CV ECHO MEAS - EDV(TEICH): 71.7 ML
BH CV ECHO MEAS - EF(CUBED): 85.3 %
BH CV ECHO MEAS - EF(MOD-SP4): 67.2 %
BH CV ECHO MEAS - EF(TEICH): 79.2 %
BH CV ECHO MEAS - ESV(CUBED): 9.7 ML
BH CV ECHO MEAS - ESV(MOD-SP4): 15.4 ML
BH CV ECHO MEAS - ESV(TEICH): 14.9 ML
BH CV ECHO MEAS - FS: 47.3 %
BH CV ECHO MEAS - IVS/LVPW: 1
BH CV ECHO MEAS - IVSD: 1.1 CM
BH CV ECHO MEAS - LA DIMENSION: 2.7 CM
BH CV ECHO MEAS - LA/AO: 1
BH CV ECHO MEAS - LAT PEAK E' VEL: 11.4 CM/SEC
BH CV ECHO MEAS - LV DIASTOLIC VOL/BSA (35-75): 26.8 ML/M^2
BH CV ECHO MEAS - LV MASS(C)D: 151.8 GRAMS
BH CV ECHO MEAS - LV MASS(C)DI: 86.5 GRAMS/M^2
BH CV ECHO MEAS - LV MAX PG: 8.6 MMHG
BH CV ECHO MEAS - LV MEAN PG: 4 MMHG
BH CV ECHO MEAS - LV SYSTOLIC VOL/BSA (12-30): 8.8 ML/M^2
BH CV ECHO MEAS - LV V1 MAX: 147 CM/SEC
BH CV ECHO MEAS - LV V1 MEAN: 89.1 CM/SEC
BH CV ECHO MEAS - LV V1 VTI: 32.3 CM
BH CV ECHO MEAS - LVIDD: 4 CM
BH CV ECHO MEAS - LVIDS: 2.1 CM
BH CV ECHO MEAS - LVLD AP4: 6.8 CM
BH CV ECHO MEAS - LVLS AP4: 5.5 CM
BH CV ECHO MEAS - LVOT AREA (M): 2.8 CM^2
BH CV ECHO MEAS - LVOT AREA: 2.7 CM^2
BH CV ECHO MEAS - LVOT DIAM: 1.9 CM
BH CV ECHO MEAS - LVPWD: 1.1 CM
BH CV ECHO MEAS - MED PEAK E' VEL: 9.57 CM/SEC
BH CV ECHO MEAS - MV A MAX VEL: 50.9 CM/SEC
BH CV ECHO MEAS - MV DEC TIME: 0.12 SEC
BH CV ECHO MEAS - MV E MAX VEL: 94.1 CM/SEC
BH CV ECHO MEAS - MV E/A: 1.8
BH CV ECHO MEAS - RAP SYSTOLE: 5 MMHG
BH CV ECHO MEAS - RVSP: 31.6 MMHG
BH CV ECHO MEAS - SI(AO): 91.1 ML/M^2
BH CV ECHO MEAS - SI(CUBED): 32.1 ML/M^2
BH CV ECHO MEAS - SI(LVOT): 49.5 ML/M^2
BH CV ECHO MEAS - SI(MOD-SP4): 18 ML/M^2
BH CV ECHO MEAS - SI(TEICH): 32.3 ML/M^2
BH CV ECHO MEAS - SV(AO): 159.8 ML
BH CV ECHO MEAS - SV(CUBED): 56.3 ML
BH CV ECHO MEAS - SV(LVOT): 86.8 ML
BH CV ECHO MEAS - SV(MOD-SP4): 31.6 ML
BH CV ECHO MEAS - SV(TEICH): 56.7 ML
BH CV ECHO MEAS - TR MAX VEL: 258 CM/SEC
BH CV ECHO MEASUREMENTS AVERAGE E/E' RATIO: 8.97
CANCER AG27-29 SERPL-ACNC: 18.8 U/ML (ref 0–38.6)
LEFT ATRIUM VOLUME INDEX: 21.6 ML/M2
LEFT ATRIUM VOLUME: 38 CM3
MAXIMAL PREDICTED HEART RATE: 162 BPM
STRESS TARGET HR: 138 BPM

## 2020-10-22 NOTE — PROGRESS NOTES
[facility] HEMATOLOGY AND ONCOLOGY  [address]  formerly [facility] [address] [phone]    Patient Name: Chasidy Tejada  Encounter Date: 10/23/2020  YOB: 1960  Patient Number: [id number]     REASON FOR VISIT: Chasidy Tejada is a 60-year-old female who returns in follow-up of stage IA left breast carcinoma, ER/MI and HER-2/lamberto positive.  She underwent left breast lumpectomy with SNB, 01/10/2020 and was started on adjuvant Arimidex beginning 2/3/2020 through 03/11/2020 before resumption on 08/19/2020.  She is completed adjuvant paclitaxel (week 12/12, 05/27/2020), and is currently receiving adjuvant trastuzumab (Herceptin), having completed cycle 11, 10/07/2020.  Adjuvant radiation administered on 06/22/2020 through 08/06/2020 (33/33 fx).  She is here alone (previously with her spouse).    DIAGNOSTIC ABNORMALITIES:          1.   12/13/2019- mammogram screening.  Impression: Stellate lesion outer left breast.  Spot compression and ultrasound suggested.          2.   12/27/2019- diagnostic mammogram.  Impression: Irregular density upper outer left breast.  Biopsy suggested.          3.   12/27/2019- left breast ultrasound.  Impression: Suspicious 6 x 5 x 8 mm density left breast approximately 8 cm from the nipple.  Biopsy suggested.  BI-RADS Category 4B.          4.   01/03/2020-ultrasound-guided breast biopsy.  Impression: Appropriate sampling of the 8 mm irregular hypoechoic nodule in the left breast at 1:00 in the posterior depth/axillary tail region.  Final diagnosis: Left breast at 1 o'clock position, core biopsies: A.invasive mammary carcinoma of no special type (ductal, not otherwise specified), grade 2, at least 4.9 mm in greatest extent.  B.associated low-grade ductal carcinoma in situ.  % positive, MI 25% positive, HER-2/lamberto-2+ (IHC)/FISH- a focal positive score is present in an area corresponding to approximately 15% of  the tumor (signal ratio: 2.9).           5.   02/03/2020- CMP normal with .  CBC normal.           6.   02/03/2020-bone scan ([facility]).  Impression: Degenerative joint changes.  No evidence of bony metastasis.           7.   02/03/2020-CT scans, head chest, abdomen, pelvis-impression: No abnormal intracranial enhancement to suggest intracranial metastasis/abnormalities.  No evidence of intrathoracic metastasis.  Stable 5 to 6 mm right lower lobe nodule of the right hemidiaphragm unchanged from 2/14/2011.  No convincing evidence of intra-abdominal or pelvic metastasis.  Hepatic cysts.  Fatty attenuated benign focus within the body of the pancreas visualized, 12/14/2011.  Previous hysterectomy, cholecystectomy and appendectomy.           8.   02/14/2020-echocardiogram.  Impression: LV systolic function normal (EF equals 70%).  Normal LV and RV size thickness and function.  Normal LA and RA size.  Normal cardiac valves.  No pericardial effusion.           9.  02/27/2020- CMP normal with .  CBC normal.  Iron 65, iron saturation 18%, ferritin 134, B12 745, folate > 20, CEA 1.76, CA-27-29 14.2.          10.  03/04/2020- repeat FISH for HER-2/lamberto (from tumor block, 01/10/2020).  No tumor on specimen.          11.  06/23/2020- DEXA scan.  Impression: Normal bone density.  No lower than 1 standard deviation below the mean for young adult woman.    PREVIOUS INTERVENTIONS:           1.   01/10/2020- left breast lumpectomy with left axillary sentinel node biopsy.  Final diagnosis: 1.left breast mass, lumpectomy: Invasive and in situ ductal carcinoma.  Tumor site-not specified.  Histologic type: Invasive carcinoma of no special type (invasive ductal carcinoma, not otherwise specified).  Overall tumor grade: Grade 1.  Tumor size: Greatest dimension of largest invasive focus: 5 mm.  Tumor focality: Single focus of invasive carcinoma.  Ductal carcinoma in situ (DCIS): Present.  Negative for extensive intraductal component  "(EIC).  Size (extent) CIS: Cannot be determined: Microscopic.  Architectural pattern: Cribriform/solid.  Nuclear grade: 2 (intermediate).  Necrosis: Present, central (expansive \"comedo) necrosis).  Lobular carcinoma in situ (LCIS): No LCIS in specimen.  Lymphovascular invasion: Not identified.  Dermal lymphovascular invasion: No skin present.  Margins: Uninvolved by invasive carcinoma.  Lymph nodes: Uninvolved by tumor cells.  Regional lymph nodes: 1.  Number of lymph nodes examined: 1.  Number of sentinel nodes examined: 1.  Pathologic stage classification (pTNM, AJCC eighth edition): pT1a,(sn), pN0.  % positive, [place] 25% positive, HER-2 2+ (equivocal).           2.   Adjuvant Arimidex 1 mg p.o. daily beginning 02/03/2020 through 03/11/2020: resumed 08/19/2020.           3.   Adjuvant paclitaxel beginning 03/11/2020 through 05/27/2020 (12/12 weekly doses)           4.   Adjuvant trastuzumab every 3 weeks - beginning 03/11/2020 through 10/07/2020 (11 cycles)           5.   Adjuvant radiation to the left breast (5040 cGy with 1000 cGy boost to the tumor bed for a total of 6040 cGy/33 fractions) beginning 06/22/2020 through 08/06/2020.    LABS    Lab Results - Last 18 Months   Lab Units 10/19/20  1207 10/07/20  1033 09/16/20  1017 08/26/20  0945 08/05/20  0939 07/15/20  0954  05/20/20  0846   HEMOGLOBIN g/dL 13.8 14.0 12.5 13.6 12.7 12.4   < > 11.5*   HEMATOCRIT % 40.5 42.8 37.1 40.0 37.7 37.9   < > 34.5   MCV fL 87.7 88.8 88.3 88.9 90.6 92.7   < > 93.0   WBC 10*3/mm3 6.78 5.78 5.19 6.09 5.66 6.14   < > 8.84   RDW % 13.3 13.2 12.7 12.6 12.7 13.1   < > 15.6*   MPV fL 9.8 9.5 9.5 9.8 9.6 10.0   < > 9.5   PLATELETS 10*3/mm3 295 299 300 286 280 283   < > 402   IMM GRAN % % 1.0* 0.2 0.2 0.3 0.4 0.3   < >  --    NEUTROS ABS 10*3/mm3 3.97 3.18 2.58 3.37 3.08 2.89   < > 5.18   LYMPHS ABS 10*3/mm3 2.26 2.08 2.03 2.00 1.68 2.21   < >  --    MONOS ABS 10*3/mm3 0.38 0.34 0.33 0.43 0.41 0.46   < >  --    EOS ABS 10*3/mm3 0.06 " 0.13 0.18 0.22 0.42* 0.49*   < > 0.18   BASOS ABS 10*3/mm3 0.04 0.04 0.06 0.05 0.05 0.07   < > 0.09   IMMATURE GRANS (ABS) 10*3/mm3 0.07* 0.01 0.01 0.02 0.02 0.02   < >  --    NRBC /100 WBC 0.0 0.0 0.0 0.0 0.0 0.0   < > 1.0*   NEUTROPHIL % %  --   --   --   --   --   --   --  55.6   MONOCYTES % %  --   --   --   --   --   --   --  4.0*   BASOPHIL % %  --   --   --   --   --   --   --  1.0   ANISOCYTOSIS   --   --   --   --   --   --   --  Slight/1+   GIANT PLT   --   --   --   --   --   --   --  Slight/1+    < > = values in this interval not displayed.       Lab Results - Last 18 Months   Lab Units 10/19/20  1114 10/07/20  1033 09/16/20  1017 08/26/20  0945 08/05/20  0939 07/15/20  0954   GLUCOSE mg/dL 101* 101* 105* 100* 120* 90   SODIUM mmol/L 138 138 143 140 143 140   POTASSIUM mmol/L 3.9 4.1 4.0 4.3 3.9 3.9   CO2 mmol/L 25.0 26.0 26.0 27.0 27.0 25.0   CHLORIDE mmol/L 104 104 106 102 106 105   ANION GAP mmol/L 9.0 8.0 11.0 11.0 10.0 10.0   CREATININE mg/dL 0.62 0.57 0.63 0.61 0.47* 0.46*   BUN mg/dL 16 23* 19 25* 19 19   BUN / CREAT RATIO  25.8* 40.4* 30.2* 41.0* 40.4* 41.3*   CALCIUM mg/dL 9.9 10.3 9.2 9.7 10.0 9.8   EGFR IF NONAFRICN AM mL/min/1.73 99 109 97 101 136 140   ALK PHOS U/L 81 83 74 73 72 73   TOTAL PROTEIN g/dL 7.6 7.5 7.0 7.7 7.2 7.0   ALT (SGPT) U/L 27 34* 24 24 31 35*   AST (SGOT) U/L 24 28 19 26 24 25   BILIRUBIN mg/dL 0.4 0.5 0.3 0.4 0.5 0.4   ALBUMIN g/dL 4.70 4.60 4.30 4.60 4.40 4.40   GLOBULIN gm/dL 2.9 2.9 2.7 3.1 2.8 2.6       Lab Results - Last 18 Months   Lab Units 10/19/20  1114 08/05/20  0938 06/24/20  1009 04/22/20  0851 02/27/20  0901   CEA ng/mL 1.87 1.26 1.97 2.26 1.76       Lab Results - Last 18 Months   Lab Units 10/19/20  1114 08/05/20  0939 08/05/20  0938 06/24/20  1009 05/27/20  0916 04/22/20  0851 02/27/20  0901 12/12/19  0704   IRON mcg/dL 79 98  --   --  37 55 65  --    TIBC mcg/dL 380 340  --   --  326 353 370  --    IRON SATURATION % 21 29  --   --  11* 16* 18*  --     FERRITIN ng/mL 141.00 216.70*  --  197.90*  --  199.00* 134.20  --    TSH uIU/mL  --   --   --   --   --   --   --  0.798   FOLATE ng/mL >20.00  --  >20.00  --   --  >20.00 >20.00  --          PAST MEDICAL HISTORY:  ALLERGIES:  Allergies   Allergen Reactions   • Demerol [Meperidine] Hives   • Morphine And Related Hives     CURRENT MEDICATIONS:  Outpatient Encounter Medications as of 10/23/2020   Medication Sig Dispense Refill   • ALPRAZolam (XANAX) 0.5 MG tablet Take 1 tablet by mouth 2 (Two) Times a Day As Needed for Anxiety. 60 tablet 2   • anastrozole (ARIMIDEX) 1 MG tablet Take 1 mg by mouth Daily.     • calcium carbonate-vitamin d (Calcium 600+D) 600-400 MG-UNIT per tablet Take 1 tablet by mouth 2 (Two) Times a Day. 60 tablet 3   • lidocaine-prilocaine (EMLA) 2.5-2.5 % cream Apply to port site 30 minutes before it is accessed.  Cover with occlusive dressing. 30 g 2   • Multiple Vitamins-Minerals (MULTIVITAMIN ADULT PO) Take 1 tablet by mouth Daily.     • ondansetron (ZOFRAN) 8 MG tablet Take 1 tablet by mouth Every 8 (Eight) Hours As Needed for Nausea or Vomiting. 30 tablet 0   • polyethylene glycol (MIRALAX) packet Take 17 g by mouth Daily.     • sertraline (ZOLOFT) 50 MG tablet Take 1 tablet by mouth Daily. 30 tablet 11     No facility-administered encounter medications on file as of 10/23/2020.      Adult illnesses:  Colon polyps  Obesity  Spinal stenosis neck   Herniated cervical disc without myelopathy  Borderline blood pressure  History of dysphagia    Past surgeries:  Anterior cervical discectomy w fusion, 09/2018  Appendectomy  BSO/GANGA  Cholecystectomy  Colonoscopy, 02/10/2017  BTL  Tonsillectomy  Breast biopsy  Left partial mastectomy, 01/10/2020  03/05/2020 - Mediport placement per Dr. Edmond    ADULT ILLNESSES:  Patient Active Problem List   Diagnosis Code   • Family history of colon cancer Z80.0   • Colon polyps K63.5   • Laryngopharyngeal reflux K21.9   • Pharyngoesophageal dysphagia R13.14   •  Non-smoker Z78.9   • Obesity (BMI 30-39.9) E66.9   • Spinal stenosis in cervical region M48.02   • Cervical radiculopathy M54.12   • Herniated cervical disc without myelopathy M50.20   • Borderline blood pressure R03.0   • BMI 31.0-31.9,adult Z68.31   • BMI 33.0-33.9,adult Z68.33   • Invasive ductal carcinoma of breast, female, left (CMS/HCC) C50.912   • Former smoker Z87.891   • S/P lumpectomy, left breast Z98.890   • S/P lymph node biopsy Z98.890   • On antineoplastic chemotherapy Z79.899   • Estrogen receptor positive status (ER+) Z17.0   • History of radiation therapy Z92.3     SURGERIES:  Past Surgical History:   Procedure Laterality Date   • ANTERIOR CERVICAL DISCECTOMY W/ FUSION N/A 9/7/2018    Procedure: CERVICAL DISCECTOMY ANTERIOR WITH FUSION C6-7;  Surgeon: Chris Mcfadden MD;  Location: [facility] OR;  Service: Neurosurgery   • APPENDECTOMY     • BILATERAL SALPINGO OOPHORECTOMY     • CERVICAL CORPECTOMY N/A 9/7/2018    Procedure: CERVICAL CORPECTOMY C6-7;  Surgeon: Chris Mcfadden MD;  Location: [facility] OR;  Service: Neurosurgery   • CHOLECYSTECTOMY     • COLONOSCOPY  09/05/2012   • COLONOSCOPY N/A 2/10/2017    Procedure: COLONOSCOPY WITH ANESTHESIA;  Surgeon: Elina Gonsalez MD;  Location: [facility] ENDOSCOPY;  Service:    • HYSTERECTOMY  1998    Had hysterectomy for painful periods and bleeding. (Dr. Patel) TL w/ BSO   • LAPAROSCOPIC TUBAL LIGATION     • MASTECTOMY W/ SENTINEL NODE BIOPSY Left 1/10/2020    Procedure: LEFT NEEDLE DIRECTED ULTRASOUND GUIDED PARTIAL MASTECTOMY WITH SENTINEL LYMPH NODE BIOPSY, INJECTION AND SCAN, RADIOLOGIST WILL INJECT;  Surgeon: Flor Edmond MD;  Location: [facility] OR;  Service: General   • TONSILLECTOMY     • VENOUS ACCESS DEVICE (PORT) INSERTION N/A 3/5/2020    Procedure: INSERTION VENOUS ACCESS DEVICE EXCISION SKIN LESION X 2 CHEST AND ABDOMEN;  Surgeon: Flor Edmond MD;  Location:  [facility] OR;  Service: General;  Laterality: N/A;     HEALTH MAINTENANCE  "ITEMS:  Health Maintenance Due   Topic Date Due   • ZOSTER VACCINE (1 of 2) 06/06/2012   • INFLUENZA VACCINE  08/01/2020       <no information>  Last Completed Colonoscopy       Status Date      COLONOSCOPY Done 2/10/2017 Surg:COLONOSCOPY     Patient has more history with this topic...        Immunization History   Administered Date(s) Administered   • Tdap 08/29/2018     Last Completed Mammogram       Status Date      MAMMOGRAM Done 12/27/2019 MAMMO DIAGNOSTIC DIGITAL TOMOSYNTHESIS LEFT W CAD     Patient has more history with this topic...            FAMILY HISTORY:  Family History   Problem Relation Age of Onset   • Colon cancer Maternal Grandmother         age not known   • Cancer Father    • Heart disease Father    • Diabetes Mother    • Hypertension Mother    • Stroke Mother    • Hypertension Sister    • No Known Problems Daughter    • Cancer Sister    • Colon polyps Neg Hx    • Esophageal cancer Neg Hx    • Liver cancer Neg Hx    • Liver disease Neg Hx    • Rectal cancer Neg Hx    • Stomach cancer Neg Hx    • Breast cancer Neg Hx    • Ovarian cancer Neg Hx      SOCIAL HISTORY:  Social History     Socioeconomic History   • Marital status:      Spouse name: Not on file   • Number of children: 1   • Years of education: Not on file   • Highest education level: Not on file   Tobacco Use   • Smoking status: Never Smoker   • Smokeless tobacco: Never Used   • Tobacco comment: \"i never really smoked\"   Substance and Sexual Activity   • Alcohol use: Not Currently     Frequency: Never   • Drug use: No   • Sexual activity: Yes     Partners: Male     Birth control/protection: Surgical       REVIEW OF SYSTEMS:  Review of Systems   Constitutional: Negative.         She manages her ADLs' including chores, errands, driving.  Is still working remotely at home.  Walks at least 20 min/day    Herceptin tolerance:  \"Doing great.\"  No worsening fatigue.  No mouth sores.  No nausea, has not needed Zofran.  No vomiting, some " "dry skin otherwise no skin changes.  Near resolution of paresthesias of the fingers and improved in the toes.  No loss of fine motor function.  No loose stools.  Has not required Imodium.  No overt diarrhea.     Arimidex tolerance: No problems.  No arthralgias.  No heat intolerance.   HENT: Negative.    Eyes: Negative.    Respiratory: Negative.    Cardiovascular: Negative.    Gastrointestinal: Negative for diarrhea, nausea and GERD (\"Not really.\" Still on omeprazole as needed).   Endocrine: Positive for heat intolerance (mild residual vasomotor changes).   Genitourinary: Negative.    Musculoskeletal: Negative for arthralgias (knees, and hips \"been ok.\" Does not impede her activities.).   Allergic/Immunologic: Negative.    Neurological: Positive for numbness (paresthesias of the toes> fingers.  Improving each time).   Hematological: Negative.    Psychiatric/Behavioral: The patient is not nervous/anxious (\"nope.\").          /82   Pulse 92   Temp 98 °F (36.7 °C)   Resp 16   Ht 152.4 cm (60\")   Wt 79.1 kg (174 lb 4.8 oz)   SpO2 98%   Breastfeeding No   BMI 34.04 kg/m²  Body surface area is 1.76 meters squared.  Pain Score    10/23/20 0820   PainSc: 0-No pain       Physical Exam:  Physical Exam   Constitutional: She is oriented to person, place, and time. She appears well-developed and well-nourished. No distress.   Pleasant, heavy set, cooperative, modestly kept female.  Appears her age.  ECOG 0.      She has regained 4 pounds (had lost 1 pound at her prior visit) in the interval.   HENT:   Head: Normocephalic and atraumatic.   Mouth/Throat: No oropharyngeal exudate.   Gradual hair growth    Wearing a surgical mask   Eyes: Pupils are equal, round, and reactive to light. Conjunctivae are normal. No scleral icterus.   Neck: Normal range of motion. Neck supple. No JVD present. No tracheal deviation present. No thyromegaly present.   Cardiovascular: Normal rate, regular rhythm and normal heart sounds. Exam " reveals no friction rub.   No murmur heard.  Pulmonary/Chest: Effort normal and breath sounds normal. No stridor. No respiratory distress. She has no wheezes. She has no rales.   Abdominal: Soft. Bowel sounds are normal. She exhibits no distension and no mass. There is no abdominal tenderness. There is no rebound and no guarding.   Musculoskeletal: Normal range of motion. No deformity.   Lymphadenopathy:     She has no cervical adenopathy.   Neurological: She is alert and oriented to person, place, and time. She displays normal reflexes. No cranial nerve deficit. She exhibits normal muscle tone. Coordination normal.   Skin: Skin is warm and dry. No rash noted. No erythema. No pallor.   Psychiatric: Her behavior is normal. Judgment and thought content normal.   Vitals reviewed.  Breasts: Left breast lumpectomy and sentinel node biopsy site in the left axilla is healed.  There is no incisional tenderness in either site.  There remains radiation associated skin pigmentation and induration but no skin disruption.  The rest of the breast is without obvious nodularity skin changes, no nipple discharge.  Right breast without masses, skin changes nor nipple discharge.    ASSESSMENT:   1.  Invasive and in situ ductal carcinoma  left breast.                 Stage:  IA (pT1a, pN0, G2) ER  100% positive, MD  25%, HER-2/lamberto - 2+ (IHC)/FISH -a focal positive score is present in an area corresponding to approximately 15% of the tumor (signal ratio: 2.9).                 Tumor Ross:  5 mm.  Tumor focality: Single focus of invasive carcinoma.  No EIC nor LCIS.  Lymphovascular invasion: Not identified.  Dermal lymphovascular invasion: No skin present.  Microcalcifications: Not identified.  Margins uninvolved by invasive carcinoma.  Lymph nodes: Number of lymph nodes examined: 1 (0/1).                 Tumor Status:  Underwent lumpectomy and SNB, 01/10/2020.   Adjuvant Arimidex since 02/03/2020          2.   Obesity.  Moderate (BMI  34)        3.   Spinal stenosis neck, quiescent         4.   Herniated cervical disc without myelopathy        5.   Anemia, likely chemo associated.  Hgb 13.8, 10/19/2020 (prior: Hgb 11.5-12.7)        6.   GERD, quiescent       RECOMMENDATIONS:         1.    Re: Apprised of the labs, 10/19/2020 (above).  Normal CBC, CMP, normal CEA, normal CA-27-29, repleted B12/folate, repleted serum iron, iron saturation 21% (from 29%; from 11%) repleted ferritin (141; from 216)..  Ferrous sulfate stopped last visit.          2.    Herceptin tolerance discussed.  Doing well with no problems.  Is willing to press on.         3.    Previously apprised of bone density, 06/23/2020 (above).  Normal.         4.    Review 2D echocardiogram on 10/20/2020.  LV systolic function 66-70%.        5.    Schedule 2D echo for 01/20/2021        6.    Previously noted that we are unable to repeat FISH testing for HER-2 on the tumor specimen from 01/10/2020 -pathology block did not contain any more tumor - personally discussed/confirmed with Dr. Burgos of pathology.        7.   Care previously discussed with Dr. Jaimes on 02/28/2020 after she saw the patient on 02/25/2020 (encounter reviewed).  Pathology was reviewed at [facility] and discussed with the patient.  Recommendations/plan: Discussed that since her cancer is strongly ER positive and low histologic grade/low proliferative rate, and HER-2 on the biopsy was borderline recommended repeat HER-2 testing on her surgical pathology.  Defer to his negative proceed with only endocrine therapy and radiation.  If HER-2 is confirmed to be positive, recommend adjuvant weekly Taxol x12+ Herceptin every 3 weeks to complete 1 year of HER-2 directed therapy.  These plans were personally discussed with the patient (via telephone) on 02/28/2020 I also personally asked the pathologist (Dr. Radha Burgos) to send off the biopsy specimen for repeat FISH HER-2 testing.          8.   Reviewed NCCN guidelines  version 3.2019 invasive breast cancer.  For tumors (=/> 5 mm), and pN0, recommend adjuvant endocrine therapy +/- adjuvant chemotherapy with trastuzumab (category 2B).          9.  Previously discussed the potential toxicities of adjuvant chemotherapy + Herceptin (to include but not limited to: Asthenia, cardiotoxicity, myelosuppression) are discussed at length. The rationale for adjuvant hormonal manipulation with AIs (see above) is discussed (to include but not limited to: Hot flashes, asthenia, pain, arthralgia, arthritis, nausea, bone pains, edema, fatigue, headache, venous thrombosis, anemia, leukopenia, depression, rash, pharyngitis, weight gain, dyspnea, fractures, breast pains, infection, angina). Questions answered to the best of my ability, and to her apparent satisfaction. She is willing to press on with therapy.   10.  Schedule treatment at [facility] -     · Herceptin 6 mg/kg beginning C2 (TD = 450 mg) per administration guidelines every 3 weeks x 1 year (17 cycles)-C12, 10/28/2020; C13, 11/18/2020; C14, 12/09/2020.                     -  Premed before Herceptin -   · Tylenol 500 mg orally.   · Benadryl 25 mg IVP over 20-30 min.                11.  Rx:   · Oscal 600/400 po bid # 60 - OTC  · Arimidex 1 mg daily # 30 x 3 RF-eRx          12.  CMP and CBC with diff weekly with Procrit 40,000 units subcutaneous if Hgb less than 10 or Hct less than 30; Neupogen 480 mcg subcutaneously daily x4 if ANC less than 1.0- [facility]        13. Previously discussed the rationale for adjuvant hormonal manipulation with AIs (see above) and potential toxicities of AI therapy are discussed (to include but not limited to: Hot flashes, asthenia, pain, arthralgias, arthritis, nausea/vomiting, headache, pharyngitis, depression, rash, hypertension, lymphedema, insomnia, edema, weight gain, dyspnea, abdominal pain, constipation, hyperlipidemia, osteoporosis, fractures, cough, bone pain, diarrhea, breast pain, paresthesias, infections,  cataracts, myalgias, thromboembolism, angina, Lyles-Yung syndrome, erythema multiforme, anemia, leukopenia, stroke, myocardial infarction, osteoporosis, fractures). Questions are answered. She agrees to resume therapy at the terminus of radiation.       14.  Return to the [place] office with pre-office serum iron, iron saturation, ferritin B12, folate, CMP, CEA, CA 27-29 and CBC and differential on 12/18/2020.      .     MEDICAL DECISION MAKING: High complexity   AMOUNT OF DATA: Extensive     I spent at least - 40 total minutes, face-to-face, caring for Chasidy today.  Greater than 50% of this time involved counseling and/or coordination of care as documented within this note regarding the patient's illness(es), pros and cons of various treatment options, instructions and/or risk reduction.     Cc:  Abigail Jaimes MD- [place] [facility]          MD Kailash Sawant MD Robert Learch, MD

## 2020-10-23 ENCOUNTER — OFFICE VISIT (OUTPATIENT)
Dept: ONCOLOGY | Facility: CLINIC | Age: 60
End: 2020-10-23

## 2020-10-23 VITALS
WEIGHT: 174.3 LBS | SYSTOLIC BLOOD PRESSURE: 138 MMHG | OXYGEN SATURATION: 98 % | HEART RATE: 92 BPM | BODY MASS INDEX: 34.22 KG/M2 | RESPIRATION RATE: 16 BRPM | HEIGHT: 60 IN | DIASTOLIC BLOOD PRESSURE: 82 MMHG | TEMPERATURE: 98 F

## 2020-10-23 DIAGNOSIS — C50.912 INVASIVE DUCTAL CARCINOMA OF BREAST, FEMALE, LEFT (HCC): Primary | ICD-10-CM

## 2020-10-23 PROCEDURE — 99215 OFFICE O/P EST HI 40 MIN: CPT | Performed by: INTERNAL MEDICINE

## 2020-10-23 RX ORDER — ANASTROZOLE 1 MG/1
1 TABLET ORAL DAILY
Qty: 30 TABLET | Refills: 3 | Status: SHIPPED | OUTPATIENT
Start: 2020-10-23 | End: 2020-11-02

## 2020-10-27 DIAGNOSIS — C50.912 INVASIVE DUCTAL CARCINOMA OF BREAST, FEMALE, LEFT (HCC): Primary | ICD-10-CM

## 2020-10-27 RX ORDER — SODIUM CHLORIDE 9 MG/ML
250 INJECTION, SOLUTION INTRAVENOUS ONCE
Status: CANCELLED | OUTPATIENT
Start: 2020-10-28

## 2020-10-27 RX ORDER — FAMOTIDINE 10 MG/ML
20 INJECTION, SOLUTION INTRAVENOUS AS NEEDED
Status: CANCELLED | OUTPATIENT
Start: 2020-10-28

## 2020-10-27 RX ORDER — ACETAMINOPHEN 325 MG/1
500 TABLET ORAL ONCE
Status: CANCELLED
Start: 2020-10-28

## 2020-10-27 RX ORDER — DIPHENHYDRAMINE HYDROCHLORIDE 50 MG/ML
50 INJECTION INTRAMUSCULAR; INTRAVENOUS AS NEEDED
Status: CANCELLED | OUTPATIENT
Start: 2020-10-28

## 2020-10-28 ENCOUNTER — LAB (OUTPATIENT)
Dept: LAB | Facility: HOSPITAL | Age: 60
End: 2020-10-28

## 2020-10-28 ENCOUNTER — INFUSION (OUTPATIENT)
Dept: ONCOLOGY | Facility: HOSPITAL | Age: 60
End: 2020-10-28

## 2020-10-28 VITALS
DIASTOLIC BLOOD PRESSURE: 70 MMHG | BODY MASS INDEX: 33.49 KG/M2 | OXYGEN SATURATION: 97 % | TEMPERATURE: 97.3 F | RESPIRATION RATE: 16 BRPM | SYSTOLIC BLOOD PRESSURE: 146 MMHG | WEIGHT: 170.6 LBS | HEART RATE: 89 BPM | HEIGHT: 60 IN

## 2020-10-28 DIAGNOSIS — C50.912 INVASIVE DUCTAL CARCINOMA OF BREAST, FEMALE, LEFT (HCC): ICD-10-CM

## 2020-10-28 DIAGNOSIS — C50.912 INVASIVE DUCTAL CARCINOMA OF BREAST, FEMALE, LEFT (HCC): Primary | ICD-10-CM

## 2020-10-28 LAB
ALBUMIN SERPL-MCNC: 4.6 G/DL (ref 3.5–5.2)
ALBUMIN/GLOB SERPL: 1.6 G/DL
ALP SERPL-CCNC: 81 U/L (ref 39–117)
ALT SERPL W P-5'-P-CCNC: 26 U/L (ref 1–33)
ANION GAP SERPL CALCULATED.3IONS-SCNC: 10 MMOL/L (ref 5–15)
AST SERPL-CCNC: 24 U/L (ref 1–32)
BASOPHILS # BLD AUTO: 0.05 10*3/MM3 (ref 0–0.2)
BASOPHILS NFR BLD AUTO: 0.7 % (ref 0–1.5)
BILIRUB SERPL-MCNC: 0.7 MG/DL (ref 0–1.2)
BUN SERPL-MCNC: 20 MG/DL (ref 6–20)
BUN/CREAT SERPL: 32.3 (ref 7–25)
CALCIUM SPEC-SCNC: 10.3 MG/DL (ref 8.6–10.5)
CEA SERPL-MCNC: 1.72 NG/ML
CHLORIDE SERPL-SCNC: 102 MMOL/L (ref 98–107)
CO2 SERPL-SCNC: 27 MMOL/L (ref 22–29)
CREAT SERPL-MCNC: 0.62 MG/DL (ref 0.57–1)
DEPRECATED RDW RBC AUTO: 42.7 FL (ref 37–54)
EOSINOPHIL # BLD AUTO: 0.04 10*3/MM3 (ref 0–0.4)
EOSINOPHIL NFR BLD AUTO: 0.6 % (ref 0.3–6.2)
ERYTHROCYTE [DISTWIDTH] IN BLOOD BY AUTOMATED COUNT: 13.2 % (ref 12.3–15.4)
FOLATE SERPL-MCNC: >20 NG/ML (ref 4.78–24.2)
GFR SERPL CREATININE-BSD FRML MDRD: 99 ML/MIN/1.73
GLOBULIN UR ELPH-MCNC: 2.9 GM/DL
GLUCOSE SERPL-MCNC: 100 MG/DL (ref 65–99)
HCT VFR BLD AUTO: 38.8 % (ref 34–46.6)
HGB BLD-MCNC: 13.1 G/DL (ref 12–15.9)
IMM GRANULOCYTES # BLD AUTO: 0.02 10*3/MM3 (ref 0–0.05)
IMM GRANULOCYTES NFR BLD AUTO: 0.3 % (ref 0–0.5)
LYMPHOCYTES # BLD AUTO: 2.71 10*3/MM3 (ref 0.7–3.1)
LYMPHOCYTES NFR BLD AUTO: 38.9 % (ref 19.6–45.3)
MCH RBC QN AUTO: 29.6 PG (ref 26.6–33)
MCHC RBC AUTO-ENTMCNC: 33.8 G/DL (ref 31.5–35.7)
MCV RBC AUTO: 87.6 FL (ref 79–97)
MONOCYTES # BLD AUTO: 0.42 10*3/MM3 (ref 0.1–0.9)
MONOCYTES NFR BLD AUTO: 6 % (ref 5–12)
NEUTROPHILS NFR BLD AUTO: 3.73 10*3/MM3 (ref 1.7–7)
NEUTROPHILS NFR BLD AUTO: 53.5 % (ref 42.7–76)
NRBC BLD AUTO-RTO: 0 /100 WBC (ref 0–0.2)
PLATELET # BLD AUTO: 305 10*3/MM3 (ref 140–450)
PMV BLD AUTO: 9.7 FL (ref 6–12)
POTASSIUM SERPL-SCNC: 4 MMOL/L (ref 3.5–5.2)
PROT SERPL-MCNC: 7.5 G/DL (ref 6–8.5)
RBC # BLD AUTO: 4.43 10*6/MM3 (ref 3.77–5.28)
SODIUM SERPL-SCNC: 139 MMOL/L (ref 136–145)
VIT B12 BLD-MCNC: 816 PG/ML (ref 211–946)
WBC # BLD AUTO: 6.97 10*3/MM3 (ref 3.4–10.8)

## 2020-10-28 PROCEDURE — 82746 ASSAY OF FOLIC ACID SERUM: CPT

## 2020-10-28 PROCEDURE — 82378 CARCINOEMBRYONIC ANTIGEN: CPT

## 2020-10-28 PROCEDURE — 25010000003 HEPARIN LOCK FLUSH PER 10 UNITS: Performed by: INTERNAL MEDICINE

## 2020-10-28 PROCEDURE — 85025 COMPLETE CBC W/AUTO DIFF WBC: CPT

## 2020-10-28 PROCEDURE — 25010000002 TRASTUZUMAB-ANNS 150 MG RECONSTITUTED SOLUTION 1 EACH VIAL: Performed by: NURSE PRACTITIONER

## 2020-10-28 PROCEDURE — 82607 VITAMIN B-12: CPT

## 2020-10-28 PROCEDURE — 96367 TX/PROPH/DG ADDL SEQ IV INF: CPT

## 2020-10-28 PROCEDURE — 25010000002 DIPHENHYDRAMINE PER 50 MG: Performed by: NURSE PRACTITIONER

## 2020-10-28 PROCEDURE — 96413 CHEMO IV INFUSION 1 HR: CPT

## 2020-10-28 PROCEDURE — 80053 COMPREHEN METABOLIC PANEL: CPT

## 2020-10-28 PROCEDURE — 86300 IMMUNOASSAY TUMOR CA 15-3: CPT

## 2020-10-28 RX ORDER — SODIUM CHLORIDE 9 MG/ML
250 INJECTION, SOLUTION INTRAVENOUS ONCE
Status: COMPLETED | OUTPATIENT
Start: 2020-10-28 | End: 2020-10-28

## 2020-10-28 RX ORDER — SODIUM CHLORIDE 0.9 % (FLUSH) 0.9 %
10 SYRINGE (ML) INJECTION AS NEEDED
Status: CANCELLED | OUTPATIENT
Start: 2020-10-28

## 2020-10-28 RX ORDER — SODIUM CHLORIDE 0.9 % (FLUSH) 0.9 %
10 SYRINGE (ML) INJECTION AS NEEDED
Status: DISCONTINUED | OUTPATIENT
Start: 2020-10-28 | End: 2020-10-28 | Stop reason: HOSPADM

## 2020-10-28 RX ORDER — HEPARIN SODIUM (PORCINE) LOCK FLUSH IV SOLN 100 UNIT/ML 100 UNIT/ML
500 SOLUTION INTRAVENOUS AS NEEDED
Status: DISCONTINUED | OUTPATIENT
Start: 2020-10-28 | End: 2020-10-28 | Stop reason: HOSPADM

## 2020-10-28 RX ORDER — HEPARIN SODIUM (PORCINE) LOCK FLUSH IV SOLN 100 UNIT/ML 100 UNIT/ML
500 SOLUTION INTRAVENOUS AS NEEDED
Status: CANCELLED | OUTPATIENT
Start: 2020-10-28

## 2020-10-28 RX ORDER — ACETAMINOPHEN 500 MG
500 TABLET ORAL ONCE
Status: COMPLETED | OUTPATIENT
Start: 2020-10-28 | End: 2020-10-28

## 2020-10-28 RX ADMIN — Medication 500 UNITS: at 12:35

## 2020-10-28 RX ADMIN — TRASTUZUMAB-ANNS 450 MG: 150 INJECTION, POWDER, LYOPHILIZED, FOR SOLUTION INTRAVENOUS at 11:48

## 2020-10-28 RX ADMIN — SODIUM CHLORIDE 250 ML: 9 INJECTION, SOLUTION INTRAVENOUS at 11:09

## 2020-10-28 RX ADMIN — ACETAMINOPHEN 500 MG: 500 TABLET, FILM COATED ORAL at 11:08

## 2020-10-28 RX ADMIN — DIPHENHYDRAMINE HYDROCHLORIDE 25 MG: 50 INJECTION, SOLUTION INTRAMUSCULAR; INTRAVENOUS at 11:09

## 2020-10-28 RX ADMIN — SODIUM CHLORIDE, PRESERVATIVE FREE 10 ML: 5 INJECTION INTRAVENOUS at 12:34

## 2020-10-29 LAB — CANCER AG27-29 SERPL-ACNC: 15.1 U/ML (ref 0–38.6)

## 2020-11-02 RX ORDER — ANASTROZOLE 1 MG/1
TABLET ORAL
Qty: 30 TABLET | Refills: 5 | Status: SHIPPED | OUTPATIENT
Start: 2020-11-02 | End: 2020-12-18 | Stop reason: SDUPTHER

## 2020-11-18 ENCOUNTER — TELEPHONE (OUTPATIENT)
Dept: ONCOLOGY | Facility: CLINIC | Age: 60
End: 2020-11-18

## 2020-11-18 ENCOUNTER — LAB (OUTPATIENT)
Dept: LAB | Facility: HOSPITAL | Age: 60
End: 2020-11-18

## 2020-11-18 ENCOUNTER — INFUSION (OUTPATIENT)
Dept: ONCOLOGY | Facility: HOSPITAL | Age: 60
End: 2020-11-18

## 2020-11-18 VITALS
SYSTOLIC BLOOD PRESSURE: 125 MMHG | RESPIRATION RATE: 18 BRPM | HEART RATE: 68 BPM | OXYGEN SATURATION: 100 % | TEMPERATURE: 97 F | BODY MASS INDEX: 33.38 KG/M2 | HEIGHT: 60 IN | WEIGHT: 170 LBS | DIASTOLIC BLOOD PRESSURE: 62 MMHG

## 2020-11-18 DIAGNOSIS — C50.912 INVASIVE DUCTAL CARCINOMA OF BREAST, FEMALE, LEFT (HCC): Primary | ICD-10-CM

## 2020-11-18 DIAGNOSIS — C50.912 INVASIVE DUCTAL CARCINOMA OF BREAST, FEMALE, LEFT (HCC): ICD-10-CM

## 2020-11-18 LAB
ALBUMIN SERPL-MCNC: 4.6 G/DL (ref 3.5–5.2)
ALBUMIN/GLOB SERPL: 1.6 G/DL
ALP SERPL-CCNC: 81 U/L (ref 39–117)
ALT SERPL W P-5'-P-CCNC: 22 U/L (ref 1–33)
ANION GAP SERPL CALCULATED.3IONS-SCNC: 10 MMOL/L (ref 5–15)
AST SERPL-CCNC: 21 U/L (ref 1–32)
BASOPHILS # BLD AUTO: 0.04 10*3/MM3 (ref 0–0.2)
BASOPHILS NFR BLD AUTO: 0.7 % (ref 0–1.5)
BILIRUB SERPL-MCNC: 0.6 MG/DL (ref 0–1.2)
BUN SERPL-MCNC: 20 MG/DL (ref 6–20)
BUN/CREAT SERPL: 35.7 (ref 7–25)
CALCIUM SPEC-SCNC: 10.7 MG/DL (ref 8.6–10.5)
CHLORIDE SERPL-SCNC: 102 MMOL/L (ref 98–107)
CO2 SERPL-SCNC: 26 MMOL/L (ref 22–29)
CREAT SERPL-MCNC: 0.56 MG/DL (ref 0.57–1)
DEPRECATED RDW RBC AUTO: 43.2 FL (ref 37–54)
EOSINOPHIL # BLD AUTO: 0.06 10*3/MM3 (ref 0–0.4)
EOSINOPHIL NFR BLD AUTO: 1.1 % (ref 0.3–6.2)
ERYTHROCYTE [DISTWIDTH] IN BLOOD BY AUTOMATED COUNT: 13.4 % (ref 12.3–15.4)
FERRITIN SERPL-MCNC: 164.3 NG/ML (ref 13–150)
GFR SERPL CREATININE-BSD FRML MDRD: 111 ML/MIN/1.73
GLOBULIN UR ELPH-MCNC: 2.8 GM/DL
GLUCOSE SERPL-MCNC: 94 MG/DL (ref 65–99)
HCT VFR BLD AUTO: 37.7 % (ref 34–46.6)
HGB BLD-MCNC: 12.9 G/DL (ref 12–15.9)
IMM GRANULOCYTES # BLD AUTO: 0.01 10*3/MM3 (ref 0–0.05)
IMM GRANULOCYTES NFR BLD AUTO: 0.2 % (ref 0–0.5)
IRON 24H UR-MRATE: 89 MCG/DL (ref 37–145)
IRON SATN MFR SERPL: 25 % (ref 20–50)
LYMPHOCYTES # BLD AUTO: 2.34 10*3/MM3 (ref 0.7–3.1)
LYMPHOCYTES NFR BLD AUTO: 41.4 % (ref 19.6–45.3)
MCH RBC QN AUTO: 30.1 PG (ref 26.6–33)
MCHC RBC AUTO-ENTMCNC: 34.2 G/DL (ref 31.5–35.7)
MCV RBC AUTO: 88.1 FL (ref 79–97)
MONOCYTES # BLD AUTO: 0.37 10*3/MM3 (ref 0.1–0.9)
MONOCYTES NFR BLD AUTO: 6.5 % (ref 5–12)
NEUTROPHILS NFR BLD AUTO: 2.83 10*3/MM3 (ref 1.7–7)
NEUTROPHILS NFR BLD AUTO: 50.1 % (ref 42.7–76)
NRBC BLD AUTO-RTO: 0 /100 WBC (ref 0–0.2)
PLATELET # BLD AUTO: 289 10*3/MM3 (ref 140–450)
PMV BLD AUTO: 9.5 FL (ref 6–12)
POTASSIUM SERPL-SCNC: 3.9 MMOL/L (ref 3.5–5.2)
PROT SERPL-MCNC: 7.4 G/DL (ref 6–8.5)
RBC # BLD AUTO: 4.28 10*6/MM3 (ref 3.77–5.28)
SODIUM SERPL-SCNC: 138 MMOL/L (ref 136–145)
TIBC SERPL-MCNC: 353 MCG/DL (ref 298–536)
TRANSFERRIN SERPL-MCNC: 237 MG/DL (ref 200–360)
WBC # BLD AUTO: 5.65 10*3/MM3 (ref 3.4–10.8)

## 2020-11-18 PROCEDURE — 25010000003 HEPARIN LOCK FLUSH PER 10 UNITS: Performed by: INTERNAL MEDICINE

## 2020-11-18 PROCEDURE — 83540 ASSAY OF IRON: CPT

## 2020-11-18 PROCEDURE — 96413 CHEMO IV INFUSION 1 HR: CPT

## 2020-11-18 PROCEDURE — 96366 THER/PROPH/DIAG IV INF ADDON: CPT

## 2020-11-18 PROCEDURE — 25010000002 DIPHENHYDRAMINE PER 50 MG: Performed by: NURSE PRACTITIONER

## 2020-11-18 PROCEDURE — 85025 COMPLETE CBC W/AUTO DIFF WBC: CPT

## 2020-11-18 PROCEDURE — 25010000002 TRASTUZUMAB-ANNS 150 MG RECONSTITUTED SOLUTION 1 EACH VIAL: Performed by: NURSE PRACTITIONER

## 2020-11-18 PROCEDURE — 84466 ASSAY OF TRANSFERRIN: CPT

## 2020-11-18 PROCEDURE — 96367 TX/PROPH/DG ADDL SEQ IV INF: CPT

## 2020-11-18 PROCEDURE — 82728 ASSAY OF FERRITIN: CPT

## 2020-11-18 PROCEDURE — 80053 COMPREHEN METABOLIC PANEL: CPT

## 2020-11-18 RX ORDER — DIPHENHYDRAMINE HYDROCHLORIDE 50 MG/ML
50 INJECTION INTRAMUSCULAR; INTRAVENOUS AS NEEDED
Status: CANCELLED | OUTPATIENT
Start: 2020-11-18

## 2020-11-18 RX ORDER — ACETAMINOPHEN 500 MG
500 TABLET ORAL ONCE
Status: COMPLETED | OUTPATIENT
Start: 2020-11-18 | End: 2020-11-18

## 2020-11-18 RX ORDER — ACETAMINOPHEN 325 MG/1
500 TABLET ORAL ONCE
Status: CANCELLED
Start: 2020-11-18

## 2020-11-18 RX ORDER — HEPARIN SODIUM (PORCINE) LOCK FLUSH IV SOLN 100 UNIT/ML 100 UNIT/ML
500 SOLUTION INTRAVENOUS AS NEEDED
Status: DISCONTINUED | OUTPATIENT
Start: 2020-11-18 | End: 2020-11-18 | Stop reason: HOSPADM

## 2020-11-18 RX ORDER — SODIUM CHLORIDE 0.9 % (FLUSH) 0.9 %
10 SYRINGE (ML) INJECTION AS NEEDED
Status: DISCONTINUED | OUTPATIENT
Start: 2020-11-18 | End: 2020-11-18 | Stop reason: HOSPADM

## 2020-11-18 RX ORDER — SODIUM CHLORIDE 0.9 % (FLUSH) 0.9 %
10 SYRINGE (ML) INJECTION AS NEEDED
Status: CANCELLED | OUTPATIENT
Start: 2020-11-18

## 2020-11-18 RX ORDER — SODIUM CHLORIDE 9 MG/ML
250 INJECTION, SOLUTION INTRAVENOUS ONCE
Status: CANCELLED | OUTPATIENT
Start: 2020-11-18

## 2020-11-18 RX ORDER — DIPHENHYDRAMINE HYDROCHLORIDE 50 MG/ML
50 INJECTION INTRAMUSCULAR; INTRAVENOUS AS NEEDED
Status: DISCONTINUED | OUTPATIENT
Start: 2020-11-18 | End: 2020-11-18 | Stop reason: HOSPADM

## 2020-11-18 RX ORDER — SODIUM CHLORIDE 9 MG/ML
250 INJECTION, SOLUTION INTRAVENOUS ONCE
Status: DISCONTINUED | OUTPATIENT
Start: 2020-11-18 | End: 2020-11-18 | Stop reason: HOSPADM

## 2020-11-18 RX ORDER — HEPARIN SODIUM (PORCINE) LOCK FLUSH IV SOLN 100 UNIT/ML 100 UNIT/ML
500 SOLUTION INTRAVENOUS AS NEEDED
Status: CANCELLED | OUTPATIENT
Start: 2020-11-18

## 2020-11-18 RX ORDER — FAMOTIDINE 10 MG/ML
20 INJECTION, SOLUTION INTRAVENOUS AS NEEDED
Status: CANCELLED | OUTPATIENT
Start: 2020-11-18

## 2020-11-18 RX ORDER — FAMOTIDINE 10 MG/ML
20 INJECTION, SOLUTION INTRAVENOUS AS NEEDED
Status: DISCONTINUED | OUTPATIENT
Start: 2020-11-18 | End: 2020-11-18 | Stop reason: HOSPADM

## 2020-11-18 RX ADMIN — ACETAMINOPHEN 500 MG: 500 TABLET, FILM COATED ORAL at 11:16

## 2020-11-18 RX ADMIN — SODIUM CHLORIDE, PRESERVATIVE FREE 10 ML: 5 INJECTION INTRAVENOUS at 12:29

## 2020-11-18 RX ADMIN — DIPHENHYDRAMINE HYDROCHLORIDE 25 MG: 50 INJECTION, SOLUTION INTRAMUSCULAR; INTRAVENOUS at 11:17

## 2020-11-18 RX ADMIN — TRASTUZUMAB-ANNS 450 MG: 150 INJECTION, POWDER, LYOPHILIZED, FOR SOLUTION INTRAVENOUS at 11:37

## 2020-11-18 RX ADMIN — Medication 500 UNITS: at 12:29

## 2020-11-18 NOTE — TELEPHONE ENCOUNTER
----- Message from Tommy Wheat MD sent at 11/18/2020  1:01 PM CST -----  Labs 11/18/2020–calcium 10.7.  Please repeat CMP in 1 week.  Thank you

## 2020-11-18 NOTE — TELEPHONE ENCOUNTER
Called patient and reviewed CMP results, Ca+ level slightly elevated at 10.9.  Instructed that patient wants her to get labs repeated next week due to this.  Appointment set up for 11/25 at 1000.  Patient v/u.

## 2020-11-25 ENCOUNTER — LAB (OUTPATIENT)
Dept: LAB | Facility: HOSPITAL | Age: 60
End: 2020-11-25

## 2020-11-25 DIAGNOSIS — C50.912 INVASIVE DUCTAL CARCINOMA OF BREAST, FEMALE, LEFT (HCC): ICD-10-CM

## 2020-11-25 LAB
ALBUMIN SERPL-MCNC: 4.6 G/DL (ref 3.5–5.2)
ALBUMIN/GLOB SERPL: 1.5 G/DL
ALP SERPL-CCNC: 93 U/L (ref 39–117)
ALT SERPL W P-5'-P-CCNC: 24 U/L (ref 1–33)
ANION GAP SERPL CALCULATED.3IONS-SCNC: 9 MMOL/L (ref 5–15)
AST SERPL-CCNC: 22 U/L (ref 1–32)
BASOPHILS # BLD AUTO: 0.05 10*3/MM3 (ref 0–0.2)
BASOPHILS NFR BLD AUTO: 0.8 % (ref 0–1.5)
BILIRUB SERPL-MCNC: 0.6 MG/DL (ref 0–1.2)
BUN SERPL-MCNC: 17 MG/DL (ref 6–20)
BUN/CREAT SERPL: 28.8 (ref 7–25)
CALCIUM SPEC-SCNC: 9.9 MG/DL (ref 8.6–10.5)
CHLORIDE SERPL-SCNC: 104 MMOL/L (ref 98–107)
CO2 SERPL-SCNC: 26 MMOL/L (ref 22–29)
CREAT SERPL-MCNC: 0.59 MG/DL (ref 0.57–1)
DEPRECATED RDW RBC AUTO: 45.3 FL (ref 37–54)
EOSINOPHIL # BLD AUTO: 0.08 10*3/MM3 (ref 0–0.4)
EOSINOPHIL NFR BLD AUTO: 1.3 % (ref 0.3–6.2)
ERYTHROCYTE [DISTWIDTH] IN BLOOD BY AUTOMATED COUNT: 13.6 % (ref 12.3–15.4)
GFR SERPL CREATININE-BSD FRML MDRD: 105 ML/MIN/1.73
GLOBULIN UR ELPH-MCNC: 3.1 GM/DL
GLUCOSE SERPL-MCNC: 127 MG/DL (ref 65–99)
HCT VFR BLD AUTO: 40.3 % (ref 34–46.6)
HGB BLD-MCNC: 13.3 G/DL (ref 12–15.9)
IMM GRANULOCYTES # BLD AUTO: 0.02 10*3/MM3 (ref 0–0.05)
IMM GRANULOCYTES NFR BLD AUTO: 0.3 % (ref 0–0.5)
LYMPHOCYTES # BLD AUTO: 2.32 10*3/MM3 (ref 0.7–3.1)
LYMPHOCYTES NFR BLD AUTO: 36.9 % (ref 19.6–45.3)
MCH RBC QN AUTO: 29.9 PG (ref 26.6–33)
MCHC RBC AUTO-ENTMCNC: 33 G/DL (ref 31.5–35.7)
MCV RBC AUTO: 90.6 FL (ref 79–97)
MONOCYTES # BLD AUTO: 0.32 10*3/MM3 (ref 0.1–0.9)
MONOCYTES NFR BLD AUTO: 5.1 % (ref 5–12)
NEUTROPHILS NFR BLD AUTO: 3.49 10*3/MM3 (ref 1.7–7)
NEUTROPHILS NFR BLD AUTO: 55.6 % (ref 42.7–76)
NRBC BLD AUTO-RTO: 0 /100 WBC (ref 0–0.2)
PLATELET # BLD AUTO: 312 10*3/MM3 (ref 140–450)
PMV BLD AUTO: 9.7 FL (ref 6–12)
POTASSIUM SERPL-SCNC: 3.8 MMOL/L (ref 3.5–5.2)
PROT SERPL-MCNC: 7.7 G/DL (ref 6–8.5)
RBC # BLD AUTO: 4.45 10*6/MM3 (ref 3.77–5.28)
SODIUM SERPL-SCNC: 139 MMOL/L (ref 136–145)
WBC # BLD AUTO: 6.28 10*3/MM3 (ref 3.4–10.8)

## 2020-11-25 PROCEDURE — 85025 COMPLETE CBC W/AUTO DIFF WBC: CPT

## 2020-11-25 PROCEDURE — 80053 COMPREHEN METABOLIC PANEL: CPT

## 2020-11-25 PROCEDURE — 36415 COLL VENOUS BLD VENIPUNCTURE: CPT

## 2020-12-08 DIAGNOSIS — C50.912 INVASIVE DUCTAL CARCINOMA OF BREAST, FEMALE, LEFT (HCC): Primary | ICD-10-CM

## 2020-12-08 RX ORDER — DIPHENHYDRAMINE HYDROCHLORIDE 50 MG/ML
50 INJECTION INTRAMUSCULAR; INTRAVENOUS AS NEEDED
Status: CANCELLED | OUTPATIENT
Start: 2020-12-09

## 2020-12-08 RX ORDER — SODIUM CHLORIDE 9 MG/ML
250 INJECTION, SOLUTION INTRAVENOUS ONCE
Status: CANCELLED | OUTPATIENT
Start: 2020-12-09

## 2020-12-08 RX ORDER — ACETAMINOPHEN 325 MG/1
500 TABLET ORAL ONCE
Status: CANCELLED
Start: 2020-12-09

## 2020-12-08 RX ORDER — FAMOTIDINE 10 MG/ML
20 INJECTION, SOLUTION INTRAVENOUS AS NEEDED
Status: CANCELLED | OUTPATIENT
Start: 2020-12-09

## 2020-12-09 ENCOUNTER — LAB (OUTPATIENT)
Dept: LAB | Facility: HOSPITAL | Age: 60
End: 2020-12-09

## 2020-12-09 ENCOUNTER — INFUSION (OUTPATIENT)
Dept: ONCOLOGY | Facility: HOSPITAL | Age: 60
End: 2020-12-09

## 2020-12-09 VITALS
HEART RATE: 80 BPM | WEIGHT: 169 LBS | HEIGHT: 60 IN | SYSTOLIC BLOOD PRESSURE: 124 MMHG | OXYGEN SATURATION: 100 % | RESPIRATION RATE: 18 BRPM | DIASTOLIC BLOOD PRESSURE: 77 MMHG | TEMPERATURE: 97.5 F | BODY MASS INDEX: 33.18 KG/M2

## 2020-12-09 DIAGNOSIS — C50.912 INVASIVE DUCTAL CARCINOMA OF BREAST, FEMALE, LEFT (HCC): Primary | ICD-10-CM

## 2020-12-09 DIAGNOSIS — C50.912 INVASIVE DUCTAL CARCINOMA OF BREAST, FEMALE, LEFT (HCC): ICD-10-CM

## 2020-12-09 LAB
ALBUMIN SERPL-MCNC: 4.4 G/DL (ref 3.5–5.2)
ALBUMIN/GLOB SERPL: 1.5 G/DL
ALP SERPL-CCNC: 90 U/L (ref 39–117)
ALT SERPL W P-5'-P-CCNC: 22 U/L (ref 1–33)
ANION GAP SERPL CALCULATED.3IONS-SCNC: 9 MMOL/L (ref 5–15)
AST SERPL-CCNC: 17 U/L (ref 1–32)
BASOPHILS # BLD AUTO: 0.04 10*3/MM3 (ref 0–0.2)
BASOPHILS NFR BLD AUTO: 0.7 % (ref 0–1.5)
BILIRUB SERPL-MCNC: 0.6 MG/DL (ref 0–1.2)
BUN SERPL-MCNC: 20 MG/DL (ref 6–20)
BUN/CREAT SERPL: 30.8 (ref 7–25)
CALCIUM SPEC-SCNC: 9.3 MG/DL (ref 8.6–10.5)
CHLORIDE SERPL-SCNC: 104 MMOL/L (ref 98–107)
CO2 SERPL-SCNC: 26 MMOL/L (ref 22–29)
CREAT SERPL-MCNC: 0.65 MG/DL (ref 0.57–1)
DEPRECATED RDW RBC AUTO: 43.3 FL (ref 37–54)
EOSINOPHIL # BLD AUTO: 0.05 10*3/MM3 (ref 0–0.4)
EOSINOPHIL NFR BLD AUTO: 0.8 % (ref 0.3–6.2)
ERYTHROCYTE [DISTWIDTH] IN BLOOD BY AUTOMATED COUNT: 13.2 % (ref 12.3–15.4)
FERRITIN SERPL-MCNC: 138.9 NG/ML (ref 13–150)
GFR SERPL CREATININE-BSD FRML MDRD: 94 ML/MIN/1.73
GLOBULIN UR ELPH-MCNC: 3 GM/DL
GLUCOSE SERPL-MCNC: 105 MG/DL (ref 65–99)
HCT VFR BLD AUTO: 38.9 % (ref 34–46.6)
HGB BLD-MCNC: 12.9 G/DL (ref 12–15.9)
IMM GRANULOCYTES # BLD AUTO: 0.02 10*3/MM3 (ref 0–0.05)
IMM GRANULOCYTES NFR BLD AUTO: 0.3 % (ref 0–0.5)
IRON 24H UR-MRATE: 87 MCG/DL (ref 37–145)
IRON SATN MFR SERPL: 25 % (ref 20–50)
LYMPHOCYTES # BLD AUTO: 2.12 10*3/MM3 (ref 0.7–3.1)
LYMPHOCYTES NFR BLD AUTO: 35.3 % (ref 19.6–45.3)
MCH RBC QN AUTO: 29.7 PG (ref 26.6–33)
MCHC RBC AUTO-ENTMCNC: 33.2 G/DL (ref 31.5–35.7)
MCV RBC AUTO: 89.4 FL (ref 79–97)
MONOCYTES # BLD AUTO: 0.38 10*3/MM3 (ref 0.1–0.9)
MONOCYTES NFR BLD AUTO: 6.3 % (ref 5–12)
NEUTROPHILS NFR BLD AUTO: 3.39 10*3/MM3 (ref 1.7–7)
NEUTROPHILS NFR BLD AUTO: 56.6 % (ref 42.7–76)
NRBC BLD AUTO-RTO: 0 /100 WBC (ref 0–0.2)
PLATELET # BLD AUTO: 311 10*3/MM3 (ref 140–450)
PMV BLD AUTO: 9.7 FL (ref 6–12)
POTASSIUM SERPL-SCNC: 3.7 MMOL/L (ref 3.5–5.2)
PROT SERPL-MCNC: 7.4 G/DL (ref 6–8.5)
RBC # BLD AUTO: 4.35 10*6/MM3 (ref 3.77–5.28)
SODIUM SERPL-SCNC: 139 MMOL/L (ref 136–145)
TIBC SERPL-MCNC: 353 MCG/DL (ref 298–536)
TRANSFERRIN SERPL-MCNC: 237 MG/DL (ref 200–360)
WBC # BLD AUTO: 6 10*3/MM3 (ref 3.4–10.8)

## 2020-12-09 PROCEDURE — 96413 CHEMO IV INFUSION 1 HR: CPT

## 2020-12-09 PROCEDURE — 84466 ASSAY OF TRANSFERRIN: CPT

## 2020-12-09 PROCEDURE — 85025 COMPLETE CBC W/AUTO DIFF WBC: CPT

## 2020-12-09 PROCEDURE — 83540 ASSAY OF IRON: CPT

## 2020-12-09 PROCEDURE — 25010000002 TRASTUZUMAB-ANNS 150 MG RECONSTITUTED SOLUTION 1 EACH VIAL: Performed by: NURSE PRACTITIONER

## 2020-12-09 PROCEDURE — 80053 COMPREHEN METABOLIC PANEL: CPT

## 2020-12-09 PROCEDURE — 25010000002 DIPHENHYDRAMINE PER 50 MG: Performed by: NURSE PRACTITIONER

## 2020-12-09 PROCEDURE — 25010000003 HEPARIN LOCK FLUSH PER 10 UNITS: Performed by: INTERNAL MEDICINE

## 2020-12-09 PROCEDURE — 82728 ASSAY OF FERRITIN: CPT

## 2020-12-09 PROCEDURE — 96367 TX/PROPH/DG ADDL SEQ IV INF: CPT

## 2020-12-09 RX ORDER — SODIUM CHLORIDE 0.9 % (FLUSH) 0.9 %
10 SYRINGE (ML) INJECTION AS NEEDED
Status: DISCONTINUED | OUTPATIENT
Start: 2020-12-09 | End: 2020-12-09 | Stop reason: HOSPADM

## 2020-12-09 RX ORDER — HEPARIN SODIUM (PORCINE) LOCK FLUSH IV SOLN 100 UNIT/ML 100 UNIT/ML
500 SOLUTION INTRAVENOUS AS NEEDED
Status: CANCELLED | OUTPATIENT
Start: 2020-12-09

## 2020-12-09 RX ORDER — HEPARIN SODIUM (PORCINE) LOCK FLUSH IV SOLN 100 UNIT/ML 100 UNIT/ML
500 SOLUTION INTRAVENOUS AS NEEDED
Status: DISCONTINUED | OUTPATIENT
Start: 2020-12-09 | End: 2020-12-09 | Stop reason: HOSPADM

## 2020-12-09 RX ORDER — FAMOTIDINE 10 MG/ML
20 INJECTION, SOLUTION INTRAVENOUS AS NEEDED
Status: DISCONTINUED | OUTPATIENT
Start: 2020-12-09 | End: 2020-12-09 | Stop reason: HOSPADM

## 2020-12-09 RX ORDER — ACETAMINOPHEN 500 MG
500 TABLET ORAL ONCE
Status: COMPLETED | OUTPATIENT
Start: 2020-12-09 | End: 2020-12-09

## 2020-12-09 RX ORDER — SODIUM CHLORIDE 0.9 % (FLUSH) 0.9 %
10 SYRINGE (ML) INJECTION AS NEEDED
Status: CANCELLED | OUTPATIENT
Start: 2020-12-09

## 2020-12-09 RX ORDER — DIPHENHYDRAMINE HYDROCHLORIDE 50 MG/ML
50 INJECTION INTRAMUSCULAR; INTRAVENOUS AS NEEDED
Status: DISCONTINUED | OUTPATIENT
Start: 2020-12-09 | End: 2020-12-09 | Stop reason: HOSPADM

## 2020-12-09 RX ORDER — SODIUM CHLORIDE 9 MG/ML
250 INJECTION, SOLUTION INTRAVENOUS ONCE
Status: COMPLETED | OUTPATIENT
Start: 2020-12-09 | End: 2020-12-09

## 2020-12-09 RX ADMIN — ACETAMINOPHEN 500 MG: 500 TABLET, FILM COATED ORAL at 11:00

## 2020-12-09 RX ADMIN — Medication 500 UNITS: at 12:07

## 2020-12-09 RX ADMIN — SODIUM CHLORIDE 250 ML: 9 INJECTION, SOLUTION INTRAVENOUS at 11:00

## 2020-12-09 RX ADMIN — DIPHENHYDRAMINE HYDROCHLORIDE 25 MG: 50 INJECTION, SOLUTION INTRAMUSCULAR; INTRAVENOUS at 11:00

## 2020-12-09 RX ADMIN — TRASTUZUMAB-ANNS 450 MG: 150 INJECTION, POWDER, LYOPHILIZED, FOR SOLUTION INTRAVENOUS at 11:26

## 2020-12-09 RX ADMIN — SODIUM CHLORIDE, PRESERVATIVE FREE 10 ML: 5 INJECTION INTRAVENOUS at 12:07

## 2020-12-15 ENCOUNTER — OFFICE VISIT (OUTPATIENT)
Dept: OBSTETRICS AND GYNECOLOGY | Facility: CLINIC | Age: 60
End: 2020-12-15

## 2020-12-15 VITALS
WEIGHT: 172 LBS | DIASTOLIC BLOOD PRESSURE: 82 MMHG | BODY MASS INDEX: 33.77 KG/M2 | SYSTOLIC BLOOD PRESSURE: 164 MMHG | HEIGHT: 60 IN

## 2020-12-15 DIAGNOSIS — Z78.0 MENOPAUSE: Primary | ICD-10-CM

## 2020-12-15 DIAGNOSIS — Z87.891 FORMER SMOKER: ICD-10-CM

## 2020-12-15 DIAGNOSIS — Z01.411 ENCOUNTER FOR GYNECOLOGICAL EXAMINATION WITH ABNORMAL FINDING: ICD-10-CM

## 2020-12-15 DIAGNOSIS — Z98.890 S/P LUMPECTOMY, LEFT BREAST: ICD-10-CM

## 2020-12-15 DIAGNOSIS — C50.912 INVASIVE DUCTAL CARCINOMA OF BREAST, FEMALE, LEFT (HCC): ICD-10-CM

## 2020-12-15 PROCEDURE — 99396 PREV VISIT EST AGE 40-64: CPT | Performed by: OBSTETRICS & GYNECOLOGY

## 2020-12-15 RX ORDER — AMITRIPTYLINE HYDROCHLORIDE 25 MG/1
50 TABLET, FILM COATED ORAL NIGHTLY
Qty: 30 TABLET | Refills: 5 | Status: SHIPPED | OUTPATIENT
Start: 2020-12-15 | End: 2021-02-26

## 2020-12-15 NOTE — PROGRESS NOTES
"Subjective   Chief Complaint   Patient presents with   • Annual Exam     pt here today for annual exam. pt voices no concerns.      Chasidy Tejada is a 60 y.o. year old  menopausal female presenting to be seen for her annual exam.  Patient has had breast cancer this year - she had a lumpectomy and is now doing chemotherapy.  Had previously been really struggling with anxiety last year;  COVID, pandemic and cancer have made this year hard, but she still feels like anxiety has probably been better.   is doing better (had a stroke last year). The patient is status-post hysterectomy. Hot flashes and night sweats ARE a problem since she has been on Arimidex.    SEXUAL Hx:  She is sexually active.  In the past year there has not been new sexual partners.  HEALTH Hx:  She exercises regularly: no.  The patient reports regular self breast exams: no  She has noticed changes in height: no.    No Additional Complaints Reported    The following portions of the patient's history were reviewed and updated as appropriate:problem list, current medications, allergies, past family history, past medical history, past social history and past surgical history.    Social History    Tobacco Use      Smoking status: Never Smoker      Smokeless tobacco: Never Used      Tobacco comment: \"i never really smoked\"    Review of Systems   Constitutional: Negative for activity change and unexpected weight change.   Eyes: Positive for visual disturbance (wears glasses).   Respiratory: Negative for shortness of breath.    Cardiovascular: Negative for chest pain.   Gastrointestinal: Positive for diarrhea (from chemo). Negative for abdominal pain, blood in stool and constipation.        Colonoscopy up-to-date until    Endocrine: Positive for heat intolerance.   Genitourinary: Positive for enuresis (with cough or sneeze). Negative for difficulty urinating, dyspareunia, dysuria, pelvic pain, vaginal bleeding, vaginal discharge and " "vaginal pain.   Musculoskeletal: Positive for arthralgias (neuropathy in R hip). Negative for back pain.   Neurological: Negative for dizziness and headaches (tension HA's and migraines, rare now).   Psychiatric/Behavioral: Positive for sleep disturbance. Negative for dysphoric mood. The patient is nervous/anxious (took xanax years ago).          Objective   /82   Ht 152.4 cm (60\")   Wt 78 kg (172 lb)   BMI 33.59 kg/m²   Physical Exam   Constitutional: She is oriented to person, place, and time. She appears well-developed. No distress.   HENT:   Head: Normocephalic and atraumatic.   Neck: Normal range of motion. Neck supple. No thyromegaly present.   Cardiovascular: Normal rate and regular rhythm.   No murmur heard.  Pulmonary/Chest: Effort normal and breath sounds normal. Right breast exhibits no inverted nipple and no mass. Left breast exhibits inverted nipple and skin change. Left breast exhibits no mass. No breast tenderness or discharge.   Port-a-cath for chemo is near clavicle, where indicated on diagram.  Skin of L breast is dark and has orange peel appearance, secondary to radiation.       Abdominal: Soft. She exhibits no distension. There is no abdominal tenderness.   Genitourinary:    Vagina normal.   Rectum:      No external hemorrhoid or abnormal anal tone.   No breast tenderness or discharge.    Pelvic exam was performed with patient supine.   There is no tenderness or lesion on the right labia. There is no tenderness or lesion on the left labia. Right adnexum displays no tenderness and no fullness. Left adnexum displays no tenderness and no fullness.    No vaginal discharge or bleeding.   No bleeding in the vagina.    Genitourinary Comments: Pt s/p hysterectomy: uterus and cervix surgically absent.  Vaginal cuff unremarkable.     Musculoskeletal: Normal range of motion.   Neurological: She is alert and oriented to person, place, and time.   Skin: Skin is warm and dry.   Psychiatric: Her " behavior is normal. Judgment normal.   Nursing note and vitals reviewed.           Assessment & Plan    Chasidy was seen today for gynecologic exam.    Diagnoses and all orders for this visit:  Diagnoses and all orders for this visit:    1. Menopause (Primary): Patient with hot flashes and night sweats secondary to menopause and Arimidex.  Patient not a candidate for traditional HRT due to Estrogen receptor + breast cancer  -     amitriptyline (ELAVIL) 25 MG tablet; Take 2 tablets by mouth Every Night.  Dispense: 30 tablet; Refill: 5    2. Encounter for gynecological examination with abnormal finding: Patient's exam remarkable for skin changes from her L breast radiation.  Mammogram ordered; patient reports regular SBE.  Routine screening labs ordered; patient has not seen PCP this year.  DEXA was normal in 2020.  Colonoscopy up-to-date.  -     Mammo Screening Bilateral With CAD; Future  -     CBC & Differential  -     Comprehensive Metabolic Panel  -     Hemoglobin A1c  -     Lipid Panel  -     TSH    3. Invasive ductal carcinoma of breast, female, left (CMS/HCC)    4. S/P lumpectomy, left breast    5. Former smoker    6. BMI 33.0-33.9,adult        This note was electronically signed.    Elisha Martel MD  12/16/2020  09:06 CST

## 2020-12-17 ENCOUNTER — HOSPITAL ENCOUNTER (OUTPATIENT)
Dept: MAMMOGRAPHY | Facility: HOSPITAL | Age: 60
Discharge: HOME | End: 2020-12-17
Admitting: OBSTETRICS & GYNECOLOGY

## 2020-12-17 DIAGNOSIS — Z85.3 HX OF BREAST CANCER: ICD-10-CM

## 2020-12-17 DIAGNOSIS — Z01.411 ENCOUNTER FOR GYNECOLOGICAL EXAMINATION WITH ABNORMAL FINDING: ICD-10-CM

## 2020-12-17 PROCEDURE — 77066 DX MAMMO INCL CAD BI: CPT

## 2020-12-17 PROCEDURE — G0279 TOMOSYNTHESIS, MAMMO: HCPCS

## 2020-12-18 ENCOUNTER — OFFICE VISIT (OUTPATIENT)
Dept: ONCOLOGY | Facility: CLINIC | Age: 60
End: 2020-12-18

## 2020-12-18 VITALS
OXYGEN SATURATION: 97 % | DIASTOLIC BLOOD PRESSURE: 80 MMHG | TEMPERATURE: 96.8 F | HEIGHT: 60 IN | HEART RATE: 85 BPM | WEIGHT: 172.3 LBS | SYSTOLIC BLOOD PRESSURE: 138 MMHG | RESPIRATION RATE: 16 BRPM | BODY MASS INDEX: 33.83 KG/M2

## 2020-12-18 DIAGNOSIS — C50.912 INVASIVE DUCTAL CARCINOMA OF BREAST, FEMALE, LEFT (HCC): Primary | ICD-10-CM

## 2020-12-18 PROCEDURE — 99214 OFFICE O/P EST MOD 30 MIN: CPT | Performed by: INTERNAL MEDICINE

## 2020-12-18 RX ORDER — ANASTROZOLE 1 MG/1
1 TABLET ORAL DAILY
Qty: 30 TABLET | Refills: 5 | Status: SHIPPED | OUTPATIENT
Start: 2020-12-18 | End: 2021-02-26 | Stop reason: SDUPTHER

## 2020-12-18 NOTE — PROGRESS NOTES
[facility] HEMATOLOGY AND ONCOLOGY  [address]  [place] [address] [phone]    Patient Name: Chasidy Tejada  Encounter Date: 12/18/2020  YOB: 1960  Patient Number: [id number]       REASON FOR VISIT: Chasidy Tejada is a 60-year-old female who returns in follow-up of stage IA left breast carcinoma, ER/NM and HER-2/lamberto positive.  She underwent left breast lumpectomy with SNB, 01/10/2020 and was started on adjuvant Arimidex beginning 02/03/2020 through 03/11/2020 before resumption on 08/19/2020.  She completed adjuvant paclitaxel (week 12/12, 05/27/2020), and is currently receiving adjuvant trastuzumab (Herceptin), having completed cycle 14, 12/09/2020.  Adjuvant radiation administered on 06/22/2020 through 08/06/2020 (33/33 fx).  She is here alone (previously with her spouse).    I have reviewed the HPI and verified with the patient the accuracy of it. No changes to interval history since the information was documented. Tommy Wheat MD 12/18/20     DIAGNOSTIC ABNORMALITIES:          1.   12/13/2019- mammogram screening.  Impression: Stellate lesion outer left breast.  Spot compression and ultrasound suggested.          2.   12/27/2019- diagnostic mammogram.  Impression: Irregular density upper outer left breast.  Biopsy suggested.          3.   12/27/2019- left breast ultrasound.  Impression: Suspicious 6 x 5 x 8 mm density left breast approximately 8 cm from the nipple.  Biopsy suggested.  BI-RADS Category 4B.          4.   01/03/2020-ultrasound-guided breast biopsy.  Impression: Appropriate sampling of the 8 mm irregular hypoechoic nodule in the left breast at 1:00 in the posterior depth/axillary tail region.  Final diagnosis: Left breast at 1 o'clock position, core biopsies: A.invasive mammary carcinoma of no special type (ductal, not otherwise specified), grade 2, at least 4.9 mm in greatest extent.  B.associated  low-grade ductal carcinoma in situ.  % positive, DC 25% positive, HER-2/lamberto-2+ (IHC)/FISH- a focal positive score is present in an area corresponding to approximately 15% of the tumor (signal ratio: 2.9).           5.   02/03/2020- CMP normal with .  CBC normal.           6.   02/03/2020-bone scan ([facility]).  Impression: Degenerative joint changes.  No evidence of bony metastasis.           7.   02/03/2020-CT scans, head chest, abdomen, pelvis-impression: No abnormal intracranial enhancement to suggest intracranial metastasis/abnormalities.  No evidence of intrathoracic metastasis.  Stable 5 to 6 mm right lower lobe nodule of the right hemidiaphragm unchanged from 2/14/2011.  No convincing evidence of intra-abdominal or pelvic metastasis.  Hepatic cysts.  Fatty attenuated benign focus within the body of the pancreas visualized, 12/14/2011.  Previous hysterectomy, cholecystectomy and appendectomy.           8.   02/14/2020-echocardiogram.  Impression: LV systolic function normal (EF equals 70%).  Normal LV and RV size thickness and function.  Normal LA and RA size.  Normal cardiac valves.  No pericardial effusion.           9.  02/27/2020- CMP normal with .  CBC normal.  Iron 65, iron saturation 18%, ferritin 134, B12 745, folate > 20, CEA 1.76, CA-27-29 14.2.          10.  03/04/2020- repeat FISH for HER-2/lamberto (from tumor block, 01/10/2020).  No tumor on specimen.          11.  06/23/2020- DEXA scan.  Impression: Normal bone density.  No lower than 1 standard deviation below the mean for young adult woman.    PREVIOUS INTERVENTIONS:           1.   01/10/2020- left breast lumpectomy with left axillary sentinel node biopsy.  Final diagnosis: 1.left breast mass, lumpectomy: Invasive and in situ ductal carcinoma.  Tumor site-not specified.  Histologic type: Invasive carcinoma of no special type (invasive ductal carcinoma, not otherwise specified).  Overall tumor grade: Grade 1.  Tumor size: Greatest  "dimension of largest invasive focus: 5 mm.  Tumor focality: Single focus of invasive carcinoma.  Ductal carcinoma in situ (DCIS): Present.  Negative for extensive intraductal component (EIC).  Size (extent) CIS: Cannot be determined: Microscopic.  Architectural pattern: Cribriform/solid.  Nuclear grade: 2 (intermediate).  Necrosis: Present, central (expansive \"comedo) necrosis).  Lobular carcinoma in situ (LCIS): No LCIS in specimen.  Lymphovascular invasion: Not identified.  Dermal lymphovascular invasion: No skin present.  Margins: Uninvolved by invasive carcinoma.  Lymph nodes: Uninvolved by tumor cells.  Regional lymph nodes: 1.  Number of lymph nodes examined: 1.  Number of sentinel nodes examined: 1.  Pathologic stage classification (pTNM, AJCC eighth edition): pT1a,(sn), pN0.  % positive, IA 25% positive, HER-2 2+ (equivocal).           2.   Adjuvant Arimidex 1 mg p.o. daily beginning 02/03/2020 through 03/11/2020: resumed 08/19/2020.           3.   Adjuvant paclitaxel beginning 03/11/2020 through 05/27/2020 (12/12 weekly doses)           4.   Adjuvant trastuzumab every 3 weeks - beginning 03/11/2020 through 12/09/2020 (14 cycles)           5.   Adjuvant radiation to the left breast (5040 cGy with 1000 cGy boost to the tumor bed for a total of 6040 cGy/33 fractions) beginning 06/22/2020 through 08/06/2020.    LABS    Lab Results - Last 18 Months   Lab Units 12/09/20  1021 11/25/20  1000 11/18/20  1025 10/28/20  0221 10/19/20  1207 10/07/20  1033  05/20/20  0846   HEMOGLOBIN g/dL 12.9 13.3 12.9 13.1 13.8 14.0   < > 11.5*   HEMATOCRIT % 38.9 40.3 37.7 38.8 40.5 42.8   < > 34.5   MCV fL 89.4 90.6 88.1 87.6 87.7 88.8   < > 93.0   WBC 10*3/mm3 6.00 6.28 5.65 6.97 6.78 5.78   < > 8.84   RDW % 13.2 13.6 13.4 13.2 13.3 13.2   < > 15.6*   MPV fL 9.7 9.7 9.5 9.7 9.8 9.5   < > 9.5   PLATELETS 10*3/mm3 311 312 289 305 295 299   < > 402   IMM GRAN % % 0.3 0.3 0.2 0.3 1.0* 0.2   < >  --    NEUTROS ABS 10*3/mm3 3.39 " 3.49 2.83 3.73 3.97 3.18   < > 5.18   LYMPHS ABS 10*3/mm3 2.12 2.32 2.34 2.71 2.26 2.08   < >  --    MONOS ABS 10*3/mm3 0.38 0.32 0.37 0.42 0.38 0.34   < >  --    EOS ABS 10*3/mm3 0.05 0.08 0.06 0.04 0.06 0.13   < > 0.18   BASOS ABS 10*3/mm3 0.04 0.05 0.04 0.05 0.04 0.04   < > 0.09   IMMATURE GRANS (ABS) 10*3/mm3 0.02 0.02 0.01 0.02 0.07* 0.01   < >  --    NRBC /100 WBC 0.0 0.0 0.0 0.0 0.0 0.0   < > 1.0*   NEUTROPHIL % %  --   --   --   --   --   --   --  55.6   MONOCYTES % %  --   --   --   --   --   --   --  4.0*   BASOPHIL % %  --   --   --   --   --   --   --  1.0   ANISOCYTOSIS   --   --   --   --   --   --   --  Slight/1+   GIANT PLT   --   --   --   --   --   --   --  Slight/1+    < > = values in this interval not displayed.       Lab Results - Last 18 Months   Lab Units 12/09/20  1021 11/25/20  1000 11/18/20  1025 10/28/20  1012 10/19/20  1114 10/07/20  1033   GLUCOSE mg/dL 105* 127* 94 100* 101* 101*   SODIUM mmol/L 139 139 138 139 138 138   POTASSIUM mmol/L 3.7 3.8 3.9 4.0 3.9 4.1   CO2 mmol/L 26.0 26.0 26.0 27.0 25.0 26.0   CHLORIDE mmol/L 104 104 102 102 104 104   ANION GAP mmol/L 9.0 9.0 10.0 10.0 9.0 8.0   CREATININE mg/dL 0.65 0.59 0.56* 0.62 0.62 0.57   BUN mg/dL 20 17 20 20 16 23*   BUN / CREAT RATIO  30.8* 28.8* 35.7* 32.3* 25.8* 40.4*   CALCIUM mg/dL 9.3 9.9 10.7* 10.3 9.9 10.3   EGFR IF NONAFRICN AM mL/min/1.73 94 105 111 99 99 109   ALK PHOS U/L 90 93 81 81 81 83   TOTAL PROTEIN g/dL 7.4 7.7 7.4 7.5 7.6 7.5   ALT (SGPT) U/L 22 24 22 26 27 34*   AST (SGOT) U/L 17 22 21 24 24 28   BILIRUBIN mg/dL 0.6 0.6 0.6 0.7 0.4 0.5   ALBUMIN g/dL 4.40 4.60 4.60 4.60 4.70 4.60   GLOBULIN gm/dL 3.0 3.1 2.8 2.9 2.9 2.9       Lab Results - Last 18 Months   Lab Units 10/28/20  1012 10/19/20  1114 08/05/20  0938 06/24/20  1009 04/22/20  0851 02/27/20  0901   CEA ng/mL 1.72 1.87 1.26 1.97 2.26 1.76       Lab Results - Last 18 Months   Lab Units 12/09/20  1021 11/18/20  1025 10/28/20  1012 10/19/20  1114  08/05/20  0939 08/05/20  0938 06/24/20  1009 05/27/20  0916 04/22/20  0851 02/27/20  0901  12/12/19  0704   IRON mcg/dL 87 89  --  79 98  --   --  37 55 65   < >  --    TIBC mcg/dL 353 353  --  380 340  --   --  326 353 370   < >  --    IRON SATURATION % 25 25  --  21 29  --   --  11* 16* 18*   < >  --    FERRITIN ng/mL 138.90 164.30*  --  141.00 216.70*  --  197.90*  --  199.00* 134.20   < >  --    TSH uIU/mL  --   --   --   --   --   --   --   --   --   --   --  0.798   FOLATE ng/mL  --   --  >20.00 >20.00  --  >20.00  --   --  >20.00 >20.00  --   --     < > = values in this interval not displayed.         PAST MEDICAL HISTORY:  ALLERGIES:  Allergies   Allergen Reactions   • Demerol [Meperidine] Hives   • Morphine And Related Hives     CURRENT MEDICATIONS:  Outpatient Encounter Medications as of 12/18/2020   Medication Sig Dispense Refill   • ALPRAZolam (XANAX) 0.5 MG tablet Take 1 tablet by mouth 2 (Two) Times a Day As Needed for Anxiety. 60 tablet 2   • amitriptyline (ELAVIL) 25 MG tablet Take 2 tablets by mouth Every Night. 30 tablet 5   • anastrozole (ARIMIDEX) 1 MG tablet TAKE ONE TABLET BY MOUTH DAILY 30 tablet 5   • calcium carbonate-vitamin d (Calcium 600+D) 600-400 MG-UNIT per tablet Take 1 tablet by mouth 2 (Two) Times a Day. 60 tablet 3   • lidocaine-prilocaine (EMLA) 2.5-2.5 % cream Apply to port site 30 minutes before it is accessed.  Cover with occlusive dressing. 30 g 2   • Multiple Vitamins-Minerals (MULTIVITAMIN ADULT PO) Take 1 tablet by mouth Daily.     • ondansetron (ZOFRAN) 8 MG tablet Take 1 tablet by mouth Every 8 (Eight) Hours As Needed for Nausea or Vomiting. 30 tablet 0   • polyethylene glycol (MIRALAX) packet Take 17 g by mouth Daily.       No facility-administered encounter medications on file as of 12/18/2020.      Adult illnesses:  Colon polyps  Obesity  Spinal stenosis neck   Herniated cervical disc without myelopathy  Borderline blood pressure  History of dysphagia    Past  surgeries:  Anterior cervical discectomy w fusion, 09/2018  Appendectomy  BSO/GANGA  Cholecystectomy  Colonoscopy, 02/10/2017  BTL  Tonsillectomy  Breast biopsy  Left partial mastectomy, 01/10/2020  03/05/2020 - Mediport placement per Dr. Edmond    ADULT ILLNESSES:  Patient Active Problem List   Diagnosis Code   • Family history of colon cancer Z80.0   • Colon polyps K63.5   • Laryngopharyngeal reflux K21.9   • Pharyngoesophageal dysphagia R13.14   • Non-smoker Z78.9   • Obesity (BMI 30-39.9) E66.9   • Spinal stenosis in cervical region M48.02   • Cervical radiculopathy M54.12   • Herniated cervical disc without myelopathy M50.20   • Borderline blood pressure R03.0   • BMI 31.0-31.9,adult Z68.31   • BMI 33.0-33.9,adult Z68.33   • Invasive ductal carcinoma of breast, female, left (CMS/HCC) C50.912   • Former smoker Z87.891   • S/P lumpectomy, left breast Z98.890   • S/P lymph node biopsy Z98.890   • On antineoplastic chemotherapy Z79.899   • Estrogen receptor positive status (ER+) Z17.0   • History of radiation therapy Z92.3     SURGERIES:  Past Surgical History:   Procedure Laterality Date   • ANTERIOR CERVICAL DISCECTOMY W/ FUSION N/A 9/7/2018    Procedure: CERVICAL DISCECTOMY ANTERIOR WITH FUSION C6-7;  Surgeon: Chris Mcfadden MD;  Location: [facility] OR;  Service: Neurosurgery   • APPENDECTOMY     • BILATERAL SALPINGO OOPHORECTOMY     • BREAST BIOPSY     • BREAST LUMPECTOMY     • CERVICAL CORPECTOMY N/A 9/7/2018    Procedure: CERVICAL CORPECTOMY C6-7;  Surgeon: Chris Mcfadden MD;  Location: [facility] OR;  Service: Neurosurgery   • CHOLECYSTECTOMY     • COLONOSCOPY  09/05/2012   • COLONOSCOPY N/A 2/10/2017    Procedure: COLONOSCOPY WITH ANESTHESIA;  Surgeon: Elina Gonsalez MD;  Location: [facility] ENDOSCOPY;  Service:    • HYSTERECTOMY  1998    Had hysterectomy for painful periods and bleeding. (Dr. Patel) H w/ BSO   • LAPAROSCOPIC TUBAL LIGATION     • MASTECTOMY W/ SENTINEL NODE BIOPSY Left 1/10/2020    Procedure:  LEFT NEEDLE DIRECTED ULTRASOUND GUIDED PARTIAL MASTECTOMY WITH SENTINEL LYMPH NODE BIOPSY, INJECTION AND SCAN, RADIOLOGIST WILL INJECT;  Surgeon: Flor Edmond MD;  Location:  [facility] OR;  Service: General   • TONSILLECTOMY     • VENOUS ACCESS DEVICE (PORT) INSERTION N/A 3/5/2020    Procedure: INSERTION VENOUS ACCESS DEVICE EXCISION SKIN LESION X 2 CHEST AND ABDOMEN;  Surgeon: Flor Edmond MD;  Location:  [facility] OR;  Service: General;  Laterality: N/A;     HEALTH MAINTENANCE ITEMS:  Health Maintenance Due   Topic Date Due   • ZOSTER VACCINE (1 of 2) 06/06/2012   • INFLUENZA VACCINE  08/01/2020   • ANNUAL PHYSICAL  12/03/2020       <no information>  Last Completed Colonoscopy       Status Date      COLONOSCOPY Done 2/10/2017 Surg:COLONOSCOPY     Patient has more history with this topic...        Immunization History   Administered Date(s) Administered   • Tdap 08/29/2018     Last Completed Mammogram       Status Date      MAMMOGRAM Done 12/17/2020 MAMMO DIAGNOSTIC DIGITAL TOMOSYNTHESIS BILATERAL W CAD     Patient has more history with this topic...            FAMILY HISTORY:  Family History   Problem Relation Age of Onset   • Colon cancer Maternal Grandmother         age not known   • Cancer Father    • Heart disease Father    • Diabetes Mother    • Hypertension Mother    • Stroke Mother    • Breast cancer Sister    • No Known Problems Daughter    • No Known Problems Sister    • Colon polyps Neg Hx    • Esophageal cancer Neg Hx    • Liver cancer Neg Hx    • Liver disease Neg Hx    • Rectal cancer Neg Hx    • Stomach cancer Neg Hx    • Ovarian cancer Neg Hx    • BRCA 1/2 Neg Hx    • Endometrial cancer Neg Hx      SOCIAL HISTORY:  Social History     Socioeconomic History   • Marital status:      Spouse name: Not on file   • Number of children: 1   • Years of education: Not on file   • Highest education level: Not on file   Tobacco Use   • Smoking status: Never Smoker   • Smokeless tobacco: Never Used   •  "Tobacco comment: \"i never really smoked\"   Substance and Sexual Activity   • Alcohol use: Not Currently     Frequency: Never   • Drug use: No   • Sexual activity: Yes     Partners: Male     Birth control/protection: Surgical     I reviewed the ROS as documented here and confirmed the accuracy of it with the patient today. 12/18/2020     REVIEW OF SYSTEMS:  Review of Systems   Constitutional: Negative.         She manages her ADLs' including chores, errands, driving.  Is still working remotely at home.  Has been sedentary    Herceptin tolerance:  \"Doing good.\"  No worsening fatigue.  No mouth sores.  No nausea, has not needed Zofran.  No vomiting, some dry skin otherwise no skin changes.  Near resolution of paresthesias of the fingers and improved in the toes.  No loss of fine motor function.  No loose stools.  Has not required Imodium.  No overt diarrhea.     Arimidex tolerance: No problems.  No new arthralgias.  Moderate hot flashes.  Says Dr. Martel has prescribed Elavil but she has not yet taken   HENT: Negative.    Eyes: Negative.    Respiratory: Negative.    Cardiovascular: Negative.    Gastrointestinal: Negative for diarrhea, nausea and GERD (\"Not really.\" Still on omeprazole as needed).   Endocrine: Positive for heat intolerance (worsening vasomotor changes.  Has been written for Elavil last 12/16/2020 by Dr. Martel but she has yet to fill it).   Genitourinary: Negative.    Musculoskeletal: Negative for arthralgias (knees, and hips \"been ok.\" Does not impede her activities.).   Allergic/Immunologic: Negative.    Neurological: Positive for numbness (paresthesias of the toes> fingers.  Says improving each time).   Hematological: Negative.    Psychiatric/Behavioral: Negative for depressed mood. The patient is not nervous/anxious (\"not bad\").        /80   Pulse 85   Temp 96.8 °F (36 °C)   Resp 16   Ht 152.4 cm (60\")   Wt 78.2 kg (172 lb 4.8 oz)   SpO2 97%   Breastfeeding No   BMI 33.65 kg/m²  Body " surface area is 1.75 meters squared.  Pain Score    12/18/20 1058   PainSc: 0-No pain     I have reexamined the patient and the results are consistent with the previously documented exam. Tommy Wheat MD     Physical Exam:  Physical Exam   Constitutional: She is oriented to person, place, and time. She appears well-developed and well-nourished. No distress.   Pleasant, heavy set, cooperative, modestly kept female.  Appears her age.  ECOG 0.      She has lost 2 pounds (had gained 4 pounds at her prior visit) in the interval.   HENT:   Head: Normocephalic and atraumatic.   Mouth/Throat: No oropharyngeal exudate.   Gradual hair growth    Wearing a surgical mask   Eyes: Pupils are equal, round, and reactive to light. Conjunctivae are normal. No scleral icterus.   Neck: Normal range of motion. Neck supple. No JVD present. No tracheal deviation present. No thyromegaly present.   Cardiovascular: Normal rate, regular rhythm and normal heart sounds. Exam reveals no friction rub.   No murmur heard.  Pulmonary/Chest: Effort normal and breath sounds normal. No stridor. No respiratory distress. She has no wheezes. She has no rales.   Port in left upper chest is well seated   Abdominal: Soft. Bowel sounds are normal. She exhibits no distension and no mass. There is no abdominal tenderness. There is no rebound and no guarding.   Musculoskeletal: Normal range of motion. No deformity.   Lymphadenopathy:     She has no cervical adenopathy.   Neurological: She is alert and oriented to person, place, and time. She displays normal reflexes. No cranial nerve deficit. She exhibits normal muscle tone. Coordination normal.   Skin: Skin is warm and dry. No rash noted. No erythema. No pallor.   Psychiatric: Her behavior is normal. Judgment and thought content normal.   Vitals reviewed.  Breasts: Again note that the left breast lumpectomy and sentinel node biopsy site in the left axilla are healed.  There is no incisional tenderness in  either site.  There remains radiation associated skin pigmentation and induration but no skin disruption.  The rest of the breast is without obvious nodularity skin changes, no nipple discharge.  Right breast without masses, skin changes nor nipple discharge.    ASSESSMENT:   1.  Invasive and in situ ductal carcinoma  left breast.                 Stage:  IA (pT1a, pN0, G2) ER  100% positive, KS  25%, HER-2/lamberto - 2+ (IHC)/FISH -a focal positive score is present in an area corresponding to approximately 15% of the tumor (signal ratio: 2.9).                 Tumor [place]:  5 mm.  Tumor focality: Single focus of invasive carcinoma.  No EIC nor LCIS.  Lymphovascular invasion: Not identified.  Dermal lymphovascular invasion: No skin present.  Microcalcifications: Not identified.  Margins uninvolved by invasive carcinoma.  Lymph nodes: Number of lymph nodes examined: 1 (0/1).                 Tumor Status:  Underwent lumpectomy and SNB, 01/10/2020.   Adjuvant Arimidex since 02/03/2020  -- 12/17/2020-Mammogram- [id number]          2.   Obesity.  Moderate (BMI 34)        3.   Spinal stenosis neck, quiescent         4.   Herniated cervical disc without myelopathy        5.   Anemia, likely chemo associated.  Hgb 12.9, 12/09/2020 (prior: Hgb 11.5-13.8)        6.   GERD, quiescent       RECOMMENDATIONS:         1.   Draw CEA and CA 27-29 if not done        2.    Re: Apprised of the labs, 12/09/2020 (above).  Normal CBC, CMP, pending CEA, pending CA-27-29, repleted B12/folate, repleted serum iron, iron saturation 25%, repleted ferritin (138; from 141; from 216)..  Ferrous sulfate stopped last visit.          3.    Herceptin tolerance discussed.  Doing well with no problems.  Is willing to press on.          4.    Mammogram report, 12/17/202 noted-YUDY        5.    Schedule 2D echo for 01/20/2021        6.    Previously noted that we are unable to repeat FISH testing for HER-2 on the tumor specimen from 01/10/2020  -pathology block did not contain any more tumor - personally discussed/confirmed with Dr. Burgos of pathology.        7.   Care previously discussed with Dr. Jaimes on 02/28/2020 after she saw the patient on 02/25/2020 (encounter reviewed).  Pathology was reviewed at [facility] and discussed with the patient.  Recommendations/plan: Discussed that since her cancer is strongly ER positive and low histologic grade/low proliferative rate, and HER-2 on the biopsy was borderline recommended repeat HER-2 testing on her surgical pathology.  Defer to his negative proceed with only endocrine therapy and radiation.  If HER-2 is confirmed to be positive, recommend adjuvant weekly Taxol x12+ Herceptin every 3 weeks to complete 1 year of HER-2 directed therapy.  These plans were personally discussed with the patient (via telephone) on 02/28/2020 I also personally asked the pathologist (Dr. Radha Burgos) to send off the biopsy specimen for repeat FISH HER-2 testing.          8.   Reviewed NCCN guidelines version 3.2019 invasive breast cancer.  For tumors (=/> 5 mm), and pN0, recommend adjuvant endocrine therapy +/- adjuvant chemotherapy with trastuzumab (category 2B).          9.  Previously discussed the potential toxicities of adjuvant chemotherapy + Herceptin (to include but not limited to: Asthenia, cardiotoxicity, myelosuppression) are discussed at length. The rationale for adjuvant hormonal manipulation with AIs (see above) is discussed (to include but not limited to: Hot flashes, asthenia, pain, arthralgia, arthritis, nausea, bone pains, edema, fatigue, headache, venous thrombosis, anemia, leukopenia, depression, rash, pharyngitis, weight gain, dyspnea, fractures, breast pains, infection, angina). Questions answered to the best of my ability, and to her apparent satisfaction. She is willing to press on with therapy.   10.  Schedule treatment at [facility] -     · Herceptin 6 mg/kg beginning C2 (TD = 450 mg) per administration  guidelines every 3 weeks x 1 year (17 cycles)-C15, 12/30/2020; C16, 01/20/2021; C17, 02/10/2021.                     -  Premed before Herceptin -   · Tylenol 500 mg orally.   · Benadryl 25 mg IVP over 20-30 min.                11.  Rx:   · Oscal 600/400 po bid # 60 - OTC  · Arimidex 1 mg daily # 30 x 3 RF-eRx          12.  CMP and CBC with diff weekly with Procrit 40,000 units subcutaneous if Hgb less than 10 or Hct less than 30; Neupogen 480 mcg subcutaneously daily x4 if ANC less than 1.0- [facility]        13. Previously discussed the rationale for adjuvant hormonal manipulation with AIs (see above) and potential toxicities of AI therapy are discussed (to include but not limited to: Hot flashes, asthenia, pain, arthralgias, arthritis, nausea/vomiting, headache, pharyngitis, depression, rash, hypertension, lymphedema, insomnia, edema, weight gain, dyspnea, abdominal pain, constipation, hyperlipidemia, osteoporosis, fractures, cough, bone pain, diarrhea, breast pain, paresthesias, infections, cataracts, myalgias, thromboembolism, angina, Lyles-Yung syndrome, erythema multiforme, anemia, leukopenia, stroke, myocardial infarction, osteoporosis, fractures). Questions are answered. She agrees to resume therapy at the terminus of radiation.       14.  Return to the [facility] office with pre-office serum iron, iron saturation, ferritin B12, folate, CMP, CEA, CA 27-29 and CBC and differential on 02/26/2021.      .     MEDICAL DECISION MAKING: Moderate complexity   AMOUNT OF DATA: Extensive     I spent 35 total minutes, face-to-face, caring for Chasidy nova.  Greater than 50% of this time involved counseling and/or coordination of care as documented within this note regarding the patient's illness(es), pros and cons of various treatment options, instructions and/or risk reduction.     Cc:  Abigail Jaimes MD- [place] [facility]          MD Kailash Sawant MD Robert Learch, MD

## 2020-12-29 DIAGNOSIS — C50.912 INVASIVE DUCTAL CARCINOMA OF BREAST, FEMALE, LEFT (HCC): Primary | ICD-10-CM

## 2020-12-29 RX ORDER — FAMOTIDINE 10 MG/ML
20 INJECTION, SOLUTION INTRAVENOUS AS NEEDED
Status: CANCELLED | OUTPATIENT
Start: 2020-12-30

## 2020-12-29 RX ORDER — ACETAMINOPHEN 325 MG/1
500 TABLET ORAL ONCE
Status: CANCELLED
Start: 2020-12-30

## 2020-12-29 RX ORDER — SODIUM CHLORIDE 9 MG/ML
250 INJECTION, SOLUTION INTRAVENOUS ONCE
Status: CANCELLED | OUTPATIENT
Start: 2020-12-30

## 2020-12-29 RX ORDER — DIPHENHYDRAMINE HYDROCHLORIDE 50 MG/ML
50 INJECTION INTRAMUSCULAR; INTRAVENOUS AS NEEDED
Status: CANCELLED | OUTPATIENT
Start: 2020-12-30

## 2020-12-30 ENCOUNTER — INFUSION (OUTPATIENT)
Dept: ONCOLOGY | Facility: HOSPITAL | Age: 60
End: 2020-12-30

## 2020-12-30 ENCOUNTER — LAB (OUTPATIENT)
Dept: LAB | Facility: HOSPITAL | Age: 60
End: 2020-12-30

## 2020-12-30 VITALS
HEART RATE: 82 BPM | HEIGHT: 60 IN | OXYGEN SATURATION: 99 % | TEMPERATURE: 97 F | BODY MASS INDEX: 34.36 KG/M2 | DIASTOLIC BLOOD PRESSURE: 76 MMHG | WEIGHT: 175 LBS | RESPIRATION RATE: 16 BRPM | SYSTOLIC BLOOD PRESSURE: 135 MMHG

## 2020-12-30 DIAGNOSIS — C50.912 INVASIVE DUCTAL CARCINOMA OF BREAST, FEMALE, LEFT (HCC): ICD-10-CM

## 2020-12-30 DIAGNOSIS — C50.912 INVASIVE DUCTAL CARCINOMA OF BREAST, FEMALE, LEFT (HCC): Primary | ICD-10-CM

## 2020-12-30 LAB
ALBUMIN SERPL-MCNC: 4.3 G/DL (ref 3.5–5.2)
ALBUMIN/GLOB SERPL: 1.3 G/DL
ALP SERPL-CCNC: 91 U/L (ref 39–117)
ALT SERPL W P-5'-P-CCNC: 24 U/L (ref 1–33)
ANION GAP SERPL CALCULATED.3IONS-SCNC: 7 MMOL/L (ref 5–15)
AST SERPL-CCNC: 18 U/L (ref 1–32)
BASOPHILS # BLD AUTO: 0.04 10*3/MM3 (ref 0–0.2)
BASOPHILS NFR BLD AUTO: 0.8 % (ref 0–1.5)
BILIRUB SERPL-MCNC: 0.4 MG/DL (ref 0–1.2)
BUN SERPL-MCNC: 14 MG/DL (ref 6–20)
BUN/CREAT SERPL: 24.1 (ref 7–25)
CALCIUM SPEC-SCNC: 9.5 MG/DL (ref 8.6–10.5)
CEA SERPL-MCNC: 1.89 NG/ML
CHLORIDE SERPL-SCNC: 106 MMOL/L (ref 98–107)
CHOLEST SERPL-MCNC: 207 MG/DL (ref 0–200)
CO2 SERPL-SCNC: 27 MMOL/L (ref 22–29)
CREAT SERPL-MCNC: 0.58 MG/DL (ref 0.57–1)
DEPRECATED RDW RBC AUTO: 42.4 FL (ref 37–54)
EOSINOPHIL # BLD AUTO: 0.06 10*3/MM3 (ref 0–0.4)
EOSINOPHIL NFR BLD AUTO: 1.1 % (ref 0.3–6.2)
ERYTHROCYTE [DISTWIDTH] IN BLOOD BY AUTOMATED COUNT: 12.9 % (ref 12.3–15.4)
GFR SERPL CREATININE-BSD FRML MDRD: 107 ML/MIN/1.73
GLOBULIN UR ELPH-MCNC: 3.2 GM/DL
GLUCOSE SERPL-MCNC: 90 MG/DL (ref 65–99)
HBA1C MFR BLD: 5.4 % (ref 4.8–5.6)
HCT VFR BLD AUTO: 40.5 % (ref 34–46.6)
HDLC SERPL-MCNC: 64 MG/DL (ref 40–60)
HGB BLD-MCNC: 13.4 G/DL (ref 12–15.9)
IMM GRANULOCYTES # BLD AUTO: 0.01 10*3/MM3 (ref 0–0.05)
IMM GRANULOCYTES NFR BLD AUTO: 0.2 % (ref 0–0.5)
LDLC SERPL CALC-MCNC: 131 MG/DL (ref 0–100)
LDLC/HDLC SERPL: 2.03 {RATIO}
LYMPHOCYTES # BLD AUTO: 2.23 10*3/MM3 (ref 0.7–3.1)
LYMPHOCYTES NFR BLD AUTO: 41.9 % (ref 19.6–45.3)
MCH RBC QN AUTO: 30 PG (ref 26.6–33)
MCHC RBC AUTO-ENTMCNC: 33.1 G/DL (ref 31.5–35.7)
MCV RBC AUTO: 90.8 FL (ref 79–97)
MONOCYTES # BLD AUTO: 0.35 10*3/MM3 (ref 0.1–0.9)
MONOCYTES NFR BLD AUTO: 6.6 % (ref 5–12)
NEUTROPHILS NFR BLD AUTO: 2.63 10*3/MM3 (ref 1.7–7)
NEUTROPHILS NFR BLD AUTO: 49.4 % (ref 42.7–76)
NRBC BLD AUTO-RTO: 0 /100 WBC (ref 0–0.2)
PLATELET # BLD AUTO: 291 10*3/MM3 (ref 140–450)
PMV BLD AUTO: 9.4 FL (ref 6–12)
POTASSIUM SERPL-SCNC: 4 MMOL/L (ref 3.5–5.2)
PROT SERPL-MCNC: 7.5 G/DL (ref 6–8.5)
RBC # BLD AUTO: 4.46 10*6/MM3 (ref 3.77–5.28)
SODIUM SERPL-SCNC: 140 MMOL/L (ref 136–145)
TRIGL SERPL-MCNC: 66 MG/DL (ref 0–150)
TSH SERPL DL<=0.05 MIU/L-ACNC: 1.27 UIU/ML (ref 0.27–4.2)
VLDLC SERPL-MCNC: 12 MG/DL (ref 5–40)
WBC # BLD AUTO: 5.32 10*3/MM3 (ref 3.4–10.8)

## 2020-12-30 PROCEDURE — 82378 CARCINOEMBRYONIC ANTIGEN: CPT

## 2020-12-30 PROCEDURE — 25010000002 TRASTUZUMAB-ANNS 150 MG RECONSTITUTED SOLUTION 1 EACH VIAL: Performed by: NURSE PRACTITIONER

## 2020-12-30 PROCEDURE — 85025 COMPLETE CBC W/AUTO DIFF WBC: CPT

## 2020-12-30 PROCEDURE — 84443 ASSAY THYROID STIM HORMONE: CPT | Performed by: OBSTETRICS & GYNECOLOGY

## 2020-12-30 PROCEDURE — 25010000002 DIPHENHYDRAMINE PER 50 MG: Performed by: NURSE PRACTITIONER

## 2020-12-30 PROCEDURE — 83036 HEMOGLOBIN GLYCOSYLATED A1C: CPT | Performed by: OBSTETRICS & GYNECOLOGY

## 2020-12-30 PROCEDURE — 80061 LIPID PANEL: CPT | Performed by: OBSTETRICS & GYNECOLOGY

## 2020-12-30 PROCEDURE — 25010000003 HEPARIN LOCK FLUSH PER 10 UNITS: Performed by: INTERNAL MEDICINE

## 2020-12-30 PROCEDURE — 96367 TX/PROPH/DG ADDL SEQ IV INF: CPT

## 2020-12-30 PROCEDURE — 96413 CHEMO IV INFUSION 1 HR: CPT

## 2020-12-30 PROCEDURE — 80053 COMPREHEN METABOLIC PANEL: CPT

## 2020-12-30 PROCEDURE — 86300 IMMUNOASSAY TUMOR CA 15-3: CPT

## 2020-12-30 RX ORDER — DIPHENHYDRAMINE HYDROCHLORIDE 50 MG/ML
50 INJECTION INTRAMUSCULAR; INTRAVENOUS AS NEEDED
Status: DISCONTINUED | OUTPATIENT
Start: 2020-12-30 | End: 2020-12-30 | Stop reason: HOSPADM

## 2020-12-30 RX ORDER — ACETAMINOPHEN 500 MG
500 TABLET ORAL ONCE
Status: COMPLETED | OUTPATIENT
Start: 2020-12-30 | End: 2020-12-30

## 2020-12-30 RX ORDER — HEPARIN SODIUM (PORCINE) LOCK FLUSH IV SOLN 100 UNIT/ML 100 UNIT/ML
500 SOLUTION INTRAVENOUS AS NEEDED
Status: DISCONTINUED | OUTPATIENT
Start: 2020-12-30 | End: 2020-12-30 | Stop reason: HOSPADM

## 2020-12-30 RX ORDER — SODIUM CHLORIDE 9 MG/ML
250 INJECTION, SOLUTION INTRAVENOUS ONCE
Status: COMPLETED | OUTPATIENT
Start: 2020-12-30 | End: 2020-12-30

## 2020-12-30 RX ORDER — FAMOTIDINE 10 MG/ML
20 INJECTION, SOLUTION INTRAVENOUS AS NEEDED
Status: DISCONTINUED | OUTPATIENT
Start: 2020-12-30 | End: 2020-12-30 | Stop reason: HOSPADM

## 2020-12-30 RX ORDER — SODIUM CHLORIDE 0.9 % (FLUSH) 0.9 %
10 SYRINGE (ML) INJECTION AS NEEDED
Status: CANCELLED | OUTPATIENT
Start: 2020-12-30

## 2020-12-30 RX ORDER — SODIUM CHLORIDE 0.9 % (FLUSH) 0.9 %
10 SYRINGE (ML) INJECTION AS NEEDED
Status: DISCONTINUED | OUTPATIENT
Start: 2020-12-30 | End: 2020-12-30 | Stop reason: HOSPADM

## 2020-12-30 RX ORDER — HEPARIN SODIUM (PORCINE) LOCK FLUSH IV SOLN 100 UNIT/ML 100 UNIT/ML
500 SOLUTION INTRAVENOUS AS NEEDED
Status: CANCELLED | OUTPATIENT
Start: 2020-12-30

## 2020-12-30 RX ADMIN — SODIUM CHLORIDE 250 ML: 9 INJECTION, SOLUTION INTRAVENOUS at 11:05

## 2020-12-30 RX ADMIN — SODIUM CHLORIDE, PRESERVATIVE FREE 10 ML: 5 INJECTION INTRAVENOUS at 12:09

## 2020-12-30 RX ADMIN — Medication 500 UNITS: at 12:09

## 2020-12-30 RX ADMIN — DIPHENHYDRAMINE HYDROCHLORIDE 25 MG: 50 INJECTION, SOLUTION INTRAMUSCULAR; INTRAVENOUS at 11:06

## 2020-12-30 RX ADMIN — TRASTUZUMAB-ANNS 450 MG: 150 INJECTION, POWDER, LYOPHILIZED, FOR SOLUTION INTRAVENOUS at 11:28

## 2020-12-30 RX ADMIN — ACETAMINOPHEN 500 MG: 500 TABLET, FILM COATED ORAL at 11:06

## 2020-12-31 LAB — CANCER AG27-29 SERPL-ACNC: 19.5 U/ML (ref 0–38.6)

## 2021-01-09 ENCOUNTER — IMMUNIZATION (OUTPATIENT)
Dept: VACCINE CLINIC | Facility: HOSPITAL | Age: 61
End: 2021-01-09

## 2021-01-09 PROCEDURE — 91301 HC SARSCO02 VAC 100MCG/0.5ML IM: CPT | Performed by: OBSTETRICS & GYNECOLOGY

## 2021-01-09 PROCEDURE — 0011A: CPT | Performed by: OBSTETRICS & GYNECOLOGY

## 2021-01-19 ENCOUNTER — HOSPITAL ENCOUNTER (OUTPATIENT)
Dept: CARDIOLOGY | Facility: HOSPITAL | Age: 61
Discharge: HOME | End: 2021-01-19
Admitting: INTERNAL MEDICINE

## 2021-01-19 DIAGNOSIS — C50.912 INVASIVE DUCTAL CARCINOMA OF BREAST, FEMALE, LEFT (HCC): Primary | ICD-10-CM

## 2021-01-19 DIAGNOSIS — C50.912 INVASIVE DUCTAL CARCINOMA OF BREAST, FEMALE, LEFT (HCC): ICD-10-CM

## 2021-01-19 PROCEDURE — 25010000002 PERFLUTREN 6.52 MG/ML SUSPENSION: Performed by: INTERNAL MEDICINE

## 2021-01-19 PROCEDURE — 93306 TTE W/DOPPLER COMPLETE: CPT

## 2021-01-19 PROCEDURE — 93356 MYOCRD STRAIN IMG SPCKL TRCK: CPT | Performed by: INTERNAL MEDICINE

## 2021-01-19 PROCEDURE — 93356 MYOCRD STRAIN IMG SPCKL TRCK: CPT

## 2021-01-19 PROCEDURE — 93306 TTE W/DOPPLER COMPLETE: CPT | Performed by: INTERNAL MEDICINE

## 2021-01-19 RX ORDER — FAMOTIDINE 10 MG/ML
20 INJECTION, SOLUTION INTRAVENOUS AS NEEDED
Status: CANCELLED | OUTPATIENT
Start: 2021-01-20

## 2021-01-19 RX ORDER — ACETAMINOPHEN 325 MG/1
500 TABLET ORAL ONCE
Status: CANCELLED
Start: 2021-01-20

## 2021-01-19 RX ORDER — DIPHENHYDRAMINE HYDROCHLORIDE 50 MG/ML
50 INJECTION INTRAMUSCULAR; INTRAVENOUS AS NEEDED
Status: CANCELLED | OUTPATIENT
Start: 2021-01-20

## 2021-01-19 RX ORDER — SODIUM CHLORIDE 9 MG/ML
250 INJECTION, SOLUTION INTRAVENOUS ONCE
Status: CANCELLED | OUTPATIENT
Start: 2021-01-20

## 2021-01-19 RX ADMIN — PERFLUTREN 9.78 MG: 6.52 INJECTION, SUSPENSION INTRAVENOUS at 14:22

## 2021-01-20 ENCOUNTER — INFUSION (OUTPATIENT)
Dept: ONCOLOGY | Facility: HOSPITAL | Age: 61
End: 2021-01-20

## 2021-01-20 ENCOUNTER — LAB (OUTPATIENT)
Dept: LAB | Facility: HOSPITAL | Age: 61
End: 2021-01-20

## 2021-01-20 VITALS
WEIGHT: 171.8 LBS | DIASTOLIC BLOOD PRESSURE: 73 MMHG | HEIGHT: 60 IN | HEART RATE: 84 BPM | TEMPERATURE: 97.9 F | RESPIRATION RATE: 17 BRPM | BODY MASS INDEX: 33.73 KG/M2 | SYSTOLIC BLOOD PRESSURE: 139 MMHG | OXYGEN SATURATION: 100 %

## 2021-01-20 DIAGNOSIS — C50.912 INVASIVE DUCTAL CARCINOMA OF BREAST, FEMALE, LEFT (HCC): ICD-10-CM

## 2021-01-20 DIAGNOSIS — C50.912 INVASIVE DUCTAL CARCINOMA OF BREAST, FEMALE, LEFT (HCC): Primary | ICD-10-CM

## 2021-01-20 LAB
ALBUMIN SERPL-MCNC: 4.5 G/DL (ref 3.5–5.2)
ALBUMIN/GLOB SERPL: 1.3 G/DL
ALP SERPL-CCNC: 89 U/L (ref 39–117)
ALT SERPL W P-5'-P-CCNC: 24 U/L (ref 1–33)
ANION GAP SERPL CALCULATED.3IONS-SCNC: 10 MMOL/L (ref 5–15)
AST SERPL-CCNC: 24 U/L (ref 1–32)
BASOPHILS # BLD AUTO: 0.06 10*3/MM3 (ref 0–0.2)
BASOPHILS NFR BLD AUTO: 1 % (ref 0–1.5)
BH CV ECHO MEAS - AO MAX PG (FULL): 0.44 MMHG
BH CV ECHO MEAS - AO MAX PG: 7.8 MMHG
BH CV ECHO MEAS - AO MEAN PG (FULL): 1 MMHG
BH CV ECHO MEAS - AO MEAN PG: 5 MMHG
BH CV ECHO MEAS - AO ROOT AREA (BSA CORRECTED): 1.4
BH CV ECHO MEAS - AO ROOT AREA: 5.3 CM^2
BH CV ECHO MEAS - AO ROOT DIAM: 2.6 CM
BH CV ECHO MEAS - AO V2 MAX: 140 CM/SEC
BH CV ECHO MEAS - AO V2 MEAN: 101 CM/SEC
BH CV ECHO MEAS - AO V2 VTI: 25.6 CM
BH CV ECHO MEAS - AVA(I,A): 3.2 CM^2
BH CV ECHO MEAS - AVA(I,D): 3.2 CM^2
BH CV ECHO MEAS - AVA(V,A): 3.4 CM^2
BH CV ECHO MEAS - AVA(V,D): 3.4 CM^2
BH CV ECHO MEAS - BSA(HAYCOCK): 1.9 M^2
BH CV ECHO MEAS - BSA: 1.8 M^2
BH CV ECHO MEAS - BZI_BMI: 31 KILOGRAMS/M^2
BH CV ECHO MEAS - BZI_METRIC_HEIGHT: 160 CM
BH CV ECHO MEAS - BZI_METRIC_WEIGHT: 79.4 KG
BH CV ECHO MEAS - EDV(CUBED): 88.7 ML
BH CV ECHO MEAS - EDV(MOD-SP4): 33.7 ML
BH CV ECHO MEAS - EDV(TEICH): 90.5 ML
BH CV ECHO MEAS - EF(CUBED): 72.5 %
BH CV ECHO MEAS - EF(MOD-SP4): 76 %
BH CV ECHO MEAS - EF(TEICH): 64.4 %
BH CV ECHO MEAS - ESV(CUBED): 24.4 ML
BH CV ECHO MEAS - ESV(MOD-SP4): 8.1 ML
BH CV ECHO MEAS - ESV(TEICH): 32.2 ML
BH CV ECHO MEAS - FS: 35 %
BH CV ECHO MEAS - IVS/LVPW: 1.4
BH CV ECHO MEAS - IVSD: 1.1 CM
BH CV ECHO MEAS - LA DIMENSION: 2.7 CM
BH CV ECHO MEAS - LA/AO: 1
BH CV ECHO MEAS - LAT PEAK E' VEL: 10.1 CM/SEC
BH CV ECHO MEAS - LV DIASTOLIC VOL/BSA (35-75): 18.4 ML/M^2
BH CV ECHO MEAS - LV MASS(C)D: 134 GRAMS
BH CV ECHO MEAS - LV MASS(C)DI: 73.4 GRAMS/M^2
BH CV ECHO MEAS - LV MAX PG: 7.4 MMHG
BH CV ECHO MEAS - LV MEAN PG: 4 MMHG
BH CV ECHO MEAS - LV SYSTOLIC VOL/BSA (12-30): 4.4 ML/M^2
BH CV ECHO MEAS - LV V1 MAX: 136 CM/SEC
BH CV ECHO MEAS - LV V1 MEAN: 91.3 CM/SEC
BH CV ECHO MEAS - LV V1 VTI: 23.7 CM
BH CV ECHO MEAS - LVIDD: 4.5 CM
BH CV ECHO MEAS - LVIDS: 2.9 CM
BH CV ECHO MEAS - LVLD AP4: 6.4 CM
BH CV ECHO MEAS - LVLS AP4: 4.8 CM
BH CV ECHO MEAS - LVOT AREA (M): 3.5 CM^2
BH CV ECHO MEAS - LVOT AREA: 3.5 CM^2
BH CV ECHO MEAS - LVOT DIAM: 2.1 CM
BH CV ECHO MEAS - LVPWD: 0.75 CM
BH CV ECHO MEAS - MED PEAK E' VEL: 9.03 CM/SEC
BH CV ECHO MEAS - MV A MAX VEL: 66.5 CM/SEC
BH CV ECHO MEAS - MV DEC TIME: 0.18 SEC
BH CV ECHO MEAS - MV E MAX VEL: 71.3 CM/SEC
BH CV ECHO MEAS - MV E/A: 1.1
BH CV ECHO MEAS - PA MAX PG: 2.6 MMHG
BH CV ECHO MEAS - PA V2 MAX: 81 CM/SEC
BH CV ECHO MEAS - RAP SYSTOLE: 5 MMHG
BH CV ECHO MEAS - RVSP: 24.5 MMHG
BH CV ECHO MEAS - SI(AO): 74.4 ML/M^2
BH CV ECHO MEAS - SI(CUBED): 35.2 ML/M^2
BH CV ECHO MEAS - SI(LVOT): 44.9 ML/M^2
BH CV ECHO MEAS - SI(MOD-SP4): 14 ML/M^2
BH CV ECHO MEAS - SI(TEICH): 31.9 ML/M^2
BH CV ECHO MEAS - SV(AO): 135.9 ML
BH CV ECHO MEAS - SV(CUBED): 64.3 ML
BH CV ECHO MEAS - SV(LVOT): 82.1 ML
BH CV ECHO MEAS - SV(MOD-SP4): 25.6 ML
BH CV ECHO MEAS - SV(TEICH): 58.3 ML
BH CV ECHO MEAS - TR MAX VEL: 221 CM/SEC
BH CV ECHO MEASUREMENTS AVERAGE E/E' RATIO: 7.45
BILIRUB SERPL-MCNC: 0.5 MG/DL (ref 0–1.2)
BUN SERPL-MCNC: 20 MG/DL (ref 6–20)
BUN/CREAT SERPL: 34.5 (ref 7–25)
CALCIUM SPEC-SCNC: 10 MG/DL (ref 8.6–10.5)
CHLORIDE SERPL-SCNC: 102 MMOL/L (ref 98–107)
CO2 SERPL-SCNC: 25 MMOL/L (ref 22–29)
CREAT SERPL-MCNC: 0.58 MG/DL (ref 0.57–1)
DEPRECATED RDW RBC AUTO: 41.2 FL (ref 37–54)
EOSINOPHIL # BLD AUTO: 0.09 10*3/MM3 (ref 0–0.4)
EOSINOPHIL NFR BLD AUTO: 1.4 % (ref 0.3–6.2)
ERYTHROCYTE [DISTWIDTH] IN BLOOD BY AUTOMATED COUNT: 12.9 % (ref 12.3–15.4)
FERRITIN SERPL-MCNC: 173.5 NG/ML (ref 13–150)
GFR SERPL CREATININE-BSD FRML MDRD: 107 ML/MIN/1.73
GLOBULIN UR ELPH-MCNC: 3.4 GM/DL
GLUCOSE SERPL-MCNC: 91 MG/DL (ref 65–99)
HCT VFR BLD AUTO: 39.9 % (ref 34–46.6)
HGB BLD-MCNC: 14 G/DL (ref 12–15.9)
IMM GRANULOCYTES # BLD AUTO: 0.01 10*3/MM3 (ref 0–0.05)
IMM GRANULOCYTES NFR BLD AUTO: 0.2 % (ref 0–0.5)
IRON 24H UR-MRATE: 81 MCG/DL (ref 37–145)
IRON SATN MFR SERPL: 23 % (ref 20–50)
LEFT ATRIUM VOLUME INDEX: 14.4 ML/M2
LEFT ATRIUM VOLUME: 26.3 CM3
LYMPHOCYTES # BLD AUTO: 2.88 10*3/MM3 (ref 0.7–3.1)
LYMPHOCYTES NFR BLD AUTO: 46.2 % (ref 19.6–45.3)
MAXIMAL PREDICTED HEART RATE: 162 BPM
MCH RBC QN AUTO: 30.7 PG (ref 26.6–33)
MCHC RBC AUTO-ENTMCNC: 35.1 G/DL (ref 31.5–35.7)
MCV RBC AUTO: 87.5 FL (ref 79–97)
MONOCYTES # BLD AUTO: 0.37 10*3/MM3 (ref 0.1–0.9)
MONOCYTES NFR BLD AUTO: 5.9 % (ref 5–12)
NEUTROPHILS NFR BLD AUTO: 2.83 10*3/MM3 (ref 1.7–7)
NEUTROPHILS NFR BLD AUTO: 45.3 % (ref 42.7–76)
NRBC BLD AUTO-RTO: 0 /100 WBC (ref 0–0.2)
PLATELET # BLD AUTO: 281 10*3/MM3 (ref 140–450)
PMV BLD AUTO: 9.7 FL (ref 6–12)
POTASSIUM SERPL-SCNC: 4.2 MMOL/L (ref 3.5–5.2)
PROT SERPL-MCNC: 7.9 G/DL (ref 6–8.5)
RBC # BLD AUTO: 4.56 10*6/MM3 (ref 3.77–5.28)
SODIUM SERPL-SCNC: 137 MMOL/L (ref 136–145)
STRESS TARGET HR: 138 BPM
TIBC SERPL-MCNC: 349 MCG/DL (ref 298–536)
TRANSFERRIN SERPL-MCNC: 234 MG/DL (ref 200–360)
WBC # BLD AUTO: 6.24 10*3/MM3 (ref 3.4–10.8)

## 2021-01-20 PROCEDURE — 96367 TX/PROPH/DG ADDL SEQ IV INF: CPT

## 2021-01-20 PROCEDURE — 96413 CHEMO IV INFUSION 1 HR: CPT

## 2021-01-20 PROCEDURE — 85025 COMPLETE CBC W/AUTO DIFF WBC: CPT

## 2021-01-20 PROCEDURE — 80053 COMPREHEN METABOLIC PANEL: CPT

## 2021-01-20 PROCEDURE — 84466 ASSAY OF TRANSFERRIN: CPT

## 2021-01-20 PROCEDURE — 83540 ASSAY OF IRON: CPT

## 2021-01-20 PROCEDURE — 25010000002 DIPHENHYDRAMINE PER 50 MG: Performed by: NURSE PRACTITIONER

## 2021-01-20 PROCEDURE — 25010000003 HEPARIN LOCK FLUSH PER 10 UNITS: Performed by: INTERNAL MEDICINE

## 2021-01-20 PROCEDURE — 25010000002 TRASTUZUMAB-ANNS 150 MG RECONSTITUTED SOLUTION 1 EACH VIAL: Performed by: NURSE PRACTITIONER

## 2021-01-20 PROCEDURE — 82728 ASSAY OF FERRITIN: CPT

## 2021-01-20 RX ORDER — DIPHENHYDRAMINE HYDROCHLORIDE 50 MG/ML
50 INJECTION INTRAMUSCULAR; INTRAVENOUS AS NEEDED
Status: DISCONTINUED | OUTPATIENT
Start: 2021-01-20 | End: 2021-01-20 | Stop reason: HOSPADM

## 2021-01-20 RX ORDER — HEPARIN SODIUM (PORCINE) LOCK FLUSH IV SOLN 100 UNIT/ML 100 UNIT/ML
500 SOLUTION INTRAVENOUS AS NEEDED
Status: DISCONTINUED | OUTPATIENT
Start: 2021-01-20 | End: 2021-01-20 | Stop reason: HOSPADM

## 2021-01-20 RX ORDER — HEPARIN SODIUM (PORCINE) LOCK FLUSH IV SOLN 100 UNIT/ML 100 UNIT/ML
500 SOLUTION INTRAVENOUS AS NEEDED
Status: CANCELLED | OUTPATIENT
Start: 2021-01-20

## 2021-01-20 RX ORDER — SODIUM CHLORIDE 9 MG/ML
250 INJECTION, SOLUTION INTRAVENOUS ONCE
Status: COMPLETED | OUTPATIENT
Start: 2021-01-20 | End: 2021-01-20

## 2021-01-20 RX ORDER — ACETAMINOPHEN 500 MG
500 TABLET ORAL ONCE
Status: COMPLETED | OUTPATIENT
Start: 2021-01-20 | End: 2021-01-20

## 2021-01-20 RX ORDER — SODIUM CHLORIDE 0.9 % (FLUSH) 0.9 %
10 SYRINGE (ML) INJECTION AS NEEDED
Status: DISCONTINUED | OUTPATIENT
Start: 2021-01-20 | End: 2021-01-20 | Stop reason: HOSPADM

## 2021-01-20 RX ORDER — SODIUM CHLORIDE 0.9 % (FLUSH) 0.9 %
10 SYRINGE (ML) INJECTION AS NEEDED
Status: CANCELLED | OUTPATIENT
Start: 2021-01-20

## 2021-01-20 RX ORDER — FAMOTIDINE 10 MG/ML
20 INJECTION, SOLUTION INTRAVENOUS AS NEEDED
Status: DISCONTINUED | OUTPATIENT
Start: 2021-01-20 | End: 2021-01-20 | Stop reason: HOSPADM

## 2021-01-20 RX ADMIN — DIPHENHYDRAMINE HYDROCHLORIDE 25 MG: 50 INJECTION, SOLUTION INTRAMUSCULAR; INTRAVENOUS at 11:12

## 2021-01-20 RX ADMIN — Medication 500 UNITS: at 12:14

## 2021-01-20 RX ADMIN — ACETAMINOPHEN 500 MG: 500 TABLET, FILM COATED ORAL at 11:11

## 2021-01-20 RX ADMIN — SODIUM CHLORIDE 250 ML: 9 INJECTION, SOLUTION INTRAVENOUS at 11:10

## 2021-01-20 RX ADMIN — SODIUM CHLORIDE, PRESERVATIVE FREE 10 ML: 5 INJECTION INTRAVENOUS at 12:14

## 2021-01-20 RX ADMIN — TRASTUZUMAB-ANNS 450 MG: 150 INJECTION, POWDER, LYOPHILIZED, FOR SOLUTION INTRAVENOUS at 11:33

## 2021-02-06 ENCOUNTER — IMMUNIZATION (OUTPATIENT)
Dept: VACCINE CLINIC | Facility: HOSPITAL | Age: 61
End: 2021-02-06

## 2021-02-06 PROCEDURE — 91301 HC SARSCO02 VAC 100MCG/0.5ML IM: CPT | Performed by: OBSTETRICS & GYNECOLOGY

## 2021-02-06 PROCEDURE — 0012A: CPT | Performed by: OBSTETRICS & GYNECOLOGY

## 2021-02-09 ENCOUNTER — TELEPHONE (OUTPATIENT)
Dept: ONCOLOGY | Facility: CLINIC | Age: 61
End: 2021-02-09

## 2021-02-09 DIAGNOSIS — C50.912 INVASIVE DUCTAL CARCINOMA OF BREAST, FEMALE, LEFT (HCC): Primary | ICD-10-CM

## 2021-02-09 RX ORDER — FAMOTIDINE 10 MG/ML
20 INJECTION, SOLUTION INTRAVENOUS AS NEEDED
Status: CANCELLED | OUTPATIENT
Start: 2021-02-10

## 2021-02-09 RX ORDER — DIPHENHYDRAMINE HYDROCHLORIDE 50 MG/ML
50 INJECTION INTRAMUSCULAR; INTRAVENOUS AS NEEDED
Status: CANCELLED | OUTPATIENT
Start: 2021-02-10

## 2021-02-09 RX ORDER — SODIUM CHLORIDE 9 MG/ML
250 INJECTION, SOLUTION INTRAVENOUS ONCE
Status: CANCELLED | OUTPATIENT
Start: 2021-02-10

## 2021-02-09 RX ORDER — ACETAMINOPHEN 325 MG/1
500 TABLET ORAL ONCE
Status: CANCELLED
Start: 2021-02-10

## 2021-02-10 ENCOUNTER — LAB (OUTPATIENT)
Dept: LAB | Facility: HOSPITAL | Age: 61
End: 2021-02-10

## 2021-02-10 ENCOUNTER — INFUSION (OUTPATIENT)
Dept: ONCOLOGY | Facility: HOSPITAL | Age: 61
End: 2021-02-10

## 2021-02-10 VITALS
HEART RATE: 73 BPM | OXYGEN SATURATION: 99 % | DIASTOLIC BLOOD PRESSURE: 67 MMHG | RESPIRATION RATE: 18 BRPM | BODY MASS INDEX: 33.51 KG/M2 | WEIGHT: 171.6 LBS | SYSTOLIC BLOOD PRESSURE: 122 MMHG | TEMPERATURE: 97.2 F

## 2021-02-10 DIAGNOSIS — C50.912 INVASIVE DUCTAL CARCINOMA OF BREAST, FEMALE, LEFT (HCC): Primary | ICD-10-CM

## 2021-02-10 DIAGNOSIS — C50.912 INVASIVE DUCTAL CARCINOMA OF BREAST, FEMALE, LEFT (HCC): ICD-10-CM

## 2021-02-10 LAB
ALBUMIN SERPL-MCNC: 4.2 G/DL (ref 3.5–5.2)
ALBUMIN/GLOB SERPL: 1.3 G/DL
ALP SERPL-CCNC: 90 U/L (ref 39–117)
ALT SERPL W P-5'-P-CCNC: 23 U/L (ref 1–33)
ANION GAP SERPL CALCULATED.3IONS-SCNC: 10 MMOL/L (ref 5–15)
AST SERPL-CCNC: 21 U/L (ref 1–32)
BASOPHILS # BLD AUTO: 0.04 10*3/MM3 (ref 0–0.2)
BASOPHILS NFR BLD AUTO: 0.6 % (ref 0–1.5)
BILIRUB SERPL-MCNC: 0.5 MG/DL (ref 0–1.2)
BUN SERPL-MCNC: 14 MG/DL (ref 6–20)
BUN/CREAT SERPL: 27.5 (ref 7–25)
CALCIUM SPEC-SCNC: 9.9 MG/DL (ref 8.6–10.5)
CEA SERPL-MCNC: 1.63 NG/ML
CHLORIDE SERPL-SCNC: 102 MMOL/L (ref 98–107)
CO2 SERPL-SCNC: 26 MMOL/L (ref 22–29)
CREAT SERPL-MCNC: 0.51 MG/DL (ref 0.57–1)
DEPRECATED RDW RBC AUTO: 42.4 FL (ref 37–54)
EOSINOPHIL # BLD AUTO: 0.08 10*3/MM3 (ref 0–0.4)
EOSINOPHIL NFR BLD AUTO: 1.3 % (ref 0.3–6.2)
ERYTHROCYTE [DISTWIDTH] IN BLOOD BY AUTOMATED COUNT: 13 % (ref 12.3–15.4)
FERRITIN SERPL-MCNC: 157.5 NG/ML (ref 13–150)
FOLATE SERPL-MCNC: >20 NG/ML (ref 4.78–24.2)
GFR SERPL CREATININE-BSD FRML MDRD: 124 ML/MIN/1.73
GLOBULIN UR ELPH-MCNC: 3.3 GM/DL
GLUCOSE SERPL-MCNC: 94 MG/DL (ref 65–99)
HCT VFR BLD AUTO: 40.2 % (ref 34–46.6)
HGB BLD-MCNC: 13.2 G/DL (ref 12–15.9)
IMM GRANULOCYTES # BLD AUTO: 0.01 10*3/MM3 (ref 0–0.05)
IMM GRANULOCYTES NFR BLD AUTO: 0.2 % (ref 0–0.5)
IRON 24H UR-MRATE: 74 MCG/DL (ref 37–145)
IRON SATN MFR SERPL: 24 % (ref 20–50)
LYMPHOCYTES # BLD AUTO: 2.26 10*3/MM3 (ref 0.7–3.1)
LYMPHOCYTES NFR BLD AUTO: 36.7 % (ref 19.6–45.3)
MCH RBC QN AUTO: 29.5 PG (ref 26.6–33)
MCHC RBC AUTO-ENTMCNC: 32.8 G/DL (ref 31.5–35.7)
MCV RBC AUTO: 89.7 FL (ref 79–97)
MONOCYTES # BLD AUTO: 0.46 10*3/MM3 (ref 0.1–0.9)
MONOCYTES NFR BLD AUTO: 7.5 % (ref 5–12)
NEUTROPHILS NFR BLD AUTO: 3.31 10*3/MM3 (ref 1.7–7)
NEUTROPHILS NFR BLD AUTO: 53.7 % (ref 42.7–76)
NRBC BLD AUTO-RTO: 0 /100 WBC (ref 0–0.2)
PLATELET # BLD AUTO: 290 10*3/MM3 (ref 140–450)
PMV BLD AUTO: 9.6 FL (ref 6–12)
POTASSIUM SERPL-SCNC: 4 MMOL/L (ref 3.5–5.2)
PROT SERPL-MCNC: 7.5 G/DL (ref 6–8.5)
RBC # BLD AUTO: 4.48 10*6/MM3 (ref 3.77–5.28)
SODIUM SERPL-SCNC: 138 MMOL/L (ref 136–145)
TIBC SERPL-MCNC: 308 MCG/DL (ref 298–536)
TRANSFERRIN SERPL-MCNC: 207 MG/DL (ref 200–360)
VIT B12 BLD-MCNC: 765 PG/ML (ref 211–946)
WBC # BLD AUTO: 6.16 10*3/MM3 (ref 3.4–10.8)

## 2021-02-10 PROCEDURE — 80053 COMPREHEN METABOLIC PANEL: CPT

## 2021-02-10 PROCEDURE — 82378 CARCINOEMBRYONIC ANTIGEN: CPT

## 2021-02-10 PROCEDURE — 25010000002 TRASTUZUMAB-ANNS 150 MG RECONSTITUTED SOLUTION 1 EACH VIAL: Performed by: INTERNAL MEDICINE

## 2021-02-10 PROCEDURE — 25010000003 HEPARIN LOCK FLUSH PER 10 UNITS: Performed by: INTERNAL MEDICINE

## 2021-02-10 PROCEDURE — 82746 ASSAY OF FOLIC ACID SERUM: CPT

## 2021-02-10 PROCEDURE — 36415 COLL VENOUS BLD VENIPUNCTURE: CPT

## 2021-02-10 PROCEDURE — 85025 COMPLETE CBC W/AUTO DIFF WBC: CPT

## 2021-02-10 PROCEDURE — 83540 ASSAY OF IRON: CPT

## 2021-02-10 PROCEDURE — 96367 TX/PROPH/DG ADDL SEQ IV INF: CPT

## 2021-02-10 PROCEDURE — 82728 ASSAY OF FERRITIN: CPT

## 2021-02-10 PROCEDURE — 84466 ASSAY OF TRANSFERRIN: CPT

## 2021-02-10 PROCEDURE — 25010000002 DIPHENHYDRAMINE PER 50 MG: Performed by: INTERNAL MEDICINE

## 2021-02-10 PROCEDURE — 86300 IMMUNOASSAY TUMOR CA 15-3: CPT

## 2021-02-10 PROCEDURE — 82607 VITAMIN B-12: CPT

## 2021-02-10 PROCEDURE — 96413 CHEMO IV INFUSION 1 HR: CPT

## 2021-02-10 RX ORDER — HEPARIN SODIUM (PORCINE) LOCK FLUSH IV SOLN 100 UNIT/ML 100 UNIT/ML
500 SOLUTION INTRAVENOUS AS NEEDED
Status: DISCONTINUED | OUTPATIENT
Start: 2021-02-10 | End: 2021-02-10 | Stop reason: HOSPADM

## 2021-02-10 RX ORDER — FAMOTIDINE 10 MG/ML
20 INJECTION, SOLUTION INTRAVENOUS AS NEEDED
Status: DISCONTINUED | OUTPATIENT
Start: 2021-02-10 | End: 2021-02-10 | Stop reason: HOSPADM

## 2021-02-10 RX ORDER — DIPHENHYDRAMINE HYDROCHLORIDE 50 MG/ML
50 INJECTION INTRAMUSCULAR; INTRAVENOUS AS NEEDED
Status: DISCONTINUED | OUTPATIENT
Start: 2021-02-10 | End: 2021-02-10 | Stop reason: HOSPADM

## 2021-02-10 RX ORDER — SODIUM CHLORIDE 9 MG/ML
250 INJECTION, SOLUTION INTRAVENOUS ONCE
Status: COMPLETED | OUTPATIENT
Start: 2021-02-10 | End: 2021-02-10

## 2021-02-10 RX ORDER — SODIUM CHLORIDE 0.9 % (FLUSH) 0.9 %
10 SYRINGE (ML) INJECTION AS NEEDED
Status: CANCELLED | OUTPATIENT
Start: 2021-02-10

## 2021-02-10 RX ORDER — SODIUM CHLORIDE 0.9 % (FLUSH) 0.9 %
10 SYRINGE (ML) INJECTION AS NEEDED
Status: DISCONTINUED | OUTPATIENT
Start: 2021-02-10 | End: 2021-02-10 | Stop reason: HOSPADM

## 2021-02-10 RX ORDER — ACETAMINOPHEN 500 MG
500 TABLET ORAL ONCE
Status: COMPLETED | OUTPATIENT
Start: 2021-02-10 | End: 2021-02-10

## 2021-02-10 RX ORDER — HEPARIN SODIUM (PORCINE) LOCK FLUSH IV SOLN 100 UNIT/ML 100 UNIT/ML
500 SOLUTION INTRAVENOUS AS NEEDED
Status: CANCELLED | OUTPATIENT
Start: 2021-02-10

## 2021-02-10 RX ADMIN — DIPHENHYDRAMINE HYDROCHLORIDE 25 MG: 50 INJECTION, SOLUTION INTRAMUSCULAR; INTRAVENOUS at 11:11

## 2021-02-10 RX ADMIN — Medication 500 UNITS: at 12:15

## 2021-02-10 RX ADMIN — TRASTUZUMAB-ANNS 450 MG: 150 INJECTION, POWDER, LYOPHILIZED, FOR SOLUTION INTRAVENOUS at 11:29

## 2021-02-10 RX ADMIN — SODIUM CHLORIDE 250 ML: 9 INJECTION, SOLUTION INTRAVENOUS at 11:10

## 2021-02-10 RX ADMIN — SODIUM CHLORIDE, PRESERVATIVE FREE 10 ML: 5 INJECTION INTRAVENOUS at 12:15

## 2021-02-10 RX ADMIN — ACETAMINOPHEN 500 MG: 500 TABLET, FILM COATED ORAL at 11:10

## 2021-02-12 LAB — CANCER AG27-29 SERPL-ACNC: 14.3 U/ML (ref 0–38.6)

## 2021-02-21 NOTE — PROGRESS NOTES
[facility] ON[facility] HEMATOLOGY AND ONCOLOGY  [address]  [facility] [place] [address] [phone]    Patient Name: Chasidy Tejada  Encounter Date: 02/26/2021  YOB: 1960  Patient Number: [id number]       REASON FOR VISIT: Chasidy Tejada is a 60-year-old female who returns in follow-up of stage IA left breast carcinoma, ER/WY and HER-2/lamberto positive.  She underwent left breast lumpectomy with SNB, 01/10/2020 and was started on adjuvant Arimidex beginning 02/03/2020 through 03/11/2020 before resumption on 08/19/2020.  She completed adjuvant paclitaxel (week 12/12, 05/27/2020), and is currently receiving adjuvant trastuzumab (Herceptin), having completed cycle 17, 02/10/2021.  Adjuvant radiation was administered on 06/22/2020 through 08/06/2020 (6040 cGy in 33 fx).  She is here alone (previously with her spouse).      DIAGNOSTIC ABNORMALITIES:          1.   12/13/2019- mammogram screening.  Impression: Stellate lesion outer left breast.  Spot compression and ultrasound suggested.          2.   12/27/2019- diagnostic mammogram.  Impression: Irregular density upper outer left breast.  Biopsy suggested.          3.   12/27/2019- left breast ultrasound.  Impression: Suspicious 6 x 5 x 8 mm density left breast approximately 8 cm from the nipple.  Biopsy suggested.  BI-RADS Category 4B.          4.   01/03/2020-ultrasound-guided breast biopsy.  Impression: Appropriate sampling of the 8 mm irregular hypoechoic nodule in the left breast at 1:00 in the posterior depth/axillary tail region.  Final diagnosis: Left breast at 1 o'clock position, core biopsies: A.invasive mammary carcinoma of no special type (ductal, not otherwise specified), grade 2, at least 4.9 mm in greatest extent.  B.associated low-grade ductal carcinoma in situ.  % positive, WY 25% positive, HER-2/lamberto-2+ (IHC)/FISH- a focal positive score is present in an area corresponding to  approximately 15% of the tumor (signal ratio: 2.9).           5.   02/03/2020- CMP normal with .  CBC normal.           6.   02/03/2020-bone scan ([facility]).  Impression: Degenerative joint changes.  No evidence of bony metastasis.           7.   02/03/2020-CT scans, head chest, abdomen, pelvis-impression: No abnormal intracranial enhancement to suggest intracranial metastasis/abnormalities.  No evidence of intrathoracic metastasis.  Stable 5 to 6 mm right lower lobe nodule of the right hemidiaphragm unchanged from 2/14/2011.  No convincing evidence of intra-abdominal or pelvic metastasis.  Hepatic cysts.  Fatty attenuated benign focus within the body of the pancreas visualized, 12/14/2011.  Previous hysterectomy, cholecystectomy and appendectomy.           8.   02/14/2020-echocardiogram.  Impression: LV systolic function normal (EF equals 70%).  Normal LV and RV size thickness and function.  Normal LA and RA size.  Normal cardiac valves.  No pericardial effusion.           9.  02/27/2020- CMP normal with .  CBC normal.  Iron 65, iron saturation 18%, ferritin 134, B12 745, folate > 20, CEA 1.76, CA-27-29 14.2.          10.  03/04/2020- repeat FISH for HER-2/lamberto (from tumor block, 01/10/2020).  No tumor on specimen.          11.  06/23/2020- DEXA scan.  Impression: Normal bone density.  No lower than 1 standard deviation below the mean for young adult woman.    PREVIOUS INTERVENTIONS:           1.   01/10/2020- left breast lumpectomy with left axillary sentinel node biopsy.  Final diagnosis: 1.left breast mass, lumpectomy: Invasive and in situ ductal carcinoma.  Tumor site-not specified.  Histologic type: Invasive carcinoma of no special type (invasive ductal carcinoma, not otherwise specified).  Overall tumor grade: Grade 1.  Tumor size: Greatest dimension of largest invasive focus: 5 mm.  Tumor focality: Single focus of invasive carcinoma.  Ductal carcinoma in situ (DCIS): Present.  Negative for extensive  "intraductal component (EIC).  Size (extent) CIS: Cannot be determined: Microscopic.  Architectural pattern: Cribriform/solid.  Nuclear grade: 2 (intermediate).  Necrosis: Present, central (expansive \"comedo) necrosis).  Lobular carcinoma in situ (LCIS): No LCIS in specimen.  Lymphovascular invasion: Not identified.  Dermal lymphovascular invasion: No skin present.  Margins: Uninvolved by invasive carcinoma.  Lymph nodes: Uninvolved by tumor cells.  Regional lymph nodes: 1.  Number of lymph nodes examined: 1.  Number of sentinel nodes examined: 1.  Pathologic stage classification (pTNM, AJCC eighth edition): pT1a,(sn), pN0.  % positive, SD 25% positive, HER-2 2+ (equivocal).           2.   Adjuvant Arimidex 1 mg p.o. daily beginning 02/03/2020 through 03/11/2020: resumed 08/19/2020.           3.   Adjuvant paclitaxel beginning 03/11/2020 through 05/27/2020 (12/12 weekly doses)           4.   Adjuvant trastuzumab every 3 weeks - beginning 03/11/2020 through 02/10/2021 (17 cycles)           5.   Adjuvant radiation to the left breast (5040 cGy with 1000 cGy boost to the tumor bed for a total of 6040 cGy/33 fractions) from 06/22/2020 through 08/06/2020.    LABS    Lab Results - Last 18 Months   Lab Units 02/10/21  1028 01/20/21  1024 12/30/20  1019 12/09/20  1021 11/25/20  1000 11/18/20  1025  05/20/20  0846   HEMOGLOBIN g/dL 13.2 14.0 13.4 12.9 13.3 12.9   < > 11.5*   HEMATOCRIT % 40.2 39.9 40.5 38.9 40.3 37.7   < > 34.5   MCV fL 89.7 87.5 90.8 89.4 90.6 88.1   < > 93.0   WBC 10*3/mm3 6.16 6.24 5.32 6.00 6.28 5.65   < > 8.84   RDW % 13.0 12.9 12.9 13.2 13.6 13.4   < > 15.6*   MPV fL 9.6 9.7 9.4 9.7 9.7 9.5   < > 9.5   PLATELETS 10*3/mm3 290 281 291 311 312 289   < > 402   IMM GRAN % % 0.2 0.2 0.2 0.3 0.3 0.2   < >  --    NEUTROS ABS 10*3/mm3 3.31 2.83 2.63 3.39 3.49 2.83   < > 5.18   LYMPHS ABS 10*3/mm3 2.26 2.88 2.23 2.12 2.32 2.34   < >  --    MONOS ABS 10*3/mm3 0.46 0.37 0.35 0.38 0.32 0.37   < >  --    EOS ABS " 10*3/mm3 0.08 0.09 0.06 0.05 0.08 0.06   < > 0.18   BASOS ABS 10*3/mm3 0.04 0.06 0.04 0.04 0.05 0.04   < > 0.09   IMMATURE GRANS (ABS) 10*3/mm3 0.01 0.01 0.01 0.02 0.02 0.01   < >  --    NRBC /100 WBC 0.0 0.0 0.0 0.0 0.0 0.0   < > 1.0*   NEUTROPHIL % %  --   --   --   --   --   --   --  55.6   MONOCYTES % %  --   --   --   --   --   --   --  4.0*   BASOPHIL % %  --   --   --   --   --   --   --  1.0   ANISOCYTOSIS   --   --   --   --   --   --   --  Slight/1+   GIANT PLT   --   --   --   --   --   --   --  Slight/1+    < > = values in this interval not displayed.       Lab Results - Last 18 Months   Lab Units 02/10/21  1028 01/20/21  1024 12/30/20  1019 12/09/20  1021 11/25/20  1000 11/18/20  1025   GLUCOSE mg/dL 94 91 90 105* 127* 94   SODIUM mmol/L 138 137 140 139 139 138   POTASSIUM mmol/L 4.0 4.2 4.0 3.7 3.8 3.9   CO2 mmol/L 26.0 25.0 27.0 26.0 26.0 26.0   CHLORIDE mmol/L 102 102 106 104 104 102   ANION GAP mmol/L 10.0 10.0 7.0 9.0 9.0 10.0   CREATININE mg/dL 0.51* 0.58 0.58 0.65 0.59 0.56*   BUN mg/dL 14 20 14 20 17 20   BUN / CREAT RATIO  27.5* 34.5* 24.1 30.8* 28.8* 35.7*   CALCIUM mg/dL 9.9 10.0 9.5 9.3 9.9 10.7*   EGFR IF NONAFRICN AM mL/min/1.73 124 107 107 94 105 111   ALK PHOS U/L 90 89 91 90 93 81   TOTAL PROTEIN g/dL 7.5 7.9 7.5 7.4 7.7 7.4   ALT (SGPT) U/L 23 24 24 22 24 22   AST (SGOT) U/L 21 24 18 17 22 21   BILIRUBIN mg/dL 0.5 0.5 0.4 0.6 0.6 0.6   ALBUMIN g/dL 4.20 4.50 4.30 4.40 4.60 4.60   GLOBULIN gm/dL 3.3 3.4 3.2 3.0 3.1 2.8       Lab Results - Last 18 Months   Lab Units 02/10/21  1028 12/30/20  1019 10/28/20  1012 10/19/20  1114 08/05/20  0938 06/24/20  1009   CEA ng/mL 1.63 1.89 1.72 1.87 1.26 1.97       Lab Results - Last 18 Months   Lab Units 02/10/21  1028 01/20/21  1024 12/30/20  1019 12/09/20  1021 11/18/20  1025 10/28/20  1012 10/19/20  1114 08/05/20  0939 08/05/20  0938  04/22/20  0851 02/27/20  0901  12/12/19  0704   IRON mcg/dL 74 81  --  87 89  --  79 98  --    < > 55 65   < >  --     TIBC mcg/dL 308 349  --  353 353  --  380 340  --    < > 353 370   < >  --    IRON SATURATION % 24 23  --  25 25  --  21 29  --    < > 16* 18*   < >  --    FERRITIN ng/mL 157.50* 173.50*  --  138.90 164.30*  --  141.00 216.70*  --    < > 199.00* 134.20   < >  --    TSH uIU/mL  --   --  1.270  --   --   --   --   --   --   --   --   --   --  0.798   FOLATE ng/mL >20.00  --   --   --   --  >20.00 >20.00  --  >20.00  --  >20.00 >20.00  --   --     < > = values in this interval not displayed.         PAST MEDICAL HISTORY:  ALLERGIES:  Allergies   Allergen Reactions   • Demerol [Meperidine] Hives   • Morphine And Related Hives     CURRENT MEDICATIONS:  Outpatient Encounter Medications as of 2/26/2021   Medication Sig Dispense Refill   • anastrozole (ARIMIDEX) 1 MG tablet Take 1 tablet by mouth Daily. 30 tablet 5   • calcium carbonate-vitamin d (Calcium 600+D) 600-400 MG-UNIT per tablet Take 1 tablet by mouth 2 (Two) Times a Day. 60 tablet 3   • Multiple Vitamins-Minerals (MULTIVITAMIN ADULT PO) Take 1 tablet by mouth Daily.     • polyethylene glycol (MIRALAX) packet Take 17 g by mouth Daily.     • [DISCONTINUED] ALPRAZolam (XANAX) 0.5 MG tablet Take 1 tablet by mouth 2 (Two) Times a Day As Needed for Anxiety. 60 tablet 2   • [DISCONTINUED] amitriptyline (ELAVIL) 25 MG tablet Take 2 tablets by mouth Every Night. 30 tablet 5   • [DISCONTINUED] lidocaine-prilocaine (EMLA) 2.5-2.5 % cream Apply to port site 30 minutes before it is accessed.  Cover with occlusive dressing. 30 g 2   • [DISCONTINUED] ondansetron (ZOFRAN) 8 MG tablet Take 1 tablet by mouth Every 8 (Eight) Hours As Needed for Nausea or Vomiting. 30 tablet 0     No facility-administered encounter medications on file as of 2/26/2021.      Adult illnesses:  Colon polyps  Obesity  Spinal stenosis neck   Herniated cervical disc without myelopathy  Borderline blood pressure  History of dysphagia    Past surgeries:  Anterior cervical discectomy w fusion,  09/2018  Appendectomy  BSO/GANGA  Cholecystectomy  Colonoscopy, 02/10/2017  BTL  Tonsillectomy  Breast biopsy  Left partial mastectomy, 01/10/2020  03/05/2020 - Mediport placement per Dr. Edmond    ADULT ILLNESSES:  Patient Active Problem List   Diagnosis Code   • Family history of colon cancer Z80.0   • Colon polyps K63.5   • Laryngopharyngeal reflux K21.9   • Pharyngoesophageal dysphagia R13.14   • Non-smoker Z78.9   • Obesity (BMI 30-39.9) E66.9   • Spinal stenosis in cervical region M48.02   • Cervical radiculopathy M54.12   • Herniated cervical disc without myelopathy M50.20   • Borderline blood pressure R03.0   • BMI 31.0-31.9,adult Z68.31   • BMI 33.0-33.9,adult Z68.33   • Invasive ductal carcinoma of breast, female, left (CMS/HCC) C50.912   • Former smoker Z87.891   • S/P lumpectomy, left breast Z98.890   • S/P lymph node biopsy Z98.890   • On antineoplastic chemotherapy Z79.899   • Estrogen receptor positive status (ER+) Z17.0   • History of radiation therapy Z92.3     SURGERIES:  Past Surgical History:   Procedure Laterality Date   • ANTERIOR CERVICAL DISCECTOMY W/ FUSION N/A 9/7/2018    Procedure: CERVICAL DISCECTOMY ANTERIOR WITH FUSION C6-7;  Surgeon: Chris Mcfadden MD;  Location: [facility] OR;  Service: Neurosurgery   • APPENDECTOMY     • BILATERAL SALPINGO OOPHORECTOMY     • BREAST BIOPSY     • BREAST LUMPECTOMY     • CERVICAL CORPECTOMY N/A 9/7/2018    Procedure: CERVICAL CORPECTOMY C6-7;  Surgeon: Chris Mcfadden MD;  Location: [facility] OR;  Service: Neurosurgery   • CHOLECYSTECTOMY     • COLONOSCOPY  09/05/2012   • COLONOSCOPY N/A 2/10/2017    Procedure: COLONOSCOPY WITH ANESTHESIA;  Surgeon: Elina Gonsalez MD;  Location: [facility] ENDOSCOPY;  Service:    • HYSTERECTOMY  1998    Had hysterectomy for painful periods and bleeding. (Dr. Patel) [facility] w/ BSO   • LAPAROSCOPIC TUBAL LIGATION     • MASTECTOMY W/ SENTINEL NODE BIOPSY Left 1/10/2020    Procedure: LEFT NEEDLE DIRECTED ULTRASOUND GUIDED PARTIAL  MASTECTOMY WITH SENTINEL LYMPH NODE BIOPSY, INJECTION AND SCAN, RADIOLOGIST WILL INJECT;  Surgeon: Flor Edmond MD;  Location:  [facility] OR;  Service: General   • TONSILLECTOMY     • VENOUS ACCESS DEVICE (PORT) INSERTION N/A 3/5/2020    Procedure: INSERTION VENOUS ACCESS DEVICE EXCISION SKIN LESION X 2 CHEST AND ABDOMEN;  Surgeon: Flor Edmond MD;  Location:  [facility] OR;  Service: General;  Laterality: N/A;     HEALTH MAINTENANCE ITEMS:  Health Maintenance Due   Topic Date Due   • ZOSTER VACCINE (1 of 2) 06/06/2012   • INFLUENZA VACCINE  08/01/2020   • ANNUAL PHYSICAL  12/03/2020       <no information>  Last Completed Colonoscopy       Status Date      COLONOSCOPY Done 2/10/2017 Surg:COLONOSCOPY     Patient has more history with this topic...        Immunization History   Administered Date(s) Administered   • COVID-19 (MODERNA) 01/09/2021, 02/06/2021   • Tdap 08/29/2018     Last Completed Mammogram       Status Date      MAMMOGRAM Done 12/17/2020 MAMMO DIAGNOSTIC DIGITAL TOMOSYNTHESIS BILATERAL W CAD     Patient has more history with this topic...            FAMILY HISTORY:  Family History   Problem Relation Age of Onset   • Colon cancer Maternal Grandmother         age not known   • Cancer Father    • Heart disease Father    • Diabetes Mother    • Hypertension Mother    • Stroke Mother    • Breast cancer Sister    • No Known Problems Daughter    • No Known Problems Sister    • Colon polyps Neg Hx    • Esophageal cancer Neg Hx    • Liver cancer Neg Hx    • Liver disease Neg Hx    • Rectal cancer Neg Hx    • Stomach cancer Neg Hx    • Ovarian cancer Neg Hx    • BRCA 1/2 Neg Hx    • Endometrial cancer Neg Hx      SOCIAL HISTORY:  Social History     Socioeconomic History   • Marital status:      Spouse name: Not on file   • Number of children: 1   • Years of education: Not on file   • Highest education level: Not on file   Tobacco Use   • Smoking status: Never Smoker   • Smokeless tobacco: Never Used   •  "Tobacco comment: \"i never really smoked\"   Substance and Sexual Activity   • Alcohol use: Not Currently     Frequency: Never   • Drug use: No   • Sexual activity: Yes     Partners: Male     Birth control/protection: Surgical     I reviewed the ROS as documented here and confirmed the accuracy of it with the patient today. 2/26/2021     REVIEW OF SYSTEMS:  Review of Systems   Constitutional: Negative.         She manages her ADLs' including chores, errands, driving.  Is still working remotely at home.  Has been less sedentary    Herceptin tolerance:  \"Done good.\"  No worsening fatigue.  No mouth sores.  No nausea, has not needed Zofran.  No vomiting, some dry skin otherwise no skin changes.  Near resolution of paresthesias of the fingers and improved in the toes.  No loss of fine motor function.  No loose stools.  Has not required Imodium.  No overt diarrhea.     Arimidex tolerance: No problems.  No new arthralgias.  Moderate hot flashes.  Says Dr. Martel prescribed Elavil but she did not like it.  \"I stopped it.\"   HENT: Negative.    Eyes: Negative.    Respiratory: Negative.    Cardiovascular: Negative.    Gastrointestinal: Negative for diarrhea, nausea and GERD (\"Not really.\" Still on omeprazole as needed).   Endocrine: Positive for heat intolerance (Lingering vasomotor changes.  Was written for Elavil last 12/16/2020 by Dr. Martel but she stopped it, \"it made me feel like I was in a fog.\").   Genitourinary: Negative.    Musculoskeletal: Negative for arthralgias (knees, and hips \"been ok.\" Does not impede her activities.).   Allergic/Immunologic: Negative.    Neurological: Positive for numbness (paresthesias of the toes> fingers.  Says improving each time).   Hematological: Negative.    Psychiatric/Behavioral: Negative for depressed mood. The patient is not nervous/anxious (\"not bad\").        /72   Pulse 64   Temp 97.8 °F (36.6 °C) (Temporal)   Resp 16   Ht 152.4 cm (60\")   Wt 78 kg (171 lb 14.4 oz)   " LMP  (LMP Unknown)   SpO2 97%   Breastfeeding No   BMI 33.57 kg/m²  Body surface area is 1.75 meters squared.  Pain Score    02/26/21 1128   PainSc: 0-No pain       Physical Exam:  Physical Exam   Constitutional: She is oriented to person, place, and time. She appears well-developed and well-nourished. No distress.   Pleasant, heavy set, cooperative, modestly kept female.  Appears her age.  ECOG 0.      She has lost 1 pound (in addition to 2 pounds at her prior visit) in the interval.   HENT:   Head: Normocephalic and atraumatic.   Mouth/Throat: No oropharyngeal exudate.   Hair has regrown    Wearing a surgical mask   Eyes: Pupils are equal, round, and reactive to light. Conjunctivae are normal. No scleral icterus.   Neck: Normal range of motion. Neck supple. No JVD present. No tracheal deviation present. No thyromegaly present.   Cardiovascular: Normal rate, regular rhythm and normal heart sounds. Exam reveals no friction rub.   No murmur heard.  Pulmonary/Chest: Effort normal and breath sounds normal. No stridor. No respiratory distress. She has no wheezes. She has no rales.   Port in left upper chest is well seated   Abdominal: Soft. Bowel sounds are normal. She exhibits no distension and no mass. There is no abdominal tenderness. There is no rebound and no guarding.   Musculoskeletal: Normal range of motion. No deformity.   Lymphadenopathy:     She has no cervical adenopathy.   Neurological: She is alert and oriented to person, place, and time. She displays normal reflexes. No cranial nerve deficit. She exhibits normal muscle tone. Coordination normal.   Skin: Skin is warm and dry. No rash noted. No erythema. No pallor.   Psychiatric: Her behavior is normal. Judgment and thought content normal.   Vitals reviewed.  Breasts: Again note that the left breast lumpectomy and sentinel node biopsy site in the left axilla are healed.  There is no incisional tenderness in either site.  The radiation associated skin  pigmentation and induration are much less evident.  The rest of the breast is without obvious nodularity skin changes, no nipple discharge.  Right breast without masses, skin changes nor nipple discharge.    ASSESSMENT:   1.  Invasive and in situ ductal carcinoma  left breast.                 Stage:  IA (pT1a, pN0, G2) ER  100% positive, TX  25%, HER-2/lamberto - 2+ (IHC)/FISH -a focal positive score is present in an area corresponding to approximately 15% of the tumor (signal ratio: 2.9).                 Tumor McLean:  5 mm.  Tumor focality: Single focus of invasive carcinoma.  No EIC nor LCIS.  Lymphovascular invasion: Not identified.  Dermal lymphovascular invasion: No skin present.  Microcalcifications: Not identified.  Margins uninvolved by invasive carcinoma.  Lymph nodes: Number of lymph nodes examined: 1 (0/1).                 Tumor Status:    -- Underwent lumpectomy and SNB, 01/10/2020.   --Completed adjuvant paclitaxel, 05/27/2020; adjuvant Herceptin, 02/10/2021; adjuvant radiation, 08/06/2020 (above)  -- Adjuvant Arimidex since 02/03/2020  -- 12/17/2020-Mammogram- [id number]-Arbtnj          2.   Obesity.  Moderate (BMI 34)        3.   Spinal stenosis neck, quiescent         4.   Herniated cervical disc without myelopathy        5.   Anemia, likely chemo associated.  Resolved, Hgb 13.2, 02/10/2021 (prior: Hgb 11.5-13.8)        6.   GERD, quiescent       RECOMMENDATIONS:         1.   Apprised of the labs, 02/10/2021 (above).  Normal CBC, CMP, normal CEA, normal CA-27-29, repleted B12/folate, repleted serum iron, iron saturation 24%, repleted ferritin (157; 138; 141; from 216)..  Ferrous sulfate stopped last visit.          2.    Herceptin tolerance discussed. No probelms         3.    Mammogram report, 12/17/2020 noted-YUDY        4.    2D echo,  01/20/2021-normal LV systolic function with LVEF 61-65%.        5.    Previously noted that we are unable to repeat FISH testing for HER-2 on the tumor specimen from  01/10/2020 -pathology block did not contain any more tumor - personally discussed/confirmed with Dr. Burgos of pathology.        6.   Care previously discussed with Dr. Jaimes on 02/28/2020 after she saw the patient on 02/25/2020 (encounter reviewed).  Pathology was reviewed at [facility] and discussed with the patient.  Recommendations/plan: Discussed that since her cancer is strongly ER positive and low histologic grade/low proliferative rate, and HER-2 on the biopsy was borderline recommended repeat HER-2 testing on her surgical pathology.  Defer to his negative proceed with only endocrine therapy and radiation.  If HER-2 is confirmed to be positive, recommend adjuvant weekly Taxol x12+ Herceptin every 3 weeks to complete 1 year of HER-2 directed therapy.  These plans were personally discussed with the patient (via telephone) on 02/28/2020 I also personally asked the pathologist (Dr. Radha Burgos) to send off the biopsy specimen for repeat FISH HER-2 testing.          7.   Reviewed NCCN guidelines version 3.2019 invasive breast cancer.  For tumors (=/> 5 mm), and pN0, recommend adjuvant endocrine therapy +/- adjuvant chemotherapy with trastuzumab (category 2B).          8.  Previously discussed the potential toxicities of adjuvant chemotherapy + Herceptin (to include but not limited to: Asthenia, cardiotoxicity, myelosuppression) are discussed at length. The rationale for adjuvant hormonal manipulation with AIs (see above) is discussed (to include but not limited to: Hot flashes, asthenia, pain, arthralgia, arthritis, nausea, bone pains, edema, fatigue, headache, venous thrombosis, anemia, leukopenia, depression, rash, pharyngitis, weight gain, dyspnea, fractures, breast pains, infection, angina). Questions answered to the best of my ability, and to her apparent satisfaction. She has completed therapy.             9.  Rx:   · Oscal 600/400 po bid # 60 - OTC  · Arimidex 1 mg daily # 30 x 3 RF-eRx          10.  CMP  and CBC with diff weekly with Procrit 40,000 units subcutaneous if Hgb less than 10 or Hct less than 30; Neupogen 480 mcg subcutaneously daily x4 if ANC less than 1.0- [facility]        11. Previously discussed the rationale for adjuvant hormonal manipulation with AIs (see above) and potential toxicities of AI therapy are discussed (to include but not limited to: Hot flashes, asthenia, pain, arthralgias, arthritis, nausea/vomiting, headache, pharyngitis, depression, rash, hypertension, lymphedema, insomnia, edema, weight gain, dyspnea, abdominal pain, constipation, hyperlipidemia, osteoporosis, fractures, cough, bone pain, diarrhea, breast pain, paresthesias, infections, cataracts, myalgias, thromboembolism, angina, Lyles-Yung syndrome, erythema multiforme, anemia, leukopenia, stroke, myocardial infarction, osteoporosis, fractures). Questions are answered. She agrees to resume therapy at the terminus of radiation.       12.  Reappoint to Dr. Edmond Re:  Port removal per patient request       13.  Return to the [place] office with pre-office serum iron, iron saturation, ferritin B12, folate, CMP, CEA, CA 27-29 and CBC and differential in 12 weeks.      .     MEDICAL DECISION MAKING: Moderate complexity   AMOUNT OF DATA: Moderate to Extensive     I spent 30 total minutes, face-to-face, caring for Chasidy nova.  Greater than 50% of this time involved counseling and/or coordination of care as documented within this note regarding the patient's illness(es), pros and cons of various treatment options, instructions and/or risk reduction.     Cc:  Abigail Jaimes MD- [facility]          MD Kailash Sawant MD Robert Learch, MD

## 2021-02-26 ENCOUNTER — OFFICE VISIT (OUTPATIENT)
Dept: ONCOLOGY | Facility: CLINIC | Age: 61
End: 2021-02-26

## 2021-02-26 VITALS
RESPIRATION RATE: 16 BRPM | DIASTOLIC BLOOD PRESSURE: 72 MMHG | BODY MASS INDEX: 33.75 KG/M2 | SYSTOLIC BLOOD PRESSURE: 118 MMHG | OXYGEN SATURATION: 97 % | HEIGHT: 60 IN | HEART RATE: 64 BPM | WEIGHT: 171.9 LBS | TEMPERATURE: 97.8 F

## 2021-02-26 DIAGNOSIS — C50.912 INVASIVE DUCTAL CARCINOMA OF BREAST, FEMALE, LEFT (HCC): Primary | ICD-10-CM

## 2021-02-26 PROCEDURE — 99214 OFFICE O/P EST MOD 30 MIN: CPT | Performed by: INTERNAL MEDICINE

## 2021-02-26 RX ORDER — ANASTROZOLE 1 MG/1
1 TABLET ORAL DAILY
Qty: 30 TABLET | Refills: 5 | Status: SHIPPED | OUTPATIENT
Start: 2021-02-26 | End: 2021-06-07 | Stop reason: SDUPTHER

## 2021-03-02 ENCOUNTER — TRANSCRIBE ORDERS (OUTPATIENT)
Dept: ADMINISTRATIVE | Facility: HOSPITAL | Age: 61
End: 2021-03-02

## 2021-03-02 ENCOUNTER — LAB (OUTPATIENT)
Dept: LAB | Facility: HOSPITAL | Age: 61
End: 2021-03-02

## 2021-03-02 DIAGNOSIS — Z11.59 SCREENING FOR VIRAL DISEASE: Primary | ICD-10-CM

## 2021-03-02 LAB — SARS-COV-2 ORF1AB RESP QL NAA+PROBE: NOT DETECTED

## 2021-03-02 PROCEDURE — C9803 HOPD COVID-19 SPEC COLLECT: HCPCS | Performed by: SPECIALIST

## 2021-03-02 PROCEDURE — U0004 COV-19 TEST NON-CDC HGH THRU: HCPCS | Performed by: SPECIALIST

## 2021-03-04 ENCOUNTER — ANESTHESIA EVENT (OUTPATIENT)
Dept: PERIOP | Facility: HOSPITAL | Age: 61
End: 2021-03-04

## 2021-03-04 ENCOUNTER — HOSPITAL ENCOUNTER (OUTPATIENT)
Facility: HOSPITAL | Age: 61
Setting detail: HOSPITAL OUTPATIENT SURGERY
Discharge: HOME | End: 2021-03-04
Attending: SPECIALIST | Admitting: SPECIALIST

## 2021-03-04 ENCOUNTER — ANESTHESIA (OUTPATIENT)
Dept: PERIOP | Facility: HOSPITAL | Age: 61
End: 2021-03-04

## 2021-03-04 VITALS
TEMPERATURE: 97.3 F | RESPIRATION RATE: 16 BRPM | DIASTOLIC BLOOD PRESSURE: 69 MMHG | HEIGHT: 60 IN | OXYGEN SATURATION: 100 % | WEIGHT: 172.4 LBS | BODY MASS INDEX: 33.85 KG/M2 | SYSTOLIC BLOOD PRESSURE: 155 MMHG | HEART RATE: 66 BPM

## 2021-03-04 DIAGNOSIS — Z98.890 S/P LUMPECTOMY, LEFT BREAST: Primary | ICD-10-CM

## 2021-03-04 PROCEDURE — 25010000002 ONDANSETRON PER 1 MG: Performed by: NURSE ANESTHETIST, CERTIFIED REGISTERED

## 2021-03-04 PROCEDURE — 25010000002 MIDAZOLAM PER 1 MG: Performed by: ANESTHESIOLOGY

## 2021-03-04 PROCEDURE — 25010000002 PROPOFOL 10 MG/ML EMULSION: Performed by: NURSE ANESTHETIST, CERTIFIED REGISTERED

## 2021-03-04 RX ORDER — OXYCODONE AND ACETAMINOPHEN 10; 325 MG/1; MG/1
1 TABLET ORAL ONCE AS NEEDED
Status: DISCONTINUED | OUTPATIENT
Start: 2021-03-04 | End: 2021-03-04 | Stop reason: HOSPADM

## 2021-03-04 RX ORDER — SODIUM CHLORIDE 0.9 % (FLUSH) 0.9 %
3 SYRINGE (ML) INJECTION AS NEEDED
Status: DISCONTINUED | OUTPATIENT
Start: 2021-03-04 | End: 2021-03-04 | Stop reason: HOSPADM

## 2021-03-04 RX ORDER — OXYCODONE HYDROCHLORIDE AND ACETAMINOPHEN 5; 325 MG/1; MG/1
1 TABLET ORAL ONCE AS NEEDED
Status: DISCONTINUED | OUTPATIENT
Start: 2021-03-04 | End: 2021-03-04 | Stop reason: HOSPADM

## 2021-03-04 RX ORDER — MIDAZOLAM HYDROCHLORIDE 1 MG/ML
2 INJECTION INTRAMUSCULAR; INTRAVENOUS
Status: DISCONTINUED | OUTPATIENT
Start: 2021-03-04 | End: 2021-03-04 | Stop reason: HOSPADM

## 2021-03-04 RX ORDER — DEXTROSE MONOHYDRATE 25 G/50ML
12.5 INJECTION, SOLUTION INTRAVENOUS AS NEEDED
Status: DISCONTINUED | OUTPATIENT
Start: 2021-03-04 | End: 2021-03-04 | Stop reason: HOSPADM

## 2021-03-04 RX ORDER — SODIUM CHLORIDE 0.9 % (FLUSH) 0.9 %
10 SYRINGE (ML) INJECTION EVERY 12 HOURS SCHEDULED
Status: DISCONTINUED | OUTPATIENT
Start: 2021-03-04 | End: 2021-03-04 | Stop reason: HOSPADM

## 2021-03-04 RX ORDER — ONDANSETRON 2 MG/ML
INJECTION INTRAMUSCULAR; INTRAVENOUS AS NEEDED
Status: DISCONTINUED | OUTPATIENT
Start: 2021-03-04 | End: 2021-03-04 | Stop reason: SURG

## 2021-03-04 RX ORDER — LIDOCAINE HYDROCHLORIDE AND EPINEPHRINE 10; 10 MG/ML; UG/ML
INJECTION, SOLUTION INFILTRATION; PERINEURAL AS NEEDED
Status: DISCONTINUED | OUTPATIENT
Start: 2021-03-04 | End: 2021-03-04 | Stop reason: HOSPADM

## 2021-03-04 RX ORDER — OXYCODONE AND ACETAMINOPHEN 7.5; 325 MG/1; MG/1
2 TABLET ORAL EVERY 4 HOURS PRN
Status: DISCONTINUED | OUTPATIENT
Start: 2021-03-04 | End: 2021-03-04 | Stop reason: HOSPADM

## 2021-03-04 RX ORDER — SODIUM CHLORIDE, SODIUM LACTATE, POTASSIUM CHLORIDE, CALCIUM CHLORIDE 600; 310; 30; 20 MG/100ML; MG/100ML; MG/100ML; MG/100ML
1000 INJECTION, SOLUTION INTRAVENOUS CONTINUOUS
Status: DISCONTINUED | OUTPATIENT
Start: 2021-03-04 | End: 2021-03-04 | Stop reason: HOSPADM

## 2021-03-04 RX ORDER — PROPOFOL 10 MG/ML
VIAL (ML) INTRAVENOUS AS NEEDED
Status: DISCONTINUED | OUTPATIENT
Start: 2021-03-04 | End: 2021-03-04 | Stop reason: SURG

## 2021-03-04 RX ORDER — NALOXONE HCL 0.4 MG/ML
0.4 VIAL (ML) INJECTION AS NEEDED
Status: DISCONTINUED | OUTPATIENT
Start: 2021-03-04 | End: 2021-03-04 | Stop reason: HOSPADM

## 2021-03-04 RX ORDER — MIDAZOLAM HYDROCHLORIDE 1 MG/ML
1 INJECTION INTRAMUSCULAR; INTRAVENOUS
Status: DISCONTINUED | OUTPATIENT
Start: 2021-03-04 | End: 2021-03-04 | Stop reason: HOSPADM

## 2021-03-04 RX ORDER — LIDOCAINE HYDROCHLORIDE 20 MG/ML
INJECTION, SOLUTION EPIDURAL; INFILTRATION; INTRACAUDAL; PERINEURAL AS NEEDED
Status: DISCONTINUED | OUTPATIENT
Start: 2021-03-04 | End: 2021-03-04 | Stop reason: SURG

## 2021-03-04 RX ORDER — ONDANSETRON 2 MG/ML
4 INJECTION INTRAMUSCULAR; INTRAVENOUS ONCE AS NEEDED
Status: DISCONTINUED | OUTPATIENT
Start: 2021-03-04 | End: 2021-03-04 | Stop reason: HOSPADM

## 2021-03-04 RX ORDER — LIDOCAINE HYDROCHLORIDE 10 MG/ML
0.5 INJECTION, SOLUTION EPIDURAL; INFILTRATION; INTRACAUDAL; PERINEURAL ONCE AS NEEDED
Status: DISCONTINUED | OUTPATIENT
Start: 2021-03-04 | End: 2021-03-04 | Stop reason: HOSPADM

## 2021-03-04 RX ORDER — FENTANYL CITRATE 50 UG/ML
25 INJECTION, SOLUTION INTRAMUSCULAR; INTRAVENOUS
Status: DISCONTINUED | OUTPATIENT
Start: 2021-03-04 | End: 2021-03-04 | Stop reason: HOSPADM

## 2021-03-04 RX ORDER — LABETALOL HYDROCHLORIDE 5 MG/ML
5 INJECTION, SOLUTION INTRAVENOUS
Status: DISCONTINUED | OUTPATIENT
Start: 2021-03-04 | End: 2021-03-04 | Stop reason: HOSPADM

## 2021-03-04 RX ORDER — MAGNESIUM HYDROXIDE 1200 MG/15ML
LIQUID ORAL AS NEEDED
Status: DISCONTINUED | OUTPATIENT
Start: 2021-03-04 | End: 2021-03-04 | Stop reason: HOSPADM

## 2021-03-04 RX ORDER — FLUMAZENIL 0.1 MG/ML
0.2 INJECTION INTRAVENOUS AS NEEDED
Status: DISCONTINUED | OUTPATIENT
Start: 2021-03-04 | End: 2021-03-04 | Stop reason: HOSPADM

## 2021-03-04 RX ORDER — SODIUM CHLORIDE, SODIUM LACTATE, POTASSIUM CHLORIDE, CALCIUM CHLORIDE 600; 310; 30; 20 MG/100ML; MG/100ML; MG/100ML; MG/100ML
9 INJECTION, SOLUTION INTRAVENOUS CONTINUOUS
Status: DISCONTINUED | OUTPATIENT
Start: 2021-03-04 | End: 2021-03-04 | Stop reason: HOSPADM

## 2021-03-04 RX ORDER — SODIUM CHLORIDE 0.9 % (FLUSH) 0.9 %
10 SYRINGE (ML) INJECTION AS NEEDED
Status: DISCONTINUED | OUTPATIENT
Start: 2021-03-04 | End: 2021-03-04 | Stop reason: HOSPADM

## 2021-03-04 RX ORDER — IBUPROFEN 600 MG/1
600 TABLET ORAL ONCE AS NEEDED
Status: DISCONTINUED | OUTPATIENT
Start: 2021-03-04 | End: 2021-03-04 | Stop reason: HOSPADM

## 2021-03-04 RX ORDER — OXYCODONE HYDROCHLORIDE AND ACETAMINOPHEN 5; 325 MG/1; MG/1
1-2 TABLET ORAL EVERY 4 HOURS PRN
Qty: 15 TABLET | Refills: 0 | Status: SHIPPED | OUTPATIENT
Start: 2021-03-04 | End: 2021-08-31

## 2021-03-04 RX ADMIN — PROPOFOL 260 MG: 10 INJECTION, EMULSION INTRAVENOUS at 11:00

## 2021-03-04 RX ADMIN — LIDOCAINE HYDROCHLORIDE 100 MG: 20 INJECTION, SOLUTION EPIDURAL; INFILTRATION; INTRACAUDAL; PERINEURAL at 11:00

## 2021-03-04 RX ADMIN — SODIUM CHLORIDE, POTASSIUM CHLORIDE, SODIUM LACTATE AND CALCIUM CHLORIDE 1000 ML: 600; 310; 30; 20 INJECTION, SOLUTION INTRAVENOUS at 10:32

## 2021-03-04 RX ADMIN — ONDANSETRON HYDROCHLORIDE 4 MG: 2 SOLUTION INTRAMUSCULAR; INTRAVENOUS at 11:06

## 2021-03-04 RX ADMIN — CEFAZOLIN 1 G: 1 INJECTION, POWDER, FOR SOLUTION INTRAMUSCULAR; INTRAVENOUS; PARENTERAL at 11:03

## 2021-03-04 RX ADMIN — MIDAZOLAM 2 MG: 1 INJECTION INTRAMUSCULAR; INTRAVENOUS at 10:33

## 2021-03-04 NOTE — OP NOTE
Preoperative diagnosis:  Completion of therapy  Postoperative diagnosis:  Same  Procedure:  Removal port  Surgeon: Flor Edmond MD  Anesthesia:  Mac loc  Ebl:  Minimal  Ivf: see anesthesia  Indications; the patient is a 60-year-old female who presents for removal of her port.  The risks, benefits, complications, and possible alternatives were discussed with the patient who agreed to proceed.  Description of procedure: the patient was laid supine. The left chest was prepped and draped.  The previous incision was opened.  bovie was used to dissect out the port.  It was grasped and removed intact.  It was discarded.  The pseudovein was closed with 2-0 silk.  The skin was closed with 3-0 and 4-0 vicryl. The sponge, needle, and instrument counts were correct.  Complications: none  Disposition good to pacu  Findings:  Removed intact

## 2021-03-04 NOTE — ANESTHESIA POSTPROCEDURE EVALUATION
"Patient: Chasidy Tejada    Procedure Summary     Date: 03/04/21 Room / Location:  [facility] /  [facility]    Anesthesia Start: 1058 Anesthesia Stop: 1122    Procedure: REMOVAL OF SINGLE LUMEN PORT (Left Chest) Diagnosis: (CHEMOTHERAPY TREATMENT)    Surgeon: Flor Edmond MD Provider: Rosio Perry CRNA    Anesthesia Type: MAC ASA Status: 2          Anesthesia Type: MAC    Vitals  Vitals Value Taken Time   /67 03/04/21 1145   Temp 97.3 °F (36.3 °C) 03/04/21 1120   Pulse 70 03/04/21 1159   Resp 16 03/04/21 1145   SpO2 99 % 03/04/21 1159   Vitals shown include unvalidated device data.        Post Anesthesia Care and Evaluation    Patient location during evaluation: PHASE II  Patient participation: complete - patient participated  Level of consciousness: awake and alert  Pain management: adequate  Airway patency: patent  Anesthetic complications: No anesthetic complications  PONV Status: none  Cardiovascular status: acceptable  Respiratory status: acceptable  Hydration status: acceptable    Comments: Blood pressure 151/67, pulse 73, temperature 97.3 °F (36.3 °C), temperature source Tympanic, resp. rate 16, height 152.5 cm (60.04\"), weight 78.2 kg (172 lb 6.4 oz), SpO2 100 %, not currently breastfeeding.    Pt discharged from PACU based on darlin score >8      "

## 2021-03-04 NOTE — DISCHARGE INSTRUCTIONS
YOUR NEXT PAIN MEDICATION IS DUE AT______________        Moderate Conscious Sedation, Adult, Care After  Refer to this sheet in the next few weeks. These instructions provide you with information on caring for yourself after your procedure. Your health care provider may also give you more specific instructions. Your treatment has been planned according to current medical practices, but problems sometimes occur. Call your health care provider if you have any problems or questions after your procedure.  WHAT TO EXPECT AFTER THE PROCEDURE    After your procedure:  · You may feel sleepy, clumsy, and have poor balance for several hours.  · Vomiting may occur if you eat too soon after the procedure.  HOME CARE INSTRUCTIONS  · Do not participate in any activities where you could become injured for at least 24 hours. Do not:  ¨ Drive.  ¨ Swim.  ¨ Ride a bicycle.  ¨ Operate heavy machinery.  ¨ Cook.  ¨ Use power tools.  ¨ Climb ladders.  ¨ Work from a high place.  · Do not make important decisions or sign legal documents until you are improved.  · If you vomit, drink water, juice, or soup when you can drink without vomiting. Make sure you have little or no nausea before eating solid foods.  · Only take over-the-counter or prescription medicines for pain, discomfort, or fever as directed by your health care provider.  · Make sure you and your family fully understand everything about the medicines given to you, including what side effects may occur.  · You should not drink alcohol, take sleeping pills, or take medicines that cause drowsiness for at least 24 hours.  · If you smoke, do not smoke without supervision.  · If you are feeling better, you may resume normal activities 24 hours after you were sedated.  · Keep all appointments with your health care provider.  SEEK MEDICAL CARE IF:  · Your skin is pale or bluish in color.  · You continue to feel nauseous or vomit.  · Your pain is getting worse and is not helped by  medicine.  · You have bleeding or swelling.  · You are still sleepy or feeling clumsy after 24 hours.  SEEK IMMEDIATE MEDICAL CARE IF:  · You develop a rash.  · You have difficulty breathing.  · You develop any type of allergic problem.  · You have a fever.  MAKE SURE YOU:  · Understand these instructions.  · Will watch your condition.  · Will get help right away if you are not doing well or get worse.     This information is not intended to replace advice given to you by your health care provider. Make sure you discuss any questions you have with your health care provider.     Document Released: 10/08/2014 Document Revised: 01/08/2016 Document Reviewed: 10/08/2014  [employer] Interactive Patient Education ©2016 [employer]         CALL YOUR PHYSICIAN IF YOU EXPERIENCE  INCREASED PAIN NOT HELPED BY YOUR PAIN MEDICATION.        Fall Prevention in the Home      Falls can cause injuries. They can happen to people of all ages. There are many things you can do to make your home safe and to help prevent falls.    WHAT CAN I DO ON THE OUTSIDE OF MY HOME?  · Regularly fix the edges of walkways and driveways and fix any cracks.  · Remove anything that might make you trip as you walk through a door, such as a raised step or threshold.  · Trim any bushes or trees on the path to your home.  · Use bright outdoor lighting.  · Clear any walking paths of anything that might make someone trip, such as rocks or tools.  · Regularly check to see if handrails are loose or broken. Make sure that both sides of any steps have handrails.  · Any raised decks and porches should have guardrails on the edges.  · Have any leaves, snow, or ice cleared regularly.  · Use sand or salt on walking paths during winter.  · Clean up any spills in your garage right away. This includes oil or grease spills.  WHAT CAN I DO IN THE BATHROOM?    · Use night lights.  · Install grab bars by the toilet and in the tub and shower. Do not use towel bars as grab  bars.  · Use non-skid mats or decals in the tub or shower.  · If you need to sit down in the shower, use a plastic, non-slip stool.  · Keep the floor dry. Clean up any water that spills on the floor as soon as it happens.  · Remove soap buildup in the tub or shower regularly.  · Attach bath mats securely with double-sided non-slip rug tape.  · Do not have throw rugs and other things on the floor that can make you trip.  WHAT CAN I DO IN THE BEDROOM?  · Use night lights.  · Make sure that you have a light by your bed that is easy to reach.  · Do not use any sheets or blankets that are too big for your bed. They should not hang down onto the floor.  · Have a firm chair that has side arms. You can use this for support while you get dressed.  · Do not have throw rugs and other things on the floor that can make you trip.  WHAT CAN I DO IN THE KITCHEN?  · Clean up any spills right away.  · Avoid walking on wet floors.  · Keep items that you use a lot in easy-to-reach places.  · If you need to reach something above you, use a strong step stool that has a grab bar.  · Keep electrical cords out of the way.  · Do not use floor polish or wax that makes floors slippery. If you must use wax, use non-skid floor wax.  · Do not have throw rugs and other things on the floor that can make you trip.  WHAT CAN I DO WITH MY STAIRS?  · Do not leave any items on the stairs.  · Make sure that there are handrails on both sides of the stairs and use them. Fix handrails that are broken or loose. Make sure that handrails are as long as the stairways.  · Check any carpeting to make sure that it is firmly attached to the stairs. Fix any carpet that is loose or worn.  · Avoid having throw rugs at the top or bottom of the stairs. If you do have throw rugs, attach them to the floor with carpet tape.  · Make sure that you have a light switch at the top of the stairs and the bottom of the stairs. If you do not have them, ask someone to add them for  you.  WHAT ELSE CAN I DO TO HELP PREVENT FALLS?  · Wear shoes that:  ¨ Do not have high heels.  ¨ Have rubber bottoms.  ¨ Are comfortable and fit you well.  ¨ Are closed at the toe. Do not wear sandals.  · If you use a stepladder:  ¨ Make sure that it is fully opened. Do not climb a closed stepladder.  ¨ Make sure that both sides of the stepladder are locked into place.  ¨ Ask someone to hold it for you, if possible.  · Clearly naeem and make sure that you can see:  ¨ Any grab bars or handrails.  ¨ First and last steps.  ¨ Where the edge of each step is.  · Use tools that help you move around (mobility aids) if they are needed. These include:  ¨ Canes.  ¨ Walkers.  ¨ Scooters.  ¨ Crutches.  · Turn on the lights when you go into a dark area. Replace any light bulbs as soon as they burn out.  · Set up your furniture so you have a clear path. Avoid moving your furniture around.  · If any of your floors are uneven, fix them.  · If there are any pets around you, be aware of where they are.  · Review your medicines with your doctor. Some medicines can make you feel dizzy. This can increase your chance of falling.  Ask your doctor what other things that you can do to help prevent falls.     This information is not intended to replace advice given to you by your health care provider. Make sure you discuss any questions you have with your health care provider.     Document Released: 10/14/2010 Document Revised: 05/03/2016 Document Reviewed: 01/22/2016  [employer]vier Interactive Patient Education ©2016 [employer]     PATIENT/FAMILY/RESPONSIBLE PARTY VERBALIZES UNDERSTANDING OF ABOVE EDUCATION.  COPY OF PAIN SCALE GIVEN AND REVIEWED WITH VERBALIZED UNDERSTANDING.

## 2021-03-04 NOTE — ANESTHESIA PREPROCEDURE EVALUATION
Anesthesia Evaluation     Patient summary reviewed   no history of anesthetic complications:  NPO Solid Status: > 8 hours             Airway   Mallampati: I  TM distance: >3 FB  Neck ROM: full  Dental      Pulmonary    (-) COPD, asthma, sleep apnea, not a smoker  Cardiovascular   Exercise tolerance: excellent (>7 METS)    (-) pacemaker, past MI, angina, cardiac stents      Neuro/Psych  (-) seizures, TIA, CVA  GI/Hepatic/Renal/Endo    (+) obesity,     (-) GERD, liver disease, no renal disease, diabetes    Musculoskeletal     Abdominal    Substance History      OB/GYN          Other      history of cancer                    Anesthesia Plan    ASA 2     MAC       Anesthetic plan, all risks, benefits, and alternatives have been provided, discussed and informed consent has been obtained with: patient.

## 2021-03-04 NOTE — H&P
Flor Edmond MD [facility] History and Physical     Referring Provider: Flor Edmond MD    Patient Care Team:  Jones Elias DO as PCP - General (Internal Medicine)    Chief complaint completion of therapy    Subjective .     History of present illness:  The patient is a 60 y.o. female who has a history of breast cancer.  She has completed therapy and presents for removal of her port..    Review of Systems    Review of Systems - General ROS: negative  ENT ROS: negative  Respiratory ROS: no cough, shortness of breath, or wheezing  Cardiovascular ROS: no chest pain or dyspnea on exertion  Gastrointestinal ROS: no abdominal pain, change in bowel habits, or black or bloody stools  Genitourinary ROS: no dysuria, trouble voiding, or hematuria  Dermatological ROS: negative   Breast ROS: negative for breast lumps  Hematological and Lymphatic ROS: negative  Musculoskeletal ROS: negative   Neurological ROS: no TIA or stroke symptoms    Psychological ROS: negative  Endocrine ROS: negative    History  Past Medical History:   Diagnosis Date   • Acid reflux    • Asthma     long time ago    • Breast cancer (CMS/HCC)    • Cancer (CMS/HCC)    • Drug therapy    • Hx of radiation therapy    • Panic attack    • Right arm weakness    ,   Past Surgical History:   Procedure Laterality Date   • ANTERIOR CERVICAL DISCECTOMY W/ FUSION N/A 9/7/2018    Procedure: CERVICAL DISCECTOMY ANTERIOR WITH FUSION C6-7;  Surgeon: Chris Mcfadden MD;  Location: [facility] OR;  Service: Neurosurgery   • APPENDECTOMY     • BILATERAL SALPINGO OOPHORECTOMY     • BREAST BIOPSY     • BREAST LUMPECTOMY     • CERVICAL CORPECTOMY N/A 9/7/2018    Procedure: CERVICAL CORPECTOMY C6-7;  Surgeon: Chris Mcfadden MD;  Location: [facility] OR;  Service: Neurosurgery   • CHOLECYSTECTOMY     • COLONOSCOPY  09/05/2012   • COLONOSCOPY N/A 2/10/2017    Procedure: COLONOSCOPY WITH ANESTHESIA;  Surgeon: Elina Gonsalez MD;  Location: [facility] ENDOSCOPY;  Service:    •  "HYSTERECTOMY  1998    Had hysterectomy for painful periods and bleeding. (Dr. Patel) TLH w/ BSO   • LAPAROSCOPIC TUBAL LIGATION     • MASTECTOMY W/ SENTINEL NODE BIOPSY Left 1/10/2020    Procedure: LEFT NEEDLE DIRECTED ULTRASOUND GUIDED PARTIAL MASTECTOMY WITH SENTINEL LYMPH NODE BIOPSY, INJECTION AND SCAN, RADIOLOGIST WILL INJECT;  Surgeon: Flor Edmond MD;  Location: [facility] OR;  Service: General   • TONSILLECTOMY     • VENOUS ACCESS DEVICE (PORT) INSERTION N/A 3/5/2020    Procedure: INSERTION VENOUS ACCESS DEVICE EXCISION SKIN LESION X 2 CHEST AND ABDOMEN;  Surgeon: Flor Edmond MD;  Location: [facility] OR;  Service: General;  Laterality: N/A;   ,   Family History   Problem Relation Age of Onset   • Colon cancer Maternal Grandmother         age not known   • Cancer Father    • Heart disease Father    • Diabetes Mother    • Hypertension Mother    • Stroke Mother    • Breast cancer Sister    • No Known Problems Daughter    • No Known Problems Sister    • Colon polyps Neg Hx    • Esophageal cancer Neg Hx    • Liver cancer Neg Hx    • Liver disease Neg Hx    • Rectal cancer Neg Hx    • Stomach cancer Neg Hx    • Ovarian cancer Neg Hx    • BRCA 1/2 Neg Hx    • Endometrial cancer Neg Hx    ,   Social History     Tobacco Use   • Smoking status: Never Smoker   • Smokeless tobacco: Never Used   • Tobacco comment: \"i never really smoked\"   Substance Use Topics   • Alcohol use: Not Currently     Frequency: Never   • Drug use: No   ,   Medications Prior to Admission   Medication Sig Dispense Refill Last Dose   • anastrozole (ARIMIDEX) 1 MG tablet Take 1 tablet by mouth Daily. 30 tablet 5 3/2/2021   • calcium carbonate-vitamin d (Calcium 600+D) 600-400 MG-UNIT per tablet Take 1 tablet by mouth 2 (Two) Times a Day. 60 tablet 3 3/2/2021   • Multiple Vitamins-Minerals (MULTIVITAMIN ADULT PO) Take 1 tablet by mouth Daily.   3/2/2021   • polyethylene glycol (MIRALAX) packet Take 17 g by mouth Daily.   Unknown at Unknown time "    and Allergies:  Demerol [meperidine] and Morphine and related    Current Facility-Administered Medications:   •  ceFAZolin (ANCEF) 1 g/100 mL 0.9% NS IVPB (mbp), 1 g, Intravenous, Once, Flor Edmond MD  •  lactated ringers infusion 1,000 mL, 1,000 mL, Intravenous, Continuous, Flor Edmond MD  •  lidocaine PF 1% (XYLOCAINE) injection 0.5 mL, 0.5 mL, Intradermal, Once PRN, Flor Edmond MD  •  sodium chloride 0.9 % flush 3 mL, 3 mL, Intravenous, PRN, Flor Edmond MD    Objective     Vital Signs   Temp:  [97.5 °F (36.4 °C)] 97.5 °F (36.4 °C)  Heart Rate:  [89] 89  Resp:  [16] 16  BP: (173)/(93) 173/93    Physical Exam:  General appearance - alert, well appearing, and in no distress  Mental status - alert, oriented to person, place, and time  Neck - supple, no significant adenopathy  Chest - clear to auscultation, no wheezes, rales or rhonchi, symmetric air entry  Heart - normal rate, regular rhythm, normal S1, S2, no murmurs, rubs, clicks or gallops  Abdomen - soft, nontender, nondistended, no masses or organomegaly  Neurological - alert, oriented, normal speech, no focal findings or movement disorder noted  Musculoskeletal - no joint tenderness, deformity or swelling  Extremities - peripheral pulses normal, no pedal edema, no clubbing or cyanosis    Results Review:     Lab Results (last 24 hours)     ** No results found for the last 24 hours. **        Imaging Results (Last 24 Hours)     ** No results found for the last 24 hours. **            Assessment/Plan       Completion of therapy    She will undergo removal of her port. The risks, benefits, complications, and possible alternatives were discussed with the patient who agreed to proceed.      Flor Edmond MD  03/04/21  10:10 CST

## 2021-04-01 ENCOUNTER — OFFICE VISIT (OUTPATIENT)
Dept: RADIATION ONCOLOGY | Facility: HOSPITAL | Age: 61
End: 2021-04-01

## 2021-04-01 ENCOUNTER — HOSPITAL ENCOUNTER (OUTPATIENT)
Dept: RADIATION ONCOLOGY | Facility: HOSPITAL | Age: 61
Setting detail: RADIATION/ONCOLOGY SERIES
End: 2021-04-01

## 2021-04-01 VITALS
WEIGHT: 174 LBS | HEIGHT: 60 IN | DIASTOLIC BLOOD PRESSURE: 96 MMHG | BODY MASS INDEX: 34.16 KG/M2 | SYSTOLIC BLOOD PRESSURE: 152 MMHG

## 2021-04-01 DIAGNOSIS — Z98.890 S/P LUMPECTOMY, LEFT BREAST: ICD-10-CM

## 2021-04-01 DIAGNOSIS — Z87.891 FORMER SMOKER: ICD-10-CM

## 2021-04-01 DIAGNOSIS — Z17.0 ESTROGEN RECEPTOR POSITIVE STATUS (ER+): ICD-10-CM

## 2021-04-01 DIAGNOSIS — C50.912 INVASIVE DUCTAL CARCINOMA OF BREAST, FEMALE, LEFT (HCC): Primary | ICD-10-CM

## 2021-04-01 DIAGNOSIS — Z98.890 S/P LYMPH NODE BIOPSY: ICD-10-CM

## 2021-04-01 DIAGNOSIS — Z92.3 HISTORY OF RADIATION THERAPY: ICD-10-CM

## 2021-04-01 DIAGNOSIS — Z79.811 PROPHYLACTIC USE OF ANASTROZOLE (ARIMIDEX): ICD-10-CM

## 2021-04-01 PROCEDURE — G0463 HOSPITAL OUTPT CLINIC VISIT: HCPCS | Performed by: RADIOLOGY

## 2021-04-01 NOTE — PATIENT INSTRUCTIONS
Follow up in one year.  Continue monthly breast exams, notify us for changes.  Continue ongoing management per primary care physician and other specialists.  Please call if you have any questions.  [phone]

## 2021-05-18 ENCOUNTER — LAB (OUTPATIENT)
Dept: LAB | Facility: HOSPITAL | Age: 61
End: 2021-05-18

## 2021-05-18 DIAGNOSIS — C50.912 INVASIVE DUCTAL CARCINOMA OF BREAST, FEMALE, LEFT (HCC): ICD-10-CM

## 2021-05-18 LAB
ALBUMIN SERPL-MCNC: 4.9 G/DL (ref 3.5–5.2)
ALBUMIN/GLOB SERPL: 1.8 G/DL
ALP SERPL-CCNC: 108 U/L (ref 39–117)
ALT SERPL W P-5'-P-CCNC: 29 U/L (ref 1–33)
ANION GAP SERPL CALCULATED.3IONS-SCNC: 12 MMOL/L (ref 5–15)
AST SERPL-CCNC: 30 U/L (ref 1–32)
BASOPHILS # BLD AUTO: 0.06 10*3/MM3 (ref 0–0.2)
BASOPHILS NFR BLD AUTO: 0.9 % (ref 0–1.5)
BILIRUB SERPL-MCNC: 0.6 MG/DL (ref 0–1.2)
BUN SERPL-MCNC: 9 MG/DL (ref 6–20)
BUN/CREAT SERPL: 16.4 (ref 7–25)
CALCIUM SPEC-SCNC: 10.3 MG/DL (ref 8.6–10.5)
CEA SERPL-MCNC: 1.32 NG/ML
CHLORIDE SERPL-SCNC: 99 MMOL/L (ref 98–107)
CO2 SERPL-SCNC: 28 MMOL/L (ref 22–29)
CREAT SERPL-MCNC: 0.55 MG/DL (ref 0.57–1)
DEPRECATED RDW RBC AUTO: 42.9 FL (ref 37–54)
EOSINOPHIL # BLD AUTO: 0.04 10*3/MM3 (ref 0–0.4)
EOSINOPHIL NFR BLD AUTO: 0.6 % (ref 0.3–6.2)
ERYTHROCYTE [DISTWIDTH] IN BLOOD BY AUTOMATED COUNT: 13.1 % (ref 12.3–15.4)
FERRITIN SERPL-MCNC: 171.9 NG/ML (ref 13–150)
FOLATE SERPL-MCNC: >20 NG/ML (ref 4.78–24.2)
GFR SERPL CREATININE-BSD FRML MDRD: 114 ML/MIN/1.73
GLOBULIN UR ELPH-MCNC: 2.8 GM/DL
GLUCOSE SERPL-MCNC: 93 MG/DL (ref 65–99)
HCT VFR BLD AUTO: 42.5 % (ref 34–46.6)
HGB BLD-MCNC: 14.1 G/DL (ref 12–15.9)
IMM GRANULOCYTES # BLD AUTO: 0.02 10*3/MM3 (ref 0–0.05)
IMM GRANULOCYTES NFR BLD AUTO: 0.3 % (ref 0–0.5)
IRON 24H UR-MRATE: 103 MCG/DL (ref 37–145)
IRON SATN MFR SERPL: 25 % (ref 20–50)
LYMPHOCYTES # BLD AUTO: 3.18 10*3/MM3 (ref 0.7–3.1)
LYMPHOCYTES NFR BLD AUTO: 46.4 % (ref 19.6–45.3)
MCH RBC QN AUTO: 29.6 PG (ref 26.6–33)
MCHC RBC AUTO-ENTMCNC: 33.2 G/DL (ref 31.5–35.7)
MCV RBC AUTO: 89.3 FL (ref 79–97)
MONOCYTES # BLD AUTO: 0.32 10*3/MM3 (ref 0.1–0.9)
MONOCYTES NFR BLD AUTO: 4.7 % (ref 5–12)
NEUTROPHILS NFR BLD AUTO: 3.23 10*3/MM3 (ref 1.7–7)
NEUTROPHILS NFR BLD AUTO: 47.1 % (ref 42.7–76)
NRBC BLD AUTO-RTO: 0 /100 WBC (ref 0–0.2)
PLATELET # BLD AUTO: 309 10*3/MM3 (ref 140–450)
PMV BLD AUTO: 10.3 FL (ref 6–12)
POTASSIUM SERPL-SCNC: 4.6 MMOL/L (ref 3.5–5.2)
PROT SERPL-MCNC: 7.7 G/DL (ref 6–8.5)
RBC # BLD AUTO: 4.76 10*6/MM3 (ref 3.77–5.28)
SODIUM SERPL-SCNC: 139 MMOL/L (ref 136–145)
TIBC SERPL-MCNC: 407 MCG/DL (ref 298–536)
TRANSFERRIN SERPL-MCNC: 273 MG/DL (ref 200–360)
VIT B12 BLD-MCNC: 1241 PG/ML (ref 211–946)
WBC # BLD AUTO: 6.85 10*3/MM3 (ref 3.4–10.8)

## 2021-05-18 PROCEDURE — 82746 ASSAY OF FOLIC ACID SERUM: CPT

## 2021-05-18 PROCEDURE — 86300 IMMUNOASSAY TUMOR CA 15-3: CPT

## 2021-05-18 PROCEDURE — 80053 COMPREHEN METABOLIC PANEL: CPT

## 2021-05-18 PROCEDURE — 82607 VITAMIN B-12: CPT

## 2021-05-18 PROCEDURE — 82728 ASSAY OF FERRITIN: CPT

## 2021-05-18 PROCEDURE — 85025 COMPLETE CBC W/AUTO DIFF WBC: CPT

## 2021-05-18 PROCEDURE — 84466 ASSAY OF TRANSFERRIN: CPT

## 2021-05-18 PROCEDURE — 36415 COLL VENOUS BLD VENIPUNCTURE: CPT

## 2021-05-18 PROCEDURE — 82378 CARCINOEMBRYONIC ANTIGEN: CPT

## 2021-05-18 PROCEDURE — 83540 ASSAY OF IRON: CPT

## 2021-05-19 LAB — CANCER AG27-29 SERPL-ACNC: 20.5 U/ML (ref 0–38.6)

## 2021-05-23 NOTE — PROGRESS NOTES
[facility] HEMATOLOGY AND ONCOLOGY  [address]  [place] [address] [phone]    Patient Name: Chasidy Tejada  Encounter Date: 05/26/2021  YOB: 1960  Patient Number: [id number]         REASON FOR VISIT: Chasidy Tejada is a 60-year-old female who returns in follow-up of stage IA left breast carcinoma, ER/HI and HER-2/lamberto positive.  She underwent left breast lumpectomy with SNB, 01/10/2020 and was started on adjuvant Arimidex beginning 02/03/2020 through 03/11/2020 before resumption on 08/19/2020.  She completed adjuvant paclitaxel (week 12/12, 05/27/2020), and received adjuvant trastuzumab (Herceptin), having completed cycle 17, 02/10/2021 (3.5 months).  Adjuvant radiation was administered on 06/22/2020 through 08/06/2020 (6040 cGy in 33 fx).  She is here alone (previously with her spouse).    I have reviewed the HPI and verified with the patient the accuracy of it. No changes to interval history since the information was documented. Tommy Wheat MD 05/26/21       DIAGNOSTIC ABNORMALITIES:          1.   12/13/2019- mammogram screening.  Impression: Stellate lesion outer left breast.  Spot compression and ultrasound suggested.          2.   12/27/2019- diagnostic mammogram.  Impression: Irregular density upper outer left breast.  Biopsy suggested.          3.   12/27/2019- left breast ultrasound.  Impression: Suspicious 6 x 5 x 8 mm density left breast approximately 8 cm from the nipple.  Biopsy suggested.  BI-RADS Category 4B.          4.   01/03/2020-ultrasound-guided breast biopsy.  Impression: Appropriate sampling of the 8 mm irregular hypoechoic nodule in the left breast at 1:00 in the posterior depth/axillary tail region.  Final diagnosis: Left breast at 1 o'clock position, core biopsies: A.invasive mammary carcinoma of no special type (ductal, not otherwise specified), grade 2, at least 4.9 mm in greatest extent.   B.associated low-grade ductal carcinoma in situ.  % positive, MS 25% positive, HER-2/lamberto-2+ (IHC)/FISH- a focal positive score is present in an area corresponding to approximately 15% of the tumor (signal ratio: 2.9).           5.   02/03/2020- CMP normal with .  CBC normal.           6.   02/03/2020-bone scan ([facility]).  Impression: Degenerative joint changes.  No evidence of bony metastasis.           7.   02/03/2020-CT scans, head chest, abdomen, pelvis-impression: No abnormal intracranial enhancement to suggest intracranial metastasis/abnormalities.  No evidence of intrathoracic metastasis.  Stable 5 to 6 mm right lower lobe nodule of the right hemidiaphragm unchanged from 2/14/2011.  No convincing evidence of intra-abdominal or pelvic metastasis.  Hepatic cysts.  Fatty attenuated benign focus within the body of the pancreas visualized, 12/14/2011.  Previous hysterectomy, cholecystectomy and appendectomy.           8.   02/14/2020-echocardiogram.  Impression: LV systolic function normal (EF equals 70%).  Normal LV and RV size thickness and function.  Normal LA and RA size.  Normal cardiac valves.  No pericardial effusion.           9.  02/27/2020- CMP normal with .  CBC normal.  Iron 65, iron saturation 18%, ferritin 134, B12 745, folate > 20, CEA 1.76, CA-27-29 14.2.          10.  03/04/2020- repeat FISH for HER-2/lamberto (from tumor block, 01/10/2020).  No tumor on specimen.          11.  06/23/2020- DEXA scan.  Impression: Normal bone density.  No lower than 1 standard deviation below the mean for young adult woman.    PREVIOUS INTERVENTIONS:           1.   01/10/2020- left breast lumpectomy with left axillary sentinel node biopsy.  Final diagnosis: 1.left breast mass, lumpectomy: Invasive and in situ ductal carcinoma.  Tumor site-not specified.  Histologic type: Invasive carcinoma of no special type (invasive ductal carcinoma, not otherwise specified).  Overall tumor grade: Grade 1.  Tumor size:  "Greatest dimension of largest invasive focus: 5 mm.  Tumor focality: Single focus of invasive carcinoma.  Ductal carcinoma in situ (DCIS): Present.  Negative for extensive intraductal component (EIC).  Size (extent) CIS: Cannot be determined: Microscopic.  Architectural pattern: Cribriform/solid.  Nuclear grade: 2 (intermediate).  Necrosis: Present, central (expansive \"comedo) necrosis).  Lobular carcinoma in situ (LCIS): No LCIS in specimen.  Lymphovascular invasion: Not identified.  Dermal lymphovascular invasion: No skin present.  Margins: Uninvolved by invasive carcinoma.  Lymph nodes: Uninvolved by tumor cells.  Regional lymph nodes: 1.  Number of lymph nodes examined: 1.  Number of sentinel nodes examined: 1.  Pathologic stage classification (pTNM, AJCC eighth edition): pT1a,(sn), pN0.  % positive, MN 25% positive, HER-2 2+ (equivocal).           2.   Adjuvant Arimidex 1 mg p.o. daily beginning 02/03/2020 through 03/11/2020: resumed 08/19/2020.           3.   Adjuvant paclitaxel beginning 03/11/2020 through 05/27/2020 (12/12 weekly doses)           4.   Adjuvant trastuzumab every 3 weeks - beginning 03/11/2020 through 02/10/2021 (17 cycles)           5.   Adjuvant radiation to the left breast (5040 cGy with 1000 cGy boost to the tumor bed for a total of 6040 cGy/33 fractions) from 06/22/2020 through 08/06/2020.    LABS    Lab Results - Last 18 Months   Lab Units 05/18/21  1053 02/10/21  1028 01/20/21  1024 12/30/20  1019 12/09/20  1021 11/25/20  1000 05/20/20  0846   HEMOGLOBIN g/dL 14.1 13.2 14.0 13.4 12.9 13.3 11.5*   HEMATOCRIT % 42.5 40.2 39.9 40.5 38.9 40.3 34.5   MCV fL 89.3 89.7 87.5 90.8 89.4 90.6 93.0   WBC 10*3/mm3 6.85 6.16 6.24 5.32 6.00 6.28 8.84   RDW % 13.1 13.0 12.9 12.9 13.2 13.6 15.6*   MPV fL 10.3 9.6 9.7 9.4 9.7 9.7 9.5   PLATELETS 10*3/mm3 309 290 281 291 311 312 402   IMM GRAN % % 0.3 0.2 0.2 0.2 0.3 0.3  --    NEUTROS ABS 10*3/mm3 3.23 3.31 2.83 2.63 3.39 3.49 5.18   LYMPHS ABS " 10*3/mm3 3.18* 2.26 2.88 2.23 2.12 2.32  --    MONOS ABS 10*3/mm3 0.32 0.46 0.37 0.35 0.38 0.32  --    EOS ABS 10*3/mm3 0.04 0.08 0.09 0.06 0.05 0.08 0.18   BASOS ABS 10*3/mm3 0.06 0.04 0.06 0.04 0.04 0.05 0.09   IMMATURE GRANS (ABS) 10*3/mm3 0.02 0.01 0.01 0.01 0.02 0.02  --    NRBC /100 WBC 0.0 0.0 0.0 0.0 0.0 0.0 1.0*   NEUTROPHIL % %  --   --   --   --   --   --  55.6   MONOCYTES % %  --   --   --   --   --   --  4.0*   BASOPHIL % %  --   --   --   --   --   --  1.0   ANISOCYTOSIS   --   --   --   --   --   --  Slight/1+   GIANT PLT   --   --   --   --   --   --  Slight/1+       Lab Results - Last 18 Months   Lab Units 05/18/21  1054 02/10/21  1028 01/20/21  1024 12/30/20  1019 12/09/20  1021 11/25/20  1000   GLUCOSE mg/dL 93 94 91 90 105* 127*   SODIUM mmol/L 139 138 137 140 139 139   POTASSIUM mmol/L 4.6 4.0 4.2 4.0 3.7 3.8   CO2 mmol/L 28.0 26.0 25.0 27.0 26.0 26.0   CHLORIDE mmol/L 99 102 102 106 104 104   ANION GAP mmol/L 12.0 10.0 10.0 7.0 9.0 9.0   CREATININE mg/dL 0.55* 0.51* 0.58 0.58 0.65 0.59   BUN mg/dL 9 14 20 14 20 17   BUN / CREAT RATIO  16.4 27.5* 34.5* 24.1 30.8* 28.8*   CALCIUM mg/dL 10.3 9.9 10.0 9.5 9.3 9.9   EGFR IF NONAFRICN AM mL/min/1.73 114 124 107 107 94 105   ALK PHOS U/L 108 90 89 91 90 93   TOTAL PROTEIN g/dL 7.7 7.5 7.9 7.5 7.4 7.7   ALT (SGPT) U/L 29 23 24 24 22 24   AST (SGOT) U/L 30 21 24 18 17 22   BILIRUBIN mg/dL 0.6 0.5 0.5 0.4 0.6 0.6   ALBUMIN g/dL 4.90 4.20 4.50 4.30 4.40 4.60   GLOBULIN gm/dL 2.8 3.3 3.4 3.2 3.0 3.1       Lab Results - Last 18 Months   Lab Units 05/18/21  1053 02/10/21  1028 12/30/20  1019 10/28/20  1012 10/19/20  1114 08/05/20  0938   CEA ng/mL 1.32 1.63 1.89 1.72 1.87 1.26       Lab Results - Last 18 Months   Lab Units 05/18/21  1054 05/18/21  1053 02/10/21  1028 01/20/21  1024 12/30/20  1019 12/09/20  1021 11/18/20  1025 10/28/20  1012 10/19/20  1114 08/05/20  0938 05/27/20  0916 04/22/20  0851 12/12/19  0704   IRON mcg/dL 103  --  74 81  --  87 89  --   79  --   --  55  --    TIBC mcg/dL 407  --  308 349  --  353 353  --  380  --   --  353  --    IRON SATURATION % 25  --  24 23  --  25 25  --  21  --   --  16*  --    FERRITIN ng/mL 171.90*  --  157.50* 173.50*  --  138.90 164.30*  --  141.00  --    < > 199.00*  --    TSH uIU/mL  --   --   --   --  1.270  --   --   --   --   --   --   --  0.798   FOLATE ng/mL  --  >20.00 >20.00  --   --   --   --  >20.00 >20.00 >20.00  --  >20.00  --     < > = values in this interval not displayed.         PAST MEDICAL HISTORY:  ALLERGIES:  Allergies   Allergen Reactions   • Demerol [Meperidine] Hives   • Morphine And Related Hives     CURRENT MEDICATIONS:  Outpatient Encounter Medications as of 5/26/2021   Medication Sig Dispense Refill   • anastrozole (ARIMIDEX) 1 MG tablet Take 1 tablet by mouth Daily. 30 tablet 5   • calcium carbonate-vitamin d (Calcium 600+D) 600-400 MG-UNIT per tablet Take 1 tablet by mouth 2 (Two) Times a Day. 60 tablet 3   • Multiple Vitamins-Minerals (MULTIVITAMIN ADULT PO) Take 1 tablet by mouth Daily.     • polyethylene glycol (MIRALAX) packet Take 17 g by mouth Daily.     • oxyCODONE-acetaminophen (PERCOCET) 5-325 MG per tablet Take 1-2 tablets by mouth Every 4 (Four) Hours As Needed (Pain). 15 tablet 0     No facility-administered encounter medications on file as of 5/26/2021.     Adult illnesses:  Colon polyps  Obesity  Spinal stenosis neck   Herniated cervical disc without myelopathy  Borderline blood pressure  History of dysphagia    Past surgeries:  Anterior cervical discectomy w fusion, 09/2018  Appendectomy  BSO/GANGA  Cholecystectomy  Colonoscopy, 02/10/2017  BTL  Tonsillectomy  Breast biopsy  Left partial mastectomy, 01/10/2020  03/05/2020 - Mediport placement per Dr. Edmond  03/04/2021-Mediport removal per Dr. Edmond.    ADULT ILLNESSES:  Patient Active Problem List   Diagnosis Code   • Family history of colon cancer Z80.0   • Colon polyps K63.5   • Laryngopharyngeal reflux K21.9   •  Pharyngoesophageal dysphagia R13.14   • Non-smoker Z78.9   • Obesity (BMI 30-39.9) E66.9   • Spinal stenosis in cervical region M48.02   • Cervical radiculopathy M54.12   • Herniated cervical disc without myelopathy M50.20   • Borderline blood pressure R03.0   • BMI 31.0-31.9,adult Z68.31   • BMI 33.0-33.9,adult Z68.33   • Invasive ductal carcinoma of breast, female, left (CMS/HCC) C50.912   • Former smoker Z87.891   • S/P lumpectomy, left breast Z98.890   • S/P lymph node biopsy Z98.890   • On antineoplastic chemotherapy Z79.899   • Estrogen receptor positive status (ER+) Z17.0   • History of radiation therapy Z92.3     SURGERIES:  Past Surgical History:   Procedure Laterality Date   • ANTERIOR CERVICAL DISCECTOMY W/ FUSION N/A 9/7/2018    Procedure: CERVICAL DISCECTOMY ANTERIOR WITH FUSION C6-7;  Surgeon: Chris Mcfadden MD;  Location: [facility] OR;  Service: Neurosurgery   • APPENDECTOMY     • BILATERAL SALPINGO OOPHORECTOMY     • BREAST BIOPSY     • BREAST LUMPECTOMY     • CERVICAL CORPECTOMY N/A 9/7/2018    Procedure: CERVICAL CORPECTOMY C6-7;  Surgeon: Chris Mcfadden MD;  Location: [facility] OR;  Service: Neurosurgery   • CHOLECYSTECTOMY     • COLONOSCOPY  09/05/2012   • COLONOSCOPY N/A 2/10/2017    Procedure: COLONOSCOPY WITH ANESTHESIA;  Surgeon: Elina Gonsalez MD;  Location: [facility] ENDOSCOPY;  Service:    • HYSTERECTOMY  1998    Had hysterectomy for painful periods and bleeding. (Dr. Patel) [facility] w/ BSO   • LAPAROSCOPIC TUBAL LIGATION     • MASTECTOMY W/ SENTINEL NODE BIOPSY Left 1/10/2020    Procedure: LEFT NEEDLE DIRECTED ULTRASOUND GUIDED PARTIAL MASTECTOMY WITH SENTINEL LYMPH NODE BIOPSY, INJECTION AND SCAN, RADIOLOGIST WILL INJECT;  Surgeon: Flor Edmond MD;  Location: [facility] OR;  Service: General   • TONSILLECTOMY     • VENOUS ACCESS DEVICE (PORT) INSERTION N/A 3/5/2020    Procedure: INSERTION VENOUS ACCESS DEVICE EXCISION SKIN LESION X 2 CHEST AND ABDOMEN;  Surgeon: Flor Edmond MD;  Location:  "[facility] OR;  Service: General;  Laterality: N/A;   • VENOUS ACCESS DEVICE (PORT) REMOVAL Left 3/4/2021    Procedure: REMOVAL OF SINGLE LUMEN PORT;  Surgeon: Flor Edmond MD;  Location:  [facility] OR;  Service: General;  Laterality: Left;     HEALTH MAINTENANCE ITEMS:  Health Maintenance Due   Topic Date Due   • ZOSTER VACCINE (1 of 2) Never done   • ANNUAL PHYSICAL  12/03/2020       <no information>  Last Completed Colonoscopy     This patient has no relevant Health Maintenance data.        Immunization History   Administered Date(s) Administered   • COVID-19 (MODERNA) 01/09/2021, 02/06/2021   • Tdap 08/29/2018     Last Completed Mammogram          MAMMOGRAM (Yearly) Next due on 12/17/2021 12/17/2020  Mammo Diagnostic Digital Tomosynthesis Bilateral With CAD    12/27/2019  Mammo Diagnostic Digital Tomosynthesis Left With CAD    12/13/2019  Mammo Screening Digital Tomosynthesis Bilateral With CAD    06/01/2018  Mammo Screening Digital Tomosynthesis Bilateral With CAD    07/14/2015  MAMMOGRAPHY SCREENING BILATERAL                  FAMILY HISTORY:  Family History   Problem Relation Age of Onset   • Colon cancer Maternal Grandmother         age not known   • Cancer Father    • Heart disease Father    • Diabetes Mother    • Hypertension Mother    • Stroke Mother    • Breast cancer Sister    • No Known Problems Daughter    • No Known Problems Sister    • Colon polyps Neg Hx    • Esophageal cancer Neg Hx    • Liver cancer Neg Hx    • Liver disease Neg Hx    • Rectal cancer Neg Hx    • Stomach cancer Neg Hx    • Ovarian cancer Neg Hx    • BRCA 1/2 Neg Hx    • Endometrial cancer Neg Hx      SOCIAL HISTORY:  Social History     Socioeconomic History   • Marital status:      Spouse name: Not on file   • Number of children: 1   • Years of education: Not on file   • Highest education level: Not on file   Tobacco Use   • Smoking status: Never Smoker   • Smokeless tobacco: Never Used   • Tobacco comment: \"i never really " "smoked\"   Vaping Use   • Vaping Use: Never used   Substance and Sexual Activity   • Alcohol use: Not Currently   • Drug use: No   • Sexual activity: Defer       REVIEW OF SYSTEMS:  Review of Systems   Constitutional: Negative.         She manages her ADLs' including chores, errands, driving.  Is still working full time.  Has been active.    Arimidex tolerance:  \"I think it's causing nausea.\"  No other problems.  No new arthralgias.  Moderate hot flashes.  Says Dr. Martel prescribed Elavil but she did not like it.  \"I stopped it.\"   HENT: Negative.    Eyes: Negative.    Respiratory: Negative.    Cardiovascular: Negative.    Gastrointestinal: Negative for diarrhea, nausea (\"in the morning.\") and GERD (\"Normal.\" Still on omeprazole as needed).   Endocrine: Positive for heat intolerance (Lingering vasomotor changes.  Was written for Elavil last 12/16/2020 by Dr. Martel but she stopped it, \"it made me feel like I was in a fog.\").   Genitourinary: Negative.    Musculoskeletal: Negative for arthralgias (knees, and hips \"been ok.\" Does not impede her activities.).   Allergic/Immunologic: Negative.    Neurological: Positive for numbness (paresthesias of the toes> fingers.  Says improving each time).   Hematological: Negative.    Psychiatric/Behavioral: Negative for depressed mood. The patient is not nervous/anxious (\"not bad\").      /86   Pulse 100   Temp 97.9 °F (36.6 °C)   Resp 16   Ht 152.4 cm (60\")   Wt 75.7 kg (166 lb 12.8 oz)   LMP  (LMP Unknown)   SpO2 97%   Breastfeeding No   BMI 32.58 kg/m²  Body surface area is 1.73 meters squared.  Pain Score    05/26/21 1405   PainSc: 0-No pain       Physical Exam:  Physical Exam   Constitutional: She is oriented to person, place, and time. She appears well-developed and well-nourished. No distress.   Pleasant, heavy set, cooperative, modestly kept female.  Appears her age.  ECOG 0.      She has lost 5 pounds (in addition to 3 pounds at her 2 prior visits) in the " interval.   HENT:   Head: Normocephalic and atraumatic.   Mouth/Throat: No oropharyngeal exudate.   Hair has regrown    Wearing a surgical mask   Eyes: Pupils are equal, round, and reactive to light. Conjunctivae are normal. No scleral icterus.   Neck: No JVD present. No tracheal deviation present. No thyromegaly present.   Cardiovascular: Normal rate, regular rhythm and normal heart sounds. Exam reveals no friction rub.   No murmur heard.  Pulmonary/Chest: Effort normal and breath sounds normal. No stridor. No respiratory distress. She has no wheezes. She has no rales.   Port site in the left upper chest is healed.  The device has been removed.    Breasts: Chaperoned exam-Lisette Edmond MA-  Again note that the left breast lumpectomy and sentinel node biopsy site in the left axilla are healed.  There is no incisional tenderness in either site.  The radiation associated skin pigmentation and induration are barely evident.  The rest of the breast is without obvious nodularity skin changes, no nipple discharge.  Right breast without masses, skin changes nor nipple discharge.     Abdominal: Soft. Bowel sounds are normal. She exhibits no distension and no mass. There is no abdominal tenderness. There is no rebound and no guarding.   Musculoskeletal: Normal range of motion. No deformity.   Lymphadenopathy:     She has no cervical adenopathy.        Right cervical: No superficial cervical, no deep cervical and no posterior cervical adenopathy present.       Left cervical: No superficial cervical, no deep cervical and no posterior cervical adenopathy present.        Right: No supraclavicular adenopathy present.        Left: No supraclavicular adenopathy present.   Neurological: She is alert and oriented to person, place, and time. She displays normal reflexes. No cranial nerve deficit. She exhibits normal muscle tone. Coordination normal.   Skin: Skin is warm and dry. No rash noted. No erythema. No pallor.   Psychiatric:  Her behavior is normal. Judgment and thought content normal.   Vitals reviewed.    ASSESSMENT:   1.  Invasive and in situ ductal carcinoma  left breast.                 Stage:  IA (pT1a, pN0, G2) ER  100% positive, AZ  25%, HER-2/lamberto - 2+ (IHC)/FISH -a focal positive score is present in an area corresponding to approximately 15% of the tumor (signal ratio: 2.9).                 Tumor [place]:  5 mm.  Tumor focality: Single focus of invasive carcinoma.  No EIC nor LCIS.  Lymphovascular invasion: Not identified.  Dermal lymphovascular invasion: No skin present.  Microcalcifications: Not identified.  Margins uninvolved by invasive carcinoma.  Lymph nodes: Number of lymph nodes examined: 1 (0/1).                 Tumor Status:    -- Underwent lumpectomy and SNB, 01/10/2020.   --Completed adjuvant paclitaxel, 05/27/2020; adjuvant Herceptin, 02/10/2021; adjuvant radiation, 08/06/2020 (above)  -- Adjuvant Arimidex since 02/03/2020  -- 12/17/2020-Mammogram- [id number]          2.   Obesity.  Moderate (BMI 34)        3.   Spinal stenosis neck, quiescent         4.   Herniated cervical disc without myelopathy        5.   Anemia, likely chemo associated.    --Resolved, Hgb 14.1, 05/18/2021 (prior: Hgb 11.5-13.8)        6.   GERD, quiescent        7.   Nausea.  NOS.  Arimidex vs anxiety       RECOMMENDATIONS:         1.   Apprised of the labs, 05/18/2021 (above).  Normal CBC, CMP, normal CEA, normal CA-27-29, repleted B12/folate, repleted serum iron, iron saturation 25% (from 24%), repleted ferritin (171; 157; 138; 141; from 216)..  Ferrous sulfate stopped last visit.          2.    Previously reviewed mammogram report, 12/17/2020 noted-YUDY        3.    Previously reviewed 2D echo,  01/20/2021-normal LV systolic function with LVEF 61-65%.        4.    Previously noted that we are unable to repeat FISH testing for HER-2 on the tumor specimen from 01/10/2020 -pathology block did not contain any more tumor - personally  discussed/confirmed with Dr. Burgos of pathology.        5.   Care previously discussed with Dr. Jaimes on 02/28/2020 after she saw the patient on 02/25/2020 (encounter reviewed).  Pathology was reviewed at [place] and discussed with the patient.  Recommendations/plan: Discussed that since her cancer is strongly ER positive and low histologic grade/low proliferative rate, and HER-2 on the biopsy was borderline recommended repeat HER-2 testing on her surgical pathology.  Defer to his negative proceed with only endocrine therapy and radiation.  If HER-2 is confirmed to be positive, recommend adjuvant weekly Taxol x12+ Herceptin every 3 weeks to complete 1 year of HER-2 directed therapy.  These plans were personally discussed with the patient (via telephone) on 02/28/2020 I also personally asked the pathologist (Dr. Radha Burgos) to send off the biopsy specimen for repeat FISH HER-2 testing.          6.   Reviewed NCCN guidelines version 3.2019 invasive breast cancer.  For tumors (=/> 5 mm), and pN0, recommend adjuvant endocrine therapy +/- adjuvant chemotherapy with trastuzumab (category 2B).                  7.  Rx:   · Oscal 600/400 po bid # 60 - OTC  · Arimidex 1 mg daily # 30 x 3 RF-eRx  · Zofran 8 mg po tid prn # 30  · Stop all B12 supplements           8.  Previously discussed the rationale for adjuvant hormonal manipulation with AIs (see above) and potential toxicities of AI therapy are discussed (to include but not limited to: Hot flashes, asthenia, pain, arthralgias, arthritis, nausea/vomiting, headache, pharyngitis, depression, rash, hypertension, lymphedema, insomnia, edema, weight gain, dyspnea, abdominal pain, constipation, hyperlipidemia, osteoporosis, fractures, cough, bone pain, diarrhea, breast pain, paresthesias, infections, cataracts, myalgias, thromboembolism, angina, Lyles-Yung syndrome, erythema multiforme, anemia, leukopenia, stroke, myocardial infarction, osteoporosis, fractures).  Questions are answered. She agrees to resume therapy at the terminus of radiation.             9.  Return to the [place] office with pre-office serum iron, iron saturation, ferritin B12, folate, CMP, CEA, CA 27-29 and CBC and differential in 12 weeks.      .     MEDICAL DECISION MAKING: Moderate complexity   AMOUNT OF DATA: Moderate to Extensive     I spent 40 total minutes, face-to-face, caring for Chasidy today.  Greater than 50% of this time involved counseling and/or coordination of care as documented within this note regarding the patient's illness(es), pros and cons of various treatment options, instructions and/or risk reduction.     Cc:  Abigail Jaimes MD- [place] [facility]          MD Kailash Sawant MD Robert Learch, MD

## 2021-05-26 ENCOUNTER — OFFICE VISIT (OUTPATIENT)
Dept: ONCOLOGY | Facility: CLINIC | Age: 61
End: 2021-05-26

## 2021-05-26 VITALS
DIASTOLIC BLOOD PRESSURE: 86 MMHG | WEIGHT: 166.8 LBS | SYSTOLIC BLOOD PRESSURE: 144 MMHG | HEART RATE: 100 BPM | OXYGEN SATURATION: 97 % | RESPIRATION RATE: 16 BRPM | BODY MASS INDEX: 32.75 KG/M2 | TEMPERATURE: 97.9 F | HEIGHT: 60 IN

## 2021-05-26 DIAGNOSIS — C50.912 INVASIVE DUCTAL CARCINOMA OF BREAST, FEMALE, LEFT (HCC): Primary | ICD-10-CM

## 2021-05-26 PROCEDURE — 99215 OFFICE O/P EST HI 40 MIN: CPT | Performed by: INTERNAL MEDICINE

## 2021-05-26 RX ORDER — ONDANSETRON 4 MG/1
8 TABLET, FILM COATED ORAL EVERY 8 HOURS PRN
Qty: 30 TABLET | Refills: 1 | Status: SHIPPED | OUTPATIENT
Start: 2021-05-26

## 2021-06-07 RX ORDER — ANASTROZOLE 1 MG/1
1 TABLET ORAL DAILY
Qty: 30 TABLET | Refills: 5 | Status: SHIPPED | OUTPATIENT
Start: 2021-06-07 | End: 2021-09-27 | Stop reason: SDUPTHER

## 2021-08-17 ENCOUNTER — LAB (OUTPATIENT)
Dept: LAB | Facility: HOSPITAL | Age: 61
End: 2021-08-17

## 2021-08-17 DIAGNOSIS — C50.912 INVASIVE DUCTAL CARCINOMA OF BREAST, FEMALE, LEFT (HCC): ICD-10-CM

## 2021-08-17 LAB
ALBUMIN SERPL-MCNC: 5 G/DL (ref 3.5–5.2)
ALBUMIN/GLOB SERPL: 1.7 G/DL
ALP SERPL-CCNC: 112 U/L (ref 39–117)
ALT SERPL W P-5'-P-CCNC: 25 U/L (ref 1–33)
ANION GAP SERPL CALCULATED.3IONS-SCNC: 14 MMOL/L (ref 5–15)
AST SERPL-CCNC: 22 U/L (ref 1–32)
BASOPHILS # BLD AUTO: 0.05 10*3/MM3 (ref 0–0.2)
BASOPHILS NFR BLD AUTO: 0.7 % (ref 0–1.5)
BILIRUB SERPL-MCNC: 0.8 MG/DL (ref 0–1.2)
BUN SERPL-MCNC: 12 MG/DL (ref 6–20)
BUN/CREAT SERPL: 24.5 (ref 7–25)
CALCIUM SPEC-SCNC: 10.2 MG/DL (ref 8.6–10.5)
CHLORIDE SERPL-SCNC: 101 MMOL/L (ref 98–107)
CO2 SERPL-SCNC: 25 MMOL/L (ref 22–29)
CREAT SERPL-MCNC: 0.49 MG/DL (ref 0.57–1)
DEPRECATED RDW RBC AUTO: 44.4 FL (ref 37–54)
EOSINOPHIL # BLD AUTO: 0.04 10*3/MM3 (ref 0–0.4)
EOSINOPHIL NFR BLD AUTO: 0.5 % (ref 0.3–6.2)
ERYTHROCYTE [DISTWIDTH] IN BLOOD BY AUTOMATED COUNT: 13.2 % (ref 12.3–15.4)
FERRITIN SERPL-MCNC: 226.4 NG/ML (ref 13–150)
GFR SERPL CREATININE-BSD FRML MDRD: 129 ML/MIN/1.73
GLOBULIN UR ELPH-MCNC: 3 GM/DL
GLUCOSE SERPL-MCNC: 89 MG/DL (ref 65–99)
HCT VFR BLD AUTO: 41.2 % (ref 34–46.6)
HGB BLD-MCNC: 13.6 G/DL (ref 12–15.9)
IMM GRANULOCYTES # BLD AUTO: 0.01 10*3/MM3 (ref 0–0.05)
IMM GRANULOCYTES NFR BLD AUTO: 0.1 % (ref 0–0.5)
IRON 24H UR-MRATE: 110 MCG/DL (ref 37–145)
IRON SATN MFR SERPL: 26 % (ref 20–50)
LYMPHOCYTES # BLD AUTO: 3.64 10*3/MM3 (ref 0.7–3.1)
LYMPHOCYTES NFR BLD AUTO: 47.7 % (ref 19.6–45.3)
MCH RBC QN AUTO: 30.1 PG (ref 26.6–33)
MCHC RBC AUTO-ENTMCNC: 33 G/DL (ref 31.5–35.7)
MCV RBC AUTO: 91.2 FL (ref 79–97)
MONOCYTES # BLD AUTO: 0.37 10*3/MM3 (ref 0.1–0.9)
MONOCYTES NFR BLD AUTO: 4.8 % (ref 5–12)
NEUTROPHILS NFR BLD AUTO: 3.52 10*3/MM3 (ref 1.7–7)
NEUTROPHILS NFR BLD AUTO: 46.2 % (ref 42.7–76)
NRBC BLD AUTO-RTO: 0 /100 WBC (ref 0–0.2)
PLATELET # BLD AUTO: 320 10*3/MM3 (ref 140–450)
PMV BLD AUTO: 10 FL (ref 6–12)
POTASSIUM SERPL-SCNC: 3.6 MMOL/L (ref 3.5–5.2)
PROT SERPL-MCNC: 8 G/DL (ref 6–8.5)
RBC # BLD AUTO: 4.52 10*6/MM3 (ref 3.77–5.28)
SODIUM SERPL-SCNC: 140 MMOL/L (ref 136–145)
TIBC SERPL-MCNC: 425 MCG/DL (ref 298–536)
TRANSFERRIN SERPL-MCNC: 285 MG/DL (ref 200–360)
WBC # BLD AUTO: 7.63 10*3/MM3 (ref 3.4–10.8)

## 2021-08-17 PROCEDURE — 36415 COLL VENOUS BLD VENIPUNCTURE: CPT

## 2021-08-17 PROCEDURE — 82728 ASSAY OF FERRITIN: CPT

## 2021-08-17 PROCEDURE — 83540 ASSAY OF IRON: CPT

## 2021-08-17 PROCEDURE — 82607 VITAMIN B-12: CPT

## 2021-08-17 PROCEDURE — 80053 COMPREHEN METABOLIC PANEL: CPT

## 2021-08-17 PROCEDURE — 86300 IMMUNOASSAY TUMOR CA 15-3: CPT

## 2021-08-17 PROCEDURE — 85025 COMPLETE CBC W/AUTO DIFF WBC: CPT

## 2021-08-17 PROCEDURE — 82746 ASSAY OF FOLIC ACID SERUM: CPT

## 2021-08-17 PROCEDURE — 84466 ASSAY OF TRANSFERRIN: CPT

## 2021-08-17 PROCEDURE — 82378 CARCINOEMBRYONIC ANTIGEN: CPT

## 2021-08-18 LAB
CANCER AG27-29 SERPL-ACNC: 24.5 U/ML (ref 0–38.6)
CEA SERPL-MCNC: 1.74 NG/ML
FOLATE SERPL-MCNC: 14.5 NG/ML (ref 4.78–24.2)
VIT B12 BLD-MCNC: 436 PG/ML (ref 211–946)

## 2021-08-25 NOTE — PROGRESS NOTES
[facility] HEMATOLOGY AND ONCOLOGY  [address]  [place] [address] [phone]    Patient Name: Chasidy Tejada  Encounter Date: 08/30/2021  YOB: 1960  Patient Number: [id number]       REASON FOR VISIT: Chasidy Tejada is a 61-year-old female who returns in follow-up of stage IA left breast carcinoma, ER/CT and HER-2/lamberto positive.  She underwent left breast lumpectomy with SNB, 01/10/2020 and was started on adjuvant Arimidex beginning 02/03/2020 through 03/11/2020 before resumption on 08/19/2020.  She completed adjuvant paclitaxel (week 12/12, 05/27/2020), and received adjuvant trastuzumab (Herceptin), having completed cycle 17, 02/10/2021 (6.5 months).  Adjuvant radiation was administered on 06/22/2020 through 08/06/2020 (6040 cGy in 33 fx).  She is here alone (previously with her spouse).    I have reviewed the HPI and verified with the patient the accuracy of it. No changes to interval history since the information was documented. Tommy Wheat MD 08/30/21     DIAGNOSTIC ABNORMALITIES:          1.   12/13/2019- mammogram screening.  Impression: Stellate lesion outer left breast.  Spot compression and ultrasound suggested.          2.   12/27/2019- diagnostic mammogram.  Impression: Irregular density upper outer left breast.  Biopsy suggested.          3.   12/27/2019- left breast ultrasound.  Impression: Suspicious 6 x 5 x 8 mm density left breast approximately 8 cm from the nipple.  Biopsy suggested.  BI-RADS Category 4B.          4.   01/03/2020-ultrasound-guided breast biopsy.  Impression: Appropriate sampling of the 8 mm irregular hypoechoic nodule in the left breast at 1:00 in the posterior depth/axillary tail region.  Final diagnosis: Left breast at 1 o'clock position, core biopsies: A.invasive mammary carcinoma of no special type (ductal, not otherwise specified), grade 2, at least 4.9 mm in greatest extent.   B.associated low-grade ductal carcinoma in situ.  % positive, WA 25% positive, HER-2/lamberto-2+ (IHC)/FISH- a focal positive score is present in an area corresponding to approximately 15% of the tumor (signal ratio: 2.9).           5.   02/03/2020- CMP normal with .  CBC normal.           6.   02/03/2020-bone scan ([facility]).  Impression: Degenerative joint changes.  No evidence of bony metastasis.           7.   02/03/2020-CT scans, head chest, abdomen, pelvis-impression: No abnormal intracranial enhancement to suggest intracranial metastasis/abnormalities.  No evidence of intrathoracic metastasis.  Stable 5 to 6 mm right lower lobe nodule of the right hemidiaphragm unchanged from 2/14/2011.  No convincing evidence of intra-abdominal or pelvic metastasis.  Hepatic cysts.  Fatty attenuated benign focus within the body of the pancreas visualized, 12/14/2011.  Previous hysterectomy, cholecystectomy and appendectomy.           8.   02/14/2020-echocardiogram.  Impression: LV systolic function normal (EF equals 70%).  Normal LV and RV size thickness and function.  Normal LA and RA size.  Normal cardiac valves.  No pericardial effusion.           9.  02/27/2020- CMP normal with .  CBC normal.  Iron 65, iron saturation 18%, ferritin 134, B12 745, folate > 20, CEA 1.76, CA-27-29 14.2.          10.  03/04/2020- repeat FISH for HER-2/lamberto (from tumor block, 01/10/2020).  No tumor on specimen.          11.  06/23/2020- DEXA scan.  Impression: Normal bone density.  No lower than 1 standard deviation below the mean for young adult woman.    PREVIOUS INTERVENTIONS:           1.   01/10/2020- left breast lumpectomy with left axillary sentinel node biopsy.  Final diagnosis: 1.left breast mass, lumpectomy: Invasive and in situ ductal carcinoma.  Tumor site-not specified.  Histologic type: Invasive carcinoma of no special type (invasive ductal carcinoma, not otherwise specified).  Overall tumor grade: Grade 1.  Tumor size:  "Greatest dimension of largest invasive focus: 5 mm.  Tumor focality: Single focus of invasive carcinoma.  Ductal carcinoma in situ (DCIS): Present.  Negative for extensive intraductal component (EIC).  Size (extent) CIS: Cannot be determined: Microscopic.  Architectural pattern: Cribriform/solid.  Nuclear grade: 2 (intermediate).  Necrosis: Present, central (expansive \"comedo) necrosis).  Lobular carcinoma in situ (LCIS): No LCIS in specimen.  Lymphovascular invasion: Not identified.  Dermal lymphovascular invasion: No skin present.  Margins: Uninvolved by invasive carcinoma.  Lymph nodes: Uninvolved by tumor cells.  Regional lymph nodes: 1.  Number of lymph nodes examined: 1.  Number of sentinel nodes examined: 1.  Pathologic stage classification (pTNM, AJCC eighth edition): pT1a,(sn), pN0.  % positive, IN 25% positive, HER-2 2+ (equivocal).           2.   Adjuvant Arimidex 1 mg p.o. daily beginning 02/03/2020 through 03/11/2020: resumed 08/19/2020.           3.   Adjuvant paclitaxel beginning 03/11/2020 through 05/27/2020 (12/12 weekly doses)           4.   Adjuvant trastuzumab every 3 weeks - beginning 03/11/2020 through 02/10/2021 (17 cycles)           5.   Adjuvant radiation to the left breast (5040 cGy with 1000 cGy boost to the tumor bed for a total of 6040 cGy/33 fractions) from 06/22/2020 through 08/06/2020.    LABS    Lab Results - Last 18 Months   Lab Units 08/17/21  1326 05/18/21  1053 02/10/21  1028 01/20/21  1024 12/30/20  1019 12/09/20  1021 05/27/20  0916 05/20/20  0846   HEMOGLOBIN g/dL 13.6 14.1 13.2 14.0 13.4 12.9   < > 11.5*   HEMATOCRIT % 41.2 42.5 40.2 39.9 40.5 38.9   < > 34.5   MCV fL 91.2 89.3 89.7 87.5 90.8 89.4   < > 93.0   WBC 10*3/mm3 7.63 6.85 6.16 6.24 5.32 6.00   < > 8.84   RDW % 13.2 13.1 13.0 12.9 12.9 13.2   < > 15.6*   MPV fL 10.0 10.3 9.6 9.7 9.4 9.7   < > 9.5   PLATELETS 10*3/mm3 320 309 290 281 291 311   < > 402   IMM GRAN % % 0.1 0.3 0.2 0.2 0.2 0.3   < >  --    NEUTROS " ABS 10*3/mm3 3.52 3.23 3.31 2.83 2.63 3.39   < > 5.18   LYMPHS ABS 10*3/mm3 3.64* 3.18* 2.26 2.88 2.23 2.12   < >  --    MONOS ABS 10*3/mm3 0.37 0.32 0.46 0.37 0.35 0.38   < >  --    EOS ABS 10*3/mm3 0.04 0.04 0.08 0.09 0.06 0.05   < > 0.18   BASOS ABS 10*3/mm3 0.05 0.06 0.04 0.06 0.04 0.04   < > 0.09   IMMATURE GRANS (ABS) 10*3/mm3 0.01 0.02 0.01 0.01 0.01 0.02   < >  --    NRBC /100 WBC 0.0 0.0 0.0 0.0 0.0 0.0   < > 1.0*   NEUTROPHIL % %  --   --   --   --   --   --   --  55.6   MONOCYTES % %  --   --   --   --   --   --   --  4.0*   BASOPHIL % %  --   --   --   --   --   --   --  1.0   ANISOCYTOSIS   --   --   --   --   --   --   --  Slight/1+   GIANT PLT   --   --   --   --   --   --   --  Slight/1+    < > = values in this interval not displayed.       Lab Results - Last 18 Months   Lab Units 08/17/21  1326 05/18/21  1054 02/10/21  1028 01/20/21  1024 12/30/20  1019 12/09/20  1021   GLUCOSE mg/dL 89 93 94 91 90 105*   SODIUM mmol/L 140 139 138 137 140 139   POTASSIUM mmol/L 3.6 4.6 4.0 4.2 4.0 3.7   CO2 mmol/L 25.0 28.0 26.0 25.0 27.0 26.0   CHLORIDE mmol/L 101 99 102 102 106 104   ANION GAP mmol/L 14.0 12.0 10.0 10.0 7.0 9.0   CREATININE mg/dL 0.49* 0.55* 0.51* 0.58 0.58 0.65   BUN mg/dL 12 9 14 20 14 20   BUN / CREAT RATIO  24.5 16.4 27.5* 34.5* 24.1 30.8*   CALCIUM mg/dL 10.2 10.3 9.9 10.0 9.5 9.3   EGFR IF NONAFRICN AM mL/min/1.73 129 114 124 107 107 94   ALK PHOS U/L 112 108 90 89 91 90   TOTAL PROTEIN g/dL 8.0 7.7 7.5 7.9 7.5 7.4   ALT (SGPT) U/L 25 29 23 24 24 22   AST (SGOT) U/L 22 30 21 24 18 17   BILIRUBIN mg/dL 0.8 0.6 0.5 0.5 0.4 0.6   ALBUMIN g/dL 5.00 4.90 4.20 4.50 4.30 4.40   GLOBULIN gm/dL 3.0 2.8 3.3 3.4 3.2 3.0       Lab Results - Last 18 Months   Lab Units 08/17/21  1326 05/18/21  1053 02/10/21  1028 12/30/20  1019 10/28/20  1012 10/19/20  1114   CEA ng/mL 1.74 1.32 1.63 1.89 1.72 1.87       Lab Results - Last 18 Months   Lab Units 08/17/21  1326 05/18/21  1054 05/18/21  1053 02/10/21  1028  01/20/21  1024 12/30/20  1019 12/09/20  1021 11/18/20  1025 10/28/20  1012 10/19/20  1114 10/19/20  1114 08/05/20  0939 08/05/20  0938   IRON mcg/dL 110 103  --  74 81  --  87 89  --    < > 79   < >  --    TIBC mcg/dL 425 407  --  308 349  --  353 353  --    < > 380   < >  --    IRON SATURATION % 26 25  --  24 23  --  25 25  --    < > 21   < >  --    FERRITIN ng/mL 226.40* 171.90*  --  157.50* 173.50*  --  138.90 164.30*  --    < > 141.00   < >  --    TSH uIU/mL  --   --   --   --   --  1.270  --   --   --   --   --   --   --    FOLATE ng/mL 14.50  --  >20.00 >20.00  --   --   --   --  >20.00  --  >20.00  --  >20.00    < > = values in this interval not displayed.         PAST MEDICAL HISTORY:  ALLERGIES:  Allergies   Allergen Reactions   • Demerol [Meperidine] Hives   • Morphine And Related Hives     CURRENT MEDICATIONS:  Outpatient Encounter Medications as of 8/30/2021   Medication Sig Dispense Refill   • anastrozole (ARIMIDEX) 1 MG tablet Take 1 tablet by mouth Daily. 30 tablet 5   • calcium carbonate-vitamin d (Calcium 600+D) 600-400 MG-UNIT per tablet Take 1 tablet by mouth 2 (Two) Times a Day. 60 tablet 3   • Multiple Vitamins-Minerals (MULTIVITAMIN ADULT PO) Take 1 tablet by mouth Daily.     • ondansetron (ZOFRAN) 4 MG tablet Take 2 tablets by mouth Every 8 (Eight) Hours As Needed for Nausea or Vomiting. 30 tablet 1   • polyethylene glycol (MIRALAX) packet Take 17 g by mouth Daily.     • oxyCODONE-acetaminophen (PERCOCET) 5-325 MG per tablet Take 1-2 tablets by mouth Every 4 (Four) Hours As Needed (Pain). 15 tablet 0     No facility-administered encounter medications on file as of 8/30/2021.     Adult illnesses:  Colon polyps  Obesity  Spinal stenosis neck   Herniated cervical disc without myelopathy  Borderline blood pressure  History of dysphagia    Past surgeries:  Anterior cervical discectomy w fusion, 09/2018  Appendectomy  BSO/GANGA  Cholecystectomy  Colonoscopy, 02/10/2017  BTL  Tonsillectomy  Breast  biopsy  Left partial mastectomy, 01/10/2020  03/05/2020 - Mediport placement per Dr. Edmond  03/04/2021-Mediport removal per Dr. Edmond.    ADULT ILLNESSES:  Patient Active Problem List   Diagnosis Code   • Family history of colon cancer Z80.0   • Colon polyps K63.5   • Laryngopharyngeal reflux K21.9   • Pharyngoesophageal dysphagia R13.14   • Non-smoker Z78.9   • Obesity (BMI 30-39.9) E66.9   • Spinal stenosis in cervical region M48.02   • Cervical radiculopathy M54.12   • Herniated cervical disc without myelopathy M50.20   • Borderline blood pressure R03.0   • BMI 31.0-31.9,adult Z68.31   • BMI 33.0-33.9,adult Z68.33   • Invasive ductal carcinoma of breast, female, left (CMS/HCC) C50.912   • Former smoker Z87.891   • S/P lumpectomy, left breast Z98.890   • S/P lymph node biopsy Z98.890   • On antineoplastic chemotherapy Z79.899   • Estrogen receptor positive status (ER+) Z17.0   • History of radiation therapy Z92.3     SURGERIES:  Past Surgical History:   Procedure Laterality Date   • ANTERIOR CERVICAL DISCECTOMY W/ FUSION N/A 9/7/2018    Procedure: CERVICAL DISCECTOMY ANTERIOR WITH FUSION C6-7;  Surgeon: Chris Mcfadden MD;  Location: [facility] OR;  Service: Neurosurgery   • APPENDECTOMY     • BILATERAL SALPINGO OOPHORECTOMY     • BREAST BIOPSY     • BREAST LUMPECTOMY     • CERVICAL CORPECTOMY N/A 9/7/2018    Procedure: CERVICAL CORPECTOMY C6-7;  Surgeon: Chris Mcfadden MD;  Location: [facility] OR;  Service: Neurosurgery   • CHOLECYSTECTOMY     • COLONOSCOPY  09/05/2012   • COLONOSCOPY N/A 2/10/2017    Procedure: COLONOSCOPY WITH ANESTHESIA;  Surgeon: Elina Gonsalez MD;  Location: [facility] ENDOSCOPY;  Service:    • HYSTERECTOMY  1998    Had hysterectomy for painful periods and bleeding. (Dr. Patel) [facility] w/ BSO   • LAPAROSCOPIC TUBAL LIGATION     • MASTECTOMY W/ SENTINEL NODE BIOPSY Left 1/10/2020    Procedure: LEFT NEEDLE DIRECTED ULTRASOUND GUIDED PARTIAL MASTECTOMY WITH SENTINEL LYMPH NODE BIOPSY, INJECTION AND  SCAN, RADIOLOGIST WILL INJECT;  Surgeon: Flor Edmond MD;  Location:  [facility] OR;  Service: General   • TONSILLECTOMY     • VENOUS ACCESS DEVICE (PORT) INSERTION N/A 3/5/2020    Procedure: INSERTION VENOUS ACCESS DEVICE EXCISION SKIN LESION X 2 CHEST AND ABDOMEN;  Surgeon: Flor Edmond MD;  Location:  [facility] OR;  Service: General;  Laterality: N/A;   • VENOUS ACCESS DEVICE (PORT) REMOVAL Left 3/4/2021    Procedure: REMOVAL OF SINGLE LUMEN PORT;  Surgeon: Flor Edmond MD;  Location:  [facility] OR;  Service: General;  Laterality: Left;     HEALTH MAINTENANCE ITEMS:  Health Maintenance Due   Topic Date Due   • ZOSTER VACCINE (1 of 2) Never done   • ANNUAL PHYSICAL  12/03/2020   • COVID-19 Vaccine (3 - Moderna risk 3-dose series) 03/06/2021       <no information>  Last Completed Colonoscopy     This patient has no relevant Health Maintenance data.        Immunization History   Administered Date(s) Administered   • COVID-19 (MODERNA) 01/09/2021, 02/06/2021   • Tdap 08/29/2018     Last Completed Mammogram          MAMMOGRAM (Yearly) Order placed this encounter    12/17/2020  Mammo Diagnostic Digital Tomosynthesis Bilateral With CAD    12/27/2019  Mammo Diagnostic Digital Tomosynthesis Left With CAD    12/13/2019  Mammo Screening Digital Tomosynthesis Bilateral With CAD    06/01/2018  Mammo Screening Digital Tomosynthesis Bilateral With CAD    07/14/2015  MAMMOGRAPHY SCREENING BILATERAL                  FAMILY HISTORY:  Family History   Problem Relation Age of Onset   • Colon cancer Maternal Grandmother         age not known   • Cancer Father    • Heart disease Father    • Diabetes Mother    • Hypertension Mother    • Stroke Mother    • Breast cancer Sister    • No Known Problems Daughter    • No Known Problems Sister    • Colon polyps Neg Hx    • Esophageal cancer Neg Hx    • Liver cancer Neg Hx    • Liver disease Neg Hx    • Rectal cancer Neg Hx    • Stomach cancer Neg Hx    • Ovarian cancer Neg Hx    • BRCA 1/2  "Neg Hx    • Endometrial cancer Neg Hx      SOCIAL HISTORY:  Social History     Socioeconomic History   • Marital status:      Spouse name: Not on file   • Number of children: 1   • Years of education: Not on file   • Highest education level: Not on file   Tobacco Use   • Smoking status: Never Smoker   • Smokeless tobacco: Never Used   • Tobacco comment: \"i never really smoked\"   Vaping Use   • Vaping Use: Never used   Substance and Sexual Activity   • Alcohol use: Not Currently   • Drug use: No   • Sexual activity: Defer       REVIEW OF SYSTEMS:  Review of Systems   Constitutional: Negative.         She manages her ADLs' including chores, errands, driving.  Is still working full time.  Has been active.    Arimidex tolerance:  \"I don't think it agrees with me cause it makes me irritable and grouchy.\"  No other problems.  No new arthralgias.  Mild hot flashes.     HENT: Negative.    Eyes: Negative.    Respiratory: Negative.    Cardiovascular: Negative.    Gastrointestinal: Negative for diarrhea, nausea (\"in the morning.\") and GERD (\"Normal.\" Still on omeprazole as needed).   Endocrine: Positive for heat intolerance (Lingering vasomotor changes.  \"Hardly any.\"  ).   Genitourinary: Negative.    Musculoskeletal: Negative for arthralgias (knees, and hips \"been ok.\" Does not impede her activities.).   Allergic/Immunologic: Negative.    Neurological: Positive for numbness (paresthesias of the toes> fingers.  Says improving each time).   Hematological: Negative.    Psychiatric/Behavioral: Positive for depressed mood (Feels irritable and grouchy.  Is blaming the Arimidex.  \"I am usually a happy person.\"). The patient is not nervous/anxious (\"not bad\").          /86   Pulse 73   Temp 96.6 °F (35.9 °C)   Resp 16   Ht 152.4 cm (60\")   Wt 73.2 kg (161 lb 6.4 oz)   LMP  (LMP Unknown)   SpO2 97%   Breastfeeding No   BMI 31.52 kg/m²  Body surface area is 1.7 meters squared.  Pain Score    08/30/21 0949 "   PainSc: 0-No pain       Physical Exam:  Physical Exam   Constitutional: She is oriented to person, place, and time. She appears well-developed and well-nourished. No distress.   Pleasant, heavy set, cooperative, modestly kept female.  Appears her age.  ECOG 0.      She has lost 5 pounds (in addition to 8 pounds at her 3 prior visits) in the interval.   HENT:   Head: Normocephalic and atraumatic.   Mouth/Throat: No oropharyngeal exudate.   Hair has regrown    Wearing a surgical mask   Eyes: Pupils are equal, round, and reactive to light. Conjunctivae are normal. No scleral icterus.   Neck: No JVD present. No tracheal deviation present. No thyromegaly present.   Cardiovascular: Normal rate, regular rhythm and normal heart sounds. Exam reveals no friction rub.   No murmur heard.  Pulmonary/Chest: Effort normal and breath sounds normal. No stridor. No respiratory distress. She has no wheezes. She has no rales.   Port site in the left upper chest is healed.  The device has been removed.    Breasts: Chaperoned exam-[place], [place]-  Note that she has tanned breasts from sun exposure.  Again note that the left breast lumpectomy and sentinel node biopsy site in the left axilla are healed.  There is no incisional tenderness in either site.  The radiation associated skin changes are barely evident.  The rest of the breast is without obvious nodularity skin changes, no nipple discharge.  Right breast without masses, skin changes nor nipple discharge.     Abdominal: Soft. Bowel sounds are normal. She exhibits no distension and no mass. There is no abdominal tenderness. There is no rebound and no guarding.   Musculoskeletal: Normal range of motion. No deformity.   Lymphadenopathy:     She has no cervical adenopathy.        Right cervical: No superficial cervical, no deep cervical and no posterior cervical adenopathy present.       Left cervical: No superficial cervical, no deep cervical and no posterior cervical adenopathy  present.        Right: No supraclavicular adenopathy present.        Left: No supraclavicular adenopathy present.   Neurological: She is alert and oriented to person, place, and time. She displays normal reflexes. No cranial nerve deficit. She exhibits normal muscle tone. Coordination normal.   Skin: Skin is warm and dry. No rash noted. No erythema. No pallor.   Psychiatric: Her behavior is normal. Judgment and thought content normal.   Vitals reviewed.    ASSESSMENT:   1.  Invasive and in situ ductal carcinoma  left breast.                 Stage:  IA (pT1a, pN0, G2) ER  100% positive, MD  25%, HER-2/lamberto - 2+ (IHC)/FISH -a focal positive score is present in an area corresponding to approximately 15% of the tumor (signal ratio: 2.9).                 Tumor Nikolai:  5 mm.  Tumor focality: Single focus of invasive carcinoma.  No EIC nor LCIS.  Lymphovascular invasion: Not identified.  Dermal lymphovascular invasion: No skin present.  Microcalcifications: Not identified.  Margins uninvolved by invasive carcinoma.  Lymph nodes: Number of lymph nodes examined: 1 (0/1).                 Tumor Status:    -- Underwent lumpectomy and SNB, 01/10/2020.   --Completed adjuvant paclitaxel, 05/27/2020; adjuvant Herceptin, 02/10/2021; adjuvant radiation, 08/06/2020 (above)  -- Adjuvant Arimidex since 02/03/2020  -- 12/17/2020-Mammogram- [id number]          2.   Obesity.  Moderate (BMI 34)        3.   Spinal stenosis neck, quiescent         4.   Herniated cervical disc without myelopathy        5.   Anemia, likely chemo associated.    --Resolved, Hgb 14.1, 05/18/2021 (prior: Hgb 11.5-13.8)        6.   GERD, quiescent        7.   Mood changes, to include mood lability, irritability and grouchiness.  Blames Arimidex.       RECOMMENDATIONS:         1.   Apprised of the labs, 08/17/2021 (above).  Normal CBC, CMP, normal CEA, normal CA-27-29, repleted B12/folate, repleted serum iron, iron saturation 25% (from 24%), repleted ferritin  (226; 171; 157; 138; 141; from 216).          2.    Arimidex tolerance discussed.  Denies arthralgias but has had mood lability.  I recommended a holiday from the drug and see if it makes a difference.  She agrees         3.    Schedule mammogram, 12/18/2021        4.    Previously noted that we are unable to repeat FISH testing for HER-2 on the tumor specimen from 01/10/2020 -pathology block did not contain any more tumor - personally discussed/confirmed with Dr. Burgos of pathology.        5.   Care previously discussed with Dr. Jaimes on 02/28/2020 after she saw the patient on 02/25/2020 (encounter reviewed).  Pathology was reviewed at [place] and discussed with the patient.  Recommendations/plan: Discussed that since her cancer is strongly ER positive and low histologic grade/low proliferative rate, and HER-2 on the biopsy was borderline recommended repeat HER-2 testing on her surgical pathology.  Defer to his negative proceed with only endocrine therapy and radiation.  If HER-2 is confirmed to be positive, recommend adjuvant weekly Taxol x12+ Herceptin every 3 weeks to complete 1 year of HER-2 directed therapy.  These plans were personally discussed with the patient (via telephone) on 02/28/2020 I also personally asked the pathologist (Dr. Radha Burgos) to send off the biopsy specimen for repeat FISH HER-2 testing.          6.   Reviewed NCCN guidelines version 3.2019 invasive breast cancer.  For tumors (=/> 5 mm), and pN0, recommend adjuvant endocrine therapy +/- adjuvant chemotherapy with trastuzumab (category 2B).                  7.  Rx:   · Oscal 600/400 po bid # 60 - OTC  · HOLD -Arimidex   · Stay off B12 supplements           8.  Previously discussed the rationale for adjuvant hormonal manipulation with AIs (see above) and potential toxicities of AI therapy are discussed (to include but not limited to: Hot flashes, asthenia, pain, arthralgias, arthritis, nausea/vomiting, headache, pharyngitis,  depression, rash, hypertension, lymphedema, insomnia, edema, weight gain, dyspnea, abdominal pain, constipation, hyperlipidemia, osteoporosis, fractures, cough, bone pain, diarrhea, breast pain, paresthesias, infections, cataracts, myalgias, thromboembolism, angina, Lyles-Yung syndrome, erythema multiforme, anemia, leukopenia, stroke, myocardial infarction, osteoporosis, fractures). Questions are answered.             9.  Return to the [place] office with pre-office serum iron, iron saturation, ferritin B12, folate, CMP, CEA, CA 27-29 and CBC and differential in 4 weeks to reassess her symptoms from the Arimidex.  May need a change.    .     MEDICAL DECISION MAKING: Moderate complexity   AMOUNT OF DATA: Moderate to Extensive     I spent 35 total minutes, face-to-face, caring for Chasidy today.  Greater than 50% of this time involved counseling and/or coordination of care as documented within this note regarding the patient's illness(es), pros and cons of various treatment options, instructions and/or risk reduction.     Cc:  Abigail Jaimes MD- [place] [facility]          MD Kailash Sawant MD Robert Learch, MD

## 2021-08-27 RX ORDER — ANASTROZOLE 1 MG/1
1 TABLET ORAL DAILY
Qty: 30 TABLET | Refills: 3 | Status: CANCELLED | OUTPATIENT
Start: 2021-08-27

## 2021-08-30 ENCOUNTER — OFFICE VISIT (OUTPATIENT)
Dept: ONCOLOGY | Facility: CLINIC | Age: 61
End: 2021-08-30

## 2021-08-30 VITALS
DIASTOLIC BLOOD PRESSURE: 86 MMHG | HEART RATE: 73 BPM | HEIGHT: 60 IN | WEIGHT: 161.4 LBS | SYSTOLIC BLOOD PRESSURE: 146 MMHG | OXYGEN SATURATION: 97 % | RESPIRATION RATE: 16 BRPM | TEMPERATURE: 96.6 F | BODY MASS INDEX: 31.69 KG/M2

## 2021-08-30 DIAGNOSIS — C50.912 INVASIVE DUCTAL CARCINOMA OF BREAST, FEMALE, LEFT (HCC): Primary | ICD-10-CM

## 2021-08-30 PROCEDURE — 99214 OFFICE O/P EST MOD 30 MIN: CPT | Performed by: INTERNAL MEDICINE

## 2021-08-31 DIAGNOSIS — C50.912 INVASIVE DUCTAL CARCINOMA OF BREAST, FEMALE, LEFT (HCC): Primary | ICD-10-CM

## 2021-09-20 ENCOUNTER — LAB (OUTPATIENT)
Dept: LAB | Facility: HOSPITAL | Age: 61
End: 2021-09-20

## 2021-09-20 DIAGNOSIS — C50.912 INVASIVE DUCTAL CARCINOMA OF BREAST, FEMALE, LEFT (HCC): ICD-10-CM

## 2021-09-20 LAB
ALBUMIN SERPL-MCNC: 4.6 G/DL (ref 3.5–5.2)
ALBUMIN/GLOB SERPL: 1.5 G/DL
ALP SERPL-CCNC: 110 U/L (ref 39–117)
ALT SERPL W P-5'-P-CCNC: 20 U/L (ref 1–33)
ANION GAP SERPL CALCULATED.3IONS-SCNC: 12 MMOL/L (ref 5–15)
AST SERPL-CCNC: 26 U/L (ref 1–32)
BASOPHILS # BLD AUTO: 0.03 10*3/MM3 (ref 0–0.2)
BASOPHILS NFR BLD AUTO: 0.4 % (ref 0–1.5)
BILIRUB SERPL-MCNC: 0.6 MG/DL (ref 0–1.2)
BUN SERPL-MCNC: 8 MG/DL (ref 6–20)
BUN/CREAT SERPL: 16.3 (ref 7–25)
CALCIUM SPEC-SCNC: 9.8 MG/DL (ref 8.6–10.5)
CHLORIDE SERPL-SCNC: 103 MMOL/L (ref 98–107)
CO2 SERPL-SCNC: 26 MMOL/L (ref 22–29)
CREAT SERPL-MCNC: 0.49 MG/DL (ref 0.57–1)
DEPRECATED RDW RBC AUTO: 43.6 FL (ref 37–54)
EOSINOPHIL # BLD AUTO: 0.04 10*3/MM3 (ref 0–0.4)
EOSINOPHIL NFR BLD AUTO: 0.6 % (ref 0.3–6.2)
ERYTHROCYTE [DISTWIDTH] IN BLOOD BY AUTOMATED COUNT: 13.2 % (ref 12.3–15.4)
FERRITIN SERPL-MCNC: 137.4 NG/ML (ref 13–150)
GFR SERPL CREATININE-BSD FRML MDRD: 129 ML/MIN/1.73
GLOBULIN UR ELPH-MCNC: 3.1 GM/DL
GLUCOSE SERPL-MCNC: 87 MG/DL (ref 65–99)
HCT VFR BLD AUTO: 40.4 % (ref 34–46.6)
HGB BLD-MCNC: 13.7 G/DL (ref 12–15.9)
IMM GRANULOCYTES # BLD AUTO: 0.01 10*3/MM3 (ref 0–0.05)
IMM GRANULOCYTES NFR BLD AUTO: 0.1 % (ref 0–0.5)
IRON 24H UR-MRATE: 89 MCG/DL (ref 37–145)
IRON SATN MFR SERPL: 23 % (ref 20–50)
LYMPHOCYTES # BLD AUTO: 3.18 10*3/MM3 (ref 0.7–3.1)
LYMPHOCYTES NFR BLD AUTO: 43.9 % (ref 19.6–45.3)
MCH RBC QN AUTO: 30.8 PG (ref 26.6–33)
MCHC RBC AUTO-ENTMCNC: 33.9 G/DL (ref 31.5–35.7)
MCV RBC AUTO: 90.8 FL (ref 79–97)
MONOCYTES # BLD AUTO: 0.35 10*3/MM3 (ref 0.1–0.9)
MONOCYTES NFR BLD AUTO: 4.8 % (ref 5–12)
NEUTROPHILS NFR BLD AUTO: 3.64 10*3/MM3 (ref 1.7–7)
NEUTROPHILS NFR BLD AUTO: 50.2 % (ref 42.7–76)
NRBC BLD AUTO-RTO: 0 /100 WBC (ref 0–0.2)
PLATELET # BLD AUTO: 335 10*3/MM3 (ref 140–450)
PMV BLD AUTO: 10.3 FL (ref 6–12)
POTASSIUM SERPL-SCNC: 3.8 MMOL/L (ref 3.5–5.2)
PROT SERPL-MCNC: 7.7 G/DL (ref 6–8.5)
RBC # BLD AUTO: 4.45 10*6/MM3 (ref 3.77–5.28)
SODIUM SERPL-SCNC: 141 MMOL/L (ref 136–145)
TIBC SERPL-MCNC: 389 MCG/DL (ref 298–536)
TRANSFERRIN SERPL-MCNC: 261 MG/DL (ref 200–360)
WBC # BLD AUTO: 7.25 10*3/MM3 (ref 3.4–10.8)

## 2021-09-20 PROCEDURE — 84466 ASSAY OF TRANSFERRIN: CPT

## 2021-09-20 PROCEDURE — 82728 ASSAY OF FERRITIN: CPT

## 2021-09-20 PROCEDURE — 85025 COMPLETE CBC W/AUTO DIFF WBC: CPT

## 2021-09-20 PROCEDURE — 83540 ASSAY OF IRON: CPT

## 2021-09-20 PROCEDURE — 80053 COMPREHEN METABOLIC PANEL: CPT

## 2021-09-20 PROCEDURE — 82378 CARCINOEMBRYONIC ANTIGEN: CPT

## 2021-09-20 PROCEDURE — 82607 VITAMIN B-12: CPT

## 2021-09-20 PROCEDURE — 86300 IMMUNOASSAY TUMOR CA 15-3: CPT

## 2021-09-20 PROCEDURE — 82746 ASSAY OF FOLIC ACID SERUM: CPT

## 2021-09-20 PROCEDURE — 36415 COLL VENOUS BLD VENIPUNCTURE: CPT

## 2021-09-20 NOTE — PROGRESS NOTES
MGW ONC [facility] GROUP HEMATOLOGY AND ONCOLOGY  [address]  [facility] [address] [phone]    Patient Name: Chasidy Tejada  Encounter Date: 09/27/2021  YOB: 1960  Patient Number: [id number]       REASON FOR VISIT: Chasidy Tejada is a 61-year-old female who returns in follow-up of stage IA left breast carcinoma, ER/[place] and HER-2/lamberto positive.  She underwent left breast lumpectomy with SNB, 01/10/2020 and was started on adjuvant Arimidex beginning 02/03/2020 through 03/11/2020 before resumption on 08/19/202 through 08/30/2021 - stopped due to intolerance (temper volatility, irritability).0.  She completed adjuvant paclitaxel (week 12/12, 05/27/2020), and received adjuvant trastuzumab (Herceptin), having completed cycle 17, 02/10/2021 (6.5 months).  Adjuvant radiation was administered on 06/22/2020 through 08/06/2020 (6040 cGy in 33 fx). She is here alone (previously with her spouse).    I have reviewed the HPI and verified with the patient the accuracy of it. No changes to interval history since the information was documented. Tommy Wheat MD 09/27/21     DIAGNOSTIC ABNORMALITIES:          1.   12/13/2019- mammogram screening.  Impression: Stellate lesion outer left breast.  Spot compression and ultrasound suggested.          2.   12/27/2019- diagnostic mammogram.  Impression: Irregular density upper outer left breast.  Biopsy suggested.          3.   12/27/2019- left breast ultrasound.  Impression: Suspicious 6 x 5 x 8 mm density left breast approximately 8 cm from the nipple.  Biopsy suggested.  BI-RADS Category 4B.          4.   01/03/2020-ultrasound-guided breast biopsy.  Impression: Appropriate sampling of the 8 mm irregular hypoechoic nodule in the left breast at 1:00 in the posterior depth/axillary tail region.  Final diagnosis: Left breast at 1 o'clock position, core biopsies: A.invasive mammary carcinoma of no special type  (ductal, not otherwise specified), grade 2, at least 4.9 mm in greatest extent.  B.associated low-grade ductal carcinoma in situ.  % positive, TN 25% positive, HER-2/lamberto-2+ (IHC)/FISH- a focal positive score is present in an area corresponding to approximately 15% of the tumor (signal ratio: 2.9).           5.   02/03/2020- CMP normal with .  CBC normal.           6.   02/03/2020-bone scan ([facility]).  Impression: Degenerative joint changes.  No evidence of bony metastasis.           7.   02/03/2020-CT scans, head chest, abdomen, pelvis-impression: No abnormal intracranial enhancement to suggest intracranial metastasis/abnormalities.  No evidence of intrathoracic metastasis.  Stable 5 to 6 mm right lower lobe nodule of the right hemidiaphragm unchanged from 2/14/2011.  No convincing evidence of intra-abdominal or pelvic metastasis.  Hepatic cysts.  Fatty attenuated benign focus within the body of the pancreas visualized, 12/14/2011.  Previous hysterectomy, cholecystectomy and appendectomy.           8.   02/14/2020-echocardiogram.  Impression: LV systolic function normal (EF equals 70%).  Normal LV and RV size thickness and function.  Normal LA and RA size.  Normal cardiac valves.  No pericardial effusion.           9.  02/27/2020- CMP normal with .  CBC normal.  Iron 65, iron saturation 18%, ferritin 134, B12 745, folate > 20, CEA 1.76, CA-27-29 14.2.          10.  03/04/2020- repeat FISH for HER-2/lamberto (from tumor block, 01/10/2020).  No tumor on specimen.          11.  06/23/2020- DEXA scan.  Impression: Normal bone density.  No lower than 1 standard deviation below the mean for young adult woman.    PREVIOUS INTERVENTIONS:           1.   01/10/2020- left breast lumpectomy with left axillary sentinel node biopsy.  Final diagnosis: 1.left breast mass, lumpectomy: Invasive and in situ ductal carcinoma.  Tumor site-not specified.  Histologic type: Invasive carcinoma of no special type (invasive ductal  "carcinoma, not otherwise specified).  Overall tumor grade: Grade 1.  Tumor size: Greatest dimension of largest invasive focus: 5 mm.  Tumor focality: Single focus of invasive carcinoma.  Ductal carcinoma in situ (DCIS): Present.  Negative for extensive intraductal component (EIC).  Size (extent) CIS: Cannot be determined: Microscopic.  Architectural pattern: Cribriform/solid.  Nuclear grade: 2 (intermediate).  Necrosis: Present, central (expansive \"comedo) necrosis).  Lobular carcinoma in situ (LCIS): No LCIS in specimen.  Lymphovascular invasion: Not identified.  Dermal lymphovascular invasion: No skin present.  Margins: Uninvolved by invasive carcinoma.  Lymph nodes: Uninvolved by tumor cells.  Regional lymph nodes: 1.  Number of lymph nodes examined: 1.  Number of sentinel nodes examined: 1.  Pathologic stage classification (pTNM, AJCC eighth edition): pT1a,(sn), pN0.  % positive, [place] 25% positive, HER-2 2+ (equivocal).           2.   Adjuvant Arimidex 1 mg p.o. daily beginning 02/03/2020 through 03/11/2020: resumed 08/19/2020 through 08/30/2021 - stopped due to intolerance (temper volatility, irritability).           3.   Adjuvant paclitaxel beginning 03/11/2020 through 05/27/2020 (12/12 weekly doses)           4.   Adjuvant trastuzumab every 3 weeks - beginning 03/11/2020 through 02/10/2021 (17 cycles)           5.   Adjuvant radiation to the left breast (5040 cGy with 1000 cGy boost to the tumor bed for a total of 6040 cGy/33 fractions) from 06/22/2020 through 08/06/2020.    LABS    Lab Results - Last 18 Months   Lab Units 09/20/21  1421 08/17/21  1326 05/18/21  1053 02/10/21  1028 01/20/21  1024 12/30/20  1019 05/27/20  0916 05/20/20  0846   HEMOGLOBIN g/dL 13.7 13.6 14.1 13.2 14.0 13.4   < > 11.5*   HEMATOCRIT % 40.4 41.2 42.5 40.2 39.9 40.5   < > 34.5   MCV fL 90.8 91.2 89.3 89.7 87.5 90.8   < > 93.0   WBC 10*3/mm3 7.25 7.63 6.85 6.16 6.24 5.32   < > 8.84   RDW % 13.2 13.2 13.1 13.0 12.9 12.9   < > " 15.6*   MPV fL 10.3 10.0 10.3 9.6 9.7 9.4   < > 9.5   PLATELETS 10*3/mm3 335 320 309 290 281 291   < > 402   IMM GRAN % % 0.1 0.1 0.3 0.2 0.2 0.2   < >  --    NEUTROS ABS 10*3/mm3 3.64 3.52 3.23 3.31 2.83 2.63   < > 5.18   LYMPHS ABS 10*3/mm3 3.18* 3.64* 3.18* 2.26 2.88 2.23   < >  --    MONOS ABS 10*3/mm3 0.35 0.37 0.32 0.46 0.37 0.35   < >  --    EOS ABS 10*3/mm3 0.04 0.04 0.04 0.08 0.09 0.06   < > 0.18   BASOS ABS 10*3/mm3 0.03 0.05 0.06 0.04 0.06 0.04   < > 0.09   IMMATURE GRANS (ABS) 10*3/mm3 0.01 0.01 0.02 0.01 0.01 0.01   < >  --    NRBC /100 WBC 0.0 0.0 0.0 0.0 0.0 0.0   < > 1.0*   NEUTROPHIL % %  --   --   --   --   --   --   --  55.6   MONOCYTES % %  --   --   --   --   --   --   --  4.0*   BASOPHIL % %  --   --   --   --   --   --   --  1.0   ANISOCYTOSIS   --   --   --   --   --   --   --  Slight/1+   GIANT PLT   --   --   --   --   --   --   --  Slight/1+    < > = values in this interval not displayed.       Lab Results - Last 18 Months   Lab Units 09/20/21  1421 08/17/21  1326 05/18/21  1054 02/10/21  1028 01/20/21  1024 12/30/20  1019   GLUCOSE mg/dL 87 89 93 94 91 90   SODIUM mmol/L 141 140 139 138 137 140   POTASSIUM mmol/L 3.8 3.6 4.6 4.0 4.2 4.0   CO2 mmol/L 26.0 25.0 28.0 26.0 25.0 27.0   CHLORIDE mmol/L 103 101 99 102 102 106   ANION GAP mmol/L 12.0 14.0 12.0 10.0 10.0 7.0   CREATININE mg/dL 0.49* 0.49* 0.55* 0.51* 0.58 0.58   BUN mg/dL 8 12 9 14 20 14   BUN / CREAT RATIO  16.3 24.5 16.4 27.5* 34.5* 24.1   CALCIUM mg/dL 9.8 10.2 10.3 9.9 10.0 9.5   EGFR IF NONAFRICN AM mL/min/1.73 129 129 114 124 107 107   ALK PHOS U/L 110 112 108 90 89 91   TOTAL PROTEIN g/dL 7.7 8.0 7.7 7.5 7.9 7.5   ALT (SGPT) U/L 20 25 29 23 24 24   AST (SGOT) U/L 26 22 30 21 24 18   BILIRUBIN mg/dL 0.6 0.8 0.6 0.5 0.5 0.4   ALBUMIN g/dL 4.60 5.00 4.90 4.20 4.50 4.30   GLOBULIN gm/dL 3.1 3.0 2.8 3.3 3.4 3.2       Lab Results - Last 18 Months   Lab Units 09/20/21  1421 08/17/21  1326 05/18/21  1053 02/10/21  1028  12/30/20  1019 10/28/20  1012   CEA ng/mL 1.83 1.74 1.32 1.63 1.89 1.72       Lab Results - Last 18 Months   Lab Units 09/20/21  1421 08/17/21  1326 05/18/21  1054 05/18/21  1053 02/10/21  1028 01/20/21  1024 12/30/20  1019 12/09/20  1021 11/18/20  1025 10/28/20  1012 10/19/20  1114   IRON mcg/dL 89 110 103  --  74 81  --  87   < >  --  79   TIBC mcg/dL 389 425 407  --  308 349  --  353   < >  --  380   IRON SATURATION % 23 26 25  --  24 23  --  25   < >  --  21   FERRITIN ng/mL 137.40 226.40* 171.90*  --  157.50* 173.50*  --  138.90   < >  --  141.00   TSH uIU/mL  --   --   --   --   --   --  1.270  --   --   --   --    FOLATE ng/mL 16.30 14.50  --  >20.00 >20.00  --   --   --   --  >20.00 >20.00    < > = values in this interval not displayed.         PAST MEDICAL HISTORY:  ALLERGIES:  Allergies   Allergen Reactions   • Demerol [Meperidine] Hives   • Morphine And Related Hives     CURRENT MEDICATIONS:  Outpatient Encounter Medications as of 9/27/2021   Medication Sig Dispense Refill   • calcium carbonate-vitamin d (Calcium 600+D) 600-400 MG-UNIT per tablet Take 1 tablet by mouth 2 (Two) Times a Day. 60 tablet 3   • Multiple Vitamins-Minerals (MULTIVITAMIN ADULT PO) Take 1 tablet by mouth Daily.     • ondansetron (ZOFRAN) 4 MG tablet Take 2 tablets by mouth Every 8 (Eight) Hours As Needed for Nausea or Vomiting. 30 tablet 1   • polyethylene glycol (MIRALAX) packet Take 17 g by mouth Daily.     • anastrozole (ARIMIDEX) 1 MG tablet Take 1 tablet by mouth Daily. 30 tablet 5     No facility-administered encounter medications on file as of 9/27/2021.     Adult illnesses:  Colon polyps  Obesity  Spinal stenosis neck   Herniated cervical disc without myelopathy  Borderline blood pressure  History of dysphagia    Past surgeries:  Anterior cervical discectomy w fusion, 09/2018  Appendectomy  BSO/GANGA  Cholecystectomy  Colonoscopy, 02/10/2017  BTL  Tonsillectomy  Breast biopsy  Left partial mastectomy, 01/10/2020  03/05/2020 -  Mediport placement per Dr. Edmond  03/04/2021-Mediport removal per Dr. Edmond.    ADULT ILLNESSES:  Patient Active Problem List   Diagnosis Code   • Family history of colon cancer Z80.0   • Colon polyps K63.5   • Laryngopharyngeal reflux K21.9   • Pharyngoesophageal dysphagia R13.14   • Non-smoker Z78.9   • Obesity (BMI 30-39.9) E66.9   • Spinal stenosis in cervical region M48.02   • Cervical radiculopathy M54.12   • Herniated cervical disc without myelopathy M50.20   • Borderline blood pressure R03.0   • BMI 31.0-31.9,adult Z68.31   • BMI 33.0-33.9,adult Z68.33   • Invasive ductal carcinoma of breast, female, left (CMS/HCC) C50.912   • Former smoker Z87.891   • S/P lumpectomy, left breast Z98.890   • S/P lymph node biopsy Z98.890   • On antineoplastic chemotherapy Z79.899   • Estrogen receptor positive status (ER+) Z17.0   • History of radiation therapy Z92.3     SURGERIES:  Past Surgical History:   Procedure Laterality Date   • ANTERIOR CERVICAL DISCECTOMY W/ FUSION N/A 9/7/2018    Procedure: CERVICAL DISCECTOMY ANTERIOR WITH FUSION C6-7;  Surgeon: Chris Mcfadden MD;  Location: [facility] OR;  Service: Neurosurgery   • APPENDECTOMY     • BILATERAL SALPINGO OOPHORECTOMY     • BREAST BIOPSY     • BREAST LUMPECTOMY     • CERVICAL CORPECTOMY N/A 9/7/2018    Procedure: CERVICAL CORPECTOMY C6-7;  Surgeon: Chris Mcfadden MD;  Location: [facility] OR;  Service: Neurosurgery   • CHOLECYSTECTOMY     • COLONOSCOPY  09/05/2012   • COLONOSCOPY N/A 2/10/2017    Procedure: COLONOSCOPY WITH ANESTHESIA;  Surgeon: Elina Gonsalez MD;  Location: [facility] ENDOSCOPY;  Service:    • HYSTERECTOMY  1998    Had hysterectomy for painful periods and bleeding. (Dr. Patel) TLH w/ BSO   • LAPAROSCOPIC TUBAL LIGATION     • MASTECTOMY W/ SENTINEL NODE BIOPSY Left 1/10/2020    Procedure: LEFT NEEDLE DIRECTED ULTRASOUND GUIDED PARTIAL MASTECTOMY WITH SENTINEL LYMPH NODE BIOPSY, INJECTION AND SCAN, RADIOLOGIST WILL INJECT;  Surgeon: Flor Edmond  MD;  Location:  [facility] OR;  Service: General   • TONSILLECTOMY     • VENOUS ACCESS DEVICE (PORT) INSERTION N/A 3/5/2020    Procedure: INSERTION VENOUS ACCESS DEVICE EXCISION SKIN LESION X 2 CHEST AND ABDOMEN;  Surgeon: Flor Edmond MD;  Location:  [facility] OR;  Service: General;  Laterality: N/A;   • VENOUS ACCESS DEVICE (PORT) REMOVAL Left 3/4/2021    Procedure: REMOVAL OF SINGLE LUMEN PORT;  Surgeon: Flor Edmond MD;  Location:  [facility] OR;  Service: General;  Laterality: Left;     HEALTH MAINTENANCE ITEMS:  Health Maintenance Due   Topic Date Due   • ZOSTER VACCINE (1 of 2) Never done   • ANNUAL PHYSICAL  12/03/2020   • COVID-19 Vaccine (3 - Moderna risk 3-dose series) 03/06/2021       <no information>  Last Completed Colonoscopy          COLORECTAL CANCER SCREENING (COLONOSCOPY - Every 5 Years) Next due on 2/10/2022    02/10/2017  COLONOSCOPY    02/10/2017  Surgical Procedure: COLONOSCOPY              Immunization History   Administered Date(s) Administered   • COVID-19 (MODERNA) 01/09/2021, 02/06/2021   • Tdap 08/29/2018     Last Completed Mammogram          Scheduled - MAMMOGRAM (Yearly) Scheduled for 12/20/2021 12/17/2020  Mammo Diagnostic Digital Tomosynthesis Bilateral With CAD    12/27/2019  Mammo Diagnostic Digital Tomosynthesis Left With CAD    12/13/2019  Mammo Screening Digital Tomosynthesis Bilateral With CAD    06/01/2018  Mammo Screening Digital Tomosynthesis Bilateral With CAD    07/14/2015  MAMMOGRAPHY SCREENING BILATERAL                  FAMILY HISTORY:  Family History   Problem Relation Age of Onset   • Colon cancer Maternal Grandmother         age not known   • Cancer Father    • Heart disease Father    • Diabetes Mother    • Hypertension Mother    • Stroke Mother    • Breast cancer Sister    • No Known Problems Daughter    • No Known Problems Sister    • Colon polyps Neg Hx    • Esophageal cancer Neg Hx    • Liver cancer Neg Hx    • Liver disease Neg Hx    • Rectal cancer Neg Hx    •  "Stomach cancer Neg Hx    • Ovarian cancer Neg Hx    • BRCA 1/2 Neg Hx    • Endometrial cancer Neg Hx      SOCIAL HISTORY:  Social History     Socioeconomic History   • Marital status:      Spouse name: Not on file   • Number of children: 1   • Years of education: Not on file   • Highest education level: Not on file   Tobacco Use   • Smoking status: Never Smoker   • Smokeless tobacco: Never Used   • Tobacco comment: \"i never really smoked\"   Vaping Use   • Vaping Use: Never used   Substance and Sexual Activity   • Alcohol use: Not Currently   • Drug use: No   • Sexual activity: Defer       REVIEW OF SYSTEMS:  Review of Systems   Constitutional: Negative.         She manages her ADLs' including chores, errands, driving.  Is still working full time.  Has been active.  Is up and about > 75% of the time    Arimidex tolerance:  Feels much better since stopping the drug.  \"I don't think it agreed with me cause it makes me irritable and grouchy.\"  No other problems.  No new arthralgias.  Mild hot flashes.     HENT: Negative.    Eyes: Negative.    Respiratory: Negative.    Cardiovascular: Negative.    Gastrointestinal: Negative for diarrhea, nausea (\"in the morning.\") and GERD (\"Normal.\" Still on omeprazole as needed).   Endocrine: Positive for heat intolerance (Lingering vasomotor changes.  \"Hardly any.\"  ).   Genitourinary: Negative.    Musculoskeletal: Negative for arthralgias (knees, and hips \"been ok.\" Does not impede her activities.).   Allergic/Immunologic: Negative.    Neurological: Positive for numbness (paresthesias of the toes> fingers.  Says improving each time).   Hematological: Negative.    Psychiatric/Behavioral: Negative for depressed mood (Resolution of irritability and grouchiness since stopping Arimidex last 08/31/2021.). The patient is not nervous/anxious (\"not bad\").          /78   Pulse 103   Temp 97.3 °F (36.3 °C)   Resp 16   Ht 152.4 cm (60\")   Wt 71.8 kg (158 lb 6.4 oz)   LMP  (LMP " Unknown)   SpO2 99%   Breastfeeding No   BMI 30.94 kg/m²  Body surface area is 1.69 meters squared.  Pain Score    09/27/21 1416   PainSc: 0-No pain       Physical Exam:  Physical Exam   Constitutional: She is oriented to person, place, and time. She appears well-developed and well-nourished. No distress.   Pleasant, heavy set, cooperative, modestly kept female.  Appears her age.  ECOG 0.      She has lost 3 pounds (in addition to 11 pounds at her 4 prior visits) in the interval.   HENT:   Head: Normocephalic and atraumatic.   Mouth/Throat: No oropharyngeal exudate.   Hair has regrown    Wearing a surgical mask   Eyes: Pupils are equal, round, and reactive to light. Conjunctivae are normal. No scleral icterus.   Neck: No JVD present. No tracheal deviation present. No thyromegaly present.   Cardiovascular: Normal rate, regular rhythm and normal heart sounds. Exam reveals no friction rub.   No murmur heard.  Pulmonary/Chest: Effort normal and breath sounds normal. No stridor. No respiratory distress. She has no wheezes. She has no rales.   Port site in the left upper chest is healed.  The device has been removed.    Breasts: Exam deferred today per patient request-  Previously noted that the left breast lumpectomy and sentinel node biopsy site in the left axilla are healed.  There was no incisional tenderness in either site.  The radiation associated skin changes were barely evident.  The rest of the breast is without obvious nodularity skin changes, no nipple discharge.  Right breast previously without masses, skin changes nor nipple discharge.     Abdominal: Soft. Bowel sounds are normal. She exhibits no distension and no mass. There is no abdominal tenderness. There is no rebound and no guarding.   Musculoskeletal: Normal range of motion. No deformity.   Lymphadenopathy:     She has no cervical adenopathy.        Right cervical: No superficial cervical, no deep cervical and no posterior cervical adenopathy  present.       Left cervical: No superficial cervical, no deep cervical and no posterior cervical adenopathy present.        Right: No supraclavicular adenopathy present.        Left: No supraclavicular adenopathy present.   Neurological: She is alert and oriented to person, place, and time. She displays normal reflexes. No cranial nerve deficit. She exhibits normal muscle tone. Coordination normal.   Skin: Skin is warm and dry. No rash noted. No erythema. No pallor.   Psychiatric: Her behavior is normal. Judgment and thought content normal.   Vitals reviewed.    ASSESSMENT:   1.  Invasive and in situ ductal carcinoma  left breast.                 Stage:  IA (pT1a, pN0, G2) ER  100% positive, IL  25%, HER-2/lamberto - 2+ (IHC)/FISH -a focal positive score is present in an area corresponding to approximately 15% of the tumor (signal ratio: 2.9).                 Tumor [place]:  5 mm.  Tumor focality: Single focus of invasive carcinoma.  No EIC nor LCIS.  Lymphovascular invasion: Not identified.  Dermal lymphovascular invasion: No skin present.  Microcalcifications: Not identified.  Margins uninvolved by invasive carcinoma.  Lymph nodes: Number of lymph nodes examined: 1 (0/1).                 Tumor Status:    -- Underwent lumpectomy and SNB, 01/10/2020.   --Completed adjuvant paclitaxel, 05/27/2020; adjuvant Herceptin, 02/10/2021; adjuvant radiation, 08/06/2020 (above)  -- Adjuvant Arimidex since 02/03/2020  -- 12/17/2020-Mammogram- [id number]          2.   Obesity.  Moderate (BMI 34)        3.   Spinal stenosis neck, quiescent         4.   Herniated cervical disc without myelopathy        5.   Anemia, likely chemo associated.    --Resolved, Hgb 14.1, 05/18/2021 (prior: Hgb 11.5-13.8)        6.   GERD, quiescent        7.   Mood changes, to include mood lability, irritability and grouchiness.  Resolved since stopping Arimidex.       RECOMMENDATIONS:         1.   Apprised of the labs, 08/17/2021 through 09/20/2021  (above).  Normal CBC, CMP, normal CEA, normal CA-27-29, repleted B12/folate, repleted serum iron, iron saturation 23% (from 25%; 24%), repleted ferritin (137; from 226; 171; 157; 138; 141; 216).          2.    Arimidex tolerance discussed.  Resolution of mood lability off the drug since 08/31/2021.  Is willing to try another AI.         3.    Schedule mammogram, 12/18/2021        4.    Previously noted that we are unable to repeat FISH testing for HER-2 on the tumor specimen from 01/10/2020 -pathology block did not contain any more tumor - personally discussed/confirmed with Dr. Burgos of pathology.        5.   Care previously discussed with Dr. Jaimes on 02/28/2020 after she saw the patient on 02/25/2020 (encounter reviewed).  Pathology was reviewed at [facility] and discussed with the patient.  Recommendations/plan: Discussed that since her cancer is strongly ER positive and low histologic grade/low proliferative rate, and HER-2 on the biopsy was borderline recommended repeat HER-2 testing on her surgical pathology.  Defer to his negative proceed with only endocrine therapy and radiation.  If HER-2 is confirmed to be positive, recommend adjuvant weekly Taxol x12+ Herceptin every 3 weeks to complete 1 year of HER-2 directed therapy.  These plans were personally discussed with the patient (via telephone) on 02/28/2020 I also personally asked the pathologist (Dr. Radha Burgos) to send off the biopsy specimen for repeat FISH HER-2 testing.          6.   Reviewed NCCN guidelines version 3.2019 invasive breast cancer.  For tumors (=/> 5 mm), and pN0, recommend adjuvant endocrine therapy +/- adjuvant chemotherapy with trastuzumab (category 2B).                  7.  Rx:   · Oscal 600/400 po bid # 60 - OTC  · Femara 2.5 mg po qd 3 30  · Stay off B12 supplements           8.  Previously discussed the rationale for adjuvant hormonal manipulation with AIs (see above) and potential toxicities of AI therapy are discussed (to  include but not limited to: Hot flashes, asthenia, pain, arthralgias, arthritis, nausea/vomiting, headache, pharyngitis, depression, rash, hypertension, lymphedema, insomnia, edema, weight gain, dyspnea, abdominal pain, constipation, hyperlipidemia, osteoporosis, fractures, cough, bone pain, diarrhea, breast pain, paresthesias, infections, cataracts, myalgias, thromboembolism, angina, Lyles-Yung syndrome, erythema multiforme, anemia, leukopenia, stroke, myocardial infarction, osteoporosis, fractures). Questions are answered. She agrees to another trial of therapy (this time with letrozole).             9.  Return to the [place] office with pre-office serum iron, iron saturation, ferritin B12, folate, CMP, CEA, CA 27-29 and CBC and differential in 4 weeks to reassess her tolerance to Femara (letrozole).    .     MEDICAL DECISION MAKING: Moderate complexity   AMOUNT OF DATA: Moderate to Extensive     I spent 33 total minutes, face-to-face, caring for Chasidy today.  Greater than 50% of this time involved counseling and/or coordination of care as documented within this note regarding the patient's illness(es), pros and cons of various treatment options, instructions and/or risk reduction.     Cc:  Abigail Jaimes MD- [facility]          MD Kailash Sawant MD Robert Learch, MD

## 2021-09-21 LAB
CANCER AG27-29 SERPL-ACNC: 19.5 U/ML (ref 0–38.6)
CEA SERPL-MCNC: 1.83 NG/ML
FOLATE SERPL-MCNC: 16.3 NG/ML (ref 4.78–24.2)
VIT B12 BLD-MCNC: 411 PG/ML (ref 211–946)

## 2021-09-27 ENCOUNTER — OFFICE VISIT (OUTPATIENT)
Dept: ONCOLOGY | Facility: CLINIC | Age: 61
End: 2021-09-27

## 2021-09-27 VITALS
HEIGHT: 60 IN | SYSTOLIC BLOOD PRESSURE: 144 MMHG | WEIGHT: 158.4 LBS | BODY MASS INDEX: 31.1 KG/M2 | HEART RATE: 103 BPM | OXYGEN SATURATION: 99 % | RESPIRATION RATE: 16 BRPM | TEMPERATURE: 97.3 F | DIASTOLIC BLOOD PRESSURE: 78 MMHG

## 2021-09-27 DIAGNOSIS — C50.912 INVASIVE DUCTAL CARCINOMA OF BREAST, FEMALE, LEFT (HCC): Primary | ICD-10-CM

## 2021-09-27 PROCEDURE — 99214 OFFICE O/P EST MOD 30 MIN: CPT | Performed by: INTERNAL MEDICINE

## 2021-09-27 RX ORDER — LETROZOLE 2.5 MG/1
2.5 TABLET, FILM COATED ORAL DAILY
Qty: 30 TABLET | Refills: 11 | Status: SHIPPED | OUTPATIENT
Start: 2021-09-27 | End: 2021-10-25 | Stop reason: SDUPTHER

## 2021-09-29 PROCEDURE — 87635 SARS-COV-2 COVID-19 AMP PRB: CPT | Performed by: NURSE PRACTITIONER

## 2021-10-04 NOTE — PROGRESS NOTES
"CC: f/u cervical spine fusion    History:  Chasidy Tejada is a 58 y.o. female who presents today for evaluation of the above problems.  The patient appears to be doing reasonably well and presents to the office today with some nervousness following her surgery. The patient was admitted for cervical discectomy anterior with fusion of C6-7 and cervical corpectomy C6-7 for cervical radiculopathy. Dr. Mcfadden was the surgeon and the patient will follow up with his office 10/1/18. Per OP note, surgery was unremarkable and without complication and minimal blood loss occurred. The patient states she is feeling \"just ok\" but states she feels significantly better than she did prior to surgery. The patient states she is having a challenging time sleeping and eating, but is taking small bites and has to be careful swallowing. The patient states she is only using tylenol for pain and is using carisoprodol for muscle discomfort. The patient states she has not had any issue with bowels or bladder following surgery.     Her blood pressure is slightly elevated today at 148/93 which is not normal for the patient. She states she is extremely nervous in the office today as she is afraid she will trip or bump into something. Will continue to monitor bp for appropriate return to normal.      Patient has lost approximately 4 lbs since 9/7 surgery and patient desires to continue weight loss. She is encouraged in maintaining a healthy diet and when able per Dr. Mcfadden to begin exercise.      ROS:  Review of Systems   Constitutional: Positive for activity change. Negative for chills, fatigue and fever.   HENT: Negative.    Eyes: Negative.    Respiratory: Negative.  Negative for cough, choking, chest tightness, shortness of breath and wheezing.    Cardiovascular: Negative.  Negative for chest pain, palpitations and leg swelling.   Gastrointestinal: Negative.  Negative for constipation, diarrhea, nausea and vomiting.   Genitourinary: Negative. "  Negative for difficulty urinating.   Musculoskeletal: Positive for myalgias, neck pain and neck stiffness.        Right arm pain   Neurological: Negative.    Psychiatric/Behavioral: Positive for sleep disturbance. The patient is nervous/anxious.        Allergies   Allergen Reactions   • Demerol [Meperidine] Hives   • Morphine And Related Hives     Past Medical History:   Diagnosis Date   • Acid reflux    • Asthma     long time ago    • Panic attack    • Right arm weakness      Past Surgical History:   Procedure Laterality Date   • ANTERIOR CERVICAL DISCECTOMY W/ FUSION N/A 9/7/2018    Procedure: CERVICAL DISCECTOMY ANTERIOR WITH FUSION C6-7;  Surgeon: Chris Mcfadden MD;  Location: [facility] OR;  Service: Neurosurgery   • APPENDECTOMY     • CERVICAL CORPECTOMY N/A 9/7/2018    Procedure: CERVICAL CORPECTOMY C6-7;  Surgeon: Chris Mcfadden MD;  Location: [facility] OR;  Service: Neurosurgery   • CHOLECYSTECTOMY     • COLONOSCOPY  09/05/2012   • COLONOSCOPY N/A 2/10/2017    Procedure: COLONOSCOPY WITH ANESTHESIA;  Surgeon: Elina Gonsalez MD;  Location: [facility] ENDOSCOPY;  Service:    • HYSTERECTOMY  1998    Had hysterectomy for painful periods and bleeding. (Dr. Patel)   • LAPAROSCOPIC TUBAL LIGATION     • TONSILLECTOMY       Family History   Problem Relation Age of Onset   • Colon cancer Maternal Grandmother         age not known   • Cancer Father    • Heart disease Father    • Diabetes Mother    • Hypertension Mother    • Stroke Mother    • Hypertension Sister    • No Known Problems Daughter    • No Known Problems Sister    • Colon polyps Neg Hx    • Esophageal cancer Neg Hx    • Liver cancer Neg Hx    • Liver disease Neg Hx    • Rectal cancer Neg Hx    • Stomach cancer Neg Hx    • Breast cancer Neg Hx    • Ovarian cancer Neg Hx       reports that she has quit smoking. Her smoking use included Cigarettes. She has never used smokeless tobacco. She reports that she drinks alcohol. She reports that she does not use  "drugs.      Current Outpatient Prescriptions:   •  carisoprodol (SOMA) 250 MG tablet, Take 1 tablet by mouth 4 (Four) Times a Day As Needed for Muscle Spasms. (Patient taking differently: Take 250 mg by mouth 4 (Four) Times a Day As Needed for Muscle Spasms. Dr Mcfadden), Disp: 40 tablet, Rfl: 0  •  estrogens, conjugated,-methyltestosterone (ESTRATEST HS) 0.625-1.25 MG per tablet, Take 1 tablet by mouth Daily., Disp: 30 tablet, Rfl: 3  •  Multiple Vitamins-Minerals (MULTIVITAMIN ADULT PO), Take  by mouth Daily., Disp: , Rfl:   •  aspirin 81 MG EC tablet, Take 81 mg by mouth Daily., Disp: , Rfl:   •  omeprazole (priLOSEC) 40 MG capsule, Take 1 capsule by mouth As Needed., Disp: , Rfl:     OBJECTIVE:  /93 (BP Location: Left arm, Patient Position: Sitting, Cuff Size: Adult)   Pulse 114   Resp 16   Ht 149.9 cm (59\")   Wt 68.2 kg (150 lb 6.4 oz)   Breastfeeding? No   BMI 30.38 kg/m²    Physical Exam   Constitutional: She is oriented to person, place, and time. She appears well-developed and well-nourished. No distress.   HENT:   Head: Normocephalic and atraumatic.   Nose: Nose normal.   Eyes: Conjunctivae and EOM are normal.   Neck: No tracheal deviation present.   Neck immobilized in a C collar.   Cardiovascular: Normal rate, regular rhythm, normal heart sounds and intact distal pulses.  Exam reveals no gallop and no friction rub.    No murmur heard.  Pulmonary/Chest: Effort normal and breath sounds normal. No respiratory distress. She has no wheezes. She has no rales.   Abdominal: Soft. Bowel sounds are normal.   Musculoskeletal: She exhibits no edema.   Neurological: She is alert and oriented to person, place, and time.   Skin: Skin is warm and dry. She is not diaphoretic.   Psychiatric: She has a normal mood and affect. Her behavior is normal. Judgment and thought content normal.   Vitals reviewed.      Assessment/Plan    Diagnoses and all orders for this visit:    Annual visit for general adult medical " examination with abnormal findings  Immunizations:      - Tetanus: Received in 2018      - Influenza: Recommend yearly.      - Pneumovax: Once after age 65      - Prevnar: Once after age 65      - Shingrix: Once after age 60      - Zostavax: Once after age 60  CRC screening: Colonoscopy done 1 years ago without abnormalities  Mammogram: was done on approximately 6/2018 and the result was: Birads II (Benign findings).  PAP: hysterctomy history.  DEXA: DEXA scan at 67    Borderline blood pressure  Fair control, BP goal for age is <140/90 per JNC 8 guidelines and monitor in the setting of previous control and recent surgery.    Herniated cervical disc without myelopathy  S/p surgery. F/u with neurosurgery as scheduled.    Obesity (BMI 30-39.9)  Recommended attention to portion control and being careful about the types and timing of meals for the purpose of weight management.      An After Visit Summary was printed and given to the patient at discharge.  Return in about 4 months (around 1/18/2019) for Recheck.         Jones Elias D.O. 9/18/2018    Patient is pregnant regardless of trimester (administer thimerosal-free vaccine)

## 2021-10-15 ENCOUNTER — OFFICE VISIT (OUTPATIENT)
Dept: INTERNAL MEDICINE | Facility: CLINIC | Age: 61
End: 2021-10-15

## 2021-10-15 VITALS
BODY MASS INDEX: 31.12 KG/M2 | HEART RATE: 94 BPM | HEIGHT: 60 IN | TEMPERATURE: 98.2 F | DIASTOLIC BLOOD PRESSURE: 86 MMHG | RESPIRATION RATE: 16 BRPM | WEIGHT: 158.5 LBS | SYSTOLIC BLOOD PRESSURE: 134 MMHG | OXYGEN SATURATION: 96 %

## 2021-10-15 DIAGNOSIS — Z00.01 ANNUAL VISIT FOR GENERAL ADULT MEDICAL EXAMINATION WITH ABNORMAL FINDINGS: Primary | ICD-10-CM

## 2021-10-15 DIAGNOSIS — C50.912 INVASIVE DUCTAL CARCINOMA OF BREAST, FEMALE, LEFT (HCC): ICD-10-CM

## 2021-10-15 PROBLEM — E66.09 CLASS 1 OBESITY DUE TO EXCESS CALORIES WITHOUT SERIOUS COMORBIDITY WITH BODY MASS INDEX (BMI) OF 31.0 TO 31.9 IN ADULT: Status: ACTIVE | Noted: 2018-08-16

## 2021-10-15 PROBLEM — E66.811 CLASS 1 OBESITY DUE TO EXCESS CALORIES WITHOUT SERIOUS COMORBIDITY WITH BODY MASS INDEX (BMI) OF 31.0 TO 31.9 IN ADULT: Status: ACTIVE | Noted: 2018-08-16

## 2021-10-15 PROBLEM — Z78.9 NON-SMOKER: Status: RESOLVED | Noted: 2018-08-16 | Resolved: 2021-10-15

## 2021-10-15 PROCEDURE — 99386 PREV VISIT NEW AGE 40-64: CPT | Performed by: INTERNAL MEDICINE

## 2021-10-15 PROCEDURE — 90750 HZV VACC RECOMBINANT IM: CPT | Performed by: INTERNAL MEDICINE

## 2021-10-15 NOTE — PROGRESS NOTES
CC: reestablish care for preventive health    History:  Chasidy Tejada is a 61 y.o. female who presents today for evaluation of the above problems.  She notes she has been doing well without any acute illness. She continues working with Oncology with regard to her breast cancer and continues on Femara at this time without side effects. 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       ROS:  Review of Systems   Constitutional: Negative for chills and fever.   HENT: Negative for congestion and sore throat.    Eyes: Negative for visual disturbance.   Respiratory: Negative for cough and shortness of breath.    Cardiovascular: Negative for chest pain and palpitations.   Gastrointestinal: Negative for abdominal pain, constipation and nausea.   Endocrine: Negative for cold intolerance and heat intolerance.   Genitourinary: Negative for difficulty urinating and frequency.   Musculoskeletal: Negative for arthralgias and back pain.   Skin: Negative for rash.   Neurological: Negative for dizziness and headaches.   Psychiatric/Behavioral: Negative for dysphoric mood. The patient is not nervous/anxious.        Allergies   Allergen Reactions   • Demerol [Meperidine] Hives   • Morphine And Related Hives     Past Medical History:   Diagnosis Date   • Acid reflux    • Asthma     long time ago    • Breast cancer (HCC)    • Cancer (HCC)    • Drug therapy    • Hx of radiation therapy    • Panic attack    • Right arm weakness      Past Surgical History:   Procedure Laterality Date   • ANTERIOR CERVICAL DISCECTOMY W/ FUSION N/A 9/7/2018    Procedure: CERVICAL DISCECTOMY ANTERIOR WITH FUSION C6-7;  Surgeon: Chris Mcfadden MD;  Location: [facility];  Service: Neurosurgery   • APPENDECTOMY     • BILATERAL SALPINGO OOPHORECTOMY     • BREAST BIOPSY     • BREAST LUMPECTOMY     • CERVICAL CORPECTOMY N/A 9/7/2018    Procedure: CERVICAL CORPECTOMY C6-7;  Surgeon: Chris Mcfadden MD;  Location:   [facility] OR;  Service: Neurosurgery   • CHOLECYSTECTOMY     • COLONOSCOPY  09/05/2012   • COLONOSCOPY N/A 2/10/2017    Procedure: COLONOSCOPY WITH ANESTHESIA;  Surgeon: Elina Gonsalez MD;  Location: [facility] ENDOSCOPY;  Service:    • HYSTERECTOMY  1998    Had hysterectomy for painful periods and bleeding. (Dr. Patel) TLH w/ BSO   • LAPAROSCOPIC TUBAL LIGATION     • MASTECTOMY W/ SENTINEL NODE BIOPSY Left 1/10/2020    Procedure: LEFT NEEDLE DIRECTED ULTRASOUND GUIDED PARTIAL MASTECTOMY WITH SENTINEL LYMPH NODE BIOPSY, INJECTION AND SCAN, RADIOLOGIST WILL INJECT;  Surgeon: Flor Edmond MD;  Location: [facility] OR;  Service: General   • TONSILLECTOMY     • VENOUS ACCESS DEVICE (PORT) INSERTION N/A 3/5/2020    Procedure: INSERTION VENOUS ACCESS DEVICE EXCISION SKIN LESION X 2 CHEST AND ABDOMEN;  Surgeon: Flor Edmond MD;  Location:  [facility] OR;  Service: General;  Laterality: N/A;   • VENOUS ACCESS DEVICE (PORT) REMOVAL Left 3/4/2021    Procedure: REMOVAL OF SINGLE LUMEN PORT;  Surgeon: Flor Edmond MD;  Location: [facility] OR;  Service: General;  Laterality: Left;     Family History   Problem Relation Age of Onset   • Colon cancer Maternal Grandmother         age not known   • Cancer Father    • Heart disease Father    • Diabetes Mother    • Hypertension Mother    • Stroke Mother    • Breast cancer Sister    • No Known Problems Daughter    • No Known Problems Sister    • Colon polyps Neg Hx    • Esophageal cancer Neg Hx    • Liver cancer Neg Hx    • Liver disease Neg Hx    • Rectal cancer Neg Hx    • Stomach cancer Neg Hx    • Ovarian cancer Neg Hx    • BRCA 1/2 Neg Hx    • Endometrial cancer Neg Hx       reports that she has never smoked. She has never used smokeless tobacco. She reports previous alcohol use. She reports that she does not use drugs.      Current Outpatient Medications:   •  calcium carbonate-vitamin d (Calcium 600+D) 600-400 MG-UNIT per tablet, Take 1 tablet by mouth 2 (Two) Times a Day., Disp: 60  "tablet, Rfl: 3  •  letrozole (FEMARA) 2.5 MG tablet, Take 1 tablet by mouth Daily., Disp: 30 tablet, Rfl: 11  •  Multiple Vitamins-Minerals (MULTIVITAMIN ADULT PO), Take 1 tablet by mouth Daily., Disp: , Rfl:   •  ondansetron (ZOFRAN) 4 MG tablet, Take 2 tablets by mouth Every 8 (Eight) Hours As Needed for Nausea or Vomiting., Disp: 30 tablet, Rfl: 1  •  polyethylene glycol (MIRALAX) packet, Take 17 g by mouth Daily., Disp: , Rfl:     OBJECTIVE:  /86 (BP Location: Left arm, Patient Position: Sitting, Cuff Size: Adult)   Pulse 94   Temp 98.2 °F (36.8 °C)   Resp 16   Ht 152.4 cm (60\")   Wt 71.9 kg (158 lb 8 oz)   LMP  (LMP Unknown)   SpO2 96%   Breastfeeding No   BMI 30.95 kg/m²    Physical Exam  Constitutional:       General: She is not in acute distress.     Appearance: She is well-developed.   HENT:      Head: Normocephalic and atraumatic.      Right Ear: External ear normal.      Left Ear: External ear normal.   Eyes:      General: No scleral icterus.     Extraocular Movements: Extraocular movements intact.   Neck:      Trachea: No tracheal deviation.   Cardiovascular:      Rate and Rhythm: Normal rate and regular rhythm.      Heart sounds: Normal heart sounds. No murmur heard.      Pulmonary:      Effort: Pulmonary effort is normal. No accessory muscle usage or respiratory distress.      Breath sounds: Normal breath sounds. No wheezing.   Abdominal:      General: There is no distension.      Palpations: Abdomen is soft.      Tenderness: There is no abdominal tenderness.   Musculoskeletal:         General: Normal range of motion.      Cervical back: Normal range of motion and neck supple.      Right lower leg: No edema.      Left lower leg: No edema.   Skin:     General: Skin is warm and dry.      Nails: There is no clubbing.   Neurological:      Mental Status: She is alert and oriented to person, place, and time.      Coordination: Coordination normal.      Gait: Gait normal.   Psychiatric:         " Mood and Affect: Mood normal. Mood is not anxious or depressed.         Behavior: Behavior normal.         Assessment/Plan     Diagnoses and all orders for this visit:    1. Annual visit for general adult medical examination with abnormal findings (Primary)  -     Hemoglobin A1c; Future  -     Lipid Panel; Future  -     Shingrix Vaccine  Immunizations:      - Tetanus: Received in 2018      - Influenza: Received in 10/2021      - Pneumovax: Once after age 65      - Prevnar: Once after age 65      - Shingrix: Ordered and administered today      - COVID: Up to date.   CRC screening: Colonoscopy done 4 years ago with 5 year recall.  Mammogram: was done on approximately 12/2020 and the result was: Birads II (Benign findings).  PAP: discontinued secondary to benign hysterectomy.  DEXA: DEXA scan at 67     2. Invasive ductal carcinoma of breast, female, left (HCC)  Stable on Femara without symptoms of recurrence at this time.        An After Visit Summary was printed and given to the patient at discharge.  Return in about 2 months (around 12/15/2021) for MA Shingrix shot; 1 year for Physical.         Jones Elias D.O. 10/16/2021   Electronically signed.

## 2021-10-18 ENCOUNTER — LAB (OUTPATIENT)
Dept: LAB | Facility: HOSPITAL | Age: 61
End: 2021-10-18

## 2021-10-18 DIAGNOSIS — C50.912 INVASIVE DUCTAL CARCINOMA OF BREAST, FEMALE, LEFT (HCC): ICD-10-CM

## 2021-10-18 LAB
ALBUMIN SERPL-MCNC: 4.6 G/DL (ref 3.5–5.2)
ALBUMIN/GLOB SERPL: 1.3 G/DL
ALP SERPL-CCNC: 108 U/L (ref 39–117)
ALT SERPL W P-5'-P-CCNC: 22 U/L (ref 1–33)
ANION GAP SERPL CALCULATED.3IONS-SCNC: 10 MMOL/L (ref 5–15)
AST SERPL-CCNC: 25 U/L (ref 1–32)
BASOPHILS # BLD AUTO: 0.05 10*3/MM3 (ref 0–0.2)
BASOPHILS NFR BLD AUTO: 0.8 % (ref 0–1.5)
BILIRUB SERPL-MCNC: 0.5 MG/DL (ref 0–1.2)
BUN SERPL-MCNC: 9 MG/DL (ref 6–20)
BUN/CREAT SERPL: 18.4 (ref 7–25)
CALCIUM SPEC-SCNC: 9.7 MG/DL (ref 8.6–10.5)
CHLORIDE SERPL-SCNC: 101 MMOL/L (ref 98–107)
CO2 SERPL-SCNC: 28 MMOL/L (ref 22–29)
CREAT SERPL-MCNC: 0.49 MG/DL (ref 0.57–1)
DEPRECATED RDW RBC AUTO: 44.2 FL (ref 37–54)
EOSINOPHIL # BLD AUTO: 0.08 10*3/MM3 (ref 0–0.4)
EOSINOPHIL NFR BLD AUTO: 1.3 % (ref 0.3–6.2)
ERYTHROCYTE [DISTWIDTH] IN BLOOD BY AUTOMATED COUNT: 13 % (ref 12.3–15.4)
FERRITIN SERPL-MCNC: 186.9 NG/ML (ref 13–150)
GFR SERPL CREATININE-BSD FRML MDRD: 129 ML/MIN/1.73
GLOBULIN UR ELPH-MCNC: 3.5 GM/DL
GLUCOSE SERPL-MCNC: 89 MG/DL (ref 65–99)
HCT VFR BLD AUTO: 40.6 % (ref 34–46.6)
HGB BLD-MCNC: 13.5 G/DL (ref 12–15.9)
IMM GRANULOCYTES # BLD AUTO: 0.01 10*3/MM3 (ref 0–0.05)
IMM GRANULOCYTES NFR BLD AUTO: 0.2 % (ref 0–0.5)
IRON 24H UR-MRATE: 72 MCG/DL (ref 37–145)
IRON SATN MFR SERPL: 19 % (ref 20–50)
LYMPHOCYTES # BLD AUTO: 3 10*3/MM3 (ref 0.7–3.1)
LYMPHOCYTES NFR BLD AUTO: 50.6 % (ref 19.6–45.3)
MCH RBC QN AUTO: 30.6 PG (ref 26.6–33)
MCHC RBC AUTO-ENTMCNC: 33.3 G/DL (ref 31.5–35.7)
MCV RBC AUTO: 92.1 FL (ref 79–97)
MONOCYTES # BLD AUTO: 0.39 10*3/MM3 (ref 0.1–0.9)
MONOCYTES NFR BLD AUTO: 6.6 % (ref 5–12)
NEUTROPHILS NFR BLD AUTO: 2.4 10*3/MM3 (ref 1.7–7)
NEUTROPHILS NFR BLD AUTO: 40.5 % (ref 42.7–76)
NRBC BLD AUTO-RTO: 0 /100 WBC (ref 0–0.2)
PLATELET # BLD AUTO: 345 10*3/MM3 (ref 140–450)
PMV BLD AUTO: 9.9 FL (ref 6–12)
POTASSIUM SERPL-SCNC: 4.1 MMOL/L (ref 3.5–5.2)
PROT SERPL-MCNC: 8.1 G/DL (ref 6–8.5)
RBC # BLD AUTO: 4.41 10*6/MM3 (ref 3.77–5.28)
SODIUM SERPL-SCNC: 139 MMOL/L (ref 136–145)
TIBC SERPL-MCNC: 375 MCG/DL (ref 298–536)
TRANSFERRIN SERPL-MCNC: 252 MG/DL (ref 200–360)
WBC # BLD AUTO: 5.93 10*3/MM3 (ref 3.4–10.8)

## 2021-10-18 PROCEDURE — 82607 VITAMIN B-12: CPT

## 2021-10-18 PROCEDURE — 82746 ASSAY OF FOLIC ACID SERUM: CPT

## 2021-10-18 PROCEDURE — 82728 ASSAY OF FERRITIN: CPT

## 2021-10-18 PROCEDURE — 80053 COMPREHEN METABOLIC PANEL: CPT

## 2021-10-18 PROCEDURE — 83540 ASSAY OF IRON: CPT

## 2021-10-18 PROCEDURE — 84466 ASSAY OF TRANSFERRIN: CPT

## 2021-10-18 PROCEDURE — 82378 CARCINOEMBRYONIC ANTIGEN: CPT

## 2021-10-18 PROCEDURE — 85025 COMPLETE CBC W/AUTO DIFF WBC: CPT

## 2021-10-18 PROCEDURE — 86300 IMMUNOASSAY TUMOR CA 15-3: CPT

## 2021-10-18 PROCEDURE — 36415 COLL VENOUS BLD VENIPUNCTURE: CPT

## 2021-10-18 NOTE — PROGRESS NOTES
[facility] GROUP HEMATOLOGY AND ONCOLOGY  [address]  [place] [id number] [phone]    Patient Name: Chasidy Tejada  Encounter Date: 10/25/2021  YOB: 1960  Patient Number: [id number]       REASON FOR VISIT: Chasidy Tejada is a 61-year-old female who returns in follow-up of stage IA left breast carcinoma, ER/WI and HER-2/lamberto positive.  She underwent left breast lumpectomy with SNB, 01/10/2020 and was started on adjuvant Arimidex beginning 02/03/2020 through 03/11/2020 before resumption on 08/19/202 through 08/30/2021 - stopped due to intolerance (temper volatility, irritability).  She completed adjuvant paclitaxel (week 12/12, 05/27/2020), and received adjuvant trastuzumab (Herceptin), having completed cycle 17, 02/10/2021 (8.5 months).  Adjuvant radiation was administered on 06/22/2020 through 08/06/2020 (6040 cGy in 33 fx). She is here alone (previously with her spouse).  She was started on adjuvant Femara at her last visit, 09/27/2021.    I have reviewed the HPI and verified with the patient the accuracy of it. No changes to interval history since the information was documented. Tommy Wheat MD 10/25/21       DIAGNOSTIC ABNORMALITIES:          1.   12/13/2019- mammogram screening.  Impression: Stellate lesion outer left breast.  Spot compression and ultrasound suggested.          2.   12/27/2019- diagnostic mammogram.  Impression: Irregular density upper outer left breast.  Biopsy suggested.          3.   12/27/2019- left breast ultrasound.  Impression: Suspicious 6 x 5 x 8 mm density left breast approximately 8 cm from the nipple.  Biopsy suggested.  BI-RADS Category 4B.          4.   01/03/2020-ultrasound-guided breast biopsy.  Impression: Appropriate sampling of the 8 mm irregular hypoechoic nodule in the left breast at 1:00 in the posterior depth/axillary tail region.  Final diagnosis: Left breast at 1 o'clock  position, core biopsies: A.invasive mammary carcinoma of no special type (ductal, not otherwise specified), grade 2, at least 4.9 mm in greatest extent.  B.associated low-grade ductal carcinoma in situ.  % positive, MI 25% positive, HER-2/lamberto-2+ (IHC)/FISH- a focal positive score is present in an area corresponding to approximately 15% of the tumor (signal ratio: 2.9).           5.   02/03/2020- CMP normal with .  CBC normal.           6.   02/03/2020-bone scan ([facility]).  Impression: Degenerative joint changes.  No evidence of bony metastasis.           7.   02/03/2020-CT scans, head chest, abdomen, pelvis-impression: No abnormal intracranial enhancement to suggest intracranial metastasis/abnormalities.  No evidence of intrathoracic metastasis.  Stable 5 to 6 mm right lower lobe nodule of the right hemidiaphragm unchanged from 2/14/2011.  No convincing evidence of intra-abdominal or pelvic metastasis.  Hepatic cysts.  Fatty attenuated benign focus within the body of the pancreas visualized, 12/14/2011.  Previous hysterectomy, cholecystectomy and appendectomy.           8.   02/14/2020-echocardiogram.  Impression: LV systolic function normal (EF equals 70%).  Normal LV and RV size thickness and function.  Normal LA and RA size.  Normal cardiac valves.  No pericardial effusion.           9.  02/27/2020- CMP normal with .  CBC normal.  Iron 65, iron saturation 18%, ferritin 134, B12 745, folate > 20, CEA 1.76, CA-27-29 14.2.          10.  03/04/2020- repeat FISH for HER-2/lamberto (from tumor block, 01/10/2020).  No tumor on specimen.          11.  06/23/2020- DEXA scan.  Impression: Normal bone density.  No lower than 1 standard deviation below the mean for young adult woman.    PREVIOUS INTERVENTIONS:           1.   01/10/2020- left breast lumpectomy with left axillary sentinel node biopsy.  Final diagnosis: 1.left breast mass, lumpectomy: Invasive and in situ ductal carcinoma.  Tumor site-not specified.   "Histologic type: Invasive carcinoma of no special type (invasive ductal carcinoma, not otherwise specified).  Overall tumor grade: Grade 1.  Tumor size: Greatest dimension of largest invasive focus: 5 mm.  Tumor focality: Single focus of invasive carcinoma.  Ductal carcinoma in situ (DCIS): Present.  Negative for extensive intraductal component (EIC).  Size (extent) CIS: Cannot be determined: Microscopic.  Architectural pattern: Cribriform/solid.  Nuclear grade: 2 (intermediate).  Necrosis: Present, central (expansive \"comedo) necrosis).  Lobular carcinoma in situ (LCIS): No LCIS in specimen.  Lymphovascular invasion: Not identified.  Dermal lymphovascular invasion: No skin present.  Margins: Uninvolved by invasive carcinoma.  Lymph nodes: Uninvolved by tumor cells.  Regional lymph nodes: 1.  Number of lymph nodes examined: 1.  Number of sentinel nodes examined: 1.  Pathologic stage classification (pTNM, AJCC eighth edition): pT1a,(sn), pN0.  % positive, MT 25% positive, HER-2 2+ (equivocal).           2.   Adjuvant Arimidex 1 mg p.o. daily beginning 02/03/2020 through 03/11/2020: resumed 08/19/2020 through 08/30/2021 - stopped due to intolerance (temper volatility, irritability).           3.   Adjuvant paclitaxel beginning 03/11/2020 through 05/27/2020 (12/12 weekly doses)           4.   Adjuvant trastuzumab every 3 weeks - beginning 03/11/2020 through 02/10/2021 (17 cycles)           5.   Adjuvant radiation to the left breast (5040 cGy with 1000 cGy boost to the tumor bed for a total of 6040 cGy/33 fractions) from 06/22/2020 through 08/06/2020.           6.   Adjuvant Femara 2.5 mg po daily beginning 09/27/2021    LABS    Lab Results - Last 18 Months   Lab Units 10/18/21  1412 09/20/21  1421 08/17/21  1326 05/18/21  1053 02/10/21  1028 01/20/21  1024 05/27/20  0916 05/20/20  0846   HEMOGLOBIN g/dL 13.5 13.7 13.6 14.1 13.2 14.0   < > 11.5*   HEMATOCRIT % 40.6 40.4 41.2 42.5 40.2 39.9   < > 34.5   MCV fL 92.1 " 90.8 91.2 89.3 89.7 87.5   < > 93.0   WBC 10*3/mm3 5.93 7.25 7.63 6.85 6.16 6.24   < > 8.84   RDW % 13.0 13.2 13.2 13.1 13.0 12.9   < > 15.6*   MPV fL 9.9 10.3 10.0 10.3 9.6 9.7   < > 9.5   PLATELETS 10*3/mm3 345 335 320 309 290 281   < > 402   IMM GRAN % % 0.2 0.1 0.1 0.3 0.2 0.2   < >  --    NEUTROS ABS 10*3/mm3 2.40 3.64 3.52 3.23 3.31 2.83   < > 5.18   LYMPHS ABS 10*3/mm3 3.00 3.18* 3.64* 3.18* 2.26 2.88   < >  --    MONOS ABS 10*3/mm3 0.39 0.35 0.37 0.32 0.46 0.37   < >  --    EOS ABS 10*3/mm3 0.08 0.04 0.04 0.04 0.08 0.09   < > 0.18   BASOS ABS 10*3/mm3 0.05 0.03 0.05 0.06 0.04 0.06   < > 0.09   IMMATURE GRANS (ABS) 10*3/mm3 0.01 0.01 0.01 0.02 0.01 0.01   < >  --    NRBC /100 WBC 0.0 0.0 0.0 0.0 0.0 0.0   < > 1.0*   NEUTROPHIL % %  --   --   --   --   --   --   --  55.6   MONOCYTES % %  --   --   --   --   --   --   --  4.0*   BASOPHIL % %  --   --   --   --   --   --   --  1.0   ANISOCYTOSIS   --   --   --   --   --   --   --  Slight/1+   GIANT PLT   --   --   --   --   --   --   --  Slight/1+    < > = values in this interval not displayed.       Lab Results - Last 18 Months   Lab Units 10/18/21  1412 09/20/21  1421 08/17/21  1326 05/18/21  1054 02/10/21  1028 01/20/21  1024   GLUCOSE mg/dL 89 87 89 93 94 91   SODIUM mmol/L 139 141 140 139 138 137   POTASSIUM mmol/L 4.1 3.8 3.6 4.6 4.0 4.2   CO2 mmol/L 28.0 26.0 25.0 28.0 26.0 25.0   CHLORIDE mmol/L 101 103 101 99 102 102   ANION GAP mmol/L 10.0 12.0 14.0 12.0 10.0 10.0   CREATININE mg/dL 0.49* 0.49* 0.49* 0.55* 0.51* 0.58   BUN mg/dL 9 8 12 9 14 20   BUN / CREAT RATIO  18.4 16.3 24.5 16.4 27.5* 34.5*   CALCIUM mg/dL 9.7 9.8 10.2 10.3 9.9 10.0   EGFR IF NONAFRICN AM mL/min/1.73 129 129 129 114 124 107   ALK PHOS U/L 108 110 112 108 90 89   TOTAL PROTEIN g/dL 8.1 7.7 8.0 7.7 7.5 7.9   ALT (SGPT) U/L 22 20 25 29 23 24   AST (SGOT) U/L 25 26 22 30 21 24   BILIRUBIN mg/dL 0.5 0.6 0.8 0.6 0.5 0.5   ALBUMIN g/dL 4.60 4.60 5.00 4.90 4.20 4.50   GLOBULIN gm/dL 3.5  3.1 3.0 2.8 3.3 3.4       Lab Results - Last 18 Months   Lab Units 10/18/21  1412 09/20/21  1421 08/17/21  1326 05/18/21  1053 02/10/21  1028 12/30/20  1019   CEA ng/mL 1.51 1.83 1.74 1.32 1.63 1.89       Lab Results - Last 18 Months   Lab Units 10/18/21  1412 09/20/21  1421 08/17/21  1326 05/18/21  1054 05/18/21  1053 02/10/21  1028 01/20/21  1024 12/30/20  1019 12/09/20  1021 11/18/20  1025 10/28/20  1012   IRON mcg/dL 72 89 110 103  --  74 81  --    < >   < >  --    TIBC mcg/dL 375 389 425 407  --  308 349  --    < >   < >  --    IRON SATURATION % 19* 23 26 25  --  24 23  --    < >   < >  --    FERRITIN ng/mL 186.90* 137.40 226.40* 171.90*  --  157.50* 173.50*  --    < >   < >  --    TSH uIU/mL  --   --   --   --   --   --   --  1.270  --   --   --    FOLATE ng/mL >20.00 16.30 14.50  --  >20.00 >20.00  --   --   --   --  >20.00    < > = values in this interval not displayed.         PAST MEDICAL HISTORY:  ALLERGIES:  Allergies   Allergen Reactions   • Demerol [Meperidine] Hives   • Morphine And Related Hives     CURRENT MEDICATIONS:  Outpatient Encounter Medications as of 10/25/2021   Medication Sig Dispense Refill   • calcium carbonate-vitamin d (Calcium 600+D) 600-400 MG-UNIT per tablet Take 1 tablet by mouth 2 (Two) Times a Day. 60 tablet 3   • letrozole (FEMARA) 2.5 MG tablet Take 1 tablet by mouth Daily. 30 tablet 11   • Multiple Vitamins-Minerals (MULTIVITAMIN ADULT PO) Take 1 tablet by mouth Daily.     • ondansetron (ZOFRAN) 4 MG tablet Take 2 tablets by mouth Every 8 (Eight) Hours As Needed for Nausea or Vomiting. 30 tablet 1   • polyethylene glycol (MIRALAX) packet Take 17 g by mouth Daily.       No facility-administered encounter medications on file as of 10/25/2021.     Adult illnesses:  Colon polyps  Obesity  Spinal stenosis neck   Herniated cervical disc without myelopathy  Borderline blood pressure  History of dysphagia    Past surgeries:  Anterior cervical discectomy w fusion,  09/2018  Appendectomy  BSO/GANGA  Cholecystectomy  Colonoscopy, 02/10/2017  BTL  Tonsillectomy  Breast biopsy  Left partial mastectomy, 01/10/2020  03/05/2020 - Mediport placement per Dr. Edmond  03/04/2021-Mediport removal per Dr. Edmond.    ADULT ILLNESSES:  Patient Active Problem List   Diagnosis Code   • Family history of colon cancer Z80.0   • Colon polyps K63.5   • Laryngopharyngeal reflux K21.9   • Pharyngoesophageal dysphagia R13.14   • Class 1 obesity due to excess calories without serious comorbidity with body mass index (BMI) of 31.0 to 31.9 in adult E66.09, Z68.31   • Spinal stenosis in cervical region M48.02   • Cervical radiculopathy M54.12   • Herniated cervical disc without myelopathy M50.20   • Borderline blood pressure R03.0   • Invasive ductal carcinoma of breast, female, left (HCC) C50.912   • Former smoker Z87.891   • S/P lumpectomy, left breast Z98.890   • S/P lymph node biopsy Z98.890   • On antineoplastic chemotherapy Z79.899   • Estrogen receptor positive status (ER+) Z17.0   • History of radiation therapy Z92.3     SURGERIES:  Past Surgical History:   Procedure Laterality Date   • ANTERIOR CERVICAL DISCECTOMY W/ FUSION N/A 9/7/2018    Procedure: CERVICAL DISCECTOMY ANTERIOR WITH FUSION C6-7;  Surgeon: Chris Mcfadden MD;  Location: [facility] OR;  Service: Neurosurgery   • APPENDECTOMY     • BILATERAL SALPINGO OOPHORECTOMY     • BREAST BIOPSY     • BREAST LUMPECTOMY     • CERVICAL CORPECTOMY N/A 9/7/2018    Procedure: CERVICAL CORPECTOMY C6-7;  Surgeon: Chris Mcfadden MD;  Location: [facility] OR;  Service: Neurosurgery   • CHOLECYSTECTOMY     • COLONOSCOPY  09/05/2012   • COLONOSCOPY N/A 2/10/2017    Procedure: COLONOSCOPY WITH ANESTHESIA;  Surgeon: Elina Gonsalez MD;  Location: [facility] ENDOSCOPY;  Service:    • HYSTERECTOMY  1998    Had hysterectomy for painful periods and bleeding. (Dr. Patel) [facility] w/ BSO   • LAPAROSCOPIC TUBAL LIGATION     • MASTECTOMY W/ SENTINEL NODE BIOPSY Left 1/10/2020     Procedure: LEFT NEEDLE DIRECTED ULTRASOUND GUIDED PARTIAL MASTECTOMY WITH SENTINEL LYMPH NODE BIOPSY, INJECTION AND SCAN, RADIOLOGIST WILL INJECT;  Surgeon: Flor Edmond MD;  Location:  [facility] OR;  Service: General   • TONSILLECTOMY     • VENOUS ACCESS DEVICE (PORT) INSERTION N/A 3/5/2020    Procedure: INSERTION VENOUS ACCESS DEVICE EXCISION SKIN LESION X 2 CHEST AND ABDOMEN;  Surgeon: Flor Edmond MD;  Location:  [facility] OR;  Service: General;  Laterality: N/A;   • VENOUS ACCESS DEVICE (PORT) REMOVAL Left 3/4/2021    Procedure: REMOVAL OF SINGLE LUMEN PORT;  Surgeon: Flor Edmond MD;  Location:  [facility] OR;  Service: General;  Laterality: Left;     HEALTH MAINTENANCE ITEMS:  Health Maintenance Due   Topic Date Due   • COVID-19 Vaccine (3 - Moderna risk 3-dose series) 03/06/2021       <no information>  Last Completed Colonoscopy          COLORECTAL CANCER SCREENING (COLONOSCOPY - Every 5 Years) Next due on 2/10/2022    02/10/2017  COLONOSCOPY    02/10/2017  Surgical Procedure: COLONOSCOPY              Immunization History   Administered Date(s) Administered   • COVID-19 (MODERNA) 01/09/2021, 02/06/2021   • Flu Vaccine Intradermal Quad 18-64YR 10/07/2021   • Influenza, Unspecified 11/18/2017, 11/05/2018, 10/15/2019, 10/27/2020   • Shingrix 10/15/2021   • Tdap 08/29/2018     Last Completed Mammogram          Scheduled - MAMMOGRAM (Yearly) Scheduled for 12/20/2021 12/17/2020  Mammo Diagnostic Digital Tomosynthesis Bilateral With CAD    12/27/2019  Mammo Diagnostic Digital Tomosynthesis Left With CAD    12/13/2019  Mammo Screening Digital Tomosynthesis Bilateral With CAD    06/01/2018  Mammo Screening Digital Tomosynthesis Bilateral With CAD    07/14/2015  MAMMOGRAPHY SCREENING BILATERAL                  FAMILY HISTORY:  Family History   Problem Relation Age of Onset   • Colon cancer Maternal Grandmother         age not known   • Cancer Father    • Heart disease Father    • Diabetes Mother    •  "Hypertension Mother    • Stroke Mother    • Breast cancer Sister    • No Known Problems Daughter    • No Known Problems Sister    • Colon polyps Neg Hx    • Esophageal cancer Neg Hx    • Liver cancer Neg Hx    • Liver disease Neg Hx    • Rectal cancer Neg Hx    • Stomach cancer Neg Hx    • Ovarian cancer Neg Hx    • BRCA 1/2 Neg Hx    • Endometrial cancer Neg Hx      SOCIAL HISTORY:  Social History     Socioeconomic History   • Marital status:    • Number of children: 1   Tobacco Use   • Smoking status: Never Smoker   • Smokeless tobacco: Never Used   • Tobacco comment: \"i never really smoked\"   Vaping Use   • Vaping Use: Never used   Substance and Sexual Activity   • Alcohol use: Not Currently   • Drug use: No   • Sexual activity: Defer       REVIEW OF SYSTEMS:  Review of Systems   Constitutional: Negative.         She manages her ADLs' including chores, errands, driving.  Is still working full time.  Has been active.  Is up and about > 75% of the time    Femara tolerance:  Feels much better since stopping the Arimidex.  \"I'm not in a dark place, with no depression, not irritable and not grouchy.\"  No other problems.  No new arthralgias.  Mild to moderate hot flashes.  \"Not bad.\"   HENT: Negative.    Eyes: Negative.    Respiratory: Negative.    Cardiovascular: Negative.    Gastrointestinal: Negative for diarrhea, nausea (\"not anymore.\") and GERD (\"Normal.\" Still on omeprazole as needed, \"not often.\").   Endocrine: Positive for heat intolerance (Lingering vasomotor changes.  \"Off and on through the day.\"  ).   Genitourinary: Negative.    Musculoskeletal: Negative for arthralgias (knees, and hips \"been ok.\" Does not impede her activities.).   Allergic/Immunologic: Negative.    Neurological: Positive for numbness (paresthesias of the toes> fingers.  Says improving each time).   Hematological: Negative.    Psychiatric/Behavioral: Negative for depressed mood (Resolution of irritability and grouchiness since " "stopping Arimidex last 08/31/2021.). The patient is not nervous/anxious (\"not bad\").        /78   Pulse 95   Temp 96.6 °F (35.9 °C)   Resp 16   Ht 152.4 cm (60\")   Wt 73.3 kg (161 lb 8 oz)   LMP  (LMP Unknown)   SpO2 98%   Breastfeeding No   BMI 31.54 kg/m²  Body surface area is 1.7 meters squared.  Pain Score    10/25/21 1442   PainSc: 0-No pain       Physical Exam:  Physical Exam   Constitutional: She is oriented to person, place, and time. She appears well-developed and well-nourished. No distress.   Pleasant, heavy set, cooperative, modestly kept female.  Appears her age.  ECOG 0.      She has regained 3 pounds (had lost 14 pounds at her 5 prior visits) in the interval.   HENT:   Head: Normocephalic and atraumatic.   Mouth/Throat: No oropharyngeal exudate.   Hair has regrown    Wearing a surgical mask   Eyes: Pupils are equal, round, and reactive to light. Conjunctivae are normal. No scleral icterus.   Neck: No JVD present. No tracheal deviation present. No thyromegaly present.   Cardiovascular: Normal rate, regular rhythm and normal heart sounds. Exam reveals no friction rub.   No murmur heard.  Pulmonary/Chest: Effort normal and breath sounds normal. No stridor. No respiratory distress. She has no wheezes. She has no rales.   Port site in the left upper chest is healed.  The device has been removed.    Breasts: Chaperoned exam--Lisette Edmond MA.      Again note that the left breast lumpectomy and sentinel node biopsy site in the left axilla are healed.  There is no incisional tenderness in either site.  The radiation associated skin changes were barely evident.  The rest of the breast is without obvious nodularity skin changes, no nipple discharge.  Right breast without masses, skin changes nor nipple discharge.     Abdominal: Soft. Bowel sounds are normal. She exhibits no distension and no mass. There is no abdominal tenderness. There is no rebound and no guarding.   Musculoskeletal: Normal " range of motion. No deformity.   Lymphadenopathy:     She has no cervical adenopathy.        Right cervical: No superficial cervical, no deep cervical and no posterior cervical adenopathy present.       Left cervical: No superficial cervical, no deep cervical and no posterior cervical adenopathy present.        Right: No supraclavicular adenopathy present.        Left: No supraclavicular adenopathy present.   Neurological: She is alert and oriented to person, place, and time. She displays normal reflexes. No cranial nerve deficit. She exhibits normal muscle tone. Coordination normal.   Skin: Skin is warm and dry. No rash noted. No erythema. No pallor.   Psychiatric: Her behavior is normal. Judgment and thought content normal.   Vitals reviewed.    ASSESSMENT:   1.  Invasive and in situ ductal carcinoma  left breast.                 Stage:  IA (pT1a, pN0, G2) ER  100% positive, AK  25%, HER-2/lamberto - 2+ (IHC)/FISH -a focal positive score is present in an area corresponding to approximately 15% of the tumor (signal ratio: 2.9).                 Tumor [place]:  5 mm.  Tumor focality: Single focus of invasive carcinoma.  No EIC nor LCIS.  Lymphovascular invasion: Not identified.  Dermal lymphovascular invasion: No skin present.  Microcalcifications: Not identified.  Margins uninvolved by invasive carcinoma.  Lymph nodes: Number of lymph nodes examined: 1 (0/1).                 Tumor Status:    -- Underwent lumpectomy and SNB, 01/10/2020.   --Completed adjuvant paclitaxel, 05/27/2020; adjuvant Herceptin, 02/10/2021; adjuvant radiation, 08/06/2020 (above)  -- Adjuvant Arimidex since 02/03/2020 through 09/27/2021-stopped due to depression, and irritability.  -- 12/17/2020-Mammogram- [id number]  --Adjuvant Femara 2.5 mg p.o. daily beginning 09/28/2021 through present          2.   Obesity.  Moderate (BMI 34)        3.   Spinal stenosis neck, quiescent         4.   Herniated cervical disc without myelopathy        5.    Anemia, likely chemo associated.    --Resolved, Hgb 13.5, 10/18/2021 (prior: Hgb 11.5-13.8)        6.   GERD, quiescent        7.   Mood changes, to include mood lability, depression, irritability and grouchiness.  Resolved since stopping Arimidex.       RECOMMENDATIONS:         1.   Apprised of the labs, 10/18/2021.  Normal CBC, CMP, normal CEA, normal CA-27-29, repleted B12/folate, repleted serum iron, iron saturation 19% (from 23%; 25%; 24%), repleted ferritin (186; from 137; 226; 171; 157; 138; 141; 216).          2.    Femara tolerance discussed.  Doing well so far with no mood lability and no new arthralgias.  Feels she can press on        3.    Schedule mammogram, 12/18/2021        4.    Previously noted that we are unable to repeat FISH testing for HER-2 on the tumor specimen from 01/10/2020 -pathology block did not contain any more tumor - personally discussed/confirmed with Dr. Burgos of pathology.        5.   Care previously discussed with Dr. Jaimes on 02/28/2020 after she saw the patient on 02/25/2020 (encounter reviewed).  Pathology was reviewed at [facility] and discussed with the patient.  Recommendations/plan: Discussed that since her cancer is strongly ER positive and low histologic grade/low proliferative rate, and HER-2 on the biopsy was borderline recommended repeat HER-2 testing on her surgical pathology.  Defer to his negative proceed with only endocrine therapy and radiation.  If HER-2 is confirmed to be positive, recommend adjuvant weekly Taxol x12+ Herceptin every 3 weeks to complete 1 year of HER-2 directed therapy.  These plans were personally discussed with the patient (via telephone) on 02/28/2020 I also personally asked the pathologist (Dr. Radha Burgos) to send off the biopsy specimen for repeat FISH HER-2 testing.          6.   Reviewed NCCN guidelines version 3.2019 invasive breast cancer.  For tumors (=/> 5 mm), and pN0, recommend adjuvant endocrine therapy +/- adjuvant  chemotherapy with trastuzumab (category 2B).                  7.  Rx:   · Oscal 600/400 po bid # 60 - OTC  · Femara 2.5 mg po qd # 30 - 11 RF  · Stay off B12 supplements           8.  Previously discussed the rationale for adjuvant hormonal manipulation with AIs (see above) and potential toxicities of AI therapy are discussed (to include but not limited to: Hot flashes, asthenia, pain, arthralgias, arthritis, nausea/vomiting, headache, pharyngitis, depression, rash, hypertension, lymphedema, insomnia, edema, weight gain, dyspnea, abdominal pain, constipation, hyperlipidemia, osteoporosis, fractures, cough, bone pain, diarrhea, breast pain, paresthesias, infections, cataracts, myalgias, thromboembolism, angina, Lyles-Yung syndrome, erythema multiforme, anemia, leukopenia, stroke, myocardial infarction, osteoporosis, fractures). Questions are answered. She agrees to another trial of therapy (this time with letrozole).             9.  Return to the [place] office with pre-office serum iron, iron saturation, ferritin B12, folate, CMP, CEA, CA 27-29 and CBC and differential in 12 weeks.    .     MEDICAL DECISION MAKING: Moderate complexity   AMOUNT OF DATA: Moderate to Extensive     I spent 37 total minutes, face-to-face, caring for Chasidy today.  Greater than 50% of this time involved counseling and/or coordination of care as documented within this note regarding the patient's illness(es), pros and cons of various treatment options, instructions and/or risk reduction.     Cc:  Abigail Jaimes MD- [facility]          MD Kialash Sawant MD Robert Learch, MD

## 2021-10-19 LAB
CANCER AG27-29 SERPL-ACNC: 21.6 U/ML (ref 0–38.6)
CEA SERPL-MCNC: 1.51 NG/ML
FOLATE SERPL-MCNC: >20 NG/ML (ref 4.78–24.2)
VIT B12 BLD-MCNC: 614 PG/ML (ref 211–946)

## 2021-10-25 ENCOUNTER — OFFICE VISIT (OUTPATIENT)
Dept: ONCOLOGY | Facility: CLINIC | Age: 61
End: 2021-10-25

## 2021-10-25 VITALS
HEIGHT: 60 IN | BODY MASS INDEX: 31.71 KG/M2 | SYSTOLIC BLOOD PRESSURE: 136 MMHG | DIASTOLIC BLOOD PRESSURE: 78 MMHG | RESPIRATION RATE: 16 BRPM | TEMPERATURE: 96.6 F | HEART RATE: 95 BPM | OXYGEN SATURATION: 98 % | WEIGHT: 161.5 LBS

## 2021-10-25 DIAGNOSIS — C50.912 INVASIVE DUCTAL CARCINOMA OF BREAST, FEMALE, LEFT (HCC): Primary | ICD-10-CM

## 2021-10-25 PROCEDURE — 99214 OFFICE O/P EST MOD 30 MIN: CPT | Performed by: INTERNAL MEDICINE

## 2021-10-25 RX ORDER — LETROZOLE 2.5 MG/1
2.5 TABLET, FILM COATED ORAL DAILY
Qty: 30 TABLET | Refills: 11 | Status: SHIPPED | OUTPATIENT
Start: 2021-10-25 | End: 2022-11-18 | Stop reason: SDUPTHER

## 2021-11-19 ENCOUNTER — IMMUNIZATION (OUTPATIENT)
Dept: VACCINE CLINIC | Facility: HOSPITAL | Age: 61
End: 2021-11-19

## 2021-11-19 DIAGNOSIS — Z23 NEED FOR VACCINATION: Primary | ICD-10-CM

## 2021-11-19 PROCEDURE — 91306 HC SARSCOV2 VAC 50MCG/0.25ML IM: CPT | Performed by: OBSTETRICS & GYNECOLOGY

## 2021-11-19 PROCEDURE — 0064A HC ADM SARSCOV2 50MCG/0.25ML BOOSTER: CPT | Performed by: OBSTETRICS & GYNECOLOGY

## 2021-12-16 ENCOUNTER — CLINICAL SUPPORT (OUTPATIENT)
Dept: INTERNAL MEDICINE | Facility: CLINIC | Age: 61
End: 2021-12-16

## 2021-12-16 DIAGNOSIS — Z23 NEED FOR VACCINATION: Primary | ICD-10-CM

## 2021-12-16 PROCEDURE — 90750 HZV VACC RECOMBINANT IM: CPT | Performed by: INTERNAL MEDICINE

## 2021-12-16 PROCEDURE — 90471 IMMUNIZATION ADMIN: CPT | Performed by: INTERNAL MEDICINE

## 2021-12-16 NOTE — PROGRESS NOTES
Injection  Injection performed in Right Deltoid Shingrix 0.5 ml  by Stanley Sumner MA. Patient tolerated the procedure well without complications.  12/16/21   Stanley Sumner MA

## 2021-12-20 ENCOUNTER — HOSPITAL ENCOUNTER (OUTPATIENT)
Dept: MAMMOGRAPHY | Facility: HOSPITAL | Age: 61
Discharge: HOME | End: 2021-12-20

## 2021-12-20 ENCOUNTER — HOSPITAL ENCOUNTER (OUTPATIENT)
Dept: ULTRASOUND IMAGING | Facility: HOSPITAL | Age: 61
Discharge: HOME | End: 2021-12-20

## 2021-12-20 DIAGNOSIS — C50.912 INVASIVE DUCTAL CARCINOMA OF BREAST, FEMALE, LEFT (HCC): ICD-10-CM

## 2022-01-06 ENCOUNTER — HOSPITAL ENCOUNTER (OUTPATIENT)
Dept: ULTRASOUND IMAGING | Facility: HOSPITAL | Age: 62
End: 2022-01-06

## 2022-01-06 ENCOUNTER — HOSPITAL ENCOUNTER (OUTPATIENT)
Dept: MAMMOGRAPHY | Facility: HOSPITAL | Age: 62
Discharge: HOME | End: 2022-01-06
Admitting: INTERNAL MEDICINE

## 2022-01-06 PROCEDURE — G0279 TOMOSYNTHESIS, MAMMO: HCPCS

## 2022-01-06 PROCEDURE — 77066 DX MAMMO INCL CAD BI: CPT

## 2022-01-13 ENCOUNTER — OFFICE VISIT (OUTPATIENT)
Dept: OBSTETRICS AND GYNECOLOGY | Facility: CLINIC | Age: 62
End: 2022-01-13

## 2022-01-13 VITALS
HEIGHT: 60 IN | DIASTOLIC BLOOD PRESSURE: 78 MMHG | SYSTOLIC BLOOD PRESSURE: 132 MMHG | WEIGHT: 157 LBS | BODY MASS INDEX: 30.82 KG/M2

## 2022-01-13 DIAGNOSIS — Z01.419 ENCOUNTER FOR GYNECOLOGICAL EXAMINATION WITHOUT ABNORMAL FINDING: ICD-10-CM

## 2022-01-13 DIAGNOSIS — Z98.890 S/P LUMPECTOMY, LEFT BREAST: ICD-10-CM

## 2022-01-13 DIAGNOSIS — N95.2 VAGINAL ATROPHY: ICD-10-CM

## 2022-01-13 DIAGNOSIS — E66.9 OBESITY (BMI 30-39.9): ICD-10-CM

## 2022-01-13 DIAGNOSIS — Z78.9 NONSMOKER: ICD-10-CM

## 2022-01-13 DIAGNOSIS — N94.10 FEMALE DYSPAREUNIA: Primary | ICD-10-CM

## 2022-01-13 PROCEDURE — 99396 PREV VISIT EST AGE 40-64: CPT | Performed by: OBSTETRICS & GYNECOLOGY

## 2022-01-13 RX ORDER — CONJUGATED ESTROGENS 0.62 MG/G
CREAM VAGINAL
Qty: 30 G | Refills: 11 | Status: SHIPPED | OUTPATIENT
Start: 2022-01-13 | End: 2023-04-06

## 2022-01-17 ENCOUNTER — LAB (OUTPATIENT)
Dept: LAB | Facility: HOSPITAL | Age: 62
End: 2022-01-17

## 2022-01-17 DIAGNOSIS — C50.912 INVASIVE DUCTAL CARCINOMA OF BREAST, FEMALE, LEFT: ICD-10-CM

## 2022-01-17 LAB
ALBUMIN SERPL-MCNC: 4.2 G/DL (ref 3.5–5.2)
ALBUMIN/GLOB SERPL: 1.3 G/DL
ALP SERPL-CCNC: 96 U/L (ref 39–117)
ALT SERPL W P-5'-P-CCNC: 19 U/L (ref 1–33)
ANION GAP SERPL CALCULATED.3IONS-SCNC: 8 MMOL/L (ref 5–15)
AST SERPL-CCNC: 17 U/L (ref 1–32)
BASOPHILS # BLD AUTO: 0.05 10*3/MM3 (ref 0–0.2)
BASOPHILS NFR BLD AUTO: 0.5 % (ref 0–1.5)
BILIRUB SERPL-MCNC: 0.4 MG/DL (ref 0–1.2)
BUN SERPL-MCNC: 18 MG/DL (ref 6–20)
BUN/CREAT SERPL: 36.7 (ref 7–25)
CALCIUM SPEC-SCNC: 9.2 MG/DL (ref 8.6–10.5)
CHLORIDE SERPL-SCNC: 102 MMOL/L (ref 98–107)
CO2 SERPL-SCNC: 28 MMOL/L (ref 22–29)
CREAT SERPL-MCNC: 0.49 MG/DL (ref 0.57–1)
DEPRECATED RDW RBC AUTO: 46 FL (ref 37–54)
EOSINOPHIL # BLD AUTO: 0.12 10*3/MM3 (ref 0–0.4)
EOSINOPHIL NFR BLD AUTO: 1.2 % (ref 0.3–6.2)
ERYTHROCYTE [DISTWIDTH] IN BLOOD BY AUTOMATED COUNT: 13.5 % (ref 12.3–15.4)
FERRITIN SERPL-MCNC: 101.1 NG/ML (ref 13–150)
GFR SERPL CREATININE-BSD FRML MDRD: 129 ML/MIN/1.73
GLOBULIN UR ELPH-MCNC: 3.3 GM/DL
GLUCOSE SERPL-MCNC: 114 MG/DL (ref 65–99)
HCT VFR BLD AUTO: 39.8 % (ref 34–46.6)
HGB BLD-MCNC: 12.8 G/DL (ref 12–15.9)
IMM GRANULOCYTES # BLD AUTO: 0.03 10*3/MM3 (ref 0–0.05)
IMM GRANULOCYTES NFR BLD AUTO: 0.3 % (ref 0–0.5)
IRON 24H UR-MRATE: 78 MCG/DL (ref 37–145)
IRON SATN MFR SERPL: 22 % (ref 20–50)
LYMPHOCYTES # BLD AUTO: 4.37 10*3/MM3 (ref 0.7–3.1)
LYMPHOCYTES NFR BLD AUTO: 42.9 % (ref 19.6–45.3)
MCH RBC QN AUTO: 29.7 PG (ref 26.6–33)
MCHC RBC AUTO-ENTMCNC: 32.2 G/DL (ref 31.5–35.7)
MCV RBC AUTO: 92.3 FL (ref 79–97)
MONOCYTES # BLD AUTO: 0.53 10*3/MM3 (ref 0.1–0.9)
MONOCYTES NFR BLD AUTO: 5.2 % (ref 5–12)
NEUTROPHILS NFR BLD AUTO: 49.9 % (ref 42.7–76)
NEUTROPHILS NFR BLD AUTO: 5.09 10*3/MM3 (ref 1.7–7)
NRBC BLD AUTO-RTO: 0 /100 WBC (ref 0–0.2)
PLATELET # BLD AUTO: 310 10*3/MM3 (ref 140–450)
PMV BLD AUTO: 9.5 FL (ref 6–12)
POTASSIUM SERPL-SCNC: 3.7 MMOL/L (ref 3.5–5.2)
PROT SERPL-MCNC: 7.5 G/DL (ref 6–8.5)
RBC # BLD AUTO: 4.31 10*6/MM3 (ref 3.77–5.28)
SODIUM SERPL-SCNC: 138 MMOL/L (ref 136–145)
TIBC SERPL-MCNC: 347 MCG/DL (ref 298–536)
TRANSFERRIN SERPL-MCNC: 233 MG/DL (ref 200–360)
WBC NRBC COR # BLD: 10.19 10*3/MM3 (ref 3.4–10.8)

## 2022-01-17 PROCEDURE — 82728 ASSAY OF FERRITIN: CPT

## 2022-01-17 PROCEDURE — 36415 COLL VENOUS BLD VENIPUNCTURE: CPT

## 2022-01-17 PROCEDURE — 82746 ASSAY OF FOLIC ACID SERUM: CPT

## 2022-01-17 PROCEDURE — 83540 ASSAY OF IRON: CPT

## 2022-01-17 PROCEDURE — 86300 IMMUNOASSAY TUMOR CA 15-3: CPT

## 2022-01-17 PROCEDURE — 82378 CARCINOEMBRYONIC ANTIGEN: CPT

## 2022-01-17 PROCEDURE — 80053 COMPREHEN METABOLIC PANEL: CPT

## 2022-01-17 PROCEDURE — 84466 ASSAY OF TRANSFERRIN: CPT

## 2022-01-17 PROCEDURE — 85025 COMPLETE CBC W/AUTO DIFF WBC: CPT

## 2022-01-17 PROCEDURE — 82607 VITAMIN B-12: CPT

## 2022-01-18 LAB
CANCER AG27-29 SERPL-ACNC: 17 U/ML (ref 0–38.6)
CEA SERPL-MCNC: 1.07 NG/ML
FOLATE SERPL-MCNC: 16.4 NG/ML (ref 4.78–24.2)
FOLATE SERPL-MCNC: 17.4 NG/ML (ref 4.78–24.2)
VIT B12 BLD-MCNC: 422 PG/ML (ref 211–946)
VIT B12 BLD-MCNC: 441 PG/ML (ref 211–946)

## 2022-01-19 LAB
ALBUMIN SERPL-MCNC: 4.4 G/DL (ref 3.5–5.2)
ALBUMIN/GLOB SERPL: 1.7 G/DL
ALP SERPL-CCNC: 98 U/L (ref 39–117)
ALT SERPL-CCNC: 19 U/L (ref 1–33)
AST SERPL-CCNC: 22 U/L (ref 1–32)
BASOPHILS # BLD AUTO: NORMAL 10*3/UL
BILIRUB SERPL-MCNC: 0.5 MG/DL (ref 0–1.2)
BUN SERPL-MCNC: 17 MG/DL (ref 6–20)
BUN/CREAT SERPL: 27.9 (ref 7–25)
CALCIUM SERPL-MCNC: 9.6 MG/DL (ref 8.6–10.5)
CHLORIDE SERPL-SCNC: 103 MMOL/L (ref 98–107)
CHOLEST SERPL-MCNC: 183 MG/DL (ref 0–200)
CO2 SERPL-SCNC: 29.2 MMOL/L (ref 22–29)
CREAT SERPL-MCNC: 0.61 MG/DL (ref 0.57–1)
EOSINOPHIL # BLD AUTO: NORMAL 10*3/UL
EOSINOPHIL NFR BLD AUTO: NORMAL %
GLOBULIN SER CALC-MCNC: 2.6 GM/DL
GLUCOSE SERPL-MCNC: 85 MG/DL (ref 65–99)
HBA1C MFR BLD: NORMAL %
HCT VFR BLD AUTO: NORMAL %
HDLC SERPL-MCNC: 64 MG/DL (ref 40–60)
HGB BLD-MCNC: NORMAL G/DL
LDLC SERPL CALC-MCNC: 106 MG/DL (ref 0–100)
LYMPHOCYTES # BLD AUTO: NORMAL 10*3/UL
LYMPHOCYTES NFR BLD AUTO: NORMAL %
MONOCYTES NFR BLD AUTO: NORMAL %
NEUTROPHILS NFR BLD AUTO: NORMAL %
PLATELET # BLD AUTO: NORMAL 10*3/UL
POTASSIUM SERPL-SCNC: 5.1 MMOL/L (ref 3.5–5.2)
PROT SERPL-MCNC: 7 G/DL (ref 6–8.5)
RBC # BLD AUTO: NORMAL 10*6/UL
REQUEST PROBLEM: NORMAL
SODIUM SERPL-SCNC: 141 MMOL/L (ref 136–145)
TRIGL SERPL-MCNC: 72 MG/DL (ref 0–150)
TSH SERPL DL<=0.005 MIU/L-ACNC: 2.1 UIU/ML (ref 0.27–4.2)
VLDLC SERPL CALC-MCNC: 13 MG/DL (ref 5–40)
WBC # BLD AUTO: NORMAL 10*3/MM3

## 2022-01-20 NOTE — PROGRESS NOTES
[facility] HEMATOLOGY AND ONCOLOGY  [address]  [place] [id number] [phone]    Patient Name: Chasidy Tejada  Encounter Date: 01/24/2022  YOB: 1960  Patient Number: [id number]       REASON FOR VISIT: Chasidy Tejada is a 61-year-old female who returns in follow-up of stage IA left breast carcinoma, ER/AR and HER-2/lamberto positive.  She underwent left breast lumpectomy with SNB, 01/10/2020 and was started on adjuvant Arimidex beginning 02/03/2020 through 03/11/2020 before resumption on 08/19/202 through 08/30/2021 - stopped due to intolerance (temper volatility, irritability).  She completed adjuvant paclitaxel (week 12/12, 05/27/2020), and received adjuvant trastuzumab (Herceptin), having completed cycle 17, 02/10/2021 (11.5 months).  Adjuvant radiation was administered on 06/22/2020 through 08/06/2020 (6040 cGy in 33 fx). She is here alone (previously with her spouse).  She was started on adjuvant Femara at her last visit, 09/27/2021.    I have reviewed the HPI and verified with the patient the accuracy of it. No changes to interval history since the information was documented. Tommy Wheat MD 01/24/22     DIAGNOSTIC ABNORMALITIES:          1.   12/13/2019- mammogram screening.  Impression: Stellate lesion outer left breast.  Spot compression and ultrasound suggested.          2.   12/27/2019- diagnostic mammogram.  Impression: Irregular density upper outer left breast.  Biopsy suggested.          3.   12/27/2019- left breast ultrasound.  Impression: Suspicious 6 x 5 x 8 mm density left breast approximately 8 cm from the nipple.  Biopsy suggested.  BI-RADS Category 4B.          4.   01/03/2020-ultrasound-guided breast biopsy.  Impression: Appropriate sampling of the 8 mm irregular hypoechoic nodule in the left breast at 1:00 in the posterior depth/axillary tail region.  Final diagnosis: Left breast at 1 o'clock  position, core biopsies: A.invasive mammary carcinoma of no special type (ductal, not otherwise specified), grade 2, at least 4.9 mm in greatest extent.  B.associated low-grade ductal carcinoma in situ.  % positive, WA 25% positive, HER-2/lamberto-2+ (IHC)/FISH- a focal positive score is present in an area corresponding to approximately 15% of the tumor (signal ratio: 2.9).           5.   02/03/2020- CMP normal with .  CBC normal.           6.   02/03/2020-bone scan ([facility]).  Impression: Degenerative joint changes.  No evidence of bony metastasis.           7.   02/03/2020-CT scans, head chest, abdomen, pelvis-impression: No abnormal intracranial enhancement to suggest intracranial metastasis/abnormalities.  No evidence of intrathoracic metastasis.  Stable 5 to 6 mm right lower lobe nodule of the right hemidiaphragm unchanged from 2/14/2011.  No convincing evidence of intra-abdominal or pelvic metastasis.  Hepatic cysts.  Fatty attenuated benign focus within the body of the pancreas visualized, 12/14/2011.  Previous hysterectomy, cholecystectomy and appendectomy.           8.   02/14/2020-echocardiogram.  Impression: LV systolic function normal (EF equals 70%).  Normal LV and RV size thickness and function.  Normal LA and RA size.  Normal cardiac valves.  No pericardial effusion.           9.  02/27/2020- CMP normal with .  CBC normal.  Iron 65, iron saturation 18%, ferritin 134, B12 745, folate > 20, CEA 1.76, CA-27-29 14.2.          10.  03/04/2020- repeat FISH for HER-2/lamberto (from tumor block, 01/10/2020).  No tumor on specimen.          11.  06/23/2020- DEXA scan.  Impression: Normal bone density.  No lower than 1 standard deviation below the mean for young adult woman.    PREVIOUS INTERVENTIONS:           1.   01/10/2020- left breast lumpectomy with left axillary sentinel node biopsy.  Final diagnosis: 1.left breast mass, lumpectomy: Invasive and in situ ductal carcinoma.  Tumor site-not specified.   "Histologic type: Invasive carcinoma of no special type (invasive ductal carcinoma, not otherwise specified).  Overall tumor grade: Grade 1.  Tumor size: Greatest dimension of largest invasive focus: 5 mm.  Tumor focality: Single focus of invasive carcinoma.  Ductal carcinoma in situ (DCIS): Present.  Negative for extensive intraductal component (EIC).  Size (extent) CIS: Cannot be determined: Microscopic.  Architectural pattern: Cribriform/solid.  Nuclear grade: 2 (intermediate).  Necrosis: Present, central (expansive \"comedo) necrosis).  Lobular carcinoma in situ (LCIS): No LCIS in specimen.  Lymphovascular invasion: Not identified.  Dermal lymphovascular invasion: No skin present.  Margins: Uninvolved by invasive carcinoma.  Lymph nodes: Uninvolved by tumor cells.  Regional lymph nodes: 1.  Number of lymph nodes examined: 1.  Number of sentinel nodes examined: 1.  Pathologic stage classification (pTNM, AJCC eighth edition): pT1a,(sn), pN0.  % positive, ME 25% positive, HER-2 2+ (equivocal).           2.   Adjuvant Arimidex 1 mg p.o. daily beginning 02/03/2020 through 03/11/2020: resumed 08/19/2020 through 08/30/2021 - stopped due to intolerance (temper volatility, irritability).           3.   Adjuvant paclitaxel beginning 03/11/2020 through 05/27/2020 (12/12 weekly doses)           4.   Adjuvant trastuzumab every 3 weeks - beginning 03/11/2020 through 02/10/2021 (17 cycles)           5.   Adjuvant radiation to the left breast (5040 cGy with 1000 cGy boost to the tumor bed for a total of 6040 cGy/33 fractions) from 06/22/2020 through 08/06/2020.           6.   Adjuvant Femara 2.5 mg po daily beginning 09/27/2021    LABS    Lab Results - Last 18 Months   Lab Units 01/17/22  1435 01/17/22  1013 10/18/21  1412 09/20/21  1421 08/17/21  1326 05/18/21  1053 02/10/21  1028 02/10/21  1028   HEMOGLOBIN g/dL 12.8 CANCELED 13.5 13.7 13.6 14.1   < > 13.2   HEMATOCRIT % 39.8 CANCELED 40.6 40.4 41.2 42.5   < > 40.2   MCV " fL 92.3  --  92.1 90.8 91.2 89.3  --  89.7   WBC 10*3/mm3 10.19 CANCELED 5.93 7.25 7.63 6.85   < > 6.16   RDW % 13.5  --  13.0 13.2 13.2 13.1  --  13.0   MPV fL 9.5  --  9.9 10.3 10.0 10.3  --  9.6   PLATELETS 10*3/mm3 310 CANCELED 345 335 320 309   < > 290   IMM GRAN % % 0.3  --  0.2 0.1 0.1 0.3  --  0.2   NEUTROS ABS 10*3/mm3 5.09  --  2.40 3.64 3.52 3.23  --  3.31   LYMPHS ABS 10*3/mm3 4.37* CANCELED 3.00 3.18* 3.64* 3.18*   < > 2.26   MONOS ABS 10*3/mm3 0.53  --  0.39 0.35 0.37 0.32  --  0.46   EOS ABS 10*3/mm3 0.12 CANCELED 0.08 0.04 0.04 0.04   < > 0.08   BASOS ABS 10*3/mm3 0.05 CANCELED 0.05 0.03 0.05 0.06   < > 0.04   IMMATURE GRANS (ABS) 10*3/mm3 0.03  --  0.01 0.01 0.01 0.02  --  0.01   NRBC /100 WBC 0.0  --  0.0 0.0 0.0 0.0  --  0.0    < > = values in this interval not displayed.       Lab Results - Last 18 Months   Lab Units 01/17/22  1435 01/17/22  1013 10/18/21  1412 09/20/21  1421 08/17/21  1326 05/18/21  1054 02/10/21  1028 02/10/21  1028   GLUCOSE mg/dL 114* 85 89 87 89 93   < > 94   SODIUM mmol/L 138 141 139 141 140 139   < > 138   POTASSIUM mmol/L 3.7 5.1 4.1 3.8 3.6 4.6   < > 4.0   TOTAL CO2 mmol/L  --  29.2*  --   --   --   --   --   --    CO2 mmol/L 28.0  --  28.0 26.0 25.0 28.0   < > 26.0   CHLORIDE mmol/L 102 103 101 103 101 99   < > 102   ANION GAP mmol/L 8.0  --  10.0 12.0 14.0 12.0  --  10.0   CREATININE mg/dL 0.49* 0.61 0.49* 0.49* 0.49* 0.55*   < > 0.51*   BUN mg/dL 18 17 9 8 12 9   < > 14   BUN / CREAT RATIO  36.7* 27.9* 18.4 16.3 24.5 16.4   < > 27.5*   CALCIUM mg/dL 9.2 9.6 9.7 9.8 10.2 10.3   < > 9.9   EGFR IF NONAFRICN AM mL/min/1.73 129 100 129 129 129 114   < > 124   ALK PHOS U/L 96 98 108 110 112 108   < > 90   TOTAL PROTEIN g/dL 7.5  --  8.1 7.7 8.0 7.7  --  7.5   ALT (SGPT) U/L 19 19 22 20 25 29   < > 23   AST (SGOT) U/L 17 22 25 26 22 30   < > 21   BILIRUBIN mg/dL 0.4 0.5 0.5 0.6 0.8 0.6   < > 0.5   ALBUMIN g/dL 4.20 4.40 4.60 4.60 5.00 4.90   < > 4.20   GLOBULIN gm/dL 3.3  --   3.5 3.1 3.0 2.8  --  3.3    < > = values in this interval not displayed.       Lab Results - Last 18 Months   Lab Units 01/17/22  1435 10/18/21  1412 09/20/21  1421 08/17/21  1326 05/18/21  1053 02/10/21  1028   CEA ng/mL 1.07 1.51 1.83 1.74 1.32 1.63       Lab Results - Last 18 Months   Lab Units 01/17/22  1435 01/17/22  1013 10/18/21  1412 09/20/21  1421 08/17/21  1326 05/18/21  1054 05/18/21  1053 02/10/21  1028 01/20/21  1024 12/30/20  1019   IRON mcg/dL 78  --  72 89 110 103  --  74   < >  --    TIBC mcg/dL 347  --  375 389 425 407  --  308   < >  --    IRON SATURATION % 22  --  19* 23 26 25  --  24   < >  --    FERRITIN ng/mL 101.10  --  186.90* 137.40 226.40* 171.90*  --  157.50*   < >  --    TSH uIU/mL  --  2.100  --   --   --   --   --   --   --  1.270   FOLATE ng/mL 17.40  16.40  --  >20.00 16.30 14.50  --  >20.00 >20.00  --   --     < > = values in this interval not displayed.         PAST MEDICAL HISTORY:  ALLERGIES:  Allergies   Allergen Reactions   • Demerol [Meperidine] Hives   • Morphine And Related Hives     CURRENT MEDICATIONS:  Outpatient Encounter Medications as of 1/24/2022   Medication Sig Dispense Refill   • calcium carbonate-vitamin d (Calcium 600+D) 600-400 MG-UNIT per tablet Take 1 tablet by mouth 2 (Two) Times a Day. 60 tablet 3   • conjugated estrogens (Premarin) 0.625 MG/GM vaginal cream Apply 1/2 gram using finger two nights weekly.  OK to use every night for first two weeks 30 g 11   • letrozole (FEMARA) 2.5 MG tablet Take 1 tablet by mouth Daily. 30 tablet 11   • Multiple Vitamins-Minerals (MULTIVITAMIN ADULT PO) Take 1 tablet by mouth Daily.     • ondansetron (ZOFRAN) 4 MG tablet Take 2 tablets by mouth Every 8 (Eight) Hours As Needed for Nausea or Vomiting. 30 tablet 1   • polyethylene glycol (MIRALAX) packet Take 17 g by mouth Daily.     • methylPREDNISolone (MEDROL) 4 MG dose pack take as directed per package instructions 21 tablet 0     No facility-administered encounter  medications on file as of 1/24/2022.     Adult illnesses:  Colon polyps  Obesity  Spinal stenosis neck   Herniated cervical disc without myelopathy  Borderline blood pressure  History of dysphagia    Past surgeries:  Anterior cervical discectomy w fusion, 09/2018  Appendectomy  BSO/GANGA  Cholecystectomy  Colonoscopy, 02/10/2017  BTL  Tonsillectomy  Breast biopsy  Left partial mastectomy, 01/10/2020  03/05/2020 - Mediport placement per Dr. Edmond  03/04/2021-Mediport removal per Dr. Edmond.    ADULT ILLNESSES:  Patient Active Problem List   Diagnosis Code   • Family history of colon cancer Z80.0   • Colon polyps K63.5   • Laryngopharyngeal reflux K21.9   • Pharyngoesophageal dysphagia R13.14   • Class 1 obesity due to excess calories without serious comorbidity with body mass index (BMI) of 31.0 to 31.9 in adult E66.09, Z68.31   • Spinal stenosis in cervical region M48.02   • Cervical radiculopathy M54.12   • Herniated cervical disc without myelopathy M50.20   • Borderline blood pressure R03.0   • Invasive ductal carcinoma of breast, female, left (HCC) C50.912   • Former smoker Z87.891   • S/P lumpectomy, left breast Z98.890   • S/P lymph node biopsy Z98.890   • On antineoplastic chemotherapy Z79.899   • Estrogen receptor positive status (ER+) Z17.0   • History of radiation therapy Z92.3     SURGERIES:  Past Surgical History:   Procedure Laterality Date   • ANTERIOR CERVICAL DISCECTOMY W/ FUSION N/A 9/7/2018    Procedure: CERVICAL DISCECTOMY ANTERIOR WITH FUSION C6-7;  Surgeon: Chris Mcfadden MD;  Location: [facility] OR;  Service: Neurosurgery   • APPENDECTOMY     • BILATERAL SALPINGO OOPHORECTOMY     • BREAST BIOPSY     • BREAST LUMPECTOMY     • CERVICAL CORPECTOMY N/A 9/7/2018    Procedure: CERVICAL CORPECTOMY C6-7;  Surgeon: Chris Mcfadden MD;  Location: [facility] OR;  Service: Neurosurgery   • CHOLECYSTECTOMY     • COLONOSCOPY N/A 2/10/2017    One 8mm adenomatous polyp in the rectum; The examination was  otherwise normal on direct and retroflexion views; A tattoo was seen in the rectum-A post-polypectomy scar was found at the tattoo site-There was no evidence of residual polyp tissue; Repeat 5 years   • COLONOSCOPY  09/05/2012    One 13mm polyp in the rectum-Marked with Maxine ink-path shows Ganglioneuroma; The examination was otherwise normal; Repeat 4 years   • HYSTERECTOMY  1998    Had hysterectomy for painful periods and bleeding. (Dr. Patel) TLH w/ BSO   • LAPAROSCOPIC TUBAL LIGATION     • MASTECTOMY W/ SENTINEL NODE BIOPSY Left 1/10/2020    Procedure: LEFT NEEDLE DIRECTED ULTRASOUND GUIDED PARTIAL MASTECTOMY WITH SENTINEL LYMPH NODE BIOPSY, INJECTION AND SCAN, RADIOLOGIST WILL INJECT;  Surgeon: Flor Edmond MD;  Location:  [facility] OR;  Service: General   • TONSILLECTOMY     • VENOUS ACCESS DEVICE (PORT) INSERTION N/A 3/5/2020    Procedure: INSERTION VENOUS ACCESS DEVICE EXCISION SKIN LESION X 2 CHEST AND ABDOMEN;  Surgeon: Flor Edmond MD;  Location:  [facility] OR;  Service: General;  Laterality: N/A;   • VENOUS ACCESS DEVICE (PORT) REMOVAL Left 3/4/2021    Procedure: REMOVAL OF SINGLE LUMEN PORT;  Surgeon: Flor Edmond MD;  Location:  [facility] OR;  Service: General;  Laterality: Left;     HEALTH MAINTENANCE ITEMS:  Health Maintenance Due   Topic Date Due   • COVID-19 Vaccine (3 - Moderna risk 4-dose series) 12/17/2021       <no information>  Last Completed Colonoscopy          COLORECTAL CANCER SCREENING (COLONOSCOPY - Every 5 Years) Next due on 2/10/2022    02/10/2017  COLONOSCOPY    02/10/2017  Surgical Procedure: COLONOSCOPY    09/05/2012  SCANNED - COLONOSCOPY              Immunization History   Administered Date(s) Administered   • COVID-19 (MODERNA) 1st, 2nd, 3rd Dose Only 01/09/2021, 02/06/2021   • COVID-19 (MODERNA) BOOSTER 11/19/2021   • Flu Vaccine Intradermal Quad 18-64YR 10/07/2021   • Influenza, Unspecified 11/18/2017, 11/05/2018, 10/15/2019, 10/27/2020   • Shingrix 10/15/2021, 12/16/2021  "  • Tdap 08/29/2018     Last Completed Mammogram          Ordered - MAMMOGRAM (Yearly) Ordered on 9/27/2021 01/06/2022  Mammo Diagnostic Digital Tomosynthesis Bilateral With CAD    12/17/2020  Mammo Diagnostic Digital Tomosynthesis Bilateral With CAD    12/27/2019  Mammo Diagnostic Digital Tomosynthesis Left With CAD    12/13/2019  Mammo Screening Digital Tomosynthesis Bilateral With CAD    06/01/2018  Mammo Screening Digital Tomosynthesis Bilateral With CAD    Only the first 5 history entries have been loaded, but more history exists.                  FAMILY HISTORY:  Family History   Problem Relation Age of Onset   • Colon cancer Maternal Grandmother         Unknown age   • Cancer Father    • Heart disease Father    • Diabetes Mother    • Hypertension Mother    • Stroke Mother    • Breast cancer Sister    • No Known Problems Daughter    • No Known Problems Sister    • Colon polyps Neg Hx    • Esophageal cancer Neg Hx    • Liver cancer Neg Hx    • Liver disease Neg Hx    • Rectal cancer Neg Hx    • Stomach cancer Neg Hx    • Ovarian cancer Neg Hx    • BRCA 1/2 Neg Hx    • Endometrial cancer Neg Hx      SOCIAL HISTORY:  Social History     Socioeconomic History   • Marital status:    • Number of children: 1   Tobacco Use   • Smoking status: Never Smoker   • Smokeless tobacco: Never Used   • Tobacco comment: \"i never really smoked\"   Vaping Use   • Vaping Use: Never used   Substance and Sexual Activity   • Alcohol use: Not Currently   • Drug use: No   • Sexual activity: Defer       REVIEW OF SYSTEMS:  Review of Systems   Constitutional: Negative.         She manages her ADLs' including chores, errands, driving.  Is still working full time.  Has been active.  Is up and about > 75% of the time    Femara tolerance:  Feels much better since stopping the Arimidex.  \"I'm not in a dark place, with no depression, not irritable and not grouchy.\"  No other problems.  No new arthralgias.  Mild to moderate hot flashes. " " \"Not bad.\"   HENT: Negative.    Eyes: Negative.    Respiratory: Negative.    Cardiovascular: Negative.    Gastrointestinal: Negative for diarrhea, nausea (\"not anymore.\") and GERD (\"Normal.\" Still on omeprazole as needed, \"not often.\").   Endocrine: Positive for heat intolerance (Lingering vasomotor changes.  \"Off and on through the day but not bad so I can live with it.\"  ).   Genitourinary: Negative.    Musculoskeletal: Negative for arthralgias (knees, and hips \"been ok.\" Does not impede her activities.).   Allergic/Immunologic: Negative.    Neurological: Positive for numbness (paresthesias of the right toes.  Says not worse. The symptoms of the left foot and hands have resolved).   Hematological: Negative.    Psychiatric/Behavioral: Negative for depressed mood (Resolution of irritability and grouchiness since stopping Arimidex last 08/31/2021.). The patient is not nervous/anxious (\"not bad\").        /80   Pulse 79   Temp 97.1 °F (36.2 °C)   Resp 16   Ht 152.4 cm (60\")   Wt 71.3 kg (157 lb 3.2 oz)   LMP  (LMP Unknown)   SpO2 97%   Breastfeeding No   BMI 30.70 kg/m²  Body surface area is 1.68 meters squared.  Pain Score    01/24/22 1440   PainSc: 0-No pain       Physical Exam:  Physical Exam   Constitutional: She is oriented to person, place, and time. She appears well-developed and well-nourished. No distress.   Pleasant, heavy set, cooperative, modestly kept female.  Appears her age.  ECOG 0.      She has lost 4 pounds (had gained 3 pounds  At her prior visit) in the interval.   HENT:   Head: Normocephalic and atraumatic.   Mouth/Throat: No oropharyngeal exudate.   Hair has regrown    Wearing a surgical mask   Eyes: Pupils are equal, round, and reactive to light. Conjunctivae are normal. No scleral icterus.   Neck: No JVD present. No tracheal deviation present. No thyromegaly present.   Cardiovascular: Normal rate, regular rhythm and normal heart sounds. Exam reveals no friction rub.   No murmur " heard.  Pulmonary/Chest: Effort normal and breath sounds normal. No stridor. No respiratory distress. She has no wheezes. She has no rales.   Port site in the left upper chest is healed.  The device has been removed.    Breasts:   Deferred as she just had one done by her GYN on 01/13/2022 and recent mammogram.    Prior exam:The left breast lumpectomy and sentinel node biopsy site in the left axilla are healed.  There is no incisional tenderness in either site.  The radiation associated skin changes were barely evident.  The rest of the breast was without obvious nodularity skin changes, no nipple discharge.  Right breast without masses, skin changes nor nipple discharge.     Abdominal: Soft. Bowel sounds are normal. She exhibits no distension and no mass. There is no abdominal tenderness. There is no rebound and no guarding.   Musculoskeletal: Normal range of motion. No deformity.   Lymphadenopathy:     She has no cervical adenopathy.        Right cervical: No superficial cervical, no deep cervical and no posterior cervical adenopathy present.       Left cervical: No superficial cervical, no deep cervical and no posterior cervical adenopathy present.        Right: No supraclavicular adenopathy present.        Left: No supraclavicular adenopathy present.   Neurological: She is alert and oriented to person, place, and time. She displays normal reflexes. No cranial nerve deficit. She exhibits normal muscle tone. Coordination normal.   Skin: Skin is warm and dry. No rash noted. No erythema. No pallor.   Psychiatric: Her behavior is normal. Judgment and thought content normal.   Vitals reviewed.    ASSESSMENT:   1.  Invasive and in situ ductal carcinoma  left breast.                 Stage:  IA (pT1a, pN0, G2) ER  100% positive, NE  25%, HER-2/lamberto - 2+ (IHC)/FISH -a focal positive score is present in an area corresponding to approximately 15% of the tumor (signal ratio: 2.9).                 Tumor [place]:  5 mm.  Tumor  focality: Single focus of invasive carcinoma.  No EIC nor LCIS.  Lymphovascular invasion: Not identified.  Dermal lymphovascular invasion: No skin present.  Microcalcifications: Not identified.  Margins uninvolved by invasive carcinoma.  Lymph nodes: Number of lymph nodes examined: 1 (0/1).                 Tumor Status:    -- Underwent lumpectomy and SNB, 01/10/2020.   --Completed adjuvant paclitaxel, 05/27/2020; adjuvant Herceptin, 02/10/2021; adjuvant radiation, 08/06/2020 (above)  -- Adjuvant Arimidex since 02/03/2020 through 09/27/2021-stopped due to depression, and irritability.  -- 12/17/2020-Mammogram- [id number]  --Adjuvant Femara 2.5 mg p.o. daily beginning 09/28/2021 through present  -- 01/06/2022- mammogram. No mammographic evidence of malignancy. BI-RADS Category 2. Benign.          2.   Obesity.  Moderate (BMI 30 - from 34)        3.   Spinal stenosis neck, quiescent         4.   Herniated cervical disc without myelopathy        5.   Anemia, likely chemo associated.    --Resolved, Hgb 12.8, 01/17/2022 (prior: Hgb 11.5-13.8)        6.   GERD, quiescent        7.   Mood changes, to include mood lability, depression, irritability and grouchiness.  Resolved since stopping Arimidex.       RECOMMENDATIONS:         1.   Apprised of the labs, 01/17/2022.  Normal CBC, normal CMP, normal CEA, normal CA-27-29, repleted B12/folate, repleted serum iron, iron saturation 22% (from 19%; 23%; 25%; 24%), repleted ferritin (101; from 186; 137; 226; 171; 157; 138; 141; 216).          2.    Femara tolerance discussed.  Doing well so far with no mood lability and no new arthralgias.  Feels she can press on with this.        3.    Apprised of mammogram, 01/06/2022 (above). YUDY        4.    Previously noted that we are unable to repeat FISH testing for HER-2 on the tumor specimen from 01/10/2020 -pathology block did not contain any more tumor - personally discussed/confirmed with Dr. Burgos of pathology.        5.   Care  previously discussed with Dr. Jaimes on 02/28/2020 after she saw the patient on 02/25/2020 (encounter reviewed).  Pathology was reviewed at [place] and discussed with the patient.  Recommendations/plan: Discussed that since her cancer is strongly ER positive and low histologic grade/low proliferative rate, and HER-2 on the biopsy was borderline recommended repeat HER-2 testing on her surgical pathology.  Defer to his negative proceed with only endocrine therapy and radiation.  If HER-2 is confirmed to be positive, recommend adjuvant weekly Taxol x12+ Herceptin every 3 weeks to complete 1 year of HER-2 directed therapy.  These plans were personally discussed with the patient (via telephone) on 02/28/2020 I also personally asked the pathologist (Dr. Radha Burgos) to send off the biopsy specimen for repeat FISH HER-2 testing.          6.   Reviewed NCCN guidelines version 3.2019 invasive breast cancer.  For tumors (=/> 5 mm), and pN0, recommend adjuvant endocrine therapy +/- adjuvant chemotherapy with trastuzumab (category 2B).                  7.  Rx:   · Oscal 600/400 po bid # 60 - OTC  · Femara 2.5 mg po qd # 30 - 11 RF  · Stay off B12 supplements           8.  Previously discussed the rationale for adjuvant hormonal manipulation with AIs (see above) and potential toxicities of AI therapy are discussed (to include but not limited to: Hot flashes, asthenia, pain, arthralgias, arthritis, nausea/vomiting, headache, pharyngitis, depression, rash, hypertension, lymphedema, insomnia, edema, weight gain, dyspnea, abdominal pain, constipation, hyperlipidemia, osteoporosis, fractures, cough, bone pain, diarrhea, breast pain, paresthesias, infections, cataracts, myalgias, thromboembolism, angina, Lyles-Yung syndrome, erythema multiforme, anemia, leukopenia, stroke, myocardial infarction, osteoporosis, fractures). Questions are answered. She agrees to press on..             9.  Return to the [place] office with  pre-office serum iron, iron saturation, ferritin B12, folate, CMP, CEA, CA 27-29 and CBC and differential in 16 weeks.  Will order surveillance DEXA scan due in June at that visit.    .     MEDICAL DECISION MAKING: Moderate complexity   AMOUNT OF DATA: Moderate to Extensive     I spent ~30 minutes caring for Chasidy on this date of service. This time includes time spent by me in the following activities: preparing for the visit, reviewing tests, performing a medically appropriate examination and/or evaluation, counseling and educating the patient/family/caregiver, ordering medications, tests, or procedures and documenting information in the medical record      Cc:  Abigail Jaimes MD- [facility]          MD Kailash Sawant MD Robert Learch, MD

## 2022-01-24 ENCOUNTER — OFFICE VISIT (OUTPATIENT)
Dept: ONCOLOGY | Facility: CLINIC | Age: 62
End: 2022-01-24

## 2022-01-24 VITALS
RESPIRATION RATE: 16 BRPM | HEIGHT: 60 IN | BODY MASS INDEX: 30.86 KG/M2 | DIASTOLIC BLOOD PRESSURE: 80 MMHG | OXYGEN SATURATION: 97 % | WEIGHT: 157.2 LBS | TEMPERATURE: 97.1 F | HEART RATE: 79 BPM | SYSTOLIC BLOOD PRESSURE: 128 MMHG

## 2022-01-24 DIAGNOSIS — C50.912 INVASIVE DUCTAL CARCINOMA OF BREAST, FEMALE, LEFT: Primary | ICD-10-CM

## 2022-01-24 PROCEDURE — 99214 OFFICE O/P EST MOD 30 MIN: CPT | Performed by: INTERNAL MEDICINE

## 2022-01-25 ENCOUNTER — OFFICE VISIT (OUTPATIENT)
Dept: GASTROENTEROLOGY | Facility: CLINIC | Age: 62
End: 2022-01-25

## 2022-01-25 VITALS
WEIGHT: 155 LBS | DIASTOLIC BLOOD PRESSURE: 74 MMHG | HEIGHT: 60 IN | OXYGEN SATURATION: 98 % | TEMPERATURE: 97.3 F | BODY MASS INDEX: 30.43 KG/M2 | HEART RATE: 89 BPM | SYSTOLIC BLOOD PRESSURE: 126 MMHG

## 2022-01-25 DIAGNOSIS — Z80.0 FAMILY HISTORY OF COLON CANCER: ICD-10-CM

## 2022-01-25 DIAGNOSIS — Z86.010 HISTORY OF ADENOMATOUS POLYP OF COLON: Primary | ICD-10-CM

## 2022-01-25 PROBLEM — Z86.0101 HISTORY OF ADENOMATOUS POLYP OF COLON: Status: ACTIVE | Noted: 2022-01-25

## 2022-01-25 PROCEDURE — S0285 CNSLT BEFORE SCREEN COLONOSC: HCPCS | Performed by: NURSE PRACTITIONER

## 2022-01-25 NOTE — PROGRESS NOTES
Chief Complaint   Patient presents with   • Colon Cancer Screening     Pt presents today for colon recall-last colon was 2/10/2017; Personal history of adenomatous polyps; Family history of colon cancer     Subjective   HPI  Chasidy Tejada is a 61 y.o. female who presents as a referral for preventative maintenance. She has no complaints of nausea or vomiting. No change in bowels. No wt loss. No BRBPR. No melena. There is a family hx for colon cancer. No abdominal pain. There was no Cologuard screening this year.  The patient's last colonoscopy revealed adenomatous polyps performed 2/10/2017.  Past Medical History:   Diagnosis Date   • Acid reflux    • Asthma     long time ago    • Drug therapy    • Family history of colon cancer    • History of adenomatous polyp of colon    • History of breast cancer    • Hx of radiation therapy    • Panic attack    • Right arm weakness      Past Surgical History:   Procedure Laterality Date   • ANTERIOR CERVICAL DISCECTOMY W/ FUSION N/A 9/7/2018    Procedure: CERVICAL DISCECTOMY ANTERIOR WITH FUSION C6-7;  Surgeon: Chris Mcfadden MD;  Location:  [facility];  Service: Neurosurgery   • APPENDECTOMY     • BILATERAL SALPINGO OOPHORECTOMY     • BREAST BIOPSY     • BREAST LUMPECTOMY     • CERVICAL CORPECTOMY N/A 9/7/2018    Procedure: CERVICAL CORPECTOMY C6-7;  Surgeon: Chris Mcfadden MD;  Location:  [facility];  Service: Neurosurgery   • CHOLECYSTECTOMY     • COLONOSCOPY N/A 2/10/2017    One 8mm adenomatous polyp in the rectum; The examination was otherwise normal on direct and retroflexion views; A tattoo was seen in the rectum-A post-polypectomy scar was found at the tattoo site-There was no evidence of residual polyp tissue; Repeat 5 years   • COLONOSCOPY  09/05/2012    One 13mm polyp in the rectum-Marked with Maxine ink-path shows Ganglioneuroma; The examination was otherwise normal; Repeat 4 years   • HYSTERECTOMY  1998    Had hysterectomy for painful periods and bleeding. (  [place]) TLH w/ BSO   • LAPAROSCOPIC TUBAL LIGATION     • MASTECTOMY W/ SENTINEL NODE BIOPSY Left 1/10/2020    Procedure: LEFT NEEDLE DIRECTED ULTRASOUND GUIDED PARTIAL MASTECTOMY WITH SENTINEL LYMPH NODE BIOPSY, INJECTION AND SCAN, RADIOLOGIST WILL INJECT;  Surgeon: Flor Edmond MD;  Location:  [facility];  Service: General   • TONSILLECTOMY     • VENOUS ACCESS DEVICE (PORT) INSERTION N/A 3/5/2020    Procedure: INSERTION VENOUS ACCESS DEVICE EXCISION SKIN LESION X 2 CHEST AND ABDOMEN;  Surgeon: Flor Edmond MD;  Location:  [facility];  Service: General;  Laterality: N/A;   • VENOUS ACCESS DEVICE (PORT) REMOVAL Left 3/4/2021    Procedure: REMOVAL OF SINGLE LUMEN PORT;  Surgeon: Flor Edmond MD;  Location:  [facility];  Service: General;  Laterality: Left;       Current Outpatient Medications:   •  calcium carbonate-vitamin d (Calcium 600+D) 600-400 MG-UNIT per tablet, Take 1 tablet by mouth 2 (Two) Times a Day., Disp: 60 tablet, Rfl: 3  •  conjugated estrogens (Premarin) 0.625 MG/GM vaginal cream, Apply 1/2 gram using finger two nights weekly.  OK to use every night for first two weeks, Disp: 30 g, Rfl: 11  •  letrozole (FEMARA) 2.5 MG tablet, Take 1 tablet by mouth Daily., Disp: 30 tablet, Rfl: 11  •  Multiple Vitamins-Minerals (MULTIVITAMIN ADULT PO), Take 1 tablet by mouth Daily., Disp: , Rfl:   •  ondansetron (ZOFRAN) 4 MG tablet, Take 2 tablets by mouth Every 8 (Eight) Hours As Needed for Nausea or Vomiting., Disp: 30 tablet, Rfl: 1  •  polyethylene glycol (MIRALAX) packet, Take 17 g by mouth Daily., Disp: , Rfl:   •  Sodium Sulfate-Mag Sulfate-KCl 0703-079-713 MG tablet, Take 12 tablets by mouth Take As Directed., Disp: 24 tablet, Rfl: 0  Allergies   Allergen Reactions   • Demerol [Meperidine] Hives   • Morphine And Related Hives     Social History     Socioeconomic History   • Marital status:    • Number of children: 1   Tobacco Use   • Smoking status: Never Smoker   • Smokeless tobacco: Never  "Used   • Tobacco comment: \"i never really smoked\"   Vaping Use   • Vaping Use: Never used   Substance and Sexual Activity   • Alcohol use: Not Currently   • Drug use: No   • Sexual activity: Defer     Family History   Problem Relation Age of Onset   • Colon cancer Maternal Grandmother         Unknown age   • Cancer Father    • Heart disease Father    • Diabetes Mother    • Hypertension Mother    • Stroke Mother    • Breast cancer Sister    • No Known Problems Daughter    • No Known Problems Sister    • Colon polyps Neg Hx    • Esophageal cancer Neg Hx    • Liver cancer Neg Hx    • Liver disease Neg Hx    • Rectal cancer Neg Hx    • Stomach cancer Neg Hx    • Ovarian cancer Neg Hx    • BRCA 1/2 Neg Hx    • Endometrial cancer Neg Hx        REVIEW OF SYSTEMS  General: well appearing, no fever chills or sweats, no unexplained wt loss  HEENT: no acute visual or hearing disturbances  Cardiovascular: No chest pain or palpitations  Pulmonary: No shortness of breath, coughing, wheezing or hemoptysis  : No burning, urgency, hematuria, or dysuria  Musculoskeletal: No joint pain or stiffness  Peripheral: no edema  Skin: No lesions or rashes  Neuro: No dizziness, headaches, stroke, syncope  Endocrine: No hot or cold intolerances  Hematological: No blood dyscrasias    Objective   Vitals:    01/25/22 1307   BP: 126/74   BP Location: Right arm   Patient Position: Sitting   Cuff Size: Adult   Pulse: 89   Temp: 97.3 °F (36.3 °C)   TempSrc: Infrared   SpO2: 98%   Weight: 70.3 kg (155 lb)   Height: 152.4 cm (60\")         PHYSICAL EXAM  General: age appropriate well nourished well appearing, no acute distress  Head: normocephalic and atraumatic  Global assessment-supple  Neck-No JVD noted, no lymphadenopathy  Pulmonary-clear to auscultation bilaterally, normal respiratory effort  Cardiovascular-normal rate and rhythm, normal heart sounds, S1 and S2 noted  Abdomen-soft, non tender, non distended, normal bowel sounds all 4 quadrants, no " hepatosplenomegaly noted  Extremities-No clubbing cyanosis or edema  Neuro-Non focal, converses appropriately, awake, alert, oriented    Lab Results - Last 18 Months   Lab Units 01/17/22  1435 01/17/22  1013 10/18/21  1412 09/20/21  1421 08/17/21  1326 05/18/21  1054 02/10/21  1028 02/10/21  1028   GLUCOSE mg/dL 114* 85 89 87 89 93   < > 94   BUN mg/dL 18 17 9 8 12 9   < > 14   CREATININE mg/dL 0.49* 0.61 0.49* 0.49* 0.49* 0.55*   < > 0.51*   SODIUM mmol/L 138 141 139 141 140 139   < > 138   POTASSIUM mmol/L 3.7 5.1 4.1 3.8 3.6 4.6   < > 4.0   CHLORIDE mmol/L 102 103 101 103 101 99   < > 102   TOTAL CO2 mmol/L  --  29.2*  --   --   --   --   --   --    CO2 mmol/L 28.0  --  28.0 26.0 25.0 28.0   < > 26.0   TOTAL PROTEIN g/dL 7.5  --  8.1 7.7 8.0 7.7  --  7.5   ALBUMIN g/dL 4.20 4.40 4.60 4.60 5.00 4.90   < > 4.20   ALT (SGPT) U/L 19 19 22 20 25 29   < > 23   AST (SGOT) U/L 17 22 25 26 22 30   < > 21   ALK PHOS U/L 96 98 108 110 112 108   < > 90   BILIRUBIN mg/dL 0.4 0.5 0.5 0.6 0.8 0.6   < > 0.5   GLOBULIN gm/dL 3.3  --  3.5 3.1 3.0 2.8  --  3.3    < > = values in this interval not displayed.       Lab Results - Last 18 Months   Lab Units 01/17/22  1435 01/17/22  1013 10/18/21  1412 09/20/21  1421 08/17/21  1326 05/18/21  1053 02/10/21  1028 02/10/21  1028   HEMOGLOBIN g/dL 12.8 CANCELED 13.5 13.7 13.6 14.1   < > 13.2   HEMATOCRIT % 39.8 CANCELED 40.6 40.4 41.2 42.5   < > 40.2   MCV fL 92.3  --  92.1 90.8 91.2 89.3  --  89.7   WBC 10*3/mm3 10.19 CANCELED 5.93 7.25 7.63 6.85   < > 6.16   RDW % 13.5  --  13.0 13.2 13.2 13.1  --  13.0   MPV fL 9.5  --  9.9 10.3 10.0 10.3  --  9.6   PLATELETS 10*3/mm3 310 CANCELED 345 335 320 309   < > 290    < > = values in this interval not displayed.       Lab Results - Last 18 Months   Lab Units 01/17/22  1435 01/17/22  1013 10/18/21  1412 09/20/21  1421 08/17/21  1326 05/18/21  1054 05/18/21  1053 02/10/21  1028 01/20/21  1024 12/30/20  1019   IRON mcg/dL 78  --  72 89 110 103  --   74   < >  --    TIBC mcg/dL 347  --  375 389 425 407  --  308   < >  --    IRON SATURATION % 22  --  19* 23 26 25  --  24   < >  --    FERRITIN ng/mL 101.10  --  186.90* 137.40 226.40* 171.90*  --  157.50*   < >  --    TSH uIU/mL  --  2.100  --   --   --   --   --   --   --  1.270   FOLATE ng/mL 17.40  16.40  --  >20.00 16.30 14.50  --  >20.00 >20.00  --   --     < > = values in this interval not displayed.        Lab Results - Last 18 Months   Lab Units 01/17/22  1435   FERRITIN ng/mL 101.10       Imaging Results (Most Recent)     None        Assessment/Plan   Diagnoses and all orders for this visit:    1. History of adenomatous polyp of colon (Primary)  -     Case Request; Standing  -     Case Request    2. Family history of colon cancer  -     Case Request; Standing  -     Case Request    Other orders  -     Follow Anesthesia Guidelines / Protocol; Future  -     Obtain Informed Consent; Future  -     Sodium Sulfate-Mag Sulfate-KCl 7099-452-768 MG tablet; Take 12 tablets by mouth Take As Directed.  Dispense: 24 tablet; Refill: 0      COLONOSCOPY WITH ANESTHESIA (N/A)             All risks, benefits, alternatives, and indications of colonoscopy procedure have been discussed with the patient. Risks to include perforation of the colon requiring possible surgery or colostomy, risk of bleeding from biopsies or removal of colon tissue, possibility of missing a colon polyp or cancer, or adverse drug reaction.  Benefits to include the diagnosis and management of disease of the colon and rectum. Alternatives to include barium enema, radiographic evaluation, lab testing or no intervention. Pt verbalizes understanding and agrees.

## 2022-01-27 ENCOUNTER — TELEPHONE (OUTPATIENT)
Dept: OBSTETRICS AND GYNECOLOGY | Facility: CLINIC | Age: 62
End: 2022-01-27

## 2022-01-27 DIAGNOSIS — N39.0 URINARY TRACT INFECTION WITHOUT HEMATURIA, SITE UNSPECIFIED: Primary | ICD-10-CM

## 2022-01-27 NOTE — TELEPHONE ENCOUNTER
Pt called office with c/o UTI symptoms.  Pt was seen in office 01/13/22 but no urinary symptoms at that time.  Does pt need and office visit or can she leave a urine specimen today?  Please advise and I will update the pt.

## 2022-01-27 NOTE — TELEPHONE ENCOUNTER
She can just drop off a urine specimen.  Please find out how uncomfortable the patient is.  If she is not feeling too bad, I prefer to wait and choose an antibiotic based on sensitivity testing, but that will take two days.  If she is really pretty symptomatic, then I will go ahead and make an educated guess, and then switch it in two days if I need to.

## 2022-01-27 NOTE — TELEPHONE ENCOUNTER
Called and updated pt.  She will leave a urine specimen today.  Pt states she is uncomfortable and would like to start medication.  Pt advised medication might need changed once urine sensitivity is back  pt voiced understanding.

## 2022-01-31 DIAGNOSIS — N30.00 ACUTE CYSTITIS WITHOUT HEMATURIA: Primary | ICD-10-CM

## 2022-01-31 LAB
APPEARANCE UR: CLEAR
BACTERIA #/AREA URNS HPF: ABNORMAL /HPF
BACTERIA UR CULT: ABNORMAL
BACTERIA UR CULT: ABNORMAL
BILIRUB UR QL STRIP: NEGATIVE
CASTS URNS MICRO: ABNORMAL
COLOR UR: YELLOW
EPI CELLS #/AREA URNS HPF: ABNORMAL /HPF
GLUCOSE UR QL STRIP: NEGATIVE
HGB UR QL STRIP: ABNORMAL
KETONES UR QL STRIP: NEGATIVE
LEUKOCYTE ESTERASE UR QL STRIP: ABNORMAL
NITRITE UR QL STRIP: POSITIVE
OTHER ANTIBIOTIC SUSC ISLT: ABNORMAL
PH UR STRIP: 6.5 [PH] (ref 5–8)
PROT UR QL STRIP: NEGATIVE
RBC #/AREA URNS HPF: ABNORMAL /HPF
SP GR UR STRIP: 1.01 (ref 1–1.03)
UROBILINOGEN UR STRIP-MCNC: ABNORMAL MG/DL
WBC #/AREA URNS HPF: ABNORMAL /HPF

## 2022-01-31 RX ORDER — CIPROFLOXACIN 250 MG/1
250 TABLET, FILM COATED ORAL 2 TIMES DAILY
Qty: 10 TABLET | Refills: 0 | Status: SHIPPED | OUTPATIENT
Start: 2022-01-31 | End: 2022-05-23 | Stop reason: ALTCHOICE

## 2022-02-04 ENCOUNTER — ANESTHESIA (OUTPATIENT)
Dept: GASTROENTEROLOGY | Facility: HOSPITAL | Age: 62
End: 2022-02-04

## 2022-02-04 ENCOUNTER — HOSPITAL ENCOUNTER (OUTPATIENT)
Facility: HOSPITAL | Age: 62
Setting detail: HOSPITAL OUTPATIENT SURGERY
Discharge: HOME | End: 2022-02-04
Attending: INTERNAL MEDICINE | Admitting: INTERNAL MEDICINE

## 2022-02-04 ENCOUNTER — TELEPHONE (OUTPATIENT)
Dept: GASTROENTEROLOGY | Facility: CLINIC | Age: 62
End: 2022-02-04

## 2022-02-04 ENCOUNTER — ANESTHESIA EVENT (OUTPATIENT)
Dept: GASTROENTEROLOGY | Facility: HOSPITAL | Age: 62
End: 2022-02-04

## 2022-02-04 VITALS
WEIGHT: 154 LBS | SYSTOLIC BLOOD PRESSURE: 121 MMHG | BODY MASS INDEX: 30.23 KG/M2 | DIASTOLIC BLOOD PRESSURE: 70 MMHG | HEIGHT: 60 IN | RESPIRATION RATE: 18 BRPM | HEART RATE: 74 BPM | OXYGEN SATURATION: 99 % | TEMPERATURE: 97 F

## 2022-02-04 PROCEDURE — 25010000002 PROPOFOL 10 MG/ML EMULSION: Performed by: NURSE ANESTHETIST, CERTIFIED REGISTERED

## 2022-02-04 PROCEDURE — 45378 DIAGNOSTIC COLONOSCOPY: CPT | Performed by: INTERNAL MEDICINE

## 2022-02-04 RX ORDER — SODIUM CHLORIDE 0.9 % (FLUSH) 0.9 %
10 SYRINGE (ML) INJECTION AS NEEDED
Status: DISCONTINUED | OUTPATIENT
Start: 2022-02-04 | End: 2022-02-04 | Stop reason: HOSPADM

## 2022-02-04 RX ORDER — PROPOFOL 10 MG/ML
VIAL (ML) INTRAVENOUS AS NEEDED
Status: DISCONTINUED | OUTPATIENT
Start: 2022-02-04 | End: 2022-02-04 | Stop reason: SURG

## 2022-02-04 RX ORDER — SODIUM CHLORIDE 9 MG/ML
500 INJECTION, SOLUTION INTRAVENOUS CONTINUOUS PRN
Status: DISCONTINUED | OUTPATIENT
Start: 2022-02-04 | End: 2022-02-04 | Stop reason: HOSPADM

## 2022-02-04 RX ORDER — LIDOCAINE HYDROCHLORIDE 20 MG/ML
INJECTION, SOLUTION EPIDURAL; INFILTRATION; INTRACAUDAL; PERINEURAL AS NEEDED
Status: DISCONTINUED | OUTPATIENT
Start: 2022-02-04 | End: 2022-02-04 | Stop reason: SURG

## 2022-02-04 RX ORDER — LIDOCAINE HYDROCHLORIDE 10 MG/ML
0.5 INJECTION, SOLUTION EPIDURAL; INFILTRATION; INTRACAUDAL; PERINEURAL ONCE AS NEEDED
Status: DISCONTINUED | OUTPATIENT
Start: 2022-02-04 | End: 2022-02-04 | Stop reason: HOSPADM

## 2022-02-04 RX ADMIN — PROPOFOL 400 MG: 10 INJECTION, EMULSION INTRAVENOUS at 09:34

## 2022-02-04 RX ADMIN — LIDOCAINE HYDROCHLORIDE 50 MG: 20 INJECTION, SOLUTION EPIDURAL; INFILTRATION; INTRACAUDAL; PERINEURAL at 09:34

## 2022-02-04 NOTE — ANESTHESIA PREPROCEDURE EVALUATION
Anesthesia Evaluation     Patient summary reviewed   no history of anesthetic complications:  NPO Solid Status: > 8 hours  NPO Liquid Status: > 2 hours           Airway   Mallampati: I  TM distance: >3 FB  Neck ROM: full  Dental      Pulmonary    (-) COPD, asthma, sleep apnea, not a smoker  Cardiovascular   Exercise tolerance: excellent (>7 METS)    (-) pacemaker, past MI, angina, cardiac stents      Neuro/Psych  (-) seizures, TIA, CVA  GI/Hepatic/Renal/Endo    (+) obesity,     (-) GERD, liver disease, no renal disease, diabetes    Musculoskeletal     Abdominal    Substance History      OB/GYN          Other      history of cancer (breast) remission                      Anesthesia Plan    ASA 2     MAC     intravenous induction     Anesthetic plan, all risks, benefits, and alternatives have been provided, discussed and informed consent has been obtained with: patient.

## 2022-02-04 NOTE — ANESTHESIA POSTPROCEDURE EVALUATION
Patient: Chasidy Tejada    Procedure Summary     Date: 02/04/22 Room / Location: [facility] ENDOSCOPY 2 / [facility]    Anesthesia Start: 0928 Anesthesia Stop: 0953    Procedure: COLONOSCOPY WITH ANESTHESIA (N/A ) Diagnosis:       History of adenomatous polyp of colon      Family history of colon cancer      (History of adenomatous polyp of colon [Z86.010])      (Family history of colon cancer [Z80.0])    Surgeons: Elina Gonsalez MD Provider: Castro Solomon CRNA    Anesthesia Type: MAC ASA Status: 2          Anesthesia Type: MAC    Vitals  Vitals Value Taken Time   BP     Temp     Pulse 81 02/04/22 0953   Resp     SpO2 96 % 02/04/22 0953   Vitals shown include unvalidated device data.        Post Anesthesia Care and Evaluation    Patient location during evaluation: PHASE II  Patient participation: complete - patient participated  Level of consciousness: awake  Pain management: adequate  Airway patency: patent  Anesthetic complications: No anesthetic complications  Respiratory status: acceptable  Hydration status: acceptable

## 2022-04-05 NOTE — PROGRESS NOTES
[facility] HospitalSudhaSudha Brito MD      Mateo Arce APRN  _______________________________________________  [facility]  Department of Radiation Oncology  [address] [place] [id number]  Office: [phone]  Fax: [phone]    DATE:  04/06/2022  PATIENT: Chasidy Tejada  1960                         MEDICAL RECORD #:  [id number]                                                       Reason for Visit:   Chief Complaint   Patient presents with   • Breast Cancer     Chasidy Tejada is a very pleasant 61 y.o. patient that has completed radiation therapy for carcinoma of the breast and returns to the clinic today for routine follow up exam. Reports fatigue. Continues on Femara with hot flashes noted. Denies activity change, appetite change, unexpected weight change, nausea/vomiting, diarrhea, light-headedness, weakness, and headaches. She continues to follow .     HISTORY OF PRESENT ILLNESS:  Diagnosed in 2020 with Stage IA (T1a, N0, cM0, G2) Invasive Ductal Carcinoma of left breast, 5 mm, ER/NC+. Underwent lumpectomy on 01/10/2020, revealing negative margins, 0/1 LNs positive. She completed 6040 cGy in 33 fractions to the left breast on 08/06/2020.    12/13/2019 - Bilateral screening mammogram:  • Stellate lesion outer left breast. Spot compression and ultrasound are suggested.  • Benign findings in the right breast   • Assessment: BI-RADS Category 0    12/27/2019 - Bilateral diagnostic mammogram:  • Irregular density persists in the upper outer left breast. This is approximately 11 cm from the nipple in the upper outer left breast.  • Ultrasound demonstrates this to be a hypoechoic demonstrates acoustic shadowing and biopsy is suggested.   IMPRESSION:  • Irregular density upper outer left breast. Biopsy is suggested  • ASSESSMENT: BI-RADS Category 4B    12/27/2019 - US left breast:  • IMPRESSION suspicious 6 x 5 x 8 mm density left breast approximately 8  cm from the nipple biopsy is suggested assessment:   • BI-RADS Category 4B    01/03/2020 - Left breast at 1 o'clock position, core biopsies:  • Invasive mammary carcinoma of no special type (ductal, not otherwise specified), grade 2, at least 4.9 mm in greatest extent.  • Associated low-grade ductal carcinoma in situ.  • % positive, MI 25% positive,   • HER-2/lamberto-2+ (IHC)/FISH- positive (signal ratio: 2.9)  Comment: The histologic grade of this tumor is based on a relatively limited sample and may change upon receipt of the final excision specimen.  Estrogen and progesterone receptor analyses as well as other prognostic indicator studies will be performed on tissue from a paraffin block.  Results of these studies will be reported as an addendum upon receipt from the reference laboratory.     01/10/2020 - Left breast lumpectomy and lymph node biopsy per :  Left breast mass, lumpectomy:  • Invasive and in situ ductal carcinoma, see synoptic and comment  Left axillary sentinel lymph node, excisional biopsy:  • Negative for tumor, see comment    02/03/2020 - Consult with ion:  • Schedule baseline 2D echocardiogram, bone density and staging bone scan, CT scans of the head, chest abdomen and pelvis with IV contrast at [facility]  • Draw baseline CBC with differential, CMP, CEA and CA-27-29   • Reviewed NCCN guidelines version 3.2019 invasive breast cancer.  For tumors (=/> 5 mm), and pN0, recommend adjuvant endocrine therapy +/- adjuvant chemotherapy with trastuzumab (category 2B).    •  Re: The potential toxicities of adjuvant chemotherapy + Herceptin (to include but not limited to: Asthenia, cardiotoxicity, myelosuppression) are discussed at length. The rationale for adjuvant hormonal manipulation with AIs (see above) is discussed (to include but not limited to: Hot flashes, asthenia, pain, arthralgia, arthritis, nausea, bone pains, edema, fatigue, headache, venous thrombosis, anemia,  leukopenia, depression, rash, pharyngitis, weight gain, dyspnea, fractures, breast pains, infection, angina). Questions answered to the best of my ability, and to her apparent satisfaction. She is willing to proceed with a trial of therapy if deemed necessary.   • Teaching sheets for Arimidex, Taxotere, carboplatin, and Herceptin.   • Refer to Dr. Jaimes ASAP Re: Need an opinion on adjuvant therapy in this patient with stage IA left breast carcinoma with tumor of only 0.5 cm,, ER/HI positive, but HER-2 positive.  • Schedule treatment C1 D1 to be scheduled - HOLD,  then C2D1 on  HOLD at [facility] - To be administered every 3 weeks for 6 chemo cycles   o Taxotere 75 mg/m2 (BSA = ; TD = mg.) per administration guidelines   o Carboplatin AUC = 5 mg/m2 (TD= pending) per administration guidelines.   o Herceptin 4 mg/ kg (TD = mg) C1D1 loading dose on Week #1, then Herceptin 2 mg/ kg (TD = 152 mg) weekly x17 weeks, to begin on week #2, per administration guidelines  o Anticipate: Herceptin 6 mg/kg (TD= pending) every 3 weeks x12 to begin 1 week after the weekly Herceptin completed   • Discussed the rationale for adjuvant hormonal manipulation with AIs (see above) and potential toxicities of AI therapy are discussed (to include but not limited to: Hot flashes, asthenia, pain, arthralgias, arthritis, nausea/vomiting, headache, pharyngitis, depression, rash, hypertension, lymphedema, insomnia, edema, weight gain, dyspnea, abdominal pain, constipation, hyperlipidemia, osteoporosis, fractures, cough, bone pain, diarrhea, breast pain, paresthesias, infections, cataracts, myalgias, thromboembolism, angina, Lyles-Yung syndrome, erythema multiforme, anemia, leukopenia, stroke, myocardial infarction, osteoporosis, fractures). Questions are answered. She agrees to a trial of therapy.   • Keep appointment with Dr. Brito on 02/05/2020 Re:  Adjuvant radiation  • Return to the [place] office with pre-office serum iron, iron saturation,  ferritin B12, folate, CMP, CEA, CA 27-29 and CBC and differential  in 4 weeks (after she is seen by Dr. Jaimes in [place]) for further recommendations.    02/05/2020 - Consult with :  • I have seen, examined and reviewed her medication list, appropriate labs and imaging studies as well as other physician notes. We discussed the goals and plans of care with the patient and family and answered all questions. Since the patient is at risk for lymphedema post surgery and radiation, I will ask physical therapy for lymphedema baseline evaluation.   • The patient has verbalized understanding of the risk of therapy for postoperative radiation therapy to the left breast.  We will await final recommendations from Dr. Jaimes, Mrs. Tejada has been referred to her by Dr. Wheat regarding a second opinion on adjuvant therapy in this patient with Stage IA left breast carcinoma with tumor of only 0.5 cm,, ER/PA positive, but HER-2 positive.    02/07/2020 - CT Head:  • No abnormal intracranial enhancement to suggest intracranial metastasis on CT exam. No acute intracranial abnormality identified.     02/07/2020 - CT Chest:  • Postoperative changes of the left upper outer quadrant breast and the left axilla from recent lumpectomy and sentinel lymph node versus partial axillary dissection. No evidence of intrathoracic metastasis.  • Stable 5 to 6 mm right lower lobe nodule of the right hemidiaphragm, unchanged from 02/14/2011. Dependent basilar atelectasis.    02/07/2020 - CT Abdomen/Pelvis:  • No convincing evidence of intra-abdominal or pelvic metastasis.  • Hepatic cysts.  • Fatty attenuated benign focus within the body of the pancreas, partially visualized on the CTA chest of 12/14/2011.  • Previous hysterectomy, cholecystectomy, and appendectomy.    02/10/2020 - Bone Scan:  • Degenerative joint changes.  • No evidence of bony metastases.    02/25/2020 - Appointment with :  • Reviewed her pathology  report, prognosis, and treatment recommendations in detail. Our plan:  • Breast cancer: We discussed that since her cancer is strongly ER+ and low histologic grade/low proliferative rate, and the HER2 on the biopsy was borderline, I would recommend repeat HER2 testing on her surgical pathology. If HER2 is negative, this could possibly save her from chemotherapy and she would proceed with only endocrine therapy and radiation. If HER2 is confirmed to be positive, would recommend adjuvant weekly Taxol x 12 + Herceptin [id number] f/b Herceptin only to complete 1 year of HER2 directed therapy.  • She will continue to follow up with Dr. Wheat and return to see me if needed     03/05/2020 - Port placement per .     03/11/2020 - 05/27/2020 - Chemotherapy course:  • Taxol x 12    03/11/2020 - 02/10/2021 - Chemotherapy course:  • Herceptin    05/26/2020 - Appointment with :  ·  Re: Apprised of the labs, 05/13/2020 and 05/20/2020 (above).  Mild anemia otherwise essentially normal CBC, CMP, CEA and CA-27-29.  Chemo tolerance discussed.  Grade 1 fatigue, nausea, dry skin, and loose bowels (no overt diarrhea).  Grade 2 alopecia.  Is willing to press on.  · Schedule bone density (was not done) and 2D echocardiogram this week at [facility].  Compare with 02/14/2020  · Unable to repeat FISH testing for HER-2 on the tumor specimen from 01/10/2020 -pathology block did not contain any more tumor - personally discussed/confirmed with Dr. Burgos of pathology.  · Care previously discussed with Dr. Jaimes on 02/28/2020 after she saw the patient on 02/25/2020 (encounter reviewed).  Pathology was reviewed at [place] and discussed with the patient.  Recommendations/plan: Discussed that since her cancer is strongly ER positive and low histologic grade/low proliferative rate, and HER-2 on the biopsy was borderline recommended repeat HER-2 testing on her surgical pathology.  Defer to his negative proceed with only  endocrine therapy and radiation.  If HER-2 is confirmed to be positive, recommend adjuvant weekly Taxol x12+ Herceptin every 3 weeks to complete 1 year of HER-2 directed therapy.  These plans were personally discussed with the patient (via telephone) on 02/28/2020 I also personally asked the pathologist (Dr. Radha Burgos) to send off the biopsy specimen for repeat FISH HER-2 testing.  · Reviewed NCCN guidelines version 3.2019 invasive breast cancer.  For tumors (=/> 5 mm), and pN0, recommend adjuvant endocrine therapy +/- adjuvant chemotherapy with trastuzumab (category 2B).    ·  Re: The potential toxicities of adjuvant chemotherapy + Herceptin (to include but not limited to: Asthenia, cardiotoxicity, myelosuppression) are discussed at length. The rationale for adjuvant hormonal manipulation with AIs (see above) is discussed (to include but not limited to: Hot flashes, asthenia, pain, arthralgia, arthritis, nausea, bone pains, edema, fatigue, headache, venous thrombosis, anemia, leukopenia, depression, rash, pharyngitis, weight gain, dyspnea, fractures, breast pains, infection, angina). Questions answered to the best of my ability, and to her apparent satisfaction. She is willing to press on with therapy.   · Schedule treatment at [facility] -   · Taxol 80 mg/m2 (BSA = 1.73; TD = 140 mg.) per administration guidelines weekly x 12 - C12, 05/27/2020  · Herceptin 6 mg/kg beginning C2 (TD = 450 mg) per administration guidelines every 3 weeks x 1 year (17 cycles)-C5, 06/03/2020; C6, 06/24/2020; C7, 07/15/2020 - then C8-C17  - Premed before Herceptin -   - Tylenol 500 mg orally.   - Benadryl 25 mg IVP over 20-30 min.   - Premedicate on day 1 of chemo with:   - Aloxi 0.25 mg IVP   - Decadron 10 mg IVPB over 20-30 min   - ON TAXOL DAYS (D1,8,15): ALOXI + DEX + APAP + BENADRYL (even on No Herceptin days to prevent hypersensitivity with Taxol) - NO EMEND  · Rx:   - Zofran 8 mg p.o. every 8 hours as needed #30 - eRx    - Arimidex 1 mg daily #30 x2 RF - eRx - STOP on day 1 of chemo. Will resume after chemo is completed.  - Oscal 600/400 po bid # 60 - OTC  · CMP and CBC with diff weekly with Procrit 40,000 units subcutaneous if Hgb less than 10 or Hct less than 30; Neupogen 480 mcg subcutaneously daily x4 if ANC less than 1.0- B[facility]   · Discussed the rationale for adjuvant hormonal manipulation with AIs (see above) and potential toxicities of AI therapy are discussed (to include but not limited to: Hot flashes, asthenia, pain, arthralgias, arthritis, nausea/vomiting, headache, pharyngitis, depression, rash, hypertension, lymphedema, insomnia, edema, weight gain, dyspnea, abdominal pain, constipation, hyperlipidemia, osteoporosis, fractures, cough, bone pain, diarrhea, breast pain, paresthesias, infections, cataracts, myalgias, thromboembolism, angina, Lyles-Yung syndrome, erythema multiforme, anemia, leukopenia, stroke, myocardial infarction, osteoporosis, fractures). Questions are answered. She agrees to a trial of therapy.   · Reappoint to Dr. Brito Re:  Reassess for adjuvant radiation  · Return to the [place] office with pre-office serum iron, iron saturation, ferritin B12, folate, CMP, CEA, CA 27-29 and CBC and     06/04/2020 - Consult with :  • The patient has verbalized understanding of the risk of therapy and agrees to the treatment recommendations for postoperative radiation therapy to the left breast, we will simulate treatment fields with dose and final number of treatment fractions to be determined during the treatment planning process,  I anticipate a dose of 5040 cGy with 1000 cGy boost to the tumor bed for a total of 6040 cGy/33 fractions with plans to start around July 1st to allow for post chemotherapy healing time, she will continue Herceptin.     06/22/2020 - 08/06/2020 - Completed Radiation course:  • Received 6040 cGy in 33 fractions to left breast via external beam radiation therapy.    08/19/2020  - Appointment with :  •  Re: Apprised of the labs, 08/05/2020 (above).  Normal CBC, CMP, normal CEA, normal CA-27-29, repleted B12/folate, repleted serum iron, iron saturation 29% (from 11%) repleted ferritin (216)..  Ferrous sulfate 325 p.o. twice daily dispense 60 called in on 05/27/2020. Herceptin tolerance discussed.  No problems.  Is willing to press on.  • Stop ferrous sulfate.    • Previously apprised of bone density, 06/23/2020 (above).  Normal.   • Review 2D echocardiogram on 07/08/2020.  LV systolic function normal.  • Schedule 2D echo for 10/08/2020  • Previously noted that we are unable to repeat FISH testing for HER-2 on the tumor specimen from 01/10/2020 -pathology block did not contain any more tumor - personally discussed/confirmed with Dr. Burgos of pathology.  • Care previously discussed with Dr. Jaimes on 02/28/2020 after she saw the patient on 02/25/2020 (encounter reviewed).  Pathology was reviewed at [place] and discussed with the patient.  Recommendations/plan: Discussed that since her cancer is strongly ER positive and low histologic grade/low proliferative rate, and HER-2 on the biopsy was borderline recommended repeat HER-2 testing on her surgical pathology.  Defer to his negative proceed with only endocrine therapy and radiation.  If HER-2 is confirmed to be positive, recommend adjuvant weekly Taxol x12+ Herceptin every 3 weeks to complete 1 year of HER-2 directed therapy.  These plans were personally discussed with the patient (via telephone) on 02/28/2020 I also personally asked the pathologist (Dr. Radha Burgos) to send off the biopsy specimen for repeat FISH HER-2 testing.  • Reviewed NCCN guidelines version 3.2019 invasive breast cancer.  For tumors (=/> 5 mm), and pN0, recommend adjuvant endocrine therapy +/- adjuvant chemotherapy with trastuzumab (category 2B).    • Previously discussed the potential toxicities of adjuvant chemotherapy + Herceptin (to include but  not limited to: Asthenia, cardiotoxicity, myelosuppression) are discussed at length. The rationale for adjuvant hormonal manipulation with AIs (see above) is discussed (to include but not limited to: Hot flashes, asthenia, pain, arthralgia, arthritis, nausea, bone pains, edema, fatigue, headache, venous thrombosis, anemia, leukopenia, depression, rash, pharyngitis, weight gain, dyspnea, fractures, breast pains, infection, angina). Questions answered to the best of my ability, and to her apparent satisfaction. She is willing to press on with therapy.   • Schedule treatment at [facility] -   • Herceptin 6 mg/kg beginning C2 (TD = 450 mg) per administration guidelines every 3 weeks x 1 year (17 cycles)-C9, 08/26/2020; C10, 09/16/2020; C11, 10/07/2020.  - Premed before Herceptin -   • Tylenol 500 mg orally.   • Benadryl 25 mg IVP over 20-30 min.   • Rx:   o Oscal 600/400 po bid # 60 - OTC  o Arimidex 1 mg daily # 30 x 3 RF-eRx  • CMP and CBC with diff weekly with Procrit 40,000 units subcutaneous if Hgb less than 10 or Hct less than 30; Neupogen 480 mcg subcutaneously daily x4 if ANC less than 1.0- [facility]   • Previously discussed the rationale for adjuvant hormonal manipulation with AIs (see above) and potential toxicities of AI therapy are discussed (to include but not limited to: Hot flashes, asthenia, pain, arthralgias, arthritis, nausea/vomiting, headache, pharyngitis, depression, rash, hypertension, lymphedema, insomnia, edema, weight gain, dyspnea, abdominal pain, constipation, hyperlipidemia, osteoporosis, fractures, cough, bone pain, diarrhea, breast pain, paresthesias, infections, cataracts, myalgias, thromboembolism, angina, Lyles-Yung syndrome, erythema multiforme, anemia, leukopenia, stroke, myocardial infarction, osteoporosis, fractures). Questions are answered. She agrees to resume therapy at the terminus of radiation.  • Previously reviewed office encounter, 06/07/2020 from Dr. Brito.  Recommends adjuvant  radiation to the left breast.  Anticipate 5040 cGy with 1000 cGy boost to the tumor bed for a total of 6040 cGy/33 fractions to start around July 1 to allow for postchemotherapy healing time.  • Return to the [place] office with pre-office serum iron, iron saturation, ferritin B12, folate, CMP, CEA, CA 27-29 and CBC and differential on 10/21/2020.    08/19/2020 - Hormonal Therapy:  • Arimidex    09/30/2020 - Appointment with :  • Routine mammogram in December  • Follow with Dr. Wheat and Dr. Elisha Martel as scheduled  • Return to Dr. Brito in 6 months, call us if you need us    12/17/2020 - Bilateral Diagnostic Mammogram:  • No mammographic evidence of malignancy within either breast. 12 month follow-up mammogram recommended.  • BI-RADS Category 2-Benign.  • The patient will receive a letter notifying her of the results of this exam.  • Breast Density Category B    02/26/2021 - Appointment with :  • Apprised of the labs, 02/10/2021 (above). Normal CBC, CMP, normal CEA, normal CA-27-29, repleted B12/folate, repleted serum iron, iron saturation 24%, repleted ferritin (157; 138; 141; from 216).. Ferrous sulfate stopped last visit.   • Herceptin tolerance discussed. No probelms   • Mammogram report, 12/17/2020 noted-YUDY   • 2D echo, 01/20/2021-normal LV systolic function with LVEF 61-65%.   • Previously noted that we are unable to repeat FISH testing for HER-2 on the tumor specimen from 01/10/2020 -pathology block did not contain any more tumor - personally discussed/confirmed with Dr. Burgos of pathology.   • Care previously discussed with Dr. Jaimes on 02/28/2020 after she saw the patient on 02/25/2020 (encounter reviewed). Pathology was reviewed at [place] and discussed with the patient. Recommendations/plan: Discussed that since her cancer is strongly ER positive and low histologic grade/low proliferative rate, and HER-2 on the biopsy was borderline recommended repeat HER-2 testing on  her surgical pathology. Defer to his negative proceed with only endocrine therapy and radiation. If HER-2 is confirmed to be positive, recommend adjuvant weekly Taxol x12+ Herceptin every 3 weeks to complete 1 year of HER-2 directed therapy. These plans were personally discussed with the patient (via telephone) on 02/28/2020 I also personally asked the pathologist (Dr. Radha Burgos) to send off the biopsy specimen for repeat FISH HER-2 testing.   • Reviewed NCCN guidelines version 3.2019 invasive breast cancer. For tumors (=/> 5 mm), and pN0, recommend adjuvant endocrine therapy +/- adjuvant chemotherapy with trastuzumab (category 2B).   • Previously discussed the potential toxicities of adjuvant chemotherapy + Herceptin (to include but not limited to: Asthenia, cardiotoxicity, myelosuppression) are discussed at length. The rationale for adjuvant hormonal manipulation with AIs (see above) is discussed (to include but not limited to: Hot flashes, asthenia, pain, arthralgia, arthritis, nausea, bone pains, edema, fatigue, headache, venous thrombosis, anemia, leukopenia, depression, rash, pharyngitis, weight gain, dyspnea, fractures, breast pains, infection, angina). Questions answered to the best of my ability, and to her apparent satisfaction. She has completed therapy.   • Rx:   • Oscal 600/400 po bid # 60 - OTC   • Arimidex 1 mg daily # 30 x 3 RF-eRx  • CMP and CBC with diff weekly with Procrit 40,000 units subcutaneous if Hgb less than 10 or Hct less than 30; Neupogen 480 mcg subcutaneously daily x4 if ANC less than 1.0- [facility]   • Previously discussed the rationale for adjuvant hormonal manipulation with AIs (see above) and potential toxicities of AI therapy are discussed (to include but not limited to: Hot flashes, asthenia, pain, arthralgias, arthritis, nausea/vomiting, headache, pharyngitis, depression, rash, hypertension, lymphedema, insomnia, edema, weight gain, dyspnea, abdominal pain, constipation,  hyperlipidemia, osteoporosis, fractures, cough, bone pain, diarrhea, breast pain, paresthesias, infections, cataracts, myalgias, thromboembolism, angina, Lyles-Yung syndrome, erythema multiforme, anemia, leukopenia, stroke, myocardial infarction, osteoporosis, fractures). Questions are answered. She agrees to resume therapy at the terminus of radiation.   • Reappoint to Dr. Edmond Re: Port removal per patient request   • Return to the [place] office with pre-office serum iron, iron saturation, ferritin B12, folate, CMP, CEA, CA 27-29 and CBC and differential in 12 weeks.     03/04/2021 - Mediport removed.     04/01/2021 - Appointment with :  • Follow up in one year.  • Continue monthly breast exams, notify us for changes.  • Continue ongoing management per primary care physician and other specialists.    01/06/2022 - Bilateral mammogram:  • No mammographic evidence of malignancy.  • BI-RADS Final Assessment Category 2: Benign.  • Recommend annual mammography.    01/24/2022 - Appointment with :  • Apprised of the labs, 01/17/2022.  Normal CBC, normal CMP, normal CEA, normal CA-27-29, repleted B12/folate, repleted serum iron, iron saturation 22% (from 19%; 23%; 25%; 24%), repleted ferritin (101; from 186; 137; 226; 171; 157; 138; 141; 216).    • Femara tolerance discussed.  Doing well so far with no mood lability and no new arthralgias.  Feels she can press on with this.  • Apprised of mammogram, 01/06/2022 (above). YUDY  • Previously noted that we are unable to repeat FISH testing for HER-2 on the tumor specimen from 01/10/2020 -pathology block did not contain any more tumor - personally discussed/confirmed with Dr. Burgos of pathology.  • Care previously discussed with Dr. Jaimes on 02/28/2020 after she saw the patient on 02/25/2020 (encounter reviewed).  Pathology was reviewed at [place] and discussed with the patient.  Recommendations/plan: Discussed that since her cancer is strongly  ER positive and low histologic grade/low proliferative rate, and HER-2 on the biopsy was borderline recommended repeat HER-2 testing on her surgical pathology.  Defer to his negative proceed with only endocrine therapy and radiation.  If HER-2 is confirmed to be positive, recommend adjuvant weekly Taxol x12+ Herceptin every 3 weeks to complete 1 year of HER-2 directed therapy.  These plans were personally discussed with the patient (via telephone) on 02/28/2020 I also personally asked the pathologist (Dr. Radha Burgos) to send off the biopsy specimen for repeat FISH HER-2 testing.  • Reviewed NCCN guidelines version 3.2019 invasive breast cancer.  For tumors (=/> 5 mm), and pN0, recommend adjuvant endocrine therapy +/- adjuvant chemotherapy with trastuzumab (category 2B).    • Rx:   o Oscal 600/400 po bid # 60 - OTC  o Femara 2.5 mg po qd # 30 - 11 RF  o Stay off B12 supplements  • Previously discussed the rationale for adjuvant hormonal manipulation with AIs (see above) and potential toxicities of AI therapy are discussed (to include but not limited to: Hot flashes, asthenia, pain, arthralgias, arthritis, nausea/vomiting, headache, pharyngitis, depression, rash, hypertension, lymphedema, insomnia, edema, weight gain, dyspnea, abdominal pain, constipation, hyperlipidemia, osteoporosis, fractures, cough, bone pain, diarrhea, breast pain, paresthesias, infections, cataracts, myalgias, thromboembolism, angina, Lyles-Yung syndrome, erythema multiforme, anemia, leukopenia, stroke, myocardial infarction, osteoporosis, fractures). Questions are answered. She agrees to press on.  • Return to the [place] office with pre-office serum iron, iron saturation, ferritin B12, folate, CMP, CEA, CA 27-29 and CBC and differential in 16 weeks.  Will order surveillance DEXA scan due in June at that visit.    History obtained from  PATIENT and CHART    PAST MEDICAL HISTORY  Past Medical History:   Diagnosis Date   • Acid reflux    •  Asthma     long time ago    • Drug therapy    • Family history of colon cancer    • History of adenomatous polyp of colon    • History of breast cancer    • Hx of radiation therapy    • Panic attack    • Right arm weakness       PAST SURGICAL HISTORY  Past Surgical History:   Procedure Laterality Date   • ANTERIOR CERVICAL DISCECTOMY W/ FUSION N/A 9/7/2018    Procedure: CERVICAL DISCECTOMY ANTERIOR WITH FUSION C6-7;  Surgeon: Chris Mcfadden MD;  Location: [facility] OR;  Service: Neurosurgery   • APPENDECTOMY     • BILATERAL SALPINGO OOPHORECTOMY     • BREAST BIOPSY     • BREAST LUMPECTOMY     • CERVICAL CORPECTOMY N/A 9/7/2018    Procedure: CERVICAL CORPECTOMY C6-7;  Surgeon: Chris Mcfadden MD;  Location: [facility] OR;  Service: Neurosurgery   • CHOLECYSTECTOMY     • COLONOSCOPY N/A 2/10/2017    One 8mm adenomatous polyp in the rectum; The examination was otherwise normal on direct and retroflexion views; A tattoo was seen in the rectum-A post-polypectomy scar was found at the tattoo site-There was no evidence of residual polyp tissue; Repeat 5 years   • COLONOSCOPY  09/05/2012    One 13mm polyp in the rectum-Marked with Maxine ink-path shows Ganglioneuroma; The examination was otherwise normal; Repeat 4 years   • COLONOSCOPY N/A 2/4/2022    Procedure: COLONOSCOPY WITH ANESTHESIA;  Surgeon: Elina Gonsalez MD;  Location: [facility] ENDOSCOPY;  Service: Gastroenterology;  Laterality: N/A;  pre family hx colon ca; hx adenomatous polyp  post normal  Jones Elias DO   • HYSTERECTOMY  1998    Had hysterectomy for painful periods and bleeding. (Dr. Patel) TLH w/ BSO   • LAPAROSCOPIC TUBAL LIGATION     • MASTECTOMY W/ SENTINEL NODE BIOPSY Left 1/10/2020    Procedure: LEFT NEEDLE DIRECTED ULTRASOUND GUIDED PARTIAL MASTECTOMY WITH SENTINEL LYMPH NODE BIOPSY, INJECTION AND SCAN, RADIOLOGIST WILL INJECT;  Surgeon: Flor Edmond MD;  Location: [facility] OR;  Service: General   • TONSILLECTOMY     • VENOUS ACCESS DEVICE  "(PORT) INSERTION N/A 3/5/2020    Procedure: INSERTION VENOUS ACCESS DEVICE EXCISION SKIN LESION X 2 CHEST AND ABDOMEN;  Surgeon: Flor Edmond MD;  Location:  [facility] OR;  Service: General;  Laterality: N/A;   • VENOUS ACCESS DEVICE (PORT) REMOVAL Left 3/4/2021    Procedure: REMOVAL OF SINGLE LUMEN PORT;  Surgeon: Flor Edmond MD;  Location:  [facility] OR;  Service: General;  Laterality: Left;      FAMILY HISTORY  family history includes Breast cancer in her sister; Cancer in her father; Colon cancer in her maternal grandmother; Diabetes in her mother; Heart disease in her father; Hypertension in her mother; No Known Problems in her daughter and sister; Stroke in her mother.    SOCIAL HISTORY  Social History     Tobacco Use   • Smoking status: Never Smoker   • Smokeless tobacco: Never Used   • Tobacco comment: \"i never really smoked\"   Vaping Use   • Vaping Use: Never used   Substance Use Topics   • Alcohol use: Not Currently   • Drug use: No      ALLERGIES  Demerol [meperidine] and Morphine and related     MEDICATIONS  Current Outpatient Medications   Medication Sig Dispense Refill   • calcium carbonate-vitamin d (Calcium 600+D) 600-400 MG-UNIT per tablet Take 1 tablet by mouth 2 (Two) Times a Day. 60 tablet 3   • letrozole (FEMARA) 2.5 MG tablet Take 1 tablet by mouth Daily. 30 tablet 11   • Multiple Vitamins-Minerals (MULTIVITAMIN ADULT PO) Take 1 tablet by mouth Daily.     • polyethylene glycol (MIRALAX) packet Take 17 g by mouth Daily.     • ciprofloxacin (CIPRO) 250 MG tablet Take 1 tablet by mouth 2 (Two) Times a Day. 10 tablet 0   • conjugated estrogens (Premarin) 0.625 MG/GM vaginal cream Apply 1/2 gram using finger two nights weekly.  OK to use every night for first two weeks (Patient taking differently: Apply 1/2 gram using finger two nights weekly.  OK to use every night for first two weeks) 30 g 11   • ondansetron (ZOFRAN) 4 MG tablet Take 2 tablets by mouth Every 8 (Eight) Hours As Needed for Nausea " "or Vomiting. 30 tablet 1     No current facility-administered medications for this visit.      The following portions of the patient's history were reviewed and updated as appropriate: allergies, current medications, past family history, past medical history, past social history, past surgical history and problem list.    REVIEW OF SYSTEMS  Review of Systems   Constitutional: Positive for fatigue. Negative for activity change, appetite change, chills, diaphoresis, fever and unexpected weight change.   HENT: Negative.    Eyes: Negative.    Respiratory: Negative.    Cardiovascular: Negative.    Gastrointestinal: Negative.    Endocrine: Negative.         Hot flashes  Femara   Genitourinary: Negative.    Musculoskeletal: Negative.         Neuropathy right foot   Skin: Negative.    Allergic/Immunologic: Negative.    Neurological: Negative.    Hematological: Negative.    Psychiatric/Behavioral: Negative.      I have reviewed and confirmed the accuracy of the ROS as documented by the MA/CAMERONN/RN Kailash Brito III, MD    PHYSICAL EXAM  VITAL SIGNS:   Vitals:    04/06/22 0925   BP: 131/68   Pulse: 73   Resp: 18   SpO2: 99%   Weight: 73 kg (161 lb)   Height: 152.4 cm (60\")   PainSc: 0-No pain      Physical Exam   General:  Alert and oriented, in no acute distress, well-developed.  Head:  Normocephalic, without obvious abnormality    Nose/Sinuses:  Nares normal externally  Mouth/Throat:  Mucosa moist, without erythema  Neck:  supple, No evidence of adenopathy in the cervical or supraclavicular areas.  Eyes: No gross abnormalities   Ears: Ears intact with no external abnormalities noted  Chest:  Respiratory efforts are normal and unlabored, chest is clear to auscultation.  Breast: Left breast s/p lumpectomy and radiation therapy, surgical scar well healed, slight hyperpigmentation noted, otherwise within normal limits, no lymphadenopathy noted. Right breast without nodules or masses, no skin lesions noted, no palpable " "lymphadenopathy.  Cardiovascular: Regular rate and rhythm without murmurs, rubs, or gallops.   Abdomen:  Soft, non-tender, normal bowel sounds;   Extremities:  GERARD well, warm to touch, no cyanosis or edema.  Skin: No suspicious lesions or rashes of concern  Neurologic: non focal exam, strength and sensation grossly normal  Psych: Mood and affect are appropriate    Performance Status: ECOG (0) Fully active, able to carry on all predisease performance without restriction    Clinical Quality Measures  -Pain Documented by Standardized Tool, FPS Chasidy Tejada reports a pain score of 0. Given her pain assessment as noted, treatment options were discussed and the following options were decided upon as a follow-up plan to address the patient's pain: No pain, no plan given.     Body Mass Index Screening and Follow-Up Plan  Patient's Body mass index is 31.44 kg/m². indicating that she is obese (BMI >30).    Tobacco Use: Screening and Cessation Intervention  Social History    Tobacco Use      Smoking status: Never Smoker      Smokeless tobacco: Never Used      Tobacco comment: \"i never really smoked\"    Advanced Care Planning Advance Care Planning   ACP discussion was held with the patient during this visit. Patient does not have an advance directive, information provided.    ASSESSMENT AND PLAN  1. Invasive ductal carcinoma of breast, female, left (HCC)    2. Estrogen receptor positive status (ER+)    3. S/P lumpectomy, left breast    4. S/P lymph node biopsy    5. Prophylactic use of anastrozole (Arimidex)    6. History of radiation therapy    7. Former smoker      Recommendations:  Chasidy Tejada is status post completion of adjuvant radiation therapy to the left breast and presents to our clinic today for follow up exam. Diagnosed in 2020 with Stage IA (T1a, N0, cM0, G2) Invasive Ductal Carcinoma of left breast, 5 mm, ER/NV+. Underwent lumpectomy on 01/10/2020, revealing negative margins, 0/1 LNs positive. She " completed 6040 cGy in 33 fractions to the left breast on 08/06/2020.    We discussed expected post radiation long and short term effects, monthly self exams and NCCN surveillance recommendations. She is doing well and has no evidence of recurrent or metastatic disease today. Last bilateral mammogram was completed on 01/06/2022, next mammogram is scheduled in January, 2023. Continue ongoing management with other physicians, will follow up with us in 1 year or sooner if needed.    Patient Instructions   1) Return to Dr. Brito in 1 year    Time Spent: I spent 32 minutes caring for Chasidy on this date of service. This time includes time spent by me in the following activities: preparing for the visit, reviewing tests, obtaining and/or reviewing a separately obtained history, performing a medically appropriate examination and/or evaluation, counseling and educating the patient/family/caregiver, ordering medications, tests, or procedures, referring and communicating with other health care professionals, documenting information in the medical record, independently interpreting results and communicating that information with the patient/family/caregiver and care coordination.   Kailash Brito III, MD  04/06/2022

## 2022-04-06 ENCOUNTER — OFFICE VISIT (OUTPATIENT)
Dept: RADIATION ONCOLOGY | Facility: HOSPITAL | Age: 62
End: 2022-04-06

## 2022-04-06 ENCOUNTER — HOSPITAL ENCOUNTER (OUTPATIENT)
Dept: RADIATION ONCOLOGY | Facility: HOSPITAL | Age: 62
Setting detail: RADIATION/ONCOLOGY SERIES
End: 2022-04-06

## 2022-04-06 VITALS
RESPIRATION RATE: 18 BRPM | HEART RATE: 73 BPM | DIASTOLIC BLOOD PRESSURE: 68 MMHG | WEIGHT: 161 LBS | BODY MASS INDEX: 31.61 KG/M2 | HEIGHT: 60 IN | SYSTOLIC BLOOD PRESSURE: 131 MMHG | OXYGEN SATURATION: 99 %

## 2022-04-06 DIAGNOSIS — Z98.890 S/P LYMPH NODE BIOPSY: ICD-10-CM

## 2022-04-06 DIAGNOSIS — Z79.811 PROPHYLACTIC USE OF ANASTROZOLE (ARIMIDEX): ICD-10-CM

## 2022-04-06 DIAGNOSIS — Z87.891 FORMER SMOKER: ICD-10-CM

## 2022-04-06 DIAGNOSIS — Z17.0 ESTROGEN RECEPTOR POSITIVE STATUS (ER+): ICD-10-CM

## 2022-04-06 DIAGNOSIS — Z98.890 S/P LUMPECTOMY, LEFT BREAST: ICD-10-CM

## 2022-04-06 DIAGNOSIS — Z92.3 HISTORY OF RADIATION THERAPY: ICD-10-CM

## 2022-04-06 DIAGNOSIS — C50.912 INVASIVE DUCTAL CARCINOMA OF BREAST, FEMALE, LEFT: Primary | ICD-10-CM

## 2022-04-06 PROCEDURE — G0463 HOSPITAL OUTPT CLINIC VISIT: HCPCS | Performed by: RADIOLOGY

## 2022-05-16 ENCOUNTER — LAB (OUTPATIENT)
Dept: LAB | Facility: HOSPITAL | Age: 62
End: 2022-05-16

## 2022-05-16 DIAGNOSIS — C50.912 INVASIVE DUCTAL CARCINOMA OF BREAST, FEMALE, LEFT: ICD-10-CM

## 2022-05-16 LAB
ALBUMIN SERPL-MCNC: 4.8 G/DL (ref 3.5–5.2)
ALBUMIN/GLOB SERPL: 1.7 G/DL
ALP SERPL-CCNC: 113 U/L (ref 39–117)
ALT SERPL W P-5'-P-CCNC: 17 U/L (ref 1–33)
ANION GAP SERPL CALCULATED.3IONS-SCNC: 13 MMOL/L (ref 5–15)
AST SERPL-CCNC: 21 U/L (ref 1–32)
BASOPHILS # BLD AUTO: 0.06 10*3/MM3 (ref 0–0.2)
BASOPHILS NFR BLD AUTO: 0.8 % (ref 0–1.5)
BILIRUB SERPL-MCNC: 0.5 MG/DL (ref 0–1.2)
BUN SERPL-MCNC: 10 MG/DL (ref 6–20)
BUN/CREAT SERPL: 19.2 (ref 7–25)
CALCIUM SPEC-SCNC: 9.8 MG/DL (ref 8.6–10.5)
CHLORIDE SERPL-SCNC: 103 MMOL/L (ref 98–107)
CO2 SERPL-SCNC: 24 MMOL/L (ref 22–29)
CREAT SERPL-MCNC: 0.52 MG/DL (ref 0.57–1)
DEPRECATED RDW RBC AUTO: 44.1 FL (ref 37–54)
EGFRCR SERPLBLD CKD-EPI 2021: 107.2 ML/MIN/1.73
EOSINOPHIL # BLD AUTO: 0.06 10*3/MM3 (ref 0–0.4)
EOSINOPHIL NFR BLD AUTO: 0.8 % (ref 0.3–6.2)
ERYTHROCYTE [DISTWIDTH] IN BLOOD BY AUTOMATED COUNT: 13.1 % (ref 12.3–15.4)
FERRITIN SERPL-MCNC: 113.3 NG/ML (ref 13–150)
GLOBULIN UR ELPH-MCNC: 2.8 GM/DL
GLUCOSE SERPL-MCNC: 99 MG/DL (ref 65–99)
HCT VFR BLD AUTO: 40.8 % (ref 34–46.6)
HGB BLD-MCNC: 13.4 G/DL (ref 12–15.9)
IMM GRANULOCYTES # BLD AUTO: 0.02 10*3/MM3 (ref 0–0.05)
IMM GRANULOCYTES NFR BLD AUTO: 0.3 % (ref 0–0.5)
IRON 24H UR-MRATE: 90 MCG/DL (ref 37–145)
IRON SATN MFR SERPL: 21 % (ref 20–50)
LYMPHOCYTES # BLD AUTO: 3.77 10*3/MM3 (ref 0.7–3.1)
LYMPHOCYTES NFR BLD AUTO: 47.2 % (ref 19.6–45.3)
MCH RBC QN AUTO: 30.1 PG (ref 26.6–33)
MCHC RBC AUTO-ENTMCNC: 32.8 G/DL (ref 31.5–35.7)
MCV RBC AUTO: 91.7 FL (ref 79–97)
MONOCYTES # BLD AUTO: 0.4 10*3/MM3 (ref 0.1–0.9)
MONOCYTES NFR BLD AUTO: 5 % (ref 5–12)
NEUTROPHILS NFR BLD AUTO: 3.68 10*3/MM3 (ref 1.7–7)
NEUTROPHILS NFR BLD AUTO: 45.9 % (ref 42.7–76)
NRBC BLD AUTO-RTO: 0 /100 WBC (ref 0–0.2)
PLATELET # BLD AUTO: 333 10*3/MM3 (ref 140–450)
PMV BLD AUTO: 9.4 FL (ref 6–12)
POTASSIUM SERPL-SCNC: 3.9 MMOL/L (ref 3.5–5.2)
PROT SERPL-MCNC: 7.6 G/DL (ref 6–8.5)
RBC # BLD AUTO: 4.45 10*6/MM3 (ref 3.77–5.28)
SODIUM SERPL-SCNC: 140 MMOL/L (ref 136–145)
TIBC SERPL-MCNC: 432 MCG/DL (ref 298–536)
TRANSFERRIN SERPL-MCNC: 290 MG/DL (ref 200–360)
WBC NRBC COR # BLD: 7.99 10*3/MM3 (ref 3.4–10.8)

## 2022-05-16 PROCEDURE — 83540 ASSAY OF IRON: CPT

## 2022-05-16 PROCEDURE — 82607 VITAMIN B-12: CPT

## 2022-05-16 PROCEDURE — 80053 COMPREHEN METABOLIC PANEL: CPT

## 2022-05-16 PROCEDURE — 82378 CARCINOEMBRYONIC ANTIGEN: CPT

## 2022-05-16 PROCEDURE — 82746 ASSAY OF FOLIC ACID SERUM: CPT

## 2022-05-16 PROCEDURE — 84466 ASSAY OF TRANSFERRIN: CPT

## 2022-05-16 PROCEDURE — 85025 COMPLETE CBC W/AUTO DIFF WBC: CPT

## 2022-05-16 PROCEDURE — 82728 ASSAY OF FERRITIN: CPT

## 2022-05-16 PROCEDURE — 86300 IMMUNOASSAY TUMOR CA 15-3: CPT

## 2022-05-16 PROCEDURE — 36415 COLL VENOUS BLD VENIPUNCTURE: CPT

## 2022-05-17 LAB
CANCER AG27-29 SERPL-ACNC: 17 U/ML (ref 0–38.6)
CEA SERPL-MCNC: 1.35 NG/ML
FOLATE SERPL-MCNC: >20 NG/ML (ref 4.78–24.2)
VIT B12 BLD-MCNC: 455 PG/ML (ref 211–946)

## 2022-05-18 NOTE — PROGRESS NOTES
[facility] GROUP HEMATOLOGY AND ONCOLOGY  [address]  [facility] [id number] [phone]    Patient Name: Chasidy Tejada  Encounter Date: 05/23/2022  YOB: 1960  Patient Number: [id number]         REASON FOR VISIT: Chasidy Tejada is a 61-year-old female who returns in follow-up of stage IA left breast carcinoma, ER/MD and HER-2/lamberto positive.  She underwent left breast lumpectomy with SNB, 01/10/2020 and was started on adjuvant Arimidex beginning 02/03/2020 through 03/11/2020 before resumption on 08/19/202 through 08/30/2021 - stopped due to intolerance (temper volatility, irritability).  She completed adjuvant paclitaxel (week 12/12, 05/27/2020), and received adjuvant trastuzumab (Herceptin), having completed cycle 17, 02/10/2021 (15.5 months).  Adjuvant radiation was administered on 06/22/2020 through 08/06/2020 (6040 cGy in 33 fx). She is here alone (previously with her spouse).  She was started on adjuvant Femara, 09/27/2021 (8 months).    I have reviewed the HPI and verified with the patient the accuracy of it. No changes to interval history since the information was documented. Tommy Wheat MD 05/23/22       DIAGNOSTIC ABNORMALITIES:          1.   12/13/2019- mammogram screening.  Impression: Stellate lesion outer left breast.  Spot compression and ultrasound suggested.          2.   12/27/2019- diagnostic mammogram.  Impression: Irregular density upper outer left breast.  Biopsy suggested.          3.   12/27/2019- left breast ultrasound.  Impression: Suspicious 6 x 5 x 8 mm density left breast approximately 8 cm from the nipple.  Biopsy suggested.  BI-RADS Category 4B.          4.   01/03/2020-ultrasound-guided breast biopsy.  Impression: Appropriate sampling of the 8 mm irregular hypoechoic nodule in the left breast at 1:00 in the posterior depth/axillary tail region.  Final diagnosis: Left breast at 1 o'clock  position, core biopsies: A.invasive mammary carcinoma of no special type (ductal, not otherwise specified), grade 2, at least 4.9 mm in greatest extent.  B.associated low-grade ductal carcinoma in situ.  % positive, VA 25% positive, HER-2/lamberto-2+ (IHC)/FISH- a focal positive score is present in an area corresponding to approximately 15% of the tumor (signal ratio: 2.9).           5.   02/03/2020- CMP normal with .  CBC normal.           6.   02/03/2020-bone scan ([facility]).  Impression: Degenerative joint changes.  No evidence of bony metastasis.           7.   02/03/2020-CT scans, head chest, abdomen, pelvis-impression: No abnormal intracranial enhancement to suggest intracranial metastasis/abnormalities.  No evidence of intrathoracic metastasis.  Stable 5 to 6 mm right lower lobe nodule of the right hemidiaphragm unchanged from 2/14/2011.  No convincing evidence of intra-abdominal or pelvic metastasis.  Hepatic cysts.  Fatty attenuated benign focus within the body of the pancreas visualized, 12/14/2011.  Previous hysterectomy, cholecystectomy and appendectomy.           8.   02/14/2020-echocardiogram.  Impression: LV systolic function normal (EF equals 70%).  Normal LV and RV size thickness and function.  Normal LA and RA size.  Normal cardiac valves.  No pericardial effusion.           9.  02/27/2020- CMP normal with .  CBC normal.  Iron 65, iron saturation 18%, ferritin 134, B12 745, folate > 20, CEA 1.76, CA-27-29 14.2.          10.  03/04/2020- repeat FISH for HER-2/lamberto (from tumor block, 01/10/2020).  No tumor on specimen.          11.  06/23/2020- DEXA scan.  Impression: Normal bone density.  No lower than 1 standard deviation below the mean for young adult woman.    PREVIOUS INTERVENTIONS:           1.   01/10/2020- left breast lumpectomy with left axillary sentinel node biopsy.  Final diagnosis: 1.left breast mass, lumpectomy: Invasive and in situ ductal carcinoma.  Tumor site-not specified.   "Histologic type: Invasive carcinoma of no special type (invasive ductal carcinoma, not otherwise specified).  Overall tumor grade: Grade 1.  Tumor size: Greatest dimension of largest invasive focus: 5 mm.  Tumor focality: Single focus of invasive carcinoma.  Ductal carcinoma in situ (DCIS): Present.  Negative for extensive intraductal component (EIC).  Size (extent) CIS: Cannot be determined: Microscopic.  Architectural pattern: Cribriform/solid.  Nuclear grade: 2 (intermediate).  Necrosis: Present, central (expansive \"comedo) necrosis).  Lobular carcinoma in situ (LCIS): No LCIS in specimen.  Lymphovascular invasion: Not identified.  Dermal lymphovascular invasion: No skin present.  Margins: Uninvolved by invasive carcinoma.  Lymph nodes: Uninvolved by tumor cells.  Regional lymph nodes: 1.  Number of lymph nodes examined: 1.  Number of sentinel nodes examined: 1.  Pathologic stage classification (pTNM, AJCC eighth edition): pT1a,(sn), pN0.  % positive, PA 25% positive, HER-2 2+ (equivocal).           2.   Adjuvant Arimidex 1 mg p.o. daily beginning 02/03/2020 through 03/11/2020: resumed 08/19/2020 through 08/30/2021 - stopped due to intolerance (temper volatility, irritability).           3.   Adjuvant paclitaxel beginning 03/11/2020 through 05/27/2020 (12/12 weekly doses)           4.   Adjuvant trastuzumab every 3 weeks - beginning 03/11/2020 through 02/10/2021 (17 cycles)           5.   Adjuvant radiation to the left breast (5040 cGy with 1000 cGy boost to the tumor bed for a total of 6040 cGy/33 fractions) from 06/22/2020 through 08/06/2020.           6.   Adjuvant Femara 2.5 mg po daily beginning 09/27/2021    LABS    Lab Results - Last 18 Months   Lab Units 05/16/22  1359 01/17/22  1435 01/17/22  1013 10/18/21  1412 09/20/21  1421 08/17/21  1326 05/18/21  1053   HEMOGLOBIN g/dL 13.4 12.8 CANCELED 13.5 13.7 13.6 14.1   HEMATOCRIT % 40.8 39.8 CANCELED 40.6 40.4 41.2 42.5   MCV fL 91.7 92.3  --  92.1 90.8 " 91.2 89.3   WBC 10*3/mm3 7.99 10.19 CANCELED 5.93 7.25 7.63 6.85   RDW % 13.1 13.5  --  13.0 13.2 13.2 13.1   MPV fL 9.4 9.5  --  9.9 10.3 10.0 10.3   PLATELETS 10*3/mm3 333 310 CANCELED 345 335 320 309   IMM GRAN % % 0.3 0.3  --  0.2 0.1 0.1 0.3   NEUTROS ABS 10*3/mm3 3.68 5.09  --  2.40 3.64 3.52 3.23   LYMPHS ABS 10*3/mm3 3.77* 4.37* CANCELED 3.00 3.18* 3.64* 3.18*   MONOS ABS 10*3/mm3 0.40 0.53  --  0.39 0.35 0.37 0.32   EOS ABS 10*3/mm3 0.06 0.12 CANCELED 0.08 0.04 0.04 0.04   BASOS ABS 10*3/mm3 0.06 0.05 CANCELED 0.05 0.03 0.05 0.06   IMMATURE GRANS (ABS) 10*3/mm3 0.02 0.03  --  0.01 0.01 0.01 0.02   NRBC /100 WBC 0.0 0.0  --  0.0 0.0 0.0 0.0       Lab Results - Last 18 Months   Lab Units 05/16/22  1359 01/17/22  1435 01/17/22  1013 10/18/21  1412 09/20/21  1421 08/17/21  1326 05/18/21  1054   GLUCOSE mg/dL 99 114* 85 89 87 89 93   SODIUM mmol/L 140 138 141 139 141 140 139   POTASSIUM mmol/L 3.9 3.7 5.1 4.1 3.8 3.6 4.6   TOTAL CO2 mmol/L  --   --  29.2*  --   --   --   --    CO2 mmol/L 24.0 28.0  --  28.0 26.0 25.0 28.0   CHLORIDE mmol/L 103 102 103 101 103 101 99   ANION GAP mmol/L 13.0 8.0  --  10.0 12.0 14.0 12.0   CREATININE mg/dL 0.52* 0.49* 0.61 0.49* 0.49* 0.49* 0.55*   BUN mg/dL 10 18 17 9 8 12 9   BUN / CREAT RATIO  19.2 36.7* 27.9* 18.4 16.3 24.5 16.4   CALCIUM mg/dL 9.8 9.2 9.6 9.7 9.8 10.2 10.3   EGFR IF NONAFRICN AM mL/min/1.73  --  129 100 129 129 129 114   ALK PHOS U/L 113 96 98 108 110 112 108   TOTAL PROTEIN g/dL 7.6 7.5  --  8.1 7.7 8.0 7.7   ALT (SGPT) U/L 17 19 19 22 20 25 29   AST (SGOT) U/L 21 17 22 25 26 22 30   BILIRUBIN mg/dL 0.5 0.4 0.5 0.5 0.6 0.8 0.6   ALBUMIN g/dL 4.80 4.20 4.40 4.60 4.60 5.00 4.90   GLOBULIN gm/dL 2.8 3.3  --  3.5 3.1 3.0 2.8       Lab Results - Last 18 Months   Lab Units 05/16/22  1359 01/17/22  1435 10/18/21  1412 09/20/21  1421 08/17/21  1326 05/18/21  1053   CEA ng/mL 1.35 1.07 1.51 1.83 1.74 1.32       Lab Results - Last 18 Months   Lab Units 05/16/22  1359  01/17/22  1435 01/17/22  1013 10/18/21  1412 09/20/21  1421 08/17/21  1326 05/18/21  1054 05/18/21  1053 01/20/21  1024 12/30/20  1019   IRON mcg/dL 90 78  --  72 89 110 103  --    < >  --    TIBC mcg/dL 432 347  --  375 389 425 407  --    < >  --    IRON SATURATION % 21 22  --  19* 23 26 25  --    < >  --    FERRITIN ng/mL 113.30 101.10  --  186.90* 137.40 226.40* 171.90*  --    < >  --    TSH uIU/mL  --   --  2.100  --   --   --   --   --   --  1.270   FOLATE ng/mL >20.00 17.40  16.40  --  >20.00 16.30 14.50  --  >20.00   < >  --     < > = values in this interval not displayed.         PAST MEDICAL HISTORY:  ALLERGIES:  Allergies   Allergen Reactions   • Demerol [Meperidine] Hives   • Morphine And Related Hives     CURRENT MEDICATIONS:  Outpatient Encounter Medications as of 5/23/2022   Medication Sig Dispense Refill   • calcium carbonate-vitamin d (Calcium 600+D) 600-400 MG-UNIT per tablet Take 1 tablet by mouth 2 (Two) Times a Day. 60 tablet 3   • conjugated estrogens (Premarin) 0.625 MG/GM vaginal cream Apply 1/2 gram using finger two nights weekly.  OK to use every night for first two weeks (Patient taking differently: Apply 1/2 gram using finger two nights weekly.  OK to use every night for first two weeks) 30 g 11   • letrozole (FEMARA) 2.5 MG tablet Take 1 tablet by mouth Daily. 30 tablet 11   • Multiple Vitamins-Minerals (MULTIVITAMIN ADULT PO) Take 1 tablet by mouth Daily.     • ondansetron (ZOFRAN) 4 MG tablet Take 2 tablets by mouth Every 8 (Eight) Hours As Needed for Nausea or Vomiting. 30 tablet 1   • polyethylene glycol (MIRALAX) packet Take 17 g by mouth Daily.     • [DISCONTINUED] ciprofloxacin (CIPRO) 250 MG tablet Take 1 tablet by mouth 2 (Two) Times a Day. 10 tablet 0     No facility-administered encounter medications on file as of 5/23/2022.     Adult illnesses:  Colon polyps  Obesity  Spinal stenosis neck   Herniated cervical disc without myelopathy  Borderline blood pressure  History of  dysphagia    Past surgeries:  Anterior cervical discectomy w fusion, 09/2018  Appendectomy  BSO/GANGA  Cholecystectomy  Colonoscopy, 02/10/2017  BTL  Tonsillectomy  Breast biopsy  Left partial mastectomy, 01/10/2020  03/05/2020 - Mediport placement per Dr. Edmond  03/04/2021-Mediport removal per Dr. Edmond.    ADULT ILLNESSES:  Patient Active Problem List   Diagnosis Code   • Family history of colon cancer Z80.0   • Laryngopharyngeal reflux K21.9   • Pharyngoesophageal dysphagia R13.14   • Class 1 obesity due to excess calories without serious comorbidity with body mass index (BMI) of 31.0 to 31.9 in adult E66.09, Z68.31   • Spinal stenosis in cervical region M48.02   • Cervical radiculopathy M54.12   • Herniated cervical disc without myelopathy M50.20   • Borderline blood pressure R03.0   • Invasive ductal carcinoma of breast, female, left (HCC) C50.912   • Former smoker Z87.891   • S/P lumpectomy, left breast Z98.890   • S/P lymph node biopsy Z98.890   • On antineoplastic chemotherapy Z79.899   • Estrogen receptor positive status (ER+) Z17.0   • History of radiation therapy Z92.3   • History of adenomatous polyp of colon Z86.010     SURGERIES:  Past Surgical History:   Procedure Laterality Date   • ANTERIOR CERVICAL DISCECTOMY W/ FUSION N/A 9/7/2018    Procedure: CERVICAL DISCECTOMY ANTERIOR WITH FUSION C6-7;  Surgeon: Chris Mcfadden MD;  Location:  [facility] OR;  Service: Neurosurgery   • APPENDECTOMY     • BILATERAL SALPINGO OOPHORECTOMY     • BREAST BIOPSY     • BREAST LUMPECTOMY     • CERVICAL CORPECTOMY N/A 9/7/2018    Procedure: CERVICAL CORPECTOMY C6-7;  Surgeon: Chris Mcfadden MD;  Location:  [facility];  Service: Neurosurgery   • CHOLECYSTECTOMY     • COLONOSCOPY N/A 2/10/2017    One 8mm adenomatous polyp in the rectum; The examination was otherwise normal on direct and retroflexion views; A tattoo was seen in the rectum-A post-polypectomy scar was found at the tattoo site-There was no evidence of  residual polyp tissue; Repeat 5 years   • COLONOSCOPY  09/05/2012    One 13mm polyp in the rectum-Marked with Maxine ink-path shows Ganglioneuroma; The examination was otherwise normal; Repeat 4 years   • COLONOSCOPY N/A 2/4/2022    Procedure: COLONOSCOPY WITH ANESTHESIA;  Surgeon: Elina Gonsalez MD;  Location:  [facility];  Service: Gastroenterology;  Laterality: N/A;  pre family hx colon ca; hx adenomatous polyp  post normal  Jones Elias, DO   • HYSTERECTOMY  1998    Had hysterectomy for painful periods and bleeding. (Dr. Patel) TLH w/ BSO   • LAPAROSCOPIC TUBAL LIGATION     • MASTECTOMY W/ SENTINEL NODE BIOPSY Left 1/10/2020    Procedure: LEFT NEEDLE DIRECTED ULTRASOUND GUIDED PARTIAL MASTECTOMY WITH SENTINEL LYMPH NODE BIOPSY, INJECTION AND SCAN, RADIOLOGIST WILL INJECT;  Surgeon: Flor Edmond MD;  Location:  [facility];  Service: General   • TONSILLECTOMY     • VENOUS ACCESS DEVICE (PORT) INSERTION N/A 3/5/2020    Procedure: INSERTION VENOUS ACCESS DEVICE EXCISION SKIN LESION X 2 CHEST AND ABDOMEN;  Surgeon: Flor Edmond MD;  Location:  [facility];  Service: General;  Laterality: N/A;   • VENOUS ACCESS DEVICE (PORT) REMOVAL Left 3/4/2021    Procedure: REMOVAL OF SINGLE LUMEN PORT;  Surgeon: Flor Edmond MD;  Location:  [facility];  Service: General;  Laterality: Left;     HEALTH MAINTENANCE ITEMS:  Health Maintenance Due   Topic Date Due   • COVID-19 Vaccine (3 - Moderna risk 4-dose series) 12/17/2021       <no information>  Last Completed Colonoscopy          COLORECTAL CANCER SCREENING (COLONOSCOPY - Every 5 Years) Next due on 2/4/2027 02/04/2022  COLONOSCOPY    02/04/2022  Surgical Procedure: COLONOSCOPY    02/10/2017  COLONOSCOPY    02/10/2017  Surgical Procedure: COLONOSCOPY    09/05/2012  SCANNED - COLONOSCOPY              Immunization History   Administered Date(s) Administered   • COVID-19 (MODERNA) 1st, 2nd, 3rd Dose Only 01/09/2021, 02/06/2021   • COVID-19 (MODERNA) BOOSTER  "11/19/2021   • Flu Vaccine Intradermal Quad 18-64YR 10/07/2021   • Influenza, Unspecified 11/18/2017, 11/05/2018, 10/15/2019, 10/27/2020   • Shingrix 10/15/2021, 12/16/2021   • Tdap 08/29/2018     Last Completed Mammogram          MAMMOGRAM (Yearly) Order placed this encounter    01/06/2022  Mammo Diagnostic Digital Tomosynthesis Bilateral With CAD    12/17/2020  Mammo Diagnostic Digital Tomosynthesis Bilateral With CAD    12/27/2019  Mammo Diagnostic Digital Tomosynthesis Left With CAD    12/13/2019  Mammo Screening Digital Tomosynthesis Bilateral With CAD    06/01/2018  Mammo Screening Digital Tomosynthesis Bilateral With CAD    Only the first 5 history entries have been loaded, but more history exists.                  FAMILY HISTORY:  Family History   Problem Relation Age of Onset   • Colon cancer Maternal Grandmother         Unknown age   • Cancer Father    • Heart disease Father    • Diabetes Mother    • Hypertension Mother    • Stroke Mother    • Breast cancer Sister    • No Known Problems Daughter    • No Known Problems Sister    • Colon polyps Neg Hx    • Esophageal cancer Neg Hx    • Liver cancer Neg Hx    • Liver disease Neg Hx    • Rectal cancer Neg Hx    • Stomach cancer Neg Hx    • Ovarian cancer Neg Hx    • BRCA 1/2 Neg Hx    • Endometrial cancer Neg Hx      SOCIAL HISTORY:  Social History     Socioeconomic History   • Marital status:    • Number of children: 1   Tobacco Use   • Smoking status: Never Smoker   • Smokeless tobacco: Never Used   • Tobacco comment: \"i never really smoked\"   Vaping Use   • Vaping Use: Never used   Substance and Sexual Activity   • Alcohol use: Not Currently   • Drug use: No   • Sexual activity: Defer       REVIEW OF SYSTEMS:  Review of Systems   Constitutional: Negative.         She manages her ADLs' including chores, errands, driving.  Is still working full time.  Has been active.  Is up and about > 75% of the time    Femara tolerance:  Feels much better since " "stopping the Arimidex.  \"I'm not in a dark place, with no depression, not irritable and not grouchy.\"  No other problems.  No new arthralgias.  Mild to moderate hot flashes.  \"Not bad.\"   HENT: Negative.    Eyes: Negative.    Respiratory: Negative.    Cardiovascular: Negative.    Gastrointestinal: Negative for diarrhea, nausea (\"not anymore.\") and GERD (\"Normal.\" Still on omeprazole as needed, \"not often.\").   Endocrine: Positive for heat intolerance (Lingering vasomotor changes.  \"Off and on through the day but not bad so I can live with it.\"  ).   Genitourinary: Negative.    Musculoskeletal: Negative for arthralgias (knees, and hips \"been ok.\" Does not impede her activities.).   Allergic/Immunologic: Negative.    Neurological: Positive for numbness (paresthesias of the right toes.  Says not worse. The symptoms of the left foot and hands have resolved).   Hematological: Negative.    Psychiatric/Behavioral: Negative for depressed mood (Resolution of irritability and grouchiness since stopping Arimidex last 08/31/2021.). The patient is not nervous/anxious (\"not bad\").          /68   Pulse 112   Temp 97 °F (36.1 °C)   Resp 16   Ht 152.4 cm (60\")   Wt 73.4 kg (161 lb 12.8 oz)   LMP  (LMP Unknown)   SpO2 98%   Breastfeeding No   BMI 31.60 kg/m²  Body surface area is 1.71 meters squared.  Pain Score    05/23/22 1417   PainSc: 0-No pain       Physical Exam:  Physical Exam   Constitutional: She is oriented to person, place, and time. She appears well-developed and well-nourished. No distress.   Pleasant, heavy set, cooperative, modestly kept female.  Appears her age.  ECOG 0.      She has lost 4 pounds (in addition to 4 pounds at her prior visit) in the interval.   HENT:   Head: Normocephalic and atraumatic.   Mouth/Throat: No oropharyngeal exudate.   Hair has regrown    Wearing a surgical mask   Eyes: Pupils are equal, round, and reactive to light. Conjunctivae are normal. No scleral icterus.   Neck: No JVD " present. No tracheal deviation present. No thyromegaly present.   Cardiovascular: Normal rate, regular rhythm and normal heart sounds. Exam reveals no friction rub.   No murmur heard.  Pulmonary/Chest: Effort normal and breath sounds normal. No stridor. No respiratory distress. She has no wheezes. She has no rales.   Chaperoned exam: Lisette Edmond MA    Port site in the left upper chest is healed.  The device has been removed.    Breasts:   The left breast lumpectomy and sentinel node biopsy site in the left axilla are healed.  There is no incisional tenderness in either site.  Some subtle skin induration otherwise the radiation associated skin changes are no longer evident.  The rest of the breast was without obvious nodularity skin changes, no nipple discharge.  Right breast without masses, skin changes nor nipple discharge.  No associated axillary nor supraclavicular adenopathy bilaterally.   Abdominal: Soft. Bowel sounds are normal. She exhibits no distension and no mass. There is no abdominal tenderness. There is no rebound and no guarding.   Musculoskeletal: Normal range of motion. No deformity.   Lymphadenopathy:     She has no cervical adenopathy.        Right cervical: No superficial cervical, no deep cervical and no posterior cervical adenopathy present.       Left cervical: No superficial cervical, no deep cervical and no posterior cervical adenopathy present.        Right: No supraclavicular adenopathy present.        Left: No supraclavicular adenopathy present.   Neurological: She is alert and oriented to person, place, and time. She displays normal reflexes. No cranial nerve deficit. She exhibits normal muscle tone. Coordination normal.   Skin: Skin is warm and dry. No rash noted. No erythema. No pallor.   Psychiatric: Her behavior is normal. Judgment and thought content normal.   Vitals reviewed.    ASSESSMENT:   1.  Invasive and in situ ductal carcinoma  left breast.                 Stage:  IA  (pT1a, pN0, G2) ER  100% positive, TX  25%, HER-2/lamberto - 2+ (IHC)/FISH -a focal positive score is present in an area corresponding to approximately 15% of the tumor (signal ratio: 2.9).                 Tumor [place]:  5 mm.  Tumor focality: Single focus of invasive carcinoma.  No EIC nor LCIS.  Lymphovascular invasion: Not identified.  Dermal lymphovascular invasion: No skin present.  Microcalcifications: Not identified.  Margins uninvolved by invasive carcinoma.  Lymph nodes: Number of lymph nodes examined: 1 (0/1).                 Tumor Status:    -- Underwent lumpectomy and SNB, 01/10/2020.   -- Completed adjuvant paclitaxel, 05/27/2020; adjuvant Herceptin, 02/10/2021; adjuvant radiation, 08/06/2020 (above)  -- Adjuvant Arimidex since 02/03/2020 through 09/27/2021-stopped due to depression, and irritability.  -- Adjuvant Femara 2.5 mg p.o. daily beginning 09/28/2021 through present  -- 01/06/2022- mammogram. No mammographic evidence of malignancy. BI-RADS Category 2. Benign.          2.   Obesity.  Moderate (BMI 30 - from 34)        3.   Spinal stenosis neck, quiescent         4.   Herniated cervical disc without myelopathy        5.   Anemia, likely chemo associated.    --Resolved, Hgb 13.4, 05/16/2022 (prior: Hgb 11.5-13.8)  -- 02/04/2022-colonoscopy--no residual polyps otherwise normal.  Repeat 5 years        6.   GERD, quiescent        7.   Mood changes, to include mood lability, depression, irritability and grouchiness.  Resolved since stopping Arimidex.       RECOMMENDATIONS:         1.   Apprised of the labs, 05/16/2022.  Normal CBC, normal CMP, normal CEA, normal CA-27-29, repleted B12/folate, repleted serum iron, iron saturation 21% (from 22%; 19%; 23%; 25%; 24%), repleted ferritin (113; from 101; 186; 137; 226; 171; 157; 138; 141; 216).          2.    Femara tolerance discussed.  Doing well so far with no mood lability and no new arthralgias.  Feels she can press on with this.        3.    Schedule  mammogram, 01/06/2023.        4.    Schedule DEXA scan, 06/23/2022         5.    Previously noted that we are unable to repeat FISH testing for HER-2 on the tumor specimen from 01/10/2020 -pathology block did not contain any more tumor - personally discussed/confirmed with Dr. Burgos of pathology.        6.   Care previously discussed with Dr. Jaimes on 02/28/2020 after she saw the patient on 02/25/2020 (encounter reviewed).  Pathology was reviewed at [place] and discussed with the patient.  Recommendations/plan: Discussed that since her cancer is strongly ER positive and low histologic grade/low proliferative rate, and HER-2 on the biopsy was borderline recommended repeat HER-2 testing on her surgical pathology.  Defer to his negative proceed with only endocrine therapy and radiation.  If HER-2 is confirmed to be positive, recommend adjuvant weekly Taxol x12+ Herceptin every 3 weeks to complete 1 year of HER-2 directed therapy.  These plans were personally discussed with the patient (via telephone) on 02/28/2020 I also personally asked the pathologist (Dr. Radha Burgos) to send off the biopsy specimen for repeat FISH HER-2 testing.          7.   Reviewed NCCN guidelines version 3.2019 invasive breast cancer.  For tumors (=/> 5 mm), and pN0, recommend adjuvant endocrine therapy +/- adjuvant chemotherapy with trastuzumab (category 2B).                  8.  Rx:   · Oscal 600/400 po bid # 60 - OTC  · Femara 2.5 mg po qd # 30 - 11 RF  · Stay off B12 supplements           9.  Previously discussed the rationale for adjuvant hormonal manipulation with AIs (see above) and potential toxicities of AI therapy are discussed (to include but not limited to: Hot flashes, asthenia, pain, arthralgias, arthritis, nausea/vomiting, headache, pharyngitis, depression, rash, hypertension, lymphedema, insomnia, edema, weight gain, dyspnea, abdominal pain, constipation, hyperlipidemia, osteoporosis, fractures, cough, bone pain, diarrhea,  breast pain, paresthesias, infections, cataracts, myalgias, thromboembolism, angina, Lyles-Yung syndrome, erythema multiforme, anemia, leukopenia, stroke, myocardial infarction, osteoporosis, fractures). Questions are answered. She agrees to press on..             10.  Return to the [place] office with pre-office serum iron, iron saturation, ferritin B12, folate, CMP, CEA, CA 27-29 and CBC and differential in 24 weeks.      .     MEDICAL DECISION MAKING: Moderate complexity   AMOUNT OF DATA: Moderate to Extensive     I spent ~32 minutes caring for Chasidy on this date of service. This time includes time spent by me in the following activities: preparing for the visit, reviewing tests, performing a medically appropriate examination and/or evaluation, counseling and educating the patient/family/caregiver, ordering medications, tests, or procedures and documenting information in the medical record      Cc:  Abigail Jaimes MD- [facility]          MD Kailash Sawant MD Robert Learch, MD

## 2022-05-23 ENCOUNTER — OFFICE VISIT (OUTPATIENT)
Dept: ONCOLOGY | Facility: CLINIC | Age: 62
End: 2022-05-23

## 2022-05-23 VITALS
OXYGEN SATURATION: 98 % | RESPIRATION RATE: 16 BRPM | BODY MASS INDEX: 31.77 KG/M2 | HEIGHT: 60 IN | DIASTOLIC BLOOD PRESSURE: 68 MMHG | HEART RATE: 112 BPM | WEIGHT: 161.8 LBS | SYSTOLIC BLOOD PRESSURE: 142 MMHG | TEMPERATURE: 97 F

## 2022-05-23 DIAGNOSIS — C50.912 INVASIVE DUCTAL CARCINOMA OF BREAST, FEMALE, LEFT: Primary | ICD-10-CM

## 2022-05-23 PROCEDURE — 99214 OFFICE O/P EST MOD 30 MIN: CPT | Performed by: INTERNAL MEDICINE

## 2022-06-01 DIAGNOSIS — C50.912 INVASIVE DUCTAL CARCINOMA OF BREAST, FEMALE, LEFT: Primary | ICD-10-CM

## 2022-06-08 ENCOUNTER — APPOINTMENT (OUTPATIENT)
Dept: MAMMOGRAPHY | Facility: HOSPITAL | Age: 62
End: 2022-06-08

## 2022-06-15 ENCOUNTER — TELEPHONE (OUTPATIENT)
Dept: ONCOLOGY | Facility: CLINIC | Age: 62
End: 2022-06-15

## 2022-06-15 ENCOUNTER — HOSPITAL ENCOUNTER (OUTPATIENT)
Dept: MAMMOGRAPHY | Facility: HOSPITAL | Age: 62
Discharge: HOME | End: 2022-06-15

## 2022-06-15 ENCOUNTER — HOSPITAL ENCOUNTER (OUTPATIENT)
Dept: BONE DENSITY | Facility: HOSPITAL | Age: 62
Discharge: HOME | End: 2022-06-15

## 2022-06-15 DIAGNOSIS — C50.912 INVASIVE DUCTAL CARCINOMA OF BREAST, FEMALE, LEFT: ICD-10-CM

## 2022-06-15 DIAGNOSIS — Z79.811 LONG TERM CURRENT USE OF AROMATASE INHIBITOR: ICD-10-CM

## 2022-06-15 DIAGNOSIS — M85.852 OSTEOPENIA OF LEFT HIP: Primary | ICD-10-CM

## 2022-06-15 PROBLEM — M81.0 OSTEOPOROSIS WITHOUT CURRENT PATHOLOGICAL FRACTURE: Status: ACTIVE | Noted: 2022-06-15

## 2022-06-15 PROCEDURE — 77080 DXA BONE DENSITY AXIAL: CPT

## 2022-06-15 NOTE — TELEPHONE ENCOUNTER
----- Message from Tommy Wheat MD sent at 6/15/2022 10:38 AM CDT -----  Start prolia 60 mg sc then q 6 months

## 2022-06-15 NOTE — TELEPHONE ENCOUNTER
Called and notified patient Chasidy Tejada that Dr Wheat has reviewed her Bone Density results and recommends starting Prolia injections Q 6 mo going forward pt v/u     Apt amy 6/28/22 @ 2 pm  Patient v/u time and date of apt.

## 2022-06-28 ENCOUNTER — LAB (OUTPATIENT)
Dept: LAB | Facility: HOSPITAL | Age: 62
End: 2022-06-28

## 2022-06-28 ENCOUNTER — INFUSION (OUTPATIENT)
Dept: ONCOLOGY | Facility: HOSPITAL | Age: 62
End: 2022-06-28

## 2022-06-28 VITALS
HEIGHT: 60 IN | RESPIRATION RATE: 16 BRPM | HEART RATE: 65 BPM | TEMPERATURE: 97.5 F | BODY MASS INDEX: 31.53 KG/M2 | WEIGHT: 160.6 LBS | OXYGEN SATURATION: 100 % | DIASTOLIC BLOOD PRESSURE: 73 MMHG | SYSTOLIC BLOOD PRESSURE: 135 MMHG

## 2022-06-28 DIAGNOSIS — M85.852 OSTEOPENIA OF LEFT HIP: Primary | ICD-10-CM

## 2022-06-28 DIAGNOSIS — Z79.811 LONG TERM CURRENT USE OF AROMATASE INHIBITOR: ICD-10-CM

## 2022-06-28 DIAGNOSIS — M85.852 OSTEOPENIA OF LEFT HIP: ICD-10-CM

## 2022-06-28 LAB
ALBUMIN SERPL-MCNC: 4.8 G/DL (ref 3.5–5.2)
ALBUMIN/GLOB SERPL: 1.5 G/DL
ALP SERPL-CCNC: 108 U/L (ref 39–117)
ALT SERPL W P-5'-P-CCNC: 19 U/L (ref 1–33)
ANION GAP SERPL CALCULATED.3IONS-SCNC: 11 MMOL/L (ref 5–15)
AST SERPL-CCNC: 20 U/L (ref 1–32)
BASOPHILS # BLD AUTO: 0.06 10*3/MM3 (ref 0–0.2)
BASOPHILS NFR BLD AUTO: 0.7 % (ref 0–1.5)
BILIRUB SERPL-MCNC: 0.5 MG/DL (ref 0–1.2)
BUN SERPL-MCNC: 11 MG/DL (ref 8–23)
BUN/CREAT SERPL: 22 (ref 7–25)
CALCIUM SPEC-SCNC: 9.8 MG/DL (ref 8.6–10.5)
CHLORIDE SERPL-SCNC: 101 MMOL/L (ref 98–107)
CO2 SERPL-SCNC: 26 MMOL/L (ref 22–29)
CREAT SERPL-MCNC: 0.5 MG/DL (ref 0.57–1)
DEPRECATED RDW RBC AUTO: 44 FL (ref 37–54)
EGFRCR SERPLBLD CKD-EPI 2021: 107.5 ML/MIN/1.73
EOSINOPHIL # BLD AUTO: 0.06 10*3/MM3 (ref 0–0.4)
EOSINOPHIL NFR BLD AUTO: 0.7 % (ref 0.3–6.2)
ERYTHROCYTE [DISTWIDTH] IN BLOOD BY AUTOMATED COUNT: 13.3 % (ref 12.3–15.4)
GLOBULIN UR ELPH-MCNC: 3.1 GM/DL
GLUCOSE SERPL-MCNC: 83 MG/DL (ref 65–99)
HCT VFR BLD AUTO: 43 % (ref 34–46.6)
HGB BLD-MCNC: 14.5 G/DL (ref 12–15.9)
IMM GRANULOCYTES # BLD AUTO: 0.02 10*3/MM3 (ref 0–0.05)
IMM GRANULOCYTES NFR BLD AUTO: 0.2 % (ref 0–0.5)
LYMPHOCYTES # BLD AUTO: 3.44 10*3/MM3 (ref 0.7–3.1)
LYMPHOCYTES NFR BLD AUTO: 41.5 % (ref 19.6–45.3)
MAGNESIUM SERPL-MCNC: 2.1 MG/DL (ref 1.6–2.4)
MCH RBC QN AUTO: 30.8 PG (ref 26.6–33)
MCHC RBC AUTO-ENTMCNC: 33.7 G/DL (ref 31.5–35.7)
MCV RBC AUTO: 91.3 FL (ref 79–97)
MONOCYTES # BLD AUTO: 0.53 10*3/MM3 (ref 0.1–0.9)
MONOCYTES NFR BLD AUTO: 6.4 % (ref 5–12)
NEUTROPHILS NFR BLD AUTO: 4.17 10*3/MM3 (ref 1.7–7)
NEUTROPHILS NFR BLD AUTO: 50.5 % (ref 42.7–76)
NRBC BLD AUTO-RTO: 0 /100 WBC (ref 0–0.2)
PHOSPHATE SERPL-MCNC: 3.8 MG/DL (ref 2.5–4.5)
PLATELET # BLD AUTO: 360 10*3/MM3 (ref 140–450)
PMV BLD AUTO: 10.2 FL (ref 6–12)
POTASSIUM SERPL-SCNC: 4.1 MMOL/L (ref 3.5–5.2)
PROT SERPL-MCNC: 7.9 G/DL (ref 6–8.5)
RBC # BLD AUTO: 4.71 10*6/MM3 (ref 3.77–5.28)
SODIUM SERPL-SCNC: 138 MMOL/L (ref 136–145)
WBC NRBC COR # BLD: 8.28 10*3/MM3 (ref 3.4–10.8)

## 2022-06-28 PROCEDURE — 84100 ASSAY OF PHOSPHORUS: CPT

## 2022-06-28 PROCEDURE — 36415 COLL VENOUS BLD VENIPUNCTURE: CPT

## 2022-06-28 PROCEDURE — 83735 ASSAY OF MAGNESIUM: CPT

## 2022-06-28 PROCEDURE — 85025 COMPLETE CBC W/AUTO DIFF WBC: CPT

## 2022-06-28 PROCEDURE — 96372 THER/PROPH/DIAG INJ SC/IM: CPT

## 2022-06-28 PROCEDURE — 80053 COMPREHEN METABOLIC PANEL: CPT

## 2022-06-28 PROCEDURE — 25010000002 DENOSUMAB 60 MG/ML SOLUTION PREFILLED SYRINGE: Performed by: INTERNAL MEDICINE

## 2022-06-28 RX ADMIN — DENOSUMAB 60 MG: 60 INJECTION SUBCUTANEOUS at 15:26

## 2022-08-09 DIAGNOSIS — F41.9 ANXIETY: Primary | ICD-10-CM

## 2022-08-09 RX ORDER — BUSPIRONE HYDROCHLORIDE 5 MG/1
5 TABLET ORAL 3 TIMES DAILY PRN
Qty: 90 TABLET | Refills: 2 | Status: SHIPPED | OUTPATIENT
Start: 2022-08-09 | End: 2023-04-06

## 2022-09-13 ENCOUNTER — LAB (OUTPATIENT)
Dept: LAB | Facility: HOSPITAL | Age: 62
End: 2022-09-13

## 2022-09-13 DIAGNOSIS — Z00.01 ANNUAL VISIT FOR GENERAL ADULT MEDICAL EXAMINATION WITH ABNORMAL FINDINGS: ICD-10-CM

## 2022-09-13 LAB
CHOLEST SERPL-MCNC: 214 MG/DL (ref 0–200)
HBA1C MFR BLD: 5.6 % (ref 4.8–5.6)
HDLC SERPL-MCNC: 61 MG/DL (ref 40–60)
LDLC SERPL CALC-MCNC: 139 MG/DL (ref 0–100)
LDLC/HDLC SERPL: 2.25 {RATIO}
TRIGL SERPL-MCNC: 80 MG/DL (ref 0–150)
VLDLC SERPL-MCNC: 14 MG/DL (ref 5–40)

## 2022-09-13 PROCEDURE — 83036 HEMOGLOBIN GLYCOSYLATED A1C: CPT

## 2022-09-13 PROCEDURE — 36415 COLL VENOUS BLD VENIPUNCTURE: CPT

## 2022-09-13 PROCEDURE — 80061 LIPID PANEL: CPT

## 2022-10-18 ENCOUNTER — OFFICE VISIT (OUTPATIENT)
Dept: INTERNAL MEDICINE | Facility: CLINIC | Age: 62
End: 2022-10-18

## 2022-10-18 VITALS
RESPIRATION RATE: 16 BRPM | BODY MASS INDEX: 31.49 KG/M2 | OXYGEN SATURATION: 98 % | TEMPERATURE: 97.8 F | SYSTOLIC BLOOD PRESSURE: 128 MMHG | WEIGHT: 160.4 LBS | DIASTOLIC BLOOD PRESSURE: 80 MMHG | HEIGHT: 60 IN | HEART RATE: 106 BPM

## 2022-10-18 DIAGNOSIS — R55 VASOVAGAL SYNCOPE: ICD-10-CM

## 2022-10-18 DIAGNOSIS — R19.7 DIARRHEA, UNSPECIFIED TYPE: ICD-10-CM

## 2022-10-18 DIAGNOSIS — E66.09 CLASS 1 OBESITY DUE TO EXCESS CALORIES WITHOUT SERIOUS COMORBIDITY WITH BODY MASS INDEX (BMI) OF 31.0 TO 31.9 IN ADULT: ICD-10-CM

## 2022-10-18 DIAGNOSIS — R11.0 NAUSEA: ICD-10-CM

## 2022-10-18 DIAGNOSIS — C50.912 INVASIVE DUCTAL CARCINOMA OF BREAST, FEMALE, LEFT: ICD-10-CM

## 2022-10-18 DIAGNOSIS — Z00.01 ANNUAL VISIT FOR GENERAL ADULT MEDICAL EXAMINATION WITH ABNORMAL FINDINGS: Primary | ICD-10-CM

## 2022-10-18 PROCEDURE — 99213 OFFICE O/P EST LOW 20 MIN: CPT | Performed by: NURSE PRACTITIONER

## 2022-10-18 PROCEDURE — 99396 PREV VISIT EST AGE 40-64: CPT | Performed by: NURSE PRACTITIONER

## 2022-10-18 RX ORDER — ONDANSETRON 4 MG/1
4 TABLET, ORALLY DISINTEGRATING ORAL EVERY 8 HOURS PRN
Qty: 30 TABLET | Refills: 1 | Status: SHIPPED | OUTPATIENT
Start: 2022-10-18 | End: 2022-12-11

## 2022-10-18 NOTE — PROGRESS NOTES
CC: annual physical and diarrhea     HPI     Chasidy Tejada is a 62 y.o. female who presents today for evaluation of the above problems. She has been doing well over all the past year. She continues to follow with oncology every 6 months for her history of breast cancer and continues taking Femara.     She also mentions having watery stool since yesterday and is feeling a little nauseated. She had cold sweats this morning passed out while using the bathroom. She does have a small bruise to her left brow bone from falling. She feels ok today. She took imodium once and says this did help slow the frequency of her bowl movements.         ROS:  Review of Systems   Constitutional: Negative for fever.   HENT: Negative.    Respiratory: Negative for cough and shortness of breath.    Cardiovascular: Negative.    Gastrointestinal: Positive for diarrhea and nausea. Negative for abdominal pain, blood in stool and constipation.   Genitourinary: Negative.    Musculoskeletal: Negative.    Skin: Negative.    Neurological: Positive for syncope. Negative for dizziness, tremors, seizures, weakness, light-headedness and headaches.   Psychiatric/Behavioral: Negative.        Allergies   Allergen Reactions   • Demerol [Meperidine] Hives   • Morphine And Related Hives     Past Medical History:   Diagnosis Date   • Acid reflux    • Asthma     long time ago    • Breast cancer (HCC)    • Drug therapy    • Family history of colon cancer    • History of adenomatous polyp of colon    • History of breast cancer    • Hx of radiation therapy    • Panic attack    • Right arm weakness      Past Surgical History:   Procedure Laterality Date   • ANTERIOR CERVICAL DISCECTOMY W/ FUSION N/A 9/7/2018    Procedure: CERVICAL DISCECTOMY ANTERIOR WITH FUSION C6-7;  Surgeon: Chris Mcfadden MD;  Location: [facility];  Service: Neurosurgery   • APPENDECTOMY     • BILATERAL SALPINGO OOPHORECTOMY     • BREAST BIOPSY     • BREAST LUMPECTOMY     • CERVICAL  CORPECTOMY N/A 9/7/2018    Procedure: CERVICAL CORPECTOMY C6-7;  Surgeon: Chris Mcfadden MD;  Location:  [facility];  Service: Neurosurgery   • CHOLECYSTECTOMY     • COLONOSCOPY N/A 2/10/2017    One 8mm adenomatous polyp in the rectum; The examination was otherwise normal on direct and retroflexion views; A tattoo was seen in the rectum-A post-polypectomy scar was found at the tattoo site-There was no evidence of residual polyp tissue; Repeat 5 years   • COLONOSCOPY  09/05/2012    One 13mm polyp in the rectum-Marked with Maxine ink-path shows Ganglioneuroma; The examination was otherwise normal; Repeat 4 years   • COLONOSCOPY N/A 2/4/2022    Procedure: COLONOSCOPY WITH ANESTHESIA;  Surgeon: Elina Gonsalez MD;  Location: [facility] ENDOSCOPY;  Service: Gastroenterology;  Laterality: N/A;  pre family hx colon ca; hx adenomatous polyp  post normal  Jones Elias, DO   • HYSTERECTOMY  1998    Had hysterectomy for painful periods and bleeding. (Dr. Patel) TLH w/ BSO   • LAPAROSCOPIC TUBAL LIGATION     • MASTECTOMY W/ SENTINEL NODE BIOPSY Left 1/10/2020    Procedure: LEFT NEEDLE DIRECTED ULTRASOUND GUIDED PARTIAL MASTECTOMY WITH SENTINEL LYMPH NODE BIOPSY, INJECTION AND SCAN, RADIOLOGIST WILL INJECT;  Surgeon: Flor Edmond MD;  Location:  [facility];  Service: General   • TONSILLECTOMY     • VENOUS ACCESS DEVICE (PORT) INSERTION N/A 3/5/2020    Procedure: INSERTION VENOUS ACCESS DEVICE EXCISION SKIN LESION X 2 CHEST AND ABDOMEN;  Surgeon: Flor Edmond MD;  Location:  [facility];  Service: General;  Laterality: N/A;   • VENOUS ACCESS DEVICE (PORT) REMOVAL Left 3/4/2021    Procedure: REMOVAL OF SINGLE LUMEN PORT;  Surgeon: Flor Edmond MD;  Location:  [facility];  Service: General;  Laterality: Left;     Family History   Problem Relation Age of Onset   • Colon cancer Maternal Grandmother         Unknown age   • Cancer Father    • Heart disease Father    • Diabetes Mother    • Hypertension Mother    • Stroke  Mother    • Breast cancer Sister    • No Known Problems Daughter    • No Known Problems Sister    • Colon polyps Neg Hx    • Esophageal cancer Neg Hx    • Liver cancer Neg Hx    • Liver disease Neg Hx    • Rectal cancer Neg Hx    • Stomach cancer Neg Hx    • Ovarian cancer Neg Hx    • BRCA 1/2 Neg Hx    • Endometrial cancer Neg Hx       reports that she has never smoked. She has never used smokeless tobacco. She reports that she does not currently use alcohol. She reports that she does not use drugs.      Current Outpatient Medications:   •  busPIRone (BUSPAR) 5 MG tablet, Take 1 tablet by mouth 3 (Three) times a day as needed for anxiety., Disp: 90 tablet, Rfl: 2  •  calcium carbonate-vitamin d (Calcium 600+D) 600-400 MG-UNIT per tablet, Take 1 tablet by mouth 2 (Two) Times a Day., Disp: 60 tablet, Rfl: 3  •  conjugated estrogens (Premarin) 0.625 MG/GM vaginal cream, Apply 1/2 gram using finger two nights weekly.  OK to use every night for first two weeks (Patient taking differently: Apply 1/2 gram using finger two nights weekly.  OK to use every night for first two weeks), Disp: 30 g, Rfl: 11  •  letrozole (FEMARA) 2.5 MG tablet, Take 1 tablet by mouth Daily., Disp: 30 tablet, Rfl: 11  •  Multiple Vitamins-Minerals (MULTIVITAMIN ADULT PO), Take 1 tablet by mouth Daily., Disp: , Rfl:   •  polyethylene glycol (MIRALAX) packet, Take 17 g by mouth Daily., Disp: , Rfl:   •  ondansetron (ZOFRAN) 4 MG tablet, Take 2 tablets by mouth Every 8 (Eight) Hours As Needed for Nausea or Vomiting., Disp: 30 tablet, Rfl: 1  •  ondansetron ODT (Zofran ODT) 4 MG disintegrating tablet, Place 1 tablet on the tongue Every 8 (Eight) Hours As Needed for Nausea or Vomiting., Disp: 30 tablet, Rfl: 1  •  sertraline (Zoloft) 50 MG tablet, Take 1 tablet by mouth Daily., Disp: 30 tablet, Rfl: 2    OBJECTIVE:  /80 (BP Location: Right arm, Patient Position: Sitting, Cuff Size: Adult)   Pulse 106   Temp 97.8 °F (36.6 °C) (Temporal)   Resp  "16   Ht 152.4 cm (60\")   Wt 72.8 kg (160 lb 6.4 oz)   LMP  (LMP Unknown)   SpO2 98%   BMI 31.33 kg/m²    Physical Exam  Constitutional:       General: She is not in acute distress.  HENT:      Head: Normocephalic.      Right Ear: Tympanic membrane normal.      Left Ear: Tympanic membrane normal.      Nose: Nose normal.      Mouth/Throat:      Mouth: Mucous membranes are moist.      Pharynx: No oropharyngeal exudate or posterior oropharyngeal erythema.   Eyes:      Conjunctiva/sclera: Conjunctivae normal.      Pupils: Pupils are equal, round, and reactive to light.   Neck:      Vascular: No carotid bruit.   Cardiovascular:      Rate and Rhythm: Normal rate and regular rhythm.      Pulses: Normal pulses.      Heart sounds: Normal heart sounds.   Pulmonary:      Effort: Pulmonary effort is normal.      Breath sounds: Normal breath sounds.   Abdominal:      General: Bowel sounds are normal. There is no distension.      Tenderness: There is no abdominal tenderness.   Musculoskeletal:         General: Normal range of motion.      Cervical back: Normal range of motion.   Lymphadenopathy:      Cervical: No cervical adenopathy.   Skin:     General: Skin is warm.      Capillary Refill: Capillary refill takes less than 2 seconds.   Neurological:      General: No focal deficit present.   Psychiatric:         Mood and Affect: Mood normal.         Behavior: Behavior normal.         Thought Content: Thought content normal.         Judgment: Judgment normal.         ASSESSMENT/PLAN:       Diagnoses and all orders for this visit:    1. Annual visit for general adult medical examination with abnormal findings (Primary)  Mammogram yearly - scheduled 1/6/23  Dexa scan - due 6/15/24  CRC screening due 2/4/27  Tdap due every 10 years or with injury  Flu vaccine annually - recently received   Zoster vaccine at age 52 - complete  COVID booster - declines   Pneumococcal vaccine due at age 67  Hep C screening complete     2. Diarrhea, " unspecified type  Continue imodium as needed.     3. Nausea  -     ondansetron ODT (Zofran ODT) 4 MG disintegrating tablet; Place 1 tablet on the tongue Every 8 (Eight) Hours As Needed for Nausea or Vomiting.  Dispense: 30 tablet; Refill: 1    4. Vasovagal syncope  Suspect episode of syncope related to vasovagal episode. She declines ECG today or further testing at this time.     5. Invasive ductal carcinoma of breast, female, left (HCC)  Continue Femara and follow up with Oncology.     6. Class 1 obesity due to excess calories without serious comorbidity with BMI 31.0 to 31.9  BMI is >= 30 and <35. (Class 1 Obesity). The following options were offered after discussion;: exercise counseling/recommendations and nutrition counseling/recommendations    Encouraged hydration and bland foods to help with GI symptoms. Zofran and imodium as needed.       An After Visit Summary was printed and given to the patient at discharge.  Return in about 1 year (around 10/18/2023) for Annual physical with Dr. Elias .            SANDRA Vo  Electronically signed.

## 2022-11-14 ENCOUNTER — LAB (OUTPATIENT)
Dept: LAB | Facility: HOSPITAL | Age: 62
End: 2022-11-14

## 2022-11-14 DIAGNOSIS — C50.912 INVASIVE DUCTAL CARCINOMA OF BREAST, FEMALE, LEFT: ICD-10-CM

## 2022-11-14 LAB
ALBUMIN SERPL-MCNC: 4.8 G/DL (ref 3.5–5.2)
ALBUMIN/GLOB SERPL: 1.6 G/DL
ALP SERPL-CCNC: 65 U/L (ref 39–117)
ALT SERPL W P-5'-P-CCNC: 40 U/L (ref 1–33)
ANION GAP SERPL CALCULATED.3IONS-SCNC: 14 MMOL/L (ref 5–15)
AST SERPL-CCNC: 38 U/L (ref 1–32)
BASOPHILS # BLD AUTO: 0.06 10*3/MM3 (ref 0–0.2)
BASOPHILS NFR BLD AUTO: 0.7 % (ref 0–1.5)
BILIRUB SERPL-MCNC: 0.6 MG/DL (ref 0–1.2)
BUN SERPL-MCNC: 10 MG/DL (ref 8–23)
BUN/CREAT SERPL: 18.5 (ref 7–25)
CALCIUM SPEC-SCNC: 9.1 MG/DL (ref 8.6–10.5)
CHLORIDE SERPL-SCNC: 99 MMOL/L (ref 98–107)
CO2 SERPL-SCNC: 21 MMOL/L (ref 22–29)
CREAT SERPL-MCNC: 0.54 MG/DL (ref 0.57–1)
DEPRECATED RDW RBC AUTO: 45.7 FL (ref 37–54)
EGFRCR SERPLBLD CKD-EPI 2021: 105.6 ML/MIN/1.73
EOSINOPHIL # BLD AUTO: 0.04 10*3/MM3 (ref 0–0.4)
EOSINOPHIL NFR BLD AUTO: 0.4 % (ref 0.3–6.2)
ERYTHROCYTE [DISTWIDTH] IN BLOOD BY AUTOMATED COUNT: 13.4 % (ref 12.3–15.4)
FERRITIN SERPL-MCNC: 142 NG/ML (ref 13–150)
GLOBULIN UR ELPH-MCNC: 3 GM/DL
GLUCOSE SERPL-MCNC: 98 MG/DL (ref 65–99)
HCT VFR BLD AUTO: 43.7 % (ref 34–46.6)
HGB BLD-MCNC: 14.4 G/DL (ref 12–15.9)
IMM GRANULOCYTES # BLD AUTO: 0.03 10*3/MM3 (ref 0–0.05)
IMM GRANULOCYTES NFR BLD AUTO: 0.3 % (ref 0–0.5)
IRON 24H UR-MRATE: 70 MCG/DL (ref 37–145)
IRON SATN MFR SERPL: 22 % (ref 20–50)
LYMPHOCYTES # BLD AUTO: 3.87 10*3/MM3 (ref 0.7–3.1)
LYMPHOCYTES NFR BLD AUTO: 43 % (ref 19.6–45.3)
MCH RBC QN AUTO: 30.2 PG (ref 26.6–33)
MCHC RBC AUTO-ENTMCNC: 33 G/DL (ref 31.5–35.7)
MCV RBC AUTO: 91.6 FL (ref 79–97)
MONOCYTES # BLD AUTO: 0.51 10*3/MM3 (ref 0.1–0.9)
MONOCYTES NFR BLD AUTO: 5.7 % (ref 5–12)
NEUTROPHILS NFR BLD AUTO: 4.49 10*3/MM3 (ref 1.7–7)
NEUTROPHILS NFR BLD AUTO: 49.9 % (ref 42.7–76)
NRBC BLD AUTO-RTO: 0 /100 WBC (ref 0–0.2)
PLATELET # BLD AUTO: 391 10*3/MM3 (ref 140–450)
PMV BLD AUTO: 9.5 FL (ref 6–12)
POTASSIUM SERPL-SCNC: 3.7 MMOL/L (ref 3.5–5.2)
PROT SERPL-MCNC: 7.8 G/DL (ref 6–8.5)
RBC # BLD AUTO: 4.77 10*6/MM3 (ref 3.77–5.28)
SODIUM SERPL-SCNC: 134 MMOL/L (ref 136–145)
TIBC SERPL-MCNC: 319 MCG/DL (ref 298–536)
TRANSFERRIN SERPL-MCNC: 214 MG/DL (ref 200–360)
WBC NRBC COR # BLD: 9 10*3/MM3 (ref 3.4–10.8)

## 2022-11-14 PROCEDURE — 84466 ASSAY OF TRANSFERRIN: CPT

## 2022-11-14 PROCEDURE — 36415 COLL VENOUS BLD VENIPUNCTURE: CPT

## 2022-11-14 PROCEDURE — 82607 VITAMIN B-12: CPT

## 2022-11-14 PROCEDURE — 80053 COMPREHEN METABOLIC PANEL: CPT

## 2022-11-14 PROCEDURE — 82378 CARCINOEMBRYONIC ANTIGEN: CPT

## 2022-11-14 PROCEDURE — 82728 ASSAY OF FERRITIN: CPT

## 2022-11-14 PROCEDURE — 83540 ASSAY OF IRON: CPT

## 2022-11-14 PROCEDURE — 85025 COMPLETE CBC W/AUTO DIFF WBC: CPT

## 2022-11-14 PROCEDURE — 82746 ASSAY OF FOLIC ACID SERUM: CPT

## 2022-11-14 PROCEDURE — 86300 IMMUNOASSAY TUMOR CA 15-3: CPT

## 2022-11-15 ENCOUNTER — TELEPHONE (OUTPATIENT)
Dept: ONCOLOGY | Facility: CLINIC | Age: 62
End: 2022-11-15

## 2022-11-15 DIAGNOSIS — R79.89 LOW SERUM SODIUM: ICD-10-CM

## 2022-11-15 DIAGNOSIS — R74.8 ELEVATED LIVER ENZYMES: Primary | ICD-10-CM

## 2022-11-15 DIAGNOSIS — R79.89 ELEVATED LFTS: Primary | ICD-10-CM

## 2022-11-15 LAB
CANCER AG27-29 SERPL-ACNC: 17.1 U/ML (ref 0–38.6)
CEA SERPL-MCNC: 1.17 NG/ML
FOLATE SERPL-MCNC: 19.8 NG/ML (ref 4.78–24.2)
VIT B12 BLD-MCNC: 444 PG/ML (ref 211–946)

## 2022-11-15 NOTE — TELEPHONE ENCOUNTER
Notified patient Chasidy Tejada, she was informed that her CMP needs to be repeated in one week, to review the results. Patient v/u and has a f/u with Dr Mary Alice reyes next week 11/21/22 we will add the lab apt to this day also   Patient v/u of this instruction.

## 2022-11-15 NOTE — TELEPHONE ENCOUNTER
----- Message from Tommy Wheta MD sent at 11/14/2022  4:10 PM CST -----  Repeat CMP 1 week  ----- Message -----  From: Lab, Background User  Sent: 11/14/2022   2:18 PM CST  To: Tommy Wheat MD

## 2022-11-16 NOTE — PROGRESS NOTES
[facility] GROUP HEMATOLOGY AND ONCOLOGY  [address]  [place] [address] [phone]    Patient Name: Chasidy Tejada  Encounter Date: 11/21/2022  YOB: 1960  Patient Number: [id number]       REASON FOR VISIT: Chasidy Tejada is a 62-year-old female who returns in follow-up of stage IA left breast carcinoma, ER/ID and HER-2/lamberto positive.  She underwent left breast lumpectomy with SNB, 01/10/2020 and was started on adjuvant Arimidex beginning 02/03/2020 through 03/11/2020 before resumption on 08/19/202 through 08/30/2021 - stopped due to intolerance (temper volatility, irritability).  She completed adjuvant paclitaxel (week 12/12, 05/27/2020), and received adjuvant trastuzumab (Herceptin), having completed cycle 17, 02/10/2021 (21.5 months).  Adjuvant radiation was administered on 06/22/2020 through 08/06/2020 (6040 cGy in 33 fx). She is here alone (previously with her spouse).  She was started on adjuvant Femara, 09/27/2021 (14 months).    I have reviewed the HPI and verified with the patient the accuracy of it. No changes to interval history since the information was documented. Tommy Wheat MD 11/21/22     DIAGNOSTIC ABNORMALITIES:          1.   12/13/2019- mammogram screening.  Impression: Stellate lesion outer left breast.  Spot compression and ultrasound suggested.          2.   12/27/2019- diagnostic mammogram.  Impression: Irregular density upper outer left breast.  Biopsy suggested.          3.   12/27/2019- left breast ultrasound.  Impression: Suspicious 6 x 5 x 8 mm density left breast approximately 8 cm from the nipple.  Biopsy suggested.  BI-RADS Category 4B.          4.   01/03/2020-ultrasound-guided breast biopsy.  Impression: Appropriate sampling of the 8 mm irregular hypoechoic nodule in the left breast at 1:00 in the posterior depth/axillary tail region.  Final diagnosis: Left breast at 1 o'clock position,  core biopsies: A.invasive mammary carcinoma of no special type (ductal, not otherwise specified), grade 2, at least 4.9 mm in greatest extent.  B.associated low-grade ductal carcinoma in situ.  % positive, MN 25% positive, HER-2/lamberto-2+ (IHC)/FISH- a focal positive score is present in an area corresponding to approximately 15% of the tumor (signal ratio: 2.9).           5.   02/03/2020- CMP normal with .  CBC normal.           6.   02/03/2020-bone scan ([facility]).  Impression: Degenerative joint changes.  No evidence of bony metastasis.           7.   02/03/2020-CT scans, head chest, abdomen, pelvis-impression: No abnormal intracranial enhancement to suggest intracranial metastasis/abnormalities.  No evidence of intrathoracic metastasis.  Stable 5 to 6 mm right lower lobe nodule of the right hemidiaphragm unchanged from 2/14/2011.  No convincing evidence of intra-abdominal or pelvic metastasis.  Hepatic cysts.  Fatty attenuated benign focus within the body of the pancreas visualized, 12/14/2011.  Previous hysterectomy, cholecystectomy and appendectomy.           8.   02/14/2020-echocardiogram.  Impression: LV systolic function normal (EF equals 70%).  Normal LV and RV size thickness and function.  Normal LA and RA size.  Normal cardiac valves.  No pericardial effusion.           9.  02/27/2020- CMP normal with .  CBC normal.  Iron 65, iron saturation 18%, ferritin 134, B12 745, folate > 20, CEA 1.76, CA-27-29 14.2.          10.  03/04/2020- repeat FISH for HER-2/lamberto (from tumor block, 01/10/2020).  No tumor on specimen.          11.  06/23/2020- DEXA scan.  Impression: Normal bone density.  No lower than 1 standard deviation below the mean for young adult woman.    PREVIOUS INTERVENTIONS:           1.   01/10/2020- left breast lumpectomy with left axillary sentinel node biopsy.  Final diagnosis: 1.left breast mass, lumpectomy: Invasive and in situ ductal carcinoma.  Tumor site-not specified.   "Histologic type: Invasive carcinoma of no special type (invasive ductal carcinoma, not otherwise specified).  Overall tumor grade: Grade 1.  Tumor size: Greatest dimension of largest invasive focus: 5 mm.  Tumor focality: Single focus of invasive carcinoma.  Ductal carcinoma in situ (DCIS): Present.  Negative for extensive intraductal component (EIC).  Size (extent) CIS: Cannot be determined: Microscopic.  Architectural pattern: Cribriform/solid.  Nuclear grade: 2 (intermediate).  Necrosis: Present, central (expansive \"comedo) necrosis).  Lobular carcinoma in situ (LCIS): No LCIS in specimen.  Lymphovascular invasion: Not identified.  Dermal lymphovascular invasion: No skin present.  Margins: Uninvolved by invasive carcinoma.  Lymph nodes: Uninvolved by tumor cells.  Regional lymph nodes: 1.  Number of lymph nodes examined: 1.  Number of sentinel nodes examined: 1.  Pathologic stage classification (pTNM, AJCC eighth edition): pT1a,(sn), pN0.  % positive, PA 25% positive, HER-2 2+ (equivocal).           2.   Adjuvant Arimidex 1 mg p.o. daily beginning 02/03/2020 through 03/11/2020: resumed 08/19/2020 through 08/30/2021 - stopped due to intolerance (temper volatility, irritability).           3.   Adjuvant paclitaxel beginning 03/11/2020 through 05/27/2020 (12/12 weekly doses)           4.   Adjuvant trastuzumab every 3 weeks - beginning 03/11/2020 through 02/10/2021 (17 cycles)           5.   Adjuvant radiation to the left breast (5040 cGy with 1000 cGy boost to the tumor bed for a total of 6040 cGy/33 fractions) from 06/22/2020 through 08/06/2020.           6.   Adjuvant Femara 2.5 mg po daily beginning 09/27/2021    LABS    Lab Results - Last 18 Months   Lab Units 11/14/22  1357 06/28/22  1407 05/16/22  1359 01/17/22  1435 01/17/22  1013 10/18/21  1412 09/20/21  1421   HEMOGLOBIN g/dL 14.4 14.5 13.4 12.8 CANCELED 13.5 13.7   HEMATOCRIT % 43.7 43.0 40.8 39.8 CANCELED 40.6 40.4   MCV fL 91.6 91.3 91.7 92.3  --  " 92.1 90.8   WBC 10*3/mm3 9.00 8.28 7.99 10.19 CANCELED 5.93 7.25   RDW % 13.4 13.3 13.1 13.5  --  13.0 13.2   MPV fL 9.5 10.2 9.4 9.5  --  9.9 10.3   PLATELETS 10*3/mm3 391 360 333 310 CANCELED 345 335   IMM GRAN % % 0.3 0.2 0.3 0.3  --  0.2 0.1   NEUTROS ABS 10*3/mm3 4.49 4.17 3.68 5.09  --  2.40 3.64   LYMPHS ABS 10*3/mm3 3.87* 3.44* 3.77* 4.37* CANCELED 3.00 3.18*   MONOS ABS 10*3/mm3 0.51 0.53 0.40 0.53  --  0.39 0.35   EOS ABS 10*3/mm3 0.04 0.06 0.06 0.12 CANCELED 0.08 0.04   BASOS ABS 10*3/mm3 0.06 0.06 0.06 0.05 CANCELED 0.05 0.03   IMMATURE GRANS (ABS) 10*3/mm3 0.03 0.02 0.02 0.03  --  0.01 0.01   NRBC /100 WBC 0.0 0.0 0.0 0.0  --  0.0 0.0       Lab Results - Last 18 Months   Lab Units 11/14/22  1357 06/28/22  1437 05/16/22  1359 01/17/22  1435 01/17/22  1013 10/18/21  1412 09/20/21  1421 08/17/21  1326   GLUCOSE mg/dL 98 83 99 114* 85 89 87 89   SODIUM mmol/L 134* 138 140 138 141 139 141 140   POTASSIUM mmol/L 3.7 4.1 3.9 3.7 5.1 4.1 3.8 3.6   TOTAL CO2 mmol/L  --   --   --   --  29.2*  --   --   --    CO2 mmol/L 21.0* 26.0 24.0 28.0  --  28.0 26.0 25.0   CHLORIDE mmol/L 99 101 103 102 103 101 103 101   ANION GAP mmol/L 14.0 11.0 13.0 8.0  --  10.0 12.0 14.0   CREATININE mg/dL 0.54* 0.50* 0.52* 0.49* 0.61 0.49* 0.49* 0.49*   BUN mg/dL 10 11 10 18 17 9 8 12   BUN / CREAT RATIO  18.5 22.0 19.2 36.7* 27.9* 18.4 16.3 24.5   CALCIUM mg/dL 9.1 9.8 9.8 9.2 9.6 9.7 9.8 10.2   EGFR IF NONAFRICN AM mL/min/1.73  --   --   --  129 100 129 129 129   ALK PHOS U/L 65 108 113 96 98 108 110 112   TOTAL PROTEIN g/dL 7.8 7.9 7.6 7.5  --  8.1 7.7 8.0   ALT (SGPT) U/L 40* 19 17 19 19 22 20 25   AST (SGOT) U/L 38* 20 21 17 22 25 26 22   BILIRUBIN mg/dL 0.6 0.5 0.5 0.4 0.5 0.5 0.6 0.8   ALBUMIN g/dL 4.80 4.80 4.80 4.20 4.40 4.60 4.60 5.00   GLOBULIN gm/dL 3.0 3.1 2.8 3.3  --  3.5 3.1 3.0       Lab Results - Last 18 Months   Lab Units 11/14/22  1357 05/16/22  1359 01/17/22  1435 10/18/21  1412 09/20/21  1421 08/17/21  1326   CEA  ng/mL 1.17 1.35 1.07 1.51 1.83 1.74       Lab Results - Last 18 Months   Lab Units 11/14/22  1357 05/16/22  1359 01/17/22  1435 01/17/22  1013 10/18/21  1412 09/20/21  1421 08/17/21  1326   IRON mcg/dL 70 90 78  --  72 89 110   TIBC mcg/dL 319 432 347  --  375 389 425   IRON SATURATION % 22 21 22  --  19* 23 26   FERRITIN ng/mL 142.00 113.30 101.10  --  186.90* 137.40 226.40*   TSH uIU/mL  --   --   --  2.100  --   --   --    FOLATE ng/mL 19.80 >20.00 17.40  16.40  --  >20.00 16.30 14.50         PAST MEDICAL HISTORY:  ALLERGIES:  Allergies   Allergen Reactions   • Demerol [Meperidine] Hives   • Morphine And Related Hives     CURRENT MEDICATIONS:  Outpatient Encounter Medications as of 11/21/2022   Medication Sig Dispense Refill   • busPIRone (BUSPAR) 5 MG tablet Take 1 tablet by mouth 3 (Three) times a day as needed for anxiety. 90 tablet 2   • letrozole (FEMARA) 2.5 MG tablet Take 1 tablet by mouth Daily. 30 tablet 11   • ondansetron (ZOFRAN) 4 MG tablet Take 2 tablets by mouth Every 8 (Eight) Hours As Needed for Nausea or Vomiting. 30 tablet 1   • sertraline (Zoloft) 50 MG tablet Take 1 tablet by mouth Daily. 30 tablet 2   • conjugated estrogens (Premarin) 0.625 MG/GM vaginal cream Apply 1/2 gram using finger two nights weekly.  OK to use every night for first two weeks (Patient taking differently: Apply 1/2 gram using finger two nights weekly.  OK to use every night for first two weeks) 30 g 11   • [DISCONTINUED] calcium carbonate-vitamin d (Calcium 600+D) 600-400 MG-UNIT per tablet Take 1 tablet by mouth 2 (Two) Times a Day. 60 tablet 3   • [DISCONTINUED] metroNIDAZOLE (FLAGYL) 500 MG tablet Take 1 tablet by mouth 3 (Three) Times a Day. 21 tablet 0   • [DISCONTINUED] Multiple Vitamins-Minerals (MULTIVITAMIN ADULT PO) Take 1 tablet by mouth Daily.     • [DISCONTINUED] polyethylene glycol (MIRALAX) packet Take 17 g by mouth Daily.       No facility-administered encounter medications on file as of 11/21/2022.      Adult illnesses:  Colon polyps  Obesity  Spinal stenosis neck   Herniated cervical disc without myelopathy  Borderline blood pressure  History of dysphagia    Past surgeries:  Anterior cervical discectomy w fusion, 09/2018  Appendectomy  BSO/GANGA  Cholecystectomy  Colonoscopy, 02/10/2017  BTL  Tonsillectomy  Breast biopsy  Left partial mastectomy, 01/10/2020  03/05/2020 - Mediport placement per Dr. Edmond  03/04/2021-Mediport removal per Dr. Edmond.    ADULT ILLNESSES:  Patient Active Problem List   Diagnosis Code   • Family history of colon cancer Z80.0   • Laryngopharyngeal reflux K21.9   • Pharyngoesophageal dysphagia R13.14   • Class 1 obesity due to excess calories without serious comorbidity with body mass index (BMI) of 31.0 to 31.9 in adult E66.09, Z68.31   • Spinal stenosis in cervical region M48.02   • Cervical radiculopathy M54.12   • Herniated cervical disc without myelopathy M50.20   • Borderline blood pressure R03.0   • Invasive ductal carcinoma of breast, female, left (HCC) C50.912   • Former smoker Z87.891   • S/P lumpectomy, left breast Z98.890   • S/P lymph node biopsy Z98.890   • On antineoplastic chemotherapy Z79.899   • Estrogen receptor positive status (ER+) Z17.0   • History of radiation therapy Z92.3   • History of adenomatous polyp of colon Z86.010   • Osteoporosis without current pathological fracture M81.0   • Osteopenia of left hip M85.852   • Long term current use of aromatase inhibitor Z79.811     SURGERIES:  Past Surgical History:   Procedure Laterality Date   • ANTERIOR CERVICAL DISCECTOMY W/ FUSION N/A 9/7/2018    Procedure: CERVICAL DISCECTOMY ANTERIOR WITH FUSION C6-7;  Surgeon: Chris Mcfadden MD;  Location:  [facility] OR;  Service: Neurosurgery   • APPENDECTOMY     • BILATERAL SALPINGO OOPHORECTOMY     • BREAST BIOPSY     • BREAST LUMPECTOMY     • CERVICAL CORPECTOMY N/A 9/7/2018    Procedure: CERVICAL CORPECTOMY C6-7;  Surgeon: Chris Mcfadden MD;  Location:  [facility] OR;   Service: Neurosurgery   • CHOLECYSTECTOMY     • COLONOSCOPY N/A 2/10/2017    One 8mm adenomatous polyp in the rectum; The examination was otherwise normal on direct and retroflexion views; A tattoo was seen in the rectum-A post-polypectomy scar was found at the tattoo site-There was no evidence of residual polyp tissue; Repeat 5 years   • COLONOSCOPY  09/05/2012    One 13mm polyp in the rectum-Marked with Maxine ink-path shows Ganglioneuroma; The examination was otherwise normal; Repeat 4 years   • COLONOSCOPY N/A 2/4/2022    Procedure: COLONOSCOPY WITH ANESTHESIA;  Surgeon: Elina Gonsalez MD;  Location: [facility] ENDOSCOPY;  Service: Gastroenterology;  Laterality: N/A;  pre family hx colon ca; hx adenomatous polyp  post normal  Jones Elias,    • HYSTERECTOMY  1998    Had hysterectomy for painful periods and bleeding. (Dr. Patel) H w/ BSO   • LAPAROSCOPIC TUBAL LIGATION     • MASTECTOMY W/ SENTINEL NODE BIOPSY Left 1/10/2020    Procedure: LEFT NEEDLE DIRECTED ULTRASOUND GUIDED PARTIAL MASTECTOMY WITH SENTINEL LYMPH NODE BIOPSY, INJECTION AND SCAN, RADIOLOGIST WILL INJECT;  Surgeon: Flor Edmond MD;  Location: [facility] OR;  Service: General   • TONSILLECTOMY     • VENOUS ACCESS DEVICE (PORT) INSERTION N/A 3/5/2020    Procedure: INSERTION VENOUS ACCESS DEVICE EXCISION SKIN LESION X 2 CHEST AND ABDOMEN;  Surgeon: Flor Edmond MD;  Location: [facility] OR;  Service: General;  Laterality: N/A;   • VENOUS ACCESS DEVICE (PORT) REMOVAL Left 3/4/2021    Procedure: REMOVAL OF SINGLE LUMEN PORT;  Surgeon: Flor Edmond MD;  Location:  [facility] OR;  Service: General;  Laterality: Left;     HEALTH MAINTENANCE ITEMS:  Health Maintenance Due   Topic Date Due   • Pneumococcal Vaccine 0-64 (1 - PCV) Never done   • COVID-19 Vaccine (3 - Moderna risk series) 12/17/2021       <no information>  Last Completed Colonoscopy          COLORECTAL CANCER SCREENING (COLONOSCOPY - Every 5 Years) Next due on 2/4/2027 02/04/2022  " COLONOSCOPY    02/04/2022  Surgical Procedure: COLONOSCOPY    02/10/2017  COLONOSCOPY    02/10/2017  Surgical Procedure: COLONOSCOPY    09/05/2012  SCANNED - COLONOSCOPY              Immunization History   Administered Date(s) Administered   • COVID-19 (MODERNA) 1st, 2nd, 3rd Dose Only 01/09/2021, 02/06/2021   • COVID-19 (MODERNA) BOOSTER 11/19/2021   • Flu Vaccine Intradermal Quad 18-64YR 10/07/2021   • Fluzone Quad >6mos (Multi-dose) 10/03/2022   • Influenza, Unspecified 11/18/2017, 11/05/2018, 10/15/2019, 10/27/2020   • Shingrix 10/15/2021, 12/16/2021   • Tdap 08/29/2018     Last Completed Mammogram          Scheduled - MAMMOGRAM (Yearly) Scheduled for 1/6/2023 01/06/2022  Mammo Diagnostic Digital Tomosynthesis Bilateral With CAD    12/17/2020  Mammo Diagnostic Digital Tomosynthesis Bilateral With CAD    12/27/2019  Mammo Diagnostic Digital Tomosynthesis Left With CAD    12/13/2019  Mammo Screening Digital Tomosynthesis Bilateral With CAD    06/01/2018  Mammo Screening Digital Tomosynthesis Bilateral With CAD    Only the first 5 history entries have been loaded, but more history exists.                  FAMILY HISTORY:  Family History   Problem Relation Age of Onset   • Colon cancer Maternal Grandmother         Unknown age   • Cancer Father    • Heart disease Father    • Diabetes Mother    • Hypertension Mother    • Stroke Mother    • Breast cancer Sister    • No Known Problems Daughter    • No Known Problems Sister    • Colon polyps Neg Hx    • Esophageal cancer Neg Hx    • Liver cancer Neg Hx    • Liver disease Neg Hx    • Rectal cancer Neg Hx    • Stomach cancer Neg Hx    • Ovarian cancer Neg Hx    • BRCA 1/2 Neg Hx    • Endometrial cancer Neg Hx      SOCIAL HISTORY:  Social History     Socioeconomic History   • Marital status:    • Number of children: 1   Tobacco Use   • Smoking status: Never   • Smokeless tobacco: Never   • Tobacco comments:     \"i never really smoked\"   Vaping Use   • Vaping " "Use: Never used   Substance and Sexual Activity   • Alcohol use: Not Currently   • Drug use: No   • Sexual activity: Defer       REVIEW OF SYSTEMS:  Review of Systems   Constitutional: Negative.         She manages her ADLs' including chores, errands, driving.  Is still working full time.  Has been active.  Is up and about > 75% of the time    Femara tolerance:  No new problems.  No new arthralgias.  Mild to moderate hot flashes.  \"same.\" Taking daily.  \"No issues.\"   HENT: Negative.    Eyes: Negative.    Respiratory: Negative.    Cardiovascular: Negative.    Gastrointestinal: Positive for abdominal distention (for the past 3-4 weeks), abdominal pain (\"all over like pinching\"), diarrhea (Resolved since completing a 10 day course of Flagyl a week ago.  \"Regular now\") and nausea (\"not anymore.\"). Negative for anal bleeding, blood in stool, constipation, vomiting and GERD (\"Normal.\" Still on omeprazole as needed, \"not often.\").   Endocrine: Positive for heat intolerance (Lingering vasomotor changes.  \"Off and on through the day but not bad so I can live with it.\"  ).   Genitourinary: Negative.    Musculoskeletal: Negative for arthralgias (knees, and hips \"been ok.\" Does not impede her activities.).   Allergic/Immunologic: Negative.    Neurological: Positive for numbness (paresthesias of the right toes.  Says not worse. The symptoms of the left foot and hands have resolved).   Hematological: Negative.    Psychiatric/Behavioral: Negative for depressed mood (Resolution of irritability and grouchiness since stopping Arimidex last 08/31/2021.). The patient is not nervous/anxious (\"not bad\").          /88   Pulse 111   Temp 97.4 °F (36.3 °C)   Resp 18   Ht 152.4 cm (60\")   Wt 69 kg (152 lb 3.2 oz)   LMP  (LMP Unknown)   SpO2 96%   BMI 29.72 kg/m²  Body surface area is 1.66 meters squared.  Pain Score    11/21/22 1349   PainSc:   4   PainLoc: Abdomen       Physical Exam:  Physical Exam   Constitutional: She is " oriented to person, place, and time. She appears well-developed and well-nourished. No distress.   Pleasant, heavy set, cooperative, modestly kept female.  Appears her age.  ECOG 0.      She has lost 9 pounds (in addition to 4 pounds at her prior visit) in the interval.   HENT:   Head: Normocephalic and atraumatic.   Mouth/Throat: No oropharyngeal exudate.   Hair has regrown    Wearing a surgical mask   Eyes: Pupils are equal, round, and reactive to light. Conjunctivae are normal. No scleral icterus.   Neck: No JVD present. No tracheal deviation present. No thyromegaly present.   Cardiovascular: Normal rate, regular rhythm and normal heart sounds. Exam reveals no friction rub.   No murmur heard.  Pulmonary/Chest: Effort normal and breath sounds normal. No stridor. No respiratory distress. She has no wheezes. She has no rales.   Chaperoned exam: Lisette Edmond MA    Port site in the left upper chest is healed.  The device has been removed.    Breasts:   Again, the left breast lumpectomy and sentinel node biopsy site in the left axilla are healed.  There is no incisional tenderness in either site.  Some subtle skin induration.  Prior radiation associated skin changes are no longer evident.  The rest of the breast was without obvious nodularity skin changes, no nipple discharge.      Right breast without masses, skin changes nor nipple discharge.    No associated axillary nor supraclavicular adenopathy bilaterally.   Abdominal: Soft. Bowel sounds are normal. She exhibits no distension and no mass. There is abdominal tenderness (Vague diffuse tenderness without guarding). There is no rebound and no guarding.   Musculoskeletal: Normal range of motion. No deformity.   Lymphadenopathy:     She has no cervical adenopathy.        Right cervical: No superficial cervical, no deep cervical and no posterior cervical adenopathy present.       Left cervical: No superficial cervical, no deep cervical and no posterior cervical  adenopathy present.        Right: No supraclavicular adenopathy present.        Left: No supraclavicular adenopathy present.   Neurological: She is alert and oriented to person, place, and time. She displays normal reflexes. No cranial nerve deficit. She exhibits normal muscle tone. Coordination normal.   Skin: Skin is warm and dry. No rash noted. No erythema. No pallor.   Psychiatric: Her behavior is normal. Judgment and thought content normal.   Vitals reviewed.    ASSESSMENT:   1.  Invasive and in situ ductal carcinoma  left breast.                 Stage:  IA (pT1a, pN0, G2) ER  100% positive, UT  25%, HER-2/lamberto - 2+ (IHC)/FISH -a focal positive score is present in an area corresponding to approximately 15% of the tumor (signal ratio: 2.9).                 Tumor Hammond:  5 mm.  Tumor focality: Single focus of invasive carcinoma.  No EIC nor LCIS.  Lymphovascular invasion: Not identified.  Dermal lymphovascular invasion: No skin present.  Microcalcifications: Not identified.  Margins uninvolved by invasive carcinoma.  Lymph nodes: Number of lymph nodes examined: 1 (0/1).                 Tumor Status:    -- Underwent lumpectomy and SNB, 01/10/2020.   -- Completed adjuvant paclitaxel, 05/27/2020; adjuvant Herceptin, 02/10/2021; adjuvant radiation, 08/06/2020 (above)  -- Adjuvant Arimidex since 02/03/2020 through 09/27/2021-stopped due to depression, and irritability.  -- Adjuvant Femara 2.5 mg p.o. daily beginning 09/28/2021 through present  -- 01/06/2022- mammogram. No mammographic evidence of malignancy. BI-RADS Category 2. Benign.          2.   Obesity.  Moderate (BMI 30 - from 34)        3.   Spinal stenosis neck, quiescent         4.   Herniated cervical disc without myelopathy        5.   Anemia, likely chemo associated.    --Resolved, Hgb 13.4, 05/16/2022 (prior: Hgb 11.5-13.8)  -- 02/04/2022-colonoscopy--no residual polyps otherwise normal.  Repeat 5 years        6.   GERD, quiescent        7.   Mood  changes, to include mood lability, depression, irritability and grouchiness.  Resolved since stopping Arimidex.        8.   Osteopenia  --06/15/2022- DEXA scan: Osteopenia of the left hip.  Low bone mass.  Bone density 1-2 0.5 SD below the mean.  Increased risk of fracture.  --06/28/2022- Prolia 60 mg SC        9.   Belly pains with diarrhea (resolved after course of Flagyl per Dr. Edmond) and trivially elevated ALT 40, AST 38, 11/14/2022.       RECOMMENDATIONS:         1.   Apprised of the labs, 11/14/2022.  Normal CBC, ALT 40, AST 38, otherwise normal CMP, normal CEA, normal CA-27-29, repleted B12/folate, repleted serum iron, iron saturation 22% (from 21%; 22%; 19%; 23%; 25%; 24%), repleted ferritin (142; from 113; 101; 186; 137; 226; 171; 157; 138; 141; 216).          2.    Femara tolerance discussed.  Doing well so far with no mood lability and no new arthralgias.  Feels she can press on with this.        3.    Draw hepatitis profile, CMP  and CT abdomen/pelvis        4.    Keep appointment for mammogram, 01/06/2023.        5.    Apprised of DEXA scan, 06/15/2022 (above).  Osteopenia with increased fracture.  Started on Prolia beginning 06/28/2022.        6.    Previously noted that we are unable to repeat FISH testing for HER-2 on the tumor specimen from 01/10/2020 -pathology block did not contain any more tumor - personally discussed/confirmed with Dr. Burgos of pathology.        7.   Care previously discussed with Dr. Jaimes on 02/28/2020 after she saw the patient on 02/25/2020 (encounter reviewed).  Pathology was reviewed at [place] and discussed with the patient.  Recommendations/plan: Discussed that since her cancer is strongly ER positive and low histologic grade/low proliferative rate, and HER-2 on the biopsy was borderline recommended repeat HER-2 testing on her surgical pathology.  Defer to his negative proceed with only endocrine therapy and radiation.  If HER-2 is confirmed to be positive,  recommend adjuvant weekly Taxol x12+ Herceptin every 3 weeks to complete 1 year of HER-2 directed therapy.  These plans were personally discussed with the patient (via telephone) on 02/28/2020 I also personally asked the pathologist (Dr. Radha Burgos) to send off the biopsy specimen for repeat FISH HER-2 testing.          8.   Reviewed NCCN guidelines version 3.2019 invasive breast cancer.  For tumors (=/> 5 mm), and pN0, recommend adjuvant endocrine therapy +/- adjuvant chemotherapy with trastuzumab (category 2B).                  9.  Rx:   · Oscal 600/400 po bid # 60 - OTC  · Femara 2.5 mg po qd # 30 - 11 RF  · Prolia 60 mg SC every 6 months-next due 12/28/2022           10.  Previously discussed the rationale for adjuvant hormonal manipulation with AIs (see above) and potential toxicities of AI therapy are discussed (to include but not limited to: Hot flashes, asthenia, pain, arthralgias, arthritis, nausea/vomiting, headache, pharyngitis, depression, rash, hypertension, lymphedema, insomnia, edema, weight gain, dyspnea, abdominal pain, constipation, hyperlipidemia, osteoporosis, fractures, cough, bone pain, diarrhea, breast pain, paresthesias, infections, cataracts, myalgias, thromboembolism, angina, Lyles-Yung syndrome, erythema multiforme, anemia, leukopenia, stroke, myocardial infarction, osteoporosis, fractures). Questions are answered. She agrees to press on..             11.  Return to the [place] office with pre-office serum iron, iron saturation, ferritin B12, folate, CMP, CEA, CA 27-29 and CBC and differential in 24 weeks.      .     MEDICAL DECISION MAKING:  High complexity   AMOUNT OF DATA: Moderate to Extensive     I spent ~42 minutes caring for Chasidy on this date of service. This time includes time spent by me in the following activities: preparing for the visit, reviewing tests, performing a medically appropriate examination and/or evaluation, counseling and educating the  patient/family/caregiver, ordering medications, tests, or procedures and documenting information in the medical record      Cc:  Abigail Jaimes MD- [facility]          MD Kailash Sawant MD Robert Learch, MD

## 2022-11-21 ENCOUNTER — LAB (OUTPATIENT)
Dept: LAB | Facility: HOSPITAL | Age: 62
End: 2022-11-21

## 2022-11-21 ENCOUNTER — OFFICE VISIT (OUTPATIENT)
Dept: ONCOLOGY | Facility: CLINIC | Age: 62
End: 2022-11-21

## 2022-11-21 VITALS
DIASTOLIC BLOOD PRESSURE: 88 MMHG | RESPIRATION RATE: 18 BRPM | HEART RATE: 111 BPM | HEIGHT: 60 IN | BODY MASS INDEX: 29.88 KG/M2 | TEMPERATURE: 97.4 F | SYSTOLIC BLOOD PRESSURE: 150 MMHG | OXYGEN SATURATION: 96 % | WEIGHT: 152.2 LBS

## 2022-11-21 DIAGNOSIS — R79.89 ELEVATED LFTS: ICD-10-CM

## 2022-11-21 DIAGNOSIS — C50.912 INVASIVE DUCTAL CARCINOMA OF BREAST, FEMALE, LEFT: Primary | ICD-10-CM

## 2022-11-21 DIAGNOSIS — R79.89 LOW SERUM SODIUM: ICD-10-CM

## 2022-11-21 PROCEDURE — 36415 COLL VENOUS BLD VENIPUNCTURE: CPT

## 2022-11-21 PROCEDURE — 99215 OFFICE O/P EST HI 40 MIN: CPT | Performed by: INTERNAL MEDICINE

## 2022-11-21 RX ORDER — LETROZOLE 2.5 MG/1
2.5 TABLET, FILM COATED ORAL DAILY
Qty: 30 TABLET | Refills: 11 | Status: SHIPPED | OUTPATIENT
Start: 2022-11-21

## 2022-11-22 ENCOUNTER — LAB (OUTPATIENT)
Dept: LAB | Facility: HOSPITAL | Age: 62
End: 2022-11-22

## 2022-11-22 DIAGNOSIS — C50.912 INVASIVE DUCTAL CARCINOMA OF BREAST, FEMALE, LEFT: ICD-10-CM

## 2022-11-22 LAB
HAV IGM SERPL QL IA: NORMAL
HBV CORE IGM SERPL QL IA: NORMAL
HBV SURFACE AG SERPL QL IA: NORMAL
HCV AB SER DONR QL: NORMAL

## 2022-11-22 PROCEDURE — 80074 ACUTE HEPATITIS PANEL: CPT

## 2022-11-22 PROCEDURE — 36415 COLL VENOUS BLD VENIPUNCTURE: CPT

## 2022-11-30 ENCOUNTER — TELEPHONE (OUTPATIENT)
Dept: ONCOLOGY | Facility: CLINIC | Age: 62
End: 2022-11-30

## 2022-11-30 ENCOUNTER — HOSPITAL ENCOUNTER (OUTPATIENT)
Dept: CT IMAGING | Facility: HOSPITAL | Age: 62
Discharge: HOME | End: 2022-11-30
Admitting: INTERNAL MEDICINE

## 2022-11-30 DIAGNOSIS — K76.9 LIVER LESION: Primary | ICD-10-CM

## 2022-11-30 DIAGNOSIS — C50.912 INVASIVE DUCTAL CARCINOMA OF BREAST, FEMALE, LEFT: ICD-10-CM

## 2022-11-30 PROCEDURE — 25010000002 IOPAMIDOL 61 % SOLUTION: Performed by: INTERNAL MEDICINE

## 2022-11-30 PROCEDURE — 74177 CT ABD & PELVIS W/CONTRAST: CPT

## 2022-11-30 RX ADMIN — IOPAMIDOL 100 ML: 612 INJECTION, SOLUTION INTRAVENOUS at 09:36

## 2022-11-30 NOTE — TELEPHONE ENCOUNTER
PT HAS BEEN NOTIFIED  OF CT SCAN RESULTS. MRI OF LIVER HAS BEEN ORDERED. PT V/U    ----- Message from Tommy Wheat MD sent at 11/30/2022 11:27 AM CST -----  Pls schedule MRI abdomen/liver  ----- Message -----  From: Interface, Rad Results [place] In  Sent: 11/30/2022  11:17 AM CST  To: Tommy Wheat MD

## 2022-12-03 ENCOUNTER — HOSPITAL ENCOUNTER (OUTPATIENT)
Dept: MRI IMAGING | Facility: HOSPITAL | Age: 62
Discharge: HOME | End: 2022-12-03
Admitting: INTERNAL MEDICINE

## 2022-12-03 DIAGNOSIS — K76.9 LIVER LESION: ICD-10-CM

## 2022-12-03 PROCEDURE — 74181 MRI ABDOMEN W/O CONTRAST: CPT

## 2022-12-05 ENCOUNTER — TELEPHONE (OUTPATIENT)
Dept: ONCOLOGY | Facility: CLINIC | Age: 62
End: 2022-12-05

## 2022-12-05 NOTE — TELEPHONE ENCOUNTER
LEFT V/M NOTIFYING PT OF CT SCANS. NO METASTATIS. ONLY LIVER CYST WERE SEEN WHICH ARE BENIGN.    ----- Message from Tommy Wheat MD sent at 12/3/2022  4:37 PM CST -----  Pls inform pt of MRI showing only liver cysts (benign) not mets  ----- Message -----  From: [email or web address], Rad Results [place] In  Sent: 12/3/2022   2:41 PM CST  To: Tommy Wheat MD

## 2022-12-28 DIAGNOSIS — C50.912 INVASIVE DUCTAL CARCINOMA OF BREAST, FEMALE, LEFT: Primary | ICD-10-CM

## 2023-01-06 ENCOUNTER — HOSPITAL ENCOUNTER (OUTPATIENT)
Dept: MAMMOGRAPHY | Facility: HOSPITAL | Age: 63
Discharge: HOME | End: 2023-01-06
Admitting: INTERNAL MEDICINE
Payer: COMMERCIAL

## 2023-01-06 PROCEDURE — G0279 TOMOSYNTHESIS, MAMMO: HCPCS

## 2023-01-06 PROCEDURE — 77066 DX MAMMO INCL CAD BI: CPT

## 2023-04-05 ENCOUNTER — HOSPITAL ENCOUNTER (OUTPATIENT)
Dept: RADIATION ONCOLOGY | Facility: HOSPITAL | Age: 63
Setting detail: RADIATION/ONCOLOGY SERIES
End: 2023-04-05
Payer: COMMERCIAL

## 2023-04-05 PROBLEM — Z79.811 PROPHYLACTIC USE OF ANASTROZOLE (ARIMIDEX): Status: ACTIVE | Noted: 2023-04-05

## 2023-04-05 NOTE — PROGRESS NOTES
[facility], .SSudhaSudha Brito MD      Mateo Arce APRN  _______________________________________________  [facility]  Department of Radiation Oncology  [address] [place] [id number]  Office: [phone]  Fax: [phone]    DATE:  04/06/2023  PATIENT: Chasidy Tejada  1960                         MEDICAL RECORD #:  [id number]                                                       Reason for Visit:   Chief Complaint   Patient presents with   • Breast Cancer     Left      Chasidy Tejada is a very pleasant 62 y.o. patient that has completed radiation therapy for carcinoma of the breast and returns to the clinic today for routine follow up exam. Denies activity change, appetite change, unexpected weight change, nausea/vomiting, diarrhea, light-headedness, weakness, and headaches. She continues to follow .     HISTORY OF PRESENT ILLNESS:  Stage IA (T1a, N0, cM0, G2) IDC of left breast, ER/HI+. Underwent lumpectomy on 01/10/2020. She completed 6040 cGy in 33 fractions to the left breast on 08/06/2020.    12/13/2019 - Bilateral screening mammogram:  • Stellate lesion outer left breast. Spot compression and ultrasound are suggested.  • Benign findings in the right breast   • Assessment: BI-RADS Category 0    12/27/2019 - Bilateral diagnostic mammogram:  • Irregular density persists in the upper outer left breast. This is approximately 11 cm from the nipple in the upper outer left breast.  • Ultrasound demonstrates this to be a hypoechoic demonstrates acoustic shadowing and biopsy is suggested.   IMPRESSION:  • Irregular density upper outer left breast. Biopsy is suggested  • ASSESSMENT: BI-RADS Category 4B    12/27/2019 - US left breast:  • IMPRESSION suspicious 6 x 5 x 8 mm density left breast approximately 8 cm from the nipple biopsy is suggested assessment:   • BI-RADS Category 4B    01/03/2020 - Left breast at 1 o'clock position, core  biopsies:  • Invasive mammary carcinoma of no special type (ductal, not otherwise specified), grade 2, at least 4.9 mm in greatest extent.  • Associated low-grade ductal carcinoma in situ.  • % positive, ND 25% positive,   • HER-2/lamberto-2+ (IHC)/FISH- positive (signal ratio: 2.9)  Comment: The histologic grade of this tumor is based on a relatively limited sample and may change upon receipt of the final excision specimen.  Estrogen and progesterone receptor analyses as well as other prognostic indicator studies will be performed on tissue from a paraffin block.  Results of these studies will be reported as an addendum upon receipt from the reference laboratory.     01/10/2020 - Left breast lumpectomy and lymph node biopsy per :  Left breast mass, lumpectomy:  • Invasive and in situ ductal carcinoma, see synoptic and comment  Left axillary sentinel lymph node, excisional biopsy:  • Negative for tumor, see comment    02/03/2020 - Consult with ion:  • Schedule baseline 2D echocardiogram, bone density and staging bone scan, CT scans of the head, chest abdomen and pelvis with IV contrast at [facility]  • Draw baseline CBC with differential, CMP, CEA and CA-27-29   • Reviewed NCCN guidelines version 3.2019 invasive breast cancer.  For tumors (=/> 5 mm), and pN0, recommend adjuvant endocrine therapy +/- adjuvant chemotherapy with trastuzumab (category 2B).    •  Re: The potential toxicities of adjuvant chemotherapy + Herceptin (to include but not limited to: Asthenia, cardiotoxicity, myelosuppression) are discussed at length. The rationale for adjuvant hormonal manipulation with AIs (see above) is discussed (to include but not limited to: Hot flashes, asthenia, pain, arthralgia, arthritis, nausea, bone pains, edema, fatigue, headache, venous thrombosis, anemia, leukopenia, depression, rash, pharyngitis, weight gain, dyspnea, fractures, breast pains, infection, angina). Questions answered to the best  of my ability, and to her apparent satisfaction. She is willing to proceed with a trial of therapy if deemed necessary.   • Teaching sheets for Arimidex, Taxotere, carboplatin, and Herceptin.   • Refer to Dr. Jaimes ASAP Re: Need an opinion on adjuvant therapy in this patient with stage IA left breast carcinoma with tumor of only 0.5 cm,, ER/UT positive, but HER-2 positive.  • Schedule treatment C1 D1 to be scheduled - HOLD,  then C2D1 on  HOLD at [facility] - To be administered every 3 weeks for 6 chemo cycles   o Taxotere 75 mg/m2 (BSA = ; TD = mg.) per administration guidelines   o Carboplatin AUC = 5 mg/m2 (TD= pending) per administration guidelines.   o Herceptin 4 mg/ kg (TD = mg) C1D1 loading dose on Week #1, then Herceptin 2 mg/ kg (TD = 152 mg) weekly x17 weeks, to begin on week #2, per administration guidelines  o Anticipate: Herceptin 6 mg/kg (TD= pending) every 3 weeks x12 to begin 1 week after the weekly Herceptin completed   • Discussed the rationale for adjuvant hormonal manipulation with AIs (see above) and potential toxicities of AI therapy are discussed (to include but not limited to: Hot flashes, asthenia, pain, arthralgias, arthritis, nausea/vomiting, headache, pharyngitis, depression, rash, hypertension, lymphedema, insomnia, edema, weight gain, dyspnea, abdominal pain, constipation, hyperlipidemia, osteoporosis, fractures, cough, bone pain, diarrhea, breast pain, paresthesias, infections, cataracts, myalgias, thromboembolism, angina, Lyles-Yung syndrome, erythema multiforme, anemia, leukopenia, stroke, myocardial infarction, osteoporosis, fractures). Questions are answered. She agrees to a trial of therapy.   • Keep appointment with Dr. Brito on 02/05/2020 Re:  Adjuvant radiation  • Return to the [place] office with pre-office serum iron, iron saturation, ferritin B12, folate, CMP, CEA, CA 27-29 and CBC and differential  in 4 weeks (after she is seen by Dr. Jaimes in [place]) for further  recommendations.    02/05/2020 - Consult with :  • I have seen, examined and reviewed her medication list, appropriate labs and imaging studies as well as other physician notes. We discussed the goals and plans of care with the patient and family and answered all questions. Since the patient is at risk for lymphedema post surgery and radiation, I will ask physical therapy for lymphedema baseline evaluation.   • The patient has verbalized understanding of the risk of therapy for postoperative radiation therapy to the left breast.  We will await final recommendations from Dr. Jaimes, Mrs. Tejada has been referred to her by Dr. Wheat regarding a second opinion on adjuvant therapy in this patient with Stage IA left breast carcinoma with tumor of only 0.5 cm,, ER/ME positive, but HER-2 positive.    02/07/2020 - CT Head:  • No abnormal intracranial enhancement to suggest intracranial metastasis on CT exam. No acute intracranial abnormality identified.     02/07/2020 - CT Chest:  • Postoperative changes of the left upper outer quadrant breast and the left axilla from recent lumpectomy and sentinel lymph node versus partial axillary dissection. No evidence of intrathoracic metastasis.  • Stable 5 to 6 mm right lower lobe nodule of the right hemidiaphragm, unchanged from 02/14/2011. Dependent basilar atelectasis.    02/07/2020 - CT Abdomen/Pelvis:  • No convincing evidence of intra-abdominal or pelvic metastasis.  • Hepatic cysts.  • Fatty attenuated benign focus within the body of the pancreas, partially visualized on the CTA chest of 12/14/2011.  • Previous hysterectomy, cholecystectomy, and appendectomy.    02/10/2020 - Bone Scan:  • Degenerative joint changes.  • No evidence of bony metastases.    02/25/2020 - Appointment with :  • Reviewed her pathology report, prognosis, and treatment recommendations in detail. Our plan:  • Breast cancer: We discussed that since her cancer is strongly ER+ and  low histologic grade/low proliferative rate, and the HER2 on the biopsy was borderline, I would recommend repeat HER2 testing on her surgical pathology. If HER2 is negative, this could possibly save her from chemotherapy and she would proceed with only endocrine therapy and radiation. If HER2 is confirmed to be positive, would recommend adjuvant weekly Taxol x 12 + Herceptin [id number] f/b Herceptin only to complete 1 year of HER2 directed therapy.  • She will continue to follow up with Dr. Wheat and return to see me if needed     03/05/2020 - Port placement per .     03/11/2020 - 05/27/2020 - Chemotherapy course:  • Taxol x 12    03/11/2020 - 02/10/2021 - Chemotherapy course:  • Herceptin    05/26/2020 - Appointment with :  ·  Re: Apprised of the labs, 05/13/2020 and 05/20/2020 (above).  Mild anemia otherwise essentially normal CBC, CMP, CEA and CA-27-29.  Chemo tolerance discussed.  Grade 1 fatigue, nausea, dry skin, and loose bowels (no overt diarrhea).  Grade 2 alopecia.  Is willing to press on.  · Schedule bone density (was not done) and 2D echocardiogram this week at [facility].  Compare with 02/14/2020  · Unable to repeat FISH testing for HER-2 on the tumor specimen from 01/10/2020 -pathology block did not contain any more tumor - personally discussed/confirmed with Dr. Burgos of pathology.  · Care previously discussed with Dr. Jaimes on 02/28/2020 after she saw the patient on 02/25/2020 (encounter reviewed).  Pathology was reviewed at [place] and discussed with the patient.  Recommendations/plan: Discussed that since her cancer is strongly ER positive and low histologic grade/low proliferative rate, and HER-2 on the biopsy was borderline recommended repeat HER-2 testing on her surgical pathology.  Defer to his negative proceed with only endocrine therapy and radiation.  If HER-2 is confirmed to be positive, recommend adjuvant weekly Taxol x12+ Herceptin every 3 weeks to complete 1  year of HER-2 directed therapy.  These plans were personally discussed with the patient (via telephone) on 02/28/2020 I also personally asked the pathologist (Dr. Radha Burgos) to send off the biopsy specimen for repeat FISH HER-2 testing.  · Reviewed NCCN guidelines version 3.2019 invasive breast cancer.  For tumors (=/> 5 mm), and pN0, recommend adjuvant endocrine therapy +/- adjuvant chemotherapy with trastuzumab (category 2B).    ·  Re: The potential toxicities of adjuvant chemotherapy + Herceptin (to include but not limited to: Asthenia, cardiotoxicity, myelosuppression) are discussed at length. The rationale for adjuvant hormonal manipulation with AIs (see above) is discussed (to include but not limited to: Hot flashes, asthenia, pain, arthralgia, arthritis, nausea, bone pains, edema, fatigue, headache, venous thrombosis, anemia, leukopenia, depression, rash, pharyngitis, weight gain, dyspnea, fractures, breast pains, infection, angina). Questions answered to the best of my ability, and to her apparent satisfaction. She is willing to press on with therapy.   · Schedule treatment at [facility] -   · Taxol 80 mg/m2 (BSA = 1.73; TD = 140 mg.) per administration guidelines weekly x 12 - C12, 05/27/2020  · Herceptin 6 mg/kg beginning C2 (TD = 450 mg) per administration guidelines every 3 weeks x 1 year (17 cycles)-C5, 06/03/2020; C6, 06/24/2020; C7, 07/15/2020 - then C8-C17  - Premed before Herceptin -   - Tylenol 500 mg orally.   - Benadryl 25 mg IVP over 20-30 min.   - Premedicate on day 1 of chemo with:   - Aloxi 0.25 mg IVP   - Decadron 10 mg IVPB over 20-30 min   - ON TAXOL DAYS (D1,8,15): ALOXI + DEX + APAP + BENADRYL (even on No Herceptin days to prevent hypersensitivity with Taxol) - NO EMEND  · Rx:   - Zofran 8 mg p.o. every 8 hours as needed #30 - eRx   - Arimidex 1 mg daily #30 x2 RF - eRx - STOP on day 1 of chemo. Will resume after chemo is completed.  - Oscal 600/400 po bid # 60 - OTC  · CMP and CBC  with diff weekly with Procrit 40,000 units subcutaneous if Hgb less than 10 or Hct less than 30; Neupogen 480 mcg subcutaneously daily x4 if ANC less than 1.0- B[facility]   · Discussed the rationale for adjuvant hormonal manipulation with AIs (see above) and potential toxicities of AI therapy are discussed (to include but not limited to: Hot flashes, asthenia, pain, arthralgias, arthritis, nausea/vomiting, headache, pharyngitis, depression, rash, hypertension, lymphedema, insomnia, edema, weight gain, dyspnea, abdominal pain, constipation, hyperlipidemia, osteoporosis, fractures, cough, bone pain, diarrhea, breast pain, paresthesias, infections, cataracts, myalgias, thromboembolism, angina, Lyles-Yung syndrome, erythema multiforme, anemia, leukopenia, stroke, myocardial infarction, osteoporosis, fractures). Questions are answered. She agrees to a trial of therapy.   · Reappoint to Dr. Brito Re:  Reassess for adjuvant radiation  · Return to the [place] office with pre-office serum iron, iron saturation, ferritin B12, folate, CMP, CEA, CA 27-29 and CBC and     06/04/2020 - Consult with :  • The patient has verbalized understanding of the risk of therapy and agrees to the treatment recommendations for postoperative radiation therapy to the left breast, we will simulate treatment fields with dose and final number of treatment fractions to be determined during the treatment planning process,  I anticipate a dose of 5040 cGy with 1000 cGy boost to the tumor bed for a total of 6040 cGy/33 fractions with plans to start around July 1st to allow for post chemotherapy healing time, she will continue Herceptin.     06/22/2020 - 08/06/2020 - Completed Radiation course:  • Received 6040 cGy in 33 fractions to left breast via external beam radiation therapy.    08/19/2020 - Appointment with :  •  Re: Apprised of the labs, 08/05/2020 (above).  Normal CBC, CMP, normal CEA, normal CA-27-29, repleted B12/folate,  repleted serum iron, iron saturation 29% (from 11%) repleted ferritin (216)..  Ferrous sulfate 325 p.o. twice daily dispense 60 called in on 05/27/2020. Herceptin tolerance discussed.  No problems.  Is willing to press on.  • Stop ferrous sulfate.    • Previously apprised of bone density, 06/23/2020 (above).  Normal.   • Review 2D echocardiogram on 07/08/2020.  LV systolic function normal.  • Schedule 2D echo for 10/08/2020  • Previously noted that we are unable to repeat FISH testing for HER-2 on the tumor specimen from 01/10/2020 -pathology block did not contain any more tumor - personally discussed/confirmed with Dr. Burgos of pathology.  • Care previously discussed with Dr. Jaimes on 02/28/2020 after she saw the patient on 02/25/2020 (encounter reviewed).  Pathology was reviewed at [place] and discussed with the patient.  Recommendations/plan: Discussed that since her cancer is strongly ER positive and low histologic grade/low proliferative rate, and HER-2 on the biopsy was borderline recommended repeat HER-2 testing on her surgical pathology.  Defer to his negative proceed with only endocrine therapy and radiation.  If HER-2 is confirmed to be positive, recommend adjuvant weekly Taxol x12+ Herceptin every 3 weeks to complete 1 year of HER-2 directed therapy.  These plans were personally discussed with the patient (via telephone) on 02/28/2020 I also personally asked the pathologist (Dr. Radha Burgos) to send off the biopsy specimen for repeat FISH HER-2 testing.  • Reviewed NCCN guidelines version 3.2019 invasive breast cancer.  For tumors (=/> 5 mm), and pN0, recommend adjuvant endocrine therapy +/- adjuvant chemotherapy with trastuzumab (category 2B).    • Previously discussed the potential toxicities of adjuvant chemotherapy + Herceptin (to include but not limited to: Asthenia, cardiotoxicity, myelosuppression) are discussed at length. The rationale for adjuvant hormonal manipulation with AIs (see above)  is discussed (to include but not limited to: Hot flashes, asthenia, pain, arthralgia, arthritis, nausea, bone pains, edema, fatigue, headache, venous thrombosis, anemia, leukopenia, depression, rash, pharyngitis, weight gain, dyspnea, fractures, breast pains, infection, angina). Questions answered to the best of my ability, and to her apparent satisfaction. She is willing to press on with therapy.   • Schedule treatment at [facility] -   • Herceptin 6 mg/kg beginning C2 (TD = 450 mg) per administration guidelines every 3 weeks x 1 year (17 cycles)-C9, 08/26/2020; C10, 09/16/2020; C11, 10/07/2020.  - Premed before Herceptin -   • Tylenol 500 mg orally.   • Benadryl 25 mg IVP over 20-30 min.   • Rx:   o Oscal 600/400 po bid # 60 - OTC  o Arimidex 1 mg daily # 30 x 3 RF-eRx  • CMP and CBC with diff weekly with Procrit 40,000 units subcutaneous if Hgb less than 10 or Hct less than 30; Neupogen 480 mcg subcutaneously daily x4 if ANC less than 1.0- [facility]   • Previously discussed the rationale for adjuvant hormonal manipulation with AIs (see above) and potential toxicities of AI therapy are discussed (to include but not limited to: Hot flashes, asthenia, pain, arthralgias, arthritis, nausea/vomiting, headache, pharyngitis, depression, rash, hypertension, lymphedema, insomnia, edema, weight gain, dyspnea, abdominal pain, constipation, hyperlipidemia, osteoporosis, fractures, cough, bone pain, diarrhea, breast pain, paresthesias, infections, cataracts, myalgias, thromboembolism, angina, Lyles-Yung syndrome, erythema multiforme, anemia, leukopenia, stroke, myocardial infarction, osteoporosis, fractures). Questions are answered. She agrees to resume therapy at the terminus of radiation.  • Previously reviewed office encounter, 06/07/2020 from Dr. Brito.  Recommends adjuvant radiation to the left breast.  Anticipate 5040 cGy with 1000 cGy boost to the tumor bed for a total of 6040 cGy/33 fractions to start around July 1 to allow  for postchemotherapy healing time.  • Return to the [place] office with pre-office serum iron, iron saturation, ferritin B12, folate, CMP, CEA, CA 27-29 and CBC and differential on 10/21/2020.    08/19/2020 - Hormonal Therapy:  • Arimidex    09/30/2020 - Appointment with :  • Routine mammogram in December  • Follow with Dr. Wheat and Dr. Elisha Martel as scheduled  • Return to Dr. Brito in 6 months, call us if you need us    12/17/2020 - Bilateral Diagnostic Mammogram:  • No mammographic evidence of malignancy within either breast. 12 month follow-up mammogram recommended.  • BI-RADS Category 2-Benign.  • The patient will receive a letter notifying her of the results of this exam.  • Breast Density Category B    02/26/2021 - Appointment with :  • Apprised of the labs, 02/10/2021 (above). Normal CBC, CMP, normal CEA, normal CA-27-29, repleted B12/folate, repleted serum iron, iron saturation 24%, repleted ferritin (157; 138; 141; from 216).. Ferrous sulfate stopped last visit.   • Herceptin tolerance discussed. No probelms   • Mammogram report, 12/17/2020 noted-YUDY   • 2D echo, 01/20/2021-normal LV systolic function with LVEF 61-65%.   • Previously noted that we are unable to repeat FISH testing for HER-2 on the tumor specimen from 01/10/2020 -pathology block did not contain any more tumor - personally discussed/confirmed with Dr. Burgos of pathology.   • Care previously discussed with Dr. Jaimes on 02/28/2020 after she saw the patient on 02/25/2020 (encounter reviewed). Pathology was reviewed at [place] and discussed with the patient. Recommendations/plan: Discussed that since her cancer is strongly ER positive and low histologic grade/low proliferative rate, and HER-2 on the biopsy was borderline recommended repeat HER-2 testing on her surgical pathology. Defer to his negative proceed with only endocrine therapy and radiation. If HER-2 is confirmed to be positive, recommend adjuvant  weekly Taxol x12+ Herceptin every 3 weeks to complete 1 year of HER-2 directed therapy. These plans were personally discussed with the patient (via telephone) on 02/28/2020 I also personally asked the pathologist (Dr. Radha Burgos) to send off the biopsy specimen for repeat FISH HER-2 testing.   • Reviewed NCCN guidelines version 3.2019 invasive breast cancer. For tumors (=/> 5 mm), and pN0, recommend adjuvant endocrine therapy +/- adjuvant chemotherapy with trastuzumab (category 2B).   • Previously discussed the potential toxicities of adjuvant chemotherapy + Herceptin (to include but not limited to: Asthenia, cardiotoxicity, myelosuppression) are discussed at length. The rationale for adjuvant hormonal manipulation with AIs (see above) is discussed (to include but not limited to: Hot flashes, asthenia, pain, arthralgia, arthritis, nausea, bone pains, edema, fatigue, headache, venous thrombosis, anemia, leukopenia, depression, rash, pharyngitis, weight gain, dyspnea, fractures, breast pains, infection, angina). Questions answered to the best of my ability, and to her apparent satisfaction. She has completed therapy.   • Rx:   • Oscal 600/400 po bid # 60 - OTC   • Arimidex 1 mg daily # 30 x 3 RF-eRx  • CMP and CBC with diff weekly with Procrit 40,000 units subcutaneous if Hgb less than 10 or Hct less than 30; Neupogen 480 mcg subcutaneously daily x4 if ANC less than 1.0- [facility]   • Previously discussed the rationale for adjuvant hormonal manipulation with AIs (see above) and potential toxicities of AI therapy are discussed (to include but not limited to: Hot flashes, asthenia, pain, arthralgias, arthritis, nausea/vomiting, headache, pharyngitis, depression, rash, hypertension, lymphedema, insomnia, edema, weight gain, dyspnea, abdominal pain, constipation, hyperlipidemia, osteoporosis, fractures, cough, bone pain, diarrhea, breast pain, paresthesias, infections, cataracts, myalgias, thromboembolism, angina,  Lyles-Yung syndrome, erythema multiforme, anemia, leukopenia, stroke, myocardial infarction, osteoporosis, fractures). Questions are answered. She agrees to resume therapy at the terminus of radiation.   • Reappoint to Dr. Edmond Re: Port removal per patient request   • Return to the [place] office with pre-office serum iron, iron saturation, ferritin B12, folate, CMP, CEA, CA 27-29 and CBC and differential in 12 weeks.     03/04/2021 - Mediport removed.     04/01/2021 - Appointment with :  • Follow up in one year.  • Continue monthly breast exams, notify us for changes.  • Continue ongoing management per primary care physician and other specialists.    01/06/2022 - Bilateral mammogram:  • No mammographic evidence of malignancy.  • BI-RADS Final Assessment Category 2: Benign.  • Recommend annual mammography.    01/24/2022 - Appointment with :  • Apprised of the labs, 01/17/2022.  Normal CBC, normal CMP, normal CEA, normal CA-27-29, repleted B12/folate, repleted serum iron, iron saturation 22% (from 19%; 23%; 25%; 24%), repleted ferritin (101; from 186; 137; 226; 171; 157; 138; 141; 216).    • Femara tolerance discussed.  Doing well so far with no mood lability and no new arthralgias.  Feels she can press on with this.  • Apprised of mammogram, 01/06/2022 (above). YUDY  • Previously noted that we are unable to repeat FISH testing for HER-2 on the tumor specimen from 01/10/2020 -pathology block did not contain any more tumor - personally discussed/confirmed with Dr. Burgos of pathology.  • Care previously discussed with Dr. Jaimes on 02/28/2020 after she saw the patient on 02/25/2020 (encounter reviewed).  Pathology was reviewed at [place] and discussed with the patient.  Recommendations/plan: Discussed that since her cancer is strongly ER positive and low histologic grade/low proliferative rate, and HER-2 on the biopsy was borderline recommended repeat HER-2 testing on her surgical  pathology.  Defer to his negative proceed with only endocrine therapy and radiation.  If HER-2 is confirmed to be positive, recommend adjuvant weekly Taxol x12+ Herceptin every 3 weeks to complete 1 year of HER-2 directed therapy.  These plans were personally discussed with the patient (via telephone) on 02/28/2020 I also personally asked the pathologist (Dr. Radha Burgos) to send off the biopsy specimen for repeat FISH HER-2 testing.  • Reviewed NCCN guidelines version 3.2019 invasive breast cancer.  For tumors (=/> 5 mm), and pN0, recommend adjuvant endocrine therapy +/- adjuvant chemotherapy with trastuzumab (category 2B).    • Rx:   o Oscal 600/400 po bid # 60 - OTC  o Femara 2.5 mg po qd # 30 - 11 RF  o Stay off B12 supplements  • Previously discussed the rationale for adjuvant hormonal manipulation with AIs (see above) and potential toxicities of AI therapy are discussed (to include but not limited to: Hot flashes, asthenia, pain, arthralgias, arthritis, nausea/vomiting, headache, pharyngitis, depression, rash, hypertension, lymphedema, insomnia, edema, weight gain, dyspnea, abdominal pain, constipation, hyperlipidemia, osteoporosis, fractures, cough, bone pain, diarrhea, breast pain, paresthesias, infections, cataracts, myalgias, thromboembolism, angina, Lyles-Yung syndrome, erythema multiforme, anemia, leukopenia, stroke, myocardial infarction, osteoporosis, fractures). Questions are answered. She agrees to press on.  • Return to the [place] office with pre-office serum iron, iron saturation, ferritin B12, folate, CMP, CEA, CA 27-29 and CBC and differential in 16 weeks.  Will order surveillance DEXA scan due in June at that visit.    04/06/2022 - Appointment with :  • Return to Dr. Brito in 1 year    11/21/2022 - Appointment with :  • Apprised of the labs, 11/14/2022.  Normal CBC, ALT 40, AST 38, otherwise normal CMP, normal CEA, normal CA-27-29, repleted B12/folate, repleted  serum iron, iron saturation 22% (from 21%; 22%; 19%; 23%; 25%; 24%), repleted ferritin (142; from 113; 101; 186; 137; 226; 171; 157; 138; 141; 216).    • Femara tolerance discussed.  Doing well so far with no mood lability and no new arthralgias.  Feels she can press on with this.  • Draw hepatitis profile, CMP  and CT abdomen/pelvis  • Keep appointment for mammogram, 01/06/2023.  • Apprised of DEXA scan, 06/15/2022 (above).  Osteopenia with increased fracture.  Started on Prolia beginning 06/28/2022.  • Previously noted that we are unable to repeat FISH testing for HER-2 on the tumor specimen from 01/10/2020 -pathology block did not contain any more tumor - personally discussed/confirmed with Dr. Burgos of pathology.  • Care previously discussed with Dr. Jaimes on 02/28/2020 after she saw the patient on 02/25/2020 (encounter reviewed).  Pathology was reviewed at [place] and discussed with the patient.  Recommendations/plan: Discussed that since her cancer is strongly ER positive and low histologic grade/low proliferative rate, and HER-2 on the biopsy was borderline recommended repeat HER-2 testing on her surgical pathology.  Defer to his negative proceed with only endocrine therapy and radiation.  If HER-2 is confirmed to be positive, recommend adjuvant weekly Taxol x12+ Herceptin every 3 weeks to complete 1 year of HER-2 directed therapy.  These plans were personally discussed with the patient (via telephone) on 02/28/2020 I also personally asked the pathologist (Dr. Radha Burgos) to send off the biopsy specimen for repeat FISH HER-2 testing.  • Reviewed NCCN guidelines version 3.2019 invasive breast cancer.  For tumors (=/> 5 mm), and pN0, recommend adjuvant endocrine therapy +/- adjuvant chemotherapy with trastuzumab (category 2B).    • Rx:   o Oscal 600/400 po bid # 60 - OTC  o Femara 2.5 mg po qd # 30 - 11 RF  o Prolia 60 mg SC every 6 months-next due 12/28/2022  • Previously discussed the rationale for  adjuvant hormonal manipulation with AIs (see above) and potential toxicities of AI therapy are discussed (to include but not limited to: Hot flashes, asthenia, pain, arthralgias, arthritis, nausea/vomiting, headache, pharyngitis, depression, rash, hypertension, lymphedema, insomnia, edema, weight gain, dyspnea, abdominal pain, constipation, hyperlipidemia, osteoporosis, fractures, cough, bone pain, diarrhea, breast pain, paresthesias, infections, cataracts, myalgias, thromboembolism, angina, Lyles-Yung syndrome, erythema multiforme, anemia, leukopenia, stroke, myocardial infarction, osteoporosis, fractures). Questions are answered. She agrees to press on.  • Return to the [place] office with pre-office serum iron, iron saturation, ferritin B12, folate, CMP, CEA, CA 27-29 and CBC and differential in 24 weeks.      01/06/2023 - Bilateral mammogram:  • No mammographic evidence of malignancy.   • Post therapeutic changes left breast.   • Recommendation is for the patient to return for mammography in one year or sooner if clinically indicated. Diagnostic mammograms requested the first 5 years following treatment of her breast cancer.  • BIRADS Category 2 - Benign findings    History obtained from  PATIENT and CHART    PAST MEDICAL HISTORY  Past Medical History:   Diagnosis Date   • Acid reflux    • Asthma     long time ago    • Breast cancer    • Drug therapy    • Family history of colon cancer    • History of adenomatous polyp of colon    • History of breast cancer    • Hx of radiation therapy    • Panic attack    • Right arm weakness       PAST SURGICAL HISTORY  Past Surgical History:   Procedure Laterality Date   • ANTERIOR CERVICAL DISCECTOMY W/ FUSION N/A 9/7/2018    Procedure: CERVICAL DISCECTOMY ANTERIOR WITH FUSION C6-7;  Surgeon: Chris Mcfadden MD;  Location: [facility];  Service: Neurosurgery   • APPENDECTOMY     • BILATERAL SALPINGO OOPHORECTOMY     • BREAST BIOPSY     • BREAST LUMPECTOMY     • CERVICAL  CORPECTOMY N/A 9/7/2018    Procedure: CERVICAL CORPECTOMY C6-7;  Surgeon: Chris Mcfadden MD;  Location:  [facility] OR;  Service: Neurosurgery   • CHOLECYSTECTOMY     • COLONOSCOPY N/A 2/10/2017    One 8mm adenomatous polyp in the rectum; The examination was otherwise normal on direct and retroflexion views; A tattoo was seen in the rectum-A post-polypectomy scar was found at the tattoo site-There was no evidence of residual polyp tissue; Repeat 5 years   • COLONOSCOPY  09/05/2012    One 13mm polyp in the rectum-Marked with Maxine ink-path shows Ganglioneuroma; The examination was otherwise normal; Repeat 4 years   • COLONOSCOPY N/A 2/4/2022    Procedure: COLONOSCOPY WITH ANESTHESIA;  Surgeon: Elina Gonsalez MD;  Location: [facility] ENDOSCOPY;  Service: Gastroenterology;  Laterality: N/A;  pre family hx colon ca; hx adenomatous polyp  post normal  Jones Elias,    • HYSTERECTOMY  1998    Had hysterectomy for painful periods and bleeding. (Dr. Patel) TLH w/ BSO   • LAPAROSCOPIC TUBAL LIGATION     • MASTECTOMY W/ SENTINEL NODE BIOPSY Left 1/10/2020    Procedure: LEFT NEEDLE DIRECTED ULTRASOUND GUIDED PARTIAL MASTECTOMY WITH SENTINEL LYMPH NODE BIOPSY, INJECTION AND SCAN, RADIOLOGIST WILL INJECT;  Surgeon: Flor Edmond MD;  Location:  [facility] OR;  Service: General   • TONSILLECTOMY     • VENOUS ACCESS DEVICE (PORT) INSERTION N/A 3/5/2020    Procedure: INSERTION VENOUS ACCESS DEVICE EXCISION SKIN LESION X 2 CHEST AND ABDOMEN;  Surgeon: Flor Edmond MD;  Location:  [facility] OR;  Service: General;  Laterality: N/A;   • VENOUS ACCESS DEVICE (PORT) REMOVAL Left 3/4/2021    Procedure: REMOVAL OF SINGLE LUMEN PORT;  Surgeon: Flor Edmond MD;  Location:  [facility] OR;  Service: General;  Laterality: Left;      FAMILY HISTORY  family history includes Breast cancer in her sister; Cancer in her father; Colon cancer in her maternal grandmother; Diabetes in her mother; Heart disease in her father; Hypertension in her  "mother; No Known Problems in her daughter and sister; Stroke in her mother.    SOCIAL HISTORY  Social History     Tobacco Use   • Smoking status: Never   • Smokeless tobacco: Never   • Tobacco comments:     \"i never really smoked\"   Vaping Use   • Vaping Use: Never used   Substance Use Topics   • Alcohol use: Not Currently   • Drug use: No      ALLERGIES  Demerol [meperidine] and Morphine and related     MEDICATIONS  Current Outpatient Medications   Medication Sig Dispense Refill   • letrozole (FEMARA) 2.5 MG tablet Take 1 tablet by mouth Daily. 30 tablet 11   • ondansetron (ZOFRAN) 4 MG tablet Take 2 tablets by mouth Every 8 (Eight) Hours As Needed for Nausea or Vomiting. 30 tablet 1     No current facility-administered medications for this visit.      The following portions of the patient's history were reviewed and updated as appropriate: allergies, current medications, past family history, past medical history, past social history, past surgical history and problem list.    REVIEW OF SYSTEMS  Review of Systems   Constitutional: Negative for fatigue and unexpected weight change.   HENT: Negative.    Respiratory: Negative for cough and shortness of breath.    Cardiovascular: Negative.  Negative for chest pain.   Gastrointestinal: Negative.  Negative for nausea and vomiting.   Genitourinary: Negative.    Musculoskeletal: Negative.    Skin: Negative.    Neurological: Negative for dizziness, weakness and headaches.   Hematological: Negative.  Negative for adenopathy.   Psychiatric/Behavioral: Negative.    All other systems reviewed and are negative.    I have reviewed and confirmed the accuracy of the ROS as documented by the MA/LPN/RN Kailash Brito III, MD    PHYSICAL EXAM  VITAL SIGNS:   Vitals:    04/06/23 0933   BP: 145/69   Pulse: 82   SpO2: 99%  Comment: room air   Weight: 68.9 kg (152 lb)   Height: 152.4 cm (60\")   PainSc: 0-No pain         Physical Exam  Vitals reviewed.   Constitutional:       Appearance: " Normal appearance.   HENT:      Head: Normocephalic.   Eyes:      Pupils: Pupils are equal, round, and reactive to light.   Cardiovascular:      Rate and Rhythm: Normal rate and regular rhythm.      Pulses: Normal pulses.      Heart sounds: Normal heart sounds.   Pulmonary:      Effort: Pulmonary effort is normal.      Breath sounds: Normal breath sounds.   Chest:   Breasts:     Right: Normal. No mass, skin change or tenderness.      Left: Tenderness present. No mass or skin change.      Comments: Left breast s/p lumpectomy and radiation therapy; slight hyperpigmentation noted, no evidence of tumor recurrence.   Abdominal:      General: Bowel sounds are normal.   Musculoskeletal:         General: Normal range of motion.      Cervical back: Normal range of motion and neck supple.   Lymphadenopathy:      Upper Body:      Right upper body: No supraclavicular, axillary or pectoral adenopathy.      Left upper body: No supraclavicular, axillary or pectoral adenopathy.   Skin:     General: Skin is warm.      Capillary Refill: Capillary refill takes less than 2 seconds.   Neurological:      General: No focal deficit present.      Mental Status: She is alert and oriented to person, place, and time.   Psychiatric:         Mood and Affect: Mood normal.         Behavior: Behavior normal.        Performance Status: ECOG (0) Fully active, able to carry on all predisease performance without restriction    Clinical Quality Measures  -Pain Documented by Standardized Tool, FPS Chasidy Tejada reports a pain score of 0. Given her pain assessment as noted, treatment options were discussed and the following options were decided upon as a follow-up plan to address the patient's pain: No pain, no plan given.       Body Mass Index Screening and Follow-Up Plan  Patient's Body mass index is 29.69 kg/m². indicating that she is obese (BMI >30).    Tobacco Use: Screening and Cessation Intervention  Social History    Tobacco Use      Smoking  "status: Never      Smokeless tobacco: Never      Tobacco comments: \"i never really smoked\"    Advanced Care Planning Advance Care Planning   ACP discussion was held with the patient during this visit. Patient does not have an advance directive, information provided.    ASSESSMENT AND PLAN  1. Invasive ductal carcinoma of breast, female, left    2. S/P lumpectomy, left breast    3. S/P lymph node biopsy    4. History of radiation therapy    5. Prophylactic use of anastrozole (Arimidex)    6. Former smoker      Recommendations:  Chasidy Tejada is status post completion of adjuvant radiation therapy to the left breast and presents to our clinic today for follow up exam. Diagnosed with Stage IA (T1a, N0, cM0, G2) IDC of left breast, ER/MS+. Underwent lumpectomy on 01/10/2020. She completed 6040 cGy in 33 fractions to the left breast on 08/06/2020.    We discussed expected post radiation long and short term effects, monthly self exams and NCCN surveillance recommendations. She is doing well and has no evidence of recurrent or metastatic disease today. Last bilateral mammogram was completed on 01/06/2023, next mammogram is scheduled in January, 2024. Continue ongoing management with other physicians, will follow up with us in 1 year or sooner if needed.    Patient Instructions   1) Return in 1 year     Time Spent: I spent 32 minutes caring for Chasidy on this date of service. This time includes time spent by me in the following activities: preparing for the visit, reviewing tests, obtaining and/or reviewing a separately obtained history, performing a medically appropriate examination and/or evaluation, counseling and educating the patient/family/caregiver, ordering medications, tests, or procedures, referring and communicating with other health care professionals, documenting information in the medical record, independently interpreting results and communicating that information with the patient/family/caregiver and " care coordination.     Kailash Brito III, MD  04/06/2023

## 2023-04-06 ENCOUNTER — OFFICE VISIT (OUTPATIENT)
Dept: RADIATION ONCOLOGY | Facility: HOSPITAL | Age: 63
End: 2023-04-06
Payer: COMMERCIAL

## 2023-04-06 VITALS
OXYGEN SATURATION: 99 % | BODY MASS INDEX: 29.84 KG/M2 | DIASTOLIC BLOOD PRESSURE: 69 MMHG | SYSTOLIC BLOOD PRESSURE: 145 MMHG | HEIGHT: 60 IN | WEIGHT: 152 LBS | HEART RATE: 82 BPM

## 2023-04-06 DIAGNOSIS — Z92.3 HISTORY OF RADIATION THERAPY: ICD-10-CM

## 2023-04-06 DIAGNOSIS — Z98.890 S/P LYMPH NODE BIOPSY: ICD-10-CM

## 2023-04-06 DIAGNOSIS — Z79.811 PROPHYLACTIC USE OF ANASTROZOLE (ARIMIDEX): ICD-10-CM

## 2023-04-06 DIAGNOSIS — Z98.890 S/P LUMPECTOMY, LEFT BREAST: ICD-10-CM

## 2023-04-06 DIAGNOSIS — Z87.891 FORMER SMOKER: ICD-10-CM

## 2023-04-06 DIAGNOSIS — C50.912 INVASIVE DUCTAL CARCINOMA OF BREAST, FEMALE, LEFT: Primary | ICD-10-CM

## 2023-04-06 PROCEDURE — G0463 HOSPITAL OUTPT CLINIC VISIT: HCPCS | Performed by: RADIOLOGY

## 2023-06-14 ENCOUNTER — LAB (OUTPATIENT)
Dept: LAB | Facility: HOSPITAL | Age: 63
End: 2023-06-14
Payer: COMMERCIAL

## 2023-06-14 DIAGNOSIS — C50.912 INVASIVE DUCTAL CARCINOMA OF BREAST, FEMALE, LEFT: ICD-10-CM

## 2023-06-14 LAB
ALBUMIN SERPL-MCNC: 4.7 G/DL (ref 3.5–5.2)
ALBUMIN/GLOB SERPL: 1.6 G/DL
ALP SERPL-CCNC: 104 U/L (ref 39–117)
ALT SERPL W P-5'-P-CCNC: 21 U/L (ref 1–33)
ANION GAP SERPL CALCULATED.3IONS-SCNC: 13 MMOL/L (ref 5–15)
AST SERPL-CCNC: 19 U/L (ref 1–32)
BASOPHILS # BLD AUTO: 0.05 10*3/MM3 (ref 0–0.2)
BASOPHILS NFR BLD AUTO: 0.6 % (ref 0–1.5)
BILIRUB SERPL-MCNC: 0.5 MG/DL (ref 0–1.2)
BUN SERPL-MCNC: 12 MG/DL (ref 8–23)
BUN/CREAT SERPL: 25.5 (ref 7–25)
CALCIUM SPEC-SCNC: 9.8 MG/DL (ref 8.6–10.5)
CHLORIDE SERPL-SCNC: 103 MMOL/L (ref 98–107)
CO2 SERPL-SCNC: 25 MMOL/L (ref 22–29)
CREAT SERPL-MCNC: 0.47 MG/DL (ref 0.57–1)
DEPRECATED RDW RBC AUTO: 43.8 FL (ref 37–54)
EGFRCR SERPLBLD CKD-EPI 2021: 108.5 ML/MIN/1.73
EOSINOPHIL # BLD AUTO: 0.08 10*3/MM3 (ref 0–0.4)
EOSINOPHIL NFR BLD AUTO: 1 % (ref 0.3–6.2)
ERYTHROCYTE [DISTWIDTH] IN BLOOD BY AUTOMATED COUNT: 13 % (ref 12.3–15.4)
FERRITIN SERPL-MCNC: 72.18 NG/ML (ref 13–150)
GLOBULIN UR ELPH-MCNC: 3 GM/DL
GLUCOSE SERPL-MCNC: 97 MG/DL (ref 65–99)
HCT VFR BLD AUTO: 41.8 % (ref 34–46.6)
HGB BLD-MCNC: 13.4 G/DL (ref 12–15.9)
IMM GRANULOCYTES # BLD AUTO: 0.02 10*3/MM3 (ref 0–0.05)
IMM GRANULOCYTES NFR BLD AUTO: 0.3 % (ref 0–0.5)
IRON 24H UR-MRATE: 75 MCG/DL (ref 37–145)
IRON SATN MFR SERPL: 18 % (ref 20–50)
LYMPHOCYTES # BLD AUTO: 3.57 10*3/MM3 (ref 0.7–3.1)
LYMPHOCYTES NFR BLD AUTO: 46.2 % (ref 19.6–45.3)
MCH RBC QN AUTO: 29.3 PG (ref 26.6–33)
MCHC RBC AUTO-ENTMCNC: 32.1 G/DL (ref 31.5–35.7)
MCV RBC AUTO: 91.5 FL (ref 79–97)
MONOCYTES # BLD AUTO: 0.44 10*3/MM3 (ref 0.1–0.9)
MONOCYTES NFR BLD AUTO: 5.7 % (ref 5–12)
NEUTROPHILS NFR BLD AUTO: 3.57 10*3/MM3 (ref 1.7–7)
NEUTROPHILS NFR BLD AUTO: 46.2 % (ref 42.7–76)
NRBC BLD AUTO-RTO: 0 /100 WBC (ref 0–0.2)
PLATELET # BLD AUTO: 336 10*3/MM3 (ref 140–450)
PMV BLD AUTO: 9.4 FL (ref 6–12)
POTASSIUM SERPL-SCNC: 3.7 MMOL/L (ref 3.5–5.2)
PROT SERPL-MCNC: 7.7 G/DL (ref 6–8.5)
RBC # BLD AUTO: 4.57 10*6/MM3 (ref 3.77–5.28)
SODIUM SERPL-SCNC: 141 MMOL/L (ref 136–145)
TIBC SERPL-MCNC: 410 MCG/DL (ref 298–536)
TRANSFERRIN SERPL-MCNC: 275 MG/DL (ref 200–360)
WBC NRBC COR # BLD: 7.73 10*3/MM3 (ref 3.4–10.8)

## 2023-06-14 PROCEDURE — 82728 ASSAY OF FERRITIN: CPT

## 2023-06-14 PROCEDURE — 82746 ASSAY OF FOLIC ACID SERUM: CPT

## 2023-06-14 PROCEDURE — 86300 IMMUNOASSAY TUMOR CA 15-3: CPT

## 2023-06-14 PROCEDURE — 85025 COMPLETE CBC W/AUTO DIFF WBC: CPT

## 2023-06-14 PROCEDURE — 84466 ASSAY OF TRANSFERRIN: CPT

## 2023-06-14 PROCEDURE — 82607 VITAMIN B-12: CPT

## 2023-06-14 PROCEDURE — 36415 COLL VENOUS BLD VENIPUNCTURE: CPT

## 2023-06-14 PROCEDURE — 83540 ASSAY OF IRON: CPT

## 2023-06-14 PROCEDURE — 82378 CARCINOEMBRYONIC ANTIGEN: CPT

## 2023-06-15 LAB
CANCER AG27-29 SERPL-ACNC: 19.5 U/ML (ref 0–38.6)
CEA SERPL-MCNC: 1.61 NG/ML
FOLATE SERPL-MCNC: >20 NG/ML (ref 4.78–24.2)
VIT B12 BLD-MCNC: 279 PG/ML (ref 211–946)

## 2023-06-21 ENCOUNTER — INFUSION (OUTPATIENT)
Dept: ONCOLOGY | Facility: HOSPITAL | Age: 63
End: 2023-06-21
Payer: COMMERCIAL

## 2023-06-21 DIAGNOSIS — M85.852 OSTEOPENIA OF LEFT HIP: Primary | ICD-10-CM

## 2023-06-21 DIAGNOSIS — Z79.811 LONG TERM CURRENT USE OF AROMATASE INHIBITOR: ICD-10-CM

## 2023-09-28 ENCOUNTER — OFFICE VISIT (OUTPATIENT)
Dept: NEUROSURGERY | Facility: CLINIC | Age: 63
End: 2023-09-28
Payer: COMMERCIAL

## 2023-09-28 ENCOUNTER — HOSPITAL ENCOUNTER (OUTPATIENT)
Dept: GENERAL RADIOLOGY | Facility: HOSPITAL | Age: 63
Discharge: HOME | End: 2023-09-28
Admitting: NURSE PRACTITIONER
Payer: COMMERCIAL

## 2023-09-28 ENCOUNTER — TELEPHONE (OUTPATIENT)
Dept: NEUROSURGERY | Facility: CLINIC | Age: 63
End: 2023-09-28
Payer: COMMERCIAL

## 2023-09-28 VITALS — BODY MASS INDEX: 32.2 KG/M2 | WEIGHT: 164 LBS | HEIGHT: 60 IN

## 2023-09-28 DIAGNOSIS — E66.09 CLASS 1 OBESITY DUE TO EXCESS CALORIES WITHOUT SERIOUS COMORBIDITY WITH BODY MASS INDEX (BMI) OF 32.0 TO 32.9 IN ADULT: ICD-10-CM

## 2023-09-28 DIAGNOSIS — M79.641 RIGHT HAND PAIN: ICD-10-CM

## 2023-09-28 DIAGNOSIS — G56.03 BILATERAL CARPAL TUNNEL SYNDROME: Primary | ICD-10-CM

## 2023-09-28 PROCEDURE — 73130 X-RAY EXAM OF HAND: CPT

## 2023-09-28 RX ORDER — DICLOFENAC SODIUM 75 MG/1
75 TABLET, DELAYED RELEASE ORAL 2 TIMES DAILY
Qty: 60 TABLET | Refills: 2 | Status: SHIPPED | OUTPATIENT
Start: 2023-09-28

## 2023-09-28 NOTE — TELEPHONE ENCOUNTER
RECEIVED CALL FROM THE XRAY TECH AT [facility]. PT IS THERE FOR HER XRAY OF HER HAND AND STATED TO THE TECH THAT IT SHOULD BE OF BOTH HANDS.     TECH IS CALLING TO DOUBLE CHECK THAT AS ONLY AN ORDER WAS PLACED FOR RIGHT HAND.     ADVISED HER THAT PER ANA'S NOTE HE STATES THE FOLLOWING:  am going to send her for set x-rays of the right hand to make sure there is no arthritic componen that would be causing her pain       ADVISED THAT THE ONLY MENTION OF BILATERAL HANDS IS FOR THE NERVE CONDUCTION STUDY.     SHE SAID SHE WOULD RELAY THIS INFORMATION TO THE PATIENT.

## 2023-09-28 NOTE — PROGRESS NOTES
Chief complaint:   Chief Complaint   Patient presents with    Hand Pain     Pt here for constant R hand numbness, tingling and weakness. Pt states she has not had any  physical therapy, pain mgmt or seen a chiropractor.       Subjective     HPI: This is a 63-year-old female patient who is known to our service.  She did go to the operating room on September 7, 2018 for a C6-7 ACDF.  The patient did well from that surgery and had resolution of her neck pain and arm pain.  She comes back in today for reevaluation of bilateral hand pain with the right being worse than the left.  She says the pain has been going on for quite some time but has been progressively getting worse.  It does seem to be worse with writing and computer work.  She says it is not waking her up from sleeping at night.  She is right-hand dominant.  She works as an  here at [employer].  She is .  Denies any tobacco or illicit drug use.        Review of Systems   Constitutional:  Positive for fatigue.   HENT:  Positive for sneezing and trouble swallowing.    Cardiovascular:  Positive for palpitations.   Gastrointestinal:  Positive for nausea.   Endocrine: Positive for heat intolerance.   Musculoskeletal:  Positive for arthralgias, myalgias and neck stiffness.   Allergic/Immunologic: Positive for environmental allergies.   Neurological:  Positive for numbness and headaches.   Psychiatric/Behavioral:  Positive for sleep disturbance. The patient is nervous/anxious.    All other systems reviewed and are negative.     Past Medical History:   Diagnosis Date    Acid reflux     Asthma     long time ago     Breast cancer     Drug therapy     Family history of colon cancer     History of adenomatous polyp of colon     History of breast cancer     Hx of radiation therapy     Panic attack     Right arm weakness      Past Surgical History:   Procedure Laterality Date    ANTERIOR CERVICAL DISCECTOMY W/ FUSION N/A 9/7/2018     Procedure: CERVICAL DISCECTOMY ANTERIOR WITH FUSION C6-7;  Surgeon: Chris Mcfadden MD;  Location:  [facility];  Service: Neurosurgery    APPENDECTOMY      BILATERAL SALPINGO OOPHORECTOMY      BREAST BIOPSY      BREAST LUMPECTOMY      CERVICAL CORPECTOMY N/A 9/7/2018    Procedure: CERVICAL CORPECTOMY C6-7;  Surgeon: Chris Mcfadden MD;  Location:  [facility] OR;  Service: Neurosurgery    CHOLECYSTECTOMY      COLONOSCOPY N/A 2/10/2017    One 8mm adenomatous polyp in the rectum; The examination was otherwise normal on direct and retroflexion views; A tattoo was seen in the rectum-A post-polypectomy scar was found at the tattoo site-There was no evidence of residual polyp tissue; Repeat 5 years    COLONOSCOPY  09/05/2012    One 13mm polyp in the rectum-Marked with Maxine ink-path shows Ganglioneuroma; The examination was otherwise normal; Repeat 4 years    COLONOSCOPY N/A 2/4/2022    Procedure: COLONOSCOPY WITH ANESTHESIA;  Surgeon: Elina Gonsalez MD;  Location: [facility] ENDOSCOPY;  Service: Gastroenterology;  Laterality: N/A;  pre family hx colon ca; hx adenomatous polyp  post normal  Jones Elias,     HYSTERECTOMY  1998    Had hysterectomy for painful periods and bleeding. (Dr. Patel) TLH w/ BSO    LAPAROSCOPIC TUBAL LIGATION      MASTECTOMY W/ SENTINEL NODE BIOPSY Left 1/10/2020    Procedure: LEFT NEEDLE DIRECTED ULTRASOUND GUIDED PARTIAL MASTECTOMY WITH SENTINEL LYMPH NODE BIOPSY, INJECTION AND SCAN, RADIOLOGIST WILL INJECT;  Surgeon: Flor Edmond MD;  Location: [facility] OR;  Service: General    TONSILLECTOMY      VENOUS ACCESS DEVICE (PORT) INSERTION N/A 3/5/2020    Procedure: INSERTION VENOUS ACCESS DEVICE EXCISION SKIN LESION X 2 CHEST AND ABDOMEN;  Surgeon: Flor Edmond MD;  Location: [facility] OR;  Service: General;  Laterality: N/A;    VENOUS ACCESS DEVICE (PORT) REMOVAL Left 3/4/2021    Procedure: REMOVAL OF SINGLE LUMEN PORT;  Surgeon: Flor Edmond MD;  Location: [facility] OR;  Service: General;  " Laterality: Left;     Family History   Problem Relation Age of Onset    Colon cancer Maternal Grandmother         Unknown age    Cancer Father     Heart disease Father     Diabetes Mother     Hypertension Mother     Stroke Mother     Breast cancer Sister     No Known Problems Daughter     No Known Problems Sister     Colon polyps Neg Hx     Esophageal cancer Neg Hx     Liver cancer Neg Hx     Liver disease Neg Hx     Rectal cancer Neg Hx     Stomach cancer Neg Hx     Ovarian cancer Neg Hx     BRCA 1/2 Neg Hx     Endometrial cancer Neg Hx      Social History     Tobacco Use    Smoking status: Never    Smokeless tobacco: Never    Tobacco comments:     \"i never really smoked\"   Vaping Use    Vaping Use: Never used   Substance Use Topics    Alcohol use: Not Currently    Drug use: No     (Not in a hospital admission)    Allergies:  Demerol [meperidine] and Morphine and related    Objective      Vital Signs  Ht 152.4 cm (60\")   Wt 74.4 kg (164 lb)   LMP  (LMP Unknown)   BMI 32.03 kg/m²     Physical Exam  Constitutional:       Appearance: Normal appearance. She is well-developed.   HENT:      Head: Normocephalic.   Eyes:      General: Lids are normal.      Extraocular Movements: EOM normal.      Conjunctiva/sclera: Conjunctivae normal.      Pupils: Pupils are equal, round, and reactive to light.   Pulmonary:      Effort: Pulmonary effort is normal.      Breath sounds: Normal breath sounds.   Musculoskeletal:         General: Normal range of motion.      Cervical back: Normal range of motion.   Skin:     General: Skin is warm.   Neurological:      Mental Status: She is alert and oriented to person, place, and time.      GCS: GCS eye subscore is 4. GCS verbal subscore is 5. GCS motor subscore is 6.      Cranial Nerves: No cranial nerve deficit.      Sensory: No sensory deficit.      Motor: Motor strength is normal.      Gait: Gait is intact.      Deep Tendon Reflexes: Reflexes are normal and symmetric. Reflexes normal. "   Psychiatric:         Speech: Speech normal.         Behavior: Behavior normal.         Thought Content: Thought content normal.       Neurologic Exam     Mental Status   Oriented to person, place, and time.   Attention: normal. Concentration: normal.   Speech: speech is normal   Level of consciousness: alert  Normal comprehension.     Cranial Nerves     CN II   Visual fields full to confrontation.     CN III, IV, VI   Pupils are equal, round, and reactive to light.  Extraocular motions are normal.     CN V   Facial sensation intact.     CN VII   Facial expression full, symmetric.     CN VIII   CN VIII normal.     CN IX, X   CN IX normal.   CN X normal.     CN XI   CN XI normal.     CN XII   CN XII normal.     Motor Exam   Muscle bulk: normal    Strength   Strength 5/5 throughout.     Sensory Exam   Light touch normal.     Gait, Coordination, and Reflexes     Gait  Gait: normal    Reflexes   Reflexes 2+ except as noted.     Imaging review: No new imaging        Assessment/Plan: Patient is complaining of bilateral hand pain with the right being significantly worse than the left.  I am going to send her for set x-rays of the right hand to make sure there is no arthritic componen that would be causing her pain.  I will also start her on diclofenac.  I am also can have her go for nerve conduction study of the bilateral upper extremities to rule out carpal tunnel syndrome.  We will have her follow-up with me after testing is completed and make further recommendation at that time.  Patient is a nonsmoker  The patient's Body mass index is 32.03 kg/m².. BMI is above normal parameters. Recommendations include: educational material and nutrition counseling    Diagnoses and all orders for this visit:    1. Bilateral carpal tunnel syndrome (Primary)  -     EMG & Nerve Conduction Test; Future    2. Right hand pain  -     XR hand 3+ vw right; Future    3. Class 1 obesity due to excess calories without serious comorbidity with  body mass index (BMI) of 32.0 to 32.9 in adult    Other orders  -     diclofenac (VOLTAREN) 75 MG EC tablet; Take 1 tablet by mouth 2 (Two) Times a Day.  Dispense: 60 tablet; Refill: 2          I discussed the patients findings and my recommendations with patient    Bill Douglas, APRN  09/28/23  13:42 CDT

## 2023-09-28 NOTE — PATIENT INSTRUCTIONS
Advance Care Planning and Advance Directives     You make decisions on a daily basis - decisions about where you want to live, your career, your home, your life. Perhaps one of the most important decisions you face is your choice for future medical care. Take time to talk with your family and your healthcare team and start planning today.  Advance Care Planning is a process that can help you:  Understand possible future healthcare decisions in light of your own experiences  Reflect on those decision in light of your goals and values  Discuss your decisions with those closest to you and the healthcare professionals that care for you  Make a plan by creating a document that reflects your wishes    Surrogate Decision Maker  In the event of a medical emergency, which has left you unable to communicate or to make your own decisions, you would need someone to make decisions for you.  It is important to discuss your preferences for medical treatment with this person while you are in good health.     Qualities of a surrogate decision maker:  Willing to take on this role and responsibility  Knows what you want for future medical care  Willing to follow your wishes even if they don't agree with them  Able to make difficult medical decisions under stressful circumstances    Advance Directives  These are legal documents you can create that will guide your healthcare team and decision maker(s) when needed. These documents can be stored in the electronic medical record.    Living Will - a legal document to guide your care if you have a terminal condition or a serious illness and are unable to communicate. States vary by statute in document names/types, but most forms may include one or more of the following:        -  Directions regarding life-prolonging treatments        -  Directions regarding artificially provided nutrition/hydration        -  Choosing a healthcare decision maker        -  Direction regarding organ/tissue  donation    Durable Power of  for Healthcare - this document names an -in-fact to make medical decisions for you, but it may also allow this person to make personal and financial decisions for you. Please seek the advice of an  if you need this type of document.    **Advance Directives are not required and no one may discriminate against you if you do not sign one.    Medical Orders  Many states allow specific forms/orders signed by your physician to record your wishes for medical treatment in your current state of health. This form, signed in personal communication with your physician, addresses resuscitation and other medical interventions that you may or may not want.      For more information or to schedule a time with a [facility] Advance Care Planning Facilitator contact: [facility] or call [phone] and someone will contact you directly.BMI for Adults  What is BMI?  Body mass index (BMI) is a number that is calculated from a person's weight and height. BMI can help estimate how much of a person's weight is composed of fat. BMI does not measure body fat directly. Rather, it is an alternative to procedures that directly measure body fat, which can be difficult and expensive.  BMI can help identify people who may be at higher risk for certain medical problems.  What are BMI measurements used for?  BMI is used as a screening tool to identify possible weight problems. It helps determine whether a person is obese, overweight, a healthy weight, or underweight.  BMI is useful for:  Identifying a weight problem that may be related to a medical condition or may increase the risk for medical problems.  Promoting changes, such as changes in diet and exercise, to help reach a healthy weight. BMI screening can be repeated to see if these changes are working.  How is BMI calculated?  BMI involves measuring your weight in relation to your height. Both height and weight are measured,  "and the BMI is calculated from those numbers. This can be done either in English ([place] or metric measurements. Note that charts and online BMI calculators are available to help you find your BMI quickly and easily without having to do these calculations yourself.  To calculate your BMI in English ([place] measurements:    Measure your weight in pounds (lb).  Multiply the number of pounds by 703.  For example, for a person who weighs 180 lb, multiply that number by 703, which equals 126,540.  Measure your height in inches. Then multiply that number by itself to get a measurement called \"inches squared.\"  For example, for a person who is 70 inches tall, the \"inches squared\" measurement is 70 inches x 70 inches, which equals 4,900 inches squared.  Divide the total from step 2 (number of lb x 703) by the total from step 3 (inches squared): 126,540 ÷ 4,900 = 25.8. This is your BMI.    To calculate your BMI in metric measurements:  Measure your weight in kilograms (kg).  Measure your height in meters (m). Then multiply that number by itself to get a measurement called \"meters squared.\"  For example, for a person who is 1.75 m tall, the \"meters squared\" measurement is 1.75 m x 1.75 m, which is equal to 3.1 meters squared.  Divide the number of kilograms (your weight) by the meters squared number. In this example: 70 ÷ 3.1 = 22.6. This is your BMI.  What do the results mean?  BMI charts are used to identify whether you are underweight, normal weight, overweight, or obese. The following guidelines will be used:  Underweight: BMI less than 18.5.  Normal weight: BMI between 18.5 and 24.9.  Overweight: BMI between 25 and 29.9.  Obese: BMI of 30 or above.  Keep these notes in mind:  Weight includes both fat and muscle, so someone with a muscular build, such as an athlete, may have a BMI that is higher than 24.9. In cases like these, BMI is not an accurate measure of body fat.  To determine if excess body fat is the cause of a BMI " "of 25 or higher, further assessments may need to be done by a health care provider.  BMI is usually interpreted in the same way for men and women.  Where to find more information  For more information about BMI, including tools to quickly calculate your BMI, go to these websites:  Centers for Disease Control and Prevention: www.cdc.gov  American Heart Association: www.heart.org  National Heart, Lung, and Blood [place]: www.nhlbi.nih.gov  Summary  Body mass index (BMI) is a number that is calculated from a person's weight and height.  BMI may help estimate how much of a person's weight is composed of fat. BMI can help identify those who may be at higher risk for certain medical problems.  BMI can be measured using English measurements or metric measurements.  BMI charts are used to identify whether you are underweight, normal weight, overweight, or obese.  This information is not intended to replace advice given to you by your health care provider. Make sure you discuss any questions you have with your health care provider.  Document Revised: 09/09/2020 Document Reviewed: 07/17/2020  [employer] Patient Education © 2021 [employer] https://www.nhlbi.nih.gov/files/docs/public/heart/dash_brief.pdf\">   DASH Eating Plan  DASH stands for Dietary Approaches to Stop Hypertension. The DASH eating plan is a healthy eating plan that has been shown to:  Reduce high blood pressure (hypertension).  Reduce your risk for type 2 diabetes, heart disease, and stroke.  Help with weight loss.  What are tips for following this plan?  Reading food labels  Check food labels for the amount of salt (sodium) per serving. Choose foods with less than 5 percent of the Daily Value of sodium. Generally, foods with less than 300 milligrams (mg) of sodium per serving fit into this eating plan.  To find whole grains, look for the word \"whole\" as the first word in the ingredient list.  Shopping  Buy products labeled as \"low-sodium\" or \"no salt " "added.\"  Buy fresh foods. Avoid canned foods and pre-made or frozen meals.  Cooking  Avoid adding salt when cooking. Use salt-free seasonings or herbs instead of table salt or sea salt. Check with your health care provider or pharmacist before using salt substitutes.  Do not frances foods. Cook foods using healthy methods such as baking, boiling, grilling, roasting, and broiling instead.  Cook with heart-healthy oils, such as olive, canola, avocado, soybean, or sunflower oil.  Meal planning    Eat a balanced diet that includes:  4 or more servings of fruits and 4 or more servings of vegetables each day. Try to fill one-half of your plate with fruits and vegetables.  6-8 servings of whole grains each day.  Less than 6 oz (170 g) of lean meat, poultry, or fish each day. A 3-oz (85-g) serving of meat is about the same size as a deck of cards. One egg equals 1 oz (28 g).  2-3 servings of low-fat dairy each day. One serving is 1 cup (237 mL).  1 serving of nuts, seeds, or beans 5 times each week.  2-3 servings of heart-healthy fats. Healthy fats called omega-3 fatty acids are found in foods such as walnuts, flaxseeds, fortified milks, and eggs. These fats are also found in cold-water fish, such as sardines, salmon, and mackerel.  Limit how much you eat of:  Canned or prepackaged foods.  Food that is high in trans fat, such as some fried foods.  Food that is high in saturated fat, such as fatty meat.  Desserts and other sweets, sugary drinks, and other foods with added sugar.  Full-fat dairy products.  Do not salt foods before eating.  Do not eat more than 4 egg yolks a week.  Try to eat at least 2 vegetarian meals a week.  Eat more home-cooked food and less restaurant, buffet, and fast food.    Lifestyle  When eating at a restaurant, ask that your food be prepared with less salt or no salt, if possible.  If you drink alcohol:  Limit how much you use to:  0-1 drink a day for women who are not pregnant.  0-2 drinks a day for " men.  Be aware of how much alcohol is in your drink. In the [place] one drink equals one 12 oz bottle of beer (355 mL), one 5 oz glass of wine (148 mL), or one 1½ oz glass of hard liquor (44 mL).  General information  Avoid eating more than 2,300 mg of salt a day. If you have hypertension, you may need to reduce your sodium intake to 1,500 mg a day.  Work with your health care provider to maintain a healthy body weight or to lose weight. Ask what an ideal weight is for you.  Get at least 30 minutes of exercise that causes your heart to beat faster (aerobic exercise) most days of the week. Activities may include walking, swimming, or biking.  Work with your health care provider or dietitian to adjust your eating plan to your individual calorie needs.  What foods should I eat?  Fruits  All fresh, dried, or frozen fruit. Canned fruit in natural juice (without added sugar).  Vegetables  Fresh or frozen vegetables (raw, steamed, roasted, or grilled). Low-sodium or reduced-sodium tomato and vegetable juice. Low-sodium or reduced-sodium tomato sauce and tomato paste. Low-sodium or reduced-sodium canned vegetables.  Grains  Whole-grain or whole-wheat bread. Whole-grain or whole-wheat pasta. Brown rice. Oatmeal. Quinoa. Bulgur. Whole-grain and low-sodium cereals. Narcisa bread. Low-fat, low-sodium crackers. Whole-wheat flour tortillas.  Meats and other proteins  Skinless chicken or turkey. Ground chicken or turkey. Pork with fat trimmed off. Fish and seafood. Egg whites. Dried beans, peas, or lentils. Unsalted nuts, nut butters, and seeds. Unsalted canned beans. Lean cuts of beef with fat trimmed off. Low-sodium, lean precooked or cured meat, such as sausages or meat loaves.  Dairy  Low-fat (1%) or fat-free (skim) milk. Reduced-fat, low-fat, or fat-free cheeses. Nonfat, low-sodium ricotta or cottage cheese. Low-fat or nonfat yogurt. Low-fat, low-sodium cheese.  Fats and oils  Soft margarine without trans fats. Vegetable oil.  Reduced-fat, low-fat, or light mayonnaise and salad dressings (reduced-sodium). Canola, safflower, olive, avocado, soybean, and sunflower oils. Avocado.  Seasonings and condiments  Herbs. Spices. Seasoning mixes without salt.  Other foods  Unsalted popcorn and pretzels. Fat-free sweets.  The items listed above may not be a complete list of foods and beverages you can eat. Contact a dietitian for more information.  What foods should I avoid?  Fruits  Canned fruit in a light or heavy syrup. Fried fruit. Fruit in cream or butter sauce.  Vegetables  Creamed or fried vegetables. Vegetables in a cheese sauce. Regular canned vegetables (not low-sodium or reduced-sodium). Regular canned tomato sauce and paste (not low-sodium or reduced-sodium). Regular tomato and vegetable juice (not low-sodium or reduced-sodium). Pickles. Olives.  Grains  Baked goods made with fat, such as croissants, muffins, or some breads. Dry pasta or rice meal packs.  Meats and other proteins  Fatty cuts of meat. Ribs. Fried meat. Delgado. Bologna, salami, and other precooked or cured meats, such as sausages or meat loaves. Fat from the back of a pig (fatback). Bratwurst. Salted nuts and seeds. Canned beans with added salt. Canned or smoked fish. Whole eggs or egg yolks. Chicken or turkey with skin.  Dairy  Whole or 2% milk, cream, and half-and-half. Whole or full-fat cream cheese. Whole-fat or sweetened yogurt. Full-fat cheese. Nondairy creamers. Whipped toppings. Processed cheese and cheese spreads.  Fats and oils  Butter. Stick margarine. Lard. Shortening. Ghee. Delgado fat. Tropical oils, such as coconut, palm kernel, or palm oil.  Seasonings and condiments  Onion salt, garlic salt, seasoned salt, table salt, and sea salt. Worcestershire sauce. Tartar sauce. Barbecue sauce. Teriyaki sauce. Soy sauce, including reduced-sodium. Steak sauce. Canned and packaged gravies. Fish sauce. Oyster sauce. Cocktail sauce. Store-bought horseradish. Ketchup. Mustard.  Meat flavorings and tenderizers. Bouillon cubes. Hot sauces. Pre-made or packaged marinades. Pre-made or packaged taco seasonings. Relishes. Regular salad dressings.  Other foods  Salted popcorn and pretzels.  The items listed above may not be a complete list of foods and beverages you should avoid. Contact a dietitian for more information.  Where to find more information  National Heart, Lung, and Blood [place]: www.nhlbi.nih.gov  American Heart Association: www.heart.org  Academy of Nutrition and Dietetics: www.eatright.org  National Kidney Foundation: www.kidney.org  Summary  The DASH eating plan is a healthy eating plan that has been shown to reduce high blood pressure (hypertension). It may also reduce your risk for type 2 diabetes, heart disease, and stroke.  When on the DASH eating plan, aim to eat more fresh fruits and vegetables, whole grains, lean proteins, low-fat dairy, and heart-healthy fats.  With the DASH eating plan, you should limit salt (sodium) intake to 2,300 mg a day. If you have hypertension, you may need to reduce your sodium intake to 1,500 mg a day.  Work with your health care provider or dietitian to adjust your eating plan to your individual calorie needs.  This information is not intended to replace advice given to you by your health care provider. Make sure you discuss any questions you have with your health care provider.  Document Revised: 11/20/2020 Document Reviewed: 11/20/2020  [employer] Patient Education © 2021 [employer] High-Protein and High-Calorie Diet  Eating high-protein and high-calorie foods can help you to gain weight, heal after an injury, and recover after an illness or surgery. The specific amount of daily protein and calories you need depends on:  Your body weight.  The reason this diet is recommended for you.  What is my plan?  Generally, a high-protein, high-calorie diet involves:  Eating 250-500 extra calories each day.  Making sure that you get enough of your  daily calories from protein. Ask your health care provider how many of your calories should come from protein.  Talk with a health care provider, such as a diet and nutrition specialist (dietitian), about how much protein and how many calories you need each day. Follow the diet as directed by your health care provider.  What are tips for following this plan?    Preparing meals  Add whole milk, half-and-half, or heavy cream to cereal, pudding, soup, or hot cocoa.  Add whole milk to instant breakfast drinks.  Add peanut butter to oatmeal or smoothies.  Add powdered milk to baked goods, smoothies, or milkshakes.  Add powdered milk, cream, or butter to mashed potatoes.  Add cheese to cooked vegetables.  Make whole-milk yogurt parfaits. Top them with granola, fruit, or nuts.  Add cottage cheese to your fruit.  Add avocado, cheese, or both to sandwiches or salads.  Add meat, poultry, or seafood to rice, pasta, casseroles, salads, and soups.  Use mayonnaise when making egg salad, chicken salad, or tuna salad.  Use peanut butter as a dip for vegetables or as a topping for pretzels, celery, or crackers.  Add beans to casseroles, dips, and spreads.  Add pureed beans to sauces and soups.  Replace calorie-free drinks with calorie-containing drinks, such as milk and fruit juice.  Replace water with milk or heavy cream when making foods such as oatmeal, pudding, or cocoa.  General instructions  Ask your health care provider if you should take a nutritional supplement.  Try to eat six small meals each day instead of three large meals.  Eat a balanced diet. In each meal, include one food that is high in protein.  Keep nutritious snacks available, such as nuts, trail mixes, dried fruit, and yogurt.  If you have kidney disease or diabetes, talk with your health care provider about how much protein is safe for you. Too much protein may put extra stress on your kidneys.  Drink your calories. Choose high-calorie drinks and have them  after your meals.  What high-protein foods should I eat?    Vegetables  Soybeans. Peas.  Grains  Quinoa. Bulgur wheat.  Meats and other proteins  Beef, pork, and poultry. Fish and seafood. Eggs. Tofu. Textured vegetable protein (TVP). Peanut butter. Nuts and seeds. Dried beans. Protein powders.  Dairy  Whole milk. Whole-milk yogurt. Powdered milk. Cheese. Cottage Cheese. Eggnog.  Beverages  High-protein supplement drinks. Soy milk.  Other foods  Protein bars.  The items listed above may not be a complete list of high-protein foods and beverages. Contact a dietitian for more options.  What high-calorie foods should I eat?  Fruits  Dried fruit. Fruit leather. Canned fruit in syrup. Fruit juice. Avocado.  Vegetables  Vegetables cooked in oil or butter. Fried potatoes.  Grains  Pasta. Quick breads. Muffins. Pancakes. Ready-to-eat cereal.  Meats and other proteins  Peanut butter. Nuts and seeds.  Dairy  Heavy cream. Whipped cream. Cream cheese. Sour cream. Ice cream. Custard. Pudding.  Beverages  Meal-replacement beverages. Nutrition shakes. Fruit juice. Sugar-sweetened soft drinks.  Seasonings and condiments  Salad dressing. Mayonnaise. Jorge sauce. Fruit preserves or jelly. Honey. Syrup.  Sweets and desserts  Cake. Cookies. Pie. Pastries. Candy bars. Chocolate.  Fats and oils  Butter or margarine. Oil. Gravy.  Other foods  Meal-replacement bars.  The items listed above may not be a complete list of high-calorie foods and beverages. Contact a dietitian for more options.  Summary  A high-protein, high-calorie diet can help you gain weight or heal faster after an injury, illness, or surgery.  To increase your protein and calories, add ingredients such as whole milk, peanut butter, cheese, beans, meat, or seafood to meal items.  To get enough extra calories each day, include high-calorie foods and beverages at each meal.  Adding a high-calorie drink or shake can be an easy way to help you get enough calories each day.  Talk with your healthcare provider or dietitian about the best options for you.  This information is not intended to replace advice given to you by your health care provider. Make sure you discuss any questions you have with your health care provider.  Document Revised: 11/30/2018 Document Reviewed: 10/30/2018  [employer] Patient Education © 2021 [employer]

## 2023-10-12 NOTE — H&P
SUBJECTIVE:  Patient ID: Chasidy Tejada is a 58 y.o. female is here today for follow-up.    Chief Complaint: Right upper extremity pain  Chief Complaint   Patient presents with   • neck & arm pain     patient had MRI @ [facility] on 8/21/18 and is here to discuss results; patient is to start therapy tomorrow.       HPI  58-year-old female that been complaining of right paraspinal neck pain and right upper extremity radicular pain numbness tingling and weakness.This is been going on for 6 months or so.  She has been treated by her primary care doctor with pain medication, anti-inflammatories, steroids with no significant improvement.  She just started a dedicated course of physical therapy with cervical traction.  We sent her for an MRI of her cervical spine and she is here to discuss the results.    The following portions of the patient's history were reviewed and updated as appropriate: allergies, current medications, past family history, past medical history, past social history, past surgical history and problem list.    OBJECTIVE:    Review of Systems   Musculoskeletal: Positive for neck pain.   Neurological: Positive for weakness and numbness.   All other systems reviewed and are negative.         Physical Exam   Constitutional: She is oriented to person, place, and time.   Neurological: She is oriented to person, place, and time. She has a normal Finger-Nose-Finger Test and a normal Tandem Gait Test. Gait normal.   Reflex Scores:       Tricep reflexes are 0 on the right side and 1+ on the left side.       Bicep reflexes are 2+ on the right side and 2+ on the left side.       Brachioradialis reflexes are 1+ on the right side and 1+ on the left side.  Psychiatric: Her speech is normal.       Neurologic Exam     Mental Status   Oriented to person, place, and time.   Attention: normal.   Speech: speech is normal   Level of consciousness: alert  Knowledge: good.     Cranial Nerves   Cranial nerves II through XII  intact.     Motor Exam   Muscle bulk: normal  Overall muscle tone: normal  Right arm pronator drift: absent  Left arm pronator drift: absent    Strength   Right deltoid: 5/5  Left deltoid: 5/5  Right biceps: 5/5  Left biceps: 5/5  Right triceps: 3/5  Left triceps: 5/5  Right interossei: 4/5  Left interossei: 5/5    Sensory Exam   Right arm light touch: decreased from elbow  Left arm light touch: normal  Right arm pinprick: decreased from elbow  Left arm pinprick: normal    Gait, Coordination, and Reflexes     Gait  Gait: normal    Coordination   Finger to nose coordination: normal  Tandem walking coordination: normal    Tremor   Resting tremor: absent  Intention tremor: absent  Action tremor: absent    Reflexes   Right brachioradialis: 1+  Left brachioradialis: 1+  Right biceps: 2+  Left biceps: 2+  Right triceps: 0  Left triceps: 1+  Right Figueroa: absent  Left Figueroa: absent  Right ankle clonus: absent  Left ankle clonus: absent      Independent Review of Radiographic Studies:   MRI the cervical spine shows a very large right paraspinal disc herniation at C6-7 with a tremendous amount of compression of the exiting nerve root and spinal cord.    ASSESSMENT/PLAN:  The patient has right upper extremity pain, sensory deficits, weakness, diminished reflexes.  This is been going on for greater than 6 months despite maximum medical treatment.  Her MRI shows a very large C6 7 disc herniation.  I think it is very unlikely that further conservative care is going to improve her situation.  Given the neurologic deficits and weakness that she has a recommend a single level anterior cervical discectomy fusion and partial corpectomy at C6-7 to decompress her spinal cord and the exiting nerve root at that level.  The risks and benefits of the procedure were discussed at length which included but were not limited to infection, bleeding, paralysis, spinal fluid leak, speech and swallow difficulty, stroke, coma, and death.  She  acknowledged understanding of this.  Her questions concerns were addressed.  We will get her preop and scheduled as soon as possible.      1. Herniated cervical disc without myelopathy    2. Spinal stenosis in cervical region    3. Cervical radiculopathy    4. Non-smoker    5. BMI 32.0-32.9,adult            Return for postoperatively.      Chris Mcfadden MD     never

## 2023-11-03 ENCOUNTER — TELEPHONE (OUTPATIENT)
Dept: NEUROLOGY | Facility: HOSPITAL | Age: 63
End: 2023-11-03
Payer: COMMERCIAL

## 2023-12-05 DIAGNOSIS — M85.852 OSTEOPENIA OF LEFT HIP: ICD-10-CM

## 2023-12-05 DIAGNOSIS — Z79.811 LONG TERM CURRENT USE OF AROMATASE INHIBITOR: Primary | ICD-10-CM

## 2023-12-21 ENCOUNTER — LAB (OUTPATIENT)
Dept: LAB | Facility: HOSPITAL | Age: 63
End: 2023-12-21
Payer: COMMERCIAL

## 2023-12-21 ENCOUNTER — INFUSION (OUTPATIENT)
Dept: ONCOLOGY | Facility: HOSPITAL | Age: 63
End: 2023-12-21
Payer: COMMERCIAL

## 2023-12-21 VITALS
BODY MASS INDEX: 32.2 KG/M2 | TEMPERATURE: 98.7 F | HEIGHT: 60 IN | SYSTOLIC BLOOD PRESSURE: 147 MMHG | HEART RATE: 78 BPM | WEIGHT: 164 LBS | DIASTOLIC BLOOD PRESSURE: 74 MMHG | RESPIRATION RATE: 18 BRPM | OXYGEN SATURATION: 97 %

## 2023-12-21 DIAGNOSIS — M85.852 OSTEOPENIA OF LEFT HIP: Primary | ICD-10-CM

## 2023-12-21 DIAGNOSIS — Z79.811 LONG TERM CURRENT USE OF AROMATASE INHIBITOR: ICD-10-CM

## 2023-12-21 DIAGNOSIS — C50.912 INVASIVE DUCTAL CARCINOMA OF BREAST, FEMALE, LEFT: ICD-10-CM

## 2023-12-21 DIAGNOSIS — M85.852 OSTEOPENIA OF LEFT HIP: ICD-10-CM

## 2023-12-21 LAB
ALBUMIN SERPL-MCNC: 4.4 G/DL (ref 3.5–5.2)
ALBUMIN/GLOB SERPL: 1.5 G/DL
ALP SERPL-CCNC: 82 U/L (ref 39–117)
ALT SERPL W P-5'-P-CCNC: 16 U/L (ref 1–33)
ANION GAP SERPL CALCULATED.3IONS-SCNC: 11 MMOL/L (ref 5–15)
AST SERPL-CCNC: 16 U/L (ref 1–32)
BASOPHILS # BLD AUTO: 0.03 10*3/MM3 (ref 0–0.2)
BASOPHILS NFR BLD AUTO: 0.3 % (ref 0–1.5)
BILIRUB SERPL-MCNC: 0.4 MG/DL (ref 0–1.2)
BUN SERPL-MCNC: 12 MG/DL (ref 8–23)
BUN/CREAT SERPL: 22.2 (ref 7–25)
CALCIUM SPEC-SCNC: 9.3 MG/DL (ref 8.6–10.5)
CHLORIDE SERPL-SCNC: 104 MMOL/L (ref 98–107)
CO2 SERPL-SCNC: 27 MMOL/L (ref 22–29)
CREAT SERPL-MCNC: 0.54 MG/DL (ref 0.57–1)
DEPRECATED RDW RBC AUTO: 44.3 FL (ref 37–54)
EGFRCR SERPLBLD CKD-EPI 2021: 104.9 ML/MIN/1.73
EOSINOPHIL # BLD AUTO: 0.07 10*3/MM3 (ref 0–0.4)
EOSINOPHIL NFR BLD AUTO: 0.8 % (ref 0.3–6.2)
ERYTHROCYTE [DISTWIDTH] IN BLOOD BY AUTOMATED COUNT: 13.3 % (ref 12.3–15.4)
FERRITIN SERPL-MCNC: 85.33 NG/ML (ref 13–150)
GLOBULIN UR ELPH-MCNC: 2.9 GM/DL
GLUCOSE SERPL-MCNC: 115 MG/DL (ref 65–99)
HCT VFR BLD AUTO: 41 % (ref 34–46.6)
HGB BLD-MCNC: 13.1 G/DL (ref 12–15.9)
IMM GRANULOCYTES # BLD AUTO: 0.03 10*3/MM3 (ref 0–0.05)
IMM GRANULOCYTES NFR BLD AUTO: 0.3 % (ref 0–0.5)
IRON 24H UR-MRATE: 76 MCG/DL (ref 37–145)
IRON SATN MFR SERPL: 20 % (ref 20–50)
LYMPHOCYTES # BLD AUTO: 3.4 10*3/MM3 (ref 0.7–3.1)
LYMPHOCYTES NFR BLD AUTO: 39.6 % (ref 19.6–45.3)
MAGNESIUM SERPL-MCNC: 1.9 MG/DL (ref 1.6–2.4)
MCH RBC QN AUTO: 29.3 PG (ref 26.6–33)
MCHC RBC AUTO-ENTMCNC: 32 G/DL (ref 31.5–35.7)
MCV RBC AUTO: 91.7 FL (ref 79–97)
MONOCYTES # BLD AUTO: 0.45 10*3/MM3 (ref 0.1–0.9)
MONOCYTES NFR BLD AUTO: 5.2 % (ref 5–12)
NEUTROPHILS NFR BLD AUTO: 4.6 10*3/MM3 (ref 1.7–7)
NEUTROPHILS NFR BLD AUTO: 53.8 % (ref 42.7–76)
NRBC BLD AUTO-RTO: 0 /100 WBC (ref 0–0.2)
PHOSPHATE SERPL-MCNC: 3.6 MG/DL (ref 2.5–4.5)
PLATELET # BLD AUTO: 353 10*3/MM3 (ref 140–450)
PMV BLD AUTO: 9.5 FL (ref 6–12)
POTASSIUM SERPL-SCNC: 4 MMOL/L (ref 3.5–5.2)
PROT SERPL-MCNC: 7.3 G/DL (ref 6–8.5)
RBC # BLD AUTO: 4.47 10*6/MM3 (ref 3.77–5.28)
SODIUM SERPL-SCNC: 142 MMOL/L (ref 136–145)
TIBC SERPL-MCNC: 383 MCG/DL (ref 298–536)
TRANSFERRIN SERPL-MCNC: 257 MG/DL (ref 200–360)
WBC NRBC COR # BLD AUTO: 8.58 10*3/MM3 (ref 3.4–10.8)

## 2023-12-21 PROCEDURE — 82728 ASSAY OF FERRITIN: CPT

## 2023-12-21 PROCEDURE — 80053 COMPREHEN METABOLIC PANEL: CPT

## 2023-12-21 PROCEDURE — 82378 CARCINOEMBRYONIC ANTIGEN: CPT

## 2023-12-21 PROCEDURE — 84466 ASSAY OF TRANSFERRIN: CPT

## 2023-12-21 PROCEDURE — 84100 ASSAY OF PHOSPHORUS: CPT

## 2023-12-21 PROCEDURE — 83540 ASSAY OF IRON: CPT

## 2023-12-21 PROCEDURE — 86300 IMMUNOASSAY TUMOR CA 15-3: CPT

## 2023-12-21 PROCEDURE — 36415 COLL VENOUS BLD VENIPUNCTURE: CPT

## 2023-12-21 PROCEDURE — 96372 THER/PROPH/DIAG INJ SC/IM: CPT

## 2023-12-21 PROCEDURE — 85025 COMPLETE CBC W/AUTO DIFF WBC: CPT

## 2023-12-21 PROCEDURE — 82607 VITAMIN B-12: CPT

## 2023-12-21 PROCEDURE — 82746 ASSAY OF FOLIC ACID SERUM: CPT

## 2023-12-21 PROCEDURE — 25010000002 DENOSUMAB 60 MG/ML SOLUTION PREFILLED SYRINGE: Performed by: INTERNAL MEDICINE

## 2023-12-21 PROCEDURE — 83735 ASSAY OF MAGNESIUM: CPT

## 2023-12-21 RX ADMIN — DENOSUMAB 60 MG: 60 INJECTION SUBCUTANEOUS at 15:18

## 2023-12-21 NOTE — PROGRESS NOTES
[facility] GROUP HEMATOLOGY AND ONCOLOGY  [address]  [place] [address] [phone]    Patient Name: Chasidy Tejada  Encounter Date: 12/29/2023  YOB: 1960  Patient Number: [id number]     REASON FOR VISIT:  Chasidy Tejada is a 63-year-old female who returns in follow-up of stage IA left breast carcinoma, ER/TN and HER-2/lamberto positive.  She underwent left breast lumpectomy with SNB, 01/10/2020 and was started on adjuvant Arimidex beginning 02/03/2020 through 03/11/2020 before resumption on 08/19/202 through 08/30/2021 - stopped due to intolerance (temper volatility, irritability).  She completed adjuvant paclitaxel (week 12/12, 05/27/2020), and received adjuvant trastuzumab (Herceptin), having completed cycle 17, 02/10/2021 (34.5 months).  Adjuvant radiation was administered on 06/22/2020 through 08/06/2020 (6040 cGy in 33 fx). She is here alone (previously with her spouse).  She was started on adjuvant Femara, 09/27/2021 (27 months).    I have reviewed the HPI and verified with the patient the accuracy of it. No changes to interval history since the information was documented. Tommy Wheat MD 12/29/23      DIAGNOSTIC ABNORMALITIES:          1.   12/13/2019- mammogram screening.  Impression: Stellate lesion outer left breast.  Spot compression and ultrasound suggested.          2.   12/27/2019- diagnostic mammogram.  Impression: Irregular density upper outer left breast.  Biopsy suggested.          3.   12/27/2019- left breast ultrasound.  Impression: Suspicious 6 x 5 x 8 mm density left breast approximately 8 cm from the nipple.  Biopsy suggested.  BI-RADS Category 4B.          4.   01/03/2020-ultrasound-guided breast biopsy.  Impression: Appropriate sampling of the 8 mm irregular hypoechoic nodule in the left breast at 1:00 in the posterior depth/axillary tail region.  Final diagnosis: Left breast at 1 o'clock position,  core biopsies: A.invasive mammary carcinoma of no special type (ductal, not otherwise specified), grade 2, at least 4.9 mm in greatest extent.  B.associated low-grade ductal carcinoma in situ.  % positive, MD 25% positive, HER-2/lamberto-2+ (IHC)/FISH- a focal positive score is present in an area corresponding to approximately 15% of the tumor (signal ratio: 2.9).           5.   02/03/2020- CMP normal with .  CBC normal.           6.   02/03/2020-bone scan ([facility]).  Impression: Degenerative joint changes.  No evidence of bony metastasis.           7.   02/03/2020-CT scans, head chest, abdomen, pelvis-impression: No abnormal intracranial enhancement to suggest intracranial metastasis/abnormalities.  No evidence of intrathoracic metastasis.  Stable 5 to 6 mm right lower lobe nodule of the right hemidiaphragm unchanged from 2/14/2011.  No convincing evidence of intra-abdominal or pelvic metastasis.  Hepatic cysts.  Fatty attenuated benign focus within the body of the pancreas visualized, 12/14/2011.  Previous hysterectomy, cholecystectomy and appendectomy.           8.   02/14/2020-echocardiogram.  Impression: LV systolic function normal (EF equals 70%).  Normal LV and RV size thickness and function.  Normal LA and RA size.  Normal cardiac valves.  No pericardial effusion.           9.  02/27/2020- CMP normal with .  CBC normal.  Iron 65, iron saturation 18%, ferritin 134, B12 745, folate > 20, CEA 1.76, CA-27-29 14.2.          10.  03/04/2020- repeat FISH for HER-2/lamberto (from tumor block, 01/10/2020).  No tumor on specimen.          11.  06/23/2020- DEXA scan.  Impression: Normal bone density.  No lower than 1 standard deviation below the mean for young adult woman.    PREVIOUS INTERVENTIONS:           1.   01/10/2020- left breast lumpectomy with left axillary sentinel node biopsy.  Final diagnosis: 1.left breast mass, lumpectomy: Invasive and in situ ductal carcinoma.  Tumor site-not specified.   "Histologic type: Invasive carcinoma of no special type (invasive ductal carcinoma, not otherwise specified).  Overall tumor grade: Grade 1.  Tumor size: Greatest dimension of largest invasive focus: 5 mm.  Tumor focality: Single focus of invasive carcinoma.  Ductal carcinoma in situ (DCIS): Present.  Negative for extensive intraductal component (EIC).  Size (extent) CIS: Cannot be determined: Microscopic.  Architectural pattern: Cribriform/solid.  Nuclear grade: 2 (intermediate).  Necrosis: Present, central (expansive \"comedo) necrosis).  Lobular carcinoma in situ (LCIS): No LCIS in specimen.  Lymphovascular invasion: Not identified.  Dermal lymphovascular invasion: No skin present.  Margins: Uninvolved by invasive carcinoma.  Lymph nodes: Uninvolved by tumor cells.  Regional lymph nodes: 1.  Number of lymph nodes examined: 1.  Number of sentinel nodes examined: 1.  Pathologic stage classification (pTNM, AJCC eighth edition): pT1a,(sn), pN0.  % positive, TN 25% positive, HER-2 2+ (equivocal).           2.   Adjuvant Arimidex 1 mg p.o. daily beginning 02/03/2020 through 03/11/2020: resumed 08/19/2020 through 08/30/2021 - stopped due to intolerance (temper volatility, irritability).           3.   Adjuvant paclitaxel beginning 03/11/2020 through 05/27/2020 (12/12 weekly doses)           4.   Adjuvant trastuzumab every 3 weeks - beginning 03/11/2020 through 02/10/2021 (17 cycles)           5.   Adjuvant radiation to the left breast (5040 cGy with 1000 cGy boost to the tumor bed for a total of 6040 cGy/33 fractions) from 06/22/2020 through 08/06/2020.           6.   Adjuvant Femara 2.5 mg po daily beginning 09/27/2021    LABS    Lab Results - Last 18 Months   Lab Units 12/21/23  1424 06/14/23  1351 11/14/22  1357   HEMOGLOBIN g/dL 13.1 13.4 14.4   HEMATOCRIT % 41.0 41.8 43.7   MCV fL 91.7 91.5 91.6   WBC 10*3/mm3 8.58 7.73 9.00   RDW % 13.3 13.0 13.4   MPV fL 9.5 9.4 9.5   PLATELETS 10*3/mm3 353 336 391   IMM GRAN " % % 0.3 0.3 0.3   NEUTROS ABS 10*3/mm3 4.60 3.57 4.49   LYMPHS ABS 10*3/mm3 3.40* 3.57* 3.87*   MONOS ABS 10*3/mm3 0.45 0.44 0.51   EOS ABS 10*3/mm3 0.07 0.08 0.04   BASOS ABS 10*3/mm3 0.03 0.05 0.06   IMMATURE GRANS (ABS) 10*3/mm3 0.03 0.02 0.03   NRBC /100 WBC 0.0 0.0 0.0       Lab Results - Last 18 Months   Lab Units 12/21/23  1424 06/14/23  1351 11/14/22  1357   GLUCOSE mg/dL 115* 97 98   SODIUM mmol/L 142 141 134*   POTASSIUM mmol/L 4.0 3.7 3.7   CO2 mmol/L 27.0 25.0 21.0*   CHLORIDE mmol/L 104 103 99   ANION GAP mmol/L 11.0 13.0 14.0   CREATININE mg/dL 0.54* 0.47* 0.54*   BUN mg/dL 12 12 10   BUN / CREAT RATIO  22.2 25.5* 18.5   CALCIUM mg/dL 9.3 9.8 9.1   ALK PHOS U/L 82 104 65   TOTAL PROTEIN g/dL 7.3 7.7 7.8   ALT (SGPT) U/L 16 21 40*   AST (SGOT) U/L 16 19 38*   BILIRUBIN mg/dL 0.4 0.5 0.6   ALBUMIN g/dL 4.4 4.7 4.80   GLOBULIN gm/dL 2.9 3.0 3.0       Lab Results - Last 18 Months   Lab Units 12/21/23  1424 06/14/23  1351 11/14/22  1357   CEA ng/mL 1.25 1.61 1.17       Lab Results - Last 18 Months   Lab Units 12/21/23  1424 06/14/23  1351 11/14/22  1357   IRON mcg/dL 76 75 70   TIBC mcg/dL 383 410 319   IRON SATURATION (TSAT) % 20 18* 22   FERRITIN ng/mL 85.33 72.18 142.00   FOLATE ng/mL 11.30 >20.00 19.80         PAST MEDICAL HISTORY:  ALLERGIES:  Allergies   Allergen Reactions    Demerol [Meperidine] Hives    Morphine And Related Hives     CURRENT MEDICATIONS:  Outpatient Encounter Medications as of 12/29/2023   Medication Sig Dispense Refill    folic acid-vit B6-vit B12 (Folbee) 2.5-25-1 MG tablet tablet Take 1 tablet by mouth Daily. 30 tablet 0    letrozole (FEMARA) 2.5 MG tablet Take 1 tablet by mouth Daily. 30 tablet 11    ondansetron (ZOFRAN) 4 MG tablet Take 2 tablets by mouth Every 8 (Eight) Hours As Needed for Nausea or Vomiting. 30 tablet 3    [DISCONTINUED] diclofenac (VOLTAREN) 75 MG EC tablet Take 1 tablet by mouth 2 (Two) Times a Day. (Patient not taking: Reported on 12/29/2023) 60 tablet 2      No facility-administered encounter medications on file as of 12/29/2023.     Adult illnesses:  Colon polyps  Obesity  Spinal stenosis neck   Herniated cervical disc without myelopathy  Borderline blood pressure  History of dysphagia    Past surgeries:  Anterior cervical discectomy w fusion, 09/2018  Appendectomy  BSO/GANGA  Cholecystectomy  Colonoscopy, 02/10/2017  BTL  Tonsillectomy  Breast biopsy  Left partial mastectomy, 01/10/2020  03/05/2020 - Mediport placement per Dr. Edmond  03/04/2021-Mediport removal per Dr. Edmond.    ADULT ILLNESSES:  Patient Active Problem List   Diagnosis Code    Family history of colon cancer Z80.0    Laryngopharyngeal reflux K21.9    Pharyngoesophageal dysphagia R13.14    Class 1 obesity due to excess calories without serious comorbidity with body mass index (BMI) of 31.0 to 31.9 in adult E66.09, Z68.31    Spinal stenosis in cervical region M48.02    Cervical radiculopathy M54.12    Herniated cervical disc without myelopathy M50.20    Borderline blood pressure R03.0    Invasive ductal carcinoma of breast, female, left C50.912    Former smoker Z87.891    S/P lumpectomy, left breast Z98.890    S/P lymph node biopsy Z98.890    On antineoplastic chemotherapy Z79.899    Estrogen receptor positive status (ER+) Z17.0    History of radiation therapy Z92.3    History of adenomatous polyp of colon Z86.010    Osteoporosis without current pathological fracture M81.0    Osteopenia of left hip M85.852    Long term current use of aromatase inhibitor Z79.811    Prophylactic use of anastrozole (Arimidex) Z79.811     SURGERIES:  Past Surgical History:   Procedure Laterality Date    ANTERIOR CERVICAL DISCECTOMY W/ FUSION N/A 9/7/2018    Procedure: CERVICAL DISCECTOMY ANTERIOR WITH FUSION C6-7;  Surgeon: Chris Mcfadden MD;  Location: [facility] OR;  Service: Neurosurgery    APPENDECTOMY      BILATERAL SALPINGO OOPHORECTOMY      BREAST BIOPSY      BREAST LUMPECTOMY      CERVICAL CORPECTOMY N/A  9/7/2018    Procedure: CERVICAL CORPECTOMY C6-7;  Surgeon: Chris Mcfadden MD;  Location:  [facility] OR;  Service: Neurosurgery    CHOLECYSTECTOMY      COLONOSCOPY N/A 2/10/2017    One 8mm adenomatous polyp in the rectum; The examination was otherwise normal on direct and retroflexion views; A tattoo was seen in the rectum-A post-polypectomy scar was found at the tattoo site-There was no evidence of residual polyp tissue; Repeat 5 years    COLONOSCOPY  09/05/2012    One 13mm polyp in the rectum-Marked with Maxine ink-path shows Ganglioneuroma; The examination was otherwise normal; Repeat 4 years    COLONOSCOPY N/A 2/4/2022    Procedure: COLONOSCOPY WITH ANESTHESIA;  Surgeon: Elina Gonsalez MD;  Location:  [facility] ENDOSCOPY;  Service: Gastroenterology;  Laterality: N/A;  pre family hx colon ca; hx adenomatous polyp  post normal  Jones Elias,     HYSTERECTOMY  1998    Had hysterectomy for painful periods and bleeding. (Dr. Patel) TLH w/ BSO    LAPAROSCOPIC TUBAL LIGATION      MASTECTOMY W/ SENTINEL NODE BIOPSY Left 1/10/2020    Procedure: LEFT NEEDLE DIRECTED ULTRASOUND GUIDED PARTIAL MASTECTOMY WITH SENTINEL LYMPH NODE BIOPSY, INJECTION AND SCAN, RADIOLOGIST WILL INJECT;  Surgeon: Flor Edmond MD;  Location:  [facility] OR;  Service: General    TONSILLECTOMY      VENOUS ACCESS DEVICE (PORT) INSERTION N/A 3/5/2020    Procedure: INSERTION VENOUS ACCESS DEVICE EXCISION SKIN LESION X 2 CHEST AND ABDOMEN;  Surgeon: Flor Edmond MD;  Location:  [facility] OR;  Service: General;  Laterality: N/A;    VENOUS ACCESS DEVICE (PORT) REMOVAL Left 3/4/2021    Procedure: REMOVAL OF SINGLE LUMEN PORT;  Surgeon: Flor Edmond MD;  Location:  [facility] OR;  Service: General;  Laterality: Left;     HEALTH MAINTENANCE ITEMS:  Health Maintenance Due   Topic Date Due    INFLUENZA VACCINE  08/01/2023    COVID-19 Vaccine (4 - 2023-24 season) 09/01/2023    ANNUAL PHYSICAL  10/18/2023       <no information>  Last Completed Colonoscopy             COLORECTAL CANCER SCREENING (COLONOSCOPY - Every 5 Years) Next due on 2/4/2027 02/04/2022  COLONOSCOPY    02/04/2022  Surgical Procedure: COLONOSCOPY    02/10/2017  COLONOSCOPY    02/10/2017  Surgical Procedure: COLONOSCOPY    09/05/2012  SCANNED - COLONOSCOPY    Only the first 5 history entries have been loaded, but more history exists.                  Immunization History   Administered Date(s) Administered    COVID-19 (MODERNA) 1st,2nd,3rd Dose Monovalent 01/09/2021, 02/06/2021    COVID-19 (MODERNA) Monovalent Original Booster 11/19/2021    Flu Vaccine Intradermal Quad 18-64YR 10/07/2021    Fluzone Quad >6mos (Multi-dose) 10/03/2022    Influenza, Unspecified 11/18/2017, 11/05/2018, 10/15/2019, 10/27/2020    Shingrix 10/15/2021, 12/16/2021    Tdap 08/29/2018     Last Completed Mammogram            MAMMOGRAM (Yearly) Order placed this encounter      01/06/2023  Mammo Diagnostic Digital Tomosynthesis Bilateral With CAD    01/06/2022  Mammo Diagnostic Digital Tomosynthesis Bilateral With CAD    12/17/2020  Mammo Diagnostic Digital Tomosynthesis Bilateral With CAD    12/27/2019  Mammo Diagnostic Digital Tomosynthesis Left With CAD    12/13/2019  Mammo Screening Digital Tomosynthesis Bilateral With CAD    Only the first 5 history entries have been loaded, but more history exists.                      FAMILY HISTORY:  Family History   Problem Relation Age of Onset    Colon cancer Maternal Grandmother         Unknown age    Cancer Father     Heart disease Father     Diabetes Mother     Hypertension Mother     Stroke Mother     Breast cancer Sister     No Known Problems Daughter     No Known Problems Sister     Colon polyps Neg Hx     Esophageal cancer Neg Hx     Liver cancer Neg Hx     Liver disease Neg Hx     Rectal cancer Neg Hx     Stomach cancer Neg Hx     Ovarian cancer Neg Hx     BRCA 1/2 Neg Hx     Endometrial cancer Neg Hx      SOCIAL HISTORY:  Social History     Socioeconomic History    Marital  "status:     Number of children: 1   Tobacco Use    Smoking status: Never    Smokeless tobacco: Never    Tobacco comments:     \"i never really smoked\"   Vaping Use    Vaping Use: Never used   Substance and Sexual Activity    Alcohol use: Not Currently    Drug use: No    Sexual activity: Defer       REVIEW OF SYSTEMS:  Review of Systems   Constitutional: Negative.         She manages her ADLs' including chores, errands, driving.  Is still working full time.  Has been active.  Is up and about \"most of the time.\"    Femara tolerance:  No new problems.  No new arthralgias.  Mild to moderate hot flashes.  \"All same.\" Taking daily.  \"No other issues.\"   HENT: Negative.     Eyes: Negative.    Respiratory: Negative.     Cardiovascular: Negative.    Gastrointestinal:  Negative for abdominal distention, abdominal pain, anal bleeding, blood in stool, constipation, diarrhea, nausea, vomiting and GERD (\"Normal.\" Still on omeprazole as needed, \"not often.\").   Endocrine: Positive for heat intolerance (Lingering vasomotor changes.  \"Mostly at night but not bad so I can live with it.\").   Genitourinary: Negative.    Musculoskeletal:  Negative for arthralgias (knees, and hips \"been ok.\" Does not impede her activities.).   Allergic/Immunologic: Negative.    Neurological:  Positive for numbness (paresthesias of the right toes.  Again says not worse. The symptoms of the left foot and hands have resolved).   Hematological: Negative.    Psychiatric/Behavioral:  Negative for depressed mood (Resolution of irritability and grouchiness since stopping Arimidex last 08/31/2021.). The patient is not nervous/anxious (\"not bad\").        /84   Pulse 90   Temp 97.1 °F (36.2 °C) (Temporal)   Resp 18   Ht 152.4 cm (60\")   Wt 75.3 kg (165 lb 14.4 oz)   LMP  (LMP Unknown)   SpO2 98%   BMI 32.40 kg/m²  Body surface area is 1.72 meters squared.  Pain Score    12/29/23 0832   PainSc: 0-No pain         Physical Exam:  Physical Exam "   Constitutional: She is oriented to person, place, and time. She appears well-developed and well-nourished. No distress.   Pleasant, heavy set, cooperative, modestly kept female.  Appears her age.  ECOG 0.      She has regained 9 lb (in addition to 4 lb at her prior visit-had lost 13 lb at her 2 visits prior to that) in the interval.   HENT:   Head: Normocephalic and atraumatic.   Mouth/Throat: No oropharyngeal exudate.   Eyes: Pupils are equal, round, and reactive to light. Conjunctivae are normal. No scleral icterus.   Neck: No JVD present. No tracheal deviation present. No thyromegaly present.   Cardiovascular: Normal rate, regular rhythm and normal heart sounds. Exam reveals no friction rub.   No murmur heard.  Pulmonary/Chest: Effort normal and breath sounds normal. No stridor. No respiratory distress. She has no wheezes. She has no rales.   Chaperoned exam: Lisette Rice MA     Port site in the left upper chest is healed.  The device has been removed.    Left breast lumpectomy and sentinel node biopsy site in the left axilla are healed.  Unchanged subtle skin induration.  Prior radiation associated skin changes are no longer evident.  The rest of the breast was without obvious nodularity skin changes, no nipple discharge.      Right breast without masses, skin changes nor nipple discharge.    No associated axillary nor supraclavicular adenopathy bilaterally.   Abdominal: Soft. Bowel sounds are normal. She exhibits no distension and no mass. There is no abdominal tenderness. There is no rebound and no guarding.   Musculoskeletal: Normal range of motion. No deformity.   Lymphadenopathy:     She has no cervical adenopathy.        Right cervical: No superficial cervical, no deep cervical and no posterior cervical adenopathy present.       Left cervical: No superficial cervical, no deep cervical and no posterior cervical adenopathy present.        Right: No supraclavicular adenopathy present.        Left: No  supraclavicular adenopathy present.   Neurological: She is alert and oriented to person, place, and time. She displays normal reflexes. No cranial nerve deficit. She exhibits normal muscle tone. Coordination normal.   Skin: Skin is warm and dry. No rash noted. No erythema. No pallor.   tanned   Psychiatric: Her behavior is normal. Judgment and thought content normal.   Vitals reviewed.      ASSESSMENT:   1.  Invasive and in situ ductal carcinoma  left breast.                 Stage:  IA (pT1a, pN0, G2) ER  100% positive, NC  25%, HER-2/lamberto - 2+ (IHC)/FISH -a focal positive score is present in an area corresponding to approximately 15% of the tumor (signal ratio: 2.9).                 Tumor [place]:  5 mm.  Tumor focality: Single focus of invasive carcinoma.  No EIC nor LCIS.  Lymphovascular invasion: Not identified.  Dermal lymphovascular invasion: No skin present.  Microcalcifications: Not identified.  Margins uninvolved by invasive carcinoma.  Lymph nodes: Number of lymph nodes examined: 1 (0/1).                 Tumor Status:    -- Underwent lumpectomy and SNB, 01/10/2020.   -- Completed adjuvant paclitaxel, 05/27/2020; adjuvant Herceptin, 02/10/2021; adjuvant radiation, 08/06/2020 (above)  -- Adjuvant Arimidex since 02/03/2020 through 09/27/2021-stopped due to depression, and irritability.  -- Adjuvant Femara 2.5 mg p.o. daily beginning 09/28/2021 through present  -- 01/06/2023- mammogram. No mammographic evidence of malignancy. Post therapeutic changes left breast.  BI-RADS Category 2. Benign.          2.   Obesity.  Moderate (BMI 32 - max: 34)        3.   Spinal stenosis neck, quiescent         4.   Herniated cervical disc without myelopathy        5.   Anemia, previously chemo associated.    --Resolved, Hgb 13.1, 12/21/23 (prior: Hgb 11.5-13.8)  -- 02/04/2022-colonoscopy--no residual polyps otherwise normal.  Repeat 5 years        6.   GERD, quiescent        7.   Mood changes, to include mood lability,  depression, irritability and grouchiness.  Resolved since stopping Arimidex.        8.   Osteopenia  --06/15/2022- DEXA scan: Osteopenia of the left hip.  Low bone mass.  Bone density 1-2 0.5 SD below the mean.  Increased risk of fracture.  --06/28/2022- Prolia 60 mg SC        9.   Belly pains with diarrhea (resolved after course of Flagyl per Dr. Edmond) and trivially elevated ALT 40, AST 38, 11/14/2022.  --11/22/23- Hepatitis profile-negative  --11/30/23- CT abdomen/pelvis- 3 cystic-appearing liver lesions that are all increased in size compared to the prior study. These are lobular in shape and do not have any definite soft tissue enhancement. There is a 7 mm indeterminate lesion in the right hepatic lobe on image 16 series A small metastatic lesion is included in the differential. MRI may be more definitive. There is a probable 1 cm lipoma in the proximal body of the pancreas, stable.  --12/3/22- MRI abdomen-Multiple simple liver cysts, correlating with lesions of concern on  prior CT. No solid liver lesion identified.  --6/14/23- Normal LFTs       RECOMMENDATIONS:         1.   Apprised of the labs, 12/5/23 and 12/21/23.  Normal CBC, normal CMP, normal CEA, normal CA-27-29, B12 252 (Folbee called in), repleted folate, repleted serum iron, iron saturation 20% (from 18%; 22%; 21%; 22%; 19%; 23%; 25%; 24%), repleted ferritin (85.3; from 72; 142; 113; 101; 186; 137; 226; 171; 157; 138; 141; 216).            2.    Femara tolerance discussed.  Doing well so far with no mood lability and no new arthralgias.  Feels she can press on with this.        3.    Apprised of negative hepatitis profile, 11/22/22; CT a/p, 11/30/23; MRI abdomen, 12/3/23 (above).  Multiple liver cysts         4.    Schedule mammogram, 1/03/24.        5.    Previously apprised of DEXA scan, 06/15/2022 (above).  Osteopenia with increased fracture.  Started on Prolia beginning 06/28/2022.        6.    Previously noted that we are unable to repeat  FISH testing for HER-2 on the tumor specimen from 01/10/2020 -pathology block did not contain any more tumor - personally discussed/confirmed with Dr. Burgos of pathology.        7.   Care previously discussed with Dr. Jaimes on 02/28/2020 after she saw the patient on 02/25/2020 (encounter reviewed).  Pathology was reviewed at [facility] and discussed with the patient.  Recommendations/plan: Discussed that since her cancer is strongly ER positive and low histologic grade/low proliferative rate, and HER-2 on the biopsy was borderline recommended repeat HER-2 testing on her surgical pathology.  Defer to his negative proceed with only endocrine therapy and radiation.  If HER-2 is confirmed to be positive, recommend adjuvant weekly Taxol x12+ Herceptin every 3 weeks to complete 1 year of HER-2 directed therapy.  These plans were personally discussed with the patient (via telephone) on 02/28/2020 I also personally asked the pathologist (Dr. Radha Burgos) to send off the biopsy specimen for repeat FISH HER-2 testing.          8.   Reviewed NCCN guidelines version 3.2019 invasive breast cancer.  For tumors (=/> 5 mm), and pN0, recommend adjuvant endocrine therapy +/- adjuvant chemotherapy with trastuzumab (category 2B).                  9.  Rx:   Oscal 600/400 po bid # 60 - OTC  Femara 2.5 mg po qd # 30 - 11 RF  Prolia 60 mg SC every 6 months-next due 12/28/2022           10.  Previously discussed the rationale for adjuvant hormonal manipulation with AIs (see above) and potential toxicities of AI therapy are discussed (to include but not limited to: Hot flashes, asthenia, pain, arthralgias, arthritis, nausea/vomiting, headache, pharyngitis, depression, rash, hypertension, lymphedema, insomnia, edema, weight gain, dyspnea, abdominal pain, constipation, hyperlipidemia, osteoporosis, fractures, cough, bone pain, diarrhea, breast pain, paresthesias, infections, cataracts, myalgias, thromboembolism, angina, Lyles-Yung  syndrome, erythema multiforme, anemia, leukopenia, stroke, myocardial infarction, osteoporosis, fractures). Questions are answered. She agrees to press on.. 11.  Return to the [facility] office with pre-office serum iron, iron saturation, ferritin B12, folate, CMP, CEA, CA 27-29 and CBC and differential in 24 weeks.      MEDICAL DECISION MAKING:  High complexity   AMOUNT OF DATA: Moderate      I spent ~40 minutes caring for Chasidy on this date of service. This time includes time spent by me in the following activities: preparing for the visit, reviewing tests, performing a medically appropriate examination and/or evaluation, counseling and educating the patient/family/caregiver, ordering medications, tests, or procedures and documenting information in the medical record      Cc:  Abigail Jaimes MD- [facility]          MD Kailash Sawant MD Robert Learch, MD

## 2023-12-22 LAB
CANCER AG27-29 SERPL-ACNC: 19.4 U/ML (ref 0–38.6)
CEA SERPL-MCNC: 1.25 NG/ML
FOLATE SERPL-MCNC: 11.3 NG/ML (ref 4.78–24.2)
VIT B12 BLD-MCNC: 252 PG/ML (ref 211–946)

## 2023-12-26 ENCOUNTER — TELEPHONE (OUTPATIENT)
Dept: ONCOLOGY | Facility: CLINIC | Age: 63
End: 2023-12-26
Payer: COMMERCIAL

## 2023-12-26 DIAGNOSIS — E53.8 FOLATE DEFICIENCY: Primary | ICD-10-CM

## 2023-12-26 NOTE — TELEPHONE ENCOUNTER
Left v/m notifying pt that folbee has been sent to her pharmacy. Take one daily for low b12/folate levels.

## 2023-12-26 NOTE — TELEPHONE ENCOUNTER
----- Message from Tommy Wheat MD sent at 12/22/2023  7:05 PM CST -----  Pls call in [facility] qd #[phone]  ----- Message -----  From: Lab, Background User  Sent: 12/21/2023   2:41 PM CST  To: Tommy Wheat MD

## 2023-12-27 RX ORDER — CYANOCOBALAMIN/FOLIC AC/VIT B6 1-2.5-25MG
1 TABLET ORAL DAILY
Qty: 30 TABLET | Refills: 0 | Status: SHIPPED | OUTPATIENT
Start: 2023-12-27

## 2023-12-29 ENCOUNTER — OFFICE VISIT (OUTPATIENT)
Dept: ONCOLOGY | Facility: CLINIC | Age: 63
End: 2023-12-29
Payer: COMMERCIAL

## 2023-12-29 VITALS
TEMPERATURE: 97.1 F | HEIGHT: 60 IN | OXYGEN SATURATION: 98 % | BODY MASS INDEX: 32.57 KG/M2 | RESPIRATION RATE: 18 BRPM | SYSTOLIC BLOOD PRESSURE: 120 MMHG | WEIGHT: 165.9 LBS | DIASTOLIC BLOOD PRESSURE: 84 MMHG | HEART RATE: 90 BPM

## 2023-12-29 DIAGNOSIS — C50.912 INVASIVE DUCTAL CARCINOMA OF BREAST, FEMALE, LEFT: Primary | ICD-10-CM

## 2023-12-29 RX ORDER — LETROZOLE 2.5 MG/1
2.5 TABLET, FILM COATED ORAL DAILY
Qty: 30 TABLET | Refills: 11 | Status: SHIPPED | OUTPATIENT
Start: 2023-12-29

## 2023-12-29 RX ORDER — ONDANSETRON HYDROCHLORIDE 8 MG/1
8 TABLET, FILM COATED ORAL EVERY 8 HOURS PRN
Qty: 60 TABLET | Refills: 3 | Status: SHIPPED | OUTPATIENT
Start: 2023-12-29

## 2024-01-12 LAB — NCCN CRITERIA FLAG: ABNORMAL

## 2024-01-22 ENCOUNTER — HOSPITAL ENCOUNTER (OUTPATIENT)
Dept: MAMMOGRAPHY | Facility: HOSPITAL | Age: 64
Discharge: HOME | End: 2024-01-22
Admitting: INTERNAL MEDICINE
Payer: COMMERCIAL

## 2024-01-22 DIAGNOSIS — C50.912 INVASIVE DUCTAL CARCINOMA OF BREAST, FEMALE, LEFT: ICD-10-CM

## 2024-01-22 PROCEDURE — 77066 DX MAMMO INCL CAD BI: CPT

## 2024-01-22 PROCEDURE — G0279 TOMOSYNTHESIS, MAMMO: HCPCS

## 2024-02-04 DIAGNOSIS — E53.8 FOLATE DEFICIENCY: ICD-10-CM

## 2024-02-05 RX ORDER — CYANOCOBALAMIN/FOLIC AC/VIT B6 1-2.5-25MG
1 TABLET ORAL DAILY
Qty: 30 TABLET | Refills: 0 | Status: SHIPPED | OUTPATIENT
Start: 2024-02-05

## 2024-03-22 ENCOUNTER — OFFICE VISIT (OUTPATIENT)
Dept: OBSTETRICS AND GYNECOLOGY | Age: 64
End: 2024-03-22
Payer: COMMERCIAL

## 2024-03-22 VITALS
BODY MASS INDEX: 31.8 KG/M2 | DIASTOLIC BLOOD PRESSURE: 90 MMHG | WEIGHT: 162 LBS | HEIGHT: 60 IN | SYSTOLIC BLOOD PRESSURE: 142 MMHG

## 2024-03-22 DIAGNOSIS — Z75.8 DOES NOT HAVE PRIMARY CARE PROVIDER: ICD-10-CM

## 2024-03-22 DIAGNOSIS — Z01.419 WELL WOMAN EXAM WITH ROUTINE GYNECOLOGICAL EXAM: Primary | ICD-10-CM

## 2024-03-22 DIAGNOSIS — C50.912 INVASIVE DUCTAL CARCINOMA OF BREAST, FEMALE, LEFT: ICD-10-CM

## 2024-03-22 DIAGNOSIS — N89.8 VAGINAL DRYNESS: ICD-10-CM

## 2024-03-22 RX ORDER — CALCIUM CARBONATE/VITAMIN D3 600 MG-10
TABLET ORAL
COMMUNITY
Start: 2024-01-02

## 2024-03-22 NOTE — PROGRESS NOTES
"Chief Complaint   Patient presents with    Gynecologic Exam     Patient here for annual, patient has had a hysterectomy, last bilateral diagnostic mammogram 1/6/23, dexa 6/15/22, colonoscopy 2/4/22, patient denies any problems or concerns.         Subjective     Chasidy Tejada is a 63 y.o. female    History of Present Illness  Pt comes in today for annual wellness exam. Denies any concerns. Was wanting to discuss estrace vaginal cream.     /90 (BP Location: Left arm, Patient Position: Sitting, Cuff Size: Adult)   Ht 152.4 cm (60\")   Wt 73.5 kg (162 lb)   LMP  (LMP Unknown)   BMI 31.64 kg/m²     Outpatient Encounter Medications as of 3/22/2024   Medication Sig Dispense Refill    calcium carb-cholecalciferol 600-10 MG-MCG tablet per tablet       letrozole (FEMARA) 2.5 MG tablet Take 1 tablet by mouth Daily. 30 tablet 11    ondansetron (ZOFRAN) 8 MG tablet Take 1 tablet by mouth Every 8 (Eight) Hours As Needed for Nausea or Vomiting. 60 tablet 3    WesTab One 2.5-25-1 MG tablet tablet TAKE 1 TABLET BY MOUTH DAILY 30 tablet 0    [DISCONTINUED] Calcium Carbonate-Vitamin D 600-10 MG-MCG per tablet Take 1 tablet by mouth 2 (Two) Times a Day. 60 tablet 11     No facility-administered encounter medications on file as of 3/22/2024.       Past Medical History  Past Medical History:   Diagnosis Date    Acid reflux     Asthma     Breast cancer     Drug therapy     Family history of colon cancer     History of adenomatous polyp of colon     History of breast cancer     Hx of radiation therapy     Panic attack     Right arm weakness         Surgical History  Past Surgical History:   Procedure Laterality Date    ANTERIOR CERVICAL DISCECTOMY W/ FUSION N/A 9/7/2018    Procedure: CERVICAL DISCECTOMY ANTERIOR WITH FUSION C6-7;  Surgeon: Chris Mcfadden MD;  Location: [facility] OR;  Service: Neurosurgery    APPENDECTOMY      BILATERAL SALPINGO OOPHORECTOMY      BREAST BIOPSY      BREAST LUMPECTOMY      CERVICAL CORPECTOMY " N/A 9/7/2018    Procedure: CERVICAL CORPECTOMY C6-7;  Surgeon: Chris Mcfadden MD;  Location:  [facility] OR;  Service: Neurosurgery    CHOLECYSTECTOMY      COLONOSCOPY N/A 2/10/2017    One 8mm adenomatous polyp in the rectum; The examination was otherwise normal on direct and retroflexion views; A tattoo was seen in the rectum-A post-polypectomy scar was found at the tattoo site-There was no evidence of residual polyp tissue; Repeat 5 years    COLONOSCOPY  09/05/2012    One 13mm polyp in the rectum-Marked with Maxine ink-path shows Ganglioneuroma; The examination was otherwise normal; Repeat 4 years    COLONOSCOPY N/A 2/4/2022    Procedure: COLONOSCOPY WITH ANESTHESIA;  Surgeon: Elina Gonsalez MD;  Location:  [facility];  Service: Gastroenterology;  Laterality: N/A;  pre family hx colon ca; hx adenomatous polyp  post normal  Jones Elias,     HYSTERECTOMY  1998    Had hysterectomy for painful periods and bleeding. (Dr. Patel) TLH w/ BSO    LAPAROSCOPIC TUBAL LIGATION      MASTECTOMY W/ SENTINEL NODE BIOPSY Left 1/10/2020    Procedure: LEFT NEEDLE DIRECTED ULTRASOUND GUIDED PARTIAL MASTECTOMY WITH SENTINEL LYMPH NODE BIOPSY, INJECTION AND SCAN, RADIOLOGIST WILL INJECT;  Surgeon: Flor Edmond MD;  Location:  [facility] OR;  Service: General    TONSILLECTOMY      VENOUS ACCESS DEVICE (PORT) INSERTION N/A 3/5/2020    Procedure: INSERTION VENOUS ACCESS DEVICE EXCISION SKIN LESION X 2 CHEST AND ABDOMEN;  Surgeon: Flor Edmond MD;  Location:  [facility] OR;  Service: General;  Laterality: N/A;    VENOUS ACCESS DEVICE (PORT) REMOVAL Left 3/4/2021    Procedure: REMOVAL OF SINGLE LUMEN PORT;  Surgeon: Flor Edmond MD;  Location:  [facility] OR;  Service: General;  Laterality: Left;       Family History  Family History   Problem Relation Age of Onset    Colon cancer Maternal Grandmother         Unknown age    Cancer Father     Heart disease Father     Diabetes Mother     Hypertension Mother     Stroke Mother      Breast cancer Sister     No Known Problems Daughter     No Known Problems Sister     Colon polyps Neg Hx     Esophageal cancer Neg Hx     Liver cancer Neg Hx     Liver disease Neg Hx     Rectal cancer Neg Hx     Stomach cancer Neg Hx     Ovarian cancer Neg Hx     BRCA 1/2 Neg Hx     Endometrial cancer Neg Hx        The following portions of the patient's history were reviewed and updated as appropriate: allergies, current medications, past family history, past medical history, past social history, past surgical history, and problem list.    Review of Systems   Constitutional:  Negative for activity change and unexpected weight loss.   Cardiovascular:  Negative for chest pain.   Gastrointestinal:  Negative for blood in stool, constipation and diarrhea.   Endocrine: Negative for cold intolerance and heat intolerance.   Genitourinary:  Negative for dyspareunia, pelvic pain and vaginal discharge.        Vaginal dryness   Musculoskeletal:  Negative for arthralgias, back pain, neck pain and neck stiffness.   Skin:  Negative for rash.   Neurological:  Negative for dizziness and headache.   Psychiatric/Behavioral:  Negative for sleep disturbance. The patient is not nervous/anxious.        Objective   Physical Exam  Vitals and nursing note reviewed.   Constitutional:       Appearance: She is well-developed.   HENT:      Head: Normocephalic and atraumatic.      Right Ear: External ear normal.      Left Ear: External ear normal.   Eyes:      General: No scleral icterus.        Right eye: No discharge.         Left eye: No discharge.      Conjunctiva/sclera: Conjunctivae normal.   Neck:      Thyroid: No thyromegaly.      Vascular: No carotid bruit.   Cardiovascular:      Rate and Rhythm: Regular rhythm.      Heart sounds: Normal heart sounds. No murmur heard.  Pulmonary:      Effort: Pulmonary effort is normal. No respiratory distress.      Breath sounds: Normal breath sounds.   Chest:   Breasts:     Breasts are symmetrical.       Right: No inverted nipple, mass, nipple discharge, skin change or tenderness.      Left: No inverted nipple, mass, nipple discharge, skin change or tenderness.   Abdominal:      General: Bowel sounds are normal. There is no distension.      Palpations: Abdomen is soft. There is no mass.      Tenderness: There is no abdominal tenderness. There is no guarding.      Hernia: No hernia is present. There is no hernia in the left inguinal area.   Genitourinary:     Labia:         Right: No rash, tenderness, lesion or injury.         Left: No rash, tenderness, lesion or injury.       Vagina: Normal. No signs of injury. No vaginal discharge, erythema or bleeding.      Rectum: No mass.      Comments: Cervix, Uterus and Adnexa are surgically absent.  Urethra and urethral meatus normal  Bladder, normal no prolapse  Perineum and anus examined and without lesions    Vaginal dryness noted on exam.   Musculoskeletal:         General: No tenderness. Normal range of motion.      Cervical back: Normal range of motion and neck supple.   Lymphadenopathy:      Head:      Right side of head: No submental, submandibular, tonsillar, preauricular, posterior auricular or occipital adenopathy.      Left side of head: No submental, submandibular, tonsillar, preauricular, posterior auricular or occipital adenopathy.      Cervical: No cervical adenopathy.      Right cervical: No superficial, deep or posterior cervical adenopathy.     Left cervical: No superficial, deep or posterior cervical adenopathy.   Skin:     General: Skin is warm and dry.      Findings: No bruising, erythema or rash.   Neurological:      Mental Status: She is alert and oriented to person, place, and time.      Motor: No abnormal muscle tone.      Coordination: Coordination normal.   Psychiatric:         Behavior: Behavior normal.         Thought Content: Thought content normal.         Judgment: Judgment normal.           Assessment & Plan   Diagnoses and all orders for  this visit:    1. Well woman exam with routine gynecological exam (Primary)    2. Does not have primary care provider  -     Ambulatory Referral to Internal Medicine    3. Vaginal dryness    4. Invasive ductal carcinoma of breast, female, left         Normal GYN exam. Encouraged SBE, pt is aware how to do self breast exam and the importance of same. Discussed weight management and importance of maintaining a healthy weight. Discussed Vitamin D intake and the importance of adequate vitamin D for both bone health and a healthy immune system.  Discussed daily exercise and the importance of same, in regards to a healthy heart as well as helping to maintain her weight and improving her mental health.  Colonoscopy is up to date. Mammogram is up to date. Bone density is up to date. Pap smear is not done per ASCCP guidelines. HPV is not done. Lab work up  will be updated through new PCP .      Pt was started on estrace vaginal cream 2 years ago. It did help with her symptoms. Pt did not specifically address using it with her oncologist but states he knew she was on it. She had been educated here when started on it on risk factors. Initially she wanted refills, but she thinks she would now rather try coconut oil. If she decides she wants to get back on it, she is going to first discuss it with her oncologist as she doesn't want to take any chances.     Pt doesn't currently have PCP. Does request referral.          Sherita Mcgee, SANDRA  3/22/2024    Return in about 1 year (around 3/22/2025) for Annual physical.

## 2024-04-08 ENCOUNTER — OFFICE VISIT (OUTPATIENT)
Dept: INTERNAL MEDICINE | Facility: CLINIC | Age: 64
End: 2024-04-08
Payer: COMMERCIAL

## 2024-04-08 VITALS
OXYGEN SATURATION: 100 % | TEMPERATURE: 97.7 F | DIASTOLIC BLOOD PRESSURE: 86 MMHG | HEIGHT: 60 IN | BODY MASS INDEX: 32.39 KG/M2 | WEIGHT: 165 LBS | SYSTOLIC BLOOD PRESSURE: 140 MMHG | HEART RATE: 78 BPM

## 2024-04-08 DIAGNOSIS — C50.912 INVASIVE DUCTAL CARCINOMA OF BREAST, FEMALE, LEFT: ICD-10-CM

## 2024-04-08 DIAGNOSIS — E55.9 VITAMIN D DEFICIENCY: ICD-10-CM

## 2024-04-08 DIAGNOSIS — Z76.89 ENCOUNTER TO ESTABLISH CARE WITH NEW DOCTOR: Primary | ICD-10-CM

## 2024-04-08 DIAGNOSIS — R53.83 OTHER FATIGUE: ICD-10-CM

## 2024-04-08 DIAGNOSIS — F41.1 GAD (GENERALIZED ANXIETY DISORDER): ICD-10-CM

## 2024-04-08 DIAGNOSIS — E66.9 OBESITY (BMI 30-39.9): ICD-10-CM

## 2024-04-08 DIAGNOSIS — Z00.00 WELLNESS EXAMINATION: ICD-10-CM

## 2024-04-08 PROCEDURE — 99214 OFFICE O/P EST MOD 30 MIN: CPT | Performed by: INTERNAL MEDICINE

## 2024-04-08 RX ORDER — SERTRALINE HYDROCHLORIDE 25 MG/1
25 TABLET, FILM COATED ORAL DAILY
Qty: 30 TABLET | Refills: 1 | Status: SHIPPED | OUTPATIENT
Start: 2024-04-08

## 2024-04-08 NOTE — PROGRESS NOTES
"    Chief Complaint  Establish Care and Anxiety (Worsening; pt seeking suggestions )    Subjective        Chasidy Tejada presents to [facility]  Anxiety        See below.     Objective   Vital Signs:  /86 (BP Location: Right arm, Patient Position: Sitting, Cuff Size: Adult)   Pulse 78   Temp 97.7 °F (36.5 °C) (Infrared)   Ht 152.4 cm (60\")   Wt 74.8 kg (165 lb)   SpO2 100%   BMI 32.22 kg/m²   Estimated body mass index is 32.22 kg/m² as calculated from the following:    Height as of this encounter: 152.4 cm (60\").    Weight as of this encounter: 74.8 kg (165 lb).       Physical Exam  HENT:      Head: Normocephalic and atraumatic.   Eyes:      Conjunctiva/sclera: Conjunctivae normal.      Pupils: Pupils are equal, round, and reactive to light.      Comments: Wears glasses.   Cardiovascular:      Rate and Rhythm: Normal rate and regular rhythm.      Heart sounds: Normal heart sounds.   Pulmonary:      Effort: Pulmonary effort is normal. No respiratory distress.      Breath sounds: Normal breath sounds.   Musculoskeletal:         General: No swelling.      Cervical back: Neck supple.   Skin:     General: Skin is warm and dry.      Findings: No rash.   Neurological:      General: No focal deficit present.      Mental Status: She is alert and oriented to person, place, and time.   Psychiatric:         Mood and Affect: Mood normal.         Behavior: Behavior normal.         Thought Content: Thought content normal.         Judgment: Judgment normal.        Result Review :  Last hemoglobin A1c and lipids were in September 2022.  Last thyroid studies were in January 2022.    Last CMP was in December.  Nonfasting.  Unremarkable.  Last CBC was in December.  Completely normal.  Iron studies, B12, and folate were normal in December 2023.  However, B12 was low normal.  On replacement (WesTab).    Benign findings on mammogram in January 2024.  Last DEXA scan was in June 2022 and showed " osteopenia.    Last colonoscopy was in February 2022 with Dr. Gonsalez:           Assessment and Plan   Diagnoses and all orders for this visit:    1. Encounter to establish care with new doctor (Primary)    2. Wellness examination  -     CBC & Differential  -     Comprehensive Metabolic Panel  -     Urinalysis With Microscopic - Urine, Clean Catch  -     Lipid Panel  -     TSH Rfx On Abnormal To Free T4    3. History of invasive ductal carcinoma of breast, female, left; status poist mastectomy, chemoradiaton    4. JHON (generalized anxiety disorder)  -     sertraline (Zoloft) 25 MG tablet; Take 1 tablet by mouth Daily.  Dispense: 30 tablet; Refill: 1    5. Other fatigue  -     Vitamin B12    6. Vitamin D deficiency  -     Vitamin D,25-Hydroxy    7. Obesity (BMI 30-39.9)    Presents today to establish care.  I know her well from the hospital.  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  She had previously seen Dr. Elias's office and desired a change in position after she learned that I had gone into primary care.    Her main difficulty in her health history was that she was diagnosed with an invasive ductal carcinoma of the left breast in 2020.  This resulted in a mastectomy with Dr. Flor Edmond and was followed by chemoradiation with Dr. Partida and Dr. Brito.  She is still on letrozole.  She sees Dr. Partida every 6 months.  She sees him again in June.  There have not been any signs of recurrence thus far.  She did have an abnormal CT of the liver in late 2022, but this was investigated by an MRI that showed multiple simple liver cyst and no signs of solid lesion or metastatic disease.    She has had some problems with anxiety through the years.  She states that this was worse after being diagnosed with breast cancer.  She has an underlying fear that her cancer will recur.  She previously had adverse GI effects related to BuSpar.  She was  previously on sertraline and felt that it was helpful.  She has not been on this since the end of 2022.  She would like to go back on it.  She had been on 50 mg at one point.  We will start her back on 25 mg.  She will follow in 1 month or call sooner if problems.    She has had some problems with worsening fatigue.  She also has difficulty with numbness and tingling in her feet at times.  Will check a vitamin B12.  It was low normal (252) in December.    Most recent mammogram was in January.  Benign findings.  Dr. Partida usually orders these.    She needs a DEXA scan again in June.  We will order at the time of her next appointment.    Status post hysterectomy.    Next colonoscopy due in February 2027.    Dr. Sharlene Owens takes care of her vision needs.  She does not currently have a dentist.    I will see her in 1 month for her annual physical.         Follow Up   Return in about 4 weeks (around 5/6/2024) for Annual physical.  Patient was given instructions and counseling regarding her condition or for health maintenance advice. Please see specific information pulled into the AVS if appropriate.      KALLI Fitzpatrick DO       Electronically signed by ARMANI Fitzpatrick DO, 04/08/24, 8:46 AM CDT.

## 2024-04-09 LAB
25(OH)D3+25(OH)D2 SERPL-MCNC: 33.8 NG/ML (ref 30–100)
ALBUMIN SERPL-MCNC: 4.7 G/DL (ref 3.5–5.2)
ALBUMIN/GLOB SERPL: 1.9 G/DL
ALP SERPL-CCNC: 79 U/L (ref 39–117)
ALT SERPL-CCNC: 24 U/L (ref 1–33)
APPEARANCE UR: CLEAR
AST SERPL-CCNC: 19 U/L (ref 1–32)
BACTERIA #/AREA URNS HPF: NORMAL /HPF
BASOPHILS # BLD AUTO: 0.05 10*3/MM3 (ref 0–0.2)
BASOPHILS NFR BLD AUTO: 0.6 % (ref 0–1.5)
BILIRUB SERPL-MCNC: 0.5 MG/DL (ref 0–1.2)
BILIRUB UR QL STRIP: NEGATIVE
BUN SERPL-MCNC: 15 MG/DL (ref 8–23)
BUN/CREAT SERPL: 22.1 (ref 7–25)
CALCIUM SERPL-MCNC: 9.8 MG/DL (ref 8.6–10.5)
CASTS URNS MICRO: NORMAL
CHLORIDE SERPL-SCNC: 104 MMOL/L (ref 98–107)
CHOLEST SERPL-MCNC: 178 MG/DL (ref 0–200)
CO2 SERPL-SCNC: 24.4 MMOL/L (ref 22–29)
COLOR UR: YELLOW
CREAT SERPL-MCNC: 0.68 MG/DL (ref 0.57–1)
EGFRCR SERPLBLD CKD-EPI 2021: 99.2 ML/MIN/1.73
EOSINOPHIL # BLD AUTO: 0.11 10*3/MM3 (ref 0–0.4)
EOSINOPHIL NFR BLD AUTO: 1.4 % (ref 0.3–6.2)
EPI CELLS #/AREA URNS HPF: NORMAL /HPF
ERYTHROCYTE [DISTWIDTH] IN BLOOD BY AUTOMATED COUNT: 13.1 % (ref 12.3–15.4)
GLOBULIN SER CALC-MCNC: 2.5 GM/DL
GLUCOSE SERPL-MCNC: 100 MG/DL (ref 65–99)
GLUCOSE UR QL STRIP: NEGATIVE
HCT VFR BLD AUTO: 45.2 % (ref 34–46.6)
HDLC SERPL-MCNC: 56 MG/DL (ref 40–60)
HGB BLD-MCNC: 14.5 G/DL (ref 12–15.9)
HGB UR QL STRIP: NEGATIVE
IMM GRANULOCYTES # BLD AUTO: 0.02 10*3/MM3 (ref 0–0.05)
IMM GRANULOCYTES NFR BLD AUTO: 0.3 % (ref 0–0.5)
KETONES UR QL STRIP: NEGATIVE
LDLC SERPL CALC-MCNC: 111 MG/DL (ref 0–100)
LEUKOCYTE ESTERASE UR QL STRIP: NEGATIVE
LYMPHOCYTES # BLD AUTO: 3.29 10*3/MM3 (ref 0.7–3.1)
LYMPHOCYTES NFR BLD AUTO: 41.1 % (ref 19.6–45.3)
MCH RBC QN AUTO: 29.7 PG (ref 26.6–33)
MCHC RBC AUTO-ENTMCNC: 32.1 G/DL (ref 31.5–35.7)
MCV RBC AUTO: 92.6 FL (ref 79–97)
MONOCYTES # BLD AUTO: 0.38 10*3/MM3 (ref 0.1–0.9)
MONOCYTES NFR BLD AUTO: 4.8 % (ref 5–12)
NEUTROPHILS # BLD AUTO: 4.15 10*3/MM3 (ref 1.7–7)
NEUTROPHILS NFR BLD AUTO: 51.8 % (ref 42.7–76)
NITRITE UR QL STRIP: NEGATIVE
NRBC BLD AUTO-RTO: 0 /100 WBC (ref 0–0.2)
PH UR STRIP: 6 [PH] (ref 5–8)
PLATELET # BLD AUTO: 372 10*3/MM3 (ref 140–450)
POTASSIUM SERPL-SCNC: 4.4 MMOL/L (ref 3.5–5.2)
PROT SERPL-MCNC: 7.2 G/DL (ref 6–8.5)
PROT UR QL STRIP: NEGATIVE
RBC # BLD AUTO: 4.88 10*6/MM3 (ref 3.77–5.28)
RBC #/AREA URNS HPF: NORMAL /HPF
SODIUM SERPL-SCNC: 141 MMOL/L (ref 136–145)
SP GR UR STRIP: 1.01 (ref 1–1.03)
TRIGL SERPL-MCNC: 55 MG/DL (ref 0–150)
TSH SERPL DL<=0.005 MIU/L-ACNC: 1.07 UIU/ML (ref 0.27–4.2)
UROBILINOGEN UR STRIP-MCNC: NORMAL MG/DL
VIT B12 SERPL-MCNC: 527 PG/ML (ref 211–946)
VLDLC SERPL CALC-MCNC: 11 MG/DL (ref 5–40)
WBC # BLD AUTO: 8 10*3/MM3 (ref 3.4–10.8)
WBC #/AREA URNS HPF: NORMAL /HPF

## 2024-04-15 ENCOUNTER — PATIENT ROUNDING (BHMG ONLY) (OUTPATIENT)
Dept: INTERNAL MEDICINE | Facility: CLINIC | Age: 64
End: 2024-04-15
Payer: COMMERCIAL

## 2024-04-15 NOTE — PROGRESS NOTES
A Official[employer]DJ message has been sent to the patient for patient rounding with [facility] [place].

## 2024-05-06 ENCOUNTER — OFFICE VISIT (OUTPATIENT)
Dept: INTERNAL MEDICINE | Facility: CLINIC | Age: 64
End: 2024-05-06
Payer: COMMERCIAL

## 2024-05-06 VITALS
OXYGEN SATURATION: 99 % | WEIGHT: 161 LBS | BODY MASS INDEX: 31.61 KG/M2 | DIASTOLIC BLOOD PRESSURE: 88 MMHG | TEMPERATURE: 97.3 F | HEIGHT: 60 IN | HEART RATE: 80 BPM | SYSTOLIC BLOOD PRESSURE: 138 MMHG

## 2024-05-06 DIAGNOSIS — F41.1 GAD (GENERALIZED ANXIETY DISORDER): ICD-10-CM

## 2024-05-06 DIAGNOSIS — E66.9 OBESITY (BMI 30-39.9): ICD-10-CM

## 2024-05-06 DIAGNOSIS — Z00.00 ANNUAL PHYSICAL EXAM: Primary | ICD-10-CM

## 2024-05-06 DIAGNOSIS — C50.912 INVASIVE DUCTAL CARCINOMA OF BREAST, FEMALE, LEFT: ICD-10-CM

## 2024-05-06 DIAGNOSIS — Z78.0 POST-MENOPAUSAL: ICD-10-CM

## 2024-05-06 PROCEDURE — 99213 OFFICE O/P EST LOW 20 MIN: CPT | Performed by: INTERNAL MEDICINE

## 2024-05-06 PROCEDURE — 99396 PREV VISIT EST AGE 40-64: CPT | Performed by: INTERNAL MEDICINE

## 2024-05-06 RX ORDER — SERTRALINE HYDROCHLORIDE 25 MG/1
25 TABLET, FILM COATED ORAL DAILY
Qty: 90 TABLET | Refills: 1 | Status: SHIPPED | OUTPATIENT
Start: 2024-05-06 | End: 2024-05-06

## 2024-06-17 ENCOUNTER — HOSPITAL ENCOUNTER (OUTPATIENT)
Dept: BONE DENSITY | Facility: HOSPITAL | Age: 64
Discharge: HOME | End: 2024-06-17
Admitting: INTERNAL MEDICINE
Payer: COMMERCIAL

## 2024-06-17 DIAGNOSIS — Z78.0 POST-MENOPAUSAL: ICD-10-CM

## 2024-06-17 PROCEDURE — 77080 DXA BONE DENSITY AXIAL: CPT

## 2024-06-19 DIAGNOSIS — Z79.811 LONG TERM CURRENT USE OF AROMATASE INHIBITOR: Primary | ICD-10-CM

## 2024-06-19 DIAGNOSIS — M85.852 OSTEOPENIA OF LEFT HIP: ICD-10-CM

## 2024-06-21 NOTE — PROGRESS NOTES
[facility] GROUP HEMATOLOGY AND ONCOLOGY  [address]  [facility] [address] [phone]    Patient Name: Chasidy Tejada  Encounter Date: 06/27/2024  YOB: 1960  Patient Number: [id number]       REASON FOR VISIT:  Chasidy Tejada is a 64-year-old female who returns in follow-up of stage IA left breast carcinoma, ER/NC and HER-2/lamberto positive.  She underwent left breast lumpectomy with SNB, 01/10/2020 and was started on adjuvant Arimidex beginning 02/03/2020 through 03/11/2020 before resumption on 08/19/202 through 08/30/2021 - stopped due to intolerance (temper volatility, irritability).  She completed adjuvant paclitaxel (week 12/12, 05/27/2020), and received adjuvant trastuzumab (Herceptin), having completed cycle 17, 02/10/2021 (40.5 months).  Adjuvant radiation was administered on 06/22/2020 through 08/06/2020 (6040 cGy in 33 fx). She is here alone (previously with her spouse).  She was started on adjuvant Femara, 09/27/2021 (33 months).    I have reviewed the HPI and verified with the patient the accuracy of it. No changes to interval history since the information was documented. Tommy Wheat MD 06/27/24      DIAGNOSTIC ABNORMALITIES:          1.   12/13/2019- mammogram screening.  Impression: Stellate lesion outer left breast.  Spot compression and ultrasound suggested.          2.   12/27/2019- diagnostic mammogram.  Impression: Irregular density upper outer left breast.  Biopsy suggested.          3.   12/27/2019- left breast ultrasound.  Impression: Suspicious 6 x 5 x 8 mm density left breast approximately 8 cm from the nipple.  Biopsy suggested.  BI-RADS Category 4B.          4.   01/03/2020-ultrasound-guided breast biopsy.  Impression: Appropriate sampling of the 8 mm irregular hypoechoic nodule in the left breast at 1:00 in the posterior depth/axillary tail region.  Final diagnosis: Left breast at 1 o'clock  position, core biopsies: A.invasive mammary carcinoma of no special type (ductal, not otherwise specified), grade 2, at least 4.9 mm in greatest extent.  B.associated low-grade ductal carcinoma in situ.  % positive, [place] 25% positive, HER-2/lamberto-2+ (IHC)/FISH- a focal positive score is present in an area corresponding to approximately 15% of the tumor (signal ratio: 2.9).           5.   02/03/2020- CMP normal with .  CBC normal.           6.   02/03/2020-bone scan ([facility]).  Impression: Degenerative joint changes.  No evidence of bony metastasis.           7.   02/03/2020-CT scans, head chest, abdomen, pelvis-impression: No abnormal intracranial enhancement to suggest intracranial metastasis/abnormalities.  No evidence of intrathoracic metastasis.  Stable 5 to 6 mm right lower lobe nodule of the right hemidiaphragm unchanged from 2/14/2011.  No convincing evidence of intra-abdominal or pelvic metastasis.  Hepatic cysts.  Fatty attenuated benign focus within the body of the pancreas visualized, 12/14/2011.  Previous hysterectomy, cholecystectomy and appendectomy.           8.   02/14/2020-echocardiogram.  Impression: LV systolic function normal (EF equals 70%).  Normal LV and RV size thickness and function.  Normal LA and RA size.  Normal cardiac valves.  No pericardial effusion.           9.  02/27/2020- CMP normal with .  CBC normal.  Iron 65, iron saturation 18%, ferritin 134, B12 745, folate > 20, CEA 1.76, CA-27-29 14.2.          10.  03/04/2020- repeat FISH for HER-2/lamberto (from tumor block, 01/10/2020).  No tumor on specimen.          11.  06/23/2020- DEXA scan.  Impression: Normal bone density.  No lower than 1 standard deviation below the mean for young adult woman.    PREVIOUS INTERVENTIONS:           1.   01/10/2020- left breast lumpectomy with left axillary sentinel node biopsy.  Final diagnosis: 1.left breast mass, lumpectomy: Invasive and in situ ductal carcinoma.  Tumor site-not specified.   "Histologic type: Invasive carcinoma of no special type (invasive ductal carcinoma, not otherwise specified).  Overall tumor grade: Grade 1.  Tumor size: Greatest dimension of largest invasive focus: 5 mm.  Tumor focality: Single focus of invasive carcinoma.  Ductal carcinoma in situ (DCIS): Present.  Negative for extensive intraductal component (EIC).  Size (extent) CIS: Cannot be determined: Microscopic.  Architectural pattern: Cribriform/solid.  Nuclear grade: 2 (intermediate).  Necrosis: Present, central (expansive \"comedo) necrosis).  Lobular carcinoma in situ (LCIS): No LCIS in specimen.  Lymphovascular invasion: Not identified.  Dermal lymphovascular invasion: No skin present.  Margins: Uninvolved by invasive carcinoma.  Lymph nodes: Uninvolved by tumor cells.  Regional lymph nodes: 1.  Number of lymph nodes examined: 1.  Number of sentinel nodes examined: 1.  Pathologic stage classification (pTNM, AJCC eighth edition): pT1a,(sn), pN0.  % positive, NM 25% positive, HER-2 2+ (equivocal).           2.   Adjuvant Arimidex 1 mg p.o. daily beginning 02/03/2020 through 03/11/2020: resumed 08/19/2020 through 08/30/2021 - stopped due to intolerance (temper volatility, irritability).           3.   Adjuvant paclitaxel beginning 03/11/2020 through 05/27/2020 (12/12 weekly doses)           4.   Adjuvant trastuzumab every 3 weeks - beginning 03/11/2020 through 02/10/2021 (17 cycles)           5.   Adjuvant radiation to the left breast (5040 cGy with 1000 cGy boost to the tumor bed for a total of 6040 cGy/33 fractions) from 06/22/2020 through 08/06/2020.           6.   Adjuvant Femara 2.5 mg po daily beginning 09/27/2021    LABS    Lab Results - Last 18 Months   Lab Units 04/08/24  0828 12/21/23  1424 06/14/23  1351   HEMOGLOBIN g/dL 14.5 13.1 13.4   HEMATOCRIT % 45.2 41.0 41.8   MCV fL 92.6 91.7 91.5   WBC 10*3/mm3 8.00 8.58 7.73   RDW % 13.1 13.3 13.0   MPV fL  --  9.5 9.4   PLATELETS 10*3/mm3 372 353 336   IMM " GRAN % %  --  0.3 0.3   NEUTROS ABS 10*3/mm3 4.15 4.60 3.57   LYMPHS ABS 10*3/mm3 3.29* 3.40* 3.57*   MONOS ABS 10*3/mm3 0.38 0.45 0.44   EOS ABS 10*3/mm3 0.11 0.07 0.08   BASOS ABS 10*3/mm3 0.05 0.03 0.05   IMMATURE GRANS (ABS) 10*3/mm3  --  0.03 0.02   NRBC /100 WBC 0.0 0.0 0.0       Lab Results - Last 18 Months   Lab Units 04/08/24  0828 12/21/23  1424 06/14/23  1351   GLUCOSE mg/dL 100* 115* 97   SODIUM mmol/L 141 142 141   POTASSIUM mmol/L 4.4 4.0 3.7   CO2 mmol/L 24.4 27.0 25.0   CHLORIDE mmol/L 104 104 103   ANION GAP mmol/L  --  11.0 13.0   CREATININE mg/dL 0.68 0.54* 0.47*   BUN mg/dL 15 12 12   BUN / CREAT RATIO  22.1 22.2 25.5*   CALCIUM mg/dL 9.8 9.3 9.8   ALK PHOS U/L 79 82 104   TOTAL PROTEIN g/dL  --  7.3 7.7   ALT (SGPT) U/L 24 16 21   AST (SGOT) U/L 19 16 19   BILIRUBIN mg/dL 0.5 0.4 0.5   ALBUMIN g/dL 4.7 4.4 4.7   GLOBULIN gm/dL  --  2.9 3.0       Lab Results - Last 18 Months   Lab Units 12/21/23  1424 06/14/23  1351   CEA ng/mL 1.25 1.61       Lab Results - Last 18 Months   Lab Units 04/08/24  0828 12/21/23  1424 06/14/23  1351   IRON mcg/dL  --  76 75   TIBC mcg/dL  --  383 410   IRON SATURATION (TSAT) %  --  20 18*   FERRITIN ng/mL  --  85.33 72.18   TSH uIU/mL 1.070  --   --    FOLATE ng/mL  --  11.30 >20.00         PAST MEDICAL HISTORY:  ALLERGIES:  Allergies   Allergen Reactions    Demerol [Meperidine] Hives    Morphine And Codeine Hives     CURRENT MEDICATIONS:  Outpatient Encounter Medications as of 6/27/2024   Medication Sig Dispense Refill    calcium carb-cholecalciferol 600-10 MG-MCG tablet per tablet       letrozole (FEMARA) 2.5 MG tablet Take 1 tablet by mouth Daily. 30 tablet 11    ondansetron (ZOFRAN) 8 MG tablet Take 1 tablet by mouth Every 8 (Eight) Hours As Needed for Nausea or Vomiting. 60 tablet 3    sertraline (Zoloft) 50 MG tablet Take 1 tablet by mouth Daily. 90 tablet 1    [DISCONTINUED] WesTab One 2.5-25-1 MG tablet tablet TAKE 1 TABLET BY MOUTH DAILY (Patient not taking:  Reported on 5/6/2024) 30 tablet 0     No facility-administered encounter medications on file as of 6/27/2024.     Adult illnesses:  Colon polyps  Obesity  Spinal stenosis neck   Herniated cervical disc without myelopathy  Borderline blood pressure  History of dysphagia    Past surgeries:  Anterior cervical discectomy w fusion, 09/2018  Appendectomy  BSO/GANGA  Cholecystectomy  Colonoscopy, 02/10/2017  BTL  Tonsillectomy  Breast biopsy  Left partial mastectomy, 01/10/2020  03/05/2020 - Mediport placement per Dr. Edmond  03/04/2021-Mediport removal per Dr. Edmond.    ADULT ILLNESSES:  Patient Active Problem List   Diagnosis Code    Family history of colon cancer Z80.0    Laryngopharyngeal reflux K21.9    Pharyngoesophageal dysphagia R13.14    Class 1 obesity due to excess calories without serious comorbidity with body mass index (BMI) of 31.0 to 31.9 in adult E66.09, Z68.31    Spinal stenosis in cervical region M48.02    Cervical radiculopathy M54.12    Herniated cervical disc without myelopathy M50.20    Borderline blood pressure R03.0    Invasive ductal carcinoma of breast, female, left C50.912    Former smoker Z87.891    S/P lumpectomy, left breast Z98.890    S/P lymph node biopsy Z98.890    On antineoplastic chemotherapy Z79.899    Estrogen receptor positive status (ER+) Z17.0    History of radiation therapy Z92.3    History of adenomatous polyp of colon Z86.010    Osteoporosis without current pathological fracture M81.0    Osteopenia of left hip M85.852    Long term current use of aromatase inhibitor Z79.811    Prophylactic use of anastrozole (Arimidex) Z79.811     SURGERIES:  Past Surgical History:   Procedure Laterality Date    ANTERIOR CERVICAL DISCECTOMY W/ FUSION N/A 9/7/2018    Procedure: CERVICAL DISCECTOMY ANTERIOR WITH FUSION C6-7;  Surgeon: Chris Mcfadden MD;  Location: [facility] OR;  Service: Neurosurgery    APPENDECTOMY      BILATERAL SALPINGO OOPHORECTOMY      BREAST BIOPSY      BREAST LUMPECTOMY       CERVICAL CORPECTOMY N/A 9/7/2018    Procedure: CERVICAL CORPECTOMY C6-7;  Surgeon: Chris Mcfadden MD;  Location:  [facility] OR;  Service: Neurosurgery    CHOLECYSTECTOMY      COLONOSCOPY N/A 2/10/2017    One 8mm adenomatous polyp in the rectum; The examination was otherwise normal on direct and retroflexion views; A tattoo was seen in the rectum-A post-polypectomy scar was found at the tattoo site-There was no evidence of residual polyp tissue; Repeat 5 years    COLONOSCOPY  09/05/2012    One 13mm polyp in the rectum-Marked with Maxine ink-path shows Ganglioneuroma; The examination was otherwise normal; Repeat 4 years    COLONOSCOPY N/A 2/4/2022    Procedure: COLONOSCOPY WITH ANESTHESIA;  Surgeon: Elina Gonsalez MD;  Location:  [facility] ENDOSCOPY;  Service: Gastroenterology;  Laterality: N/A;  pre family hx colon ca; hx adenomatous polyp  post normal  Jones Elias,     HYSTERECTOMY  1998    Had hysterectomy for painful periods and bleeding. (Dr. Patel) TLH w/ BSO    LAPAROSCOPIC TUBAL LIGATION      MASTECTOMY W/ SENTINEL NODE BIOPSY Left 1/10/2020    Procedure: LEFT NEEDLE DIRECTED ULTRASOUND GUIDED PARTIAL MASTECTOMY WITH SENTINEL LYMPH NODE BIOPSY, INJECTION AND SCAN, RADIOLOGIST WILL INJECT;  Surgeon: Flor Edmond MD;  Location:  [facility] OR;  Service: General    TONSILLECTOMY      VENOUS ACCESS DEVICE (PORT) INSERTION N/A 3/5/2020    Procedure: INSERTION VENOUS ACCESS DEVICE EXCISION SKIN LESION X 2 CHEST AND ABDOMEN;  Surgeon: Flor Edmond MD;  Location:  [facility] OR;  Service: General;  Laterality: N/A;    VENOUS ACCESS DEVICE (PORT) REMOVAL Left 3/4/2021    Procedure: REMOVAL OF SINGLE LUMEN PORT;  Surgeon: Flor Edmond MD;  Location:  [facility] OR;  Service: General;  Laterality: Left;     HEALTH MAINTENANCE ITEMS:  There are no preventive care reminders to display for this patient.      <no information>  Last Completed Colonoscopy            COLORECTAL CANCER SCREENING (COLONOSCOPY - Every 5  Years) Next due on 2/4/2027 02/04/2022  COLONOSCOPY    02/04/2022  Surgical Procedure: COLONOSCOPY    02/10/2017  COLONOSCOPY    02/10/2017  Surgical Procedure: COLONOSCOPY    09/05/2012  SCANNED - COLONOSCOPY    Only the first 5 history entries have been loaded, but more history exists.                  Immunization History   Administered Date(s) Administered    COVID-19 (MODERNA) 1st,2nd,3rd Dose Monovalent 01/09/2021, 02/06/2021    COVID-19 (MODERNA) Monovalent Original Booster 11/19/2021    Flu Vaccine Intradermal Quad 18-64YR 10/07/2021    Fluzone Quad >6mos (Multi-dose) 10/03/2022    Influenza, Unspecified 11/18/2017, 11/05/2018, 10/15/2019, 10/27/2020    Shingrix 10/15/2021, 12/16/2021    Tdap 08/29/2018     Last Completed Mammogram            MAMMOGRAM (Yearly) Next due on 1/22/2025 01/22/2024  Mammo Diagnostic Digital Tomosynthesis Bilateral With CAD    01/06/2023  Mammo Diagnostic Digital Tomosynthesis Bilateral With CAD    01/06/2022  Mammo Diagnostic Digital Tomosynthesis Bilateral With CAD    12/17/2020  Mammo Diagnostic Digital Tomosynthesis Bilateral With CAD    12/27/2019  Mammo Diagnostic Digital Tomosynthesis Left With CAD    Only the first 5 history entries have been loaded, but more history exists.                      FAMILY HISTORY:  Family History   Problem Relation Age of Onset    Colon cancer Maternal Grandmother         Unknown age    Cancer Father     Heart disease Father     Diabetes Mother     Hypertension Mother     Stroke Mother     Breast cancer Sister     No Known Problems Daughter     No Known Problems Sister     Colon polyps Neg Hx     Esophageal cancer Neg Hx     Liver cancer Neg Hx     Liver disease Neg Hx     Rectal cancer Neg Hx     Stomach cancer Neg Hx     Ovarian cancer Neg Hx     BRCA 1/2 Neg Hx     Endometrial cancer Neg Hx      SOCIAL HISTORY:  Social History     Socioeconomic History    Marital status:     Number of children: 1   Tobacco Use    Smoking  "status: Never    Smokeless tobacco: Never    Tobacco comments:     \"i never really smoked\"   Vaping Use    Vaping status: Never Used   Substance and Sexual Activity    Alcohol use: Not Currently    Drug use: No    Sexual activity: Yes     Partners: Male     Birth control/protection: Hysterectomy       REVIEW OF SYSTEMS:  Review of Systems   Constitutional: Negative.         She manages her ADLs' including chores, errands, driving.  Is still working full time.  Has been active.  Is up and about \"most of the time.\"    Femara tolerance:  No new problems.  No new arthralgias.  Mild to moderate hot flashes.  \"All same.\" Taking daily.  \"No other issues.\"   HENT: Negative.     Eyes: Negative.    Respiratory: Negative.     Cardiovascular: Negative.    Gastrointestinal:  Negative for abdominal distention, abdominal pain, anal bleeding, blood in stool, constipation, diarrhea, nausea, vomiting and GERD (\"Normal.\" Still on omeprazole as needed, \"not often.\").   Endocrine: Positive for heat intolerance (Lingering vasomotor changes.  \"Mostly at night but not bad so I can live with it.\").   Genitourinary: Negative.    Musculoskeletal:  Negative for arthralgias (knees, and hips \"been ok.\" Does not impede her activities.).   Allergic/Immunologic: Negative.    Neurological:  Positive for numbness (paresthesias of the right toes.  Again says not worse. The symptoms of the left foot and hands have resolved).   Hematological: Negative.    Psychiatric/Behavioral:  Negative for depressed mood (Resolution of irritability and grouchiness since stopping Arimidex last 08/31/2021.). The patient is not nervous/anxious (\"not bad\").        /88   Pulse 112   Temp 97.5 °F (36.4 °C)   Resp 16   Ht 152.4 cm (60\")   Wt 74.2 kg (163 lb 8 oz)   LMP  (LMP Unknown)   SpO2 97%   BMI 31.93 kg/m²  Body surface area is 1.71 meters squared.  Pain Score    06/27/24 1413   PainSc: 0-No pain           Physical Exam:  Physical Exam   Constitutional: " She is oriented to person, place, and time. She appears well-developed and well-nourished. No distress.   Pleasant, heavy set, cooperative, modestly kept female.  Appears her age.  ECOG 0.      She has lost 2 lb (had gained 13 lb at her 2 prior visits) in the interval.   HENT:   Head: Normocephalic and atraumatic.   Mouth/Throat: No oropharyngeal exudate.   Eyes: Pupils are equal, round, and reactive to light. Conjunctivae are normal. No scleral icterus.   Neck: No JVD present. No tracheal deviation present. No thyromegaly present.   Cardiovascular: Normal rate, regular rhythm and normal heart sounds. Exam reveals no friction rub.   No murmur heard.  Pulmonary/Chest: Effort normal and breath sounds normal. No stridor. No respiratory distress. She has no wheezes. She has no rales.   Chaperoned exam: Jolynn Artis LPN    Port site in the left upper chest is healed.  The device has been removed.    Left breast lumpectomy and sentinel node biopsy site in the left axilla are healed.  Unchanged subtle skin induration.  Prior radiation associated skin changes are no longer evident.  The rest of the breast was without obvious nodularity skin changes, no nipple discharge.      Right breast without masses, skin changes nor nipple discharge.    No associated axillary nor supraclavicular adenopathy bilaterally.   Abdominal: Soft. Bowel sounds are normal. She exhibits no distension and no mass. There is no abdominal tenderness. There is no rebound and no guarding.   Musculoskeletal: Normal range of motion. No deformity.   Lymphadenopathy:     She has no cervical adenopathy.        Right cervical: No superficial cervical, no deep cervical and no posterior cervical adenopathy present.       Left cervical: No superficial cervical, no deep cervical and no posterior cervical adenopathy present.        Right: No supraclavicular adenopathy present.        Left: No supraclavicular adenopathy present.   Neurological: She is alert and  "oriented to person, place, and time. She displays normal reflexes. No cranial nerve deficit. She exhibits normal muscle tone. Coordination normal.   Skin: Skin is warm and dry. No rash noted. No erythema. No pallor.   tanned.  \"I have a pool.\"   Psychiatric: Her behavior is normal. Judgment and thought content normal.   Vitals reviewed.      ASSESSMENT:   1.  Invasive and in situ ductal carcinoma  left breast.                 Stage:  IA (pT1a, pN0, G2) ER  100% positive, TX  25%, HER-2/lamberto - 2+ (IHC)/FISH -a focal positive score is present in an area corresponding to approximately 15% of the tumor (signal ratio: 2.9).                 Tumor Virgin:  5 mm.  Tumor focality: Single focus of invasive carcinoma.  No EIC nor LCIS.  Lymphovascular invasion: Not identified.  Dermal lymphovascular invasion: No skin present.  Microcalcifications: Not identified.  Margins uninvolved by invasive carcinoma.  Lymph nodes: Number of lymph nodes examined: 1 (0/1).                 Tumor Status:    -- Underwent lumpectomy and SNB, 01/10/2020.   -- Completed adjuvant paclitaxel, 05/27/2020; adjuvant Herceptin, 02/10/2021; adjuvant radiation, 08/06/2020 (above)  -- Adjuvant Arimidex since 02/03/2020 through 09/27/2021-stopped due to depression, and irritability.  -- Adjuvant Femara 2.5 mg p.o. daily beginning 09/28/2021 through present  -- 01/22/2024- mammogram.. Benign mammogram. BI-RADS 2. Screening mammography recommended in one year.            2.   Obesity.  Moderate (BMI 32 - max: 34)        3.   Spinal stenosis neck, quiescent         4.   Herniated cervical disc without myelopathy        5.   Anemia, previously chemo associated.    --Resolved, Hgb 13.8, 6/27/24 (prior: Hgb 11.5-13.8)  -- 02/04/2022-colonoscopy--no residual polyps otherwise normal.  Repeat 5 years        6.   GERD, quiescent        7.   Mood changes, to include mood lability, depression, irritability and grouchiness.  Resolved since stopping Arimidex.        8.   " Osteopenia.  Improved on Oscal+Prolia  --6/17/2024- DEXA scan-Normal. Bone density is no lower than one standard deviation below  the mean for young adult woman. When compared to the previous exam of 6/15/2022 there has been progressive bone loss within the lumbar spine of -4.0%. Slight increase within the hip of +0.5% is demonstrated.  --06/15/2022- DEXA scan: Osteopenia of the left hip.  Low bone mass.  Bone density 1-2 0.5 SD below the mean.  Increased risk of fracture.  --06/28/2022- Prolia 60 mg SC        9.   Belly pains with diarrhea (resolved after course of Flagyl per Dr. Edmond) and trivially elevated ALT 40, AST 38, 11/14/2022.  --11/22/23- Hepatitis profile-negative  --11/30/23- CT abdomen/pelvis- 3 cystic-appearing liver lesions that are all increased in size compared to the prior study. These are lobular in shape and do not have any definite soft tissue enhancement. There is a 7 mm indeterminate lesion in the right hepatic lobe on image 16 series A small metastatic lesion is included in the differential. MRI may be more definitive. There is a probable 1 cm lipoma in the proximal body of the pancreas, stable.  --12/3/22- MRI abdomen-Multiple simple liver cysts, correlating with lesions of concern on  prior CT. No solid liver lesion identified.  --6/14/23- Normal LFTs       RECOMMENDATIONS:         1.   Apprised of the labs, 6/27/2024.  Normal CBC, glucose 117 otherwise normal CMP, CEA p, CA-27-29 p, B12/folate p, repleted serum iron, iron saturation 18% (from 20%; 18%; 22%; 21%; 22%; 19%; 23%; 25%; 24%), barely repleted ferritin (70.9; from 85.3; 72; 142; 113; 101; 186; 137; 226; 171; 157; 138; 141; 216).            2.    Femara tolerance discussed.  Continues to do well with no mood lability and no new arthralgias.  Feels she can press on with this.        3.    Previously apprised of negative hepatitis profile, 11/22/22; CT a/p, 11/30/23; MRI abdomen, 12/3/23 (above).  Multiple liver cysts         4.     Apprised of mammogram, 1/22/24 (above).  YUDY.        5.    Apprised of DEXA scan, 6/17/24 (above).  Normal.  Remains on calcium and vitamin D.  Started on Prolia beginning 06/28/2022.        6.    Previously noted that we are unable to repeat FISH testing for HER-2 on the tumor specimen from 01/10/2020 -pathology block did not contain any more tumor - personally discussed/confirmed with Dr. Burgos of pathology.        7.   Care previously discussed with Dr. Jaimes on 02/28/2020 after she saw the patient on 02/25/2020 (encounter reviewed).  Pathology was reviewed at [place] and discussed with the patient.  Recommendations/plan: Discussed that since her cancer is strongly ER positive and low histologic grade/low proliferative rate, and HER-2 on the biopsy was borderline recommended repeat HER-2 testing on her surgical pathology.  Defer to his negative proceed with only endocrine therapy and radiation.  If HER-2 is confirmed to be positive, recommend adjuvant weekly Taxol x12+ Herceptin every 3 weeks to complete 1 year of HER-2 directed therapy.  These plans were personally discussed with the patient (via telephone) on 02/28/2020 I also personally asked the pathologist (Dr. Radha Burgos) to send off the biopsy specimen for repeat FISH HER-2 testing.          8.   Reviewed NCCN guidelines version 3.2019 invasive breast cancer.  For tumors (=/> 5 mm), and pN0, recommend adjuvant endocrine therapy +/- adjuvant chemotherapy with trastuzumab (category 2B).                  9.  Rx:   Oscal 600/400 po bid # 60 - OTC  Femara 2.5 mg po qd # 30 - 11 RF  Prolia 60 mg SC every 6 months           10.  Previously discussed the rationale for adjuvant hormonal manipulation with AIs (see above) and potential toxicities of AI therapy are discussed (to include but not limited to: Hot flashes, asthenia, pain, arthralgias, arthritis, nausea/vomiting, headache, pharyngitis, depression, rash, hypertension, lymphedema, insomnia, edema,  weight gain, dyspnea, abdominal pain, constipation, hyperlipidemia, osteoporosis, fractures, cough, bone pain, diarrhea, breast pain, paresthesias, infections, cataracts, myalgias, thromboembolism, angina, Lyles-Yung syndrome, erythema multiforme, anemia, leukopenia, stroke, myocardial infarction, osteoporosis, fractures). Questions are answered. She agrees to press on.. 11.  Return to the [place] office with pre-office serum iron, iron saturation, ferritin B12, folate, CMP, CEA, CA 27-29 and CBC and differential in 24 weeks.      MEDICAL DECISION MAKING:  High complexity   AMOUNT OF DATA: Moderate      I spent ~40 minutes caring for Chasidy on this date of service. This time includes time spent by me in the following activities: preparing for the visit, reviewing tests, performing a medically appropriate examination and/or evaluation, counseling and educating the patient/family/caregiver, ordering medications, tests, or procedures and documenting information in the medical record      Cc:  Abigail Jaimes MD- [facility]          MD Jones Guzman MD

## 2024-06-27 ENCOUNTER — LAB (OUTPATIENT)
Dept: LAB | Facility: HOSPITAL | Age: 64
End: 2024-06-27
Payer: COMMERCIAL

## 2024-06-27 ENCOUNTER — OFFICE VISIT (OUTPATIENT)
Dept: ONCOLOGY | Facility: CLINIC | Age: 64
End: 2024-06-27
Payer: COMMERCIAL

## 2024-06-27 ENCOUNTER — INFUSION (OUTPATIENT)
Dept: ONCOLOGY | Facility: HOSPITAL | Age: 64
End: 2024-06-27
Payer: COMMERCIAL

## 2024-06-27 VITALS
HEART RATE: 91 BPM | TEMPERATURE: 96.8 F | SYSTOLIC BLOOD PRESSURE: 145 MMHG | OXYGEN SATURATION: 96 % | RESPIRATION RATE: 16 BRPM | DIASTOLIC BLOOD PRESSURE: 72 MMHG

## 2024-06-27 VITALS
OXYGEN SATURATION: 97 % | SYSTOLIC BLOOD PRESSURE: 132 MMHG | DIASTOLIC BLOOD PRESSURE: 88 MMHG | BODY MASS INDEX: 32.1 KG/M2 | HEIGHT: 60 IN | WEIGHT: 163.5 LBS | RESPIRATION RATE: 16 BRPM | TEMPERATURE: 97.5 F | HEART RATE: 112 BPM

## 2024-06-27 DIAGNOSIS — Z79.811 LONG TERM CURRENT USE OF AROMATASE INHIBITOR: ICD-10-CM

## 2024-06-27 DIAGNOSIS — C50.912 INVASIVE DUCTAL CARCINOMA OF BREAST, FEMALE, LEFT: Primary | ICD-10-CM

## 2024-06-27 DIAGNOSIS — M85.852 OSTEOPENIA OF LEFT HIP: ICD-10-CM

## 2024-06-27 DIAGNOSIS — C50.912 INVASIVE DUCTAL CARCINOMA OF BREAST, FEMALE, LEFT: ICD-10-CM

## 2024-06-27 DIAGNOSIS — M85.852 OSTEOPENIA OF LEFT HIP: Primary | ICD-10-CM

## 2024-06-27 LAB
ALBUMIN SERPL-MCNC: 4.5 G/DL (ref 3.5–5.2)
ALBUMIN/GLOB SERPL: 1.5 G/DL
ALP SERPL-CCNC: 91 U/L (ref 39–117)
ALT SERPL W P-5'-P-CCNC: 25 U/L (ref 1–33)
ANION GAP SERPL CALCULATED.3IONS-SCNC: 10 MMOL/L (ref 5–15)
AST SERPL-CCNC: 22 U/L (ref 1–32)
BASOPHILS # BLD AUTO: 0.05 10*3/MM3 (ref 0–0.2)
BASOPHILS NFR BLD AUTO: 0.5 % (ref 0–1.5)
BILIRUB SERPL-MCNC: 0.4 MG/DL (ref 0–1.2)
BUN SERPL-MCNC: 10 MG/DL (ref 8–23)
BUN/CREAT SERPL: 13.3 (ref 7–25)
CALCIUM SPEC-SCNC: 9.7 MG/DL (ref 8.6–10.5)
CHLORIDE SERPL-SCNC: 101 MMOL/L (ref 98–107)
CO2 SERPL-SCNC: 29 MMOL/L (ref 22–29)
CREAT SERPL-MCNC: 0.75 MG/DL (ref 0.57–1)
DEPRECATED RDW RBC AUTO: 43.1 FL (ref 37–54)
EGFRCR SERPLBLD CKD-EPI 2021: 90.1 ML/MIN/1.73
EOSINOPHIL # BLD AUTO: 0.08 10*3/MM3 (ref 0–0.4)
EOSINOPHIL NFR BLD AUTO: 0.8 % (ref 0.3–6.2)
ERYTHROCYTE [DISTWIDTH] IN BLOOD BY AUTOMATED COUNT: 13.1 % (ref 12.3–15.4)
FERRITIN SERPL-MCNC: 70.95 NG/ML (ref 13–150)
GLOBULIN UR ELPH-MCNC: 3.1 GM/DL
GLUCOSE SERPL-MCNC: 117 MG/DL (ref 65–99)
HCT VFR BLD AUTO: 41.7 % (ref 34–46.6)
HGB BLD-MCNC: 13.8 G/DL (ref 12–15.9)
IMM GRANULOCYTES # BLD AUTO: 0.03 10*3/MM3 (ref 0–0.05)
IMM GRANULOCYTES NFR BLD AUTO: 0.3 % (ref 0–0.5)
IRON 24H UR-MRATE: 74 MCG/DL (ref 37–145)
IRON SATN MFR SERPL: 18 % (ref 20–50)
LYMPHOCYTES # BLD AUTO: 3.86 10*3/MM3 (ref 0.7–3.1)
LYMPHOCYTES NFR BLD AUTO: 40.4 % (ref 19.6–45.3)
MAGNESIUM SERPL-MCNC: 2 MG/DL (ref 1.6–2.4)
MCH RBC QN AUTO: 29.9 PG (ref 26.6–33)
MCHC RBC AUTO-ENTMCNC: 33.1 G/DL (ref 31.5–35.7)
MCV RBC AUTO: 90.3 FL (ref 79–97)
MONOCYTES # BLD AUTO: 0.56 10*3/MM3 (ref 0.1–0.9)
MONOCYTES NFR BLD AUTO: 5.9 % (ref 5–12)
NEUTROPHILS NFR BLD AUTO: 4.98 10*3/MM3 (ref 1.7–7)
NEUTROPHILS NFR BLD AUTO: 52.1 % (ref 42.7–76)
NRBC BLD AUTO-RTO: 0 /100 WBC (ref 0–0.2)
PHOSPHATE SERPL-MCNC: 3.3 MG/DL (ref 2.5–4.5)
PLATELET # BLD AUTO: 368 10*3/MM3 (ref 140–450)
PMV BLD AUTO: 9.5 FL (ref 6–12)
POTASSIUM SERPL-SCNC: 4.1 MMOL/L (ref 3.5–5.2)
PROT SERPL-MCNC: 7.6 G/DL (ref 6–8.5)
RBC # BLD AUTO: 4.62 10*6/MM3 (ref 3.77–5.28)
SODIUM SERPL-SCNC: 140 MMOL/L (ref 136–145)
TIBC SERPL-MCNC: 402 MCG/DL (ref 298–536)
TRANSFERRIN SERPL-MCNC: 270 MG/DL (ref 200–360)
WBC NRBC COR # BLD AUTO: 9.56 10*3/MM3 (ref 3.4–10.8)

## 2024-06-27 PROCEDURE — 99215 OFFICE O/P EST HI 40 MIN: CPT | Performed by: INTERNAL MEDICINE

## 2024-06-27 PROCEDURE — 82728 ASSAY OF FERRITIN: CPT

## 2024-06-27 PROCEDURE — 85025 COMPLETE CBC W/AUTO DIFF WBC: CPT

## 2024-06-27 PROCEDURE — 96372 THER/PROPH/DIAG INJ SC/IM: CPT

## 2024-06-27 PROCEDURE — 82607 VITAMIN B-12: CPT

## 2024-06-27 PROCEDURE — 84466 ASSAY OF TRANSFERRIN: CPT

## 2024-06-27 PROCEDURE — 83735 ASSAY OF MAGNESIUM: CPT

## 2024-06-27 PROCEDURE — 86300 IMMUNOASSAY TUMOR CA 15-3: CPT

## 2024-06-27 PROCEDURE — 36415 COLL VENOUS BLD VENIPUNCTURE: CPT

## 2024-06-27 PROCEDURE — 82378 CARCINOEMBRYONIC ANTIGEN: CPT

## 2024-06-27 PROCEDURE — 82746 ASSAY OF FOLIC ACID SERUM: CPT

## 2024-06-27 PROCEDURE — 80053 COMPREHEN METABOLIC PANEL: CPT

## 2024-06-27 PROCEDURE — 25010000002 DENOSUMAB 60 MG/ML SOLUTION PREFILLED SYRINGE: Performed by: INTERNAL MEDICINE

## 2024-06-27 PROCEDURE — 83540 ASSAY OF IRON: CPT

## 2024-06-27 PROCEDURE — 84100 ASSAY OF PHOSPHORUS: CPT

## 2024-06-27 RX ADMIN — DENOSUMAB 60 MG: 60 INJECTION SUBCUTANEOUS at 15:07

## 2024-06-28 LAB
CANCER AG27-29 SERPL-ACNC: 19.9 U/ML (ref 0–38.6)
CEA SERPL-MCNC: 1.61 NG/ML
FOLATE SERPL-MCNC: 9.15 NG/ML (ref 4.78–24.2)
VIT B12 BLD-MCNC: 353 PG/ML (ref 211–946)

## 2024-09-11 NOTE — PATIENT INSTRUCTIONS
"Other Preventive Recommendations:    · A preventive eye exam performed by an eye specialist is recommended every 1-2 years to screen for glaucoma; cataracts, macular degeneration, and other eye disorders.  · A preventive dental visit is recommended every 6 months.  · Try to get at least 150 minutes of exercise per week or 10,000 steps per day on a pedometer .  · Order or download the FREE \"Exercise & Physical Activity: Your Everyday Guide\" from [employer]. Call 1-947.656.6721 or search [employer] online.  · You need 4574-0805 mg of calcium and 9168-3617 IU of vitamin D per day. It is possible to meet your calcium requirement with diet alone, but a vitamin D supplement is usually necessary to meet this goal.  · When exposed to the sun, use a sunscreen that protects against both UVA and UVB radiation with an SPF of 30 or greater. Reapply every 2 to 3 hours or after sweating, drying off with a towel, or swimming.  · Always wear a seat belt when traveling in a car. Always wear a helmet when riding a bicycle or motorcycle.      Exercising to Lose Weight  Exercising can help you to lose weight. In order to lose weight through exercise, you need to do vigorous-intensity exercise. You can tell that you are exercising with vigorous intensity if you are breathing very hard and fast and cannot hold a conversation while exercising.  Moderate-intensity exercise helps to maintain your current weight. You can tell that you are exercising at a moderate level if you have a higher heart rate and faster breathing, but you are still able to hold a conversation.  How often should I exercise?  Choose an activity that you enjoy and set realistic goals. Your health care provider can help you to make an activity plan that works for you. Exercise regularly as directed by your health care provider. This may include:  · Doing resistance training twice each week, such " as:  ? Push-ups.  ? Sit-ups.  ? Lifting weights.  ? Using resistance bands.  · Doing a given intensity of exercise for a given amount of time. Choose from these options:  ? 150 minutes of moderate-intensity exercise every week.  ? 75 minutes of vigorous-intensity exercise every week.  ? A mix of moderate-intensity and vigorous-intensity exercise every week.    Children, pregnant women, people who are out of shape, people who are overweight, and older adults may need to consult a health care provider for individual recommendations. If you have any sort of medical condition, be sure to consult your health care provider before starting a new exercise program.  What are some activities that can help me to lose weight?  · Walking at a rate of at least 4.5 miles an hour.  · Jogging or running at a rate of 5 miles per hour.  · Biking at a rate of at least 10 miles per hour.  · Lap swimming.  · Roller-skating or in-line skating.  · Cross-country skiing.  · Vigorous competitive sports, such as football, basketball, and soccer.  · Jumping rope.  · Aerobic dancing.  How can I be more active in my day-to-day activities?  · Use the stairs instead of the elevator.  · Take a walk during your lunch break.  · If you drive, park your car farther away from work or school.  · If you take public transportation, get off one stop early and walk the rest of the way.  · Make all of your phone calls while standing up and walking around.  · Get up, stretch, and walk around every 30 minutes throughout the day.  What guidelines should I follow while exercising?  · Do not exercise so much that you hurt yourself, feel dizzy, or get very short of breath.  · Consult your health care provider prior to starting a new exercise program.  · Wear comfortable clothes and shoes with good support.  · Drink plenty of water while you exercise to prevent dehydration or heat stroke. Body water is lost during exercise and must be replaced.  · Work out until you  breathe faster and your heart beats faster.  This information is not intended to replace advice given to you by your health care provider. Make sure you discuss any questions you have with your health care provider.  Document Released: 01/20/2012 Document Revised: 05/25/2017 Document Reviewed: 05/21/2015  [employer] Patient Education © 2018 [employer]    Calorie Counting for Weight Loss  Calories are units of energy. Your body needs a certain amount of calories from food to keep you going throughout the day. When you eat more calories than your body needs, your body stores the extra calories as fat. When you eat fewer calories than your body needs, your body burns fat to get the energy it needs.  Calorie counting means keeping track of how many calories you eat and drink each day. Calorie counting can be helpful if you need to lose weight. If you make sure to eat fewer calories than your body needs, you should lose weight. Ask your health care provider what a healthy weight is for you.  For calorie counting to work, you will need to eat the right number of calories in a day in order to lose a healthy amount of weight per week. A dietitian can help you determine how many calories you need in a day and will give you suggestions on how to reach your calorie goal.  · A healthy amount of weight to lose per week is usually 1-2 lb (0.5-0.9 kg). This usually means that your daily calorie intake should be reduced by 500-750 calories.  · Eating 1,200 - 1,500 calories per day can help most women lose weight.  · Eating 1,500 - 1,800 calories per day can help most men lose weight.    What do I need to know about calorie counting?  In order to meet your daily calorie goal, you will need to:  · Find out how many calories are in each food you would like to eat. Try to do this before you eat.  · Decide how much of the food you plan to eat.  · Write down what you ate and how many calories it had. Doing this is called  keeping a food log.    To successfully lose weight, it is important to balance calorie counting with a healthy lifestyle that includes regular activity. Aim for 150 minutes of moderate exercise (such as walking) or 75 minutes of vigorous exercise (such as running) each week.  Where do I find calorie information?    The number of calories in a food can be found on a Nutrition Facts label. If a food does not have a Nutrition Facts label, try to look up the calories online or ask your dietitian for help.  Remember that calories are listed per serving. If you choose to have more than one serving of a food, you will have to multiply the calories per serving by the amount of servings you plan to eat. For example, the label on a package of bread might say that a serving size is 1 slice and that there are 90 calories in a serving. If you eat 1 slice, you will have eaten 90 calories. If you eat 2 slices, you will have eaten 180 calories.  How do I keep a food log?  Immediately after each meal, record the following information in your food log:  · What you ate. Don't forget to include toppings, sauces, and other extras on the food.  · How much you ate. This can be measured in cups, ounces, or number of items.  · How many calories each food and drink had.  · The total number of calories in the meal.    Keep your food log near you, such as in a small notebook in your pocket, or use a mobile florentin or website. Some programs will calculate calories for you and show you how many calories you have left for the day to meet your goal.  What are some calorie counting tips?  · Use your calories on foods and drinks that will fill you up and not leave you hungry:  ? Some examples of foods that fill you up are nuts and nut butters, vegetables, lean proteins, and high-fiber foods like whole grains. High-fiber foods are foods with more than 5 g fiber per serving.  ? Drinks such as sodas, specialty coffee drinks, alcohol, and juices have a lot  "of calories, yet do not fill you up.  · Eat nutritious foods and avoid empty calories. Empty calories are calories you get from foods or beverages that do not have many vitamins or protein, such as candy, sweets, and soda. It is better to have a nutritious high-calorie food (such as an avocado) than a food with few nutrients (such as a bag of chips).  · Know how many calories are in the foods you eat most often. This will help you calculate calorie counts faster.  · Pay attention to calories in drinks. Low-calorie drinks include water and unsweetened drinks.  · Pay attention to nutrition labels for \"low fat\" or \"fat free\" foods. These foods sometimes have the same amount of calories or more calories than the full fat versions. They also often have added sugar, starch, or salt, to make up for flavor that was removed with the fat.  · Find a way of tracking calories that works for you. Get creative. Try different apps or programs if writing down calories does not work for you.  What are some portion control tips?  · Know how many calories are in a serving. This will help you know how many servings of a certain food you can have.  · Use a measuring cup to measure serving sizes. You could also try weighing out portions on a kitchen scale. With time, you will be able to estimate serving sizes for some foods.  · Take some time to put servings of different foods on your favorite plates, bowls, and cups so you know what a serving looks like.  · Try not to eat straight from a bag or box. Doing this can lead to overeating. Put the amount you would like to eat in a cup or on a plate to make sure you are eating the right portion.  · Use smaller plates, glasses, and bowls to prevent overeating.  · Try not to multitask (for example, watch TV or use your computer) while eating. If it is time to eat, sit down at a table and enjoy your food. This will help you to know when you are full. It will also help you to be aware of what you " are eating and how much you are eating.  What are tips for following this plan?  Reading food labels  · Check the calorie count compared to the serving size. The serving size may be smaller than what you are used to eating.  · Check the source of the calories. Make sure the food you are eating is high in vitamins and protein and low in saturated and trans fats.  Shopping  · Read nutrition labels while you shop. This will help you make healthy decisions before you decide to purchase your food.  · Make a grocery list and stick to it.  Cooking  · Try to cook your favorite foods in a healthier way. For example, try baking instead of frying.  · Use low-fat dairy products.  Meal planning  · Use more fruits and vegetables. Half of your plate should be fruits and vegetables.  · Include lean proteins like poultry and fish.  How do I count calories when eating out?  · Ask for smaller portion sizes.  · Consider sharing an entree and sides instead of getting your own entree.  · If you get your own entree, eat only half. Ask for a box at the beginning of your meal and put the rest of your entree in it so you are not tempted to eat it.  · If calories are listed on the menu, choose the lower calorie options.  · Choose dishes that include vegetables, fruits, whole grains, low-fat dairy products, and lean protein.  · Choose items that are boiled, broiled, grilled, or steamed. Stay away from items that are buttered, battered, fried, or served with cream sauce. Items labeled “crispy” are usually fried, unless stated otherwise.  · Choose water, low-fat milk, unsweetened iced tea, or other drinks without added sugar. If you want an alcoholic beverage, choose a lower calorie option such as a glass of wine or light beer.  · Ask for dressings, sauces, and syrups on the side. These are usually high in calories, so you should limit the amount you eat.  · If you want a salad, choose a garden salad and ask for grilled meats. Avoid extra  toppings like aguilar, cheese, or fried items. Ask for the dressing on the side, or ask for olive oil and vinegar or lemon to use as dressing.  · Estimate how many servings of a food you are given. For example, a serving of cooked rice is ½ cup or about the size of half a baseball. Knowing serving sizes will help you be aware of how much food you are eating at restaurants. The list below tells you how big or small some common portion sizes are based on everyday objects:  ? 1 oz--4 stacked dice.  ? 3 oz--1 deck of cards.  ? 1 tsp--1 die.  ? 1 Tbsp--½ a ping-pong ball.  ? 2 Tbsp--1 ping-pong ball.  ? ½ cup--½ baseball.  ? 1 cup--1 baseball.  Summary  · Calorie counting means keeping track of how many calories you eat and drink each day. If you eat fewer calories than your body needs, you should lose weight.  · A healthy amount of weight to lose per week is usually 1-2 lb (0.5-0.9 kg). This usually means reducing your daily calorie intake by 500-750 calories.  · The number of calories in a food can be found on a Nutrition Facts label. If a food does not have a Nutrition Facts label, try to look up the calories online or ask your dietitian for help.  · Use your calories on foods and drinks that will fill you up, and not on foods and drinks that will leave you hungry.  · Use smaller plates, glasses, and bowls to prevent overeating.  This information is not intended to replace advice given to you by your health care provider. Make sure you discuss any questions you have with your health care provider.  Document Released: 12/18/2006 Document Revised: 11/17/2017 Document Reviewed: 11/17/2017  [employer] Patient Education © 2018 [employer]     No

## 2024-11-01 DIAGNOSIS — F41.1 GAD (GENERALIZED ANXIETY DISORDER): ICD-10-CM

## 2024-11-06 ENCOUNTER — OFFICE VISIT (OUTPATIENT)
Dept: INTERNAL MEDICINE | Facility: CLINIC | Age: 64
End: 2024-11-06
Payer: COMMERCIAL

## 2024-11-06 VITALS
HEART RATE: 79 BPM | HEIGHT: 60 IN | OXYGEN SATURATION: 97 % | TEMPERATURE: 97.2 F | DIASTOLIC BLOOD PRESSURE: 84 MMHG | BODY MASS INDEX: 31.93 KG/M2 | SYSTOLIC BLOOD PRESSURE: 144 MMHG

## 2024-11-06 DIAGNOSIS — K21.9 LARYNGOPHARYNGEAL REFLUX: Primary | ICD-10-CM

## 2024-11-06 DIAGNOSIS — Z23 NEED FOR INFLUENZA VACCINATION: ICD-10-CM

## 2024-11-06 DIAGNOSIS — R19.5 LOOSE STOOLS: ICD-10-CM

## 2024-11-06 DIAGNOSIS — F41.1 GAD (GENERALIZED ANXIETY DISORDER): ICD-10-CM

## 2024-11-06 DIAGNOSIS — Z00.00 WELLNESS EXAMINATION: ICD-10-CM

## 2024-11-06 DIAGNOSIS — C50.912 INVASIVE DUCTAL CARCINOMA OF BREAST, FEMALE, LEFT: ICD-10-CM

## 2024-11-06 DIAGNOSIS — Z23 NEED FOR COVID-19 VACCINE: ICD-10-CM

## 2024-11-06 RX ORDER — HYDROXYZINE HYDROCHLORIDE 25 MG/1
25 TABLET, FILM COATED ORAL EVERY 6 HOURS PRN
Qty: 30 TABLET | Refills: 1 | Status: SHIPPED | OUTPATIENT
Start: 2024-11-06

## 2024-11-06 NOTE — PROGRESS NOTES
"    Chief Complaint  Anxiety (6 month follow up) and Cough (Cough for a couple of months that started with her GERD, but it has not resolved.  Also having episodes of diarrhea.)    Subjective        Chasidy Tejada presents to [facility]  Anxiety    Cough    See below.     Objective   Vital Signs:  /84 (BP Location: Left arm, Patient Position: Sitting, Cuff Size: Adult)   Pulse 79   Temp 97.2 °F (36.2 °C) (Temporal)   Ht 152.4 cm (60\")   SpO2 97%   BMI 31.93 kg/m²   Estimated body mass index is 31.93 kg/m² as calculated from the following:    Height as of this encounter: 152.4 cm (60\").    Weight as of 6/27/24: 74.2 kg (163 lb 8 oz).     BMI is >= 30 and <35. (Class 1 Obesity). The following options were offered after discussion;: weight loss educational material (shared in after visit summary)    Physical Exam  HENT:      Head: Normocephalic and atraumatic.   Eyes:      Conjunctiva/sclera: Conjunctivae normal.      Pupils: Pupils are equal, round, and reactive to light.   Cardiovascular:      Rate and Rhythm: Normal rate and regular rhythm.      Heart sounds: Normal heart sounds.   Pulmonary:      Effort: Pulmonary effort is normal. No respiratory distress.      Breath sounds: Normal breath sounds.   Musculoskeletal:         General: No swelling.   Skin:     General: Skin is warm and dry.      Findings: No rash.   Neurological:      General: No focal deficit present.      Mental Status: She is alert and oriented to person, place, and time.   Psychiatric:         Mood and Affect: Mood normal.         Behavior: Behavior normal.         Thought Content: Thought content normal.         Judgment: Judgment normal.        Result Review :  Last labs were on 6/27/2024:  1.  CMP showed a glucose of 117.  This was not fasting.  2.  Iron studies were reasonable.  3.  Folate and B12 are normal.  4.  CBC was unremarkable.         Assessment and Plan   Diagnoses and all orders for this " visit:    1. Laryngopharyngeal reflux (Primary)  -     omeprazole (priLOSEC) 20 MG capsule; Take 1 capsule by mouth Daily.  Dispense: 30 capsule; Refill: 1    2. JHON (generalized anxiety disorder)  -     hydrOXYzine (ATARAX) 25 MG tablet; Take 1 tablet by mouth Every 6 (Six) Hours As Needed for Anxiety.  Dispense: 30 tablet; Refill: 1    3. Loose stools    4. History of invasive ductal carcinoma of breast, female, left; status post mastectomy, chemoradiaton    5. Wellness examination  -     CBC & Differential; Future  -     Comprehensive Metabolic Panel; Future  -     Urinalysis With Microscopic - Urine, Clean Catch; Future  -     Lipid Panel; Future  -     TSH Rfx On Abnormal To Free T4; Future    6. Need for influenza vaccination  -     Fluzone >6mos    7. Need for COVID-19 vaccine  -     COVID-19 (Pfizer) 12yrs+ (COMIRNATY)       Presents today for follow-up.     She has been having problems with a dry cough that has been worse at night.  She takes omeprazole in the mornings.  She does have a history of laryngopharyngeal reflux.  She saw Dr. Miah Lainez for this in April 2018 and also underwent laryngoscopy at that time.  She thinks that these issues are related as it is similar to what she was experiencing then.  She is going to add Pepcid at night for now.  I have a reminder set to reach out to her in a few weeks to see how things are going.  She thinks that she had an endoscopy with Dr. Gonsalez at one point, but this is so remote that there are no records of it in the system.  We may want to consider an endoscopy to assess for worsening reflux issues/possible Anderson's esophagus.  We could also consider twice daily PPI.      Sertraline is still working well for her, but she also has periods of heightened anxiety.  She did not do well with BuSpar previously.  No current plans for a BZD.  We will have her try hydroxyzine.    She has noted some episodes of loose stooling recently.  She thinks it is related to diet.   She is going to work on continuing to figure this out. She hasn't noticed any blood. No pain. No fevers. This started after a trip.     She follows with Dr. Wheat in December.  She continues letrozole.    She also follows with SANDRA Ocampo.     Mammogram in January 2024 was BI-RADS Category 2, benign findings. Oncology typically orders those for her.     DEXA scan in June was normal.    Status post hysterectomy.    She wanted the seasonal flu vaccine and COVID-19 booster today.    Plan to have her back in 6 months.  Her annual physical will be due at that point in time.  We will obtain fasting labs prior to the appointment as well.  She knows that she can reach out sooner if problems.      Follow Up   Return in about 6 months (around 5/6/2025) for Annual physical.  Patient was given instructions and counseling regarding her condition or for health maintenance advice. Please see specific information pulled into the AVS if appropriate.      KALLI Fitzpatrick DO       Electronically signed by ARMANI Fitzpatrick DO, 11/06/24, 7:34 AM CST.

## 2024-12-17 DIAGNOSIS — Z79.811 LONG TERM CURRENT USE OF AROMATASE INHIBITOR: Primary | ICD-10-CM

## 2024-12-17 DIAGNOSIS — M85.852 OSTEOPENIA OF LEFT HIP: ICD-10-CM

## 2024-12-19 NOTE — PROGRESS NOTES
[facility] ON[facility] GROUP HEMATOLOGY AND ONCOLOGY  [address]  [facility] [address] [phone]    Patient Name: Chasidy Tejada  Encounter Date: 12/26/2024  YOB: 1960  Patient Number: [id number]     REASON FOR VISIT:  Chasidy Tejada is a 64-year-old female who returns in follow-up of stage IA left breast carcinoma, ER/MN and HER-2/lamberto positive.  She underwent left breast lumpectomy with SNB, 01/10/2020 and was started on adjuvant Arimidex beginning 02/03/2020 through 03/11/2020 before resumption on 08/19/202 through 08/30/2021 - stopped due to intolerance (temper volatility, irritability).  She completed adjuvant paclitaxel (week 12/12, 05/27/2020), and received adjuvant trastuzumab (Herceptin), having completed cycle 17, 02/10/2021 (46.5 months).  Adjuvant radiation was administered on 06/22/2020 through 08/06/2020 (6040 cGy in 33 fx). She is here alone (previously with her spouse).  She was started on adjuvant Femara, 09/27/2021 (39 months).     I have reviewed the HPI and verified with the patient the accuracy of it. No changes to interval history since the information was documented. Tommy Wheat MD 12/26/24      DIAGNOSTIC ABNORMALITIES:          1.   12/13/2019- mammogram screening.  Impression: Stellate lesion outer left breast.  Spot compression and ultrasound suggested.          2.   12/27/2019- diagnostic mammogram.  Impression: Irregular density upper outer left breast.  Biopsy suggested.          3.   12/27/2019- left breast ultrasound.  Impression: Suspicious 6 x 5 x 8 mm density left breast approximately 8 cm from the nipple.  Biopsy suggested.  BI-RADS Category 4B.          4.   01/03/2020-ultrasound-guided breast biopsy.  Impression: Appropriate sampling of the 8 mm irregular hypoechoic nodule in the left breast at 1:00 in the posterior depth/axillary tail region.  Final diagnosis: Left breast at 1 o'clock position,  core biopsies: A.invasive mammary carcinoma of no special type (ductal, not otherwise specified), grade 2, at least 4.9 mm in greatest extent.  B.associated low-grade ductal carcinoma in situ.  % positive, DC 25% positive, HER-2/lamberto-2+ (IHC)/FISH- a focal positive score is present in an area corresponding to approximately 15% of the tumor (signal ratio: 2.9).           5.   02/03/2020- CMP normal with .  CBC normal.           6.   02/03/2020-bone scan ([facility]).  Impression: Degenerative joint changes.  No evidence of bony metastasis.           7.   02/03/2020-CT scans, head chest, abdomen, pelvis-impression: No abnormal intracranial enhancement to suggest intracranial metastasis/abnormalities.  No evidence of intrathoracic metastasis.  Stable 5 to 6 mm right lower lobe nodule of the right hemidiaphragm unchanged from 2/14/2011.  No convincing evidence of intra-abdominal or pelvic metastasis.  Hepatic cysts.  Fatty attenuated benign focus within the body of the pancreas visualized, 12/14/2011.  Previous hysterectomy, cholecystectomy and appendectomy.           8.   02/14/2020-echocardiogram.  Impression: LV systolic function normal (EF equals 70%).  Normal LV and RV size thickness and function.  Normal LA and RA size.  Normal cardiac valves.  No pericardial effusion.           9.  02/27/2020- CMP normal with .  CBC normal.  Iron 65, iron saturation 18%, ferritin 134, B12 745, folate > 20, CEA 1.76, CA-27-29 14.2.          10.  03/04/2020- repeat FISH for HER-2/lamberto (from tumor block, 01/10/2020).  No tumor on specimen.          11.  06/23/2020- DEXA scan.  Impression: Normal bone density.  No lower than 1 standard deviation below the mean for young adult woman.    PREVIOUS INTERVENTIONS:           1.   01/10/2020- left breast lumpectomy with left axillary sentinel node biopsy.  Final diagnosis: 1.left breast mass, lumpectomy: Invasive and in situ ductal carcinoma.  Tumor site-not specified.   "Histologic type: Invasive carcinoma of no special type (invasive ductal carcinoma, not otherwise specified).  Overall tumor grade: Grade 1.  Tumor size: Greatest dimension of largest invasive focus: 5 mm.  Tumor focality: Single focus of invasive carcinoma.  Ductal carcinoma in situ (DCIS): Present.  Negative for extensive intraductal component (EIC).  Size (extent) CIS: Cannot be determined: Microscopic.  Architectural pattern: Cribriform/solid.  Nuclear grade: 2 (intermediate).  Necrosis: Present, central (expansive \"comedo) necrosis).  Lobular carcinoma in situ (LCIS): No LCIS in specimen.  Lymphovascular invasion: Not identified.  Dermal lymphovascular invasion: No skin present.  Margins: Uninvolved by invasive carcinoma.  Lymph nodes: Uninvolved by tumor cells.  Regional lymph nodes: 1.  Number of lymph nodes examined: 1.  Number of sentinel nodes examined: 1.  Pathologic stage classification (pTNM, AJCC eighth edition): pT1a,(sn), pN0.  % positive, IA 25% positive, HER-2 2+ (equivocal).           2.   Adjuvant Arimidex 1 mg p.o. daily beginning 02/03/2020 through 03/11/2020: resumed 08/19/2020 through 08/30/2021 - stopped due to intolerance (temper volatility, irritability).           3.   Adjuvant paclitaxel beginning 03/11/2020 through 05/27/2020 (12/12 weekly doses)           4.   Adjuvant trastuzumab every 3 weeks - beginning 03/11/2020 through 02/10/2021 (17 cycles)           5.   Adjuvant radiation to the left breast (5040 cGy with 1000 cGy boost to the tumor bed for a total of 6040 cGy/33 fractions) from 06/22/2020 through 08/06/2020.           6.   Adjuvant Femara 2.5 mg po daily beginning 09/27/2021    LABS    Lab Results - Last 18 Months   Lab Units 12/20/24  0807 06/27/24  1408 04/08/24  0828 12/21/23  1424   HEMOGLOBIN g/dL 14.1 13.8 14.5 13.1   HEMATOCRIT % 44.5 41.7 45.2 41.0   MCV fL 92.1 90.3 92.6 91.7   WBC 10*3/mm3 7.25 9.56 8.00 8.58   RDW % 13.5 13.1 13.1 13.3   MPV fL 9.6 9.5  --  " 9.5   PLATELETS 10*3/mm3 359 368 372 353   IMM GRAN % % 0.3 0.3  --  0.3   NEUTROS ABS 10*3/mm3 3.81 4.98 4.15 4.60   LYMPHS ABS 10*3/mm3 2.92 3.86* 3.29* 3.40*   MONOS ABS 10*3/mm3 0.35 0.56 0.38 0.45   EOS ABS 10*3/mm3 0.11 0.08 0.11 0.07   BASOS ABS 10*3/mm3 0.04 0.05 0.05 0.03   IMMATURE GRANS (ABS) 10*3/mm3 0.02 0.03  --  0.03   NRBC /100 WBC 0.0 0.0 0.0 0.0       Lab Results - Last 18 Months   Lab Units 12/20/24  0807 06/27/24  1408 04/08/24  0828 12/21/23  1424   GLUCOSE mg/dL 109* 117* 100* 115*   SODIUM mmol/L 139 140 141 142   POTASSIUM mmol/L 4.2 4.1 4.4 4.0   CO2 mmol/L 25.0 29.0 24.4 27.0   CHLORIDE mmol/L 101 101 104 104   ANION GAP mmol/L 13.0 10.0  --  11.0   CREATININE mg/dL 0.68 0.75 0.68 0.54*   BUN mg/dL 14 10 15 12   BUN / CREAT RATIO  20.6 13.3 22.1 22.2   CALCIUM mg/dL 9.7 9.7 9.8 9.3   ALK PHOS U/L 93 91 79 82   TOTAL PROTEIN g/dL 7.6 7.6  --  7.3   ALT (SGPT) U/L 23 25 24 16   AST (SGOT) U/L 22 22 19 16   BILIRUBIN mg/dL 0.6 0.4 0.5 0.4   ALBUMIN g/dL 4.5 4.5 4.7 4.4   GLOBULIN gm/dL 3.1 3.1  --  2.9       Lab Results - Last 18 Months   Lab Units 12/20/24  0807 06/27/24  1408 12/21/23  1424   CEA ng/mL 1.70 1.61 1.25       Lab Results - Last 18 Months   Lab Units 12/20/24  0807 06/27/24  1408 04/08/24  0828 12/21/23  1424   IRON mcg/dL 89 74  --  76   TIBC mcg/dL 410 402  --  383   IRON SATURATION (TSAT) % 22 18*  --  20   FERRITIN ng/mL 80.69 70.95  --  85.33   TSH uIU/mL  --   --  1.070  --    FOLATE ng/mL 7.55 9.15  --  11.30         PAST MEDICAL HISTORY:  ALLERGIES:  Allergies   Allergen Reactions    Demerol [Meperidine] Hives    Morphine And Codeine Hives     CURRENT MEDICATIONS:  Outpatient Encounter Medications as of 12/26/2024   Medication Sig Dispense Refill    calcium carb-cholecalciferol 600-10 MG-MCG tablet per tablet       hydrOXYzine (ATARAX) 25 MG tablet Take 1 tablet by mouth Every 6 (Six) Hours As Needed for Anxiety. 30 tablet 1    letrozole (FEMARA) 2.5 MG tablet Take 1  tablet by mouth Daily. 30 tablet 11    omeprazole (priLOSEC) 20 MG capsule Take 1 capsule by mouth Daily. 30 capsule 1    ondansetron (ZOFRAN) 8 MG tablet Take 1 tablet by mouth Every 8 (Eight) Hours As Needed for Nausea or Vomiting. 60 tablet 3    sertraline (ZOLOFT) 50 MG tablet TAKE 1 TABLET BY MOUTH DAILY 90 tablet 1     No facility-administered encounter medications on file as of 12/26/2024.     Adult illnesses:  Colon polyps  Obesity  Spinal stenosis neck   Herniated cervical disc without myelopathy  Borderline blood pressure  History of dysphagia    Past surgeries:  Anterior cervical discectomy w fusion, 09/2018  Appendectomy  BSO/GANGA  Cholecystectomy  Colonoscopy, 02/10/2017  BTL  Tonsillectomy  Breast biopsy  Left partial mastectomy, 01/10/2020  03/05/2020 - Mediport placement per Dr. Edmond  03/04/2021-Mediport removal per Dr. Edmond.    ADULT ILLNESSES:  Patient Active Problem List   Diagnosis Code    Family history of colon cancer Z80.0    Laryngopharyngeal reflux K21.9    Pharyngoesophageal dysphagia R13.14    Class 1 obesity due to excess calories without serious comorbidity with body mass index (BMI) of 31.0 to 31.9 in adult E66.811, E66.09, Z68.31    Spinal stenosis in cervical region M48.02    Cervical radiculopathy M54.12    Herniated cervical disc without myelopathy M50.20    Borderline blood pressure R03.0    Invasive ductal carcinoma of breast, female, left C50.912    Former smoker Z87.891    S/P lumpectomy, left breast Z98.890    S/P lymph node biopsy Z98.890    On antineoplastic chemotherapy Z79.899    Estrogen receptor positive status (ER+) Z17.0    History of radiation therapy Z92.3    History of adenomatous polyp of colon Z86.0101    Osteoporosis without current pathological fracture M81.0    Osteopenia of left hip M85.852    Long term current use of aromatase inhibitor Z79.811    Prophylactic use of anastrozole (Arimidex) Z79.811     SURGERIES:  Past Surgical History:   Procedure  Laterality Date    ANTERIOR CERVICAL DISCECTOMY W/ FUSION N/A 9/7/2018    Procedure: CERVICAL DISCECTOMY ANTERIOR WITH FUSION C6-7;  Surgeon: Chris Mcfadden MD;  Location: [facility] OR;  Service: Neurosurgery    APPENDECTOMY      BILATERAL SALPINGO OOPHORECTOMY      BREAST BIOPSY      BREAST LUMPECTOMY      CERVICAL CORPECTOMY N/A 9/7/2018    Procedure: CERVICAL CORPECTOMY C6-7;  Surgeon: Chris Mcfadden MD;  Location: [facility] OR;  Service: Neurosurgery    CHOLECYSTECTOMY      COLONOSCOPY N/A 2/10/2017    One 8mm adenomatous polyp in the rectum; The examination was otherwise normal on direct and retroflexion views; A tattoo was seen in the rectum-A post-polypectomy scar was found at the tattoo site-There was no evidence of residual polyp tissue; Repeat 5 years    COLONOSCOPY  09/05/2012    One 13mm polyp in the rectum-Marked with Maxine ink-path shows Ganglioneuroma; The examination was otherwise normal; Repeat 4 years    COLONOSCOPY N/A 2/4/2022    Procedure: COLONOSCOPY WITH ANESTHESIA;  Surgeon: Elina Gonsalez MD;  Location: [facility] ENDOSCOPY;  Service: Gastroenterology;  Laterality: N/A;  pre family hx colon ca; hx adenomatous polyp  post normal  Jones Elias,     HYSTERECTOMY  1998    Had hysterectomy for painful periods and bleeding. (Dr. Patel) TLH w/ BSO    LAPAROSCOPIC TUBAL LIGATION      MASTECTOMY W/ SENTINEL NODE BIOPSY Left 1/10/2020    Procedure: LEFT NEEDLE DIRECTED ULTRASOUND GUIDED PARTIAL MASTECTOMY WITH SENTINEL LYMPH NODE BIOPSY, INJECTION AND SCAN, RADIOLOGIST WILL INJECT;  Surgeon: Flor Edmond MD;  Location: [facility] OR;  Service: General    TONSILLECTOMY      VENOUS ACCESS DEVICE (PORT) INSERTION N/A 3/5/2020    Procedure: INSERTION VENOUS ACCESS DEVICE EXCISION SKIN LESION X 2 CHEST AND ABDOMEN;  Surgeon: Flor Edmond MD;  Location:  [facility] OR;  Service: General;  Laterality: N/A;    VENOUS ACCESS DEVICE (PORT) REMOVAL Left 3/4/2021    Procedure: REMOVAL OF SINGLE LUMEN  PORT;  Surgeon: Flor Edmond MD;  Location: [facility];  Service: General;  Laterality: Left;     HEALTH MAINTENANCE ITEMS:  There are no preventive care reminders to display for this patient.      <no information>  Last Completed Colonoscopy            COLORECTAL CANCER SCREENING (COLONOSCOPY - Every 5 Years) Next due on 2/4/2027 02/04/2022  COLONOSCOPY    02/04/2022  Surgical Procedure: COLONOSCOPY    02/10/2017  COLONOSCOPY    02/10/2017  Surgical Procedure: COLONOSCOPY    09/05/2012  SCANNED - COLONOSCOPY    Only the first 5 history entries have been loaded, but more history exists.                  Immunization History   Administered Date(s) Administered    COVID-19 (MODERNA) 1st,2nd,3rd Dose Monovalent 01/09/2021, 02/06/2021    COVID-19 (MODERNA) Monovalent Original Booster 11/19/2021    COVID-19 (PFIZER) 12YRS+ (COMIRNATY) 11/06/2024    Flu Vaccine Intradermal Quad 18-64YR 10/07/2021    Fluzone  >6mos 11/06/2024    Fluzone Quad >6mos (Multi-dose) 10/03/2022    Influenza, Unspecified 11/18/2017, 11/05/2018, 10/15/2019, 10/27/2020    Shingrix 10/15/2021, 12/16/2021    Tdap 08/29/2018     Last Completed Mammogram            MAMMOGRAM (Every 2 Years) Order placed this encounter      01/22/2024  Mammo Diagnostic Digital Tomosynthesis Bilateral With CAD    01/06/2023  Mammo Diagnostic Digital Tomosynthesis Bilateral With CAD    01/06/2022  Mammo Diagnostic Digital Tomosynthesis Bilateral With CAD    12/17/2020  Mammo Diagnostic Digital Tomosynthesis Bilateral With CAD    12/27/2019  Mammo Diagnostic Digital Tomosynthesis Left With CAD    Only the first 5 history entries have been loaded, but more history exists.                      FAMILY HISTORY:  Family History   Problem Relation Age of Onset    Colon cancer Maternal Grandmother         Unknown age    Cancer Father     Heart disease Father     Diabetes Mother     Hypertension Mother     Stroke Mother     Breast cancer Sister     No Known Problems  "Daughter     No Known Problems Sister     Colon polyps Neg Hx     Esophageal cancer Neg Hx     Liver cancer Neg Hx     Liver disease Neg Hx     Rectal cancer Neg Hx     Stomach cancer Neg Hx     Ovarian cancer Neg Hx     BRCA 1/2 Neg Hx     Endometrial cancer Neg Hx      SOCIAL HISTORY:  Social History     Socioeconomic History    Marital status:     Number of children: 1   Tobacco Use    Smoking status: Never    Smokeless tobacco: Never    Tobacco comments:     \"i never really smoked\"   Vaping Use    Vaping status: Never Used   Substance and Sexual Activity    Alcohol use: Not Currently    Drug use: No    Sexual activity: Yes     Partners: Male     Birth control/protection: Hysterectomy       REVIEW OF SYSTEMS:  Review of Systems   Constitutional: Negative.         She manages her ADLs' including chores, errands, driving.  Is still working full time.  Has been active.  Is up and about \"most of the time.\"    Femara tolerance:  No new problems.  No new arthralgias.  Mild to moderate hot flashes.  \"  Maybe not as bad.\" Taking daily.  \"No problem.\"   HENT: Negative.     Eyes: Negative.    Respiratory: Negative.     Cardiovascular: Negative.    Gastrointestinal:  Negative for abdominal distention, abdominal pain, anal bleeding, blood in stool, constipation, diarrhea, nausea, vomiting and GERD (\"Normal.\" Still on omeprazole as needed, \"not often.\").   Endocrine: Positive for heat intolerance (Lingering vasomotor changes.  \"Mostly at night but not bad anymore.\").   Genitourinary: Negative.    Musculoskeletal:  Negative for arthralgias (knees, and hips \"been ok.\" Does not impede her activities.).   Allergic/Immunologic: Negative.    Neurological:  Positive for numbness (paresthesias of the right toes.  Again says not worse. The symptoms of the left foot and hands have resolved).   Hematological: Negative.    Psychiatric/Behavioral:  Negative for depressed mood (Resolution of irritability and grouchiness since " "stopping Arimidex last 08/31/2021.). The patient is not nervous/anxious (\"not bad\").        /70   Pulse 97   Temp 97 °F (36.1 °C) (Temporal)   Resp 18   Ht 152.4 cm (60\")   Wt 74.8 kg (164 lb 12.8 oz)   LMP  (LMP Unknown)   SpO2 97%   BMI 32.19 kg/m²  Body surface area is 1.72 meters squared.  Pain Score    12/26/24 0852   PainSc: 0-No pain       Physical Exam:  Physical Exam   Constitutional: She is oriented to person, place, and time. She appears well-developed and well-nourished. No distress.   Pleasant, heavy set, cooperative, modestly kept female.  Appears her age.  ECOG 0.      She has regained 1 lb (had lost 2 lb at her prior visit) in the interval.   HENT:   Head: Normocephalic and atraumatic. Mouth/Throat: No oropharyngeal exudate.   Eyes: Pupils are equal, round, and reactive to light. Conjunctivae are normal. No scleral icterus.   Neck: No JVD present. No tracheal deviation present. No thyromegaly present.   Cardiovascular: Normal rate, regular rhythm and normal heart sounds. Exam reveals no friction rub.   No murmur heard.  Pulmonary/Chest: Effort normal and breath sounds normal. No stridor. No respiratory distress. She has no wheezes. She has no rales.   Chaperoned exam: Lisette Edmond MA    Port site in the left upper chest is healed.  The device has been removed.    Left breast lumpectomy and sentinel node biopsy site in the left axilla are healed.  Unchanged subtle skin induration.  Prior radiation associated skin changes are no longer evident.  The rest of the breast was without obvious nodularity skin changes, no nipple discharge.      Right breast without masses, skin changes nor nipple discharge.    No associated axillary nor supraclavicular adenopathy bilaterally.   Abdominal: Soft. Bowel sounds are normal. She exhibits no distension and no mass. There is no abdominal tenderness. There is no rebound and no guarding.   Musculoskeletal: Normal range of motion. No deformity. "   Lymphadenopathy:     She has no cervical adenopathy.        Right cervical: No superficial cervical, no deep cervical and no posterior cervical adenopathy present.       Left cervical: No superficial cervical, no deep cervical and no posterior cervical adenopathy present.        Right: No supraclavicular adenopathy present.        Left: No supraclavicular adenopathy present.   Neurological: She is alert and oriented to person, place, and time. She displays normal reflexes. No cranial nerve deficit. She exhibits normal muscle tone. Coordination normal.   Skin: Skin is warm and dry. No rash noted. No erythema. No pallor.   Not as tanned.   Psychiatric: Her behavior is normal. Judgment and thought content normal.   Nursing note and vitals reviewed.      ASSESSMENT:   1.  Invasive and in situ ductal carcinoma  left breast.                 Stage:  IA (pT1a, pN0, G2) ER  100% positive, OR  25%, HER-2/lamberto - 2+ (IHC)/FISH -a focal positive score is present in an area corresponding to approximately 15% of the tumor (signal ratio: 2.9).                 Tumor Covel:  5 mm.  Tumor focality: Single focus of invasive carcinoma.  No EIC nor LCIS.  Lymphovascular invasion: Not identified.  Dermal lymphovascular invasion: No skin present.  Microcalcifications: Not identified.  Margins uninvolved by invasive carcinoma.  Lymph nodes: Number of lymph nodes examined: 1 (0/1).                 Tumor Status:    -- Underwent lumpectomy and SNB, 01/10/2020.   -- Completed adjuvant paclitaxel, 05/27/2020; adjuvant Herceptin, 02/10/2021; adjuvant radiation, 08/06/2020 (above)  -- Adjuvant Arimidex since 02/03/2020 through 09/27/2021-stopped due to depression, and irritability.  -- Adjuvant Femara 2.5 mg p.o. daily beginning 09/28/2021 through present  -- 01/22/2024- mammogram.. Benign mammogram. BI-RADS 2. Screening mammography recommended in one year.            2.   Obesity.  Moderate (BMI 32 - max: 34)        3.   Spinal stenosis neck,  quiescent         4.   Herniated cervical disc without myelopathy        5.   Anemia, previously chemo associated.    --Resolved, Hgb 14.1, 12/20/24 (prior: Hgb 11.5-13.8)  -- 02/04/2022-colonoscopy--no residual polyps otherwise normal.  Repeat 5 years        6.   GERD, quiescent        7.   Mood changes, to include mood lability, depression, irritability and grouchiness.  Resolved since stopping Arimidex.        8.   Osteopenia.  Improved on Oscal+Prolia  --6/17/2024- DEXA scan-Normal. Bone density is no lower than one standard deviation below the mean for young adult woman. When compared to the previous exam of 6/15/2022 there has been progressive bone loss within the lumbar spine of -4.0%. Slight increase within the hip of +0.5% is demonstrated.  --06/15/2022- DEXA scan: Osteopenia of the left hip.  Low bone mass.  Bone density 1-2 0.5 SD below the mean.  Increased risk of fracture.  --06/28/2022- Prolia 60 mg SC,          9.   Belly pains with diarrhea (resolved after course of Flagyl per Dr. Edmond) and trivially elevated ALT 40, AST 38, 11/14/2022.  --11/22/23- Hepatitis profile-negative  --11/30/23- CT abdomen/pelvis- 3 cystic-appearing liver lesions that are all increased in size compared to the prior study. These are lobular in shape and do not have any definite soft tissue enhancement. There is a 7 mm indeterminate lesion in the right hepatic lobe on image 16 series A small metastatic lesion is included in the differential. MRI may be more definitive. There is a probable 1 cm lipoma in the proximal body of the pancreas, stable.  --12/3/22- MRI abdomen-Multiple simple liver cysts, correlating with lesions of concern on  prior CT. No solid liver lesion identified.  --6/14/23- Normal LFTs       RECOMMENDATIONS:         1.   Apprised of the labs, 12/20/2024.  Normal CBC, glucose 109 otherwise normal CMP, CEA 1.7, CA-27-29 20.8, B12/folate 390/7.5, repleted serum iron, iron saturation 22% (from 18%; 20%; 18%;  22%; 21%; 22%; 19%; 23%; 25%; 24%), barely repleted ferritin (80; from 70.9; 85.3; 72; 142; 113; 101; 186; 137; 226; 171; 157; 138; 141; 216).            2.    Femara tolerance discussed.  Continues to do well with no mood lability and no new arthralgias.  Feels she can press on with this.        3.    Schedule mammogram- due on 1/23/25.        4.    Again apprised of DEXA scan, 6/17/24 (above).  Normal.  Remains on calcium and vitamin D.  Started on Prolia beginning 06/28/2022.        5.    Previously noted that we are unable to repeat FISH testing for HER-2 on the tumor specimen from 01/10/2020 -pathology block did not contain any more tumor - personally discussed/confirmed with Dr. Burgos of pathology.        6.   Care previously discussed with Dr. Jaimes on 02/28/2020 after she saw the patient on 02/25/2020 (encounter reviewed).  Pathology was reviewed at [facility] and discussed with the patient.  Recommendations/plan: Discussed that since her cancer is strongly ER positive and low histologic grade/low proliferative rate, and HER-2 on the biopsy was borderline recommended repeat HER-2 testing on her surgical pathology.  Defer to his negative proceed with only endocrine therapy and radiation.  If HER-2 is confirmed to be positive, recommend adjuvant weekly Taxol x12+ Herceptin every 3 weeks to complete 1 year of HER-2 directed therapy.  These plans were personally discussed with the patient (via telephone) on 02/28/2020 I also personally asked the pathologist (Dr. Radha Burgos) to send off the biopsy specimen for repeat FISH HER-2 testing.          7.   Reviewed NCCN guidelines version 3.2019 invasive breast cancer.  For tumors (=/> 5 mm), and pN0, recommend adjuvant endocrine therapy +/- adjuvant chemotherapy with trastuzumab (category 2B).                  8.  Rx:   Oscal 600/400 po bid # 60 - OTC  Femara 2.5 mg po qd # 30 - 11 RF  Prolia 60 mg SC every 6 months           9.  Previously discussed the rationale  for adjuvant hormonal manipulation with AIs (see above) and potential toxicities of AI therapy are discussed (to include but not limited to: Hot flashes, asthenia, pain, arthralgias, arthritis, nausea/vomiting, headache, pharyngitis, depression, rash, hypertension, lymphedema, insomnia, edema, weight gain, dyspnea, abdominal pain, constipation, hyperlipidemia, osteoporosis, fractures, cough, bone pain, diarrhea, breast pain, paresthesias, infections, cataracts, myalgias, thromboembolism, angina, Lyles-Yung syndrome, erythema multiforme, anemia, leukopenia, stroke, myocardial infarction, osteoporosis, fractures). Questions are answered. She agrees to press on..             10.  Return to the [facility] office with pre-office serum iron, iron saturation, ferritin B12, folate, CMP, CEA, CA 27-29 and CBC and differential in 24 weeks.      MEDICAL DECISION MAKING:  High complexity   AMOUNT OF DATA: Moderate      I spent ~42 minutes caring for Chasidy on this date of service. This time includes time spent by me in the following activities: preparing for the visit, reviewing tests, performing a medically appropriate examination and/or evaluation, counseling and educating the patient/family/caregiver, ordering medications, tests, or procedures and documenting information in the medical record      Cc:  Abigail Jaimes MD- [facility]          MD Jones Guzman MD

## 2024-12-20 ENCOUNTER — LAB (OUTPATIENT)
Dept: LAB | Facility: HOSPITAL | Age: 64
End: 2024-12-20
Payer: COMMERCIAL

## 2024-12-20 DIAGNOSIS — Z79.811 LONG TERM CURRENT USE OF AROMATASE INHIBITOR: ICD-10-CM

## 2024-12-20 DIAGNOSIS — C50.912 INVASIVE DUCTAL CARCINOMA OF BREAST, FEMALE, LEFT: ICD-10-CM

## 2024-12-20 DIAGNOSIS — M85.852 OSTEOPENIA OF LEFT HIP: ICD-10-CM

## 2024-12-20 LAB
ALBUMIN SERPL-MCNC: 4.5 G/DL (ref 3.5–5.2)
ALBUMIN/GLOB SERPL: 1.5 G/DL
ALP SERPL-CCNC: 93 U/L (ref 39–117)
ALT SERPL W P-5'-P-CCNC: 23 U/L (ref 1–33)
ANION GAP SERPL CALCULATED.3IONS-SCNC: 13 MMOL/L (ref 5–15)
AST SERPL-CCNC: 22 U/L (ref 1–32)
BASOPHILS # BLD AUTO: 0.04 10*3/MM3 (ref 0–0.2)
BASOPHILS NFR BLD AUTO: 0.6 % (ref 0–1.5)
BILIRUB SERPL-MCNC: 0.6 MG/DL (ref 0–1.2)
BUN SERPL-MCNC: 14 MG/DL (ref 8–23)
BUN/CREAT SERPL: 20.6 (ref 7–25)
CALCIUM SPEC-SCNC: 9.7 MG/DL (ref 8.6–10.5)
CEA SERPL-MCNC: 1.7 NG/ML
CHLORIDE SERPL-SCNC: 101 MMOL/L (ref 98–107)
CO2 SERPL-SCNC: 25 MMOL/L (ref 22–29)
CREAT SERPL-MCNC: 0.68 MG/DL (ref 0.57–1)
DEPRECATED RDW RBC AUTO: 46.3 FL (ref 37–54)
EGFRCR SERPLBLD CKD-EPI 2021: 98.6 ML/MIN/1.73
EOSINOPHIL # BLD AUTO: 0.11 10*3/MM3 (ref 0–0.4)
EOSINOPHIL NFR BLD AUTO: 1.5 % (ref 0.3–6.2)
ERYTHROCYTE [DISTWIDTH] IN BLOOD BY AUTOMATED COUNT: 13.5 % (ref 12.3–15.4)
FERRITIN SERPL-MCNC: 80.69 NG/ML (ref 13–150)
FOLATE SERPL-MCNC: 7.55 NG/ML (ref 4.78–24.2)
GLOBULIN UR ELPH-MCNC: 3.1 GM/DL
GLUCOSE SERPL-MCNC: 109 MG/DL (ref 65–99)
HCT VFR BLD AUTO: 44.5 % (ref 34–46.6)
HGB BLD-MCNC: 14.1 G/DL (ref 12–15.9)
IMM GRANULOCYTES # BLD AUTO: 0.02 10*3/MM3 (ref 0–0.05)
IMM GRANULOCYTES NFR BLD AUTO: 0.3 % (ref 0–0.5)
IRON 24H UR-MRATE: 89 MCG/DL (ref 37–145)
IRON SATN MFR SERPL: 22 % (ref 20–50)
LYMPHOCYTES # BLD AUTO: 2.92 10*3/MM3 (ref 0.7–3.1)
LYMPHOCYTES NFR BLD AUTO: 40.3 % (ref 19.6–45.3)
MAGNESIUM SERPL-MCNC: 2 MG/DL (ref 1.6–2.4)
MCH RBC QN AUTO: 29.2 PG (ref 26.6–33)
MCHC RBC AUTO-ENTMCNC: 31.7 G/DL (ref 31.5–35.7)
MCV RBC AUTO: 92.1 FL (ref 79–97)
MONOCYTES # BLD AUTO: 0.35 10*3/MM3 (ref 0.1–0.9)
MONOCYTES NFR BLD AUTO: 4.8 % (ref 5–12)
NEUTROPHILS NFR BLD AUTO: 3.81 10*3/MM3 (ref 1.7–7)
NEUTROPHILS NFR BLD AUTO: 52.5 % (ref 42.7–76)
NRBC BLD AUTO-RTO: 0 /100 WBC (ref 0–0.2)
PHOSPHATE SERPL-MCNC: 3.9 MG/DL (ref 2.5–4.5)
PLATELET # BLD AUTO: 359 10*3/MM3 (ref 140–450)
PMV BLD AUTO: 9.6 FL (ref 6–12)
POTASSIUM SERPL-SCNC: 4.2 MMOL/L (ref 3.5–5.2)
PROT SERPL-MCNC: 7.6 G/DL (ref 6–8.5)
RBC # BLD AUTO: 4.83 10*6/MM3 (ref 3.77–5.28)
SODIUM SERPL-SCNC: 139 MMOL/L (ref 136–145)
TIBC SERPL-MCNC: 410 MCG/DL (ref 298–536)
TRANSFERRIN SERPL-MCNC: 275 MG/DL (ref 200–360)
VIT B12 BLD-MCNC: 390 PG/ML (ref 211–946)
WBC NRBC COR # BLD AUTO: 7.25 10*3/MM3 (ref 3.4–10.8)

## 2024-12-20 PROCEDURE — 83735 ASSAY OF MAGNESIUM: CPT

## 2024-12-20 PROCEDURE — 86300 IMMUNOASSAY TUMOR CA 15-3: CPT

## 2024-12-20 PROCEDURE — 84100 ASSAY OF PHOSPHORUS: CPT

## 2024-12-20 PROCEDURE — 85025 COMPLETE CBC W/AUTO DIFF WBC: CPT

## 2024-12-20 PROCEDURE — 82378 CARCINOEMBRYONIC ANTIGEN: CPT

## 2024-12-20 PROCEDURE — 83540 ASSAY OF IRON: CPT

## 2024-12-20 PROCEDURE — 82746 ASSAY OF FOLIC ACID SERUM: CPT

## 2024-12-20 PROCEDURE — 82607 VITAMIN B-12: CPT

## 2024-12-20 PROCEDURE — 36415 COLL VENOUS BLD VENIPUNCTURE: CPT

## 2024-12-20 PROCEDURE — 80053 COMPREHEN METABOLIC PANEL: CPT

## 2024-12-20 PROCEDURE — 84466 ASSAY OF TRANSFERRIN: CPT

## 2024-12-20 PROCEDURE — 82728 ASSAY OF FERRITIN: CPT

## 2024-12-21 LAB — CANCER AG27-29 SERPL-ACNC: 20.8 U/ML (ref 0–38.6)

## 2024-12-26 ENCOUNTER — INFUSION (OUTPATIENT)
Dept: ONCOLOGY | Facility: HOSPITAL | Age: 64
End: 2024-12-26
Payer: COMMERCIAL

## 2024-12-26 ENCOUNTER — OFFICE VISIT (OUTPATIENT)
Dept: ONCOLOGY | Facility: CLINIC | Age: 64
End: 2024-12-26
Payer: COMMERCIAL

## 2024-12-26 VITALS
HEIGHT: 60 IN | TEMPERATURE: 97 F | RESPIRATION RATE: 18 BRPM | HEART RATE: 97 BPM | DIASTOLIC BLOOD PRESSURE: 70 MMHG | BODY MASS INDEX: 32.35 KG/M2 | SYSTOLIC BLOOD PRESSURE: 134 MMHG | WEIGHT: 164.8 LBS | OXYGEN SATURATION: 97 %

## 2024-12-26 DIAGNOSIS — M85.852 OSTEOPENIA OF LEFT HIP: Primary | ICD-10-CM

## 2024-12-26 DIAGNOSIS — Z79.811 LONG TERM CURRENT USE OF AROMATASE INHIBITOR: ICD-10-CM

## 2024-12-26 DIAGNOSIS — C50.912 INVASIVE DUCTAL CARCINOMA OF BREAST, FEMALE, LEFT: Primary | ICD-10-CM

## 2024-12-26 PROCEDURE — 96372 THER/PROPH/DIAG INJ SC/IM: CPT

## 2024-12-26 PROCEDURE — 25010000002 DENOSUMAB 60 MG/ML SOLUTION PREFILLED SYRINGE: Performed by: INTERNAL MEDICINE

## 2024-12-26 RX ORDER — LETROZOLE 2.5 MG/1
2.5 TABLET, FILM COATED ORAL DAILY
Qty: 30 TABLET | Refills: 11 | Status: SHIPPED | OUTPATIENT
Start: 2024-12-26

## 2024-12-26 RX ADMIN — DENOSUMAB 60 MG: 60 INJECTION SUBCUTANEOUS at 09:41

## 2025-01-10 DIAGNOSIS — K21.9 LARYNGOPHARYNGEAL REFLUX: ICD-10-CM

## 2025-01-14 ENCOUNTER — DOCUMENTATION (OUTPATIENT)
Dept: GENETICS | Facility: HOSPITAL | Age: 65
End: 2025-01-14
Payer: COMMERCIAL

## 2025-01-14 LAB — NCCN CRITERIA FLAG: ABNORMAL

## 2025-01-14 NOTE — PROGRESS NOTES
This patient recently completed the CARE risk assessment for a mammogram appointment. Based on the patient's responses, NCCN criteria for genetic testing was met.     Navigator follow-up:     I previously spoke with the patient and she declined genetic testing.  I sent an updated [facility] message with my contact information in case she has reconsidered.

## 2025-01-15 ENCOUNTER — OFFICE VISIT (OUTPATIENT)
Dept: GASTROENTEROLOGY | Facility: CLINIC | Age: 65
End: 2025-01-15
Payer: COMMERCIAL

## 2025-01-15 VITALS
BODY MASS INDEX: 31.61 KG/M2 | HEIGHT: 60 IN | OXYGEN SATURATION: 99 % | TEMPERATURE: 97.5 F | HEART RATE: 93 BPM | DIASTOLIC BLOOD PRESSURE: 80 MMHG | WEIGHT: 161 LBS | SYSTOLIC BLOOD PRESSURE: 126 MMHG

## 2025-01-15 DIAGNOSIS — R09.A2 GLOBUS SENSATION: Primary | ICD-10-CM

## 2025-01-15 PROCEDURE — 99214 OFFICE O/P EST MOD 30 MIN: CPT | Performed by: NURSE PRACTITIONER

## 2025-01-15 NOTE — PROGRESS NOTES
"Primary Physician: ARMANI Fitzpatrick DO    Chief Complaint   Patient presents with   • Difficulty Swallowing     Pt c/o trouble swallowing \"for a while\"-states she feels like she has a piece of cotton stuck in her throat constantly; Pt also feels like food gets hung in her throat and won't go down- states at times even saliva will choke her; Pt has been on Omeprazole since Nov-states it helps her reflux but hasn't helped her trouble swallowing or the feeling of something in her throat        Subjective     Chasidy Tejada is a 64 y.o. female.    HPI  Globus Sensation  Patient referred to our office for consideration of an endoscopy.  No heartburn. NO dyspahgia and no painful swallowing.  She has had an abnormal globus sensation for months and had no help with OTC Prilosec.  No weight loss.      History of adenomatous colon polyps  Patient up-to-date on colonoscopy.  Last collected 2/4/2022 unremarkable at that time with no residual polyp seen at post polypectomy scar tattoo site.    Patient with previous adenomatous polyps removed 2017.  Maternal grandmother had colon cancer.      Past Medical History:   Diagnosis Date   • Acid reflux    • Asthma     long time ago    • Drug therapy    • Family history of colon cancer    • History of adenomatous polyp of colon    • History of breast cancer    • Hx of radiation therapy    • Panic attack    • Right arm weakness        Past Surgical History:   Procedure Laterality Date   • ANTERIOR CERVICAL DISCECTOMY W/ FUSION N/A 09/07/2018    Procedure: CERVICAL DISCECTOMY ANTERIOR WITH FUSION C6-7;  Surgeon: Chris Mcfadden MD;  Location: [facility] OR;  Service: Neurosurgery   • APPENDECTOMY     • BILATERAL SALPINGO OOPHORECTOMY     • BREAST BIOPSY     • BREAST LUMPECTOMY     • CERVICAL CORPECTOMY N/A 09/07/2018    Procedure: CERVICAL CORPECTOMY C6-7;  Surgeon: Chris Mcfadden MD;  Location: [facility] OR;  Service: Neurosurgery   • CHOLECYSTECTOMY     • COLONOSCOPY N/A " 02/10/2017    One 8mm adenomatous polyp in the rectum; The examination was otherwise normal on direct and retroflexion views; A tattoo was seen in the rectum-A post-polypectomy scar was found at the tattoo site-There was no evidence of residual polyp tissue; Repeat 5 years   • COLONOSCOPY  09/05/2012    One 13mm polyp in the rectum-Marked with Maxine ink-path shows Ganglioneuroma; The examination was otherwise normal; Repeat 4 years   • COLONOSCOPY N/A 02/04/2022    A tattoo was seen in the rectum-A post-polypectomy scar was found at the tattoo site-There was no evidence of residual polyp tissue; The examination was otherwise normal on direct and retroflexion views; No specimens collected; Repeat 5 years   • HYSTERECTOMY  1998    Had hysterectomy for painful periods and bleeding. (Dr. Patel) TLH w/ BSO   • LAPAROSCOPIC TUBAL LIGATION     • MASTECTOMY W/ SENTINEL NODE BIOPSY Left 01/10/2020    Procedure: LEFT NEEDLE DIRECTED ULTRASOUND GUIDED PARTIAL MASTECTOMY WITH SENTINEL LYMPH NODE BIOPSY, INJECTION AND SCAN, RADIOLOGIST WILL INJECT;  Surgeon: Flor Edmond MD;  Location:  [facility] OR;  Service: General   • TONSILLECTOMY     • VENOUS ACCESS DEVICE (PORT) INSERTION N/A 03/05/2020    Procedure: INSERTION VENOUS ACCESS DEVICE EXCISION SKIN LESION X 2 CHEST AND ABDOMEN;  Surgeon: Flor Edmond MD;  Location:  [facility] OR;  Service: General;  Laterality: N/A;   • VENOUS ACCESS DEVICE (PORT) REMOVAL Left 03/04/2021    Procedure: REMOVAL OF SINGLE LUMEN PORT;  Surgeon: Flor Edmond MD;  Location:  [facility] OR;  Service: General;  Laterality: Left;        Current Outpatient Medications:   •  calcium carb-cholecalciferol 600-10 MG-MCG tablet per tablet, Take 1 tablet by mouth Daily., Disp: , Rfl:   •  hydrOXYzine (ATARAX) 25 MG tablet, Take 1 tablet by mouth Every 6 (Six) Hours As Needed for Anxiety., Disp: 30 tablet, Rfl: 1  •  letrozole (FEMARA) 2.5 MG tablet, Take 1 tablet by mouth Daily., Disp: 30 tablet, Rfl:  "11  •  omeprazole (priLOSEC) 20 MG capsule, TAKE 1 CAPSULE BY MOUTH DAILY, Disp: 30 capsule, Rfl: 1  •  ondansetron (ZOFRAN) 8 MG tablet, Take 1 tablet by mouth Every 8 (Eight) Hours As Needed for Nausea or Vomiting., Disp: 60 tablet, Rfl: 3  •  sertraline (ZOLOFT) 50 MG tablet, TAKE 1 TABLET BY MOUTH DAILY, Disp: 90 tablet, Rfl: 1    Allergies   Allergen Reactions   • Demerol [Meperidine] Hives   • Morphine And Codeine Hives       Social History     Socioeconomic History   • Marital status:    • Number of children: 1   Tobacco Use   • Smoking status: Never   • Smokeless tobacco: Never   • Tobacco comments:     \"i never really smoked\"   Vaping Use   • Vaping status: Never Used   Substance and Sexual Activity   • Alcohol use: Not Currently   • Drug use: No   • Sexual activity: Yes     Partners: Male     Birth control/protection: Hysterectomy       Family History   Problem Relation Age of Onset   • Colon cancer Maternal Grandmother         Unknown age   • Cancer Father    • Heart disease Father    • Diabetes Mother    • Hypertension Mother    • Stroke Mother    • Breast cancer Sister    • No Known Problems Daughter    • No Known Problems Sister    • Colon polyps Neg Hx    • Esophageal cancer Neg Hx    • Liver cancer Neg Hx    • Liver disease Neg Hx    • Rectal cancer Neg Hx    • Stomach cancer Neg Hx    • Ovarian cancer Neg Hx    • BRCA 1/2 Neg Hx    • Endometrial cancer Neg Hx        Review of Systems   Constitutional:  Negative for unexpected weight change.   HENT:  Negative for trouble swallowing.    Respiratory:  Positive for cough. Negative for shortness of breath.    Cardiovascular:  Negative for chest pain.       Objective     /80 (BP Location: Right arm, Patient Position: Sitting, Cuff Size: Adult)   Pulse 93   Temp 97.5 °F (36.4 °C) (Infrared)   Ht 152.4 cm (60\")   Wt 73 kg (161 lb)   LMP  (LMP Unknown)   SpO2 99%   Breastfeeding No   BMI 31.44 kg/m²     Physical Exam  Vitals reviewed. "   Constitutional:       Appearance: Normal appearance.   Cardiovascular:      Rate and Rhythm: Normal rate and regular rhythm.      Heart sounds: Normal heart sounds.   Pulmonary:      Effort: Pulmonary effort is normal.   Neurological:      Mental Status: She is alert.       Lab Results - Last 18 Months   Lab Units 12/20/24  0807 06/27/24  1408 04/08/24 0828 12/21/23  1424   GLUCOSE mg/dL 109* 117* 100* 115*   BUN mg/dL 14 10 15 12   CREATININE mg/dL 0.68 0.75 0.68 0.54*   SODIUM mmol/L 139 140 141 142   POTASSIUM mmol/L 4.2 4.1 4.4 4.0   CHLORIDE mmol/L 101 101 104 104   CO2 mmol/L 25.0 29.0 24.4 27.0   TOTAL PROTEIN g/dL 7.6 7.6  --  7.3   ALBUMIN g/dL 4.5 4.5 4.7 4.4   ALT (SGPT) U/L 23 25 24 16   AST (SGOT) U/L 22 22 19 16   ALK PHOS U/L 93 91 79 82   BILIRUBIN mg/dL 0.6 0.4 0.5 0.4   GLOBULIN gm/dL 3.1 3.1  --  2.9       Lab Results - Last 18 Months   Lab Units 12/20/24  0807 06/27/24  1408 04/08/24 0828 12/21/23  1424   HEMOGLOBIN g/dL 14.1 13.8 14.5 13.1   HEMATOCRIT % 44.5 41.7 45.2 41.0   MCV fL 92.1 90.3 92.6 91.7   WBC 10*3/mm3 7.25 9.56 8.00 8.58   RDW % 13.5 13.1 13.1 13.3   MPV fL 9.6 9.5  --  9.5   PLATELETS 10*3/mm3 359 368 372 353       Lab Results - Last 18 Months   Lab Units 12/20/24  0807 06/27/24  1408 04/08/24 0828 12/21/23  1424   IRON mcg/dL 89 74  --  76   TIBC mcg/dL 410 402  --  383   IRON SATURATION (TSAT) % 22 18*  --  20   FERRITIN ng/mL 80.69 70.95  --  85.33   TSH uIU/mL  --   --  1.070  --    FOLATE ng/mL 7.55 9.15  --  11.30   VIT D 25 HYDROXY ng/ml  --   --  33.8  --         Lab Results - Last 18 Months   Lab Units 12/20/24  0807   FERRITIN ng/mL 80.69           IMPRESSION/PLAN:    Assessment & Plan      Problem List Items Addressed This Visit       Globus sensation - Primary    Overview     For months globus sensation. Tried Omeprazole with no benefit. Foods are passing through esophagus without difficulty         Current Assessment & Plan     Plan endoscopy         Relevant  Orders    Case Request (Completed)    Follow Anesthesia Guidelines / Protocol     Endoscopy per Dr. Gonsalez            ..The risks, benefits, and alternatives of endoscopy were reviewed with the patient today.  Risks including perforation, with or without dilation, possibly requiring surgery.  Additional risks include risk of bleeding.  There is also the risk of a drug reaction or problems with anesthesia.  This will be discussed with the further by the anesthesia team on the day of the procedure. The benefits include the diagnosis and management of disease of the upper digestive tract.  Alternatives to endoscopy include upper GI series, laboratory testing, radiographic evaluation, or no intervention.  The patient verbalizes understanding and agrees.    In accordance with requirements under the Affordable Care Act, [facility] has provided pricing for all hospital services and items on each of its websites. However, a patient's actual cost may differ based on the services the patient receives to meet individual healthcare needs and based on the benefits provided under the patient’s insurance coverage.        Bessy Colorado, APRN  01/15/25  09:17 CST    Part of this note may be an electronic transcription/translation of spoken language to printed text.

## 2025-01-15 NOTE — H&P (VIEW-ONLY)
"Primary Physician: ARMANI Fitzpatrick DO    Chief Complaint   Patient presents with   • Difficulty Swallowing     Pt c/o trouble swallowing \"for a while\"-states she feels like she has a piece of cotton stuck in her throat constantly; Pt also feels like food gets hung in her throat and won't go down- states at times even saliva will choke her; Pt has been on Omeprazole since Nov-states it helps her reflux but hasn't helped her trouble swallowing or the feeling of something in her throat        Subjective     Chasidy Tejada is a 64 y.o. female.    HPI  Globus Sensation  Patient referred to our office for consideration of an endoscopy.  No heartburn. NO dyspahgia and no painful swallowing.  She has had an abnormal globus sensation for months and had no help with OTC Prilosec.  No weight loss.      History of adenomatous colon polyps  Patient up-to-date on colonoscopy.  Last collected 2/4/2022 unremarkable at that time with no residual polyp seen at post polypectomy scar tattoo site.    Patient with previous adenomatous polyps removed 2017.  Maternal grandmother had colon cancer.      Past Medical History:   Diagnosis Date   • Acid reflux    • Asthma     long time ago    • Drug therapy    • Family history of colon cancer    • History of adenomatous polyp of colon    • History of breast cancer    • Hx of radiation therapy    • Panic attack    • Right arm weakness        Past Surgical History:   Procedure Laterality Date   • ANTERIOR CERVICAL DISCECTOMY W/ FUSION N/A 09/07/2018    Procedure: CERVICAL DISCECTOMY ANTERIOR WITH FUSION C6-7;  Surgeon: Chris Mcfadden MD;  Location: [facility] OR;  Service: Neurosurgery   • APPENDECTOMY     • BILATERAL SALPINGO OOPHORECTOMY     • BREAST BIOPSY     • BREAST LUMPECTOMY     • CERVICAL CORPECTOMY N/A 09/07/2018    Procedure: CERVICAL CORPECTOMY C6-7;  Surgeon: Chris Mcfadden MD;  Location: [facility] OR;  Service: Neurosurgery   • CHOLECYSTECTOMY     • COLONOSCOPY N/A " 02/10/2017    One 8mm adenomatous polyp in the rectum; The examination was otherwise normal on direct and retroflexion views; A tattoo was seen in the rectum-A post-polypectomy scar was found at the tattoo site-There was no evidence of residual polyp tissue; Repeat 5 years   • COLONOSCOPY  09/05/2012    One 13mm polyp in the rectum-Marked with Maxine ink-path shows Ganglioneuroma; The examination was otherwise normal; Repeat 4 years   • COLONOSCOPY N/A 02/04/2022    A tattoo was seen in the rectum-A post-polypectomy scar was found at the tattoo site-There was no evidence of residual polyp tissue; The examination was otherwise normal on direct and retroflexion views; No specimens collected; Repeat 5 years   • HYSTERECTOMY  1998    Had hysterectomy for painful periods and bleeding. (Dr. Patel) TLH w/ BSO   • LAPAROSCOPIC TUBAL LIGATION     • MASTECTOMY W/ SENTINEL NODE BIOPSY Left 01/10/2020    Procedure: LEFT NEEDLE DIRECTED ULTRASOUND GUIDED PARTIAL MASTECTOMY WITH SENTINEL LYMPH NODE BIOPSY, INJECTION AND SCAN, RADIOLOGIST WILL INJECT;  Surgeon: Flor Edmond MD;  Location:  [facility] OR;  Service: General   • TONSILLECTOMY     • VENOUS ACCESS DEVICE (PORT) INSERTION N/A 03/05/2020    Procedure: INSERTION VENOUS ACCESS DEVICE EXCISION SKIN LESION X 2 CHEST AND ABDOMEN;  Surgeon: Flor Edmond MD;  Location:  [facility] OR;  Service: General;  Laterality: N/A;   • VENOUS ACCESS DEVICE (PORT) REMOVAL Left 03/04/2021    Procedure: REMOVAL OF SINGLE LUMEN PORT;  Surgeon: Flor Edmond MD;  Location:  [facility] OR;  Service: General;  Laterality: Left;        Current Outpatient Medications:   •  calcium carb-cholecalciferol 600-10 MG-MCG tablet per tablet, Take 1 tablet by mouth Daily., Disp: , Rfl:   •  hydrOXYzine (ATARAX) 25 MG tablet, Take 1 tablet by mouth Every 6 (Six) Hours As Needed for Anxiety., Disp: 30 tablet, Rfl: 1  •  letrozole (FEMARA) 2.5 MG tablet, Take 1 tablet by mouth Daily., Disp: 30 tablet, Rfl:  "11  •  omeprazole (priLOSEC) 20 MG capsule, TAKE 1 CAPSULE BY MOUTH DAILY, Disp: 30 capsule, Rfl: 1  •  ondansetron (ZOFRAN) 8 MG tablet, Take 1 tablet by mouth Every 8 (Eight) Hours As Needed for Nausea or Vomiting., Disp: 60 tablet, Rfl: 3  •  sertraline (ZOLOFT) 50 MG tablet, TAKE 1 TABLET BY MOUTH DAILY, Disp: 90 tablet, Rfl: 1    Allergies   Allergen Reactions   • Demerol [Meperidine] Hives   • Morphine And Codeine Hives       Social History     Socioeconomic History   • Marital status:    • Number of children: 1   Tobacco Use   • Smoking status: Never   • Smokeless tobacco: Never   • Tobacco comments:     \"i never really smoked\"   Vaping Use   • Vaping status: Never Used   Substance and Sexual Activity   • Alcohol use: Not Currently   • Drug use: No   • Sexual activity: Yes     Partners: Male     Birth control/protection: Hysterectomy       Family History   Problem Relation Age of Onset   • Colon cancer Maternal Grandmother         Unknown age   • Cancer Father    • Heart disease Father    • Diabetes Mother    • Hypertension Mother    • Stroke Mother    • Breast cancer Sister    • No Known Problems Daughter    • No Known Problems Sister    • Colon polyps Neg Hx    • Esophageal cancer Neg Hx    • Liver cancer Neg Hx    • Liver disease Neg Hx    • Rectal cancer Neg Hx    • Stomach cancer Neg Hx    • Ovarian cancer Neg Hx    • BRCA 1/2 Neg Hx    • Endometrial cancer Neg Hx        Review of Systems   Constitutional:  Negative for unexpected weight change.   HENT:  Negative for trouble swallowing.    Respiratory:  Positive for cough. Negative for shortness of breath.    Cardiovascular:  Negative for chest pain.       Objective     /80 (BP Location: Right arm, Patient Position: Sitting, Cuff Size: Adult)   Pulse 93   Temp 97.5 °F (36.4 °C) (Infrared)   Ht 152.4 cm (60\")   Wt 73 kg (161 lb)   LMP  (LMP Unknown)   SpO2 99%   Breastfeeding No   BMI 31.44 kg/m²     Physical Exam  Vitals reviewed. "   Constitutional:       Appearance: Normal appearance.   Cardiovascular:      Rate and Rhythm: Normal rate and regular rhythm.      Heart sounds: Normal heart sounds.   Pulmonary:      Effort: Pulmonary effort is normal.   Neurological:      Mental Status: She is alert.       Lab Results - Last 18 Months   Lab Units 12/20/24  0807 06/27/24  1408 04/08/24 0828 12/21/23  1424   GLUCOSE mg/dL 109* 117* 100* 115*   BUN mg/dL 14 10 15 12   CREATININE mg/dL 0.68 0.75 0.68 0.54*   SODIUM mmol/L 139 140 141 142   POTASSIUM mmol/L 4.2 4.1 4.4 4.0   CHLORIDE mmol/L 101 101 104 104   CO2 mmol/L 25.0 29.0 24.4 27.0   TOTAL PROTEIN g/dL 7.6 7.6  --  7.3   ALBUMIN g/dL 4.5 4.5 4.7 4.4   ALT (SGPT) U/L 23 25 24 16   AST (SGOT) U/L 22 22 19 16   ALK PHOS U/L 93 91 79 82   BILIRUBIN mg/dL 0.6 0.4 0.5 0.4   GLOBULIN gm/dL 3.1 3.1  --  2.9       Lab Results - Last 18 Months   Lab Units 12/20/24  0807 06/27/24  1408 04/08/24 0828 12/21/23  1424   HEMOGLOBIN g/dL 14.1 13.8 14.5 13.1   HEMATOCRIT % 44.5 41.7 45.2 41.0   MCV fL 92.1 90.3 92.6 91.7   WBC 10*3/mm3 7.25 9.56 8.00 8.58   RDW % 13.5 13.1 13.1 13.3   MPV fL 9.6 9.5  --  9.5   PLATELETS 10*3/mm3 359 368 372 353       Lab Results - Last 18 Months   Lab Units 12/20/24  0807 06/27/24  1408 04/08/24 0828 12/21/23  1424   IRON mcg/dL 89 74  --  76   TIBC mcg/dL 410 402  --  383   IRON SATURATION (TSAT) % 22 18*  --  20   FERRITIN ng/mL 80.69 70.95  --  85.33   TSH uIU/mL  --   --  1.070  --    FOLATE ng/mL 7.55 9.15  --  11.30   VIT D 25 HYDROXY ng/ml  --   --  33.8  --         Lab Results - Last 18 Months   Lab Units 12/20/24  0807   FERRITIN ng/mL 80.69           IMPRESSION/PLAN:    Assessment & Plan      Problem List Items Addressed This Visit       Globus sensation - Primary    Overview     For months globus sensation. Tried Omeprazole with no benefit. Foods are passing through esophagus without difficulty         Current Assessment & Plan     Plan endoscopy         Relevant  Orders    Case Request (Completed)    Follow Anesthesia Guidelines / Protocol     Endoscopy per Dr. Gonsalez            ..The risks, benefits, and alternatives of endoscopy were reviewed with the patient today.  Risks including perforation, with or without dilation, possibly requiring surgery.  Additional risks include risk of bleeding.  There is also the risk of a drug reaction or problems with anesthesia.  This will be discussed with the further by the anesthesia team on the day of the procedure. The benefits include the diagnosis and management of disease of the upper digestive tract.  Alternatives to endoscopy include upper GI series, laboratory testing, radiographic evaluation, or no intervention.  The patient verbalizes understanding and agrees.    In accordance with requirements under the Affordable Care Act, [facility] has provided pricing for all hospital services and items on each of its websites. However, a patient's actual cost may differ based on the services the patient receives to meet individual healthcare needs and based on the benefits provided under the patient’s insurance coverage.        Bessy Colorado, APRN  01/15/25  09:17 CST    Part of this note may be an electronic transcription/translation of spoken language to printed text.

## 2025-01-24 ENCOUNTER — HOSPITAL ENCOUNTER (OUTPATIENT)
Dept: MAMMOGRAPHY | Facility: HOSPITAL | Age: 65
Discharge: HOME | End: 2025-01-24
Admitting: INTERNAL MEDICINE
Payer: COMMERCIAL

## 2025-01-24 DIAGNOSIS — C50.912 INVASIVE DUCTAL CARCINOMA OF BREAST, FEMALE, LEFT: ICD-10-CM

## 2025-01-24 PROCEDURE — 77066 DX MAMMO INCL CAD BI: CPT

## 2025-01-24 PROCEDURE — G0279 TOMOSYNTHESIS, MAMMO: HCPCS

## 2025-02-14 ENCOUNTER — ANESTHESIA EVENT (OUTPATIENT)
Dept: GASTROENTEROLOGY | Facility: HOSPITAL | Age: 65
End: 2025-02-14
Payer: COMMERCIAL

## 2025-02-14 ENCOUNTER — HOSPITAL ENCOUNTER (OUTPATIENT)
Facility: HOSPITAL | Age: 65
Setting detail: HOSPITAL OUTPATIENT SURGERY
Discharge: HOME | End: 2025-02-14
Attending: INTERNAL MEDICINE | Admitting: INTERNAL MEDICINE
Payer: COMMERCIAL

## 2025-02-14 ENCOUNTER — ANESTHESIA (OUTPATIENT)
Dept: GASTROENTEROLOGY | Facility: HOSPITAL | Age: 65
End: 2025-02-14
Payer: COMMERCIAL

## 2025-02-14 VITALS
SYSTOLIC BLOOD PRESSURE: 138 MMHG | TEMPERATURE: 98.9 F | RESPIRATION RATE: 19 BRPM | OXYGEN SATURATION: 99 % | HEIGHT: 60 IN | BODY MASS INDEX: 31.8 KG/M2 | DIASTOLIC BLOOD PRESSURE: 78 MMHG | HEART RATE: 70 BPM | WEIGHT: 162 LBS

## 2025-02-14 DIAGNOSIS — R09.A2 GLOBUS SENSATION: ICD-10-CM

## 2025-02-14 DIAGNOSIS — K21.9 LARYNGOPHARYNGEAL REFLUX: ICD-10-CM

## 2025-02-14 PROCEDURE — 88305 TISSUE EXAM BY PATHOLOGIST: CPT | Performed by: INTERNAL MEDICINE

## 2025-02-14 PROCEDURE — 25810000003 SODIUM CHLORIDE 0.9 % SOLUTION: Performed by: ANESTHESIOLOGY

## 2025-02-14 PROCEDURE — 43239 EGD BIOPSY SINGLE/MULTIPLE: CPT | Performed by: INTERNAL MEDICINE

## 2025-02-14 PROCEDURE — 25010000002 PROPOFOL 10 MG/ML EMULSION: Performed by: NURSE ANESTHETIST, CERTIFIED REGISTERED

## 2025-02-14 PROCEDURE — 88342 IMHCHEM/IMCYTCHM 1ST ANTB: CPT | Performed by: INTERNAL MEDICINE

## 2025-02-14 PROCEDURE — 25010000002 LIDOCAINE PF 2% 2 % SOLUTION: Performed by: NURSE ANESTHETIST, CERTIFIED REGISTERED

## 2025-02-14 RX ORDER — LIDOCAINE HYDROCHLORIDE 10 MG/ML
0.5 INJECTION, SOLUTION EPIDURAL; INFILTRATION; INTRACAUDAL; PERINEURAL ONCE AS NEEDED
Status: DISCONTINUED | OUTPATIENT
Start: 2025-02-14 | End: 2025-02-14 | Stop reason: HOSPADM

## 2025-02-14 RX ORDER — OMEPRAZOLE 40 MG/1
40 CAPSULE, DELAYED RELEASE ORAL DAILY
Qty: 90 CAPSULE | Refills: 3 | Status: SHIPPED | OUTPATIENT
Start: 2025-02-14

## 2025-02-14 RX ORDER — PROPOFOL 10 MG/ML
VIAL (ML) INTRAVENOUS AS NEEDED
Status: DISCONTINUED | OUTPATIENT
Start: 2025-02-14 | End: 2025-02-14 | Stop reason: SURG

## 2025-02-14 RX ORDER — SODIUM CHLORIDE 9 MG/ML
500 INJECTION, SOLUTION INTRAVENOUS ONCE
Status: COMPLETED | OUTPATIENT
Start: 2025-02-14 | End: 2025-02-14

## 2025-02-14 RX ORDER — SODIUM CHLORIDE 0.9 % (FLUSH) 0.9 %
10 SYRINGE (ML) INJECTION AS NEEDED
Status: DISCONTINUED | OUTPATIENT
Start: 2025-02-14 | End: 2025-02-14 | Stop reason: HOSPADM

## 2025-02-14 RX ORDER — LIDOCAINE HYDROCHLORIDE 20 MG/ML
INJECTION, SOLUTION EPIDURAL; INFILTRATION; INTRACAUDAL; PERINEURAL AS NEEDED
Status: DISCONTINUED | OUTPATIENT
Start: 2025-02-14 | End: 2025-02-14 | Stop reason: SURG

## 2025-02-14 RX ADMIN — SODIUM CHLORIDE 500 ML: 9 INJECTION, SOLUTION INTRAVENOUS at 09:32

## 2025-02-14 RX ADMIN — LIDOCAINE HYDROCHLORIDE 150 MG: 20 INJECTION, SOLUTION EPIDURAL; INFILTRATION; INTRACAUDAL; PERINEURAL at 10:23

## 2025-02-14 RX ADMIN — PROPOFOL 100 MG: 10 INJECTION, EMULSION INTRAVENOUS at 10:23

## 2025-02-14 NOTE — ANESTHESIA PREPROCEDURE EVALUATION
Anesthesia Evaluation     Patient summary reviewed   no history of anesthetic complications:   NPO Solid Status: > 8 hours             Airway   Mallampati: II  Dental      Pulmonary - negative pulmonary ROS   Cardiovascular - negative cardio ROS  Exercise tolerance: excellent (>7 METS)        Neuro/Psych- negative ROS  GI/Hepatic/Renal/Endo    (+) obesity, GERD    Musculoskeletal     Abdominal    Substance History      OB/GYN          Other      history of cancer                Anesthesia Plan    ASA 2     MAC     intravenous induction     Anesthetic plan, risks, benefits, and alternatives have been provided, discussed and informed consent has been obtained with: patient.    CODE STATUS:

## 2025-02-14 NOTE — ANESTHESIA POSTPROCEDURE EVALUATION
"Patient: Chasidy Tejada    Procedure Summary       Date: 02/14/25 Room / Location: [facility] ENDOSCOPY 2 /  [facility]    Anesthesia Start: 1019 Anesthesia Stop: 1038    Procedure: ESOPHAGOGASTRODUODENOSCOPY WITH ANESTHESIA Diagnosis:       Globus sensation      (Globus sensation [R09.A2])    Surgeons: Elina Gonsalez MD Provider: Arjun Owens CRNA    Anesthesia Type: MAC ASA Status: 2            Anesthesia Type: MAC    Vitals  Vitals Value Taken Time   BP 83/57 02/14/25 1041   Temp     Pulse 64 02/14/25 1042   Resp 17 02/14/25 1035   SpO2 93 % 02/14/25 1042   Vitals shown include unfiled device data.        Post Anesthesia Care and Evaluation    Patient location during evaluation: PACU  Patient participation: complete - patient participated  Level of consciousness: awake and alert  Pain management: adequate    Airway patency: patent  Anesthetic complications: No anesthetic complications    Cardiovascular status: acceptable  Respiratory status: acceptable  Hydration status: acceptable    Comments: Blood pressure (!) 75/55, pulse 66, temperature 98.9 °F (37.2 °C), temperature source Temporal, resp. rate 17, height 152.4 cm (60\"), weight 73.5 kg (162 lb), SpO2 93%, not currently breastfeeding.    Pt discharged from PACU based on darlin score >8    "

## 2025-02-18 LAB
CYTO UR: NORMAL
LAB AP CASE REPORT: NORMAL
LAB AP SPECIAL STAINS: NORMAL
Lab: NORMAL
PATH REPORT.FINAL DX SPEC: NORMAL
PATH REPORT.GROSS SPEC: NORMAL

## 2025-03-03 RX ORDER — CALCIUM CARBONATE/VITAMIN D3 600 MG-10
1 TABLET ORAL 2 TIMES DAILY
Qty: 30 TABLET | Refills: 0 | Status: SHIPPED | OUTPATIENT
Start: 2025-03-03

## 2025-03-03 NOTE — TELEPHONE ENCOUNTER
Refill request for calcium and vitamin D. Dr. Wheat is out of the office so will send 1 month supply to Dr. Armstrong to sign

## 2025-03-24 ENCOUNTER — OFFICE VISIT (OUTPATIENT)
Dept: OBSTETRICS AND GYNECOLOGY | Age: 65
End: 2025-03-24
Payer: COMMERCIAL

## 2025-03-24 VITALS
SYSTOLIC BLOOD PRESSURE: 118 MMHG | DIASTOLIC BLOOD PRESSURE: 84 MMHG | BODY MASS INDEX: 31.22 KG/M2 | HEIGHT: 60 IN | WEIGHT: 159 LBS

## 2025-03-24 DIAGNOSIS — N89.8 VAGINAL DRYNESS: ICD-10-CM

## 2025-03-24 DIAGNOSIS — C50.912 INVASIVE DUCTAL CARCINOMA OF BREAST, FEMALE, LEFT: ICD-10-CM

## 2025-03-24 DIAGNOSIS — Z01.419 WELL WOMAN EXAM WITH ROUTINE GYNECOLOGICAL EXAM: Primary | ICD-10-CM

## 2025-03-24 PROCEDURE — 99396 PREV VISIT EST AGE 40-64: CPT | Performed by: NURSE PRACTITIONER

## 2025-03-24 PROCEDURE — 99459 PELVIC EXAMINATION: CPT | Performed by: NURSE PRACTITIONER

## 2025-03-24 NOTE — PROGRESS NOTES
"Chief Complaint   Patient presents with    Gynecologic Exam     Patient here for annual, hx of hysterectomy, mammogram 1/24/25, dexa 6/17/24, patient denies any problems or concerns.           Subjective     Chasidy Tejada is a 64 y.o. female    History of Present Illness  Pt comes in today for annual wellness exam. Denies any problems.     /84 (BP Location: Left arm, Patient Position: Sitting, Cuff Size: Adult)   Ht 152.4 cm (60\")   Wt 72.1 kg (159 lb)   LMP  (LMP Unknown)   BMI 31.05 kg/m²     Outpatient Encounter Medications as of 3/24/2025   Medication Sig Dispense Refill    calcium carb-cholecalciferol 600-10 MG-MCG tablet per tablet TAKE 1 TABLET BY MOUTH TWICE A DAY 30 tablet 0    Denosumab (PROLIA SC) Inject 1 dose under the skin into the appropriate area as directed. Every 6 months.      hydrOXYzine (ATARAX) 25 MG tablet Take 1 tablet by mouth Every 6 (Six) Hours As Needed for Anxiety. 30 tablet 1    letrozole (FEMARA) 2.5 MG tablet Take 1 tablet by mouth Daily. 30 tablet 11    omeprazole (priLOSEC) 40 MG capsule Take 1 capsule by mouth Daily. 90 capsule 3    ondansetron (ZOFRAN) 8 MG tablet Take 1 tablet by mouth Every 8 (Eight) Hours As Needed for Nausea or Vomiting. 60 tablet 3    sertraline (ZOLOFT) 50 MG tablet TAKE 1 TABLET BY MOUTH DAILY 90 tablet 1     No facility-administered encounter medications on file as of 3/24/2025.       Past Medical History  Past Medical History:   Diagnosis Date    Acid reflux     Asthma     Drug therapy     Family history of colon cancer     History of adenomatous polyp of colon     History of breast cancer     Hx of radiation therapy     Panic attack     Right arm weakness         Surgical History  Past Surgical History:   Procedure Laterality Date    ANTERIOR CERVICAL DISCECTOMY W/ FUSION N/A 09/07/2018    Procedure: CERVICAL DISCECTOMY ANTERIOR WITH FUSION C6-7;  Surgeon: Chris Mcfadden MD;  Location: [facility] OR;  Service: Neurosurgery    APPENDECTOMY   "    BILATERAL SALPINGO OOPHORECTOMY      BREAST BIOPSY      BREAST LUMPECTOMY      CERVICAL CORPECTOMY N/A 09/07/2018    Procedure: CERVICAL CORPECTOMY C6-7;  Surgeon: Chris Mcfadden MD;  Location: [facility] OR;  Service: Neurosurgery    CHOLECYSTECTOMY      COLONOSCOPY N/A 02/10/2017    One 8mm adenomatous polyp in the rectum; The examination was otherwise normal on direct and retroflexion views; A tattoo was seen in the rectum-A post-polypectomy scar was found at the tattoo site-There was no evidence of residual polyp tissue; Repeat 5 years    COLONOSCOPY  09/05/2012    One 13mm polyp in the rectum-Marked with Maxine ink-path shows Ganglioneuroma; The examination was otherwise normal; Repeat 4 years    COLONOSCOPY N/A 02/04/2022    A tattoo was seen in the rectum-A post-polypectomy scar was found at the tattoo site-There was no evidence of residual polyp tissue; The examination was otherwise normal on direct and retroflexion views; No specimens collected; Repeat 5 years    ENDOSCOPY N/A 2/14/2025    Procedure: ESOPHAGOGASTRODUODENOSCOPY WITH ANESTHESIA;  Surgeon: Elina Gonsalez MD;  Location: [facility] ENDOSCOPY;  Service: Gastroenterology;  Laterality: N/A;  preop; globus   postop antral erosions   PCP [facility]    HYSTERECTOMY  1998    Had hysterectomy for painful periods and bleeding. (Dr. Patel) TLH w/ BSO    LAPAROSCOPIC TUBAL LIGATION      MASTECTOMY W/ SENTINEL NODE BIOPSY Left 01/10/2020    Procedure: LEFT NEEDLE DIRECTED ULTRASOUND GUIDED PARTIAL MASTECTOMY WITH SENTINEL LYMPH NODE BIOPSY, INJECTION AND SCAN, RADIOLOGIST WILL INJECT;  Surgeon: Flor Edmond MD;  Location: [facility] OR;  Service: General    TONSILLECTOMY      VENOUS ACCESS DEVICE (PORT) INSERTION N/A 03/05/2020    Procedure: INSERTION VENOUS ACCESS DEVICE EXCISION SKIN LESION X 2 CHEST AND ABDOMEN;  Surgeon: Flor Edmond MD;  Location: [facility] OR;  Service: General;  Laterality: N/A;    VENOUS ACCESS DEVICE (PORT) REMOVAL Left  03/04/2021    Procedure: REMOVAL OF SINGLE LUMEN PORT;  Surgeon: Flor Edmond MD;  Location: [facility];  Service: General;  Laterality: Left;       Family History  Family History   Problem Relation Age of Onset    Colon cancer Maternal Grandmother         Unknown age    Cancer Father     Heart disease Father     Diabetes Mother     Hypertension Mother     Stroke Mother     Breast cancer Sister     No Known Problems Daughter     No Known Problems Sister     Colon polyps Neg Hx     Esophageal cancer Neg Hx     Liver cancer Neg Hx     Liver disease Neg Hx     Rectal cancer Neg Hx     Stomach cancer Neg Hx     Ovarian cancer Neg Hx     BRCA 1/2 Neg Hx     Endometrial cancer Neg Hx        The following portions of the patient's history were reviewed and updated as appropriate: allergies, current medications, past family history, past medical history, past social history, past surgical history, and problem list.    Review of Systems   Constitutional:  Negative for activity change and unexpected weight loss.   HENT:  Negative for congestion.    Cardiovascular:  Negative for chest pain.   Gastrointestinal:  Negative for blood in stool, constipation and diarrhea.   Endocrine: Negative for cold intolerance and heat intolerance.   Genitourinary:  Negative for dyspareunia, pelvic pain and vaginal discharge.   Musculoskeletal:  Negative for arthralgias, back pain, neck pain and neck stiffness.   Skin:  Negative for rash.   Neurological:  Negative for dizziness and headache.   Psychiatric/Behavioral:  Negative for sleep disturbance. The patient is not nervous/anxious.        Objective   Physical Exam  Vitals and nursing note reviewed. Exam conducted with a chaperone present.   Constitutional:       Appearance: She is well-developed.   HENT:      Head: Normocephalic and atraumatic.      Right Ear: External ear normal.      Left Ear: External ear normal.   Eyes:      General: No scleral icterus.        Right eye: No discharge.          Left eye: No discharge.      Conjunctiva/sclera: Conjunctivae normal.   Neck:      Thyroid: No thyromegaly.      Vascular: No carotid bruit.   Cardiovascular:      Rate and Rhythm: Regular rhythm.      Heart sounds: Normal heart sounds. No murmur heard.  Pulmonary:      Effort: Pulmonary effort is normal. No respiratory distress.      Breath sounds: Normal breath sounds.   Chest:   Breasts:     Breasts are symmetrical.      Right: No inverted nipple, mass, nipple discharge, skin change or tenderness.      Left: No inverted nipple, mass, nipple discharge, skin change or tenderness.   Abdominal:      General: Bowel sounds are normal. There is no distension.      Palpations: Abdomen is soft. There is no mass.      Tenderness: There is no abdominal tenderness. There is no guarding.      Hernia: No hernia is present. There is no hernia in the left inguinal area.   Genitourinary:     Labia:         Right: No rash, tenderness, lesion or injury.         Left: No rash, tenderness, lesion or injury.       Vagina: Normal. No signs of injury. No vaginal discharge, erythema or bleeding.      Rectum: No mass.      Comments: Cervix, Uterus and Adnexa are surgically absent.  Urethra and urethral meatus normal  Bladder, normal no prolapse  Perineum and anus examined and without lesions    Vaginal dryness  Musculoskeletal:         General: No tenderness. Normal range of motion.      Cervical back: Normal range of motion and neck supple.   Lymphadenopathy:      Head:      Right side of head: No submental, submandibular, tonsillar, preauricular, posterior auricular or occipital adenopathy.      Left side of head: No submental, submandibular, tonsillar, preauricular, posterior auricular or occipital adenopathy.      Cervical: No cervical adenopathy.      Right cervical: No superficial, deep or posterior cervical adenopathy.     Left cervical: No superficial, deep or posterior cervical adenopathy.   Skin:     General: Skin is  warm and dry.      Findings: No bruising, erythema or rash.   Neurological:      Mental Status: She is alert and oriented to person, place, and time.      Motor: No abnormal muscle tone.      Coordination: Coordination normal.   Psychiatric:         Behavior: Behavior normal.         Thought Content: Thought content normal.         Judgment: Judgment normal.         Assessment & Plan   Diagnoses and all orders for this visit:    1. Well woman exam with routine gynecological exam (Primary)    2. Vaginal dryness    3. Invasive ductal carcinoma of breast, female, left         Normal GYN exam. Encouraged SBE, pt is aware how to do self breast exam and the importance of same. Discussed weight management and importance of maintaining a healthy weight. Discussed Vitamin D intake and the importance of adequate vitamin D for both bone health and a healthy immune system.  Discussed daily exercise and the importance of same, in regards to a healthy heart as well as helping to maintain her weight and improving her mental health.  Colonoscopy is up to date. Mammogram is up to date. Bone density is up to date. Pap smear is not done per ASCCP guidelines. HPV is not done. Lab work up is up to date through PCP.      Pt has vaginal dryness. She uses OTC lubricants and denies problems.     History of breast cancer and follows with oncologist.     SANDRA Fuentes  3/24/2025    Return in about 1 year (around 3/24/2026) for Annual physical.

## 2025-05-07 DIAGNOSIS — Z00.00 WELLNESS EXAMINATION: ICD-10-CM

## 2025-05-12 ENCOUNTER — OFFICE VISIT (OUTPATIENT)
Dept: INTERNAL MEDICINE | Facility: CLINIC | Age: 65
End: 2025-05-12
Payer: COMMERCIAL

## 2025-05-12 VITALS
HEIGHT: 60 IN | BODY MASS INDEX: 31.8 KG/M2 | SYSTOLIC BLOOD PRESSURE: 130 MMHG | WEIGHT: 162 LBS | TEMPERATURE: 97.3 F | DIASTOLIC BLOOD PRESSURE: 78 MMHG | HEART RATE: 78 BPM | OXYGEN SATURATION: 97 %

## 2025-05-12 DIAGNOSIS — F41.1 GAD (GENERALIZED ANXIETY DISORDER): ICD-10-CM

## 2025-05-12 DIAGNOSIS — Z00.00 ANNUAL PHYSICAL EXAM: Primary | ICD-10-CM

## 2025-05-12 DIAGNOSIS — E55.9 VITAMIN D DEFICIENCY: ICD-10-CM

## 2025-05-12 DIAGNOSIS — C50.912 INVASIVE DUCTAL CARCINOMA OF BREAST, FEMALE, LEFT: ICD-10-CM

## 2025-05-12 DIAGNOSIS — T45.1X5A PERIPHERAL NEUROPATHY DUE TO CHEMOTHERAPY: ICD-10-CM

## 2025-05-12 DIAGNOSIS — M81.0 OSTEOPOROSIS WITHOUT CURRENT PATHOLOGICAL FRACTURE, UNSPECIFIED OSTEOPOROSIS TYPE: ICD-10-CM

## 2025-05-12 DIAGNOSIS — G62.0 PERIPHERAL NEUROPATHY DUE TO CHEMOTHERAPY: ICD-10-CM

## 2025-05-12 DIAGNOSIS — K21.9 LARYNGOPHARYNGEAL REFLUX: ICD-10-CM

## 2025-05-12 PROCEDURE — 99396 PREV VISIT EST AGE 40-64: CPT | Performed by: INTERNAL MEDICINE

## 2025-05-12 PROCEDURE — 99214 OFFICE O/P EST MOD 30 MIN: CPT | Performed by: INTERNAL MEDICINE

## 2025-05-12 RX ORDER — PANTOPRAZOLE SODIUM 40 MG/1
40 TABLET, DELAYED RELEASE ORAL DAILY
Qty: 90 TABLET | Refills: 1 | Status: SHIPPED | OUTPATIENT
Start: 2025-05-12

## 2025-05-12 NOTE — PROGRESS NOTES
"    Chief Complaint  Annual Exam (Labs in chart/Pneumo vac)    Subjective        Chasidy Tejada presents to [facility]  History of Present Illness  See below.     Objective   Vital Signs:  /78 (BP Location: Right arm, Patient Position: Sitting, Cuff Size: Adult)   Pulse 78   Temp 97.3 °F (36.3 °C) (Temporal)   Ht 152.4 cm (60\")   Wt 73.5 kg (162 lb)   SpO2 97%   BMI 31.64 kg/m²   Estimated body mass index is 31.64 kg/m² as calculated from the following:    Height as of this encounter: 152.4 cm (60\").    Weight as of this encounter: 73.5 kg (162 lb).         Physical Exam  Constitutional:       Appearance: She is not ill-appearing.   HENT:      Head: Normocephalic and atraumatic.      Right Ear: Tympanic membrane and ear canal normal.      Left Ear: Tympanic membrane and ear canal normal.      Mouth/Throat:      Mouth: Mucous membranes are moist.      Pharynx: Oropharynx is clear.   Eyes:      Conjunctiva/sclera: Conjunctivae normal.      Pupils: Pupils are equal, round, and reactive to light.   Cardiovascular:      Rate and Rhythm: Normal rate and regular rhythm.      Heart sounds: Normal heart sounds.   Pulmonary:      Effort: Pulmonary effort is normal. No respiratory distress.      Breath sounds: Normal breath sounds.   Musculoskeletal:         General: No swelling.      Cervical back: Neck supple.   Lymphadenopathy:      Cervical: No cervical adenopathy.   Skin:     General: Skin is warm and dry.      Findings: No rash.   Neurological:      General: No focal deficit present.      Mental Status: She is alert and oriented to person, place, and time.   Psychiatric:         Mood and Affect: Mood normal.         Behavior: Behavior normal.         Thought Content: Thought content normal.         Judgment: Judgment normal.        Result Review :  Labs from 5/7/2025:  1.  CMP normal.  2.  TSH normal at 1.550.  3.  Total cholesterol 177, HDL 48, , triglycerides 67. No " statin recommended with an ASCVD score 4.2%.  4.  Vitamin D 25.7.  5.  CBC normal.  6.  Urinalysis showed epithelial cells and trace protein.  Trace ketones.    Mammogram from 1/24/2025 was BI-RADS Category 2, benign findings.    Bone mineral density in June 2024 was normal.  It showed osteopenia in June 2022.         Assessment and Plan   Diagnoses and all orders for this visit:    1. Annual physical exam (Primary)    2. Invasive ductal carcinoma of breast, female, left    3. Peripheral neuropathy due to chemotherapy    4. JHON (generalized anxiety disorder)    5. Laryngopharyngeal reflux  -     pantoprazole (PROTONIX) 40 MG EC tablet; Take 1 tablet by mouth Daily.  Dispense: 90 tablet; Refill: 1    6. Vitamin D deficiency    7. Osteoporosis without current pathological fracture, unspecified osteoporosis type       Presents today for annual physical exam as well as follow-up of the below.    She sees Dr. Wheat again in June.  She had a reassuring mammogram in January.  She continues on letrozole.  She feels like she does have some neuropathy from her previous chemotherapy exposure.  She is not interested in any medication to treat this or further workup.  Her B12 was normal in December.    Anxiety is doing well with sertraline, hydroxyzine.     There have been concerns that she has laryngeal reflux through the years.  She gets choked with food frequently.  She has a chronic cough.  She has had an esophagram remotely.  She is seeing Dr. Lainez with ENT.  She has seen Dr. Gonsalez with GI.  She had a recent endoscopy:    Dr. Gonsalez increased her omeprazole to 40 mg daily rather than 20 mg daily.  She cannot tell much of a difference.  We are going to transition her to pantoprazole today.  We may end up having speech therapy evaluate her.    Next colonoscopy due in February 2027.     Follows with SANDRA Ocampo for gynecologic care.  Saw her in March.  She is status post hysterectomy.    Oncology has her  taking Prolia.  She is also on calcium and vitamin D replacement.    Counseled on appropriate vision and dental screening.  She has not seen anyone with optometry in quite some time.  She may go see Dr. Sharlene Owens as she knows her  from the hospital.  She does not presently have a dentist.    Plan of her back in 6 months.  She knows that she can reach out sooner if problems.  Also encouraged her to send a [employer] message in 2-3 weeks to let us know if Protonix is any better than omeprazole.      Follow Up   Return in about 6 months (around 11/12/2025) for Recheck.  Patient was given instructions and counseling regarding her condition or for health maintenance advice. Please see specific information pulled into the AVS if appropriate.      KALLI Fitzpatrick DO       Electronically signed by ARMANI Fitzpatrick DO, 05/12/25, 7:28 AM CDT.

## 2025-05-31 DIAGNOSIS — F41.1 GAD (GENERALIZED ANXIETY DISORDER): ICD-10-CM

## 2025-06-16 DIAGNOSIS — Z79.811 LONG TERM CURRENT USE OF AROMATASE INHIBITOR: Primary | ICD-10-CM

## 2025-06-16 DIAGNOSIS — M85.852 OSTEOPENIA OF LEFT HIP: ICD-10-CM

## 2025-06-19 ENCOUNTER — LAB (OUTPATIENT)
Dept: LAB | Facility: HOSPITAL | Age: 65
End: 2025-06-19
Payer: COMMERCIAL

## 2025-06-19 DIAGNOSIS — C50.912 INVASIVE DUCTAL CARCINOMA OF BREAST, FEMALE, LEFT: ICD-10-CM

## 2025-06-19 DIAGNOSIS — Z79.811 LONG TERM CURRENT USE OF AROMATASE INHIBITOR: ICD-10-CM

## 2025-06-19 DIAGNOSIS — M85.852 OSTEOPENIA OF LEFT HIP: ICD-10-CM

## 2025-06-19 LAB
ALBUMIN SERPL-MCNC: 4.4 G/DL (ref 3.5–5.2)
ALBUMIN/GLOB SERPL: 1.4 G/DL
ALP SERPL-CCNC: 75 U/L (ref 39–117)
ALT SERPL W P-5'-P-CCNC: 18 U/L (ref 1–33)
ANION GAP SERPL CALCULATED.3IONS-SCNC: 14 MMOL/L (ref 5–15)
AST SERPL-CCNC: 17 U/L (ref 1–32)
BASOPHILS # BLD AUTO: 0.05 10*3/MM3 (ref 0–0.2)
BASOPHILS NFR BLD AUTO: 0.6 % (ref 0–1.5)
BILIRUB SERPL-MCNC: 0.5 MG/DL (ref 0–1.2)
BUN SERPL-MCNC: 8.7 MG/DL (ref 8–23)
BUN/CREAT SERPL: 13.2 (ref 7–25)
CALCIUM SPEC-SCNC: 9.7 MG/DL (ref 8.6–10.5)
CHLORIDE SERPL-SCNC: 100 MMOL/L (ref 98–107)
CO2 SERPL-SCNC: 24 MMOL/L (ref 22–29)
CREAT SERPL-MCNC: 0.66 MG/DL (ref 0.57–1)
DEPRECATED RDW RBC AUTO: 43.3 FL (ref 37–54)
EGFRCR SERPLBLD CKD-EPI 2021: 98.7 ML/MIN/1.73
EOSINOPHIL # BLD AUTO: 0.09 10*3/MM3 (ref 0–0.4)
EOSINOPHIL NFR BLD AUTO: 1.1 % (ref 0.3–6.2)
ERYTHROCYTE [DISTWIDTH] IN BLOOD BY AUTOMATED COUNT: 13.2 % (ref 12.3–15.4)
FERRITIN SERPL-MCNC: 73.92 NG/ML (ref 13–150)
GLOBULIN UR ELPH-MCNC: 3.1 GM/DL
GLUCOSE SERPL-MCNC: 91 MG/DL (ref 65–99)
HCT VFR BLD AUTO: 41.2 % (ref 34–46.6)
HGB BLD-MCNC: 13.7 G/DL (ref 12–15.9)
IMM GRANULOCYTES # BLD AUTO: 0.01 10*3/MM3 (ref 0–0.05)
IMM GRANULOCYTES NFR BLD AUTO: 0.1 % (ref 0–0.5)
IRON 24H UR-MRATE: 85 MCG/DL (ref 37–145)
IRON SATN MFR SERPL: 21 % (ref 20–50)
LYMPHOCYTES # BLD AUTO: 4.07 10*3/MM3 (ref 0.7–3.1)
LYMPHOCYTES NFR BLD AUTO: 51.1 % (ref 19.6–45.3)
MAGNESIUM SERPL-MCNC: 2 MG/DL (ref 1.6–2.4)
MCH RBC QN AUTO: 29.7 PG (ref 26.6–33)
MCHC RBC AUTO-ENTMCNC: 33.3 G/DL (ref 31.5–35.7)
MCV RBC AUTO: 89.4 FL (ref 79–97)
MONOCYTES # BLD AUTO: 0.37 10*3/MM3 (ref 0.1–0.9)
MONOCYTES NFR BLD AUTO: 4.6 % (ref 5–12)
NEUTROPHILS NFR BLD AUTO: 3.37 10*3/MM3 (ref 1.7–7)
NEUTROPHILS NFR BLD AUTO: 42.5 % (ref 42.7–76)
NRBC BLD AUTO-RTO: 0 /100 WBC (ref 0–0.2)
PHOSPHATE SERPL-MCNC: 4.3 MG/DL (ref 2.5–4.5)
PLATELET # BLD AUTO: 337 10*3/MM3 (ref 140–450)
PMV BLD AUTO: 9.5 FL (ref 6–12)
POTASSIUM SERPL-SCNC: 3.8 MMOL/L (ref 3.5–5.2)
PROT SERPL-MCNC: 7.5 G/DL (ref 6–8.5)
RBC # BLD AUTO: 4.61 10*6/MM3 (ref 3.77–5.28)
SODIUM SERPL-SCNC: 138 MMOL/L (ref 136–145)
TIBC SERPL-MCNC: 402 MCG/DL (ref 298–536)
TRANSFERRIN SERPL-MCNC: 270 MG/DL (ref 200–360)
WBC NRBC COR # BLD AUTO: 7.96 10*3/MM3 (ref 3.4–10.8)

## 2025-06-19 PROCEDURE — 82746 ASSAY OF FOLIC ACID SERUM: CPT

## 2025-06-19 PROCEDURE — 80053 COMPREHEN METABOLIC PANEL: CPT

## 2025-06-19 PROCEDURE — 84466 ASSAY OF TRANSFERRIN: CPT

## 2025-06-19 PROCEDURE — 85025 COMPLETE CBC W/AUTO DIFF WBC: CPT

## 2025-06-19 PROCEDURE — 82607 VITAMIN B-12: CPT

## 2025-06-19 PROCEDURE — 83735 ASSAY OF MAGNESIUM: CPT

## 2025-06-19 PROCEDURE — 82728 ASSAY OF FERRITIN: CPT

## 2025-06-19 PROCEDURE — 86300 IMMUNOASSAY TUMOR CA 15-3: CPT

## 2025-06-19 PROCEDURE — 84100 ASSAY OF PHOSPHORUS: CPT

## 2025-06-19 PROCEDURE — 82378 CARCINOEMBRYONIC ANTIGEN: CPT

## 2025-06-19 PROCEDURE — 83540 ASSAY OF IRON: CPT

## 2025-06-19 PROCEDURE — 36415 COLL VENOUS BLD VENIPUNCTURE: CPT

## 2025-06-20 LAB
CANCER AG27-29 SERPL-ACNC: 21.7 U/ML (ref 0–38.6)
CEA SERPL-MCNC: 1.56 NG/ML
FOLATE SERPL-MCNC: 10.8 NG/ML (ref 4.78–24.2)
VIT B12 BLD-MCNC: 273 PG/ML (ref 211–946)

## 2025-06-22 NOTE — PROGRESS NOTES
[facility] GROUP HEMATOLOGY AND ONCOLOGY  [address]  [facility] [address] [phone]    Patient Name: Chasidy Tejada  Encounter Date: 06/26/2025  YOB: 1960  Patient Number: [id number]     REASON FOR VISIT:  Chasidy Tejada is a 64-year-old female who returns in follow-up of stage IA left breast carcinoma, ER/NV and HER-2/lamberto positive.  She underwent left breast lumpectomy with SNB, 01/10/2020 and was started on adjuvant Arimidex beginning 02/03/2020 through 03/11/2020 before resumption on 08/19/202 through 08/30/2021 - stopped due to intolerance (temper volatility, irritability).  She completed adjuvant paclitaxel (week 12/12, 05/27/2020), and received adjuvant trastuzumab (Herceptin), having completed cycle 17, 02/10/2021 (52.5 months).  Adjuvant radiation was administered on 06/22/2020 through 08/06/2020 (6040 cGy in 33 fx). She is here alone (previously with her spouse).  She was started on adjuvant Femara, 09/27/2021 (45 months).  She is here alone.     I have reviewed the HPI and verified with the patient the accuracy of it. No changes to interval history since the information was documented. Tommy Wheat MD 06/26/25      DIAGNOSTIC ABNORMALITIES:          1.   12/13/2019- mammogram screening.  Impression: Stellate lesion outer left breast.  Spot compression and ultrasound suggested.          2.   12/27/2019- diagnostic mammogram.  Impression: Irregular density upper outer left breast.  Biopsy suggested.          3.   12/27/2019- left breast ultrasound.  Impression: Suspicious 6 x 5 x 8 mm density left breast approximately 8 cm from the nipple.  Biopsy suggested.  BI-RADS Category 4B.          4.   01/03/2020-ultrasound-guided breast biopsy.  Impression: Appropriate sampling of the 8 mm irregular hypoechoic nodule in the left breast at 1:00 in the posterior depth/axillary tail region.  Final diagnosis: Left breast at  1 o'clock position, core biopsies: A.invasive mammary carcinoma of no special type (ductal, not otherwise specified), grade 2, at least 4.9 mm in greatest extent.  B.associated low-grade ductal carcinoma in situ.  % positive, MA 25% positive, HER-2/lamberto-2+ (IHC)/FISH- a focal positive score is present in an area corresponding to approximately 15% of the tumor (signal ratio: 2.9).           5.   02/03/2020- CMP normal with .  CBC normal.           6.   02/03/2020-bone scan ([facility]).  Impression: Degenerative joint changes.  No evidence of bony metastasis.           7.   02/03/2020-CT scans, head chest, abdomen, pelvis-impression: No abnormal intracranial enhancement to suggest intracranial metastasis/abnormalities.  No evidence of intrathoracic metastasis.  Stable 5 to 6 mm right lower lobe nodule of the right hemidiaphragm unchanged from 2/14/2011.  No convincing evidence of intra-abdominal or pelvic metastasis.  Hepatic cysts.  Fatty attenuated benign focus within the body of the pancreas visualized, 12/14/2011.  Previous hysterectomy, cholecystectomy and appendectomy.           8.   02/14/2020-echocardiogram.  Impression: LV systolic function normal (EF equals 70%).  Normal LV and RV size thickness and function.  Normal LA and RA size.  Normal cardiac valves.  No pericardial effusion.           9.  02/27/2020- CMP normal with .  CBC normal.  Iron 65, iron saturation 18%, ferritin 134, B12 745, folate > 20, CEA 1.76, CA-27-29 14.2.          10.  03/04/2020- repeat FISH for HER-2/lamberto (from tumor block, 01/10/2020).  No tumor on specimen.          11.  06/23/2020- DEXA scan.  Impression: Normal bone density.  No lower than 1 standard deviation below the mean for young adult woman.    PREVIOUS INTERVENTIONS:           1.   01/10/2020- left breast lumpectomy with left axillary sentinel node biopsy.  Final diagnosis: 1.left breast mass, lumpectomy: Invasive and in situ ductal carcinoma.  Tumor site-not  "specified.  Histologic type: Invasive carcinoma of no special type (invasive ductal carcinoma, not otherwise specified).  Overall tumor grade: Grade 1.  Tumor size: Greatest dimension of largest invasive focus: 5 mm.  Tumor focality: Single focus of invasive carcinoma.  Ductal carcinoma in situ (DCIS): Present.  Negative for extensive intraductal component (EIC).  Size (extent) CIS: Cannot be determined: Microscopic.  Architectural pattern: Cribriform/solid.  Nuclear grade: 2 (intermediate).  Necrosis: Present, central (expansive \"comedo) necrosis).  Lobular carcinoma in situ (LCIS): No LCIS in specimen.  Lymphovascular invasion: Not identified.  Dermal lymphovascular invasion: No skin present.  Margins: Uninvolved by invasive carcinoma.  Lymph nodes: Uninvolved by tumor cells.  Regional lymph nodes: 1.  Number of lymph nodes examined: 1.  Number of sentinel nodes examined: 1.  Pathologic stage classification (pTNM, AJCC eighth edition): pT1a,(sn), pN0.  % positive, ND 25% positive, HER-2 2+ (equivocal).           2.   Adjuvant Arimidex 1 mg p.o. daily beginning 02/03/2020 through 03/11/2020: resumed 08/19/2020 through 08/30/2021 - stopped due to intolerance (temper volatility, irritability).           3.   Adjuvant paclitaxel beginning 03/11/2020 through 05/27/2020 (12/12 weekly doses)           4.   Adjuvant trastuzumab every 3 weeks - beginning 03/11/2020 through 02/10/2021 (17 cycles)           5.   Adjuvant radiation to the left breast (5040 cGy with 1000 cGy boost to the tumor bed for a total of 6040 cGy/33 fractions) from 06/22/2020 through 08/06/2020.           6.   Adjuvant Femara 2.5 mg po daily beginning 09/27/2021    LABS    Lab Results - Last 18 Months   Lab Units 06/19/25  1522 05/07/25  0701 12/20/24  0807 06/27/24  1408 04/08/24  0828   HEMOGLOBIN g/dL 13.7 13.1 14.1 13.8 14.5   HEMATOCRIT % 41.2 40.2 44.5 41.7 45.2   MCV fL 89.4 89.7 92.1 90.3 92.6   WBC 10*3/mm3 7.96 7.47 7.25 9.56 8.00   RDW " % 13.2 13.0 13.5 13.1 13.1   MPV fL 9.5  --  9.6 9.5  --    PLATELETS 10*3/mm3 337 345 359 368 372   IMM GRAN % % 0.1  --  0.3 0.3  --    NEUTROS ABS 10*3/mm3 3.37 3.73 3.81 4.98 4.15   LYMPHS ABS 10*3/mm3 4.07* 3.20* 2.92 3.86* 3.29*   MONOS ABS 10*3/mm3 0.37 0.41 0.35 0.56 0.38   EOS ABS 10*3/mm3 0.09 0.06 0.11 0.08 0.11   BASOS ABS 10*3/mm3 0.05 0.05 0.04 0.05 0.05   IMMATURE GRANS (ABS) 10*3/mm3 0.01  --  0.02 0.03  --    NRBC /100 WBC 0.0 0.0 0.0 0.0 0.0       Lab Results - Last 18 Months   Lab Units 06/19/25  1522 05/07/25  0701 12/20/24  0807 06/27/24  1408 04/08/24  0828   GLUCOSE mg/dL 91 93 109* 117* 100*   SODIUM mmol/L 138 141 139 140 141   POTASSIUM mmol/L 3.8 4.6 4.2 4.1 4.4   CO2 mmol/L 24.0 24.9 25.0 29.0 24.4   CHLORIDE mmol/L 100 106 101 101 104   ANION GAP mmol/L 14.0  --  13.0 10.0  --    CREATININE mg/dL 0.66 0.65 0.68 0.75 0.68   BUN mg/dL 8.7 15 14 10 15   BUN / CREAT RATIO  13.2 23.1 20.6 13.3 22.1   CALCIUM mg/dL 9.7 9.0 9.7 9.7 9.8   ALK PHOS U/L 75 73 93 91 79   TOTAL PROTEIN g/dL 7.5 6.7 7.6 7.6 7.2   ALT (SGPT) U/L 18 18 23 25 24   AST (SGOT) U/L 17 15 22 22 19   BILIRUBIN mg/dL 0.5 0.4 0.6 0.4 0.5   ALBUMIN g/dL 4.4 4.1 4.5 4.5 4.7   GLOBULIN gm/dL 3.1  --  3.1 3.1  --    GLOBULINREF gm/dL  --  2.6  --   --  2.5       Lab Results - Last 18 Months   Lab Units 06/19/25  1522 12/20/24  0807 06/27/24  1408   CEA ng/mL 1.56 1.70 1.61       Lab Results - Last 18 Months   Lab Units 06/19/25  1522 05/07/25  0701 12/20/24  0807 06/27/24  1408 04/08/24  0828   IRON mcg/dL 85  --  89 74  --    TIBC mcg/dL 402  --  410 402  --    IRON SATURATION (TSAT) % 21  --  22 18*  --    FERRITIN ng/mL 73.92  --  80.69 70.95  --    TSH uIU/mL  --  1.550  --   --  1.070   FOLATE ng/mL 10.80  --  7.55 9.15  --          PAST MEDICAL HISTORY:  ALLERGIES:  Allergies   Allergen Reactions    Demerol [Meperidine] Hives    Morphine And Codeine Hives     CURRENT MEDICATIONS:  Outpatient Encounter Medications as of  6/26/2025   Medication Sig Dispense Refill    Denosumab (PROLIA SC) Inject 1 dose under the skin into the appropriate area as directed. Every 6 months.      hydrOXYzine (ATARAX) 25 MG tablet Take 1 tablet by mouth Every 6 (Six) Hours As Needed for Anxiety. 30 tablet 1    letrozole (FEMARA) 2.5 MG tablet Take 1 tablet by mouth Daily. 30 tablet 11    ondansetron (ZOFRAN) 8 MG tablet Take 1 tablet by mouth Every 8 (Eight) Hours As Needed for Nausea or Vomiting. 60 tablet 3    pantoprazole (PROTONIX) 40 MG EC tablet Take 1 tablet by mouth Daily. 90 tablet 1    sertraline (ZOLOFT) 50 MG tablet TAKE 1 TABLET BY MOUTH DAILY 90 tablet 3    [DISCONTINUED] calcium carb-cholecalciferol 600-10 MG-MCG tablet per tablet TAKE 1 TABLET BY MOUTH TWICE A DAY 30 tablet 0     Facility-Administered Encounter Medications as of 6/26/2025   Medication Dose Route Frequency Provider Last Rate Last Admin    denosumab (PROLIA) syringe 60 mg  60 mg Subcutaneous Once Tommy Wheat MD         Adult illnesses:  Colon polyps  Obesity  Spinal stenosis neck   Herniated cervical disc without myelopathy  Borderline blood pressure  History of dysphagia    Past surgeries:  Anterior cervical discectomy w fusion, 09/2018  Appendectomy  BSO/GANGA  Cholecystectomy  Colonoscopy, 02/10/2017  BTL  Tonsillectomy  Breast biopsy  Left partial mastectomy, 01/10/2020  03/05/2020 - Mediport placement per Dr. Edmond  03/04/2021-Mediport removal per Dr. Edmond.    ADULT ILLNESSES:  Patient Active Problem List   Diagnosis Code    Family history of colon cancer Z80.0    Laryngopharyngeal reflux K21.9    Pharyngoesophageal dysphagia R13.14    Class 1 obesity due to excess calories without serious comorbidity with body mass index (BMI) of 31.0 to 31.9 in adult E66.811, E66.09, Z68.31    Spinal stenosis in cervical region M48.02    Cervical radiculopathy M54.12    Herniated cervical disc without myelopathy M50.20    Borderline blood pressure R03.0    Invasive ductal  carcinoma of breast, female, left C50.912    Former smoker Z87.891    S/P lumpectomy, left breast Z98.890    S/P lymph node biopsy Z98.890    On antineoplastic chemotherapy Z79.69    Estrogen receptor positive status (ER+) Z17.0    History of radiation therapy Z92.3    History of adenomatous polyp of colon Z86.0101    Osteoporosis without current pathological fracture M81.0    Osteopenia of left hip M85.852    Long term current use of aromatase inhibitor Z79.811    Prophylactic use of anastrozole (Arimidex) Z79.811    Globus sensation R09.A2     SURGERIES:  Past Surgical History:   Procedure Laterality Date    ANTERIOR CERVICAL DISCECTOMY W/ FUSION N/A 09/07/2018    Procedure: CERVICAL DISCECTOMY ANTERIOR WITH FUSION C6-7;  Surgeon: Chirs Mcfadden MD;  Location: [facility] OR;  Service: Neurosurgery    APPENDECTOMY      BILATERAL SALPINGO OOPHORECTOMY      BREAST BIOPSY      BREAST LUMPECTOMY      CERVICAL CORPECTOMY N/A 09/07/2018    Procedure: CERVICAL CORPECTOMY C6-7;  Surgeon: Chris Mcfadden MD;  Location: [facility] OR;  Service: Neurosurgery    CHOLECYSTECTOMY      COLONOSCOPY N/A 02/10/2017    One 8mm adenomatous polyp in the rectum; The examination was otherwise normal on direct and retroflexion views; A tattoo was seen in the rectum-A post-polypectomy scar was found at the tattoo site-There was no evidence of residual polyp tissue; Repeat 5 years    COLONOSCOPY  09/05/2012    One 13mm polyp in the rectum-Marked with Maxine ink-path shows Ganglioneuroma; The examination was otherwise normal; Repeat 4 years    COLONOSCOPY N/A 02/04/2022    A tattoo was seen in the rectum-A post-polypectomy scar was found at the tattoo site-There was no evidence of residual polyp tissue; The examination was otherwise normal on direct and retroflexion views; No specimens collected; Repeat 5 years    ENDOSCOPY N/A 2/14/2025    Procedure: ESOPHAGOGASTRODUODENOSCOPY WITH ANESTHESIA;  Surgeon: Elina Gonsalez MD;  Location: [facility]  ENDOSCOPY;  Service: Gastroenterology;  Laterality: N/A;  preop; globus   postop antral erosions   PCP Chris Fitzpatrick    HYSTERECTOMY  1998    Had hysterectomy for painful periods and bleeding. (Dr. Patel) TLH w/ BSO    LAPAROSCOPIC TUBAL LIGATION      MASTECTOMY W/ SENTINEL NODE BIOPSY Left 01/10/2020    Procedure: LEFT NEEDLE DIRECTED ULTRASOUND GUIDED PARTIAL MASTECTOMY WITH SENTINEL LYMPH NODE BIOPSY, INJECTION AND SCAN, RADIOLOGIST WILL INJECT;  Surgeon: Flor Edmond MD;  Location:  [facility] OR;  Service: General    TONSILLECTOMY      VENOUS ACCESS DEVICE (PORT) INSERTION N/A 03/05/2020    Procedure: INSERTION VENOUS ACCESS DEVICE EXCISION SKIN LESION X 2 CHEST AND ABDOMEN;  Surgeon: Flor Edmond MD;  Location:  [facility] OR;  Service: General;  Laterality: N/A;    VENOUS ACCESS DEVICE (PORT) REMOVAL Left 03/04/2021    Procedure: REMOVAL OF SINGLE LUMEN PORT;  Surgeon: Flor Edmond MD;  Location:  [facility] OR;  Service: General;  Laterality: Left;     HEALTH MAINTENANCE ITEMS:  Health Maintenance Due   Topic Date Due    Pneumococcal Vaccine 50+ (1 of 1 - PCV) Never done         <no information>  Last Completed Colonoscopy            Upcoming       COLORECTAL CANCER SCREENING (COLONOSCOPY - Every 5 Years) Next due on 2/4/2027 02/04/2022  Surgical Procedure: COLONOSCOPY    02/04/2022  COLONOSCOPY    02/10/2017  Surgical Procedure: COLONOSCOPY    02/10/2017  COLONOSCOPY    09/05/2012  SCANNED - COLONOSCOPY     Only the first 5 history entries have been loaded, but more history exists.                        Immunization History   Administered Date(s) Administered    COVID-19 (MODERNA) 1st,2nd,3rd Dose Monovalent 01/09/2021, 02/06/2021    COVID-19 (MODERNA) Monovalent Original Booster 11/19/2021    COVID-19 (PFIZER) 12YRS+ (COMIRNATY) 11/06/2024    Flu Vaccine Intradermal Quad 18-64YR 10/07/2021    Fluzone  >6mos 11/06/2024    Fluzone Quad >6mos (Multi-dose) 10/03/2022    Influenza, Unspecified  "11/18/2017, 11/05/2018, 10/15/2019, 10/27/2020    Shingrix 10/15/2021, 12/16/2021    Tdap 08/29/2018     Last Completed Mammogram            Awaiting Completion       MAMMOGRAM (Every 2 Years) Order placed this encounter      01/24/2025  Mammo Diagnostic Digital Tomosynthesis Bilateral With CAD    01/22/2024  Mammo Diagnostic Digital Tomosynthesis Bilateral With CAD    01/06/2023  Mammo Diagnostic Digital Tomosynthesis Bilateral With CAD    01/06/2022  Mammo Diagnostic Digital Tomosynthesis Bilateral With CAD    12/17/2020  Mammo Diagnostic Digital Tomosynthesis Bilateral With CAD      Only the first 5 history entries have been loaded, but more history exists.                              FAMILY HISTORY:  Family History   Problem Relation Age of Onset    Colon cancer Maternal Grandmother         Unknown age    Cancer Father     Heart disease Father     Diabetes Mother     Hypertension Mother     Stroke Mother     Breast cancer Sister     No Known Problems Daughter     No Known Problems Sister     Colon polyps Neg Hx     Esophageal cancer Neg Hx     Liver cancer Neg Hx     Liver disease Neg Hx     Rectal cancer Neg Hx     Stomach cancer Neg Hx     Ovarian cancer Neg Hx     BRCA 1/2 Neg Hx     Endometrial cancer Neg Hx      SOCIAL HISTORY:  Social History     Socioeconomic History    Marital status:     Number of children: 1   Tobacco Use    Smoking status: Never    Smokeless tobacco: Never    Tobacco comments:     \"i never really smoked\"   Vaping Use    Vaping status: Never Used   Substance and Sexual Activity    Alcohol use: Not Currently    Drug use: No    Sexual activity: Yes     Partners: Male     Birth control/protection: Hysterectomy       REVIEW OF SYSTEMS:  Review of Systems   Constitutional: Negative.         She manages her ADLs' including chores, errands, driving.  Is still working full time.  Has been active.  Is up and about \"most of the time.\"    Femara tolerance:  No new problems.  No new " "arthralgias.  Mild to moderate hot flashes.  \"  Maybe not as bad.\" Taking daily.  \"No problem.\"   HENT: Negative.     Eyes: Negative.    Respiratory: Negative.     Cardiovascular: Negative.    Gastrointestinal:  Negative for abdominal distention, abdominal pain, anal bleeding, blood in stool, constipation, diarrhea, nausea, vomiting and GERD (\"Normal.\" Still on omeprazole as needed, \"not often.\").   Endocrine: Positive for heat intolerance (Lingering vasomotor changes.  \"Mostly at night but not bad anymore.\").   Genitourinary: Negative.    Musculoskeletal:  Negative for arthralgias (knees, and hips \"been ok.\" Does not impede her activities.).   Allergic/Immunologic: Negative.    Neurological:  Positive for numbness (paresthesias of the right toes.  Again says not worse. The symptoms of the left foot and hands have resolved).   Hematological: Negative.    Psychiatric/Behavioral:  Negative for depressed mood (Resolution of irritability and grouchiness since stopping Arimidex last 08/31/2021.). The patient is not nervous/anxious (\"not bad\").      /72   Pulse 89   Temp 98.7 °F (37.1 °C) (Temporal)   Resp 18   Ht 152.4 cm (60\")   Wt 73.5 kg (162 lb)   LMP  (LMP Unknown)   SpO2 96%   BMI 31.64 kg/m²  Body surface area is 1.71 meters squared.  Pain Score    06/26/25 1511   PainSc: 0-No pain     Physical Exam:  Physical Exam   Constitutional: She is oriented to person, place, and time. She appears well-developed and well-nourished. No distress.   Pleasant, heavy set, cooperative, modestly kept female.  Appears her age.  ECOG 0.      She has lost 2 lb (had regained 1 lb at her prior visit) in the interval.   HENT:   Head: Normocephalic and atraumatic. Mouth/Throat: No oropharyngeal exudate.   Eyes: Pupils are equal, round, and reactive to light. Conjunctivae are normal. No scleral icterus.   Neck: No JVD present. No tracheal deviation present. No thyromegaly present.   Cardiovascular: Normal rate, regular " "rhythm and normal heart sounds. Exam reveals no friction rub.   No murmur heard.  Pulmonary/Chest: Effort normal and breath sounds normal. No stridor. No respiratory distress. She has no wheezes. She has no rales.   Chaperoned exam:  Katelin Rahman RN    Port site in the left upper chest is healed.  The device has been removed.    Left breast lumpectomy and sentinel node biopsy site in the left axilla are healed.  Unchanged subtle skin induration.  Prior radiation associated skin changes are no longer evident.  The rest of the breast was without obvious nodularity skin changes, no nipple discharge.      Right breast without masses, skin changes nor nipple discharge.    No associated axillary nor supraclavicular adenopathy bilaterally.   Abdominal: Soft. Bowel sounds are normal. She exhibits no distension and no mass. There is no abdominal tenderness. There is no rebound and no guarding.   Musculoskeletal: Normal range of motion. No deformity.   Lymphadenopathy:     She has no cervical adenopathy.        Right cervical: No superficial cervical, no deep cervical and no posterior cervical adenopathy present.       Left cervical: No superficial cervical, no deep cervical and no posterior cervical adenopathy present.        Right: No supraclavicular adenopathy present.        Left: No supraclavicular adenopathy present.   Neurological: She is alert and oriented to person, place, and time. She displays normal reflexes. No cranial nerve deficit. She exhibits normal muscle tone. Coordination normal.   Skin: Skin is warm and dry. No rash noted. No erythema. No pallor.   Again deeply tanned.  \"I have a pool.\"   Psychiatric: Her behavior is normal. Judgment and thought content normal.   Nursing note and vitals reviewed.      ASSESSMENT:   1.  Invasive and in situ ductal carcinoma  left breast.                 Stage:  IA (pT1a, pN0, G2) ER  100% positive, MS  25%, HER-2/lamberto - 2+ (IHC)/FISH -a focal positive score is present in " an area corresponding to approximately 15% of the tumor (signal ratio: 2.9).                 Tumor Rector:  5 mm.  Tumor focality: Single focus of invasive carcinoma.  No EIC nor LCIS.  Lymphovascular invasion: Not identified.  Dermal lymphovascular invasion: No skin present.  Microcalcifications: Not identified.  Margins uninvolved by invasive carcinoma.  Lymph nodes: Number of lymph nodes examined: 1 (0/1).                 Tumor Status:    -- Underwent lumpectomy and SNB, 01/10/2020.   -- Completed adjuvant paclitaxel, 05/27/2020; adjuvant Herceptin, 02/10/2021; adjuvant radiation, 08/06/2020 (above)  -- Adjuvant Arimidex since 02/03/2020 through 09/27/2021-stopped due to depression, and irritability.  -- Adjuvant Femara 2.5 mg p.o. daily beginning 09/28/2021 through present  -- 01/24/2025- mammogram.. No mammographic evidence of malignancy. Posttreatment changes upper outer left breast posterior depth. Recommendation is routine mammography in one year or sooner if clinically  indicated. BIRADS Category 2 - Benign findings           2.   Obesity.  Moderate (BMI 32 - max: 34)        3.   Spinal stenosis neck, quiescent         4.   Herniated cervical disc without myelopathy        5.   Anemia, previously chemo associated.    --Resolved, Hgb 13.7, 6/19/25 (prior: Hgb 11.5-13.8)  -- 02/04/2022-colonoscopy--no residual polyps otherwise normal.  Repeat 5 years        6.   GERD, quiescent        7.   Mood changes, to include mood lability, depression, irritability and grouchiness.  Resolved since stopping Arimidex.        8.   Osteopenia.  Improved on Oscal+Prolia  --6/17/2024- DEXA scan-Normal. Bone density is no lower than one standard deviation below the mean for young adult woman. When compared to the previous exam of 6/15/2022 there has been progressive bone loss within the lumbar spine of -4.0%. Slight increase within the hip of +0.5% is demonstrated.  --06/15/2022- DEXA scan: Osteopenia of the left hip.  Low bone  mass.  Bone density 1-2 0.5 SD below the mean.  Increased risk of fracture.  --06/28/2022- Prolia 60 mg SC,          9.   Belly pains with diarrhea (resolved after course of Flagyl per Dr. Edmond) and trivially elevated ALT 40, AST 38, 11/14/2022.  --11/22/23- Hepatitis profile-negative  --11/30/23- CT abdomen/pelvis- 3 cystic-appearing liver lesions that are all increased in size compared to the prior study. These are lobular in shape and do not have any definite soft tissue enhancement. There is a 7 mm indeterminate lesion in the right hepatic lobe on image 16 series A small metastatic lesion is included in the differential. MRI may be more definitive. There is a probable 1 cm lipoma in the proximal body of the pancreas, stable.  --12/3/22- MRI abdomen-Multiple simple liver cysts, correlating with lesions of concern on  prior CT. No solid liver lesion identified.  --6/14/23- Normal LFTs since       RECOMMENDATIONS:         1.   Apprised of the labs, 6/19/25.  Normal CBC, normal CMP, CEA 1.56, CA-27-29 21.7, B12/folate 273/10.8, repleted serum iron, iron saturation 21% (from 22%; 18%; 20%; 18%; 22%; 21%; 22%; 19%; 23%; 25%; 24%), barely repleted ferritin (73; from 80; 70.9; 85.3; 72; 142; 113; 101; 186; 137; 226; 171; 157; 138; 141; 216).            2.    Femara tolerance discussed.  Continues to do well with no mood lability and no new arthralgias.  Feels she can press on with this.        3.    Review mammogram, 1/24/25 (above). YUDY.        4.    Schedule Mammogram, 1/25/26        5.    Again apprised of DEXA scan, 6/17/24 (above).  Normal.  Remains on calcium and vitamin D.  Started on Prolia beginning 06/28/2022.        6.    Previously noted that we are unable to repeat FISH testing for HER-2 on the tumor specimen from 01/10/2020 -pathology block did not contain any more tumor - personally discussed/confirmed with Dr. Burgos of pathology.        7.   Care previously discussed with Dr. Jaimes on 02/28/2020  after she saw the patient on 02/25/2020 (encounter reviewed).  Pathology was reviewed at [place] and discussed with the patient.  Recommendations/plan: Discussed that since her cancer is strongly ER positive and low histologic grade/low proliferative rate, and HER-2 on the biopsy was borderline recommended repeat HER-2 testing on her surgical pathology.  Defer to his negative proceed with only endocrine therapy and radiation.  If HER-2 is confirmed to be positive, recommend adjuvant weekly Taxol x12+ Herceptin every 3 weeks to complete 1 year of HER-2 directed therapy.  These plans were personally discussed with the patient (via telephone) on 02/28/2020 I also personally asked the pathologist (Dr. Radha Burgos) to send off the biopsy specimen for repeat FISH HER-2 testing.          8.   Reviewed NCCN guidelines version 3.2019 invasive breast cancer.  For tumors (=/> 5 mm), and pN0, recommend adjuvant endocrine therapy +/- adjuvant chemotherapy with trastuzumab (category 2B).                  9.  Rx:   Oscal 600/400 po bid # 60 - OTC  Femara 2.5 mg po qd # 30 - 11 RF  Prolia 60 mg SC every 6 months  Folbee po qd # 30 x 3 RF           10.  Previously discussed the rationale for adjuvant hormonal manipulation with AIs (see above) and potential toxicities of AI therapy are discussed (to include but not limited to: Hot flashes, asthenia, pain, arthralgias, arthritis, nausea/vomiting, headache, pharyngitis, depression, rash, hypertension, lymphedema, insomnia, edema, weight gain, dyspnea, abdominal pain, constipation, hyperlipidemia, osteoporosis, fractures, cough, bone pain, diarrhea, breast pain, paresthesias, infections, cataracts, myalgias, thromboembolism, angina, Lyles-Yung syndrome, erythema multiforme, anemia, leukopenia, stroke, myocardial infarction, osteoporosis, fractures). Questions are answered. She agrees to press on..             11.  Return to the [place] office with pre-office serum iron, iron  saturation, ferritin B12, folate, CMP, CEA, CA 27-29 and CBC and differential in 24 weeks.      MEDICAL DECISION MAKING:  High complexity   AMOUNT OF DATA: Moderate      I spent ~42 minutes caring for Chasidy on this date of service. This time includes time spent by me in the following activities: preparing for the visit, reviewing tests, performing a medically appropriate examination and/or evaluation, counseling and educating the patient/family/caregiver, ordering medications, tests, or procedures and documenting information in the medical record      Cc:  Abigail Jaimes MD- [facility]          MD Jones Guzman MD

## 2025-06-26 ENCOUNTER — INFUSION (OUTPATIENT)
Dept: ONCOLOGY | Facility: HOSPITAL | Age: 65
End: 2025-06-26
Payer: COMMERCIAL

## 2025-06-26 ENCOUNTER — OFFICE VISIT (OUTPATIENT)
Dept: ONCOLOGY | Facility: CLINIC | Age: 65
End: 2025-06-26
Payer: COMMERCIAL

## 2025-06-26 VITALS
BODY MASS INDEX: 31.8 KG/M2 | HEIGHT: 60 IN | OXYGEN SATURATION: 96 % | TEMPERATURE: 98.7 F | DIASTOLIC BLOOD PRESSURE: 72 MMHG | RESPIRATION RATE: 18 BRPM | WEIGHT: 162 LBS | SYSTOLIC BLOOD PRESSURE: 132 MMHG | HEART RATE: 89 BPM

## 2025-06-26 DIAGNOSIS — Z79.811 LONG TERM CURRENT USE OF AROMATASE INHIBITOR: ICD-10-CM

## 2025-06-26 DIAGNOSIS — M85.852 OSTEOPENIA OF LEFT HIP: Primary | ICD-10-CM

## 2025-06-26 DIAGNOSIS — C50.912 INVASIVE DUCTAL CARCINOMA OF BREAST, FEMALE, LEFT: Primary | ICD-10-CM

## 2025-06-26 PROCEDURE — 96372 THER/PROPH/DIAG INJ SC/IM: CPT

## 2025-06-26 PROCEDURE — 25010000002 DENOSUMAB 60 MG/ML SOLUTION PREFILLED SYRINGE: Performed by: INTERNAL MEDICINE

## 2025-06-26 RX ADMIN — DENOSUMAB 60 MG: 60 INJECTION SUBCUTANEOUS at 16:05

## 2025-07-09 ENCOUNTER — TELEPHONE (OUTPATIENT)
Dept: INTERNAL MEDICINE | Facility: CLINIC | Age: 65
End: 2025-07-09
Payer: COMMERCIAL

## (undated) DEVICE — DISPOSABLE IRRIGATION CASSETTE: Brand: CORE

## (undated) DEVICE — GLV SURG BIOGEL LTX PF 6 1/2

## (undated) DEVICE — ANTIBACTERIAL UNDYED BRAIDED (POLYGLACTIN 910), SYNTHETIC ABSORBABLE SUTURE: Brand: COATED VICRYL

## (undated) DEVICE — DRP C/ARMOR

## (undated) DEVICE — ADHS LIQ MASTISOL 2/3ML

## (undated) DEVICE — CONN FLX BREATHE CIRCT

## (undated) DEVICE — ELECTRD BLD EDGE/INSUL1P 2.4X5.1MM STRL

## (undated) DEVICE — CVR PROB GEN PURP W/ISOSILK 5X24IN

## (undated) DEVICE — CLTH CLENS READYCLEANSE PERI CARE PK/5

## (undated) DEVICE — PROXIMATE RH ROTATING HEAD SKIN STAPLERS (35 WIDE) CONTAINS 35 STAINLESS STEEL STAPLES: Brand: PROXIMATE

## (undated) DEVICE — SUT SILK 2/0 SUTUPAK TIES 24IN SA75H

## (undated) DEVICE — SYR SLP TP 10ML DISP

## (undated) DEVICE — THE CHANNEL CLEANING BRUSH IS A NYLON FLEXI BRUSH ATTACHED TO A FLEXIBLE PLASTIC SHEATH DESIGNED TO SAFELY REMOVE DEBRIS FROM FLEXIBLE ENDOSCOPES.

## (undated) DEVICE — TRY PREP SCRB VAG PVP

## (undated) DEVICE — PAD MINOR UNIVERSAL: Brand: MEDLINE INDUSTRIES, INC.

## (undated) DEVICE — Device: Brand: DEFENDO AIR/WATER/SUCTION AND BIOPSY VALVE

## (undated) DEVICE — 5.0MM BARREL STRAIGHT FLUTE

## (undated) DEVICE — SENSR O2 OXIMAX FNGR A/ 18IN NONSTR

## (undated) DEVICE — DRSNG SURESITE WNDW 4X4.5

## (undated) DEVICE — ENDOGATOR AUXILIARY WATER JET CONNECTOR: Brand: ENDOGATOR

## (undated) DEVICE — INTENDED FOR TISSUE SEPARATION, AND OTHER PROCEDURES THAT REQUIRE A SHARP SURGICAL BLADE TO PUNCTURE OR CUT.: Brand: BARD-PARKER ® STAINLESS STEEL BLADES

## (undated) DEVICE — PK TURNOVER RM ADV

## (undated) DEVICE — 3M™ STERI-STRIP™ REINFORCED ADHESIVE SKIN CLOSURES, R1547, 1/2 IN X 4 IN (12 MM X 100 MM), 6 STRIPS/ENVELOPE: Brand: 3M™ STERI-STRIP™

## (undated) DEVICE — TBG SMPL FLTR LINE NASL 02/C02 A/ BX/100

## (undated) DEVICE — SPNG GZ 2S 2X2 8PLY STRL PK/2

## (undated) DEVICE — MSK O2 MD CONCENTR A/ LF 7FT 1P/U

## (undated) DEVICE — SUT MNCRYL 4/0 PS2 27IN UD MCP426H

## (undated) DEVICE — PK SPINE CERV ANT 30

## (undated) DEVICE — VAGINAL PREP TRAY: Brand: MEDLINE INDUSTRIES, INC.

## (undated) DEVICE — CUFF,BP,DISP,1 TUBE,ADULT,HP: Brand: MEDLINE

## (undated) DEVICE — ARGYLE YANKAUER BULB TIP WITH VENT: Brand: ARGYLE

## (undated) DEVICE — DISPOSABLE SPECIMEN RADIOGRAPHY SYSTEM WITH LOCALIZING GRID, 4.65" RADIOLUCENT GRID: Brand: ACCUGRID

## (undated) DEVICE — CATH IV ANGIO FEP 12G 3IN LTBLU 10PK

## (undated) DEVICE — ELECTRD BLD EZ CLN MOD XLNG 2.75IN

## (undated) DEVICE — SUT SILK 2/0 SH 30IN K833H

## (undated) DEVICE — 4.0MM PRECISION ROUND

## (undated) DEVICE — MICRO KOVER: Brand: UNBRANDED

## (undated) DEVICE — NDL SPINE 18G 31/2IN PNK

## (undated) DEVICE — FRCP BX RADJAW4 NDL 2.8 240 STD OG

## (undated) DEVICE — TOOL 10CY50BR LEGEND 10CM 5MM BARRELL: Brand: MIDAS REX™

## (undated) DEVICE — CVR UNIV C/ARM

## (undated) DEVICE — CONMED SCOPE SAVER BITE BLOCK, 20X27 MM: Brand: SCOPE SAVER

## (undated) DEVICE — MASK,OXYGEN,MED CONC,ADLT,7' TUB, UC: Brand: PENDING

## (undated) DEVICE — SPNG GZ STRL 2S 4X4 12PLY

## (undated) DEVICE — EMG TUBE 8229707 NIM TRIVANTAGE 7.0MM ID: Brand: NIM TRIVANTAGE™

## (undated) DEVICE — HALTR TRACT HD CERV STD UNIV

## (undated) DEVICE — DECANTER: Brand: UNBRANDED

## (undated) DEVICE — YANKAUER,BULB TIP WITH VENT: Brand: ARGYLE

## (undated) DEVICE — GLV SURG TRIUMPH GREEN W/ALOE PF LTX 8 STRL

## (undated) DEVICE — GLV SURG TRIUMPH GREEN W/ALOE PF LTX 7 STRL

## (undated) DEVICE — SUT PROLN 2/0 SH 36IN 8523H

## (undated) DEVICE — SPNG DISSCT CHRRY 3/8IN STRL PK/5

## (undated) DEVICE — GLV SURG BIOGEL LTX PF 8